# Patient Record
Sex: MALE | Race: WHITE | Employment: OTHER | URBAN - METROPOLITAN AREA
[De-identification: names, ages, dates, MRNs, and addresses within clinical notes are randomized per-mention and may not be internally consistent; named-entity substitution may affect disease eponyms.]

---

## 2017-01-30 ENCOUNTER — HOSPITAL ENCOUNTER (EMERGENCY)
Age: 62
Discharge: HOME | End: 2017-01-30
Attending: EMERGENCY MEDICINE
Payer: SUBSIDIZED

## 2017-01-30 VITALS
TEMPERATURE: 97.9 F | DIASTOLIC BLOOD PRESSURE: 70 MMHG | RESPIRATION RATE: 16 BRPM | SYSTOLIC BLOOD PRESSURE: 144 MMHG | HEART RATE: 64 BPM | OXYGEN SATURATION: 100 % | BODY MASS INDEX: 27.2 KG/M2 | HEIGHT: 70 IN | WEIGHT: 190 LBS

## 2017-01-30 DIAGNOSIS — N40.0 ENLARGED PROSTATE: Primary | ICD-10-CM

## 2017-01-30 PROCEDURE — 81003 URINALYSIS AUTO W/O SCOPE: CPT | Performed by: NURSE PRACTITIONER

## 2017-01-30 PROCEDURE — 99284 EMERGENCY DEPT VISIT MOD MDM: CPT | Performed by: NURSE PRACTITIONER

## 2017-01-30 RX ORDER — DOCUSATE SODIUM 100 MG/1
100 CAPSULE, LIQUID FILLED ORAL 2 TIMES DAILY
Qty: 60 CAP | Refills: 2 | Status: SHIPPED | OUTPATIENT
Start: 2017-01-30 | End: 2017-01-31 | Stop reason: ALTCHOICE

## 2017-01-30 RX ORDER — CIPROFLOXACIN 500 MG/1
500 TABLET ORAL 2 TIMES DAILY
Qty: 28 TAB | Refills: 0 | Status: SHIPPED | OUTPATIENT
Start: 2017-01-30 | End: 2017-02-13

## 2017-01-30 NOTE — PROGRESS NOTES
Visited with patient as no PCP listed in chart. Patient states that he is established with the [facility] and had blood drawn there back in December.   States he is having a hard time getting back in there to review the results and does not want to wait any longer as some of them were abnormal.

## 2017-01-30 NOTE — ED NOTES
I have reviewed discharge instructions with the patient. The patient verbalized understanding. Patient is ambulatory from department in no acute distress. Patient has discharge instructions and prescription x 2 in hand at time of departure.

## 2017-01-30 NOTE — ED PROVIDER NOTES
HPI Comments: 56 y/o m to ed with known elevated psa, dad w prostate cancer, with difficult and painful voids ongoing last 4-6 mos. nwo with painful bm, ribbon like bm at times. No fever or chills, no abd pain. No n/v/d. Admits urgency, frequency, hesitation of urine. Pain at beginning of void as well as with void. psa in dec 43    Patient is a 57 y.o. male presenting with frequency. The history is provided by the patient. No  was used. Urinary Frequency    This is a recurrent problem. Episode onset: 4-5 mos. The problem occurs every urination. The problem has been gradually worsening. Quality: pain at beginning of void, some pain with void. There has been no fever. Associated symptoms include frequency, hesitancy and urgency. Pertinent negatives include no chills, no sweats, no nausea, no vomiting, no discharge, no hematuria, no flank pain, no penile discharge, no abdominal pain and no back pain. History reviewed. No pertinent past medical history. History reviewed. No pertinent past surgical history. History reviewed. No pertinent family history. Social History     Social History    Marital status: SINGLE     Spouse name: N/A    Number of children: N/A    Years of education: N/A     Occupational History    Not on file. Social History Main Topics    Smoking status: Current Every Day Smoker     Packs/day: 0.50    Smokeless tobacco: Not on file      Comment: pt states he vapes    Alcohol use No    Drug use: No    Sexual activity: Not on file     Other Topics Concern    Not on file     Social History Narrative    No narrative on file         ALLERGIES: Review of patient's allergies indicates no known allergies. Review of Systems   Constitutional: Negative for chills and fever. HENT: Negative for ear pain and facial swelling. Eyes: Negative for discharge and redness. Respiratory: Negative for cough and shortness of breath.     Cardiovascular: Negative for chest pain and palpitations. Gastrointestinal: Positive for constipation. Negative for abdominal pain, nausea and vomiting. Endocrine: Negative for cold intolerance and heat intolerance. Genitourinary: Positive for decreased urine volume, difficulty urinating, dysuria, frequency, hesitancy and urgency. Negative for discharge, flank pain, hematuria, penile discharge, penile pain, penile swelling, scrotal swelling and testicular pain. Musculoskeletal: Negative for back pain and neck pain. Skin: Negative for pallor and rash. Neurological: Negative for dizziness and headaches. Psychiatric/Behavioral: Negative for confusion and decreased concentration. Vitals:    01/30/17 1128   BP: 160/90   Pulse: 70   Resp: 19   Temp: 97.9 °F (36.6 °C)   SpO2: 100%   Weight: 86.2 kg (190 lb)   Height: 5' 10\" (1.778 m)            Physical Exam   Constitutional: He is oriented to person, place, and time. He appears well-developed and well-nourished. No distress. HENT:   Head: Normocephalic and atraumatic. Right Ear: External ear normal.   Left Ear: External ear normal.   Nose: Nose normal.   Eyes: Conjunctivae and EOM are normal. Pupils are equal, round, and reactive to light. Neck: Normal range of motion. Neck supple. Cardiovascular: Normal rate, regular rhythm and normal heart sounds. Pulmonary/Chest: Effort normal and breath sounds normal. No respiratory distress. He has no wheezes. Abdominal: Soft. Bowel sounds are normal. He exhibits no distension. There is no tenderness. Genitourinary: Prostate is enlarged. Prostate is not tender. Musculoskeletal: Normal range of motion. He exhibits no edema or tenderness. Neurological: He is alert and oriented to person, place, and time. No cranial nerve deficit. Coordination normal.   Skin: Skin is warm and dry. No rash noted. Psychiatric: He has a normal mood and affect.  His behavior is normal. Judgment and thought content normal.   Nursing note and vitals reviewed. MDM  Number of Diagnoses or Management Options  Diagnosis management comments: 58 y/o m w urinary frequency, hesitancy, pain, as well as painful bm and ribbon like stools at times. psa in dec elevated. Dad with prostate cancer. Pt has tried to get in w free clinic but has had difficulty. His symptoms are problematic for him. Will eval prostate exam when chaperone available   D/w dr Cholo Duong. 12:56 PM  denny RN chaperone for prostate exam.  Prostate enlarged and hard, not tender. Appears symmetric to me. i was unable to palpate entire prostate however due to size. 1:11 PM I have spoken w Cj Ambriz at Dr Juliane Hernandez office. He is on his way there and will call me back as soon as he arrives. 1:54 PM  I have made multiple attempts to reach Dr Timothy Chance office but unable to get through. He has not called back yet. i will dc home with abx to follow w family md on call as well as dr alamo. Pt aware david santana       Amount and/or Complexity of Data Reviewed  Clinical lab tests: reviewed  Discuss the patient with other providers: yes (brown)    Risk of Complications, Morbidity, and/or Mortality  Presenting problems: minimal  Diagnostic procedures: minimal  Management options: minimal    Patient Progress  Patient progress: stable    ED Course       Procedures           I was personally available for consultation in the emergency department. I have reviewed the chart and agree with the documentation recorded by the [facility], including the assessment, treatment plan, and disposition.   Nasim Snider MD

## 2017-01-30 NOTE — ED TRIAGE NOTES
Pt in c/o urinary frequency and dysuria since last June. Pt states he is having difficulty being seen at [facility]. Pt states he believes it is his prostates. Denies n/v/d/fever. Pt states chronic constipation.

## 2017-01-31 PROBLEM — R10.2 PELVIC PAIN: Status: ACTIVE | Noted: 2017-01-31

## 2017-01-31 PROBLEM — N41.0 ACUTE PROSTATITIS: Status: ACTIVE | Noted: 2017-01-31

## 2017-01-31 PROBLEM — N40.2 PROSTATIC NODULE: Status: ACTIVE | Noted: 2017-01-31

## 2017-01-31 PROBLEM — R97.20 ELEVATED PSA: Status: ACTIVE | Noted: 2017-01-31

## 2017-01-31 PROBLEM — Z80.42 FAMILY HISTORY OF PROSTATE CANCER IN FATHER: Status: ACTIVE | Noted: 2017-01-31

## 2017-02-08 ENCOUNTER — HOSPITAL ENCOUNTER (OUTPATIENT)
Dept: LAB | Age: 62
Discharge: HOME | End: 2017-02-08

## 2017-02-08 PROCEDURE — 88305 TISSUE EXAM BY PATHOLOGIST: CPT | Performed by: UROLOGY

## 2017-02-27 ENCOUNTER — HOSPITAL ENCOUNTER (OUTPATIENT)
Dept: CT IMAGING | Age: 62
Discharge: HOME | End: 2017-02-27
Attending: UROLOGY
Payer: SUBSIDIZED

## 2017-02-27 VITALS — HEIGHT: 70 IN | BODY MASS INDEX: 26.48 KG/M2 | WEIGHT: 185 LBS

## 2017-02-27 DIAGNOSIS — C61 PROSTATIC ADENOCARCINOMA (HCC): ICD-10-CM

## 2017-02-27 PROCEDURE — 74011636320 HC RX REV CODE- 636/320: Performed by: UROLOGY

## 2017-02-27 PROCEDURE — 74011000258 HC RX REV CODE- 258: Performed by: UROLOGY

## 2017-02-27 PROCEDURE — 74177 CT ABD & PELVIS W/CONTRAST: CPT

## 2017-02-27 RX ORDER — SODIUM CHLORIDE 0.9 % (FLUSH) 0.9 %
10 SYRINGE (ML) INJECTION
Status: COMPLETED | OUTPATIENT
Start: 2017-02-27 | End: 2017-02-27

## 2017-02-27 RX ADMIN — DIATRIZOATE MEGLUMINE AND DIATRIZOATE SODIUM 15 ML: 660; 100 LIQUID ORAL; RECTAL at 13:04

## 2017-02-27 RX ADMIN — SODIUM CHLORIDE 100 ML: 900 INJECTION, SOLUTION INTRAVENOUS at 13:05

## 2017-02-27 RX ADMIN — IOVERSOL 100 ML: 741 INJECTION INTRA-ARTERIAL; INTRAVENOUS at 13:04

## 2017-02-27 RX ADMIN — Medication 10 ML: at 13:05

## 2017-03-01 ENCOUNTER — HOSPITAL ENCOUNTER (OUTPATIENT)
Dept: NUCLEAR MEDICINE | Age: 62
Discharge: HOME | End: 2017-03-01
Attending: UROLOGY
Payer: SUBSIDIZED

## 2017-03-01 DIAGNOSIS — C61 PROSTATIC ADENOCARCINOMA (HCC): ICD-10-CM

## 2017-03-01 PROCEDURE — 78306 BONE IMAGING WHOLE BODY: CPT

## 2017-03-02 ENCOUNTER — HOSPITAL ENCOUNTER (OUTPATIENT)
Dept: SURGERY | Age: 62
Discharge: HOME | End: 2017-03-02

## 2017-03-02 VITALS
OXYGEN SATURATION: 97 % | HEIGHT: 70 IN | RESPIRATION RATE: 16 BRPM | TEMPERATURE: 99 F | WEIGHT: 199.56 LBS | DIASTOLIC BLOOD PRESSURE: 87 MMHG | BODY MASS INDEX: 28.57 KG/M2 | SYSTOLIC BLOOD PRESSURE: 119 MMHG | HEART RATE: 66 BPM

## 2017-03-02 NOTE — PERIOP NOTES
Patient verified name, , and surgery as listed in [facility]. TYPE  CASE:1B  Orders per surgeon: yes Received  Labs per surgeon:\"no labs needed\"  Labs per anesthesia protocol: none  EKG  :  Not needed-pt denies      Patient provided with handouts including guide to surgery , transfusions, pain management and hand hygiene for the family and community. Pt verbalizes understanding of all pre-op instructions . Instructed that family must be present in building at all times. Hibiclens and instructions given per hospital policy. Instructed patient to continue  previous medications as prescribed prior to surgery and  to take the following medications the day of surgery according to anesthesia guidelines : none       Original medication prescription bottles NOT visualized during patient appointment. Continue all previous medications unless otherwise directed. Instructed patient to hold  the following medications prior to surgery: none       Patient verbalized understanding of all instructions and provided all medical/health information to the best of their ability.

## 2017-03-06 ENCOUNTER — ANESTHESIA EVENT (OUTPATIENT)
Dept: SURGERY | Age: 62
End: 2017-03-06
Payer: SUBSIDIZED

## 2017-03-07 ENCOUNTER — ANESTHESIA (OUTPATIENT)
Dept: SURGERY | Age: 62
End: 2017-03-07
Payer: SUBSIDIZED

## 2017-03-07 ENCOUNTER — HOSPITAL ENCOUNTER (OUTPATIENT)
Age: 62
Setting detail: OUTPATIENT SURGERY
Discharge: HOME | End: 2017-03-07
Attending: UROLOGY | Admitting: UROLOGY
Payer: SUBSIDIZED

## 2017-03-07 VITALS
DIASTOLIC BLOOD PRESSURE: 72 MMHG | TEMPERATURE: 98 F | SYSTOLIC BLOOD PRESSURE: 121 MMHG | RESPIRATION RATE: 26 BRPM | BODY MASS INDEX: 27.57 KG/M2 | WEIGHT: 192.56 LBS | HEIGHT: 70 IN | OXYGEN SATURATION: 92 % | HEART RATE: 52 BPM

## 2017-03-07 PROCEDURE — 76060000033 HC ANESTHESIA 1 TO 1.5 HR: Performed by: UROLOGY

## 2017-03-07 PROCEDURE — 76210000021 HC REC RM PH II 0.5 TO 1 HR: Performed by: UROLOGY

## 2017-03-07 PROCEDURE — 77030018836 HC SOL IRR NACL ICUM -A: Performed by: UROLOGY

## 2017-03-07 PROCEDURE — 74011250636 HC RX REV CODE- 250/636: Performed by: UROLOGY

## 2017-03-07 PROCEDURE — 77030020782 HC GWN BAIR PAWS FLX 3M -B: Performed by: ANESTHESIOLOGY

## 2017-03-07 PROCEDURE — 77030032490 HC SLV COMPR SCD KNE COVD -B: Performed by: UROLOGY

## 2017-03-07 PROCEDURE — 74011250637 HC RX REV CODE- 250/637: Performed by: ANESTHESIOLOGY

## 2017-03-07 PROCEDURE — 77030002912 HC SUT ETHBND J&J -A: Performed by: UROLOGY

## 2017-03-07 PROCEDURE — 88305 TISSUE EXAM BY PATHOLOGIST: CPT | Performed by: UROLOGY

## 2017-03-07 PROCEDURE — 74011250636 HC RX REV CODE- 250/636

## 2017-03-07 PROCEDURE — 76210000016 HC OR PH I REC 1 TO 1.5 HR: Performed by: UROLOGY

## 2017-03-07 PROCEDURE — 77030020143 HC AIRWY LARYN INTUB CGAS -A: Performed by: ANESTHESIOLOGY

## 2017-03-07 PROCEDURE — 74011250636 HC RX REV CODE- 250/636: Performed by: ANESTHESIOLOGY

## 2017-03-07 PROCEDURE — 77030011640 HC PAD GRND REM COVD -A: Performed by: UROLOGY

## 2017-03-07 PROCEDURE — 74011000250 HC RX REV CODE- 250: Performed by: UROLOGY

## 2017-03-07 PROCEDURE — 77030031139 HC SUT VCRL2 J&J -A: Performed by: UROLOGY

## 2017-03-07 PROCEDURE — 74011000250 HC RX REV CODE- 250

## 2017-03-07 PROCEDURE — 77030020407 HC IV BLD WRMR ST 3M -A: Performed by: ANESTHESIOLOGY

## 2017-03-07 PROCEDURE — 76010000149 HC OR TIME 1 TO 1.5 HR: Performed by: UROLOGY

## 2017-03-07 PROCEDURE — 77030012406 HC DRN WND PENRS BARD -A: Performed by: UROLOGY

## 2017-03-07 RX ORDER — SODIUM CHLORIDE 0.9 % (FLUSH) 0.9 %
5-10 SYRINGE (ML) INJECTION AS NEEDED
Status: DISCONTINUED | OUTPATIENT
Start: 2017-03-07 | End: 2017-03-07 | Stop reason: HOSPADM

## 2017-03-07 RX ORDER — MIDAZOLAM HYDROCHLORIDE 1 MG/ML
2 INJECTION, SOLUTION INTRAMUSCULAR; INTRAVENOUS ONCE
Status: COMPLETED | OUTPATIENT
Start: 2017-03-07 | End: 2017-03-07

## 2017-03-07 RX ORDER — FENTANYL CITRATE 50 UG/ML
100 INJECTION, SOLUTION INTRAMUSCULAR; INTRAVENOUS ONCE
Status: DISCONTINUED | OUTPATIENT
Start: 2017-03-07 | End: 2017-03-07 | Stop reason: HOSPADM

## 2017-03-07 RX ORDER — SODIUM CHLORIDE 0.9 % (FLUSH) 0.9 %
5-10 SYRINGE (ML) INJECTION EVERY 8 HOURS
Status: DISCONTINUED | OUTPATIENT
Start: 2017-03-07 | End: 2017-03-07 | Stop reason: HOSPADM

## 2017-03-07 RX ORDER — ONDANSETRON 2 MG/ML
INJECTION INTRAMUSCULAR; INTRAVENOUS AS NEEDED
Status: DISCONTINUED | OUTPATIENT
Start: 2017-03-07 | End: 2017-03-07 | Stop reason: HOSPADM

## 2017-03-07 RX ORDER — BUPIVACAINE HYDROCHLORIDE 5 MG/ML
INJECTION, SOLUTION EPIDURAL; INTRACAUDAL AS NEEDED
Status: DISCONTINUED | OUTPATIENT
Start: 2017-03-07 | End: 2017-03-07 | Stop reason: HOSPADM

## 2017-03-07 RX ORDER — SODIUM CHLORIDE, SODIUM LACTATE, POTASSIUM CHLORIDE, CALCIUM CHLORIDE 600; 310; 30; 20 MG/100ML; MG/100ML; MG/100ML; MG/100ML
100 INJECTION, SOLUTION INTRAVENOUS CONTINUOUS
Status: DISCONTINUED | OUTPATIENT
Start: 2017-03-07 | End: 2017-03-07 | Stop reason: HOSPADM

## 2017-03-07 RX ORDER — OXYCODONE HYDROCHLORIDE 5 MG/1
5 TABLET ORAL
Status: DISCONTINUED | OUTPATIENT
Start: 2017-03-07 | End: 2017-03-07 | Stop reason: HOSPADM

## 2017-03-07 RX ORDER — OXYCODONE HYDROCHLORIDE 5 MG/1
10 TABLET ORAL
Status: DISCONTINUED | OUTPATIENT
Start: 2017-03-07 | End: 2017-03-07 | Stop reason: HOSPADM

## 2017-03-07 RX ORDER — ACETAMINOPHEN 500 MG
1000 TABLET ORAL ONCE
Status: DISCONTINUED | OUTPATIENT
Start: 2017-03-07 | End: 2017-03-07 | Stop reason: HOSPADM

## 2017-03-07 RX ORDER — PROPOFOL 10 MG/ML
INJECTION, EMULSION INTRAVENOUS AS NEEDED
Status: DISCONTINUED | OUTPATIENT
Start: 2017-03-07 | End: 2017-03-07 | Stop reason: HOSPADM

## 2017-03-07 RX ORDER — KETOROLAC TROMETHAMINE 30 MG/ML
INJECTION, SOLUTION INTRAMUSCULAR; INTRAVENOUS AS NEEDED
Status: DISCONTINUED | OUTPATIENT
Start: 2017-03-07 | End: 2017-03-07 | Stop reason: HOSPADM

## 2017-03-07 RX ORDER — FLUMAZENIL 0.1 MG/ML
0.2 INJECTION INTRAVENOUS
Status: DISCONTINUED | OUTPATIENT
Start: 2017-03-07 | End: 2017-03-07 | Stop reason: HOSPADM

## 2017-03-07 RX ORDER — MIDAZOLAM HYDROCHLORIDE 1 MG/ML
2 INJECTION, SOLUTION INTRAMUSCULAR; INTRAVENOUS
Status: DISCONTINUED | OUTPATIENT
Start: 2017-03-07 | End: 2017-03-07 | Stop reason: HOSPADM

## 2017-03-07 RX ORDER — CEFAZOLIN SODIUM IN 0.9 % NACL 2 G/50 ML
2 INTRAVENOUS SOLUTION, PIGGYBACK (ML) INTRAVENOUS ONCE
Status: COMPLETED | OUTPATIENT
Start: 2017-03-07 | End: 2017-03-07

## 2017-03-07 RX ORDER — OXYCODONE AND ACETAMINOPHEN 7.5; 325 MG/1; MG/1
1 TABLET ORAL
Qty: 30 TAB | Refills: 0 | Status: SHIPPED | OUTPATIENT
Start: 2017-03-07 | End: 2017-05-23 | Stop reason: ALTCHOICE

## 2017-03-07 RX ORDER — DEXAMETHASONE SODIUM PHOSPHATE 4 MG/ML
INJECTION, SOLUTION INTRA-ARTICULAR; INTRALESIONAL; INTRAMUSCULAR; INTRAVENOUS; SOFT TISSUE AS NEEDED
Status: DISCONTINUED | OUTPATIENT
Start: 2017-03-07 | End: 2017-03-07 | Stop reason: HOSPADM

## 2017-03-07 RX ORDER — FENTANYL CITRATE 50 UG/ML
INJECTION, SOLUTION INTRAMUSCULAR; INTRAVENOUS AS NEEDED
Status: DISCONTINUED | OUTPATIENT
Start: 2017-03-07 | End: 2017-03-07 | Stop reason: HOSPADM

## 2017-03-07 RX ORDER — NALOXONE HYDROCHLORIDE 0.4 MG/ML
0.2 INJECTION, SOLUTION INTRAMUSCULAR; INTRAVENOUS; SUBCUTANEOUS AS NEEDED
Status: DISCONTINUED | OUTPATIENT
Start: 2017-03-07 | End: 2017-03-07 | Stop reason: HOSPADM

## 2017-03-07 RX ORDER — DIPHENHYDRAMINE HYDROCHLORIDE 50 MG/ML
12.5 INJECTION, SOLUTION INTRAMUSCULAR; INTRAVENOUS
Status: DISCONTINUED | OUTPATIENT
Start: 2017-03-07 | End: 2017-03-07 | Stop reason: HOSPADM

## 2017-03-07 RX ORDER — LIDOCAINE HYDROCHLORIDE 20 MG/ML
INJECTION, SOLUTION EPIDURAL; INFILTRATION; INTRACAUDAL; PERINEURAL AS NEEDED
Status: DISCONTINUED | OUTPATIENT
Start: 2017-03-07 | End: 2017-03-07 | Stop reason: HOSPADM

## 2017-03-07 RX ORDER — HYDROMORPHONE HYDROCHLORIDE 2 MG/ML
0.5 INJECTION, SOLUTION INTRAMUSCULAR; INTRAVENOUS; SUBCUTANEOUS
Status: DISCONTINUED | OUTPATIENT
Start: 2017-03-07 | End: 2017-03-07 | Stop reason: HOSPADM

## 2017-03-07 RX ORDER — LIDOCAINE HYDROCHLORIDE 10 MG/ML
0.1 INJECTION INFILTRATION; PERINEURAL AS NEEDED
Status: DISCONTINUED | OUTPATIENT
Start: 2017-03-07 | End: 2017-03-07 | Stop reason: HOSPADM

## 2017-03-07 RX ORDER — CEPHALEXIN 500 MG/1
500 CAPSULE ORAL 4 TIMES DAILY
Qty: 20 CAP | Refills: 0 | Status: SHIPPED | OUTPATIENT
Start: 2017-03-07 | End: 2017-03-12

## 2017-03-07 RX ADMIN — MIDAZOLAM HYDROCHLORIDE 2 MG: 1 INJECTION, SOLUTION INTRAMUSCULAR; INTRAVENOUS at 14:15

## 2017-03-07 RX ADMIN — FENTANYL CITRATE 25 MCG: 50 INJECTION, SOLUTION INTRAMUSCULAR; INTRAVENOUS at 15:01

## 2017-03-07 RX ADMIN — FENTANYL CITRATE 50 MCG: 50 INJECTION, SOLUTION INTRAMUSCULAR; INTRAVENOUS at 15:05

## 2017-03-07 RX ADMIN — FENTANYL CITRATE 50 MCG: 50 INJECTION, SOLUTION INTRAMUSCULAR; INTRAVENOUS at 14:35

## 2017-03-07 RX ADMIN — KETOROLAC TROMETHAMINE 30 MG: 30 INJECTION, SOLUTION INTRAMUSCULAR; INTRAVENOUS at 15:22

## 2017-03-07 RX ADMIN — FENTANYL CITRATE 25 MCG: 50 INJECTION, SOLUTION INTRAMUSCULAR; INTRAVENOUS at 14:53

## 2017-03-07 RX ADMIN — SODIUM CHLORIDE, SODIUM LACTATE, POTASSIUM CHLORIDE, AND CALCIUM CHLORIDE 100 ML/HR: 600; 310; 30; 20 INJECTION, SOLUTION INTRAVENOUS at 12:25

## 2017-03-07 RX ADMIN — CEFAZOLIN 2 G: 1 INJECTION, POWDER, FOR SOLUTION INTRAMUSCULAR; INTRAVENOUS; PARENTERAL at 14:27

## 2017-03-07 RX ADMIN — ONDANSETRON 4 MG: 2 INJECTION INTRAMUSCULAR; INTRAVENOUS at 15:22

## 2017-03-07 RX ADMIN — DEXAMETHASONE SODIUM PHOSPHATE 4 MG: 4 INJECTION, SOLUTION INTRA-ARTICULAR; INTRALESIONAL; INTRAMUSCULAR; INTRAVENOUS; SOFT TISSUE at 14:43

## 2017-03-07 RX ADMIN — PROPOFOL 200 MG: 10 INJECTION, EMULSION INTRAVENOUS at 14:37

## 2017-03-07 RX ADMIN — OXYCODONE HYDROCHLORIDE 10 MG: 5 TABLET ORAL at 16:38

## 2017-03-07 RX ADMIN — LIDOCAINE HYDROCHLORIDE 100 MG: 20 INJECTION, SOLUTION EPIDURAL; INFILTRATION; INTRACAUDAL; PERINEURAL at 14:37

## 2017-03-07 NOTE — ANESTHESIA POSTPROCEDURE EVALUATION
Post-Anesthesia Evaluation and Assessment    Patient: Lobo Camacho MRN: [id number]  SSN: [id number]    YOB: 1955  Age: 57 y.o. Sex: male       Cardiovascular Function/Vital Signs  Visit Vitals    /73    Pulse (!) 51    Temp 36.3 °C (97.4 °F)    Resp 20    Ht 5' 10\" (1.778 m)    Wt 87.3 kg (192 lb 9 oz)    SpO2 93%    BMI 27.63 kg/m2       Patient is status post general anesthesia for Procedure(s):  BILATERAL SCROTAL ORCHIECTOMY. Nausea/Vomiting: None    Postoperative hydration reviewed and adequate. Pain:  Pain Scale 1: Numeric (0 - 10) (03/07/17 1638)  Pain Intensity 1: 0 (03/07/17 1638)   Managed    Neurological Status:   Neuro (WDL): Within Defined Limits (03/07/17 1538)   At baseline    Mental Status and Level of Consciousness: Arousable    Pulmonary Status:   O2 Device: Nasal cannula (03/07/17 1538)   Adequate oxygenation and airway patent    Complications related to anesthesia: None    Post-anesthesia assessment completed.  No concerns    Signed By: Carloz Henriquez MD     March 7, 2017

## 2017-03-07 NOTE — IP AVS SNAPSHOT
Yee Saavedra 
 
 
 [address] [facility] [address] 
[phone] Patient: Farrah Ortiz MRN: [id number] :1955 You are allergic to the following No active allergies Recent Documentation Height Weight BMI Smoking Status 1.778 m 87.3 kg 27.63 kg/m2 Former Smoker Emergency Contacts Name Discharge Info Relation Home Work Mobile Lenard Johnson  Friend [5] [phone] About your hospitalization You were admitted on:  2017 [facility] You were discharged on:  2017 Unit phone number:  [phone] Why you were hospitalized Your primary diagnosis was:  Not on File Providers Seen During Your Hospitalizations Provider Role Specialty Primary office phone Madalyn Griffin MD Attending Provider Urology [phone] Your Primary Care Physician (PCP) Primary Care Physician Office Phone Office Fax NONE ** None ** ** None ** Follow-up Information Follow up With Details Comments Contact Info None   None ([phone]) Patient stated that they have no PCP Madalyn Griffin MD Schedule an appointment as soon as possible for a visit today For follow up in 2-4 weeks [address] [address] 
[phone] Current Discharge Medication List  
  
START taking these medications Dose & Instructions Dispensing Information Comments Morning Noon Evening Bedtime  
 cephALEXin 500 mg capsule Commonly known as:  Andre Fogo Your next dose is: Today, Tomorrow Other:  _________ Dose:  500 mg Take 1 Cap by mouth four (4) times daily for 5 days. Quantity:  20 Cap Refills:  0  
     
   
   
   
  
 oxyCODONE-acetaminophen 7.5-325 mg per tablet Commonly known as:  PERCOCET 7.5 Your next dose is: Today, Tomorrow Other:  _________ Dose:  1 Tab Take 1 Tab by mouth every four (4) hours as needed for Pain. Max Daily Amount: 6 Tabs. Indications: Pain Quantity:  30 Tab Refills:  0 CONTINUE these medications which have CHANGED Dose & Instructions Dispensing Information Comments Morning Noon Evening Bedtime  
 bicalutamide 50 mg tablet Commonly known as:  CASODEX What changed:  when to take this Your next dose is: Today, Tomorrow Other:  _________ Dose:  50 mg Take 1 Tab by mouth daily. Indications: advanced prostatic carcinoma Quantity:  30 Tab Refills:  2 CONTINUE these medications which have NOT CHANGED Dose & Instructions Dispensing Information Comments Morning Noon Evening Bedtime RAPAFLO PO Your next dose is: Today, Tomorrow Other:  _________ Dose:  1 Cap Take 1 Cap by mouth nightly. Unknown dosage  - will bring DOS Refills:  0 Where to Get Your Medications Information on where to get these meds will be given to you by the nurse or doctor. ! Ask your nurse or doctor about these medications  
  cephALEXin 500 mg capsule  
 oxyCODONE-acetaminophen 7.5-325 mg per tablet Discharge Instructions *Resume diet and limit activity for 5 to 7 days *Ice pack 12 hr and gauze dressing change daily.   
*May take shower tomorrow afternoon and place clean dressing and underwear.     
*F/U in 2 to 4  weeks ACTIVITY · As tolerated and as directed by your doctor. · Bathe or shower as directed by your doctor. DIET · Clear liquids until no nausea or vomiting; then light diet for the first day. · Advance to regular diet on second day, unless your doctor orders otherwise. · If nausea and vomiting continues, call your doctor. PAIN 
· Take pain medication as directed by your doctor. · Call your doctor if pain is NOT relieved by medication. · DO NOT take aspirin of blood thinners unless directed by your doctor. DRESSING CARE  
 
 
CALL YOUR DOCTOR IF  
· Excessive bleeding that does not stop after holding pressure over the area · Temperature of 101 degrees F or above · Excessive redness, swelling or bruising, and/ or green or yellow, smelly discharge from incision AFTER ANESTHESIA · For the first 24 hours: DO NOT Drive, Drink alcoholic beverages, or Make important decisions. · Be aware of dizziness following anesthesia and while taking pain medication. APPOINTMENT DATE/ TIME 
 
YOUR DOCTOR'S PHONE NUMBER  
 
 
DISCHARGE SUMMARY from Nurse PATIENT INSTRUCTIONS: 
 
After general anesthesia or intravenous sedation, for 24 hours or while taking prescription Narcotics: · Limit your activities · Do not drive and operate hazardous machinery · Do not make important personal or business decisions · Do  not drink alcoholic beverages · If you have not urinated within 8 hours after discharge, please contact your surgeon on call. *  Please give a list of your current medications to your Primary Care Provider. *  Please update this list whenever your medications are discontinued, doses are 
    changed, or new medications (including over-the-counter products) are added. *  Please carry medication information at all times in case of emergency situations. These are general instructions for a healthy lifestyle: No smoking/ No tobacco products/ Avoid exposure to second hand smoke Surgeon General's Warning:  Quitting smoking now greatly reduces serious risk to your health. Obesity, smoking, and sedentary lifestyle greatly increases your risk for illness A healthy diet, regular physical exercise & weight monitoring are important for maintaining a healthy lifestyle You may be retaining fluid if you have a history of heart failure or if you experience any of the following symptoms:  Weight gain of 3 pounds or more overnight or 5 pounds in a week, increased swelling in our hands or feet or shortness of breath while lying flat in bed. Please call your doctor as soon as you notice any of these symptoms; do not wait until your next office visit. Recognize signs and symptoms of STROKE: 
 
F-face looks uneven A-arms unable to move or move unevenly S-speech slurred or non-existent T-time-call 911 as soon as signs and symptoms begin-DO NOT go Back to bed or wait to see if you get better-TIME IS BRAIN. Discharge Orders None [facility]! Dear Domi Carbajal: 
Thank you for requesting a [employer]etch[employer] account. Our records indicate that you already have an active Fetch[employer] account. You can access your account anytime at [email or web address] ([email or web address] Did you know that you can access your hospital and ER discharge instructions at any time in [employer]etch[employer]? You can also review all of your test results from your hospital stay or ER visit. Additional Information If you have questions, please visit the Frequently Asked Questions section of the [employer]etch[employer] website at [email or web address] ([email or web address] Remember, Fetch[employer] is NOT to be used for urgent needs. For medical emergencies, dial 911. Now available from your iPhone and Android! General Information Please provide this summary of care documentation to your next provider. Patient Signature:  ____________________________________________________________ Date:  ____________________________________________________________  
  
Amauri Barone Provider Signature:  ____________________________________________________________ Date:  ____________________________________________________________

## 2017-03-07 NOTE — ANESTHESIA PREPROCEDURE EVALUATION
Anesthetic History   No history of anesthetic complications            Review of Systems / Medical History  Patient summary reviewed and pertinent labs reviewed    Pulmonary          Smoker         Neuro/Psych   Within defined limits           Cardiovascular                  Exercise tolerance: >4 METS     GI/Hepatic/Renal  Within defined limits              Endo/Other        Cancer (prostate)     Other Findings            Physical Exam    Airway  Mallampati: III  TM Distance: 4 - 6 cm  Neck ROM: normal range of motion   Mouth opening: Normal     Cardiovascular    Rhythm: regular  Rate: normal         Dental         Pulmonary  Breath sounds clear to auscultation               Abdominal         Other Findings            Anesthetic Plan    ASA: 2  Anesthesia type: general          Induction: Intravenous  Anesthetic plan and risks discussed with: Patient

## 2017-03-07 NOTE — BRIEF OP NOTE
BRIEF OPERATIVE NOTE    Date of Procedure: 3/7/2017   Preoperative Diagnosis: Prostate cancer ([facility] [address] [C61]  Postoperative Diagnosis: Prostate cancer ([facility] [address] Jailyn Dick    Procedure(s):  BILATERAL SCROTAL ORCHIECTOMY  Surgeon(s) and Role:     * Srikanth Ray MD - Primary            Surgical Staff:  Circ-1: Darryl Manjarrez RN  Scrub Tech-1: Shai Cuadra CNA  Scrub Tech-2: Ronda Harrison  Event Time In   Incision Start 1504   Incision Close 1522     Anesthesia: MAC   Estimated Blood Loss: min  Specimens:   ID Type Source Tests Collected by Time Destination   1 : RIGHT AND LEFT TESTICLES Preservative Testicle  Srikanth Ray MD 3/7/2017 1519 Pathology      Findings: note   Complications: none  Implants: * No implants in log *

## 2017-03-07 NOTE — DISCHARGE INSTRUCTIONS
*Resume diet and limit activity for 5 to 7 days  *Ice pack 12 hr and gauze dressing change daily.    *May take shower tomorrow afternoon and place clean dressing and underwear.      *F/U in 2 to 4  weeks    ACTIVITY  · As tolerated and as directed by your doctor. · Bathe or shower as directed by your doctor. DIET  · Clear liquids until no nausea or vomiting; then light diet for the first day. · Advance to regular diet on second day, unless your doctor orders otherwise. · If nausea and vomiting continues, call your doctor. PAIN  · Take pain medication as directed by your doctor. · Call your doctor if pain is NOT relieved by medication. · DO NOT take aspirin of blood thinners unless directed by your doctor. DRESSING CARE       CALL YOUR DOCTOR IF   · Excessive bleeding that does not stop after holding pressure over the area  · Temperature of 101 degrees F or above  · Excessive redness, swelling or bruising, and/ or green or yellow, smelly discharge from incision    AFTER ANESTHESIA   · For the first 24 hours: DO NOT Drive, Drink alcoholic beverages, or Make important decisions. · Be aware of dizziness following anesthesia and while taking pain medication. APPOINTMENT DATE/ TIME    YOUR DOCTOR'S PHONE NUMBER       DISCHARGE SUMMARY from Nurse    PATIENT INSTRUCTIONS:    After general anesthesia or intravenous sedation, for 24 hours or while taking prescription Narcotics:  · Limit your activities  · Do not drive and operate hazardous machinery  · Do not make important personal or business decisions  · Do  not drink alcoholic beverages  · If you have not urinated within 8 hours after discharge, please contact your surgeon on call. *  Please give a list of your current medications to your Primary Care Provider. *  Please update this list whenever your medications are discontinued, doses are      changed, or new medications (including over-the-counter products) are added.     *  Please carry medication information at all times in case of emergency situations. These are general instructions for a healthy lifestyle:    No smoking/ No tobacco products/ Avoid exposure to second hand smoke    Surgeon General's Warning:  Quitting smoking now greatly reduces serious risk to your health. Obesity, smoking, and sedentary lifestyle greatly increases your risk for illness    A healthy diet, regular physical exercise & weight monitoring are important for maintaining a healthy lifestyle    You may be retaining fluid if you have a history of heart failure or if you experience any of the following symptoms:  Weight gain of 3 pounds or more overnight or 5 pounds in a week, increased swelling in our hands or feet or shortness of breath while lying flat in bed. Please call your doctor as soon as you notice any of these symptoms; do not wait until your next office visit. Recognize signs and symptoms of STROKE:    F-face looks uneven    A-arms unable to move or move unevenly    S-speech slurred or non-existent    T-time-call 911 as soon as signs and symptoms begin-DO NOT go       Back to bed or wait to see if you get better-TIME IS BRAIN.

## 2017-03-08 NOTE — OP NOTES
[address]   OPERATIVE REPORT       Name:  Layla Farnsworth   MR#:  [id number]   :  1955   Account #:  [de-identified]   Date of Adm:  2017       DATE OF PROCEDURE: 2017    PREOPERATIVE DIAGNOSIS: Stage T4 prostatic adenocarcinoma, high-  grade. POSTOPERATIVE DIAGNOSIS: Stage T4 prostatic adenocarcinoma,   high-grade. PROCEDURE: Bilateral scrotal orchiectomy for hormonal ablation. SURGEON: CHANEL Deluna MD    ANESTHESIA: General.    BLOOD LOSS: Minimal.    OPERATIVE PROCEDURE: The patient was taken to the operating room   suite, underwent general anesthesia, shaved, prepped with   Betadine, and draped in appropriate manner. Transverse left   scrotal incision was made down through the skin and subcutaneous   tissue and tunica vaginalis using electrocautery. The patient   had the tunica vaginalis opened transversely and then the   testicle was brought out in the operative field. The spermatic   cord was dissected out. It was divided into 2 bundles using a   Nahed clamp. It was cross clamped. The testicle was removed   intact and a double ligation of each bundle of the spermatic   cord was performed with good hemostasis. The patient had a   similar procedure performed on the right side, removing the   testicle with good hemostasis. A total of 30 mL of 0.5% Marcaine   was used as a cord block and then injected into the incision   site into each side of the scrotal cavity. The patient had the   spermatic cords placed back in their anatomic position. The   patient had the incisions at each scrotal incision approximated   with interrupted 3-0 chromic in a vertical mattress incision. The patient tolerated the procedure well, was sent to the   recovery area in stable condition with a scrotal supporter,   fluff dressings, and would have an ice pack for 12 hours,   with followup in about 2-4 weeks.  He is to continue on his   Casodex and, when he comes in for followup, we will get a JOE.        MD ANASTASIA Saeed / TRINIDAD   D:  03/07/2017   15:32   T:  03/08/2017   09:51   Job #:  [id number]

## 2017-04-07 ENCOUNTER — TELEPHONE (OUTPATIENT)
Dept: ONCOLOGY | Age: 62
End: 2017-04-07

## 2017-04-07 NOTE — TELEPHONE ENCOUNTER
MATTHEW received voicemail from pt. SW returned pt's call and he stated he does not have insurance but has spoken with Lisa Friend [facility] and applied for hospital sponsorship. He stated he has also been contacted by [facility] for insurance screening and was attempting to complete application. MATTHEW encouraged pt to contact community MATHTEW Martino for immediate assistance with resources and application completion. MATTHEW stated that once pt has an appt at the [address] can also work with him. MATTHEW provided education about the [employer] [facility] and stated that she would reach out to pt's urology office to complete referral as they have confirmed pt's cancer diagnosis and pt has not been seen at [facility] yet. Pt verbalized understanding. No other needs identified at this time. MATTHEW encouraged pt to call back with questions or concerns. He verbalized understanding. MATTHEW contacted Dr. Ewing [place] office and faxed a blank referral form for [employer] for completion. MATTHEW intends to follow up PRN.

## 2017-04-07 NOTE — TELEPHONE ENCOUNTER
MATTHEW received referral for pt from [place] [address] as pt appeared at [facility] requesting to speak with a . Pt has not been seen at the [facility] nor does have an appointment scheduled. SW unable to meet with pt in person as she was scheduled with another pt. MATTHEW consulted with [facility] to assess if she had made contact with this patient. She stated she had been unable to reach him. MATTHEW called pt and left voicemail requesting a return call. MATTHEW intends to follow up PRN and refer patient to available resources as necessary.

## 2017-05-03 PROBLEM — C61 PROSTATE CANCER (HCC): Status: ACTIVE | Noted: 2017-05-03

## 2017-05-11 PROBLEM — C77.2 METASTASIS TO RETROPERITONEAL LYMPH NODE (HCC): Status: ACTIVE | Noted: 2017-05-11

## 2017-05-16 ENCOUNTER — DOCUMENTATION ONLY (OUTPATIENT)
Dept: HEMATOLOGY | Age: 62
End: 2017-05-16

## 2017-05-16 NOTE — PROGRESS NOTES
I spoke with Óscar Cano regarding him having no insurance coverage. Mr. Salome Marie is self-pay and I informed him the the medication that he will be taking is generic so   there is no option to obtain free drug from the . Mr. Salome Marie informed me that he has applied for financial assistance through the hospital.               Next, I spoke with Mr. Salome Marie regarding potential oral medication authorizations. I told him that if he ever had any problems getting his oral medications filled to give the dedicated             [address]  Hiren Sanchez a call. Most of the time, it is simply an authorization that needs to be done with the insurance company. Next, I spoke with Mr. Salome Marie regarding enrolling with [facility]. I went over some of the services that [facility] and [facility] offers and the enrollment process. Lastly, I gave Mr. Salome Marie a form with various resource organizations that could assist with specific needs (example:  transportation, lodging, preparing meals, home cleaning)                 Faxed Patient Referral form to the [facility] at [phone]. Phone [phone]. Form scanned into chart. Faxed Physician's Statement to the [id number] [place] [address] [facility] at [phone]. Phone [phone]. Form scanned into chart.

## 2017-05-19 ENCOUNTER — HOSPITAL ENCOUNTER (OUTPATIENT)
Dept: INFUSION THERAPY | Age: 62
Discharge: HOME | End: 2017-05-19
Payer: SUBSIDIZED

## 2017-05-19 ENCOUNTER — HOSPITAL ENCOUNTER (OUTPATIENT)
Dept: LAB | Age: 62
Discharge: HOME | End: 2017-05-19
Payer: SUBSIDIZED

## 2017-05-19 VITALS — DIASTOLIC BLOOD PRESSURE: 79 MMHG | HEART RATE: 68 BPM | OXYGEN SATURATION: 96 % | SYSTOLIC BLOOD PRESSURE: 150 MMHG

## 2017-05-19 DIAGNOSIS — C61 PROSTATE CANCER (HCC): ICD-10-CM

## 2017-05-19 DIAGNOSIS — C77.2 METASTASIS TO RETROPERITONEAL LYMPH NODE (HCC): ICD-10-CM

## 2017-05-19 LAB
ALBUMIN SERPL BCP-MCNC: 4.3 G/DL (ref 3.2–4.6)
ALBUMIN/GLOB SERPL: 1.4 {RATIO} (ref 1.2–3.5)
ALP SERPL-CCNC: 61 U/L (ref 50–136)
ALT SERPL-CCNC: 28 U/L (ref 12–65)
ANION GAP BLD CALC-SCNC: 8 MMOL/L (ref 7–16)
AST SERPL W P-5'-P-CCNC: 15 U/L (ref 15–37)
BASOPHILS # BLD AUTO: 0 K/UL (ref 0–0.2)
BASOPHILS # BLD: 0 % (ref 0–2)
BILIRUB SERPL-MCNC: 0.4 MG/DL (ref 0.2–1.1)
BUN SERPL-MCNC: 24 MG/DL (ref 8–23)
CALCIUM SERPL-MCNC: 9.4 MG/DL (ref 8.3–10.4)
CHLORIDE SERPL-SCNC: 108 MMOL/L (ref 98–107)
CO2 SERPL-SCNC: 25 MMOL/L (ref 21–32)
CREAT SERPL-MCNC: 1.03 MG/DL (ref 0.8–1.5)
DIFFERENTIAL METHOD BLD: ABNORMAL
EOSINOPHIL # BLD: 0 K/UL (ref 0–0.8)
EOSINOPHIL NFR BLD: 0 % (ref 0.5–7.8)
ERYTHROCYTE [DISTWIDTH] IN BLOOD BY AUTOMATED COUNT: 12.7 % (ref 11.9–14.6)
GLOBULIN SER CALC-MCNC: 3.1 G/DL (ref 2.3–3.5)
GLUCOSE SERPL-MCNC: 130 MG/DL (ref 65–100)
HCT VFR BLD AUTO: 35.8 % (ref 41.1–50.3)
HGB BLD-MCNC: 12.5 G/DL (ref 13.6–17.2)
LYMPHOCYTES # BLD AUTO: 11 % (ref 13–44)
LYMPHOCYTES # BLD: 1.5 K/UL (ref 0.5–4.6)
MCH RBC QN AUTO: 32.6 PG (ref 26.1–32.9)
MCHC RBC AUTO-ENTMCNC: 34.9 G/DL (ref 31.4–35)
MCV RBC AUTO: 93.5 FL (ref 79.6–97.8)
MONOCYTES # BLD: 0.7 K/UL (ref 0.1–1.3)
MONOCYTES NFR BLD AUTO: 5 % (ref 4–12)
NEUTS SEG # BLD: 11 K/UL (ref 1.7–8.2)
NEUTS SEG NFR BLD AUTO: 83 % (ref 43–78)
NRBC # BLD: 0 K/UL (ref 0–0.2)
PLATELET # BLD AUTO: 224 K/UL (ref 150–450)
PMV BLD AUTO: 9.7 FL (ref 10.8–14.1)
POTASSIUM SERPL-SCNC: 3.7 MMOL/L (ref 3.5–5.1)
PROT SERPL-MCNC: 7.4 G/DL (ref 6.3–8.2)
PSA SERPL-MCNC: 4.4 NG/ML
RBC # BLD AUTO: 3.83 M/UL (ref 4.23–5.67)
SODIUM SERPL-SCNC: 141 MMOL/L (ref 136–145)
WBC # BLD AUTO: 13.2 K/UL (ref 4.3–11.1)

## 2017-05-19 PROCEDURE — 84153 ASSAY OF PSA TOTAL: CPT | Performed by: NURSE PRACTITIONER

## 2017-05-19 PROCEDURE — 96375 TX/PRO/DX INJ NEW DRUG ADDON: CPT

## 2017-05-19 PROCEDURE — 80053 COMPREHEN METABOLIC PANEL: CPT | Performed by: NURSE PRACTITIONER

## 2017-05-19 PROCEDURE — 74011250636 HC RX REV CODE- 250/636

## 2017-05-19 PROCEDURE — 96413 CHEMO IV INFUSION 1 HR: CPT

## 2017-05-19 PROCEDURE — 74011250636 HC RX REV CODE- 250/636: Performed by: INTERNAL MEDICINE

## 2017-05-19 PROCEDURE — 85025 COMPLETE CBC W/AUTO DIFF WBC: CPT | Performed by: NURSE PRACTITIONER

## 2017-05-19 PROCEDURE — 36415 COLL VENOUS BLD VENIPUNCTURE: CPT | Performed by: NURSE PRACTITIONER

## 2017-05-19 PROCEDURE — 96361 HYDRATE IV INFUSION ADD-ON: CPT

## 2017-05-19 RX ORDER — DEXAMETHASONE SODIUM PHOSPHATE 100 MG/10ML
10 INJECTION INTRAMUSCULAR; INTRAVENOUS ONCE
Status: COMPLETED | OUTPATIENT
Start: 2017-05-19 | End: 2017-05-19

## 2017-05-19 RX ORDER — HEPARIN SODIUM 1000 [USP'U]/ML
2000 INJECTION, SOLUTION INTRAVENOUS; SUBCUTANEOUS AS NEEDED
Status: CANCELLED
Start: 2017-05-19

## 2017-05-19 RX ORDER — EPINEPHRINE 1 MG/ML
0.3 INJECTION, SOLUTION, CONCENTRATE INTRAVENOUS AS NEEDED
Status: CANCELLED | OUTPATIENT
Start: 2017-05-19

## 2017-05-19 RX ORDER — DIPHENHYDRAMINE HYDROCHLORIDE 50 MG/ML
50 INJECTION, SOLUTION INTRAMUSCULAR; INTRAVENOUS AS NEEDED
Status: CANCELLED
Start: 2017-05-19

## 2017-05-19 RX ORDER — SODIUM CHLORIDE 0.9 % (FLUSH) 0.9 %
10 SYRINGE (ML) INJECTION AS NEEDED
Status: ACTIVE | OUTPATIENT
Start: 2017-05-19 | End: 2017-05-20

## 2017-05-19 RX ORDER — ONDANSETRON 2 MG/ML
8 INJECTION INTRAMUSCULAR; INTRAVENOUS AS NEEDED
Status: CANCELLED | OUTPATIENT
Start: 2017-05-19

## 2017-05-19 RX ORDER — HYDROCORTISONE SODIUM SUCCINATE 100 MG/2ML
100 INJECTION, POWDER, FOR SOLUTION INTRAMUSCULAR; INTRAVENOUS AS NEEDED
Status: CANCELLED | OUTPATIENT
Start: 2017-05-19

## 2017-05-19 RX ORDER — HEPARIN 100 UNIT/ML
300-500 SYRINGE INTRAVENOUS AS NEEDED
Status: CANCELLED
Start: 2017-05-19

## 2017-05-19 RX ORDER — ACETAMINOPHEN 325 MG/1
650 TABLET ORAL AS NEEDED
Status: CANCELLED
Start: 2017-05-19

## 2017-05-19 RX ORDER — ALBUTEROL SULFATE 0.83 MG/ML
2.5 SOLUTION RESPIRATORY (INHALATION) AS NEEDED
Status: CANCELLED
Start: 2017-05-19

## 2017-05-19 RX ADMIN — DOCETAXEL 155 MG: 80 INJECTION, SOLUTION, CONCENTRATE INTRAVENOUS at 15:55

## 2017-05-19 RX ADMIN — SODIUM CHLORIDE 500 ML: 900 INJECTION, SOLUTION INTRAVENOUS at 15:10

## 2017-05-19 RX ADMIN — DEXAMETHASONE SODIUM PHOSPHATE 10 MG: 10 INJECTION INTRAMUSCULAR; INTRAVENOUS at 15:06

## 2017-05-19 NOTE — PROGRESS NOTES
Arrived ambulatory accompanied by son  D1C1 taxotere infused.   Patient is very anxious,reviewed possible reactions  Tolerated well  Reviewed when to call physician,verbalized understanding  Next appt 6/9

## 2017-05-22 ENCOUNTER — TELEPHONE (OUTPATIENT)
Dept: ONCOLOGY | Age: 62
End: 2017-05-22

## 2017-05-22 NOTE — TELEPHONE ENCOUNTER
SW received call from pt stating that he felt fatigued after his chemo and needed \"some kind of work out plan. \" Pt also stated he has tried to eat but \"I eat, but I don't feel good afterward. \" SW confirmed pt had RN Triage list and encouraged pt call that number to discuss side effects to ensure no complications from treatment. Pt verbalized understanding. Pt requested dietician and rehab referral. SW contacted pt's RN Ector Alonso via email to facilitate referrals to Oncology Rehab and MARINA Johnson. Pt did not identify any other needs at this time. SW encouraged pt to call SW should any needs arise. Pt verbalized understanding. SW intends to follow up PRN.

## 2017-05-23 ENCOUNTER — HOSPITAL ENCOUNTER (OUTPATIENT)
Dept: CT IMAGING | Age: 62
Discharge: HOME | End: 2017-05-23
Attending: UROLOGY
Payer: SUBSIDIZED

## 2017-05-23 DIAGNOSIS — R10.9 ABDOMINAL PAIN IN MALE: ICD-10-CM

## 2017-05-23 DIAGNOSIS — C61 PROSTATE CANCER (HCC): ICD-10-CM

## 2017-05-23 DIAGNOSIS — R97.20 ELEVATED PSA: ICD-10-CM

## 2017-05-23 PROCEDURE — 74011636320 HC RX REV CODE- 636/320: Performed by: UROLOGY

## 2017-05-23 PROCEDURE — 74011000258 HC RX REV CODE- 258: Performed by: UROLOGY

## 2017-05-23 PROCEDURE — 74177 CT ABD & PELVIS W/CONTRAST: CPT

## 2017-05-23 RX ORDER — SODIUM CHLORIDE 0.9 % (FLUSH) 0.9 %
10 SYRINGE (ML) INJECTION
Status: COMPLETED | OUTPATIENT
Start: 2017-05-23 | End: 2017-05-23

## 2017-05-23 RX ADMIN — Medication 10 ML: at 14:10

## 2017-05-23 RX ADMIN — DIATRIZOATE MEGLUMINE AND DIATRIZOATE SODIUM 15 ML: 660; 100 LIQUID ORAL; RECTAL at 14:09

## 2017-05-23 RX ADMIN — SODIUM CHLORIDE 100 ML: 900 INJECTION, SOLUTION INTRAVENOUS at 14:10

## 2017-05-23 RX ADMIN — IOPAMIDOL 100 ML: 755 INJECTION, SOLUTION INTRAVENOUS at 14:09

## 2017-05-24 ENCOUNTER — TELEPHONE (OUTPATIENT)
Dept: ONCOLOGY | Age: 62
End: 2017-05-24

## 2017-05-24 NOTE — TELEPHONE ENCOUNTER
SW called pt to follow up and relay that he has been referred to oncology rehab. Pt verbalized appreciation and stated he felt his physical symptoms from the chemo have gotten worse and have affected his mood. SW offered referral to LMFT and pt verbalized agreement and stated he would like to be scheduled. SW facilitated referral to  Caroline Sanches for [employer]. Pt verbalized appreciation. SW encouraged pt to call Triage RN should physical symptoms worsen. Pt verbalized understanding. SW encouraged pt to call SW should any other needs arise and pt verbalized understanding. No other needs identified at this time. SW intends to follow up PRN.

## 2017-05-26 ENCOUNTER — DOCUMENTATION ONLY (OUTPATIENT)
Dept: ONCOLOGY | Age: 62
End: 2017-05-26

## 2017-05-26 NOTE — PROGRESS NOTES
PSYCHOLOGY PROGRESS NOTE      Name:  Joe Pierre    Date of Service: 5/26/2017  Location of Service:   [facility]  Type of Service: Individual Psychotherapy  Duration:  60 minutes  Primary Diagnosis: Difficulty Coping with Disease    Summary of Service:  Therapist met with pt for an initial session and worked to form a therapeutic alliance. Therapist and pt explored pt's current status. Patient indicated that he was a 5 physically and a 2 mentally on a 1-10 scale with 10 being the best.  Pt stated that he was mentally foggy and depressed. Patient indicated on the PHQ-9 that for several days (Pt circled #1 on question #9) he had had \"thoughts that you would be better off dead or of hurting yourself in some way. \"  Patient said that he was thinking that he did not want to be a burden to anyone. Pt stated, \"I want to live and I want to feel better. \"  Pt denied any suicidal intent, plan, or means. 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 Pt stated that this failed venture occupies his thoughts and wakes him at night. Therapist and pt agreed to discuss and process this experience together. Will continue to meet with and be available to patient as scheduled and per patient's request.     Adriano Hair. LORA Morales-JUAN C  Marriage and Family Therapist    This note will not be viewable in [address]

## 2017-06-01 ENCOUNTER — DOCUMENTATION ONLY (OUTPATIENT)
Dept: ONCOLOGY | Age: 62
End: 2017-06-01

## 2017-06-01 NOTE — PROGRESS NOTES
PSYCHOLOGY PROGRESS NOTE      Name:  Pam Mae    Date of Service: 6/1/2017  Location of Service:   [facility]  Type of Service: Individual Psychotherapy  Duration:  60 minutes  Primary Diagnosis: Difficulty Coping with Disease    Summary of Service:  Therapist met with pt and continued to work to build a therapeutic alliance. Pt said that he was at a 5 physically and a 2 or 3 mentally on a 1-10 scale with 10 being the best.  Pt stated that he has continued to take his antidepressant and increased the dose today for the first time; however, he said that he doesn't think the medication is working yet. Pt indicated that he is eating and sleeping, but he said that he is too tired to do anything. Pt stated that he, \"should feel better and should be able to concentrate. \"  Therapist normalized the extreme fatigue associated with chemotherapy and encouraged pt to physically move by walking and working and to reach out socially rather than withdraw. 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 Will continue to meet with and be available to patient as scheduled and per patient's request.     Jacqueline Beavers. Marj Christianson, LORA-I  Marriage and Family Therapist    This note will not be viewable in [address]

## 2017-06-07 ENCOUNTER — HOSPITAL ENCOUNTER (OUTPATIENT)
Dept: ONCOLOGY | Age: 62
Discharge: HOME | End: 2017-06-07
Attending: INTERNAL MEDICINE
Payer: SUBSIDIZED

## 2017-06-07 DIAGNOSIS — C61 PROSTATE CANCER (HCC): ICD-10-CM

## 2017-06-07 PROCEDURE — 97161 PT EVAL LOW COMPLEX 20 MIN: CPT

## 2017-06-07 NOTE — PROGRESS NOTES
Therapy Center at [address], [address] [place] [address]  [phone]    [phone]    Oncology Rehab Plan of Care  NAME/AGE/GENDER: Rylee Horton is a 57 y.o. male who was born on 1955.   Date: 6/7/2017  Referring Provider: Stef Lemon MD  Medical Oncologist: Dr. Jaleesa Mantilla Oncologist: N/A  Primary Care Physician: None   Urologist:  Dr. Nelly Winston  Diagnosis: prostate cancer  History of Present Illness:  · Date of first cancer diagnosis/type/stage: January 2017; T4, high grade  · Surgery: bilateral scrotal orchiectomy for hormone ablation March 2017  · Radiation: N/A  · Chemotherapy:   · Current Day Forward Treatment Plan Days   · Treatment Plan: OP DOCETAXEL - 75MG/M2   ·           · Day 1, Cycle 2  (Planned for 6/9/2017)   ·  Chemotherapy: DOCEtaxel (TAXOTERE) 155 mg in 0.9% sodium chloride 250 mL   · chemo infusion   · Day 1, Cycle 3  (Planned for 6/30/2017)   ·  Chemotherapy: DOCEtaxel (TAXOTERE) 155 mg in 0.9% sodium chloride 250 mL   · chemo infusion   · Day 1, Cycle 4  (Planned for 7/21/2017)   ·  Chemotherapy: DOCEtaxel (TAXOTERE) 155 mg in 0.9% sodium chloride 250 mL   · chemo infusion   · Day 1, Cycle 5  (Planned for 8/11/2017)   ·  Chemotherapy: DOCEtaxel (TAXOTERE) 155 mg in 0.9% sodium chloride 250 mL   · chemo infusion   · Day 1, Cycle 6  (Planned for 9/1/2017)   ·  Chemotherapy: DOCEtaxel (TAXOTERE) 155 mg in 0.9% sodium chloride 250 mL   · chemo infusion   ·    · Other treatment: Casodex  · Clinical Trial: no  · Genetic counseling: No   · Date of second cancer and/or recurrence of primary cancer and subsequent treatment: N/A      SUBJECTIVE     Present Symptoms: patient reports fatigue   Symptom History:    · Pain Intensity:0 on a scale of 0-10  · Fatigue Intensity: 5 on a scale of 0-10    Home Situation (including environment, stairs, family support, if living alone, any DME used at home):  Patient rents a room in a 2 level home with 2 steps to enter. He has two housemates. 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  Personal/Social History:   Social History   Substance Use Topics    Smoking status: Former Smoker     Packs/day: 0.50     Years: 35.00     Quit date: 12/2/2016    Smokeless tobacco: Current User      Comment: pt states he vapes    Alcohol use No      Family History:   Family History   Problem Relation Age of Onset    Cancer Father     Prostate Cancer Father     Hypertension Father      Allergy: No Known Allergies   Current Med's:   Current Outpatient Prescriptions on File Prior to Encounter   Medication Sig Dispense Refill    bicalutamide (CASODEX) 50 mg tablet Take 1 Tab by mouth daily. Indications: advanced prostatic carcinoma 30 Tab 2    sertraline (ZOLOFT) 25 mg tablet Take 1 Tab by mouth daily. Can increase to 2 tabs daily after a couple of weeks. Indications: ANXIETY WITH DEPRESSION 60 Tab 0    ondansetron hcl (ZOFRAN) 8 mg tablet Take 1 Tab by mouth every eight (8) hours as needed for Nausea. 90 Tab 3    prochlorperazine (COMPAZINE) 10 mg tablet Take 1 Tab by mouth every six (6) hours as needed. 120 Tab 3    dexamethasone (DECADRON) 4 mg tablet Take 2 tablets the day before, the day of, and the day after chemotherapy 36 Tab 0    tamsulosin (FLOMAX) 0.4 mg capsule Take 0.4 mg by mouth daily. No current facility-administered medications on file prior to encounter. OBJECTIVE     Past Medical History:   Diagnosis Date    Cancer [facility])     prostate     Former light cigarette smoker (1-9 per day)     smoked for 35 years.   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 Tool Used: ECOG Performance Survey Score  Score:  Initial: 1 Most Recent:     Interpretation of Score:   0 Fully active, able to carry on all pre-disease performance without restriction   1 Restricted in physically strenuous activity but ambulatory and able to carry out work of a light or sedentary nature, e.g., light house work, office work   2 Ambulatory and capable of all selfcare but unable to carry out any work activities. Up and about more than 50% of waking hours   3 Capable of only limited selfcare, confined to bed or chair more than 50% of waking hours   4 Completely disabled. Cannot carry on any self care. Totally confined to bed or chair   5 Dead     Has your activity changed since diagnosis?    yes  Limitations/Prior level of functioning:  independent  Patient reports difficulty with the following:  endurance  []Walking  []Up/down chair  []Standing  []Stairs  []Lifting  []Laundry  []Yard work  []Driving  []Vacuuming  []Cooking  []Balance    []Reaching  []Writing  []Buttoning clothes  []Dressing  [x]Work activities []Hobbies    Owned Assistive Devices: none     Owned Exercise Equipment: none  Dominant Hand: right handed  Personal Goals: decreased fatigue    ASSESSMENT/PROBLEMS   [x]Fatigue 5 on a scale of 0-10  [x]Distress 5 on a scale of 0-10  [x]Deconditioned  [x]Shortness of Breath With Exertion  [x]Other(comment): depression, anxiety    PLAN   Short Term Goals:   4 Weeks  1. Attend Physical Therapy  2. Attend Nutrition consult  3. Continue counseling  4. Attend Yoga  5. Receive Massage therapy at [address] [facility] [address] Term Goals:   8-12 Weeks  1. Weight stable  2. Fatigue decreased to 3 on a scale of 0-10  3. Distress decreased to 3 on a scale of 0-10  4. Increased endurance (per Physical Therapy)      Referrals:   [x]Nutritionist  []Lymphedema Specialist []Support Group  []Home Health [x]Yoga   []Heal Thy Self []Look Good Feel Better  []Counseling  []Local Gym   [x]Massage  []Physical Therapy []Occupational Therapy  []Speech Therapy  []American Cancer Society [][facility] of [facility]    Recommendations: patient expressed worry regarding medical bills; his hospital sponsorship is pending - referred to financial counselors for follow-up. Also advised patient to continue his follow-up with counseling.   Potential for Stated Goals:  Abigail Miranda RN

## 2017-06-07 NOTE — PROGRESS NOTES
Rylee Horton  : 1955 Therapy Center at [address] [facility]  [address], [facility], [address] [facility]  [phone]   [phone]          OUTPATIENT PHYSICAL THERAPY:Initial Assessment 2017    ICD-10: Treatment Diagnosis: M 62.81 muscle weakness generalized  R 53.0 neoplastic related fatigue  Precautions/Allergies:   Review of patient's allergies indicates no known allergies. Fall Risk Score: 1 (? 5 = High Risk)  MD Orders: oncology rehab MEDICAL/REFERRING DIAGNOSIS:  Prostate cancer ([facility] [address] Lily M[place]    DATE OF ONSET: 2017  REFERRING PHYSICIAN: Linda Botello MD  RETURN PHYSICIAN APPOINTMENT: 17     INITIAL ASSESSMENT:  Mr. Anel Sauceda presents following diagnosis of stage IV prostate cancer. He visited the ER 2017 and was found to have a significantly elevated PSA. It was found that he also had positive retroperitoneal lymph nodes. He underwent an orchiectomy 17. He was then started on Taxotere. He reports he has increased fatigue mentally and physically. He reports his activity level has decreased since diagnosis. He will benefit from therapeutic exercises in order to increase overall strength and conditioning. PROBLEM LIST (Impacting functional limitations):  1. Decreased Strength  2. Decreased Activity Tolerance  3. Increased Fatigue  4. Decreased [place] with Home Exercise Program INTERVENTIONS PLANNED:  1. Therapeutic Exercise/Strengthening   TREATMENT PLAN:  Effective Dates: 17 TO 17. Frequency/Duration: 1 time a week for 1 week, 2 x / weeks for 10 weeks. GOALS: (Goals have been discussed and agreed upon with patient.)  Short-Term Functional Goals: Time Frame: 4 weeks  1. The patient will be independent with HEP for strength within 4 weeks. 2. The patient will report walking 4-5 days per week for 20-30 min on his good weeks following chemo.    3. The patient will report a fatigue level of 3 or less within 4 weeks.  Discharge Goals: Time Frame: 8 weeks  1. The patient will transition to Healthy self within 8 weeks. Rehabilitation Potential For Stated Goals: Good  Regarding Marine Armenleod therapy, I certify that the treatment plan above will be carried out by a therapist or under their direction. Thank you for this referral,  Jodie Angela PT     Referring Physician Signature: Kerri Ann MD              Date                    The information in this section was collected on 6/7/17 (except where otherwise noted). HISTORY:   History of Present Injury/Illness (Reason for Referral):  Stage IV prostate cancer  Past Medical History/Comorbidities:   Mr. Dipak Pelayo  has a past medical history of Cancer ([facility] [address] and Former light cigarette smoker (1-9 per day). Mr. Dipak Pelayo  has a past surgical history that includes colonoscopy; heent; and orchiectomy (02/2017). Past Medical History:   Diagnosis Date    Cancer [facility])     prostate     Former light cigarette smoker (1-9 per day)     smoked for 35 years. quit      Past Surgical History:   Procedure Laterality Date    HX COLONOSCOPY      HX HEENT      teeth removed     HX ORCHIECTOMY  02/2017       Social History/Living Environment:     rents a room, has steps, but no problems  Prior Level of Function/Work/Activity:  Not currently working, was working for [employer] at [address]:         RIGHT  Current Medications:       Current Outpatient Prescriptions:     bicalutamide (CASODEX) 50 mg tablet, Take 1 Tab by mouth daily. Indications: advanced prostatic carcinoma, Disp: 30 Tab, Rfl: 2    sertraline (ZOLOFT) 25 mg tablet, Take 1 Tab by mouth daily. Can increase to 2 tabs daily after a couple of weeks.   Indications: ANXIETY WITH DEPRESSION, Disp: 60 Tab, Rfl: 0    ondansetron hcl (ZOFRAN) 8 mg tablet, Take 1 Tab by mouth every eight (8) hours as needed for Nausea., Disp: 90 Tab, Rfl: 3    prochlorperazine (COMPAZINE) 10 mg tablet, Take 1 Tab by mouth every six (6) hours as needed. , Disp: 120 Tab, Rfl: 3    dexamethasone (DECADRON) 4 mg tablet, Take 2 tablets the day before, the day of, and the day after chemotherapy, Disp: 36 Tab, Rfl: 0    tamsulosin (FLOMAX) 0.4 mg capsule, Take 0.4 mg by mouth daily. , Disp: , Rfl:    Date Last Reviewed:  6/7/17   Number of Personal Factors/Comorbidities that affect the Plan of Care: 1-2: MODERATE COMPLEXITY   EXAMINATION:   ROM:          Within functional limits  Strength:          Grossly 5/5 x 4 extremities  Functional Mobility:         Gait/Ambulation:  independent        Transfers:  independent        Bed Mobility:  independent  Balance:          intact   Body Structures Involved:  1. Muscles Body Functions Affected:  1. Neuromusculoskeletal Activities and Participation Affected:  1. None   Number of elements (examined above) that affect the Plan of Care: 1-2: LOW COMPLEXITY   CLINICAL PRESENTATION:   Presentation: Stable and uncomplicated: LOW COMPLEXITY   CLINICAL DECISION MAKING:   Outcome Measure: Tool Used: 6-MINUTE WALK TEST  Score:  Initial: 1750 feet Most Recent: X feet (Date: -- )   Interpretation of Score: Normal range varies but is approximately 2433-4979 Feet      Distance walked: 1750 feet               Baseline End of Test   Heart Rate 69 93   Dyspnea (Agustín Scale)     Fatigue (Agustín Scale) 5/10 4/10   SpO2 98 98   /82 134/79     Score [id number] 2194-0387 [phone] 426-16 15-0   Modifier CH CI CJ CK CL CM CN         Tool Used: TINETTI  Score:  Initial:   Gait: 12/12  Balance: 16/16  TOTAL: 28/28 Most Recent:  Gait: /12  Balance: /16  TOTAL: /28   Interpretation of Score: The maximum score for the gait component is 12 points. The maximum score for the balance component is 16 points. The maximum total score is 28 points. In general, patients who score below 19 are at a high risk for falls.  Patients who score in the range of 19-24 indicate that the patient has a risk for falls.  Score 28 27-23 22-18 17-12 11-6 5-1 0   Modifier CH CI CJ CK CL CM CN       Tool Used: Timed Up and Go (TUG)  Score:  Initial: 6 seconds Most Recent: X seconds (Date: -- )   Interpretation of Score: The test measures, in seconds, the time taken by an individual to stand up from a standard arm chair (seat height 46 cm [18 in], arm height 65 cm [25.6 in]), walk a distance of 3 meters (118 in, approx 10 ft), turn, walk back to the chair and sit down. If the individual takes longer than 14 seconds to complete TUG, this indicates risk for falls. Score 7 7.5-10.5 11-14 14.5-17.5 18-21 21.5-24.5 25+   Modifier CH CI CJ CK CL CM CN       Tool Used: NCCN Distress Thermometer   Score:  Initial: 5 Most Recent: X    Interpretation of Score: If greater than or equal to 8, then PHQ-9 Depression Scale Score   and APOLINAR-7 Anxiety Scale Score  . Tool Used: ECOG Performance Survey Score  Score:  Initial: 1 Most Recent:      Interpretation of Score:   0 Fully active, able to carry on all pre-disease performance without restriction   1 Restricted in physically strenuous activity but ambulatory and able to carry out work of a light or sedentary nature, e.g., light house work, office work   2 Ambulatory and capable of all selfcare but unable to carry out any work activities. Up and about more than 50% of waking hours   3 Capable of only limited selfcare, confined to bed or chair more than 50% of waking hours   4 Completely disabled. Cannot carry on any selfcare. Totally confined to bed or chair   5 Dead        Medical Necessity:   · Patient is expected to demonstrate progress in strength and conditioning to reduce fatigue and allow to return to work activities. Reason for Services/Other Comments:  · Patient continues to demonstrate capacity to improve strength and conditioning which will increase independence.    Use of outcome tool(s) and clinical judgement create a POC that gives a: Clear prediction of patient's progress: LOW COMPLEXITY            TREATMENT:   (In addition to Assessment/Re-Assessment sessions the following treatments were rendered)  Pre-treatment Symptoms/Complaints:  Fatigue 5/10   Pain: Initial:     0/10 Post Session:  0/10   Nustep level 2 x 5 min O298  HR79 SPM78 METS 3.1  Sit to stand x 10 reps O2 98 HR 89  UBE level 1 x 4 min O2 98 HR98    Assessment only today, no treatment provided. As above    Treatment/Session Assessment:    · Response to Treatment:  Tolerated the assessment well. · Compliance with Program/Exercises: Will assess as treatment progresses. · Recommendations/Intent for next treatment session: \"Next visit will focus on advancements to more challenging activities\".   Total Treatment Duration:       Too Prince, PT

## 2017-06-07 NOTE — PROGRESS NOTES
Ambulatory/Rehab Services H2 Model Falls Risk Assessment    Risk Factor Pts. ·   Confusion/Disorientation/Impulsivity  []    4 ·   Symptomatic Depression  []   2 ·   Altered Elimination  []   1 ·   Dizziness/Vertigo  []   1 ·   Gender (Male)  []   1 ·   Any administered antiepileptics (anticonvulsants):  []   2 ·   Any administered benzodiazepines:  []   1 ·   Visual Impairment (specify):  []   1 ·   Portable Oxygen Use  []   1 ·   Orthostatic ? BP  []   1 ·   History of Recent Falls (within 3 mos.)  []   5     Ability to Rise from Chair (choose one) Pts. ·   Ability to rise in a single movement  []   0 ·   Pushes up, successful in one attempt  []   1 ·   Multiple attempts, but successful  []   3 ·   Unable to rise without assistance  []   4   Total: (5 or greater = High Risk) 1     Falls Prevention Plan:   []                Physical Limitations to Exercise (specify):   []                Mobility Assistance Device (type):   []                Exercise/Equipment Adaptation (specify):    ©2010 [facility] [address] Patent #[id number].  Federal Law prohibits the replication, distribution or use without written permission from [facility] [employer]

## 2017-06-08 ENCOUNTER — DOCUMENTATION ONLY (OUTPATIENT)
Dept: ONCOLOGY | Age: 62
End: 2017-06-08

## 2017-06-09 ENCOUNTER — HOSPITAL ENCOUNTER (OUTPATIENT)
Dept: LAB | Age: 62
Discharge: HOME | End: 2017-06-09
Payer: SUBSIDIZED

## 2017-06-09 ENCOUNTER — HOSPITAL ENCOUNTER (OUTPATIENT)
Dept: INFUSION THERAPY | Age: 62
Discharge: HOME | End: 2017-06-09
Payer: SUBSIDIZED

## 2017-06-09 DIAGNOSIS — C77.2 METASTASIS TO RETROPERITONEAL LYMPH NODE (HCC): ICD-10-CM

## 2017-06-09 DIAGNOSIS — C61 PROSTATE CANCER (HCC): ICD-10-CM

## 2017-06-09 LAB
ALBUMIN SERPL BCP-MCNC: 4.3 G/DL (ref 3.2–4.6)
ALBUMIN/GLOB SERPL: 1.2 {RATIO} (ref 1.2–3.5)
ALP SERPL-CCNC: 64 U/L (ref 50–136)
ALT SERPL-CCNC: 30 U/L (ref 12–65)
ANION GAP BLD CALC-SCNC: 8 MMOL/L (ref 7–16)
AST SERPL W P-5'-P-CCNC: 14 U/L (ref 15–37)
BASOPHILS # BLD AUTO: 0.1 K/UL (ref 0–0.2)
BASOPHILS # BLD: 0 % (ref 0–2)
BILIRUB SERPL-MCNC: 0.2 MG/DL (ref 0.2–1.1)
BUN SERPL-MCNC: 21 MG/DL (ref 8–23)
CALCIUM SERPL-MCNC: 9.1 MG/DL (ref 8.3–10.4)
CHLORIDE SERPL-SCNC: 103 MMOL/L (ref 98–107)
CO2 SERPL-SCNC: 27 MMOL/L (ref 21–32)
CREAT SERPL-MCNC: 0.84 MG/DL (ref 0.8–1.5)
DIFFERENTIAL METHOD BLD: ABNORMAL
EOSINOPHIL # BLD: 0 K/UL (ref 0–0.8)
EOSINOPHIL NFR BLD: 0 % (ref 0.5–7.8)
ERYTHROCYTE [DISTWIDTH] IN BLOOD BY AUTOMATED COUNT: 12.7 % (ref 11.9–14.6)
GLOBULIN SER CALC-MCNC: 3.6 G/DL (ref 2.3–3.5)
GLUCOSE SERPL-MCNC: 131 MG/DL (ref 65–100)
HCT VFR BLD AUTO: 39.3 % (ref 41.1–50.3)
HGB BLD-MCNC: 13.6 G/DL (ref 13.6–17.2)
LYMPHOCYTES # BLD AUTO: 14 % (ref 13–44)
LYMPHOCYTES # BLD: 2.7 K/UL (ref 0.5–4.6)
MCH RBC QN AUTO: 32.7 PG (ref 26.1–32.9)
MCHC RBC AUTO-ENTMCNC: 34.6 G/DL (ref 31.4–35)
MCV RBC AUTO: 94.5 FL (ref 79.6–97.8)
MONOCYTES # BLD: 0.8 K/UL (ref 0.1–1.3)
MONOCYTES NFR BLD AUTO: 4 % (ref 4–12)
NEUTS SEG # BLD: 15.6 K/UL (ref 1.7–8.2)
NEUTS SEG NFR BLD AUTO: 81 % (ref 43–78)
NRBC # BLD: 0 K/UL (ref 0–0.2)
PLATELET # BLD AUTO: 233 K/UL (ref 150–450)
PMV BLD AUTO: 9.2 FL (ref 10.8–14.1)
POTASSIUM SERPL-SCNC: 3.2 MMOL/L (ref 3.5–5.1)
PROT SERPL-MCNC: 7.9 G/DL (ref 6.3–8.2)
PSA SERPL-MCNC: 5.7 NG/ML
RBC # BLD AUTO: 4.16 M/UL (ref 4.23–5.67)
SODIUM SERPL-SCNC: 138 MMOL/L (ref 136–145)
WBC # BLD AUTO: 19.2 K/UL (ref 4.3–11.1)

## 2017-06-09 PROCEDURE — 74011250636 HC RX REV CODE- 250/636

## 2017-06-09 PROCEDURE — 84153 ASSAY OF PSA TOTAL: CPT | Performed by: INTERNAL MEDICINE

## 2017-06-09 PROCEDURE — 80053 COMPREHEN METABOLIC PANEL: CPT | Performed by: INTERNAL MEDICINE

## 2017-06-09 PROCEDURE — 96375 TX/PRO/DX INJ NEW DRUG ADDON: CPT

## 2017-06-09 PROCEDURE — 74011250636 HC RX REV CODE- 250/636: Performed by: INTERNAL MEDICINE

## 2017-06-09 PROCEDURE — 36415 COLL VENOUS BLD VENIPUNCTURE: CPT | Performed by: INTERNAL MEDICINE

## 2017-06-09 PROCEDURE — 85025 COMPLETE CBC W/AUTO DIFF WBC: CPT | Performed by: INTERNAL MEDICINE

## 2017-06-09 PROCEDURE — 96413 CHEMO IV INFUSION 1 HR: CPT

## 2017-06-09 RX ORDER — DEXAMETHASONE SODIUM PHOSPHATE 100 MG/10ML
10 INJECTION INTRAMUSCULAR; INTRAVENOUS ONCE
Status: COMPLETED | OUTPATIENT
Start: 2017-06-09 | End: 2017-06-09

## 2017-06-09 RX ORDER — HEPARIN 100 UNIT/ML
300-500 SYRINGE INTRAVENOUS AS NEEDED
Status: ACTIVE | OUTPATIENT
Start: 2017-06-09 | End: 2017-06-10

## 2017-06-09 RX ORDER — SODIUM CHLORIDE 0.9 % (FLUSH) 0.9 %
10 SYRINGE (ML) INJECTION AS NEEDED
Status: ACTIVE | OUTPATIENT
Start: 2017-06-09 | End: 2017-06-10

## 2017-06-09 RX ADMIN — DOCETAXEL 155 MG: 80 INJECTION, SOLUTION, CONCENTRATE INTRAVENOUS at 16:10

## 2017-06-09 RX ADMIN — Medication 10 ML: at 17:12

## 2017-06-09 RX ADMIN — DEXAMETHASONE SODIUM PHOSPHATE 10 MG: 10 INJECTION INTRAMUSCULAR; INTRAVENOUS at 15:45

## 2017-06-09 RX ADMIN — SODIUM CHLORIDE 500 ML: 900 INJECTION, SOLUTION INTRAVENOUS at 15:50

## 2017-06-09 RX ADMIN — Medication 10 ML: at 15:40

## 2017-06-09 NOTE — PROGRESS NOTES
Arrived to the [facility]. Taxotere completed. Patient tolerated well. Any issues or concerns during appointment: none. Patient aware of next infusion appointment on 6/30/17 at 1315. Discharged ambulatory.

## 2017-06-15 ENCOUNTER — DOCUMENTATION ONLY (OUTPATIENT)
Dept: ONCOLOGY | Age: 62
End: 2017-06-15

## 2017-06-15 NOTE — PROGRESS NOTES
PSYCHOLOGY PROGRESS NOTE      Name:  Cristóbal Lopez    Date of Service: 6/15/2017  Location of Service:   [facility]  Type of Service: Individual Psychotherapy  Duration:  60 minutes  Primary Diagnosis: Depression    Summary of Service:  Therapist and pt met and discussed his depression and the status of his antidepressant medication. Therapist encouraged pt to call Dr. Francia Moulton nurse to request a prescription for an increased dose of his medication or an alternative medication. In the presence of the therapist, the pt called the nurse and was put on hold for an extended period of time. Pt eventually hung up on the call, but planned to call again. Therapist and pt examined pt's thoughts regarding the reaction of his friends and family to his sharing about his mood and physical condition. Therapist reframed the behavior of 2 of the pt's male friends as unskilled rather than uncaring. Therapist normalized pt's experience of having depression, fatigue, mental fog, and pain attendant to chemotherapy and identified the pt's practice of mind reading as unhelpful. Therapist normalized pt's depressive lens. Will continue to meet with and be available to patient as scheduled and per patient's request.     Cr Fischer. LORA Bettencourt-I  Marriage and Family Therapist    This note will not be viewable in [address]

## 2017-06-21 ENCOUNTER — HOSPITAL ENCOUNTER (OUTPATIENT)
Dept: ONCOLOGY | Age: 62
Discharge: HOME | End: 2017-06-21
Attending: INTERNAL MEDICINE
Payer: SUBSIDIZED

## 2017-06-21 PROCEDURE — 97110 THERAPEUTIC EXERCISES: CPT

## 2017-06-21 NOTE — PROGRESS NOTES
Jenna Clements  : 1955 [facility] at [facility]  [address], Marylin Castelan, [address] [facility]  [phone]   [phone]          OUTPATIENT PHYSICAL THERAPY:Daily Note 2017    ICD-10: Treatment Diagnosis: M 62.81 muscle weakness generalized  R 53.0 neoplastic related fatigue  Precautions/Allergies:   Review of patient's allergies indicates no known allergies. Fall Risk Score: 1 (? 5 = High Risk)  MD Orders: oncology rehab MEDICAL/REFERRING DIAGNOSIS:  Prostate cancer ([facility] [address] Janie Jefferson    DATE OF ONSET: 2017  REFERRING PHYSICIAN: Dominic Barone MD  RETURN PHYSICIAN APPOINTMENT: 17     INITIAL ASSESSMENT:  Mr. Dionisio Suresh presents following diagnosis of stage IV prostate cancer. He visited the ER 2017 and was found to have a significantly elevated PSA. It was found that he also had positive retroperitoneal lymph nodes. He underwent an orchiectomy 17. He was then started on Taxotere. He reports he has increased fatigue mentally and physically. He reports his activity level has decreased since diagnosis. He will benefit from therapeutic exercises in order to increase overall strength and conditioning. PROBLEM LIST (Impacting functional limitations):  1. Decreased Strength  2. Decreased Activity Tolerance  3. Increased Fatigue  4. Decreased [place] with Home Exercise Program INTERVENTIONS PLANNED:  1. Therapeutic Exercise/Strengthening   TREATMENT PLAN:  Effective Dates: 17 TO 17. Frequency/Duration: 1 time a week for 1 week, 2 x / weeks for 10 weeks. GOALS: (Goals have been discussed and agreed upon with patient.)  Short-Term Functional Goals: Time Frame: 4 weeks  1. The patient will be independent with [facility] for strength within 4 weeks. 2. The patient will report walking 4-5 days per week for 20-30 min on his good weeks following chemo.    3. The patient will report a fatigue level of 3 or less within 4 weeks.  Discharge Goals: Time Frame: 8 weeks  1. The patient will transition to Healthy self within 8 weeks. Rehabilitation Potential For Stated Goals: Good  Regarding [place] therapy, I certify that the treatment plan above will be carried out by a therapist or under their direction. Thank you for this referral,  Bessie Abbott PT     Referring Physician Signature: Mary Ann Dubon MD              Date                    The information in this section was collected on 6/7/17 (except where otherwise noted). HISTORY:   History of Present Injury/Illness (Reason for Referral):  Stage IV prostate cancer  Past Medical History/Comorbidities:   Mr. Salome Marie  has a past medical history of Cancer ([facility] [address] and Former light cigarette smoker (1-9 per day). Mr. Salome Marie  has a past surgical history that includes colonoscopy; heent; and orchiectomy (02/2017). Past Medical History:   Diagnosis Date    Cancer [facility])     prostate     Former light cigarette smoker (1-9 per day)     smoked for 35 years. quit      Past Surgical History:   Procedure Laterality Date    HX COLONOSCOPY      HX HEENT      teeth removed     HX ORCHIECTOMY  02/2017       Social History/Living Environment:     rents a room, has steps, but no problems  Prior Level of Function/Work/Activity:  Not currently working, was working for [employer]:         RIGHT  Current Medications:       Current Outpatient Prescriptions:     sertraline (ZOLOFT) 100 mg tablet, Take 1 Tab by mouth daily. , Disp: 30 Tab, Rfl: 1    nystatin (MYCOSTATIN) powder, Apply  to affected area four (4) times daily. , Disp: 15 g, Rfl: 1    bicalutamide (CASODEX) 50 mg tablet, Take 1 Tab by mouth daily.  Indications: advanced prostatic carcinoma, Disp: 30 Tab, Rfl: 2    ondansetron hcl (ZOFRAN) 8 mg tablet, Take 1 Tab by mouth every eight (8) hours as needed for Nausea., Disp: 90 Tab, Rfl: 3    prochlorperazine (COMPAZINE) 10 mg tablet, Take 1 Tab by mouth every six (6) hours as needed. , Disp: 120 Tab, Rfl: 3    dexamethasone (DECADRON) 4 mg tablet, Take 2 tablets the day before, the day of, and the day after chemotherapy, Disp: 36 Tab, Rfl: 0    tamsulosin (FLOMAX) 0.4 mg capsule, Take 0.4 mg by mouth daily. , Disp: , Rfl:    Date Last Reviewed:  6/7/17   Number of Personal Factors/Comorbidities that affect the Plan of Care: 1-2: MODERATE COMPLEXITY   EXAMINATION:   ROM:          Within functional limits  Strength:          Grossly 5/5 x 4 extremities  Functional Mobility:         Gait/Ambulation:  independent        Transfers:  independent        Bed Mobility:  independent  Balance:          intact   Body Structures Involved:  1. Muscles Body Functions Affected:  1. Neuromusculoskeletal Activities and Participation Affected:  1. None   Number of elements (examined above) that affect the Plan of Care: 1-2: LOW COMPLEXITY   CLINICAL PRESENTATION:   Presentation: Stable and uncomplicated: LOW COMPLEXITY   CLINICAL DECISION MAKING:   Outcome Measure: Tool Used: 6-MINUTE WALK TEST  Score:  Initial: 1750 feet Most Recent: X feet (Date: -- )   Interpretation of Score: Normal range varies but is approximately 5895-9573 Feet      Distance walked: 1750 feet               Baseline End of Test   Heart Rate 69 93   Dyspnea (Agustín Scale)     Fatigue (Agustín Scale) 5/10 4/10   SpO2 98 98   /82 134/79     Score 2133 3458-3595 3859-4122 [phone] 426-16 15-0   Modifier CH CI CJ CK CL CM CN         Tool Used: TINETTI  Score:  Initial:   Gait: 12/12  Balance: 16/16  TOTAL: 28/28 Most Recent:  Gait: /12  Balance: /16  TOTAL: /28   Interpretation of Score: The maximum score for the gait component is 12 points. The maximum score for the balance component is 16 points. The maximum total score is 28 points. In general, patients who score below 19 are at a high risk for falls.  Patients who score in the range of 19-24 indicate that the patient has a risk for falls. Score 28 27-23 22-18 17-12 11-6 5-1 0   Modifier CH CI CJ CK CL CM CN       Tool Used: Timed Up and Go (TUG)  Score:  Initial: 6 seconds Most Recent: X seconds (Date: -- )   Interpretation of Score: The test measures, in seconds, the time taken by an individual to stand up from a standard arm chair (seat height 46 cm [18 in], arm height 65 cm [25.6 in]), walk a distance of 3 meters (118 in, approx 10 ft), turn, walk back to the chair and sit down. If the individual takes longer than 14 seconds to complete TUG, this indicates risk for falls. Score 7 7.5-10.5 11-14 14.5-17.5 18-21 21.5-24.5 25+   Modifier CH CI CJ CK CL CM CN       Tool Used: NCCN Distress Thermometer   Score:  Initial: 5 Most Recent: X    Interpretation of Score: If greater than or equal to 8, then PHQ-9 Depression Scale Score   and APOLINAR-7 Anxiety Scale Score  . Tool Used: ECOG Performance Survey Score  Score:  Initial: 1 Most Recent:      Interpretation of Score:   0 Fully active, able to carry on all pre-disease performance without restriction   1 Restricted in physically strenuous activity but ambulatory and able to carry out work of a light or sedentary nature, e.g., light house work, office work   2 Ambulatory and capable of all selfcare but unable to carry out any work activities. Up and about more than 50% of waking hours   3 Capable of only limited selfcare, confined to bed or chair more than 50% of waking hours   4 Completely disabled. Cannot carry on any selfcare. Totally confined to bed or chair   5 Dead        Medical Necessity:   · Patient is expected to demonstrate progress in strength and conditioning to reduce fatigue and allow to return to work activities. Reason for Services/Other Comments:  · Patient continues to demonstrate capacity to improve strength and conditioning which will increase independence.    Use of outcome tool(s) and clinical judgement create a POC that gives a: Clear prediction of patient's progress: LOW COMPLEXITY            TREATMENT:   (In addition to Assessment/Re-Assessment sessions the following treatments were rendered)  Pre-treatment Symptoms/Complaints:  Fatigue 7/10   Pain: Initial:     0/10 Post Session:  0/10   O2 98 HR 71  /77  48 min  Nustep level 2 x 7 min  SPM78 METS 3.0 ( increase to level 3 next visit)  Walk 3 min  UBE level 1 x 4 min  Sit to stand x 10 reps  Long arc quads x 10 reps with 5 count hold with 1 1/2#  Standing up on toes, hamstring curls, hip abduction, hip extension with 1 1/2# x 10 reps  Step ups x 10 reps from high step  Walk 3 min  Bilateral upper extremity with 3 # x 10 reps forward flexion, abduction, triceps extension, biceps curls with 5#  Counter top push ups x 10 reps    fatigue  as above      Treatment/Session Assessment:    · Response to Treatment:  Tolerated the treatment well. · Compliance with Program/Exercises: Will assess as treatment progresses. · Recommendations/Intent for next treatment session: \"Next visit will focus on advancements to more challenging activities\".   Total Treatment Duration:  PT Patient Time In/Time Out  Time In: 1027  Time Out: [address], PT

## 2017-06-22 ENCOUNTER — DOCUMENTATION ONLY (OUTPATIENT)
Dept: ONCOLOGY | Age: 62
End: 2017-06-22

## 2017-06-22 NOTE — PROGRESS NOTES
PSYCHOLOGY PROGRESS NOTE      Name:  Carolyn Varner    Date of Service: 6/22/2017  Location of Service:   [facility]  Type of Service: Individual Psychotherapy  Duration:  60 minutes  Primary Diagnosis: Depression    Summary of Service:  Therapist met pt and noted that the pt was smiling. Pt stated that he is feeling lighter and placed himself at a 7 physically and a 7 mentally on a 1 to 10 scale with 10 being the best.  Pt stated that he had increased the dose of his anti-depressant medication this week. Pt stated that he saw his son on Father's Day, had a second visit for physical therapy, and had a massage as well. Therapist and pt explored pt's words regarding his need for a \"purpose, plan, activity, or a vision\" for the future. Therapist and pt spoke about his past dealings in [facility] [employer]. 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 Therapist wondered whether pt's treatment timeline would interfere with the establishment of a SMART plan to get started. 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 Pt stated that he was going out to lunch with a good friend. Will continue to meet with and be available to patient as scheduled and per patient's request.     Estela Nguyen. LORA Summers-JUAN C  Marriage and Family Therapist    This note will not be viewable in [address]

## 2017-06-26 ENCOUNTER — HOSPITAL ENCOUNTER (OUTPATIENT)
Dept: ONCOLOGY | Age: 62
Discharge: HOME | End: 2017-06-26
Attending: INTERNAL MEDICINE
Payer: SUBSIDIZED

## 2017-06-30 ENCOUNTER — HOSPITAL ENCOUNTER (OUTPATIENT)
Dept: INFUSION THERAPY | Age: 62
Discharge: HOME | End: 2017-06-30
Payer: SUBSIDIZED

## 2017-06-30 ENCOUNTER — DOCUMENTATION ONLY (OUTPATIENT)
Dept: ONCOLOGY | Age: 62
End: 2017-06-30

## 2017-06-30 ENCOUNTER — HOSPITAL ENCOUNTER (OUTPATIENT)
Dept: LAB | Age: 62
Discharge: HOME | End: 2017-06-30
Payer: SUBSIDIZED

## 2017-06-30 DIAGNOSIS — C61 PROSTATE CANCER (HCC): ICD-10-CM

## 2017-06-30 DIAGNOSIS — C77.2 METASTASIS TO RETROPERITONEAL LYMPH NODE (HCC): ICD-10-CM

## 2017-06-30 LAB
ALBUMIN SERPL BCP-MCNC: 4 G/DL (ref 3.2–4.6)
ALBUMIN/GLOB SERPL: 1 {RATIO} (ref 1.2–3.5)
ALP SERPL-CCNC: 76 U/L (ref 50–136)
ALT SERPL-CCNC: 27 U/L (ref 12–65)
ANION GAP BLD CALC-SCNC: 8 MMOL/L (ref 7–16)
AST SERPL W P-5'-P-CCNC: 15 U/L (ref 15–37)
BASOPHILS # BLD AUTO: 0.1 K/UL (ref 0–0.2)
BASOPHILS # BLD: 1 % (ref 0–2)
BILIRUB SERPL-MCNC: 0.3 MG/DL (ref 0.2–1.1)
BUN SERPL-MCNC: 16 MG/DL (ref 8–23)
CALCIUM SERPL-MCNC: 9.6 MG/DL (ref 8.3–10.4)
CHLORIDE SERPL-SCNC: 105 MMOL/L (ref 98–107)
CO2 SERPL-SCNC: 25 MMOL/L (ref 21–32)
CREAT SERPL-MCNC: 0.87 MG/DL (ref 0.8–1.5)
DIFFERENTIAL METHOD BLD: ABNORMAL
EOSINOPHIL # BLD: 0.1 K/UL (ref 0–0.8)
EOSINOPHIL NFR BLD: 1 % (ref 0.5–7.8)
ERYTHROCYTE [DISTWIDTH] IN BLOOD BY AUTOMATED COUNT: 12.7 % (ref 11.9–14.6)
GLOBULIN SER CALC-MCNC: 3.9 G/DL (ref 2.3–3.5)
GLUCOSE SERPL-MCNC: 86 MG/DL (ref 65–100)
HCT VFR BLD AUTO: 36.9 % (ref 41.1–50.3)
HGB BLD-MCNC: 12.8 G/DL (ref 13.6–17.2)
LYMPHOCYTES # BLD AUTO: 14 % (ref 13–44)
LYMPHOCYTES # BLD: 1.7 K/UL (ref 0.5–4.6)
MCH RBC QN AUTO: 32.5 PG (ref 26.1–32.9)
MCHC RBC AUTO-ENTMCNC: 34.7 G/DL (ref 31.4–35)
MCV RBC AUTO: 93.7 FL (ref 79.6–97.8)
MONOCYTES # BLD: 1.3 K/UL (ref 0.1–1.3)
MONOCYTES NFR BLD AUTO: 11 % (ref 4–12)
NEUTS SEG # BLD: 8.9 K/UL (ref 1.7–8.2)
NEUTS SEG NFR BLD AUTO: 74 % (ref 43–78)
NRBC # BLD: 0 K/UL (ref 0–0.2)
PLATELET # BLD AUTO: 216 K/UL (ref 150–450)
PMV BLD AUTO: 9 FL (ref 10.8–14.1)
POTASSIUM SERPL-SCNC: 4 MMOL/L (ref 3.5–5.1)
PROT SERPL-MCNC: 7.9 G/DL (ref 6.3–8.2)
PSA FREE MFR SERPL: 15.4 %
PSA FREE SERPL-MCNC: 0.6 NG/ML
PSA SERPL-MCNC: 3.9 NG/ML
RBC # BLD AUTO: 3.94 M/UL (ref 4.23–5.67)
SODIUM SERPL-SCNC: 138 MMOL/L (ref 136–145)
WBC # BLD AUTO: 12.1 K/UL (ref 4.3–11.1)

## 2017-06-30 PROCEDURE — 74011250636 HC RX REV CODE- 250/636: Performed by: NURSE PRACTITIONER

## 2017-06-30 PROCEDURE — 96375 TX/PRO/DX INJ NEW DRUG ADDON: CPT

## 2017-06-30 PROCEDURE — 36415 COLL VENOUS BLD VENIPUNCTURE: CPT | Performed by: INTERNAL MEDICINE

## 2017-06-30 PROCEDURE — 74011250636 HC RX REV CODE- 250/636

## 2017-06-30 PROCEDURE — 80053 COMPREHEN METABOLIC PANEL: CPT | Performed by: INTERNAL MEDICINE

## 2017-06-30 PROCEDURE — 96413 CHEMO IV INFUSION 1 HR: CPT

## 2017-06-30 PROCEDURE — 85025 COMPLETE CBC W/AUTO DIFF WBC: CPT | Performed by: INTERNAL MEDICINE

## 2017-06-30 PROCEDURE — 84153 ASSAY OF PSA TOTAL: CPT | Performed by: INTERNAL MEDICINE

## 2017-06-30 RX ORDER — HEPARIN 100 UNIT/ML
300-500 SYRINGE INTRAVENOUS AS NEEDED
Status: DISPENSED | OUTPATIENT
Start: 2017-06-30 | End: 2017-07-01

## 2017-06-30 RX ORDER — DEXAMETHASONE SODIUM PHOSPHATE 100 MG/10ML
10 INJECTION INTRAMUSCULAR; INTRAVENOUS ONCE
Status: COMPLETED | OUTPATIENT
Start: 2017-06-30 | End: 2017-06-30

## 2017-06-30 RX ORDER — SODIUM CHLORIDE 0.9 % (FLUSH) 0.9 %
10 SYRINGE (ML) INJECTION AS NEEDED
Status: ACTIVE | OUTPATIENT
Start: 2017-06-30 | End: 2017-07-01

## 2017-06-30 RX ADMIN — DEXAMETHASONE SODIUM PHOSPHATE 10 MG: 10 INJECTION INTRAMUSCULAR; INTRAVENOUS at 14:14

## 2017-06-30 RX ADMIN — SODIUM CHLORIDE 500 ML: 900 INJECTION, SOLUTION INTRAVENOUS at 14:02

## 2017-06-30 RX ADMIN — DOCETAXEL 155 MG: 80 INJECTION, SOLUTION, CONCENTRATE INTRAVENOUS at 14:50

## 2017-06-30 RX ADMIN — Medication 10 ML: at 15:58

## 2017-06-30 RX ADMIN — Medication 10 ML: at 14:01

## 2017-06-30 NOTE — PROGRESS NOTES
PSYCHOLOGY PROGRESS NOTE      Name:  Luis Obregon    Date of Service: 6/29/2017  Location of Service:   [facility]  Type of Service: Individual Psychotherapy  Duration:  60 minutes  Primary Diagnosis: Adjustment    Summary of Service:  Therapist and pt discussed the social support from old friends that pt has been enjoying, and discussed the mutually supportive relationship that pt enjoys with his son. Therapist and pt explored pt's perception of the financial harm that he has brought upon his son. Therapist worked to reframe this perception by placing the event in the context of the gifts and blessings he has bestowed upon his son by virtue of their life together and their secure father-son attachment. Will continue to meet with and be available to patient as scheduled and per patient's request.     Jimi Ashley. LORA Slater-I  Marriage and Family Therapist    This note will not be viewable in [address]

## 2017-07-05 ENCOUNTER — HOSPITAL ENCOUNTER (OUTPATIENT)
Dept: ONCOLOGY | Age: 62
Discharge: HOME | End: 2017-07-05
Attending: INTERNAL MEDICINE
Payer: MEDICAID

## 2017-07-05 NOTE — PROGRESS NOTES
Francesca Amosida  : 1955 Therapy Center at [facility] Oncology/Bone Health  [address], [place], [address]  [phone]   [phone]          OUTPATIENT PHYSICAL THERAPY:Daily Note 2017    ICD-10: Treatment Diagnosis: M 62.81 muscle weakness generalized  R 53.0 neoplastic related fatigue  Precautions/Allergies:   Review of patient's allergies indicates no known allergies. Fall Risk Score: 1 (? 5 = High Risk)  MD Orders: oncology rehab MEDICAL/REFERRING DIAGNOSIS:  Prostate cancer ([facility] [address] [place]    DATE OF ONSET: 2017  REFERRING PHYSICIAN: Rene Brady MD  RETURN PHYSICIAN APPOINTMENT: 17     INITIAL ASSESSMENT:  Mr. Venice Styles presents following diagnosis of stage IV prostate cancer. He visited the ER 2017 and was found to have a significantly elevated PSA. It was found that he also had positive retroperitoneal lymph nodes. He underwent an orchiectomy 17. He was then started on Taxotere. He reports he has increased fatigue mentally and physically. He reports his activity level has decreased since diagnosis. He will benefit from therapeutic exercises in order to increase overall strength and conditioning. PROBLEM LIST (Impacting functional limitations):  1. Decreased Strength  2. Decreased Activity Tolerance  3. Increased Fatigue  4. Decreased [place] with Home Exercise Program INTERVENTIONS PLANNED:  1. Therapeutic Exercise/Strengthening   TREATMENT PLAN:  Effective Dates: 17 TO 17. Frequency/Duration: 1 time a week for 1 week, 2 x / weeks for 10 weeks. GOALS: (Goals have been discussed and agreed upon with patient.)  Short-Term Functional Goals: Time Frame: 4 weeks  1. The patient will be independent with [facility] for strength within 4 weeks. 2. The patient will report walking 4-5 days per week for 20-30 min on his good weeks following chemo.    3. The patient will report a fatigue level of 3 or less within 4 weeks.  Discharge Goals: Time Frame: 8 weeks  1. The patient will transition to Healthy self within 8 weeks. Rehabilitation Potential For Stated Goals: Good  Regarding [place] therapy, I certify that the treatment plan above will be carried out by a therapist or under their direction. Thank you for this referral,  Isaiah Evans PT     Referring Physician Signature: Javier Quiñones MD              Date                    The information in this section was collected on 6/7/17 (except where otherwise noted). HISTORY:   History of Present Injury/Illness (Reason for Referral):  Stage IV prostate cancer  Past Medical History/Comorbidities:   Mr. Ranjeet Stephens  has a past medical history of Cancer ([facility] [address] and Former light cigarette smoker (1-9 per day). Mr. Ranjeet Stephens  has a past surgical history that includes colonoscopy; heent; and orchiectomy (02/2017). Past Medical History:   Diagnosis Date    Cancer [facility])     prostate     Former light cigarette smoker (1-9 per day)     smoked for 35 years. quit      Past Surgical History:   Procedure Laterality Date    HX COLONOSCOPY      HX HEENT      teeth removed     HX ORCHIECTOMY  02/2017       Social History/Living Environment:     rents a room, has steps, but no problems  Prior Level of Function/Work/Activity:  Not currently working, was working for [employer] at [address]:         RIGHT  Current Medications:       Current Outpatient Prescriptions:     sertraline (ZOLOFT) 100 mg tablet, Take 1 Tab by mouth daily. , Disp: 30 Tab, Rfl: 1    nystatin (MYCOSTATIN) powder, Apply  to affected area four (4) times daily. , Disp: 15 g, Rfl: 1    bicalutamide (CASODEX) 50 mg tablet, Take 1 Tab by mouth daily.  Indications: advanced prostatic carcinoma, Disp: 30 Tab, Rfl: 2    ondansetron hcl (ZOFRAN) 8 mg tablet, Take 1 Tab by mouth every eight (8) hours as needed for Nausea., Disp: 90 Tab, Rfl: 3    prochlorperazine (COMPAZINE) 10 mg tablet, Take 1 Tab by mouth every six (6) hours as needed. , Disp: 120 Tab, Rfl: 3    dexamethasone (DECADRON) 4 mg tablet, Take 2 tablets the day before, the day of, and the day after chemotherapy, Disp: 36 Tab, Rfl: 0    tamsulosin (FLOMAX) 0.4 mg capsule, Take 0.4 mg by mouth daily. , Disp: , Rfl:    Date Last Reviewed:  6/7/17   Number of Personal Factors/Comorbidities that affect the Plan of Care: 1-2: MODERATE COMPLEXITY   EXAMINATION:   ROM:          Within functional limits  Strength:          Grossly 5/5 x 4 extremities  Functional Mobility:         Gait/Ambulation:  independent        Transfers:  independent        Bed Mobility:  independent  Balance:          intact   Body Structures Involved:  1. Muscles Body Functions Affected:  1. Neuromusculoskeletal Activities and Participation Affected:  1. None   Number of elements (examined above) that affect the Plan of Care: 1-2: LOW COMPLEXITY   CLINICAL PRESENTATION:   Presentation: Stable and uncomplicated: LOW COMPLEXITY   CLINICAL DECISION MAKING:   Outcome Measure: Tool Used: 6-MINUTE WALK TEST  Score:  Initial: 1750 feet Most Recent: X feet (Date: -- )   Interpretation of Score: Normal range varies but is approximately 4491-5854 Feet      Distance walked: 1750 feet               Baseline End of Test   Heart Rate 69 93   Dyspnea (Agustín Scale)     Fatigue (Agustín Scale) 5/10 4/10   SpO2 98 98   /82 134/79     Score 2133 4449-8053 [id number] [phone] 426-16 15-0   Modifier CH CI CJ CK CL CM CN         Tool Used: TINETTI  Score:  Initial:   Gait: 12/12  Balance: 16/16  TOTAL: 28/28 Most Recent:  Gait: /12  Balance: /16  TOTAL: /28   Interpretation of Score: The maximum score for the gait component is 12 points. The maximum score for the balance component is 16 points. The maximum total score is 28 points. In general, patients who score below 19 are at a high risk for falls.  Patients who score in the range of 19-24 indicate that the patient has a risk for falls. Score 28 27-23 22-18 17-12 11-6 5-1 0   Modifier CH CI CJ CK CL CM CN       Tool Used: Timed Up and Go (TUG)  Score:  Initial: 6 seconds Most Recent: X seconds (Date: -- )   Interpretation of Score: The test measures, in seconds, the time taken by an individual to stand up from a standard arm chair (seat height 46 cm [18 in], arm height 65 cm [25.6 in]), walk a distance of 3 meters (118 in, approx 10 ft), turn, walk back to the chair and sit down. If the individual takes longer than 14 seconds to complete TUG, this indicates risk for falls. Score 7 7.5-10.5 11-14 14.5-17.5 18-21 21.5-24.5 25+   Modifier CH CI CJ CK CL CM CN       Tool Used: NCCN Distress Thermometer   Score:  Initial: 5 Most Recent: X    Interpretation of Score: If greater than or equal to 8, then PHQ-9 Depression Scale Score   and APOLINAR-7 Anxiety Scale Score  . Tool Used: ECOG Performance Survey Score  Score:  Initial: 1 Most Recent:      Interpretation of Score:   0 Fully active, able to carry on all pre-disease performance without restriction   1 Restricted in physically strenuous activity but ambulatory and able to carry out work of a light or sedentary nature, e.g., light house work, office work   2 Ambulatory and capable of all selfcare but unable to carry out any work activities. Up and about more than 50% of waking hours   3 Capable of only limited selfcare, confined to bed or chair more than 50% of waking hours   4 Completely disabled. Cannot carry on any selfcare. Totally confined to bed or chair   5 Dead        Medical Necessity:   · Patient is expected to demonstrate progress in strength and conditioning to reduce fatigue and allow to return to work activities. Reason for Services/Other Comments:  · Patient continues to demonstrate capacity to improve strength and conditioning which will increase independence.    Use of outcome tool(s) and clinical judgement create a POC that gives a: Clear prediction of patient's progress: LOW COMPLEXITY            TREATMENT:   (In addition to Assessment/Re-Assessment sessions the following treatments were rendered)  Pre-treatment Symptoms/Complaints:  The patient reports he does not feel well and feels he needs to go back home. He reports he is dizzy. He was advised to go home and rest.  He is to increase his fluid intake. We will attempt again tomorrow. Pain: Initial:     0/10 Post Session:  /10   O2 98 HR 91  /77  10 min  No treatment due to patient feeling poorly. Advised to increase fluid and rest today. We will attempt to resume tomorrow. as above      Treatment/Session Assessment:    · Response to Treatment:  Unable to participate today. · Compliance with Program/Exercises: Will assess as treatment progresses. · Recommendations/Intent for next treatment session: \"Next visit will focus on advancements to more challenging activities\".   Total Treatment Duration:       Maylin Zamora, PT

## 2017-07-06 ENCOUNTER — HOSPITAL ENCOUNTER (OUTPATIENT)
Dept: ONCOLOGY | Age: 62
Discharge: HOME | End: 2017-07-06
Attending: INTERNAL MEDICINE
Payer: MEDICAID

## 2017-07-11 ENCOUNTER — HOSPITAL ENCOUNTER (OUTPATIENT)
Dept: ONCOLOGY | Age: 62
Discharge: HOME | End: 2017-07-11
Attending: INTERNAL MEDICINE
Payer: MEDICAID

## 2017-07-11 PROCEDURE — 97110 THERAPEUTIC EXERCISES: CPT

## 2017-07-11 NOTE — PROGRESS NOTES
Alec Valle  : 1955 [facility]  [address], Marylin Castelan, [address]  [phone]   [phone]          OUTPATIENT PHYSICAL THERAPY:Daily Note 2017    ICD-10: Treatment Diagnosis: M 62.81 muscle weakness generalized  R 53.0 neoplastic related fatigue  Precautions/Allergies:   Review of patient's allergies indicates no known allergies. Fall Risk Score: 1 (? 5 = High Risk)  MD Orders: oncology rehab MEDICAL/REFERRING DIAGNOSIS:  Prostate cancer ([facility] [address] [place]    DATE OF ONSET: 2017  REFERRING PHYSICIAN: Anjel Sullivan MD  RETURN PHYSICIAN APPOINTMENT: 17     INITIAL ASSESSMENT:  Mr. Manuelito Padilla presents following diagnosis of stage IV prostate cancer. He visited the ER 2017 and was found to have a significantly elevated PSA. It was found that he also had positive retroperitoneal lymph nodes. He underwent an orchiectomy 17. He was then started on Taxotere. He reports he has increased fatigue mentally and physically. He reports his activity level has decreased since diagnosis. He will benefit from therapeutic exercises in order to increase overall strength and conditioning. PROBLEM LIST (Impacting functional limitations):  1. Decreased Strength  2. Decreased Activity Tolerance  3. Increased Fatigue  4. Decreased [place] with Home Exercise Program INTERVENTIONS PLANNED:  1. Therapeutic Exercise/Strengthening   TREATMENT PLAN:  Effective Dates: 17 TO 17. Frequency/Duration: 1 time a week for 1 week, 2 x / weeks for 10 weeks. GOALS: (Goals have been discussed and agreed upon with patient.)  Short-Term Functional Goals: Time Frame: 4 weeks  1. The patient will be independent with HEP for strength within 4 weeks. 2. The patient will report walking 4-5 days per week for 20-30 min on his good weeks following chemo.    3. The patient will report a fatigue level of 3 or less within 4 weeks.  Discharge Goals: Time Frame: 8 weeks  1. The patient will transition to Healthy self within 8 weeks. Rehabilitation Potential For Stated Goals: Good  Regarding Neftali Winter therapy, I certify that the treatment plan above will be carried out by a therapist or under their direction. Thank you for this referral,  Adela Gold PT     Referring Physician Signature: Clarita Prado MD              Date                    The information in this section was collected on 6/7/17 (except where otherwise noted). HISTORY:   History of Present Injury/Illness (Reason for Referral):  Stage IV prostate cancer  Past Medical History/Comorbidities:   Mr. Zee Key  has a past medical history of Cancer ([facility] [address] and Former light cigarette smoker (1-9 per day). Mr. Zee Key  has a past surgical history that includes colonoscopy; heent; and orchiectomy (02/2017). Past Medical History:   Diagnosis Date    Cancer [facility])     prostate     Former light cigarette smoker (1-9 per day)     smoked for 35 years. quit      Past Surgical History:   Procedure Laterality Date    HX COLONOSCOPY      HX HEENT      teeth removed     HX ORCHIECTOMY  02/2017       Social History/Living Environment:     rents a room, has steps, but no problems  Prior Level of Function/Work/Activity:  Not currently working, was working for [employer] at [employer]:         RIGHT  Current Medications:       Current Outpatient Prescriptions:     sertraline (ZOLOFT) 100 mg tablet, Take 1 Tab by mouth daily. , Disp: 30 Tab, Rfl: 1    nystatin (MYCOSTATIN) powder, Apply  to affected area four (4) times daily. , Disp: 15 g, Rfl: 1    bicalutamide (CASODEX) 50 mg tablet, Take 1 Tab by mouth daily.  Indications: advanced prostatic carcinoma, Disp: 30 Tab, Rfl: 2    ondansetron hcl (ZOFRAN) 8 mg tablet, Take 1 Tab by mouth every eight (8) hours as needed for Nausea., Disp: 90 Tab, Rfl: 3    prochlorperazine (COMPAZINE) 10 mg tablet, Take 1 Tab by mouth every six (6) hours as needed. , Disp: 120 Tab, Rfl: 3    dexamethasone (DECADRON) 4 mg tablet, Take 2 tablets the day before, the day of, and the day after chemotherapy, Disp: 36 Tab, Rfl: 0    tamsulosin (FLOMAX) 0.4 mg capsule, Take 0.4 mg by mouth daily. , Disp: , Rfl:    Date Last Reviewed:  6/7/17   Number of Personal Factors/Comorbidities that affect the Plan of Care: 1-2: MODERATE COMPLEXITY   EXAMINATION:   ROM:          Within functional limits  Strength:          Grossly 5/5 x 4 extremities  Functional Mobility:         Gait/Ambulation:  independent        Transfers:  independent        Bed Mobility:  independent  Balance:          intact   Body Structures Involved:  1. Muscles Body Functions Affected:  1. Neuromusculoskeletal Activities and Participation Affected:  1. None   Number of elements (examined above) that affect the Plan of Care: 1-2: LOW COMPLEXITY   CLINICAL PRESENTATION:   Presentation: Stable and uncomplicated: LOW COMPLEXITY   CLINICAL DECISION MAKING:   Outcome Measure: Tool Used: 6-MINUTE WALK TEST  Score:  Initial: 1750 feet Most Recent: X feet (Date: -- )   Interpretation of Score: Normal range varies but is approximately 2047-6743 Feet      Distance walked: 1750 feet               Baseline End of Test   Heart Rate 69 93   Dyspnea (Agustín Scale)     Fatigue (Agustín Scale) 5/10 4/10   SpO2 98 98   /82 134/79     Score 2133 8847-7188 5755-9151 [phone] 426-16 15-0   Modifier CH CI CJ CK CL CM CN         Tool Used: TINETTI  Score:  Initial:   Gait: 12/12  Balance: 16/16  TOTAL: 28/28 Most Recent:  Gait: /12  Balance: /16  TOTAL: /28   Interpretation of Score: The maximum score for the gait component is 12 points. The maximum score for the balance component is 16 points. The maximum total score is 28 points. In general, patients who score below 19 are at a high risk for falls.  Patients who score in the range of 19-24 indicate that the patient has a risk for falls. Score 28 27-23 22-18 17-12 11-6 5-1 0   Modifier CH CI CJ CK CL CM CN       Tool Used: Timed Up and Go (TUG)  Score:  Initial: 6 seconds Most Recent: X seconds (Date: -- )   Interpretation of Score: The test measures, in seconds, the time taken by an individual to stand up from a standard arm chair (seat height 46 cm [18 in], arm height 65 cm [25.6 in]), walk a distance of 3 meters (118 in, approx 10 ft), turn, walk back to the chair and sit down. If the individual takes longer than 14 seconds to complete TUG, this indicates risk for falls. Score 7 7.5-10.5 11-14 14.5-17.5 18-21 21.5-24.5 25+   Modifier CH CI CJ CK CL CM CN       Tool Used: NCCN Distress Thermometer   Score:  Initial: 5 Most Recent: X    Interpretation of Score: If greater than or equal to 8, then PHQ-9 Depression Scale Score   and APOLINAR-7 Anxiety Scale Score  . Tool Used: ECOG Performance Survey Score  Score:  Initial: 1 Most Recent:      Interpretation of Score:   0 Fully active, able to carry on all pre-disease performance without restriction   1 Restricted in physically strenuous activity but ambulatory and able to carry out work of a light or sedentary nature, e.g., light house work, office work   2 Ambulatory and capable of all selfcare but unable to carry out any work activities. Up and about more than 50% of waking hours   3 Capable of only limited selfcare, confined to bed or chair more than 50% of waking hours   4 Completely disabled. Cannot carry on any selfcare. Totally confined to bed or chair   5 Dead        Medical Necessity:   · Patient is expected to demonstrate progress in strength and conditioning to reduce fatigue and allow to return to work activities. Reason for Services/Other Comments:  · Patient continues to demonstrate capacity to improve strength and conditioning which will increase independence.    Use of outcome tool(s) and clinical judgement create a POC that gives a: Clear prediction of patient's progress: LOW COMPLEXITY            TREATMENT:   (In addition to Assessment/Re-Assessment sessions the following treatments were rendered)  Pre-treatment Symptoms/Complaints:  Fatigue 4/10  Pain: Initial:     0/10 Post Session:  /10   43 min  O2 98 HR 76  /81  Nustep level 3 x 7 min SPM 80 METS 3.2  Walk 3 min  UBE level 1 x 4 min  Sit to stand x 10 reps  Long arc quads x 10 reps with 1 1/2#  Standing with 1 1/2# bilateral lower extremity up on toes, hip abduction, hip extension, hip flexion, hamstring curls x 10 reps  Bilateral upper extremity forward flexion, abduction, bicep curls( 5#), triceps extension, bent over rows( 5#) x 10 reps with 3#  Scapular retraction with silver theraband x 10 reps  Counter top pushups x 10 reps  3 min walk    Allowed patient to see Healthy Self. He will possibly start walking there and then possibly transition there for exercise soon. as above      Treatment/Session Assessment:    · Response to Treatment:  Unable to participate today. · Compliance with Program/Exercises: Will assess as treatment progresses. · Recommendations/Intent for next treatment session: \"Next visit will focus on advancements to more challenging activities\".   Total Treatment Duration:  PT Patient Time In/Time Out  Time In: 1017  Time Out: [address], PT

## 2017-07-13 ENCOUNTER — HOSPITAL ENCOUNTER (OUTPATIENT)
Dept: ONCOLOGY | Age: 62
Discharge: HOME | End: 2017-07-13
Attending: INTERNAL MEDICINE
Payer: MEDICAID

## 2017-07-13 ENCOUNTER — DOCUMENTATION ONLY (OUTPATIENT)
Dept: ONCOLOGY | Age: 62
End: 2017-07-13

## 2017-07-13 PROCEDURE — 97110 THERAPEUTIC EXERCISES: CPT

## 2017-07-13 NOTE — PROGRESS NOTES
Per Martin  : 1955 [facility]  [address], Mary[place] Castelan, [address]  [phone]   [phone]          OUTPATIENT PHYSICAL THERAPY:Daily Note 2017    ICD-10: Treatment Diagnosis: M 62.81 muscle weakness generalized  R 53.0 neoplastic related fatigue  Precautions/Allergies:   Review of patient's allergies indicates no known allergies. Fall Risk Score: 1 (? 5 = High Risk)  MD Orders: oncology rehab MEDICAL/REFERRING DIAGNOSIS:  Prostate cancer ([address] Raymon Monge    DATE OF ONSET: 2017  REFERRING PHYSICIAN: Isabel Munroe MD  RETURN PHYSICIAN APPOINTMENT: 17     INITIAL ASSESSMENT:  Mr. Dorina Solorzano presents following diagnosis of stage IV prostate cancer. He visited the ER 2017 and was found to have a significantly elevated PSA. It was found that he also had positive retroperitoneal lymph nodes. He underwent an orchiectomy 17. He was then started on Taxotere. He reports he has increased fatigue mentally and physically. He reports his activity level has decreased since diagnosis. He will benefit from therapeutic exercises in order to increase overall strength and conditioning. PROBLEM LIST (Impacting functional limitations):  1. Decreased Strength  2. Decreased Activity Tolerance  3. Increased Fatigue  4. Decreased [place] with Home Exercise Program INTERVENTIONS PLANNED:  1. Therapeutic Exercise/Strengthening   TREATMENT PLAN:  Effective Dates: 17 TO 17. Frequency/Duration: 1 time a week for 1 week, 2 x / weeks for 10 weeks. GOALS: (Goals have been discussed and agreed upon with patient.)  Short-Term Functional Goals: Time Frame: 4 weeks  1. The patient will be independent with HEP for strength within 4 weeks. 2. The patient will report walking 4-5 days per week for 20-30 min on his good weeks following chemo.    3. The patient will report a fatigue level of 3 or less within 4 weeks.  Discharge Goals: Time Frame: 8 weeks  1. The patient will transition to Healthy self within 8 weeks. Rehabilitation Potential For Stated Goals: Good  Regarding Alvarado Marquez therapy, I certify that the treatment plan above will be carried out by a therapist or under their direction. Thank you for this referral,  Majo Tamayo PT     Referring Physician Signature: Samantha Silverman MD              Date                    The information in this section was collected on 6/7/17 (except where otherwise noted). HISTORY:   History of Present Injury/Illness (Reason for Referral):  Stage IV prostate cancer  Past Medical History/Comorbidities:   Mr. Nadia Quinonez  has a past medical history of Cancer ([facility] [address] and Former light cigarette smoker (1-9 per day). Mr. Nadia Quinonez  has a past surgical history that includes colonoscopy; heent; and orchiectomy (02/2017). Past Medical History:   Diagnosis Date    Cancer [facility])     prostate     Former light cigarette smoker (1-9 per day)     smoked for 35 years. quit      Past Surgical History:   Procedure Laterality Date    HX COLONOSCOPY      HX HEENT      teeth removed     HX ORCHIECTOMY  02/2017       Social History/Living Environment:     rents a room, has steps, but no problems  Prior Level of Function/Work/Activity:  Not currently working, was working for [employer] at [employer]:         RIGHT  Current Medications:       Current Outpatient Prescriptions:     sertraline (ZOLOFT) 100 mg tablet, Take 1 Tab by mouth daily. , Disp: 30 Tab, Rfl: 1    nystatin (MYCOSTATIN) powder, Apply  to affected area four (4) times daily. , Disp: 15 g, Rfl: 1    bicalutamide (CASODEX) 50 mg tablet, Take 1 Tab by mouth daily.  Indications: advanced prostatic carcinoma, Disp: 30 Tab, Rfl: 2    ondansetron hcl (ZOFRAN) 8 mg tablet, Take 1 Tab by mouth every eight (8) hours as needed for Nausea., Disp: 90 Tab, Rfl: 3    prochlorperazine (COMPAZINE) 10 mg tablet, Take 1 Tab by mouth every six (6) hours as needed. , Disp: 120 Tab, Rfl: 3    dexamethasone (DECADRON) 4 mg tablet, Take 2 tablets the day before, the day of, and the day after chemotherapy, Disp: 36 Tab, Rfl: 0    tamsulosin (FLOMAX) 0.4 mg capsule, Take 0.4 mg by mouth daily. , Disp: , Rfl:    Date Last Reviewed:  6/7/17   Number of Personal Factors/Comorbidities that affect the Plan of Care: 1-2: MODERATE COMPLEXITY   EXAMINATION:   ROM:          Within functional limits  Strength:          Grossly 5/5 x 4 extremities  Functional Mobility:         Gait/Ambulation:  independent        Transfers:  independent        Bed Mobility:  independent  Balance:          intact   Body Structures Involved:  1. Muscles Body Functions Affected:  1. Neuromusculoskeletal Activities and Participation Affected:  1. None   Number of elements (examined above) that affect the Plan of Care: 1-2: LOW COMPLEXITY   CLINICAL PRESENTATION:   Presentation: Stable and uncomplicated: LOW COMPLEXITY   CLINICAL DECISION MAKING:   Outcome Measure: Tool Used: 6-MINUTE WALK TEST  Score:  Initial: 1750 feet Most Recent: X feet (Date: -- )   Interpretation of Score: Normal range varies but is approximately 2357-7312 Feet      Distance walked: 1750 feet               Baseline End of Test   Heart Rate 69 93   Dyspnea (Agustín Scale)     Fatigue (Agustín Scale) 5/10 4/10   SpO2 98 98   /82 134/79     Score 2133 3075-1407 5623-3825 [phone] 426-16 15-0   Modifier CH CI CJ CK CL CM CN         Tool Used: TINETTI  Score:  Initial:   Gait: 12/12  Balance: 16/16  TOTAL: 28/28 Most Recent:  Gait: /12  Balance: /16  TOTAL: /28   Interpretation of Score: The maximum score for the gait component is 12 points. The maximum score for the balance component is 16 points. The maximum total score is 28 points. In general, patients who score below 19 are at a high risk for falls.  Patients who score in the range of 19-24 indicate that the patient has a risk for falls. Score 28 27-23 22-18 17-12 11-6 5-1 0   Modifier CH CI CJ CK CL CM CN       Tool Used: Timed Up and Go (TUG)  Score:  Initial: 6 seconds Most Recent: X seconds (Date: -- )   Interpretation of Score: The test measures, in seconds, the time taken by an individual to stand up from a standard arm chair (seat height 46 cm [18 in], arm height 65 cm [25.6 in]), walk a distance of 3 meters (118 in, approx 10 ft), turn, walk back to the chair and sit down. If the individual takes longer than 14 seconds to complete TUG, this indicates risk for falls. Score 7 7.5-10.5 11-14 14.5-17.5 18-21 21.5-24.5 25+   Modifier CH CI CJ CK CL CM CN       Tool Used: NCCN Distress Thermometer   Score:  Initial: 5 Most Recent: X    Interpretation of Score: If greater than or equal to 8, then PHQ-9 Depression Scale Score   and APOLINAR-7 Anxiety Scale Score  . Tool Used: ECOG Performance Survey Score  Score:  Initial: 1 Most Recent:      Interpretation of Score:   0 Fully active, able to carry on all pre-disease performance without restriction   1 Restricted in physically strenuous activity but ambulatory and able to carry out work of a light or sedentary nature, e.g., light house work, office work   2 Ambulatory and capable of all selfcare but unable to carry out any work activities. Up and about more than 50% of waking hours   3 Capable of only limited selfcare, confined to bed or chair more than 50% of waking hours   4 Completely disabled. Cannot carry on any selfcare. Totally confined to bed or chair   5 Dead        Medical Necessity:   · Patient is expected to demonstrate progress in strength and conditioning to reduce fatigue and allow to return to work activities. Reason for Services/Other Comments:  · Patient continues to demonstrate capacity to improve strength and conditioning which will increase independence.    Use of outcome tool(s) and clinical judgement create a POC that gives a: Clear prediction of patient's progress: LOW COMPLEXITY            TREATMENT:   (In addition to Assessment/Re-Assessment sessions the following treatments were rendered)  Pre-treatment Symptoms/Complaints:  Fatigue 6/10  Patient reports he is fatigued today and is having light headedness at times. Pain: Initial:     0/10 Post Session: 0 /10   48 min  O2 96 HR 83  /70  Nustep level 3 x 7 min SPM 83 METS 3.3  Walk 3 min  UBE level 1 x 4 min  Sit to stand x 10 reps  Long arc quads x 10 reps with 1 1/2#  Standing with 1 1/2# bilateral lower extremity up on toes, hip abduction, hip extension, hip flexion, hamstring curls x 10 reps  Step ups x 10 reps high step O2 94   Hip abduction/adduction and hamstring curls with green theraband x 10 reps (may try silver next visit.)  3 min walk  Bilateral upper extremity forward flexion, abduction, bicep curls( 5#), triceps extension, bent over rows( 5#) x 10 reps with 3#  Scapular retraction with silver theraband x 10 reps  Counter top pushups x 10 reps  Fatigue 5/10  /66  Given water to drink during treatment. Advised patient to increase fluid intake at home. as above      Treatment/Session Assessment:    · Response to Treatment:  Unable to participate today. · Compliance with Program/Exercises: Will assess as treatment progresses. · Recommendations/Intent for next treatment session: \"Next visit will focus on advancements to more challenging activities\".   Total Treatment Duration:  PT Patient Time In/Time Out  Time In: 1340  Time Out: TANGELA [address], PT

## 2017-07-13 NOTE — PROGRESS NOTES
PSYCHOLOGY PROGRESS NOTE      Name:  Cande Egan    Date of Service: 7/13/2017  Location of Service:   [facility]  Type of Service: Individual Psychotherapy  Duration:  60 minutes  Primary Diagnosis: Adjustment    Summary of Service:  Patient indicated that he was a 7 mentally and a 6 physically on a 1-10 scale with 10 being the best.  Pt stated that he was a little fatigued today after his physical therapy session this morning. Pt indicated that he thought his anti-depressant medication was helping to lighten his mood. Pt indicated that he didn't have the energy or the focus to read all of his backlogged emails. Therapist normalized his reduced energy level and diminished ability to focus as the effects of his treatment. Pt expressed gratitude that he has not experienced any pain or nausea as a result of the chemotherapy. Therapist and pt created a list of activities that pt will do this week to feel active and productive prior to his next scheduled treatment. Will continue to meet with and be available to patient as scheduled and per patient's request.     Gildardo Nash. Susanna Kuo, LORIEFT-I  Marriage and Family Therapist    This note will not be viewable in [address]

## 2017-07-17 ENCOUNTER — HOSPITAL ENCOUNTER (OUTPATIENT)
Dept: ONCOLOGY | Age: 62
Discharge: HOME | End: 2017-07-17
Attending: INTERNAL MEDICINE
Payer: MEDICAID

## 2017-07-17 PROCEDURE — 97110 THERAPEUTIC EXERCISES: CPT

## 2017-07-17 NOTE — PROGRESS NOTES
Maya Nguyen  : 1955 [facility]  [address], Marylin Castelan, [address]  [phone]   [phone]          OUTPATIENT PHYSICAL THERAPY:Daily Note 2017    ICD-10: Treatment Diagnosis: M 62.81 muscle weakness generalized  R 53.0 neoplastic related fatigue  Precautions/Allergies:   Review of patient's allergies indicates no known allergies. Fall Risk Score: 1 (? 5 = High Risk)  MD Orders: oncology rehab MEDICAL/REFERRING DIAGNOSIS:  Prostate cancer ([address] Liseth Pereyra    DATE OF ONSET: 2017  REFERRING PHYSICIAN: Sarah Eli MD  RETURN PHYSICIAN APPOINTMENT: 17     INITIAL ASSESSMENT:  Mr. Jeronimo Humphrey presents following diagnosis of stage IV prostate cancer. He visited the ER 2017 and was found to have a significantly elevated PSA. It was found that he also had positive retroperitoneal lymph nodes. He underwent an orchiectomy 17. He was then started on Taxotere. He reports he has increased fatigue mentally and physically. He reports his activity level has decreased since diagnosis. He will benefit from therapeutic exercises in order to increase overall strength and conditioning. PROBLEM LIST (Impacting functional limitations):  1. Decreased Strength  2. Decreased Activity Tolerance  3. Increased Fatigue  4. Decreased Brooke with Home Exercise Program INTERVENTIONS PLANNED:  1. Therapeutic Exercise/Strengthening   TREATMENT PLAN:  Effective Dates: 17 TO 17. Frequency/Duration: 1 time a week for 1 week, 2 x / weeks for 10 weeks. GOALS: (Goals have been discussed and agreed upon with patient.)  Short-Term Functional Goals: Time Frame: 4 weeks  1. The patient will be independent with HEP for strength within 4 weeks. 2. The patient will report walking 4-5 days per week for 20-30 min on his good weeks following chemo.    3. The patient will report a fatigue level of 3 or less within 4 weeks.  Discharge Goals: Time Frame: 8 weeks  1. The patient will transition to Healthy self within 8 weeks. Rehabilitation Potential For Stated Goals: Good  Regarding Samantha Ou therapy, I certify that the treatment plan above will be carried out by a therapist or under their direction. Thank you for this referral,  Rupert Acharya PT     Referring Physician Signature: Maurice Dover MD              Date                    The information in this section was collected on 6/7/17 (except where otherwise noted). HISTORY:   History of Present Injury/Illness (Reason for Referral):  Stage IV prostate cancer  Past Medical History/Comorbidities:   Mr. Sherren Garre  has a past medical history of Cancer ([facility] [address] and Former light cigarette smoker (1-9 per day). Mr. Sherren Garre  has a past surgical history that includes colonoscopy; heent; and orchiectomy (02/2017). Past Medical History:   Diagnosis Date    Cancer [facility])     prostate     Former light cigarette smoker (1-9 per day)     smoked for 35 years. quit      Past Surgical History:   Procedure Laterality Date    HX COLONOSCOPY      HX HEENT      teeth removed     HX ORCHIECTOMY  02/2017       Social History/Living Environment:     rents a room, has steps, but no problems  Prior Level of Function/Work/Activity:  Not currently working, was working for [employer] at [address]:         RIGHT  Current Medications:       Current Outpatient Prescriptions:     sertraline (ZOLOFT) 100 mg tablet, Take 1 Tab by mouth daily. , Disp: 30 Tab, Rfl: 1    nystatin (MYCOSTATIN) powder, Apply  to affected area four (4) times daily. , Disp: 15 g, Rfl: 1    bicalutamide (CASODEX) 50 mg tablet, Take 1 Tab by mouth daily.  Indications: advanced prostatic carcinoma, Disp: 30 Tab, Rfl: 2    ondansetron hcl (ZOFRAN) 8 mg tablet, Take 1 Tab by mouth every eight (8) hours as needed for Nausea., Disp: 90 Tab, Rfl: 3    prochlorperazine (COMPAZINE) 10 mg tablet, Take 1 Tab by mouth every six (6) hours as needed. , Disp: 120 Tab, Rfl: 3    dexamethasone (DECADRON) 4 mg tablet, Take 2 tablets the day before, the day of, and the day after chemotherapy, Disp: 36 Tab, Rfl: 0    tamsulosin (FLOMAX) 0.4 mg capsule, Take 0.4 mg by mouth daily. , Disp: , Rfl:    Date Last Reviewed:  6/7/17   Number of Personal Factors/Comorbidities that affect the Plan of Care: 1-2: MODERATE COMPLEXITY   EXAMINATION:   ROM:          Within functional limits  Strength:          Grossly 5/5 x 4 extremities  Functional Mobility:         Gait/Ambulation:  independent        Transfers:  independent        Bed Mobility:  independent  Balance:          intact   Body Structures Involved:  1. Muscles Body Functions Affected:  1. Neuromusculoskeletal Activities and Participation Affected:  1. None   Number of elements (examined above) that affect the Plan of Care: 1-2: LOW COMPLEXITY   CLINICAL PRESENTATION:   Presentation: Stable and uncomplicated: LOW COMPLEXITY   CLINICAL DECISION MAKING:   Outcome Measure: Tool Used: 6-MINUTE WALK TEST  Score:  Initial: 1750 feet Most Recent: X feet (Date: -- )   Interpretation of Score: Normal range varies but is approximately 8254-5462 Feet      Distance walked: 1750 feet               Baseline End of Test   Heart Rate 69 93   Dyspnea (Agustín Scale)     Fatigue (Agustín Scale) 5/10 4/10   SpO2 98 98   /82 134/79     Score 2133 2734-1288 [id number] [phone] 426-16 15-0   Modifier CH CI CJ CK CL CM CN         Tool Used: TINETTI  Score:  Initial:   Gait: 12/12  Balance: 16/16  TOTAL: 28/28 Most Recent:  Gait: /12  Balance: /16  TOTAL: /28   Interpretation of Score: The maximum score for the gait component is 12 points. The maximum score for the balance component is 16 points. The maximum total score is 28 points. In general, patients who score below 19 are at a high risk for falls.  Patients who score in the range of 19-24 indicate that the patient has a risk for falls. Score 28 27-23 22-18 17-12 11-6 5-1 0   Modifier CH CI CJ CK CL CM CN       Tool Used: Timed Up and Go (TUG)  Score:  Initial: 6 seconds Most Recent: X seconds (Date: -- )   Interpretation of Score: The test measures, in seconds, the time taken by an individual to stand up from a standard arm chair (seat height 46 cm [18 in], arm height 65 cm [25.6 in]), walk a distance of 3 meters (118 in, approx 10 ft), turn, walk back to the chair and sit down. If the individual takes longer than 14 seconds to complete TUG, this indicates risk for falls. Score 7 7.5-10.5 11-14 14.5-17.5 18-21 21.5-24.5 25+   Modifier CH CI CJ CK CL CM CN       Tool Used: NCCN Distress Thermometer   Score:  Initial: 5 Most Recent: X    Interpretation of Score: If greater than or equal to 8, then PHQ-9 Depression Scale Score   and APOLINAR-7 Anxiety Scale Score  . Tool Used: ECOG Performance Survey Score  Score:  Initial: 1 Most Recent:      Interpretation of Score:   0 Fully active, able to carry on all pre-disease performance without restriction   1 Restricted in physically strenuous activity but ambulatory and able to carry out work of a light or sedentary nature, e.g., light house work, office work   2 Ambulatory and capable of all selfcare but unable to carry out any work activities. Up and about more than 50% of waking hours   3 Capable of only limited selfcare, confined to bed or chair more than 50% of waking hours   4 Completely disabled. Cannot carry on any selfcare. Totally confined to bed or chair   5 Dead        Medical Necessity:   · Patient is expected to demonstrate progress in strength and conditioning to reduce fatigue and allow to return to work activities. Reason for Services/Other Comments:  · Patient continues to demonstrate capacity to improve strength and conditioning which will increase independence.    Use of outcome tool(s) and clinical judgement create a POC that gives a: Clear prediction of patient's progress: LOW COMPLEXITY            TREATMENT:   (In addition to Assessment/Re-Assessment sessions the following treatments were rendered)  Pre-treatment Symptoms/Complaints:  Fatigue 2/10 before and 4/10 after. Pain: Initial:     0/10 Post Session: 0 /10   47 min  O2 98 HR 81  /68  Nustep level 3 x 9 min SPM 88 METS 3.5  Walk 3 min  UBE level 1 x 4 min   Sit to stand x 10 reps  Long arc quads x 10 reps with 1 1/2#  Standing with 1 1/2# bilateral lower extremity up on toes, hip abduction, hip extension, hip flexion, hamstring curls x 10 reps o2 98 HR 91  Step ups x 10 reps high step O2 95   Hip abduction/adduction and hamstring curls with green theraband x 10 reps (may try silver next visit.) not performed  3 min walk   Bilateral upper extremity forward flexion, abduction, bicep curl, triceps extension, bent over row) x 10 reps with 5#  Scapular retraction with silver theraband x 10 reps  Counter top pushups x 10 reps  Fatigue 4 /10      as above      Treatment/Session Assessment:    · Response to Treatment:  Unable to participate today. · Compliance with Program/Exercises: Will assess as treatment progresses. · Recommendations/Intent for next treatment session: \"Next visit will focus on advancements to more challenging activities\". The patient will attend yoga on Thursday. He will transition to therapy once a week in order to attend yoga weekly.    Total Treatment Duration:  PT Patient Time In/Time Out  Time In: 1103  Time Out: 1401 Manuel Cyr PT

## 2017-07-20 ENCOUNTER — APPOINTMENT (OUTPATIENT)
Dept: ONCOLOGY | Age: 62
End: 2017-07-20
Attending: INTERNAL MEDICINE
Payer: MEDICAID

## 2017-07-20 ENCOUNTER — DOCUMENTATION ONLY (OUTPATIENT)
Dept: ONCOLOGY | Age: 62
End: 2017-07-20

## 2017-07-21 ENCOUNTER — HOSPITAL ENCOUNTER (OUTPATIENT)
Dept: INFUSION THERAPY | Age: 62
Discharge: HOME | End: 2017-07-21
Payer: MEDICAID

## 2017-07-21 ENCOUNTER — HOSPITAL ENCOUNTER (OUTPATIENT)
Dept: LAB | Age: 62
Discharge: HOME | End: 2017-07-21
Payer: MEDICAID

## 2017-07-21 DIAGNOSIS — C61 PROSTATE CANCER (HCC): ICD-10-CM

## 2017-07-21 DIAGNOSIS — C77.2 METASTASIS TO RETROPERITONEAL LYMPH NODE (HCC): ICD-10-CM

## 2017-07-21 LAB
ALBUMIN SERPL BCP-MCNC: 3.7 G/DL (ref 3.2–4.6)
ALBUMIN/GLOB SERPL: 0.8 {RATIO} (ref 1.2–3.5)
ALP SERPL-CCNC: 74 U/L (ref 50–136)
ALT SERPL-CCNC: 38 U/L (ref 12–65)
ANION GAP BLD CALC-SCNC: 7 MMOL/L (ref 7–16)
AST SERPL W P-5'-P-CCNC: 22 U/L (ref 15–37)
BASOPHILS # BLD AUTO: 0 K/UL (ref 0–0.2)
BASOPHILS # BLD: 0 % (ref 0–2)
BILIRUB SERPL-MCNC: 0.2 MG/DL (ref 0.2–1.1)
BUN SERPL-MCNC: 19 MG/DL (ref 8–23)
CALCIUM SERPL-MCNC: 9.8 MG/DL (ref 8.3–10.4)
CHLORIDE SERPL-SCNC: 105 MMOL/L (ref 98–107)
CO2 SERPL-SCNC: 25 MMOL/L (ref 21–32)
CREAT SERPL-MCNC: 0.91 MG/DL (ref 0.8–1.5)
DIFFERENTIAL METHOD BLD: ABNORMAL
EOSINOPHIL # BLD: 0 K/UL (ref 0–0.8)
EOSINOPHIL NFR BLD: 0 % (ref 0.5–7.8)
ERYTHROCYTE [DISTWIDTH] IN BLOOD BY AUTOMATED COUNT: 13 % (ref 11.9–14.6)
GLOBULIN SER CALC-MCNC: 4.6 G/DL (ref 2.3–3.5)
GLUCOSE SERPL-MCNC: 140 MG/DL (ref 65–100)
HCT VFR BLD AUTO: 35 % (ref 41.1–50.3)
HGB BLD-MCNC: 11.9 G/DL (ref 13.6–17.2)
LYMPHOCYTES # BLD AUTO: 7 % (ref 13–44)
LYMPHOCYTES # BLD: 1.6 K/UL (ref 0.5–4.6)
MAGNESIUM SERPL-MCNC: 2.3 MG/DL (ref 1.8–2.4)
MCH RBC QN AUTO: 31.8 PG (ref 26.1–32.9)
MCHC RBC AUTO-ENTMCNC: 34 G/DL (ref 31.4–35)
MCV RBC AUTO: 93.6 FL (ref 79.6–97.8)
MONOCYTES # BLD: 0.5 K/UL (ref 0.1–1.3)
MONOCYTES NFR BLD AUTO: 2 % (ref 4–12)
NEUTS SEG # BLD: 20.2 K/UL (ref 1.7–8.2)
NEUTS SEG NFR BLD AUTO: 90 % (ref 43–78)
NRBC # BLD: 0 K/UL (ref 0–0.2)
PLATELET # BLD AUTO: 346 K/UL (ref 150–450)
PMV BLD AUTO: 9.3 FL (ref 10.8–14.1)
POTASSIUM SERPL-SCNC: 3.9 MMOL/L (ref 3.5–5.1)
PROT SERPL-MCNC: 8.3 G/DL (ref 6.3–8.2)
PSA SERPL-MCNC: 3.7 NG/ML
RBC # BLD AUTO: 3.74 M/UL (ref 4.23–5.67)
SODIUM SERPL-SCNC: 137 MMOL/L (ref 136–145)
WBC # BLD AUTO: 22.3 K/UL (ref 4.3–11.1)

## 2017-07-21 PROCEDURE — 36415 COLL VENOUS BLD VENIPUNCTURE: CPT | Performed by: NURSE PRACTITIONER

## 2017-07-21 PROCEDURE — 96361 HYDRATE IV INFUSION ADD-ON: CPT

## 2017-07-21 PROCEDURE — 80053 COMPREHEN METABOLIC PANEL: CPT | Performed by: NURSE PRACTITIONER

## 2017-07-21 PROCEDURE — 84153 ASSAY OF PSA TOTAL: CPT | Performed by: NURSE PRACTITIONER

## 2017-07-21 PROCEDURE — 83735 ASSAY OF MAGNESIUM: CPT | Performed by: NURSE PRACTITIONER

## 2017-07-21 PROCEDURE — 85025 COMPLETE CBC W/AUTO DIFF WBC: CPT | Performed by: NURSE PRACTITIONER

## 2017-07-21 PROCEDURE — 96375 TX/PRO/DX INJ NEW DRUG ADDON: CPT

## 2017-07-21 PROCEDURE — 74011250636 HC RX REV CODE- 250/636

## 2017-07-21 PROCEDURE — 74011250636 HC RX REV CODE- 250/636: Performed by: NURSE PRACTITIONER

## 2017-07-21 PROCEDURE — 96413 CHEMO IV INFUSION 1 HR: CPT

## 2017-07-21 RX ORDER — SODIUM CHLORIDE 0.9 % (FLUSH) 0.9 %
10 SYRINGE (ML) INJECTION AS NEEDED
Status: ACTIVE | OUTPATIENT
Start: 2017-07-21 | End: 2017-07-22

## 2017-07-21 RX ORDER — DEXAMETHASONE SODIUM PHOSPHATE 100 MG/10ML
10 INJECTION INTRAMUSCULAR; INTRAVENOUS ONCE
Status: COMPLETED | OUTPATIENT
Start: 2017-07-21 | End: 2017-07-21

## 2017-07-21 RX ADMIN — SODIUM CHLORIDE 500 ML: 900 INJECTION, SOLUTION INTRAVENOUS at 13:40

## 2017-07-21 RX ADMIN — Medication 10 ML: at 15:45

## 2017-07-21 RX ADMIN — Medication 10 ML: at 13:40

## 2017-07-21 RX ADMIN — DOCETAXEL 155 MG: 80 INJECTION, SOLUTION, CONCENTRATE INTRAVENOUS at 14:32

## 2017-07-21 RX ADMIN — DEXAMETHASONE SODIUM PHOSPHATE 10 MG: 10 INJECTION INTRAMUSCULAR; INTRAVENOUS at 13:53

## 2017-07-21 NOTE — PROGRESS NOTES
Arrived to the [facility]. Assessment completed. Patient was seen in [place] [address] today, before coming to infusion center. Labs reviewed. Patient tolerated chemotherapy well. Any issues or concerns during appointment: none. Patient aware of next infusion appointment on 8/11/17 (date) at [phone] (time) with IV infusion center. Discharged ambulatory, per self. Patient instructed to call Dr. Brigette Satrks office immediately for any problems or concerns. He verbalizes understanding.

## 2017-07-27 ENCOUNTER — DOCUMENTATION ONLY (OUTPATIENT)
Dept: ONCOLOGY | Age: 62
End: 2017-07-27

## 2017-07-27 NOTE — PROGRESS NOTES
PSYCHOLOGY PROGRESS NOTE      Name:  Loanne Gitelman    Date of Service: 7/27/2017  Location of Service:   [facility]  Type of Service: Individual Psychotherapy  Duration:  60 minutes  Primary Diagnosis: Adjustment    Summary of Service:  Patient stated that he was a 2 physically and a 5 mentally on a 1-10 scale with 10 being the best.  Pt stated that he is tired, unable to concentrate, foggy, and irritable after his latest round of treatment this past Friday. Pt said that he missed his yoga class this morning because he fell asleep, but he plans to attend the next class. Pt brought material to share regarding his vision and said he visited the site this week. Pt seeks to make good use of his time this week if he is able by watching golf, talking to his son, continuing to investigate his project, and listening to books on tape. Pt stated that his illness has deepened his relationship with his son with respect to honest sharing, expression of affection, and connection. Will continue to meet with and be available to patient as scheduled and per patient's request.     Kenny Liao. LORA Romo-I  Marriage and Family Therapist    This note will not be viewable in [address]

## 2017-08-03 ENCOUNTER — DOCUMENTATION ONLY (OUTPATIENT)
Dept: ONCOLOGY | Age: 62
End: 2017-08-03

## 2017-08-03 NOTE — PROGRESS NOTES
PSYCHOLOGY PROGRESS NOTE      Name:  Maya Nguyen    Date of Service: 8/3/2017  Location of Service:   [facility]  Type of Service: Individual Psychotherapy  Duration:  60 minutes  Primary Diagnosis: Adjustment    Summary of Service:  Pt stated that he missed his yoga class at oncology rehab today because he had another appointment. Pt stated that he was a 4 mentally and a 7 physically on a 1-10 scale with 10 being the best.  Pt said that his interrupted sleep and his latest chemo treatment may account for his foggy mental state. Pt stated that he obtained and read the biography of [employer]  over the weekend. Pt indicated that he was interested in it for business purposes. 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 Therapist and pt revisited pt's stated goals and identified progress on both. Will continue to meet with and be available to patient as scheduled and per patient's request.     Patricio Herzog. LORA Jose-I  Marriage and Family Therapist    This note will not be viewable in [address]

## 2017-08-10 ENCOUNTER — DOCUMENTATION ONLY (OUTPATIENT)
Dept: ONCOLOGY | Age: 62
End: 2017-08-10

## 2017-08-10 NOTE — PROGRESS NOTES
PSYCHOLOGY PROGRESS NOTE      Name:  Colton Vasquez    Date of Service: 8/10/2017  Location of Service:   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 Therapist and pt agreed that it would be reasonable for pt to set aside this business matter until after the conclusion of his treatment. Pt stated that he plans to enjoy the [place] this weekend and to begin another biography after his treatment tomorrow. Therapist and pt spoke about spiritual matters. Will continue to meet with and be available to patient as scheduled and per patient's request.     Layla Armstrong. LORA Kahn-I  Marriage and Family Therapist    This note will not be viewable in [address]

## 2017-08-11 ENCOUNTER — HOSPITAL ENCOUNTER (OUTPATIENT)
Dept: INFUSION THERAPY | Age: 62
Discharge: HOME | End: 2017-08-11
Payer: MEDICAID

## 2017-08-11 ENCOUNTER — HOSPITAL ENCOUNTER (OUTPATIENT)
Dept: LAB | Age: 62
Discharge: HOME | End: 2017-08-11
Payer: MEDICAID

## 2017-08-11 DIAGNOSIS — C77.2 METASTASIS TO RETROPERITONEAL LYMPH NODE (HCC): ICD-10-CM

## 2017-08-11 DIAGNOSIS — C61 PROSTATE CANCER (HCC): ICD-10-CM

## 2017-08-11 LAB
ALBUMIN SERPL BCP-MCNC: 4 G/DL (ref 3.2–4.6)
ALBUMIN/GLOB SERPL: 1 {RATIO} (ref 1.2–3.5)
ALP SERPL-CCNC: 70 U/L (ref 50–136)
ALT SERPL-CCNC: 56 U/L (ref 12–65)
ANION GAP BLD CALC-SCNC: 9 MMOL/L (ref 7–16)
AST SERPL W P-5'-P-CCNC: 20 U/L (ref 15–37)
BASOPHILS # BLD AUTO: 0 K/UL (ref 0–0.2)
BASOPHILS # BLD: 0 % (ref 0–2)
BILIRUB SERPL-MCNC: 0.3 MG/DL (ref 0.2–1.1)
BUN SERPL-MCNC: 20 MG/DL (ref 8–23)
CALCIUM SERPL-MCNC: 9.4 MG/DL (ref 8.3–10.4)
CHLORIDE SERPL-SCNC: 103 MMOL/L (ref 98–107)
CO2 SERPL-SCNC: 23 MMOL/L (ref 21–32)
CREAT SERPL-MCNC: 0.84 MG/DL (ref 0.8–1.5)
DIFFERENTIAL METHOD BLD: ABNORMAL
EOSINOPHIL # BLD: 0 K/UL (ref 0–0.8)
EOSINOPHIL NFR BLD: 0 % (ref 0.5–7.8)
ERYTHROCYTE [DISTWIDTH] IN BLOOD BY AUTOMATED COUNT: 13.8 % (ref 11.9–14.6)
GLOBULIN SER CALC-MCNC: 4.1 G/DL (ref 2.3–3.5)
GLUCOSE SERPL-MCNC: 200 MG/DL (ref 65–100)
HCT VFR BLD AUTO: 36.3 % (ref 41.1–50.3)
HGB BLD-MCNC: 12.3 G/DL (ref 13.6–17.2)
LYMPHOCYTES # BLD AUTO: 7 % (ref 13–44)
LYMPHOCYTES # BLD: 1.4 K/UL (ref 0.5–4.6)
MCH RBC QN AUTO: 31.5 PG (ref 26.1–32.9)
MCHC RBC AUTO-ENTMCNC: 33.9 G/DL (ref 31.4–35)
MCV RBC AUTO: 93.1 FL (ref 79.6–97.8)
MONOCYTES # BLD: 0.5 K/UL (ref 0.1–1.3)
MONOCYTES NFR BLD AUTO: 3 % (ref 4–12)
NEUTS SEG # BLD: 18.2 K/UL (ref 1.7–8.2)
NEUTS SEG NFR BLD AUTO: 90 % (ref 43–78)
NRBC # BLD: 0.01 K/UL (ref 0–0.2)
PLATELET # BLD AUTO: 240 K/UL (ref 150–450)
PMV BLD AUTO: 9.6 FL (ref 10.8–14.1)
POTASSIUM SERPL-SCNC: 3.8 MMOL/L (ref 3.5–5.1)
PROT SERPL-MCNC: 8.1 G/DL (ref 6.3–8.2)
PSA FREE MFR SERPL: 16.7 %
PSA FREE SERPL-MCNC: 0.5 NG/ML
PSA SERPL-MCNC: 3 NG/ML
RBC # BLD AUTO: 3.9 M/UL (ref 4.23–5.67)
SODIUM SERPL-SCNC: 135 MMOL/L (ref 136–145)
WBC # BLD AUTO: 20.2 K/UL (ref 4.3–11.1)

## 2017-08-11 PROCEDURE — 36415 COLL VENOUS BLD VENIPUNCTURE: CPT | Performed by: INTERNAL MEDICINE

## 2017-08-11 PROCEDURE — 84153 ASSAY OF PSA TOTAL: CPT | Performed by: INTERNAL MEDICINE

## 2017-08-11 PROCEDURE — 74011250636 HC RX REV CODE- 250/636

## 2017-08-11 PROCEDURE — 96375 TX/PRO/DX INJ NEW DRUG ADDON: CPT

## 2017-08-11 PROCEDURE — 96361 HYDRATE IV INFUSION ADD-ON: CPT

## 2017-08-11 PROCEDURE — 96413 CHEMO IV INFUSION 1 HR: CPT

## 2017-08-11 PROCEDURE — 85025 COMPLETE CBC W/AUTO DIFF WBC: CPT | Performed by: INTERNAL MEDICINE

## 2017-08-11 PROCEDURE — 80053 COMPREHEN METABOLIC PANEL: CPT | Performed by: INTERNAL MEDICINE

## 2017-08-11 PROCEDURE — 74011250636 HC RX REV CODE- 250/636: Performed by: NURSE PRACTITIONER

## 2017-08-11 RX ORDER — SODIUM CHLORIDE 0.9 % (FLUSH) 0.9 %
10 SYRINGE (ML) INJECTION AS NEEDED
Status: ACTIVE | OUTPATIENT
Start: 2017-08-11 | End: 2017-08-12

## 2017-08-11 RX ORDER — HEPARIN 100 UNIT/ML
300-500 SYRINGE INTRAVENOUS AS NEEDED
Status: ACTIVE | OUTPATIENT
Start: 2017-08-11 | End: 2017-08-12

## 2017-08-11 RX ORDER — DEXAMETHASONE SODIUM PHOSPHATE 100 MG/10ML
10 INJECTION INTRAMUSCULAR; INTRAVENOUS ONCE
Status: COMPLETED | OUTPATIENT
Start: 2017-08-11 | End: 2017-08-11

## 2017-08-11 RX ADMIN — Medication 10 ML: at 13:46

## 2017-08-11 RX ADMIN — SODIUM CHLORIDE 500 ML: 900 INJECTION, SOLUTION INTRAVENOUS at 13:49

## 2017-08-11 RX ADMIN — DOCETAXEL 155 MG: 80 INJECTION, SOLUTION, CONCENTRATE INTRAVENOUS at 14:20

## 2017-08-11 RX ADMIN — Medication 10 ML: at 15:18

## 2017-08-11 RX ADMIN — DEXAMETHASONE SODIUM PHOSPHATE 10 MG: 10 INJECTION INTRAMUSCULAR; INTRAVENOUS at 13:46

## 2017-08-11 NOTE — PROGRESS NOTES
Arrived to [facility] to receive Taxotere. Tolerated well. Issues or concerns during appointment: none. Aware of next appointment on 9/1 at 10:00 AM.  Discharged ambulatory.

## 2017-08-17 ENCOUNTER — DOCUMENTATION ONLY (OUTPATIENT)
Dept: ONCOLOGY | Age: 62
End: 2017-08-17

## 2017-08-17 NOTE — PROGRESS NOTES
PSYCHOLOGY PROGRESS NOTE      Name:  Moy Huggins    Date of Service: 8/17/2017  Location of Service:   [facility]  Type of Service: Individual Psychotherapy  Duration:  60 minutes  Primary Diagnosis: Difficulty Coping with Disease    Summary of Service:  Therapist and pt discussed pt's symptoms and symptom management after his 5th treatment one week ago. Pt stated that he experienced a more intense response to his latest round of chemotherapy including extreme hunger, fatigue, profound cognitive fogginess, and persistent insomnia. Pt stated that he was physically unable to attend yoga again this morning. Pt stated that he now simply accepts the symptoms as temporary and attempts to cope by watching television and golf. Therapist affirmed pt in his mindful attitude. Will continue to meet with and be available to patient as scheduled and per patient's request.     Konstantin Nelson. LORA Bacon-I  Marriage and Family Therapist    This note will not be viewable in [address]

## 2017-08-24 ENCOUNTER — DOCUMENTATION ONLY (OUTPATIENT)
Dept: ONCOLOGY | Age: 62
End: 2017-08-24

## 2017-08-24 NOTE — PROGRESS NOTES
PSYCHOLOGY PROGRESS NOTE      Name:  Radha Camacho    Date of Service: 8/24/2017  Location of Service:   [facility]  Type of Service: Individual Psychotherapy  Duration:  60 minutes  Primary Diagnosis: Difficulty Coping with Disease    Summary of Service:  Patient stated that he was feeling down today because his son is returning to school and is experiencing financial stress, the property [place], and he has gained 17 pounds since the start of his treatment. Pt stated that he chose to attend therapy today to express his unhappiness in order to improve his mood. Therapist processed pt's thoughts and feelings and affirmed the pt for his proactive behavior. Therapist and pt discussed insomnia and identified helpful relaxation techniques including breathing, focusing on bodily comfort, and praying. Will continue to meet with and be available to patient as scheduled and per patient's request.     Stuart Castillo. Glenroy Lunsford, LORIEFT-I  Marriage and Family Therapist    This note will not be viewable in [address]

## 2017-08-31 ENCOUNTER — DOCUMENTATION ONLY (OUTPATIENT)
Dept: ONCOLOGY | Age: 62
End: 2017-08-31

## 2017-09-01 ENCOUNTER — HOSPITAL ENCOUNTER (OUTPATIENT)
Dept: LAB | Age: 62
Discharge: HOME | End: 2017-09-01
Payer: MEDICAID

## 2017-09-01 ENCOUNTER — HOSPITAL ENCOUNTER (OUTPATIENT)
Dept: INFUSION THERAPY | Age: 62
Discharge: HOME | End: 2017-09-01
Payer: MEDICAID

## 2017-09-01 DIAGNOSIS — C61 PROSTATE CANCER (HCC): ICD-10-CM

## 2017-09-01 DIAGNOSIS — C77.2 METASTASIS TO RETROPERITONEAL LYMPH NODE (HCC): ICD-10-CM

## 2017-09-01 LAB
ALBUMIN SERPL-MCNC: 3.9 G/DL (ref 3.2–4.6)
ALBUMIN/GLOB SERPL: 1 {RATIO} (ref 1.2–3.5)
ALP SERPL-CCNC: 75 U/L (ref 50–136)
ALT SERPL-CCNC: 35 U/L (ref 12–65)
ANION GAP SERPL CALC-SCNC: 9 MMOL/L (ref 7–16)
AST SERPL-CCNC: 16 U/L (ref 15–37)
BASOPHILS # BLD: 0 K/UL (ref 0–0.2)
BASOPHILS NFR BLD: 0 % (ref 0–2)
BILIRUB SERPL-MCNC: 0.3 MG/DL (ref 0.2–1.1)
BUN SERPL-MCNC: 19 MG/DL (ref 8–23)
CALCIUM SERPL-MCNC: 9.1 MG/DL (ref 8.3–10.4)
CHLORIDE SERPL-SCNC: 104 MMOL/L (ref 98–107)
CO2 SERPL-SCNC: 23 MMOL/L (ref 21–32)
CREAT SERPL-MCNC: 0.92 MG/DL (ref 0.8–1.5)
DIFFERENTIAL METHOD BLD: ABNORMAL
EOSINOPHIL # BLD: 0 K/UL (ref 0–0.8)
EOSINOPHIL NFR BLD: 0 % (ref 0.5–7.8)
ERYTHROCYTE [DISTWIDTH] IN BLOOD BY AUTOMATED COUNT: 14.2 % (ref 11.9–14.6)
GLOBULIN SER CALC-MCNC: 3.9 G/DL (ref 2.3–3.5)
GLUCOSE SERPL-MCNC: 229 MG/DL (ref 65–100)
HCT VFR BLD AUTO: 35.9 % (ref 41.1–50.3)
HGB BLD-MCNC: 12.1 G/DL (ref 13.6–17.2)
LYMPHOCYTES # BLD: 1.7 K/UL (ref 0.5–4.6)
LYMPHOCYTES NFR BLD: 11 % (ref 13–44)
MAGNESIUM SERPL-MCNC: 2 MG/DL (ref 1.8–2.4)
MCH RBC QN AUTO: 31.5 PG (ref 26.1–32.9)
MCHC RBC AUTO-ENTMCNC: 33.7 G/DL (ref 31.4–35)
MCV RBC AUTO: 93.5 FL (ref 79.6–97.8)
MONOCYTES # BLD: 0.4 K/UL (ref 0.1–1.3)
MONOCYTES NFR BLD: 2 % (ref 4–12)
NEUTS SEG # BLD: 13.4 K/UL (ref 1.7–8.2)
NEUTS SEG NFR BLD: 87 % (ref 43–78)
NRBC # BLD: 0.01 K/UL (ref 0–0.2)
PLATELET # BLD AUTO: 268 K/UL (ref 150–450)
PMV BLD AUTO: 9.3 FL (ref 10.8–14.1)
POTASSIUM SERPL-SCNC: 3.8 MMOL/L (ref 3.5–5.1)
PROT SERPL-MCNC: 7.8 G/DL (ref 6.3–8.2)
PSA SERPL-MCNC: 2.4 NG/ML
RBC # BLD AUTO: 3.84 M/UL (ref 4.23–5.67)
SODIUM SERPL-SCNC: 136 MMOL/L (ref 136–145)
WBC # BLD AUTO: 15.4 K/UL (ref 4.3–11.1)

## 2017-09-01 PROCEDURE — 83735 ASSAY OF MAGNESIUM: CPT | Performed by: INTERNAL MEDICINE

## 2017-09-01 PROCEDURE — 80053 COMPREHEN METABOLIC PANEL: CPT | Performed by: INTERNAL MEDICINE

## 2017-09-01 PROCEDURE — 84153 ASSAY OF PSA TOTAL: CPT | Performed by: INTERNAL MEDICINE

## 2017-09-01 PROCEDURE — 74011250636 HC RX REV CODE- 250/636: Performed by: INTERNAL MEDICINE

## 2017-09-01 PROCEDURE — 36415 COLL VENOUS BLD VENIPUNCTURE: CPT | Performed by: INTERNAL MEDICINE

## 2017-09-01 PROCEDURE — 74011250636 HC RX REV CODE- 250/636

## 2017-09-01 PROCEDURE — 85025 COMPLETE CBC W/AUTO DIFF WBC: CPT | Performed by: INTERNAL MEDICINE

## 2017-09-01 PROCEDURE — 96375 TX/PRO/DX INJ NEW DRUG ADDON: CPT

## 2017-09-01 PROCEDURE — 96413 CHEMO IV INFUSION 1 HR: CPT

## 2017-09-01 RX ORDER — SODIUM CHLORIDE 0.9 % (FLUSH) 0.9 %
10 SYRINGE (ML) INJECTION AS NEEDED
Status: ACTIVE | OUTPATIENT
Start: 2017-09-01 | End: 2017-09-01

## 2017-09-01 RX ORDER — DEXAMETHASONE SODIUM PHOSPHATE 100 MG/10ML
10 INJECTION INTRAMUSCULAR; INTRAVENOUS ONCE
Status: COMPLETED | OUTPATIENT
Start: 2017-09-01 | End: 2017-09-01

## 2017-09-01 RX ORDER — HEPARIN 100 UNIT/ML
300-500 SYRINGE INTRAVENOUS AS NEEDED
Status: ACTIVE | OUTPATIENT
Start: 2017-09-01 | End: 2017-09-01

## 2017-09-01 RX ADMIN — SODIUM CHLORIDE 500 ML: 900 INJECTION, SOLUTION INTRAVENOUS at 12:10

## 2017-09-01 RX ADMIN — DOCETAXEL 155 MG: 80 INJECTION, SOLUTION, CONCENTRATE INTRAVENOUS at 12:45

## 2017-09-01 RX ADMIN — SODIUM CHLORIDE, PRESERVATIVE FREE 300 UNITS: 5 INJECTION INTRAVENOUS at 14:01

## 2017-09-01 RX ADMIN — DEXAMETHASONE SODIUM PHOSPHATE 10 MG: 10 INJECTION INTRAMUSCULAR; INTRAVENOUS at 12:08

## 2017-09-01 RX ADMIN — Medication 10 ML: at 12:05

## 2017-09-01 RX ADMIN — Medication 10 ML: at 14:00

## 2017-09-14 ENCOUNTER — DOCUMENTATION ONLY (OUTPATIENT)
Dept: ONCOLOGY | Age: 62
End: 2017-09-14

## 2017-09-14 NOTE — PROGRESS NOTES
PSYCHOLOGY PROGRESS NOTE      Name:  Cande Egan    Date of Service: 9/14/2017  Location of Service:   [facility]  Type of Service: Individual Psychotherapy  Duration:  60 minutes  Primary Diagnosis: Adjustment    Summary of Service:  Therapist and pt discussed the pt's last treatment and his PSA results. Patient expressed relief at the completion of his treatment and sought to understand how long the cognitive effects of his chemo treatments would last.  Therapist cautioned pt to be patient with himself and provided information on the slow process of the wearing away of post-chemotherapy mental fogginess. Therapist and pt spoke of activities that pt might pursue in the near future and spoke about his social relationships. Will continue to meet with and be available to patient as scheduled and per patient's request.     Gildardo Nash. LORA Gerard-I  Marriage and Family Therapist    This note will not be viewable in [address]

## 2017-09-21 ENCOUNTER — DOCUMENTATION ONLY (OUTPATIENT)
Dept: ONCOLOGY | Age: 62
End: 2017-09-21

## 2017-09-21 NOTE — PROGRESS NOTES
PSYCHOLOGY PROGRESS NOTE      Name:  Sakina Trujillo    Date of Service: 9/21/2017  Location of Service:   [facility]  Type of Service: Individual Psychotherapy  Duration:  60 minutes  Primary Diagnosis: Adjustment    Summary of Service:  Therapist and pt discussed pt's symptoms with the completion of his chemotherapy regime. Pt stated that his lack of mental clarity is unchanged. Therapist and pt revisited the long process of the wearing away of the effects of chemotherapy. Ptstated that he was not surprised by this information. 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 Will continue to meet with and be available to patient as scheduled and per patient's request.     LORA Chaudhari-I  Marriage and Family Therapist    This note will not be viewable in [address]

## 2017-09-29 ENCOUNTER — DOCUMENTATION ONLY (OUTPATIENT)
Dept: ONCOLOGY | Age: 62
End: 2017-09-29

## 2017-09-29 NOTE — PROGRESS NOTES
PSYCHOLOGY PROGRESS NOTE      Name:  Ellie Bradley    Date of Service: 9/28/2017  Location of Service:   [facility]  Type of Service: Individual Psychotherapy  Duration:  60 minutes  Primary Diagnosis: Adjustment    Summary of Service:  Therapist and pt explored pt's family history and patterns, financial matters regarding pt's son, and details relating to pt's project. Will continue to meet with and be available to patient as scheduled and per patient's request.     Park Felty. Robin Nails, LORIEFT-I  Marriage and Family Therapist    This note will not be viewable in [address]

## 2017-10-02 ENCOUNTER — HOSPITAL ENCOUNTER (OUTPATIENT)
Dept: LAB | Age: 62
Discharge: HOME | End: 2017-10-02
Payer: MEDICAID

## 2017-10-02 DIAGNOSIS — C61 PROSTATE CANCER (HCC): ICD-10-CM

## 2017-10-02 LAB
ALBUMIN SERPL-MCNC: 3.9 G/DL (ref 3.2–4.6)
ALBUMIN/GLOB SERPL: 1.1 {RATIO} (ref 1.2–3.5)
ALP SERPL-CCNC: 63 U/L (ref 50–136)
ALT SERPL-CCNC: 28 U/L (ref 12–65)
ANION GAP SERPL CALC-SCNC: 6 MMOL/L (ref 7–16)
AST SERPL-CCNC: 15 U/L (ref 15–37)
BASOPHILS # BLD: 0 K/UL (ref 0–0.2)
BASOPHILS NFR BLD: 0 % (ref 0–2)
BILIRUB SERPL-MCNC: 0.3 MG/DL (ref 0.2–1.1)
BUN SERPL-MCNC: 24 MG/DL (ref 8–23)
CALCIUM SERPL-MCNC: 9.6 MG/DL (ref 8.3–10.4)
CHLORIDE SERPL-SCNC: 107 MMOL/L (ref 98–107)
CO2 SERPL-SCNC: 24 MMOL/L (ref 21–32)
CREAT SERPL-MCNC: 0.82 MG/DL (ref 0.8–1.5)
DIFFERENTIAL METHOD BLD: ABNORMAL
EOSINOPHIL # BLD: 0.3 K/UL (ref 0–0.8)
EOSINOPHIL NFR BLD: 3 % (ref 0.5–7.8)
ERYTHROCYTE [DISTWIDTH] IN BLOOD BY AUTOMATED COUNT: 14.6 % (ref 11.9–14.6)
GLOBULIN SER CALC-MCNC: 3.5 G/DL (ref 2.3–3.5)
GLUCOSE SERPL-MCNC: 96 MG/DL (ref 65–100)
HCT VFR BLD AUTO: 36.8 % (ref 41.1–50.3)
HGB BLD-MCNC: 12.5 G/DL (ref 13.6–17.2)
LYMPHOCYTES # BLD: 1.9 K/UL (ref 0.5–4.6)
LYMPHOCYTES NFR BLD: 20 % (ref 13–44)
MCH RBC QN AUTO: 31.5 PG (ref 26.1–32.9)
MCHC RBC AUTO-ENTMCNC: 34 G/DL (ref 31.4–35)
MCV RBC AUTO: 92.7 FL (ref 79.6–97.8)
MONOCYTES # BLD: 1 K/UL (ref 0.1–1.3)
MONOCYTES NFR BLD: 10 % (ref 4–12)
NEUTS SEG # BLD: 6.6 K/UL (ref 1.7–8.2)
NEUTS SEG NFR BLD: 67 % (ref 43–78)
NRBC # BLD: 0 K/UL (ref 0–0.2)
PLATELET # BLD AUTO: 267 K/UL (ref 150–450)
PMV BLD AUTO: 9 FL (ref 10.8–14.1)
POTASSIUM SERPL-SCNC: 4.1 MMOL/L (ref 3.5–5.1)
PROT SERPL-MCNC: 7.4 G/DL (ref 6.3–8.2)
PSA SERPL-MCNC: 2 NG/ML
RBC # BLD AUTO: 3.97 M/UL (ref 4.23–5.67)
SODIUM SERPL-SCNC: 137 MMOL/L (ref 136–145)
WBC # BLD AUTO: 9.8 K/UL (ref 4.3–11.1)

## 2017-10-02 PROCEDURE — 80053 COMPREHEN METABOLIC PANEL: CPT | Performed by: INTERNAL MEDICINE

## 2017-10-02 PROCEDURE — 85025 COMPLETE CBC W/AUTO DIFF WBC: CPT | Performed by: INTERNAL MEDICINE

## 2017-10-02 PROCEDURE — 84153 ASSAY OF PSA TOTAL: CPT | Performed by: INTERNAL MEDICINE

## 2017-10-02 PROCEDURE — 36415 COLL VENOUS BLD VENIPUNCTURE: CPT | Performed by: INTERNAL MEDICINE

## 2017-10-05 ENCOUNTER — DOCUMENTATION ONLY (OUTPATIENT)
Dept: ONCOLOGY | Age: 62
End: 2017-10-05

## 2017-10-05 NOTE — PROGRESS NOTES
PSYCHOLOGY PROGRESS NOTE      Name:  Sangita Maier    Date of Service: 10/5/2017  Location of Service:   [facility]  Type of Service: Individual Psychotherapy  Duration:  60 minutes  Primary Diagnosis: Adjustment    Summary of Service:  Therapist and pt discussed the resolution of the financial matter concerning his son. Patient complained of physical fatigue and mental fog, but stated that he was generally feeling happy. Patient expressed gratitude for the blessings in his life including the good care provided by Nicki Odom. Therapist and pt created a SMART plan to begin exercising again. Therapist and pt explored details of pt's project. Will continue to meet with and be available to patient as scheduled and per patient's request.     Yaw Mcdermott. LORA Johnson-JUAN C  Marriage and Family Therapist    This note will not be viewable in [address]

## 2017-10-13 ENCOUNTER — DOCUMENTATION ONLY (OUTPATIENT)
Dept: ONCOLOGY | Age: 62
End: 2017-10-13

## 2017-10-13 NOTE — PROGRESS NOTES
PSYCHOLOGY PROGRESS NOTE      Name:  Jerome Barr    Date of Service: 10/12/2017  Location of Service:   [facility]  Type of Service: Individual Psychotherapy  Duration:  60 minutes  Primary Diagnosis: Adjustment    Summary of Service:  Therapist and pt explored matters relating to pt's exercise regime, weight gain, lack of mental clarity, and housing. 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 Therapist normalized pt's experience of diminished energy and reduced physical strength. Will continue to meet with and be available to patient as scheduled and per patient's request.     Ramonita West. Cheryle Magnus, LMFT-I  Marriage and Family Therapist    This note will not be viewable in [address]

## 2017-10-19 ENCOUNTER — DOCUMENTATION ONLY (OUTPATIENT)
Dept: ONCOLOGY | Age: 62
End: 2017-10-19

## 2017-10-19 NOTE — PROGRESS NOTES
PSYCHOLOGY PROGRESS NOTE      Name:  Sonal Atkinson    Date of Service: 10/19/2017  Location of Service:   [facility]  Type of Service: Individual Psychotherapy  Duration:  60 minutes  Primary Diagnosis: Adjustment    Summary of Service:  Patient reported that he walked three times in the past week. Therapist and pt explored exercising, eating, losing weight strategies, and socializing. [place] son and being reminded of the loss of that property by the time of year and the changing of the trees. Therapist and pt revisited pt's therapeutic goals. Will continue to meet with and be available to patient as scheduled and per patient's request.     Emerald Ortiz. LORA Flowers-I  Marriage and Family Therapist    This note will not be viewable in [address]

## 2017-10-23 NOTE — PROGRESS NOTES
Wade Day  : 1955 [facility]  [address], Marylin Castelan, [address]  [phone]   [phone]          OUTPATIENT PHYSICAL THERAPY:Discharge 10/23/2017    ICD-10: Treatment Diagnosis: M 62.81 muscle weakness generalized  R 53.0 neoplastic related fatigue  Precautions/Allergies:   Review of patient's allergies indicates no known allergies. Fall Risk Score: 1 (? 5 = High Risk)  MD Orders: oncology rehab MEDICAL/REFERRING DIAGNOSIS:  Prostate cancer ([facility] [address] Valeria Ramirez    DATE OF ONSET: 2017  REFERRING PHYSICIAN: Maurice Dover MD  RETURN PHYSICIAN APPOINTMENT: 17     INITIAL ASSESSMENT:  Mr. Sherren Garre presents following diagnosis of stage IV prostate cancer. He visited the ER 2017 and was found to have a significantly elevated PSA. It was found that he also had positive retroperitoneal lymph nodes. He underwent an orchiectomy 17. He was then started on Taxotere. He reports he has increased fatigue mentally and physically. He reports his activity level has decreased since diagnosis. He will benefit from therapeutic exercises in order to increase overall strength and conditioning. The patient did not meet his goals due to not returning. PROBLEM LIST (Impacting functional limitations):  1. Decreased Strength  2. Decreased Activity Tolerance  3. Increased Fatigue  4. Decreased [place] with Home Exercise Program INTERVENTIONS PLANNED:  1. Therapeutic Exercise/Strengthening   TREATMENT PLAN:  Effective Dates: 17 TO 17. Frequency/Duration: discharge from physical therapy. The patient did not continue. GOALS: (Goals have been discussed and agreed upon with patient.)  Short-Term Functional Goals: Time Frame: 4 weeks  1. The patient will be independent with [facility] for strength within 4 weeks. 2. The patient will report walking 4-5 days per week for 20-30 min on his good weeks following chemo. 3. The patient will report a fatigue level of 3 or less within 4 weeks. Discharge Goals: Time Frame: 8 weeks  1. The patient will transition to Healthy self within 8 weeks. Rehabilitation Potential For Stated Goals: Good  Regarding Sacha Alfaro therapy, I certify that the treatment plan above will be carried out by a therapist or under their direction. Thank you for this referral,  Cory Daniel PT     Referring Physician Signature: Sarah Eli MD              Date                    The information in this section was collected on 6/7/17 (except where otherwise noted). HISTORY:   History of Present Injury/Illness (Reason for Referral):  Stage IV prostate cancer  Past Medical History/Comorbidities:   Mr. Jeronimo Humphrey  has a past medical history of Cancer ([facility] [address] and Former light cigarette smoker (1-9 per day). Mr. Jeronimo Humphrey  has a past surgical history that includes colonoscopy; heent; and orchiectomy (02/2017). Past Medical History:   Diagnosis Date    Cancer [facility])     prostate     Former light cigarette smoker (1-9 per day)     smoked for 35 years. quit      Past Surgical History:   Procedure Laterality Date    HX COLONOSCOPY      HX HEENT      teeth removed     HX ORCHIECTOMY  02/2017       Social History/Living Environment:     rents a room, has steps, but no problems  Prior Level of Function/Work/Activity:  Not currently working, was working for [employer] at [employer] Side:         [employer]  Current Medications:       Current Outpatient Prescriptions:     sertraline (ZOLOFT) 100 mg tablet, Take 1 Tab by mouth daily. , Disp: 30 Tab, Rfl: 6    bicalutamide (CASODEX) 50 mg tablet, Take 1 Tab by mouth daily. Indications: advanced prostatic carcinoma, Disp: 30 Tab, Rfl: 2    nystatin (MYCOSTATIN) powder, Apply  to affected area four (4) times daily. , Disp: 15 g, Rfl: 1    ondansetron hcl (ZOFRAN) 8 mg tablet, Take 1 Tab by mouth every eight (8) hours as needed for Nausea., Disp: 90 Tab, Rfl: 3    prochlorperazine (COMPAZINE) 10 mg tablet, Take 1 Tab by mouth every six (6) hours as needed. , Disp: 120 Tab, Rfl: 3    dexamethasone (DECADRON) 4 mg tablet, Take 2 tablets the day before, the day of, and the day after chemotherapy, Disp: 36 Tab, Rfl: 0    tamsulosin (FLOMAX) 0.4 mg capsule, Take 0.4 mg by mouth daily. , Disp: , Rfl:    Date Last Reviewed:  6/7/17   Number of Personal Factors/Comorbidities that affect the Plan of Care: 1-2: MODERATE COMPLEXITY   EXAMINATION:   ROM:          Within functional limits  Strength:          Grossly 5/5 x 4 extremities  Functional Mobility:         Gait/Ambulation:  independent        Transfers:  independent        Bed Mobility:  independent  Balance:          intact   Body Structures Involved:  1. Muscles Body Functions Affected:  1. Neuromusculoskeletal Activities and Participation Affected:  1. None   Number of elements (examined above) that affect the Plan of Care: 1-2: LOW COMPLEXITY   CLINICAL PRESENTATION:   Presentation: Stable and uncomplicated: LOW COMPLEXITY   CLINICAL DECISION MAKING:   Outcome Measure: Tool Used: 6-MINUTE WALK TEST  Score:  Initial: 1750 feet Most Recent: X feet (Date: -- )   Interpretation of Score: Normal range varies but is approximately 6439-2687 Feet      Distance walked: 1750 feet               Baseline End of Test   Heart Rate 69 93   Dyspnea (Agustín Scale)     Fatigue (Agustín Scale) 5/10 4/10   SpO2 98 98   /82 134/79     Score 2133 1182-5050 9084-1475 [phone] 426-16 15-0   Modifier CH CI CJ CK CL CM CN         Tool Used: TINETTI  Score:  Initial:   Gait: 12/12  Balance: 16/16  TOTAL: 28/28 Most Recent:  Gait: /12  Balance: /16  TOTAL: /28   Interpretation of Score: The maximum score for the gait component is 12 points. The maximum score for the balance component is 16 points. The maximum total score is 28 points. In general, patients who score below 19 are at a high risk for falls. Patients who score in the range of 19-24 indicate that the patient has a risk for falls. Score 28 27-23 22-18 17-12 11-6 5-1 0   Modifier CH CI CJ CK CL CM CN       Tool Used: Timed Up and Go (TUG)  Score:  Initial: 6 seconds Most Recent: X seconds (Date: -- )   Interpretation of Score: The test measures, in seconds, the time taken by an individual to stand up from a standard arm chair (seat height 46 cm [18 in], arm height 65 cm [25.6 in]), walk a distance of 3 meters (118 in, approx 10 ft), turn, walk back to the chair and sit down. If the individual takes longer than 14 seconds to complete TUG, this indicates risk for falls. Score 7 7.5-10.5 11-14 14.5-17.5 18-21 21.5-24.5 25+   Modifier CH CI CJ CK CL CM CN       Tool Used: NCCN Distress Thermometer   Score:  Initial: 5 Most Recent: X    Interpretation of Score: If greater than or equal to 8, then PHQ-9 Depression Scale Score   and APOLINAR-7 Anxiety Scale Score  . Tool Used: ECOG Performance Survey Score  Score:  Initial: 1 Most Recent:      Interpretation of Score:   0 Fully active, able to carry on all pre-disease performance without restriction   1 Restricted in physically strenuous activity but ambulatory and able to carry out work of a light or sedentary nature, e.g., light house work, office work   2 Ambulatory and capable of all selfcare but unable to carry out any work activities. Up and about more than 50% of waking hours   3 Capable of only limited selfcare, confined to bed or chair more than 50% of waking hours   4 Completely disabled. Cannot carry on any selfcare. Totally confined to bed or chair   5 Dead        Medical Necessity:   · Patient is expected to demonstrate progress in strength and conditioning to reduce fatigue and allow to return to work activities. Reason for Services/Other Comments:  · Patient continues to demonstrate capacity to improve strength and conditioning which will increase independence.    Use of outcome tool(s) and clinical judgement create a POC that gives a: Clear prediction of patient's progress: LOW COMPLEXITY            TREATMENT:   (In addition to Assessment/Re-Assessment sessions the following treatments were rendered)  Pre-treatment Symptoms/Complaints:  Fatigue 2/10 before and 4/10 after. Pain: Initial:     0/10 Post Session: 0 /10   47 min  O2 98 HR 81  /68  Nustep level 3 x 9 min SPM 88 METS 3.5  Walk 3 min  UBE level 1 x 4 min   Sit to stand x 10 reps  Long arc quads x 10 reps with 1 1/2#  Standing with 1 1/2# bilateral lower extremity up on toes, hip abduction, hip extension, hip flexion, hamstring curls x 10 reps o2 98 HR 91  Step ups x 10 reps high step O2 95   Hip abduction/adduction and hamstring curls with green theraband x 10 reps (may try silver next visit.) not performed  3 min walk   Bilateral upper extremity forward flexion, abduction, bicep curl, triceps extension, bent over row) x 10 reps with 5#  Scapular retraction with silver theraband x 10 reps  Counter top pushups x 10 reps  Fatigue 4 /10      as above      Treatment/Session Assessment:    · Response to Treatment:    · Compliance with Program/Exercises: Will assess as treatment progresses. Recommendations/Intent for next treatment session:  The patient did not return to oncology rehab. Discharge at this time.   Total Treatment Duration:  PT Patient Time In/Time Out  Time In: 1103  Time Out: 1401 Manuel Cyr PT

## 2017-10-26 ENCOUNTER — DOCUMENTATION ONLY (OUTPATIENT)
Dept: ONCOLOGY | Age: 62
End: 2017-10-26

## 2017-10-26 NOTE — PROGRESS NOTES
PSYCHOLOGY PROGRESS NOTE      Name:  Laurence Darby    Date of Service: 10/26/2017  Location of Service:   [facility]  Type of Service: Individual Psychotherapy  Duration:  60 minutes  Primary Diagnosis: Adjustment    Summary of Service:  Therapist normalized pt's experience of being unable to think and remember clearly and highlighted pt's subsequent negative self-talk. Therapist and pt discussed accepting his experience as part of the process. Therapist and pt explored finding a  and proceeding forward despite pt's inability to perform optimally. Will continue to meet with and be available to patient as scheduled and per patient's request.     Sima Dia, LORIEFT-I  Marriage and Family Therapist    This note will not be viewable in [address]

## 2017-11-02 ENCOUNTER — DOCUMENTATION ONLY (OUTPATIENT)
Dept: ONCOLOGY | Age: 62
End: 2017-11-02

## 2017-11-02 NOTE — PROGRESS NOTES
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 Therapist and pt outlined an approach beginning with a disclaimer regarding pt's mental fog resulting from his chemotherapy. Pt clarified and synthesized his goals and vision in order to obtain an outside assessment. Will continue to meet with and be available to patient as scheduled and per patient's request.     Cara Thomas. LORA Villalobos-I  Marriage and Family Therapist    This note will not be viewable in [address]

## 2017-11-16 ENCOUNTER — HOSPITAL ENCOUNTER (OUTPATIENT)
Dept: CT IMAGING | Age: 62
Discharge: HOME | End: 2017-11-16
Attending: FAMILY MEDICINE

## 2017-11-16 DIAGNOSIS — Z00.00 HEALTH CARE MAINTENANCE: ICD-10-CM

## 2017-11-16 DIAGNOSIS — Z23 ENCOUNTER FOR IMMUNIZATION: ICD-10-CM

## 2017-11-16 DIAGNOSIS — C61 PROSTATE CANCER (HCC): ICD-10-CM

## 2017-11-30 ENCOUNTER — DOCUMENTATION ONLY (OUTPATIENT)
Dept: ONCOLOGY | Age: 62
End: 2017-11-30

## 2017-12-07 ENCOUNTER — DOCUMENTATION ONLY (OUTPATIENT)
Dept: ONCOLOGY | Age: 62
End: 2017-12-07

## 2017-12-07 NOTE — PROGRESS NOTES
PSYCHOLOGY PROGRESS NOTE      Name:  Gina Rodrigeuz    Date of Service: 12/7/2017  Location of Service:   [facility]  Type of Service: Individual Psychotherapy  Duration:  60 minutes  Primary Diagnosis: Depression    Summary of Service:  Therapist and pt explored possible reasons for pt's depression including a persistent ear infection, expectations surrounding the Song season, unreturned phone calls from a friend, or a changed response to his medication. Therapist and pt discussed issues relating to pt's medicaid benefits including choosing a plan and researching if he is able to earn any additional income. Will continue to meet with and be available to patient as scheduled and per patient's request.     Kofi Hook. LORA Shelley-I  Marriage and Family Therapist    This note will not be viewable in [address]

## 2017-12-14 ENCOUNTER — DOCUMENTATION ONLY (OUTPATIENT)
Dept: ONCOLOGY | Age: 62
End: 2017-12-14

## 2017-12-14 NOTE — PROGRESS NOTES
PSYCHOLOGY PROGRESS NOTE      Name:  Kelsy Unger    Date of Service: 12/14/2017  Location of Service:   [facility]  Type of Service: Individual Psychotherapy  Duration:  60 minutes  Primary Diagnosis: Depression    Summary of Service:  Patient stated that his mood is a 2 or 3 on 1-10 scale with 1 being very depressed and 10 being very happy. Patient stated that he \"would not harm himself\" and he \"could keep himself safe. \"  Patient indicated that he had already spoken to the office of his PCP about changing his anti-depressant medication or its dosage. Therapist recommended that the pt call his PCP's office again to follow up on this conversation. Patient stated that he and his roommate planned to go for a walk this Sunday. Will continue to meet with and be available to patient as scheduled and per patient's request.     Zay Mauricio. LORA Jane-I  Marriage and Family Therapist    This note will not be viewable in [address]

## 2018-01-02 ENCOUNTER — HOSPITAL ENCOUNTER (OUTPATIENT)
Dept: LAB | Age: 63
Discharge: HOME | End: 2018-01-02
Payer: MEDICAID

## 2018-01-02 DIAGNOSIS — C61 PROSTATE CANCER (HCC): ICD-10-CM

## 2018-01-02 LAB
ALBUMIN SERPL-MCNC: 3.7 G/DL (ref 3.2–4.6)
ALBUMIN/GLOB SERPL: 1 {RATIO} (ref 1.2–3.5)
ALP SERPL-CCNC: 68 U/L (ref 50–136)
ALT SERPL-CCNC: 25 U/L (ref 12–65)
ANION GAP SERPL CALC-SCNC: 8 MMOL/L (ref 7–16)
AST SERPL-CCNC: 15 U/L (ref 15–37)
BASOPHILS # BLD: 0 K/UL (ref 0–0.2)
BASOPHILS NFR BLD: 0 % (ref 0–2)
BILIRUB SERPL-MCNC: 0.3 MG/DL (ref 0.2–1.1)
BUN SERPL-MCNC: 16 MG/DL (ref 8–23)
CALCIUM SERPL-MCNC: 9.4 MG/DL (ref 8.3–10.4)
CHLORIDE SERPL-SCNC: 107 MMOL/L (ref 98–107)
CO2 SERPL-SCNC: 24 MMOL/L (ref 21–32)
CREAT SERPL-MCNC: 0.77 MG/DL (ref 0.8–1.5)
DIFFERENTIAL METHOD BLD: ABNORMAL
EOSINOPHIL # BLD: 0.2 K/UL (ref 0–0.8)
EOSINOPHIL NFR BLD: 2 % (ref 0.5–7.8)
ERYTHROCYTE [DISTWIDTH] IN BLOOD BY AUTOMATED COUNT: 13.8 % (ref 11.9–14.6)
GLOBULIN SER CALC-MCNC: 3.8 G/DL (ref 2.3–3.5)
GLUCOSE SERPL-MCNC: 102 MG/DL (ref 65–100)
HCT VFR BLD AUTO: 36.9 % (ref 41.1–50.3)
HGB BLD-MCNC: 12.7 G/DL (ref 13.6–17.2)
LYMPHOCYTES # BLD: 1.7 K/UL (ref 0.5–4.6)
LYMPHOCYTES NFR BLD: 18 % (ref 13–44)
MCH RBC QN AUTO: 31.5 PG (ref 26.1–32.9)
MCHC RBC AUTO-ENTMCNC: 34.4 G/DL (ref 31.4–35)
MCV RBC AUTO: 91.6 FL (ref 79.6–97.8)
MONOCYTES # BLD: 0.8 K/UL (ref 0.1–1.3)
MONOCYTES NFR BLD: 8 % (ref 4–12)
NEUTS SEG # BLD: 6.9 K/UL (ref 1.7–8.2)
NEUTS SEG NFR BLD: 72 % (ref 43–78)
NRBC # BLD: 0 K/UL (ref 0–0.2)
PLATELET # BLD AUTO: 225 K/UL (ref 150–450)
PMV BLD AUTO: 9.5 FL (ref 10.8–14.1)
POTASSIUM SERPL-SCNC: 4 MMOL/L (ref 3.5–5.1)
PROT SERPL-MCNC: 7.5 G/DL (ref 6.3–8.2)
PSA SERPL-MCNC: 1.5 NG/ML
RBC # BLD AUTO: 4.03 M/UL (ref 4.23–5.67)
SODIUM SERPL-SCNC: 139 MMOL/L (ref 136–145)
WBC # BLD AUTO: 9.7 K/UL (ref 4.3–11.1)

## 2018-01-02 PROCEDURE — 80053 COMPREHEN METABOLIC PANEL: CPT | Performed by: INTERNAL MEDICINE

## 2018-01-02 PROCEDURE — 84153 ASSAY OF PSA TOTAL: CPT | Performed by: INTERNAL MEDICINE

## 2018-01-02 PROCEDURE — 36415 COLL VENOUS BLD VENIPUNCTURE: CPT | Performed by: INTERNAL MEDICINE

## 2018-01-02 PROCEDURE — 85025 COMPLETE CBC W/AUTO DIFF WBC: CPT | Performed by: INTERNAL MEDICINE

## 2018-01-04 ENCOUNTER — DOCUMENTATION ONLY (OUTPATIENT)
Dept: ONCOLOGY | Age: 63
End: 2018-01-04

## 2018-01-04 NOTE — PROGRESS NOTES
PSYCHOLOGY PROGRESS NOTE      Name:  Gt Chapman    Date of Service: 1/4/2018  Location of Service:   [facility]  Type of Service: Individual Psychotherapy  Duration:  60 minutes  Primary Diagnosis: Depression    Summary of Service:  Therapist and pt spoke about pt's Marlow, his trigger thumb, his weight gain, his disappointment with himself and his situation, and his mood. Patient stated that he was feeling down, experiencing very low energy, and was not feeling motivated to do anything. Patient denied any suicidal thoughts, intent, means, or plan. Therapist recommended that pt speak to his doctor to ascertain whether a change, reduction or increase of his current medication would work to improve his low mood. Will continue to meet with and be available to patient as scheduled and per patient's request.     Christiana Segovia. LORA Armas-I  Marriage and Family Therapist    This note will not be viewable in [address]

## 2018-01-25 ENCOUNTER — DOCUMENTATION ONLY (OUTPATIENT)
Dept: ONCOLOGY | Age: 63
End: 2018-01-25

## 2018-03-08 ENCOUNTER — DOCUMENTATION ONLY (OUTPATIENT)
Dept: ONCOLOGY | Age: 63
End: 2018-03-08

## 2018-03-08 NOTE — PROGRESS NOTES
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 Pt stated that he was feeling much better than he had been when we last met 6 weeks ago. Patient stated that he increased his dose of anti-depressant medication. 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 Will continue to meet with and be available to patient as scheduled and per patient's request.     Noel Zamorano. Jere Peña, LORIEFT-I  Marriage and Family Therapist    This note will not be viewable in [address]

## 2018-03-14 ENCOUNTER — DOCUMENTATION ONLY (OUTPATIENT)
Dept: ONCOLOGY | Age: 63
End: 2018-03-14

## 2018-03-14 NOTE — PROGRESS NOTES
PSYCHOLOGY PROGRESS NOTE      Name:  Alok Klein    Date of Service: 3/14/2018  Location of Service:   [facility]  Type of Service: Individual Psychotherapy  Duration:  60 minutes  Primary Diagnosis: Adjustmentt    Summary of Service:  Therapist and pt discussed pt's familial and social relationships with his son, his former neighbor, his former assistant, and his friend who recently . 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 Patient stated that he felt more forgiving toward himself regarding his past failures. Patient stated that he was feeling very good and felt lighter with respect to mood. Therapist and pt explored the reasons pt is feeling better including being more socially active, engaging in more physical activities, and perhaps experiencing a diminishment of his post-chemo brain fog. Will continue to meet with and be available to patient as scheduled and per patient's request.     No Cortes. Ivana Courser, LMFT-I  Marriage and Family Therapist    This note will not be viewable in [address]

## 2018-03-22 ENCOUNTER — DOCUMENTATION ONLY (OUTPATIENT)
Dept: ONCOLOGY | Age: 63
End: 2018-03-22

## 2018-03-22 NOTE — PROGRESS NOTES
PSYCHOLOGY PROGRESS NOTE      Name:  Prasanth Staton    Date of Service: 3/22/2018  Location of Service:   [facility]  Type of Service: Individual Psychotherapy  Duration:  60 minutes  Primary Diagnosis: Adjustment    Summary of Service:  Patient stated that he continues to feel lighter with respect to mood and mentally clearer than he was previously. Patient stated that he feels very grateful for everything and stated that he has put his personal and business losses behind him. Therapist and pt discussed pt's social interactions as well as the business meetings that he attended this past week. Will continue to meet with and be available to patient as scheduled and per patient's request.     Raymon Goldberg. LORA Ely-I  Marriage and Family Therapist    This note will not be viewable in [address]

## 2018-04-03 ENCOUNTER — HOSPITAL ENCOUNTER (OUTPATIENT)
Dept: LAB | Age: 63
Discharge: HOME | End: 2018-04-03
Payer: COMMERCIAL

## 2018-04-03 DIAGNOSIS — C61 PROSTATE CANCER (HCC): ICD-10-CM

## 2018-04-03 DIAGNOSIS — C77.2 METASTASIS TO RETROPERITONEAL LYMPH NODE (HCC): ICD-10-CM

## 2018-04-03 LAB
ALBUMIN SERPL-MCNC: 3.9 G/DL (ref 3.2–4.6)
ALBUMIN/GLOB SERPL: 1.1 {RATIO} (ref 1.2–3.5)
ALP SERPL-CCNC: 81 U/L (ref 50–136)
ALT SERPL-CCNC: 29 U/L (ref 12–65)
ANION GAP SERPL CALC-SCNC: 7 MMOL/L (ref 7–16)
AST SERPL-CCNC: 19 U/L (ref 15–37)
BASOPHILS # BLD: 0 K/UL (ref 0–0.2)
BASOPHILS NFR BLD: 0 % (ref 0–2)
BILIRUB SERPL-MCNC: 0.4 MG/DL (ref 0.2–1.1)
BUN SERPL-MCNC: 16 MG/DL (ref 8–23)
CALCIUM SERPL-MCNC: 9 MG/DL (ref 8.3–10.4)
CHLORIDE SERPL-SCNC: 111 MMOL/L (ref 98–107)
CO2 SERPL-SCNC: 23 MMOL/L (ref 21–32)
CREAT SERPL-MCNC: 0.91 MG/DL (ref 0.8–1.5)
DIFFERENTIAL METHOD BLD: ABNORMAL
EOSINOPHIL # BLD: 0.1 K/UL (ref 0–0.8)
EOSINOPHIL NFR BLD: 2 % (ref 0.5–7.8)
ERYTHROCYTE [DISTWIDTH] IN BLOOD BY AUTOMATED COUNT: 13.9 % (ref 11.9–14.6)
GLOBULIN SER CALC-MCNC: 3.4 G/DL (ref 2.3–3.5)
GLUCOSE SERPL-MCNC: 110 MG/DL (ref 65–100)
HCT VFR BLD AUTO: 36.8 % (ref 41.1–50.3)
HGB BLD-MCNC: 12.4 G/DL (ref 13.6–17.2)
LYMPHOCYTES # BLD: 2 K/UL (ref 0.5–4.6)
LYMPHOCYTES NFR BLD: 22 % (ref 13–44)
MCH RBC QN AUTO: 32 PG (ref 26.1–32.9)
MCHC RBC AUTO-ENTMCNC: 33.7 G/DL (ref 31.4–35)
MCV RBC AUTO: 95.1 FL (ref 79.6–97.8)
MONOCYTES # BLD: 0.7 K/UL (ref 0.1–1.3)
MONOCYTES NFR BLD: 8 % (ref 4–12)
NEUTS SEG # BLD: 6.2 K/UL (ref 1.7–8.2)
NEUTS SEG NFR BLD: 68 % (ref 43–78)
NRBC # BLD: 0 K/UL (ref 0–0.2)
PLATELET # BLD AUTO: 241 K/UL (ref 150–450)
PMV BLD AUTO: 9.5 FL (ref 10.8–14.1)
POTASSIUM SERPL-SCNC: 3.9 MMOL/L (ref 3.5–5.1)
PROT SERPL-MCNC: 7.3 G/DL (ref 6.3–8.2)
PSA SERPL-MCNC: 1.6 NG/ML
RBC # BLD AUTO: 3.87 M/UL (ref 4.23–5.67)
SODIUM SERPL-SCNC: 141 MMOL/L (ref 136–145)
WBC # BLD AUTO: 9.1 K/UL (ref 4.3–11.1)

## 2018-04-03 PROCEDURE — 84153 ASSAY OF PSA TOTAL: CPT | Performed by: INTERNAL MEDICINE

## 2018-04-03 PROCEDURE — 36415 COLL VENOUS BLD VENIPUNCTURE: CPT | Performed by: INTERNAL MEDICINE

## 2018-04-03 PROCEDURE — 85025 COMPLETE CBC W/AUTO DIFF WBC: CPT | Performed by: INTERNAL MEDICINE

## 2018-04-03 PROCEDURE — 80053 COMPREHEN METABOLIC PANEL: CPT | Performed by: INTERNAL MEDICINE

## 2018-04-05 ENCOUNTER — DOCUMENTATION ONLY (OUTPATIENT)
Dept: ONCOLOGY | Age: 63
End: 2018-04-05

## 2018-04-05 NOTE — PROGRESS NOTES
PSYCHOLOGY PROGRESS NOTE      Name:  Francisco Hull    Date of Service: 4/5/2018  Location of Service:   [facility]  Type of Service: Individual Psychotherapy  Duration:  60 minutes  Primary Diagnosis: Adjustment    Summary of Service:  Pt stated that his checkup earlier this week went well. 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 Pt described further business meetings, contacts, and possibilities. Pt stated that the hope that he possessed earlier in his life has blossomed into a deep dayana in God and God's providence. Pt stated that he now trusts God and seeks to align his life in conformity to God's will. Pt said that he is deeply grateful for everything. Will continue to meet with and be available to patient as scheduled and per patient's request.     Deshawn Brooks. [place] LORIE SingletonFT-I  Marriage and Family Therapist    This note will not be viewable in [address]

## 2018-04-12 ENCOUNTER — DOCUMENTATION ONLY (OUTPATIENT)
Dept: ONCOLOGY | Age: 63
End: 2018-04-12

## 2018-04-12 NOTE — PROGRESS NOTES
PSYCHOLOGY PROGRESS NOTE      Name:  Elda Prieto    Date of Service: 4/12/2018  Location of Service:   [facility]  Type of Service: Individual Psychotherapy  Duration:  60 minutes  Primary Diagnosis: Adjustment    Summary of Service:  Therapist and pt reviewed their time together and examined what was helpful. Pt stated that he was deeply grateful for the care that [address] [place] [address] provided to him including the therapeutic support. Patient stated that prayer brought him relief, allowed him to be more patient with himself, and multiplied his gratitude for all the blessings in his life. Therapist and pt said goodbye. Will continue to meet with and be available to patient as scheduled and per patient's request.     Feliz Reno. LORA Saldaña-I  Marriage and Family Therapist    This note will not be viewable in [address]

## 2018-07-09 ENCOUNTER — HOSPITAL ENCOUNTER (OUTPATIENT)
Dept: LAB | Age: 63
Discharge: HOME | End: 2018-07-09
Payer: COMMERCIAL

## 2018-07-09 DIAGNOSIS — C77.2 METASTASIS TO RETROPERITONEAL LYMPH NODE (HCC): ICD-10-CM

## 2018-07-09 DIAGNOSIS — C61 PROSTATE CANCER (HCC): ICD-10-CM

## 2018-07-09 LAB
ALBUMIN SERPL-MCNC: 3.9 G/DL (ref 3.2–4.6)
ALBUMIN/GLOB SERPL: 1.3 {RATIO} (ref 1.2–3.5)
ALP SERPL-CCNC: 71 U/L (ref 50–136)
ALT SERPL-CCNC: 26 U/L (ref 12–65)
ANION GAP SERPL CALC-SCNC: 10 MMOL/L (ref 7–16)
AST SERPL-CCNC: 18 U/L (ref 15–37)
BASOPHILS # BLD: 0.1 K/UL (ref 0–0.2)
BASOPHILS NFR BLD: 1 % (ref 0–2)
BILIRUB SERPL-MCNC: 0.3 MG/DL (ref 0.2–1.1)
BUN SERPL-MCNC: 19 MG/DL (ref 8–23)
CALCIUM SERPL-MCNC: 9.1 MG/DL (ref 8.3–10.4)
CHLORIDE SERPL-SCNC: 105 MMOL/L (ref 98–107)
CO2 SERPL-SCNC: 22 MMOL/L (ref 21–32)
CREAT SERPL-MCNC: 0.7 MG/DL (ref 0.8–1.5)
DIFFERENTIAL METHOD BLD: ABNORMAL
EOSINOPHIL # BLD: 0.3 K/UL (ref 0–0.8)
EOSINOPHIL NFR BLD: 3 % (ref 0.5–7.8)
ERYTHROCYTE [DISTWIDTH] IN BLOOD BY AUTOMATED COUNT: 13.6 % (ref 11.9–14.6)
GLOBULIN SER CALC-MCNC: 3 G/DL (ref 2.3–3.5)
GLUCOSE SERPL-MCNC: 114 MG/DL (ref 65–100)
HCT VFR BLD AUTO: 40.8 % (ref 41.1–50.3)
HGB BLD-MCNC: 14.1 G/DL (ref 13.6–17.2)
LYMPHOCYTES # BLD: 1.7 K/UL (ref 0.5–4.6)
LYMPHOCYTES NFR BLD: 18 % (ref 13–44)
MCH RBC QN AUTO: 32.9 PG (ref 26.1–32.9)
MCHC RBC AUTO-ENTMCNC: 34.6 G/DL (ref 31.4–35)
MCV RBC AUTO: 95.3 FL (ref 79.6–97.8)
MONOCYTES # BLD: 0.7 K/UL (ref 0.1–1.3)
MONOCYTES NFR BLD: 7 % (ref 4–12)
NEUTS SEG # BLD: 6.8 K/UL (ref 1.7–8.2)
NEUTS SEG NFR BLD: 72 % (ref 43–78)
NRBC # BLD: 0 K/UL (ref 0–0.2)
PLATELET # BLD AUTO: 223 K/UL (ref 150–450)
PMV BLD AUTO: 9.4 FL (ref 10.8–14.1)
POTASSIUM SERPL-SCNC: 4 MMOL/L (ref 3.5–5.1)
PROT SERPL-MCNC: 6.9 G/DL (ref 6.3–8.2)
PSA SERPL-MCNC: 1.5 NG/ML
RBC # BLD AUTO: 4.28 M/UL (ref 4.23–5.67)
SODIUM SERPL-SCNC: 137 MMOL/L (ref 136–145)
WBC # BLD AUTO: 9.5 K/UL (ref 4.3–11.1)

## 2018-07-09 PROCEDURE — 85025 COMPLETE CBC W/AUTO DIFF WBC: CPT | Performed by: INTERNAL MEDICINE

## 2018-07-09 PROCEDURE — 80053 COMPREHEN METABOLIC PANEL: CPT | Performed by: INTERNAL MEDICINE

## 2018-07-09 PROCEDURE — 84153 ASSAY OF PSA TOTAL: CPT | Performed by: INTERNAL MEDICINE

## 2018-07-09 PROCEDURE — 36415 COLL VENOUS BLD VENIPUNCTURE: CPT | Performed by: INTERNAL MEDICINE

## 2018-10-08 ENCOUNTER — HOSPITAL ENCOUNTER (OUTPATIENT)
Dept: LAB | Age: 63
Discharge: HOME | End: 2018-10-08
Payer: COMMERCIAL

## 2018-10-08 DIAGNOSIS — C61 PROSTATE CANCER (HCC): ICD-10-CM

## 2018-10-08 LAB
ALBUMIN SERPL-MCNC: 4.1 G/DL (ref 3.2–4.6)
ALBUMIN/GLOB SERPL: 1.2 {RATIO} (ref 1.2–3.5)
ALP SERPL-CCNC: 77 U/L (ref 50–136)
ALT SERPL-CCNC: 35 U/L (ref 12–65)
ANION GAP SERPL CALC-SCNC: 6 MMOL/L (ref 7–16)
AST SERPL-CCNC: 21 U/L (ref 15–37)
BASOPHILS # BLD: 0.1 K/UL (ref 0–0.2)
BASOPHILS NFR BLD: 1 % (ref 0–2)
BILIRUB SERPL-MCNC: 0.4 MG/DL (ref 0.2–1.1)
BUN SERPL-MCNC: 21 MG/DL (ref 8–23)
CALCIUM SERPL-MCNC: 9.5 MG/DL (ref 8.3–10.4)
CHLORIDE SERPL-SCNC: 104 MMOL/L (ref 98–107)
CO2 SERPL-SCNC: 26 MMOL/L (ref 21–32)
CREAT SERPL-MCNC: 0.89 MG/DL (ref 0.8–1.5)
DIFFERENTIAL METHOD BLD: ABNORMAL
EOSINOPHIL # BLD: 0.3 K/UL (ref 0–0.8)
EOSINOPHIL NFR BLD: 4 % (ref 0.5–7.8)
ERYTHROCYTE [DISTWIDTH] IN BLOOD BY AUTOMATED COUNT: 12.8 % (ref 11.9–14.6)
GLOBULIN SER CALC-MCNC: 3.4 G/DL (ref 2.3–3.5)
GLUCOSE SERPL-MCNC: 105 MG/DL (ref 65–100)
HCT VFR BLD AUTO: 38.8 % (ref 41.1–50.3)
HGB BLD-MCNC: 13.2 G/DL (ref 13.6–17.2)
IMM GRANULOCYTES # BLD: 0.1 K/UL (ref 0–0.5)
IMM GRANULOCYTES NFR BLD AUTO: 1 % (ref 0–5)
LYMPHOCYTES # BLD: 1.8 K/UL (ref 0.5–4.6)
LYMPHOCYTES NFR BLD: 20 % (ref 13–44)
MCH RBC QN AUTO: 32.5 PG (ref 26.1–32.9)
MCHC RBC AUTO-ENTMCNC: 34 G/DL (ref 31.4–35)
MCV RBC AUTO: 95.6 FL (ref 79.6–97.8)
MONOCYTES # BLD: 0.8 K/UL (ref 0.1–1.3)
MONOCYTES NFR BLD: 9 % (ref 4–12)
NEUTS SEG # BLD: 6.1 K/UL (ref 1.7–8.2)
NEUTS SEG NFR BLD: 67 % (ref 43–78)
NRBC # BLD: 0 K/UL (ref 0–0.2)
PLATELET # BLD AUTO: 246 K/UL (ref 150–450)
PMV BLD AUTO: 9.1 FL (ref 9.4–12.3)
POTASSIUM SERPL-SCNC: 4.2 MMOL/L (ref 3.5–5.1)
PROT SERPL-MCNC: 7.5 G/DL (ref 6.3–8.2)
PSA SERPL-MCNC: 1.7 NG/ML
RBC # BLD AUTO: 4.06 M/UL (ref 4.23–5.67)
SODIUM SERPL-SCNC: 136 MMOL/L (ref 136–145)
WBC # BLD AUTO: 9.2 K/UL (ref 4.3–11.1)

## 2018-10-08 PROCEDURE — 84153 ASSAY OF PSA TOTAL: CPT

## 2018-10-08 PROCEDURE — 36415 COLL VENOUS BLD VENIPUNCTURE: CPT

## 2018-10-08 PROCEDURE — 80053 COMPREHEN METABOLIC PANEL: CPT

## 2018-10-08 PROCEDURE — 85025 COMPLETE CBC W/AUTO DIFF WBC: CPT

## 2018-10-23 ENCOUNTER — HOSPITAL ENCOUNTER (OUTPATIENT)
Dept: MAMMOGRAPHY | Age: 63
Discharge: HOME | End: 2018-10-23
Attending: NURSE PRACTITIONER
Payer: COMMERCIAL

## 2018-10-23 DIAGNOSIS — N63.20 LEFT BREAST MASS: ICD-10-CM

## 2018-10-23 DIAGNOSIS — C61 PROSTATE CANCER (HCC): ICD-10-CM

## 2018-10-23 PROCEDURE — 77066 DX MAMMO INCL CAD BI: CPT

## 2018-10-23 PROCEDURE — 76642 ULTRASOUND BREAST LIMITED: CPT

## 2019-01-22 ENCOUNTER — HOSPITAL ENCOUNTER (OUTPATIENT)
Dept: LAB | Age: 64
Discharge: HOME | End: 2019-01-22
Payer: COMMERCIAL

## 2019-01-22 DIAGNOSIS — C61 PROSTATE CANCER (HCC): ICD-10-CM

## 2019-01-22 LAB
ALBUMIN SERPL-MCNC: 4 G/DL (ref 3.2–4.6)
ALBUMIN/GLOB SERPL: 1.1 {RATIO} (ref 1.2–3.5)
ALP SERPL-CCNC: 62 U/L (ref 50–136)
ALT SERPL-CCNC: 29 U/L (ref 12–65)
ANION GAP SERPL CALC-SCNC: 6 MMOL/L (ref 7–16)
AST SERPL-CCNC: 13 U/L (ref 15–37)
BASOPHILS # BLD: 0 K/UL (ref 0–0.2)
BASOPHILS NFR BLD: 0 % (ref 0–2)
BILIRUB SERPL-MCNC: 0.3 MG/DL (ref 0.2–1.1)
BUN SERPL-MCNC: 18 MG/DL (ref 8–23)
CALCIUM SERPL-MCNC: 9.1 MG/DL (ref 8.3–10.4)
CHLORIDE SERPL-SCNC: 105 MMOL/L (ref 98–107)
CO2 SERPL-SCNC: 25 MMOL/L (ref 21–32)
CREAT SERPL-MCNC: 0.82 MG/DL (ref 0.8–1.5)
DIFFERENTIAL METHOD BLD: ABNORMAL
EOSINOPHIL # BLD: 0.2 K/UL (ref 0–0.8)
EOSINOPHIL NFR BLD: 2 % (ref 0.5–7.8)
ERYTHROCYTE [DISTWIDTH] IN BLOOD BY AUTOMATED COUNT: 12.3 % (ref 11.9–14.6)
GLOBULIN SER CALC-MCNC: 3.7 G/DL (ref 2.3–3.5)
GLUCOSE SERPL-MCNC: 96 MG/DL (ref 65–100)
HCT VFR BLD AUTO: 38.7 % (ref 41.1–50.3)
HGB BLD-MCNC: 13 G/DL (ref 13.6–17.2)
IMM GRANULOCYTES # BLD AUTO: 0 K/UL (ref 0–0.5)
IMM GRANULOCYTES NFR BLD AUTO: 0 % (ref 0–5)
LYMPHOCYTES # BLD: 2.2 K/UL (ref 0.5–4.6)
LYMPHOCYTES NFR BLD: 23 % (ref 13–44)
MCH RBC QN AUTO: 32.3 PG (ref 26.1–32.9)
MCHC RBC AUTO-ENTMCNC: 33.6 G/DL (ref 31.4–35)
MCV RBC AUTO: 96.3 FL (ref 79.6–97.8)
MONOCYTES # BLD: 0.9 K/UL (ref 0.1–1.3)
MONOCYTES NFR BLD: 9 % (ref 4–12)
NEUTS SEG # BLD: 6.1 K/UL (ref 1.7–8.2)
NEUTS SEG NFR BLD: 65 % (ref 43–78)
NRBC # BLD: 0 K/UL (ref 0–0.2)
PLATELET # BLD AUTO: 251 K/UL (ref 150–450)
PMV BLD AUTO: 9.4 FL (ref 9.4–12.3)
POTASSIUM SERPL-SCNC: 4 MMOL/L (ref 3.5–5.1)
PROT SERPL-MCNC: 7.7 G/DL (ref 6.3–8.2)
PSA SERPL-MCNC: 1.6 NG/ML
RBC # BLD AUTO: 4.02 M/UL (ref 4.23–5.67)
SODIUM SERPL-SCNC: 136 MMOL/L (ref 136–145)
WBC # BLD AUTO: 9.5 K/UL (ref 4.3–11.1)

## 2019-01-22 PROCEDURE — 84153 ASSAY OF PSA TOTAL: CPT

## 2019-01-22 PROCEDURE — 85025 COMPLETE CBC W/AUTO DIFF WBC: CPT

## 2019-01-22 PROCEDURE — 80053 COMPREHEN METABOLIC PANEL: CPT

## 2019-01-22 PROCEDURE — 36415 COLL VENOUS BLD VENIPUNCTURE: CPT

## 2019-03-07 PROBLEM — N62 GYNECOMASTIA, MALE: Status: ACTIVE | Noted: 2019-03-07

## 2019-04-23 ENCOUNTER — HOSPITAL ENCOUNTER (OUTPATIENT)
Dept: LAB | Age: 64
Discharge: HOME | End: 2019-04-23
Payer: COMMERCIAL

## 2019-04-23 DIAGNOSIS — C61 PROSTATE CANCER (HCC): ICD-10-CM

## 2019-04-23 LAB
ALBUMIN SERPL-MCNC: 4.2 G/DL (ref 3.2–4.6)
ALBUMIN/GLOB SERPL: 1.1 {RATIO} (ref 1.2–3.5)
ALP SERPL-CCNC: 77 U/L (ref 50–136)
ALT SERPL-CCNC: 26 U/L (ref 12–65)
ANION GAP SERPL CALC-SCNC: 9 MMOL/L (ref 7–16)
AST SERPL-CCNC: 16 U/L (ref 15–37)
BASOPHILS # BLD: 0.1 K/UL (ref 0–0.2)
BASOPHILS NFR BLD: 1 % (ref 0–2)
BILIRUB SERPL-MCNC: 0.3 MG/DL (ref 0.2–1.1)
BUN SERPL-MCNC: 18 MG/DL (ref 8–23)
CALCIUM SERPL-MCNC: 9.7 MG/DL (ref 8.3–10.4)
CHLORIDE SERPL-SCNC: 106 MMOL/L (ref 98–107)
CO2 SERPL-SCNC: 23 MMOL/L (ref 21–32)
CREAT SERPL-MCNC: 0.95 MG/DL (ref 0.8–1.5)
DIFFERENTIAL METHOD BLD: ABNORMAL
EOSINOPHIL # BLD: 0.2 K/UL (ref 0–0.8)
EOSINOPHIL NFR BLD: 1 % (ref 0.5–7.8)
ERYTHROCYTE [DISTWIDTH] IN BLOOD BY AUTOMATED COUNT: 12.6 % (ref 11.9–14.6)
GLOBULIN SER CALC-MCNC: 3.7 G/DL (ref 2.3–3.5)
GLUCOSE SERPL-MCNC: 91 MG/DL (ref 65–100)
HCT VFR BLD AUTO: 38 % (ref 41.1–50.3)
HGB BLD-MCNC: 13 G/DL (ref 13.6–17.2)
IMM GRANULOCYTES # BLD AUTO: 0.1 K/UL (ref 0–0.5)
IMM GRANULOCYTES NFR BLD AUTO: 1 % (ref 0–5)
LYMPHOCYTES # BLD: 2.8 K/UL (ref 0.5–4.6)
LYMPHOCYTES NFR BLD: 22 % (ref 13–44)
MCH RBC QN AUTO: 32.2 PG (ref 26.1–32.9)
MCHC RBC AUTO-ENTMCNC: 34.2 G/DL (ref 31.4–35)
MCV RBC AUTO: 94.1 FL (ref 79.6–97.8)
MONOCYTES # BLD: 1.3 K/UL (ref 0.1–1.3)
MONOCYTES NFR BLD: 10 % (ref 4–12)
NEUTS SEG # BLD: 8.3 K/UL (ref 1.7–8.2)
NEUTS SEG NFR BLD: 65 % (ref 43–78)
NRBC # BLD: 0 K/UL (ref 0–0.2)
PLATELET # BLD AUTO: 270 K/UL (ref 150–450)
PMV BLD AUTO: 9.3 FL (ref 9.4–12.3)
POTASSIUM SERPL-SCNC: 3.9 MMOL/L (ref 3.5–5.1)
PROT SERPL-MCNC: 7.9 G/DL (ref 6.3–8.2)
PSA SERPL-MCNC: 2.6 NG/ML
RBC # BLD AUTO: 4.04 M/UL (ref 4.23–5.67)
SODIUM SERPL-SCNC: 138 MMOL/L (ref 136–145)
WBC # BLD AUTO: 12.7 K/UL (ref 4.3–11.1)

## 2019-04-23 PROCEDURE — 85025 COMPLETE CBC W/AUTO DIFF WBC: CPT

## 2019-04-23 PROCEDURE — 80053 COMPREHEN METABOLIC PANEL: CPT

## 2019-04-23 PROCEDURE — 36415 COLL VENOUS BLD VENIPUNCTURE: CPT

## 2019-04-23 PROCEDURE — 84153 ASSAY OF PSA TOTAL: CPT

## 2019-06-11 ENCOUNTER — HOSPITAL ENCOUNTER (OUTPATIENT)
Dept: LAB | Age: 64
Discharge: HOME | End: 2019-06-11
Payer: COMMERCIAL

## 2019-06-11 DIAGNOSIS — C61 PROSTATE CANCER (HCC): ICD-10-CM

## 2019-06-11 LAB
ALBUMIN SERPL-MCNC: 3.8 G/DL (ref 3.2–4.6)
ALBUMIN/GLOB SERPL: 1.1 {RATIO} (ref 1.2–3.5)
ALP SERPL-CCNC: 76 U/L (ref 50–136)
ALT SERPL-CCNC: 30 U/L (ref 12–65)
ANION GAP SERPL CALC-SCNC: 8 MMOL/L (ref 7–16)
AST SERPL-CCNC: 14 U/L (ref 15–37)
BASOPHILS # BLD: 0.1 K/UL (ref 0–0.2)
BASOPHILS NFR BLD: 1 % (ref 0–2)
BILIRUB SERPL-MCNC: 0.3 MG/DL (ref 0.2–1.1)
BUN SERPL-MCNC: 20 MG/DL (ref 8–23)
CALCIUM SERPL-MCNC: 9.3 MG/DL (ref 8.3–10.4)
CHLORIDE SERPL-SCNC: 107 MMOL/L (ref 98–107)
CO2 SERPL-SCNC: 24 MMOL/L (ref 21–32)
CREAT SERPL-MCNC: 0.83 MG/DL (ref 0.8–1.5)
DIFFERENTIAL METHOD BLD: NORMAL
EOSINOPHIL # BLD: 0.2 K/UL (ref 0–0.8)
EOSINOPHIL NFR BLD: 2 % (ref 0.5–7.8)
ERYTHROCYTE [DISTWIDTH] IN BLOOD BY AUTOMATED COUNT: 13.2 % (ref 11.9–14.6)
GLOBULIN SER CALC-MCNC: 3.5 G/DL (ref 2.3–3.5)
GLUCOSE SERPL-MCNC: 119 MG/DL (ref 65–100)
HCT VFR BLD AUTO: 41.6 % (ref 41.1–50.3)
HGB BLD-MCNC: 13.9 G/DL (ref 13.6–17.2)
IMM GRANULOCYTES # BLD AUTO: 0.1 K/UL (ref 0–0.5)
IMM GRANULOCYTES NFR BLD AUTO: 1 % (ref 0–5)
LYMPHOCYTES # BLD: 2 K/UL (ref 0.5–4.6)
LYMPHOCYTES NFR BLD: 22 % (ref 13–44)
MCH RBC QN AUTO: 32.6 PG (ref 26.1–32.9)
MCHC RBC AUTO-ENTMCNC: 33.4 G/DL (ref 31.4–35)
MCV RBC AUTO: 97.4 FL (ref 79.6–97.8)
MONOCYTES # BLD: 0.8 K/UL (ref 0.1–1.3)
MONOCYTES NFR BLD: 9 % (ref 4–12)
NEUTS SEG # BLD: 5.7 K/UL (ref 1.7–8.2)
NEUTS SEG NFR BLD: 64 % (ref 43–78)
NRBC # BLD: 0 K/UL (ref 0–0.2)
PLATELET # BLD AUTO: 241 K/UL (ref 150–450)
PMV BLD AUTO: 9.8 FL (ref 9.4–12.3)
POTASSIUM SERPL-SCNC: 4 MMOL/L (ref 3.5–5.1)
PROT SERPL-MCNC: 7.3 G/DL (ref 6.3–8.2)
PSA SERPL-MCNC: 1.9 NG/ML
RBC # BLD AUTO: 4.27 M/UL (ref 4.23–5.67)
SODIUM SERPL-SCNC: 139 MMOL/L (ref 136–145)
WBC # BLD AUTO: 8.9 K/UL (ref 4.3–11.1)

## 2019-06-11 PROCEDURE — 85025 COMPLETE CBC W/AUTO DIFF WBC: CPT

## 2019-06-11 PROCEDURE — 80053 COMPREHEN METABOLIC PANEL: CPT

## 2019-06-11 PROCEDURE — 84153 ASSAY OF PSA TOTAL: CPT

## 2019-06-11 PROCEDURE — 36415 COLL VENOUS BLD VENIPUNCTURE: CPT

## 2019-07-12 ENCOUNTER — HOSPITAL ENCOUNTER (OUTPATIENT)
Dept: LAB | Age: 64
Discharge: HOME | End: 2019-07-12
Payer: COMMERCIAL

## 2019-07-12 DIAGNOSIS — C61 PROSTATE CANCER (HCC): ICD-10-CM

## 2019-07-12 LAB
ALBUMIN SERPL-MCNC: 4.1 G/DL (ref 3.2–4.6)
ALBUMIN/GLOB SERPL: 1.2 {RATIO} (ref 1.2–3.5)
ALP SERPL-CCNC: 77 U/L (ref 50–136)
ALT SERPL-CCNC: 29 U/L (ref 12–65)
ANION GAP SERPL CALC-SCNC: 7 MMOL/L (ref 7–16)
AST SERPL-CCNC: 11 U/L (ref 15–37)
BASOPHILS # BLD: 0 K/UL (ref 0–0.2)
BASOPHILS NFR BLD: 0 % (ref 0–2)
BILIRUB SERPL-MCNC: 0.3 MG/DL (ref 0.2–1.1)
BUN SERPL-MCNC: 20 MG/DL (ref 8–23)
CALCIUM SERPL-MCNC: 9.6 MG/DL (ref 8.3–10.4)
CHLORIDE SERPL-SCNC: 107 MMOL/L (ref 98–107)
CO2 SERPL-SCNC: 26 MMOL/L (ref 21–32)
CREAT SERPL-MCNC: 0.92 MG/DL (ref 0.8–1.5)
DIFFERENTIAL METHOD BLD: ABNORMAL
EOSINOPHIL # BLD: 0.3 K/UL (ref 0–0.8)
EOSINOPHIL NFR BLD: 3 % (ref 0.5–7.8)
ERYTHROCYTE [DISTWIDTH] IN BLOOD BY AUTOMATED COUNT: 12.6 % (ref 11.9–14.6)
GLOBULIN SER CALC-MCNC: 3.5 G/DL (ref 2.3–3.5)
GLUCOSE SERPL-MCNC: 103 MG/DL (ref 65–100)
HCT VFR BLD AUTO: 41.5 % (ref 41.1–50.3)
HGB BLD-MCNC: 14.4 G/DL (ref 13.6–17.2)
IMM GRANULOCYTES # BLD AUTO: 0.1 K/UL (ref 0–0.5)
IMM GRANULOCYTES NFR BLD AUTO: 1 % (ref 0–5)
LYMPHOCYTES # BLD: 2.6 K/UL (ref 0.5–4.6)
LYMPHOCYTES NFR BLD: 24 % (ref 13–44)
MCH RBC QN AUTO: 33 PG (ref 26.1–32.9)
MCHC RBC AUTO-ENTMCNC: 34.7 G/DL (ref 31.4–35)
MCV RBC AUTO: 95 FL (ref 79.6–97.8)
MONOCYTES # BLD: 0.9 K/UL (ref 0.1–1.3)
MONOCYTES NFR BLD: 9 % (ref 4–12)
NEUTS SEG # BLD: 7 K/UL (ref 1.7–8.2)
NEUTS SEG NFR BLD: 64 % (ref 43–78)
NRBC # BLD: 0 K/UL (ref 0–0.2)
PLATELET # BLD AUTO: 257 K/UL (ref 150–450)
PMV BLD AUTO: 9.3 FL (ref 9.4–12.3)
POTASSIUM SERPL-SCNC: 4 MMOL/L (ref 3.5–5.1)
PROT SERPL-MCNC: 7.6 G/DL (ref 6.3–8.2)
PSA SERPL-MCNC: 1.5 NG/ML
RBC # BLD AUTO: 4.37 M/UL (ref 4.23–5.67)
SODIUM SERPL-SCNC: 140 MMOL/L (ref 136–145)
WBC # BLD AUTO: 10.8 K/UL (ref 4.3–11.1)

## 2019-07-12 PROCEDURE — 84153 ASSAY OF PSA TOTAL: CPT

## 2019-07-12 PROCEDURE — 85025 COMPLETE CBC W/AUTO DIFF WBC: CPT

## 2019-07-12 PROCEDURE — 36415 COLL VENOUS BLD VENIPUNCTURE: CPT

## 2019-07-12 PROCEDURE — 80053 COMPREHEN METABOLIC PANEL: CPT

## 2019-09-06 ENCOUNTER — HOSPITAL ENCOUNTER (OUTPATIENT)
Dept: LAB | Age: 64
Discharge: HOME | End: 2019-09-06
Payer: COMMERCIAL

## 2019-09-06 DIAGNOSIS — C61 PROSTATE CANCER (HCC): ICD-10-CM

## 2019-09-06 LAB
ALBUMIN SERPL-MCNC: 4.1 G/DL (ref 3.2–4.6)
ALBUMIN/GLOB SERPL: 1.1 {RATIO} (ref 1.2–3.5)
ALP SERPL-CCNC: 85 U/L (ref 50–136)
ALT SERPL-CCNC: 32 U/L (ref 12–65)
ANION GAP SERPL CALC-SCNC: 11 MMOL/L (ref 7–16)
AST SERPL-CCNC: 13 U/L (ref 15–37)
BASOPHILS # BLD: 0 K/UL (ref 0–0.2)
BASOPHILS NFR BLD: 0 % (ref 0–2)
BILIRUB SERPL-MCNC: 0.4 MG/DL (ref 0.2–1.1)
BUN SERPL-MCNC: 22 MG/DL (ref 8–23)
CALCIUM SERPL-MCNC: 9.3 MG/DL (ref 8.3–10.4)
CHLORIDE SERPL-SCNC: 106 MMOL/L (ref 98–107)
CO2 SERPL-SCNC: 21 MMOL/L (ref 21–32)
CREAT SERPL-MCNC: 1.13 MG/DL (ref 0.8–1.5)
DIFFERENTIAL METHOD BLD: ABNORMAL
EOSINOPHIL # BLD: 0.2 K/UL (ref 0–0.8)
EOSINOPHIL NFR BLD: 2 % (ref 0.5–7.8)
ERYTHROCYTE [DISTWIDTH] IN BLOOD BY AUTOMATED COUNT: 12.7 % (ref 11.9–14.6)
GLOBULIN SER CALC-MCNC: 3.9 G/DL (ref 2.3–3.5)
GLUCOSE SERPL-MCNC: 122 MG/DL (ref 65–100)
HCT VFR BLD AUTO: 40 % (ref 41.1–50.3)
HGB BLD-MCNC: 13.7 G/DL (ref 13.6–17.2)
IMM GRANULOCYTES # BLD AUTO: 0 K/UL (ref 0–0.5)
IMM GRANULOCYTES NFR BLD AUTO: 0 % (ref 0–5)
LYMPHOCYTES # BLD: 2.1 K/UL (ref 0.5–4.6)
LYMPHOCYTES NFR BLD: 19 % (ref 13–44)
MCH RBC QN AUTO: 32.7 PG (ref 26.1–32.9)
MCHC RBC AUTO-ENTMCNC: 34.3 G/DL (ref 31.4–35)
MCV RBC AUTO: 95.5 FL (ref 79.6–97.8)
MONOCYTES # BLD: 0.9 K/UL (ref 0.1–1.3)
MONOCYTES NFR BLD: 9 % (ref 4–12)
NEUTS SEG # BLD: 7.4 K/UL (ref 1.7–8.2)
NEUTS SEG NFR BLD: 70 % (ref 43–78)
NRBC # BLD: 0 K/UL (ref 0–0.2)
PLATELET # BLD AUTO: 290 K/UL (ref 150–450)
PMV BLD AUTO: 9.7 FL (ref 9.4–12.3)
POTASSIUM SERPL-SCNC: 3.8 MMOL/L (ref 3.5–5.1)
PROT SERPL-MCNC: 8 G/DL (ref 6.3–8.2)
PSA SERPL-MCNC: 1.1 NG/ML
RBC # BLD AUTO: 4.19 M/UL (ref 4.23–5.67)
SODIUM SERPL-SCNC: 138 MMOL/L (ref 136–145)
WBC # BLD AUTO: 10.6 K/UL (ref 4.3–11.1)

## 2019-09-06 PROCEDURE — 80053 COMPREHEN METABOLIC PANEL: CPT

## 2019-09-06 PROCEDURE — 36415 COLL VENOUS BLD VENIPUNCTURE: CPT

## 2019-09-06 PROCEDURE — 85025 COMPLETE CBC W/AUTO DIFF WBC: CPT

## 2019-09-06 PROCEDURE — 84153 ASSAY OF PSA TOTAL: CPT

## 2019-09-17 NOTE — PROGRESS NOTES
DATE OF SERVICE:  09/17/2019    UROLOGY CONSULTATION    REASON FOR CONSULTATION:  Urology service was consulted by Dr. Mclain for   advice and opinion regarding this gentleman's clot urinary retention.    HISTORY OF PRESENT ILLNESS:  The patient is 72 years old who is visiting from   [place] who has had progressive difficulty with urination.  Three days ago, he   noticed the onset of blood in the urine, became worse yesterday such that   this morning, he was unable to void.    He does have a history apparently of lower urinary tract symptoms related to   enlarged prostate.  He is followed by urologist there.  Of note, the record   states he has had a cystoscopy in the last year that was reportedly normal.    He has had some nausea that is believed related to recent pain medication.  No   dysuria, no fevers, no chills.    For detailed account of the patient's past medical, surgical, social history   and review of systems, please see Dr. Mclain's history and physical dated   today in the patient's chart.    PHYSICAL EXAMINATION:  GENERAL:  Well-nourished, well-developed pleasant gentleman, lying in [facility], in no acute distress.  VITAL SIGNS:  Afebrile, vital signs are stable.  HEENT:  Oropharynx is clear.  NECK:  No cervical lymphadenopathy.  CHEST:  Rises symmetric.  HEART:  Regular, 2+ radial pulses bilaterally.  SKIN:  Warm and dry.  NEUROLOGIC:  Grossly intact.  EXTREMITIES:  No lower extremity edema.  ABDOMEN:  Soft, nontender, nondistended.  No suprapubic fullness or   tenderness.  There is a Hatfield catheter in place, it is a 3-way catheter.    There is blood in the Hatfield bag mixed with some urine.    DIAGNOSTIC DATA:  CT scan demonstrates normal appearing kidneys, with no   hydroureteronephrosis.  There is a large left parapelvic cyst.  The Hatfield   catheter appears to have balloon position within the prostatic fossa and its   tip in the bladder.  Within the bladder, there was a layering of what  Problem: Knowledge Deficit  Goal: *Participate in the learning process  Outcome: Progressing Towards Goal  Reviewed POC. appears   to be clot, it is difficult to discern between that and mass.    LABORATORY DATA:  White blood cell count 6, hematocrit 44.  Creatinine 0.8.    Urinalysis notable only for red cells.    IMPRESSION AND PLAN:  A 72-year-old gentleman with clot urinary retention with   difficult Hatfield catheter placement and malpositioning of the catheter and   questionable bladder clot.  Options were reviewed and I have advocated for   evacuation of clot in the operating room with fulguration, possible resection,   biopsy of any worrisome mass.  I explained the rationale for this.  The   patient expressed adequate understanding and wished to proceed.  Additionally,   we have reviewed the risks, which include but not limited to ongoing   bleeding, infectious complications as well as need for further followup.  The   patient expressed adequate understanding and wished to proceed.       ____________________________________     Caesar Nair MD MCM / NTS    DD:  09/17/2019 13:11:11  DT:  09/17/2019 15:32:25    D#:  [id number]  Job#:  [id number]    cc: JEWELL FRANK MD

## 2019-11-01 ENCOUNTER — HOSPITAL ENCOUNTER (OUTPATIENT)
Dept: LAB | Age: 64
Discharge: HOME | End: 2019-11-01
Payer: COMMERCIAL

## 2019-11-01 DIAGNOSIS — C61 PROSTATE CANCER (HCC): ICD-10-CM

## 2019-11-01 LAB
ALBUMIN SERPL-MCNC: 4.2 G/DL (ref 3.2–4.6)
ALBUMIN/GLOB SERPL: 1.2 {RATIO} (ref 1.2–3.5)
ALP SERPL-CCNC: 84 U/L (ref 50–136)
ALT SERPL-CCNC: 31 U/L (ref 12–65)
ANION GAP SERPL CALC-SCNC: 7 MMOL/L (ref 7–16)
AST SERPL-CCNC: 15 U/L (ref 15–37)
BASOPHILS # BLD: 0 K/UL (ref 0–0.2)
BASOPHILS NFR BLD: 0 % (ref 0–2)
BILIRUB SERPL-MCNC: 0.3 MG/DL (ref 0.2–1.1)
BUN SERPL-MCNC: 14 MG/DL (ref 8–23)
CALCIUM SERPL-MCNC: 9.1 MG/DL (ref 8.3–10.4)
CHLORIDE SERPL-SCNC: 105 MMOL/L (ref 98–107)
CO2 SERPL-SCNC: 25 MMOL/L (ref 21–32)
CREAT SERPL-MCNC: 0.84 MG/DL (ref 0.8–1.5)
DIFFERENTIAL METHOD BLD: ABNORMAL
EOSINOPHIL # BLD: 0.2 K/UL (ref 0–0.8)
EOSINOPHIL NFR BLD: 2 % (ref 0.5–7.8)
ERYTHROCYTE [DISTWIDTH] IN BLOOD BY AUTOMATED COUNT: 12.2 % (ref 11.9–14.6)
GLOBULIN SER CALC-MCNC: 3.5 G/DL (ref 2.3–3.5)
GLUCOSE SERPL-MCNC: 99 MG/DL (ref 65–100)
HCT VFR BLD AUTO: 40.6 % (ref 41.1–50.3)
HGB BLD-MCNC: 13.9 G/DL (ref 13.6–17.2)
IMM GRANULOCYTES # BLD AUTO: 0 K/UL (ref 0–0.5)
IMM GRANULOCYTES NFR BLD AUTO: 0 % (ref 0–5)
LYMPHOCYTES # BLD: 2.3 K/UL (ref 0.5–4.6)
LYMPHOCYTES NFR BLD: 22 % (ref 13–44)
MCH RBC QN AUTO: 33.1 PG (ref 26.1–32.9)
MCHC RBC AUTO-ENTMCNC: 34.2 G/DL (ref 31.4–35)
MCV RBC AUTO: 96.7 FL (ref 79.6–97.8)
MONOCYTES # BLD: 1 K/UL (ref 0.1–1.3)
MONOCYTES NFR BLD: 9 % (ref 4–12)
NEUTS SEG # BLD: 7.1 K/UL (ref 1.7–8.2)
NEUTS SEG NFR BLD: 66 % (ref 43–78)
NRBC # BLD: 0 K/UL (ref 0–0.2)
PLATELET # BLD AUTO: 294 K/UL (ref 150–450)
PMV BLD AUTO: 9.2 FL (ref 9.4–12.3)
POTASSIUM SERPL-SCNC: 3.7 MMOL/L (ref 3.5–5.1)
PROT SERPL-MCNC: 7.7 G/DL (ref 6.3–8.2)
PSA SERPL-MCNC: 1.2 NG/ML
RBC # BLD AUTO: 4.2 M/UL (ref 4.23–5.67)
SODIUM SERPL-SCNC: 137 MMOL/L (ref 136–145)
WBC # BLD AUTO: 10.7 K/UL (ref 4.3–11.1)

## 2019-11-01 PROCEDURE — 85025 COMPLETE CBC W/AUTO DIFF WBC: CPT

## 2019-11-01 PROCEDURE — 84153 ASSAY OF PSA TOTAL: CPT

## 2019-11-01 PROCEDURE — 36415 COLL VENOUS BLD VENIPUNCTURE: CPT

## 2019-11-01 PROCEDURE — 80053 COMPREHEN METABOLIC PANEL: CPT

## 2020-01-03 ENCOUNTER — HOSPITAL ENCOUNTER (OUTPATIENT)
Dept: LAB | Age: 65
Discharge: HOME | End: 2020-01-03
Payer: MEDICARE

## 2020-01-03 DIAGNOSIS — C61 PROSTATE CANCER (HCC): ICD-10-CM

## 2020-01-03 LAB
ALBUMIN SERPL-MCNC: 4 G/DL (ref 3.2–4.6)
ALBUMIN/GLOB SERPL: 1.1 {RATIO} (ref 1.2–3.5)
ALP SERPL-CCNC: 82 U/L (ref 50–136)
ALT SERPL-CCNC: 29 U/L (ref 12–65)
ANION GAP SERPL CALC-SCNC: 5 MMOL/L (ref 7–16)
AST SERPL-CCNC: 17 U/L (ref 15–37)
BASOPHILS # BLD: 0 K/UL (ref 0–0.2)
BASOPHILS NFR BLD: 0 % (ref 0–2)
BILIRUB SERPL-MCNC: 0.2 MG/DL (ref 0.2–1.1)
BUN SERPL-MCNC: 21 MG/DL (ref 8–23)
CALCIUM SERPL-MCNC: 9.3 MG/DL (ref 8.3–10.4)
CHLORIDE SERPL-SCNC: 106 MMOL/L (ref 98–107)
CO2 SERPL-SCNC: 26 MMOL/L (ref 21–32)
CREAT SERPL-MCNC: 0.81 MG/DL (ref 0.8–1.5)
DIFFERENTIAL METHOD BLD: ABNORMAL
EOSINOPHIL # BLD: 0.2 K/UL (ref 0–0.8)
EOSINOPHIL NFR BLD: 3 % (ref 0.5–7.8)
ERYTHROCYTE [DISTWIDTH] IN BLOOD BY AUTOMATED COUNT: 12.4 % (ref 11.9–14.6)
GLOBULIN SER CALC-MCNC: 3.5 G/DL (ref 2.3–3.5)
GLUCOSE SERPL-MCNC: 86 MG/DL (ref 65–100)
HCT VFR BLD AUTO: 41.3 % (ref 41.1–50.3)
HGB BLD-MCNC: 13.7 G/DL (ref 13.6–17.2)
IMM GRANULOCYTES # BLD AUTO: 0 K/UL (ref 0–0.5)
IMM GRANULOCYTES NFR BLD AUTO: 0 % (ref 0–5)
LYMPHOCYTES # BLD: 2.2 K/UL (ref 0.5–4.6)
LYMPHOCYTES NFR BLD: 24 % (ref 13–44)
MCH RBC QN AUTO: 32 PG (ref 26.1–32.9)
MCHC RBC AUTO-ENTMCNC: 33.2 G/DL (ref 31.4–35)
MCV RBC AUTO: 96.5 FL (ref 79.6–97.8)
MONOCYTES # BLD: 0.9 K/UL (ref 0.1–1.3)
MONOCYTES NFR BLD: 10 % (ref 4–12)
NEUTS SEG # BLD: 5.7 K/UL (ref 1.7–8.2)
NEUTS SEG NFR BLD: 63 % (ref 43–78)
NRBC # BLD: 0 K/UL (ref 0–0.2)
PLATELET # BLD AUTO: 275 K/UL (ref 150–450)
PMV BLD AUTO: 9.1 FL (ref 9.4–12.3)
POTASSIUM SERPL-SCNC: 4.1 MMOL/L (ref 3.5–5.1)
PROT SERPL-MCNC: 7.5 G/DL (ref 6.3–8.2)
PSA SERPL-MCNC: 0.9 NG/ML
RBC # BLD AUTO: 4.28 M/UL (ref 4.23–5.67)
SODIUM SERPL-SCNC: 137 MMOL/L (ref 136–145)
WBC # BLD AUTO: 9.2 K/UL (ref 4.3–11.1)

## 2020-01-03 PROCEDURE — 80053 COMPREHEN METABOLIC PANEL: CPT

## 2020-01-03 PROCEDURE — 84153 ASSAY OF PSA TOTAL: CPT

## 2020-01-03 PROCEDURE — 36415 COLL VENOUS BLD VENIPUNCTURE: CPT

## 2020-01-03 PROCEDURE — 85025 COMPLETE CBC W/AUTO DIFF WBC: CPT

## 2020-02-28 ENCOUNTER — HOSPITAL ENCOUNTER (OUTPATIENT)
Dept: LAB | Age: 65
Discharge: HOME | End: 2020-02-28
Payer: MEDICARE

## 2020-02-28 DIAGNOSIS — C61 PROSTATE CANCER (HCC): ICD-10-CM

## 2020-02-28 LAB
ALBUMIN SERPL-MCNC: 3.5 G/DL (ref 3.2–4.6)
ALBUMIN/GLOB SERPL: 0.9 {RATIO} (ref 1.2–3.5)
ALP SERPL-CCNC: 84 U/L (ref 50–136)
ALT SERPL-CCNC: 28 U/L (ref 12–65)
ANION GAP SERPL CALC-SCNC: 8 MMOL/L (ref 7–16)
AST SERPL-CCNC: 11 U/L (ref 15–37)
BASOPHILS # BLD: 0 K/UL (ref 0–0.2)
BASOPHILS NFR BLD: 0 % (ref 0–2)
BILIRUB SERPL-MCNC: 0.3 MG/DL (ref 0.2–1.1)
BUN SERPL-MCNC: 15 MG/DL (ref 8–23)
CALCIUM SERPL-MCNC: 9.3 MG/DL (ref 8.3–10.4)
CHLORIDE SERPL-SCNC: 105 MMOL/L (ref 98–107)
CO2 SERPL-SCNC: 25 MMOL/L (ref 21–32)
CREAT SERPL-MCNC: 0.86 MG/DL (ref 0.8–1.5)
DIFFERENTIAL METHOD BLD: ABNORMAL
EOSINOPHIL # BLD: 0.2 K/UL (ref 0–0.8)
EOSINOPHIL NFR BLD: 2 % (ref 0.5–7.8)
ERYTHROCYTE [DISTWIDTH] IN BLOOD BY AUTOMATED COUNT: 12.2 % (ref 11.9–14.6)
GLOBULIN SER CALC-MCNC: 4.1 G/DL (ref 2.3–3.5)
GLUCOSE SERPL-MCNC: 109 MG/DL (ref 65–100)
HCT VFR BLD AUTO: 36.6 % (ref 41.1–50.3)
HGB BLD-MCNC: 12.2 G/DL (ref 13.6–17.2)
IMM GRANULOCYTES # BLD AUTO: 0 K/UL (ref 0–0.5)
IMM GRANULOCYTES NFR BLD AUTO: 0 % (ref 0–5)
LYMPHOCYTES # BLD: 2.2 K/UL (ref 0.5–4.6)
LYMPHOCYTES NFR BLD: 20 % (ref 13–44)
MCH RBC QN AUTO: 31.4 PG (ref 26.1–32.9)
MCHC RBC AUTO-ENTMCNC: 33.3 G/DL (ref 31.4–35)
MCV RBC AUTO: 94.1 FL (ref 79.6–97.8)
MONOCYTES # BLD: 1 K/UL (ref 0.1–1.3)
MONOCYTES NFR BLD: 9 % (ref 4–12)
NEUTS SEG # BLD: 7.8 K/UL (ref 1.7–8.2)
NEUTS SEG NFR BLD: 69 % (ref 43–78)
NRBC # BLD: 0 K/UL (ref 0–0.2)
PLATELET # BLD AUTO: 305 K/UL (ref 150–450)
PMV BLD AUTO: 8.9 FL (ref 9.4–12.3)
POTASSIUM SERPL-SCNC: 4 MMOL/L (ref 3.5–5.1)
PROT SERPL-MCNC: 7.6 G/DL (ref 6.3–8.2)
PSA SERPL-MCNC: 1.2 NG/ML
RBC # BLD AUTO: 3.89 M/UL (ref 4.23–5.67)
SODIUM SERPL-SCNC: 138 MMOL/L (ref 136–145)
WBC # BLD AUTO: 11.2 K/UL (ref 4.3–11.1)

## 2020-02-28 PROCEDURE — 84153 ASSAY OF PSA TOTAL: CPT

## 2020-02-28 PROCEDURE — 36415 COLL VENOUS BLD VENIPUNCTURE: CPT

## 2020-02-28 PROCEDURE — 80053 COMPREHEN METABOLIC PANEL: CPT

## 2020-02-28 PROCEDURE — 85025 COMPLETE CBC W/AUTO DIFF WBC: CPT

## 2020-04-21 ENCOUNTER — HOSPITAL ENCOUNTER (OUTPATIENT)
Dept: LAB | Age: 65
Discharge: HOME | End: 2020-04-21
Payer: MEDICARE

## 2020-04-21 DIAGNOSIS — C61 PROSTATE CANCER (HCC): ICD-10-CM

## 2020-04-21 LAB
ALBUMIN SERPL-MCNC: 3.9 G/DL (ref 3.2–4.6)
ALBUMIN/GLOB SERPL: 1.1 {RATIO} (ref 1.2–3.5)
ALP SERPL-CCNC: 82 U/L (ref 50–136)
ALT SERPL-CCNC: 25 U/L (ref 12–65)
ANION GAP SERPL CALC-SCNC: 6 MMOL/L (ref 7–16)
AST SERPL-CCNC: 11 U/L (ref 15–37)
BASOPHILS # BLD: 0 K/UL (ref 0–0.2)
BASOPHILS NFR BLD: 0 % (ref 0–2)
BILIRUB SERPL-MCNC: 0.3 MG/DL (ref 0.2–1.1)
BUN SERPL-MCNC: 11 MG/DL (ref 8–23)
CALCIUM SERPL-MCNC: 9.2 MG/DL (ref 8.3–10.4)
CHLORIDE SERPL-SCNC: 106 MMOL/L (ref 98–107)
CO2 SERPL-SCNC: 24 MMOL/L (ref 21–32)
CREAT SERPL-MCNC: 0.76 MG/DL (ref 0.8–1.5)
DIFFERENTIAL METHOD BLD: ABNORMAL
EOSINOPHIL # BLD: 0.2 K/UL (ref 0–0.8)
EOSINOPHIL NFR BLD: 2 % (ref 0.5–7.8)
ERYTHROCYTE [DISTWIDTH] IN BLOOD BY AUTOMATED COUNT: 12.8 % (ref 11.9–14.6)
GLOBULIN SER CALC-MCNC: 3.7 G/DL (ref 2.3–3.5)
GLUCOSE SERPL-MCNC: 80 MG/DL (ref 65–100)
HCT VFR BLD AUTO: 38.4 % (ref 41.1–50.3)
HGB BLD-MCNC: 12.9 G/DL (ref 13.6–17.2)
IMM GRANULOCYTES # BLD AUTO: 0.1 K/UL (ref 0–0.5)
IMM GRANULOCYTES NFR BLD AUTO: 1 % (ref 0–5)
LYMPHOCYTES # BLD: 2.8 K/UL (ref 0.5–4.6)
LYMPHOCYTES NFR BLD: 28 % (ref 13–44)
MCH RBC QN AUTO: 31.6 PG (ref 26.1–32.9)
MCHC RBC AUTO-ENTMCNC: 33.6 G/DL (ref 31.4–35)
MCV RBC AUTO: 94.1 FL (ref 79.6–97.8)
MONOCYTES # BLD: 1 K/UL (ref 0.1–1.3)
MONOCYTES NFR BLD: 10 % (ref 4–12)
NEUTS SEG # BLD: 5.9 K/UL (ref 1.7–8.2)
NEUTS SEG NFR BLD: 60 % (ref 43–78)
NRBC # BLD: 0 K/UL (ref 0–0.2)
PLATELET # BLD AUTO: 263 K/UL (ref 150–450)
PMV BLD AUTO: 9.2 FL (ref 9.4–12.3)
POTASSIUM SERPL-SCNC: 4 MMOL/L (ref 3.5–5.1)
PROT SERPL-MCNC: 7.6 G/DL (ref 6.3–8.2)
PSA SERPL-MCNC: 1.1 NG/ML
RBC # BLD AUTO: 4.08 M/UL (ref 4.23–5.67)
SODIUM SERPL-SCNC: 136 MMOL/L (ref 136–145)
WBC # BLD AUTO: 9.9 K/UL (ref 4.3–11.1)

## 2020-04-21 PROCEDURE — 85025 COMPLETE CBC W/AUTO DIFF WBC: CPT

## 2020-04-21 PROCEDURE — 36415 COLL VENOUS BLD VENIPUNCTURE: CPT

## 2020-04-21 PROCEDURE — 84153 ASSAY OF PSA TOTAL: CPT

## 2020-04-21 PROCEDURE — 80053 COMPREHEN METABOLIC PANEL: CPT

## 2020-07-20 ENCOUNTER — HOSPITAL ENCOUNTER (OUTPATIENT)
Dept: GENERAL RADIOLOGY | Age: 65
Discharge: HOME | End: 2020-07-20

## 2020-07-20 DIAGNOSIS — M25.551 RIGHT HIP PAIN: ICD-10-CM

## 2020-08-05 ENCOUNTER — HOSPITAL ENCOUNTER (OUTPATIENT)
Dept: LAB | Age: 65
Discharge: HOME | End: 2020-08-05

## 2020-08-05 PROCEDURE — 88305 TISSUE EXAM BY PATHOLOGIST: CPT

## 2020-08-07 ENCOUNTER — HOSPITAL ENCOUNTER (OUTPATIENT)
Dept: PHYSICAL THERAPY | Age: 65
Discharge: HOME | End: 2020-08-07
Payer: MEDICARE

## 2020-08-07 PROCEDURE — 97162 PT EVAL MOD COMPLEX 30 MIN: CPT

## 2020-08-07 NOTE — THERAPY EVALUATION
Minesh Shetty  : 1955    Payor[place] / Plan[place] / Product Type: Managed Care Medicare /    [address] [place] [address] at [address] [place] [address]. [place] [address], [address] [place] [address]  [phone]   [phone]        OUTPATIENT PHYSICAL THERAPY:Initial Assessment 2020    ICD-10: Treatment Diagnosis:   Pain in right hip (M25.551)  Stiffness of right hip, not elsewhere classified (M25.651)  Pain in right thigh (X80.084)          Precautions/Allergies:   Patient has no known allergies. Fall Risk Score: 0 (? 5 = High Risk)  MD Orders: Eval and Treat  MEDICAL/REFERRING DIAGNOSIS:  hip pain   DATE OF ONSET: 2 weeks ago  REFERRING PHYSICIAN: Tiki Mendez MD  RETURN PHYSICIAN APPOINTMENT: TBD by patient      INITIAL ASSESSMENT:  Minesh Shetty presents to physical therapy with decreased postural and hip/core strength, ROM, joint mobility, flexibility, functional mobility, and increased pain. No pelvic malalignment upon initial evaluation but decreased posture. These S/S are consistent with right hip pain. Minesh Shetty will benefit from skilled physical therapy (medically necessary) to address above deficits affecting participation in basic ADLs and functional mobility/tolerance. Patient will benefit from manual therapeutic techniques (stretching, joint mobilizations, soft tissue mobilization/myofascial release), therapeutic exercises and activities, postural strengthening/education, and comprehensive home exercises program to address current impairments and functional limitations.        PROBLEM LIST (Impacting functional limitations):  · Decreased Strength  · Decreased ADL/Functional Activities  · Decreased Transfer Abilities  · Decreased Ambulation Ability/Technique  · Decreased Balance  · Increased Pain  · Decreased Activity Tolerance  · Increased Fatigue  · Increased Shortness of Breath  · Decreased Flexibility/Joint Mobility  · Decreased [place] with Home Exercise Program INTERVENTIONS PLANNED:  1. Balance Exercise  2. Bed Mobility  3. Cold  4. Cryotherapy  5. Electrical Stimulation  6. Family Education  7. Gait Training  8. Heat  9. Home Exercise Program (HEP)  10. Manual Therapy  11. Neuromuscular Re-education/Strengthening  12. Range of Motion (ROM)  13. Therapeutic Activites  14. Therapeutic Exercise/Strengthening  15. Transfer Training  16. Ultrasound (US)   TREATMENT PLAN:  Effective Dates: 8/7/2020 TO 10/6/2020 (60 days). Frequency/Duration: 2 times a week for 60 Days  GOALS: (Goals have been discussed and agreed upon with patient.)   Short-Term Goals~4 weeks  Goal Met   1. John Rimes will be independent with HEP for strength and ROM 1.  [] Date:   2. John Rimes will participate in LE strengthening exercises for hip, knee, ankle with weight as appropriate for 3 sets of 10 2.  [] Date:   3. John Rimes will report <=2/10 pain with stair climbing and demonstrate minimal to no difficulty 3. [] Date:   4. John Rimes will demonstrate improvement of MMT from initial eval to 4/5 B LE in order to return to participate in ADLs with minimal to no difficulty. 4.  [] Date:   5. John Rimes will participate in static and dynamic balance activities for 5 minutes to help improve proprioception and decrease risk of falls  5. [] Date:   6. John Rimes to increase lower extremity functional scale by 10 points to show improvement in areas of difficulty 6. [] Date:         Long Term Goals~8 weeks Goal Met   1. John Rimes will be independent in HEP of stretching and strengthening 1. [] Date:   2. John Rimes will be able to perform sit to stand transfers independently with decreased compensation  2. [] Date:   3. John Rimes will ascend/descend 20 steps with reciprocal gait pattern and rail 3. [] Date:   4.  John Rimes to increase lower extremity functional scale by 2078 points to show improvement in areas of difficulty 4. [] Date:            CLINICAL DECISION MAKING:   Outcome Measure: Tool Used: Lower Extremity Functional Scale (LEFS)  Score:  Initial: 30/80 Most Recent: 0/80 (Date: -- )   Interpretation of Score: 20 questions each scored on a 5 point scale with 0 representing \"extreme difficulty or unable to perform\" and 4 representing \"no difficulty\". The lower the score, the greater the functional disability. 80/80 represents no disability. Minimal detectable change is 9 points. Medical Necessity:   · Skilled intervention continues to be required due to deficits and impairments seen upon initial evaluation affecting patient's participation in ADLs and functional tasks. Reason for Services/Other Comments:  · Patient continues to require skilled intervention due to deficits and impairments seen upon initial evaluation affecting patient's participation in ADLs and functional tasks. Total Treatment Duration:       Rehabilitation Potential For Stated Goals: excellent  Regarding Courtney Moraneto Reyes's therapy, I certify that the treatment plan above will be carried out by a therapist or under their direction. Thank you for this referral,  Kary Pierre PT     Referring Physician Signature: Russell Chapman MD              Date                    HISTORY:   History of Present Injury/Illness (Reason for Referral):  Pt reports that 2 weeks ago he started a walking program and was doing really well. Pt had yard work on of his days which he thinks cause his hip to start hurting. Patients pain started the next morning in his hip that radiated down his leg. Pt went to the doctor and was referred to his orthopedist where he was sent to Physical Therapy.       >Present Symptoms (on day of evaluation)  · Pain; Currently= 2/10  · Best= 0/10  · Worst= 6/10     · Aggravating factors:  Walking, Lifting, Lifting >50 lbs and stair climbing  · Relieving factors: Walking and Rest  · Irritability: Low (Onset of pain is is longer than the time it takes for Pain to go away)      Past Medical History/Comorbidities:   Mr. Scottie Fleming  has a past medical history of Cancer [facility]), Ear problems, Former light cigarette smoker (1-9 per day), and History of multiple allergies. Mr. Scottie Fleming  has a past surgical history that includes hx heent; hx orchiectomy (02/2017); and hx colonoscopy.     Active Ambulatory Problems     Diagnosis Date Noted    Family history of prostate cancer in father 01/31/2017    Elevated PSA 01/31/2017    Pelvic pain 01/31/2017    Prostatic nodule 01/31/2017    Acute prostatitis 01/31/2017    Prostate cancer ([facility] 05/03/2017    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/11/2017    Gynecomastia, male 03/07/2019     Resolved Ambulatory Problems     Diagnosis Date Noted    No Resolved Ambulatory Problems     Past Medical History:   Diagnosis Date    Cancer [facility])     Ear problems     Former light cigarette smoker (1-9 per day)     History of multiple allergies      Social History/Living Environment:          Social History     Socioeconomic History    Marital status: SINGLE     Spouse name: Not on file    Number of children: Not on file    Years of education: Not on file    Highest education level: Not on file   Occupational History    Not on file   Social Needs    Financial resource strain: Not on file    Food insecurity     Worry: Not on file     Inability: Not on file    Transportation needs     Medical: Not on file     Non-medical: Not on file   Tobacco Use    Smoking status: Former Smoker     Packs/day: 0.50     Years: 35.00     Pack years: 17.50     Last attempt to quit: 12/2/2016     Years since quitting: 3.6    Smokeless tobacco: Former User    Tobacco comment: pt states he vapes   Substance and Sexual Activity    Alcohol use: No    Drug use: No    Sexual activity: Never   Lifestyle    Physical activity     Days per week: Not on file Minutes per session: Not on file    Stress: Not on file   Relationships    Social connections     Talks on phone: Not on file     Gets together: Not on file     Attends Rastafari service: Not on file     Active member of club or organization: Not on file     Attends meetings of clubs or organizations: Not on file     Relationship status: Not on file    Intimate partner violence     Fear of current or ex partner: Not on file     Emotionally abused: Not on file     Physically abused: Not on file     Forced sexual activity: Not on file   Other Topics Concern    Not on file   Social History Narrative    Not on file     Prior Level of Function/Work/Activity:  Normal  Dominant Side:  right  Previous Treatment Approach:  none  Other Clinical Tests:  none  none  Current Medications:    Current Outpatient Medications:     diclofenac (VOLTAREN) 1 % gel, Apply 2 g to affected area every six (6) hours. , Disp: 100 g, Rfl: 0    enzalutamide (Xtandi) 40 mg capsule, Take 4 Caps by mouth daily for 30 days. , Disp: 120 Cap, Rfl: 5        Ambulatory/Rehab Services H2 Model Falls Risk Assessment    Risk Factors:       No Risk Factors Identified Ability to Rise from Chair:       (0)  Ability to rise in a single movement    Falls Prevention Plan:       No modifications necessary   Total: (5 or greater = High Risk): 0    ©2010 [facility] [employer]. All Rights Reserved. [facility] States Patent #6,365,048.  Federal Law prohibits the replication, distribution or use without written permission from [employer]       Date Last Reviewed:  8/7/2020   Number of Personal Factors/Comorbidities that affect the Plan of Care: 1-2: MODERATE COMPLEXITY   EXAMINATION:   Observation/Orthostatic Postural Assessment:    · Gait= mild lack in hip flexion during gait  Palpation:  Assessed @ Initial Visit: Tenderness to glut med  ROM: Assessed @ Initial Visit:  AROM/PROM         Joint: Eval Date: 8/7/2020  Re-Assess Date:  Re-Assess Date: Active ROM RIGHT LEFT RIGHT LEFT RIGHT LEFT   Knee Extension             Knee Flexion             Hip Flexion             Hip Abduction             Prone Hip Extension w/ Knee bent    [place]        Hip IR/ER   [place]          Strength:     Eval Date: 8/7/2020  Re-Assess Date:  Re-Assess Date:      RIGHT LEFT RIGHT LEFT RIGHT LEFT   Knee Flexion 4+/5 4+/5           Knee Extension 4+/5 4+/5           Hip Flexion 4/5 4+/5           Hip Abduction 4/5 4/5           Hip Extension 4+/5 4+/5                            Special Tests: Assessed @ Initial Visit   · Scouring:Negative  · Long axis Traction:Negative  · none     Neurological Screen: Patient demonstrates/reports no loss in sensation. Functional Mobility:  Affecting participation in ambulation and standing. Balance:    Single Leg Stance: R= <15 seconds/ L= <30 seconds     Body Structures Involved:  1. Nerves  2. Bones  3. Joints  4. Muscles  5. Ligaments Body Functions Affected:  1. Sensory/Pain  2. Reproductive  3. Neuromusculoskeletal  4. Movement Related Activities and Participation Affected:  1. Mobility  2. Self Care   Number of elements that affect the Plan of Care: 3: MODERATE COMPLEXITY   CLINICAL PRESENTATION:   Presentation: Evolving clinical presentation with changing clinical characteristics: MODERATE COMPLEXITY         Use of outcome tool(s) and clinical judgement create a POC that gives a: Questionable prediction of patient's progress: MODERATE COMPLEXITY   See treatment note for associated treatment provided today.     Future Appointments   Date Time Provider Berta Gisele   8/24/2020  1:40 PM [address] [place] [address]   8/24/2020  2:15 PM Kristina Powers MD [facility]   3/11/2021 11:00 AM Cecy Anglin MD [facility]   8/9/2021  9:00 AM Ann Sharma MD [facility] HTF HTF         Ricky Niño, PT

## 2020-08-07 NOTE — PROGRESS NOTES
Mayte Profit  : 1955  Payor: [facility] [place] / Plan: Yoko Chance [place] / Product Type: Managed Care Medicare /  [address] [place] [address] at [address] [place] [address]. [address] [place] [address], [address] [place] [address]  [phone]   [phone]                                                          Sam Thornton MD      OUTPATIENT PHYSICAL THERAPY: Daily Treatment Note 2020 Visit Count:  1    Tx Diagnosis  Pain in right hip (M25.551)  Stiffness of right hip, not elsewhere classified (M25.651)  Pain in right thigh (M79.651)        Pre-treatment Symptoms/Complaints:  See Initial Eval Dated 20 for more details. Pain: Initial:4/10 Post Session: 1/10   Medications Last Reviewed:  2020     Updated Objective Findings: See Initial Eval for more details. TREATMENT:   THERAPEUTIC EXERCISE: (0 minutes):  Exercises per grid below to improve mobility, strength and balance. Required minimal visual, verbal and manual cues to promote proper body alignment and promote proper body posture. Progressed resistance and complexity of movement as indicated. Date:  2020 Date:   Date:     Activity/Exercise Parameters Parameters Parameters   Education HEP, POC, PT goals, anatomy/pathology                                               THERAPEUTIC ACTIVITY: ( 0 minutes): Activities per gid below to improve functional movement related mobility, strength and balance to improve neuro-muscular carryover to daily functional activities for improving patient's quality of life. Required visual, verbal and manual cues to promote proper body alignment and promote proper body posture/mechanics. Progressed resistance and complexity of movement as indicated.      Date:  2020 Date:   Date:     Activity/Exercise Parameters Parameters Parameters                                                                               MANUAL THERAPY: (0 minutes): Joint mobilization, Soft tissue mobilization was utilized and necessary because of the patient's restricted joint motion and restricted motion of soft tissue mobility. Date  8/7/2020    Technique Used Grade  Level # Time(s) Effect while being performed                                                                 HEP Log Date 1.    8/7/2020   2.  8/7/2020   3. 8/7/2020   4.    5.           "[employer] Portal  Treatment/Session Summary:    Response to Treatment: Pt demonstrated understanding of POC and initial HEP. No increase in pain or adverse reactions. Communication/Consultation:  POC, HEP, PT goals, Faxed initial evaluation to MD.   Equipment provided today: HEP Handout     Recommendations/Intent for next treatment session:   Next visit will focus on Manual Therapy Core Stability Quad strengthening Hip strengthening soft tissue mobilization. Treatment Plan of Care Effective Dates: 8/7/2020 TO 10/6/2020 (60 days).   Frequency/Duration: 2 times a week for 60 Days             Total Treatment Billable Duration:   0  Rx plus Eval   PT Patient Time In/Time Out  Time In: 1604  Time Out: 451 [facility],     Future Appointments   Date Time Provider Berta Jaquez   8/24/2020  1:40 PM [facility] OUTREACH INSURANCE [facility]   8/24/2020  2:15 PM Darin Salas MD [facility]   3/11/2021 11:00 AM Jacob Mccurdy MD [facility]   8/9/2021  9:00 AM Rhonda Nair MD [facility] HTF HT

## 2020-08-13 ENCOUNTER — HOSPITAL ENCOUNTER (OUTPATIENT)
Dept: PHYSICAL THERAPY | Age: 65
Discharge: HOME | End: 2020-08-13
Payer: MEDICARE

## 2020-08-13 PROCEDURE — 97110 THERAPEUTIC EXERCISES: CPT

## 2020-08-13 PROCEDURE — 97140 MANUAL THERAPY 1/> REGIONS: CPT

## 2020-08-13 NOTE — PROGRESS NOTES
Mayte Profit  : 1955  Payor[place]ison Finn [place] / Plan[place]oko [place] / Product Type: Managed Care Medicare /  [address] [place] [address] at [address] [place] [address]. [facility], [address], [address]  [phone]   [phone]                                                          Sam Thornton MD      OUTPATIENT PHYSICAL THERAPY: Daily Treatment Note 2020 Visit Count:  2    Tx Diagnosis  Pain in right hip (M25.551)  Stiffness of right hip, not elsewhere classified (M25.651)  Pain in right thigh (M79.651)        Pre-treatment Symptoms/Complaints:  Pt reports that he felt better after last visit but since his pain has returned and continues to  limit his ability to walk  Pain: Initial:4/10 Post Session: 1/10   Medications Last Reviewed:  2020     Updated Objective Findings: See Initial Eval for more details. TREATMENT:   THERAPEUTIC EXERCISE: (40 minutes):  Exercises per grid below to improve mobility, strength and balance. Required minimal visual, verbal and manual cues to promote proper body alignment and promote proper body posture. Progressed resistance and complexity of movement as indicated. Date:  2020 Date:  2020 Date:     Activity/Exercise Parameters Parameters Parameters   Education HEP, POC, PT goals, anatomy/pathology Education on back pain and how it effects and responds to sensitivity. leon pose  30 seconds 4xs    L stretch  30 seconds 3xs    Weight shifting  4 min    Hip Abduction  30xs    [place]  4 min                THERAPEUTIC ACTIVITY: ( 0 minutes): Activities per gid below to improve functional movement related mobility, strength and balance to improve neuro-muscular carryover to daily functional activities for improving patient's quality of life. Required visual, verbal and manual cues to promote proper body alignment and promote proper body posture/mechanics.  Progressed resistance and complexity of movement as indicated. Date:  8/7/2020 Date:   Date:     Activity/Exercise Parameters Parameters Parameters                                                                               MANUAL THERAPY: (13 minutes): Joint mobilization, Soft tissue mobilization was utilized and necessary because of the patient's restricted joint motion and restricted motion of soft tissue mobility. Date  8/13/2020    Technique Used Grade Level # Time(s) Effect while being performed   Ischemic compression   13 min glute med min max                                                          HEP Log Date 1.    8/13/2020   2.  8/13/2020   3. 8/13/2020   4.    5.           [employer] Portal  Treatment/Session Summary:    Response to Treatment: Pt progressed with back stretches and manual therapy to improve hip pain . Pt had improvement in walking but still demosntrated tightness in lumbar spine. Communication/Consultation:  POC, HEP, PT goals, Faxed initial evaluation to MD.   Equipment provided today: HEP Handout     Recommendations/Intent for next treatment session:   Next visit will focus on Manual Therapy Core Stability Quad strengthening Hip strengthening soft tissue mobilization. Treatment Plan of Care Effective Dates: 8/7/2020 TO 10/6/2020 (60 days).   Frequency/Duration: 2 times a week for 60 Days             Total Treatment Billable Duration:   53  Rx   PT Patient Time In/Time Out  Time In: 0908  Time Out: [address], PT    Future Appointments   Date Time Provider Berta Jaquez   8/17/2020  9:00 AM Xuan Polo, PT [facility]   8/20/2020  9:00 AM Xuan Polo, PT [facility]   8/24/2020  1:40 PM [facility] OUTREACH INSURANCE [facility]   8/24/2020  2:15 PM Davon Muñoz MD [facility]   8/25/2020  1:00 PM Xuan Polsoheila, PT [facility]   8/28/2020 11:00 AM Xuan Polo, PT [facility]   9/1/2020  1:00 PM Abby Muniz, PT [facility]   9/3/2020  1:45 PM Abby Muniz, PT [facility]   3/11/2021 11:00 AM [facility]   8/9/2021  9:00 AM Glen Belle MD [facility] HTF

## 2020-08-17 ENCOUNTER — HOSPITAL ENCOUNTER (OUTPATIENT)
Dept: PHYSICAL THERAPY | Age: 65
Discharge: HOME | End: 2020-08-17
Payer: MEDICARE

## 2020-08-17 PROCEDURE — 97110 THERAPEUTIC EXERCISES: CPT

## 2020-08-17 NOTE — PROGRESS NOTES
Minesh Shetty  : 1955  [place] / Plan[place] / Product Type: Managed Care Medicare /  [address] [place] [address] at [address] [place] [address]. [address] [place] [address], [facility], [address] [place] [address]  [phone]   [phone]                                                          Tiki Mendez MD      OUTPATIENT PHYSICAL THERAPY: Daily Treatment Note 2020 Visit Count:  3    Tx Diagnosis  Pain in right hip (M25.551)  Stiffness of right hip, not elsewhere classified (M25.651)  Pain in right thigh (M79.651)        Pre-treatment Symptoms/Complaints:  Pt reports that he felt ok after last visit but over the course of the weekend he was in alot of pain. Pain: Initial:4/10 Post Session: 1/10   Medications Last Reviewed:  2020     Updated Objective Findings: See Initial Eval for more details. TREATMENT:   THERAPEUTIC EXERCISE: (55 minutes):  Exercises per grid below to improve mobility, strength and balance. Required minimal visual, verbal and manual cues to promote proper body alignment and promote proper body posture. Progressed resistance and complexity of movement as indicated. Date:  2020 Date:  2020 Date:  2020   Activity/Exercise Parameters Parameters Parameters   Education HEP, POC, PT goals, anatomy/pathology Education on back pain and how it effects and responds to sensitivity. Educated on stress and its impact with pain. Educated on the importance to deep breathing exercises and its maintenance on pain. leon pose  30 seconds 4xs    L stretch  30 seconds 3xs 30xs x3   Weight shifting  4 min    Hip Abduction  30xs    [place]  4 min 3 min   Ball rolling   5 min   Deep breathing    15 min   nustep   8 min   Windshield wipers   4 min   Knees to chest progression   3 min               Transfer edcuation   4 min         THERAPEUTIC ACTIVITY: ( 0 minutes):  Activities per gid below to improve functional movement related mobility, strength and balance to improve neuro-muscular carryover to daily functional activities for improving patient's quality of life. Required visual, verbal and manual cues to promote proper body alignment and promote proper body posture/mechanics. Progressed resistance and complexity of movement as indicated. Date:  8/7/2020 Date:   Date:     Activity/Exercise Parameters Parameters Parameters                                                                               MANUAL THERAPY: (0 minutes): Joint mobilization, Soft tissue mobilization was utilized and necessary because of the patient's restricted joint motion and restricted motion of soft tissue mobility. Date  8/17/2020    Technique Used Grade Level # Time(s) Effect while being performed                                                                 HEP Log Date 1.    8/17/2020   2.  8/17/2020   3. 8/17/2020   4.    5.           [employer] Portal  Treatment/Session Summary:    Response to Treatment: Pt had improvement in pain in supine opsition but still had pain with transfers of supine to sitting. Communication/Consultation:  POC, HEP, PT goals, Faxed initial evaluation to MD.   Equipment provided today: HEP Handout     Recommendations/Intent for next treatment session:   Next visit will focus on Manual Therapy Core Stability Quad strengthening Hip strengthening soft tissue mobilization. Treatment Plan of Care Effective Dates: 8/7/2020 TO 10/6/2020 (60 days).   Frequency/Duration: 2 times a week for 60 Days             Total Treatment Billable Duration:   55  Rx   PT Patient Time In/Time Out  Time In: 0901  Time Out: 451 [facility], PT    Future Appointments   Date Time Provider Berta Jaquez   8/19/2020  9:00 AM Manjit Deitz PT [facility]   8/24/2020  1:40 PM [facility] OUTREACH INSURANCE [facility]   8/24/2020  2:15 PM Marina Padilla MD [facility] 8/25/2020  1:00 PM Padmini Salcedo, PT [facility]   8/28/2020 11:00 AM Padmini Salcedo, PT [facility]   9/1/2020  1:00 PM Padmini Salcedo, PT [facility] [place] [facility]   9/3/2020  1:45 PM [facility]i [facility]cedo[facility]   3/11/2021 11:00 AM Layla Way MD [facility]   8/9/2021  9:00 AM Marquez Mars MD [facility] HTF HTF

## 2020-08-19 ENCOUNTER — HOSPITAL ENCOUNTER (OUTPATIENT)
Dept: PHYSICAL THERAPY | Age: 65
Discharge: HOME | End: 2020-08-19
Payer: MEDICARE

## 2020-08-19 PROCEDURE — 97110 THERAPEUTIC EXERCISES: CPT

## 2020-08-19 PROCEDURE — 97140 MANUAL THERAPY 1/> REGIONS: CPT

## 2020-08-19 NOTE — PROGRESS NOTES
Nita Wang  : 1955  Payor: Mahnaz [place]egino [place] / Plan[place] Jennifer [place] / Product Type: Managed Care Medicare /  Escobar Niño at [facility]. [address] [place] [address], [facility], [address] [place] [address] Road  [phone]   [phone]                                                          Linda Mims MD      OUTPATIENT PHYSICAL THERAPY: Daily Treatment Note 2020 Visit Count:  4    Tx Diagnosis  Pain in right hip (M25.551)  Stiffness of right hip, not elsewhere classified (M25.651)  Pain in right thigh (M79.651)        Pre-treatment Symptoms/Complaints:  Pt reports he is walking around ok but any time he bends his back he is in alot of pain. Pain: Initial:4/10 Post Session: 1/10   Medications Last Reviewed:  2020     Updated Objective Findings: See Initial Eval for more details. TREATMENT:   THERAPEUTIC EXERCISE: (25 minutes):  Exercises per grid below to improve mobility, strength and balance. Required minimal visual, verbal and manual cues to promote proper body alignment and promote proper body posture. Progressed resistance and complexity of movement as indicated. Date:  2020 Date:  2020 Date:  2020 Date  2020   Activity/Exercise Parameters Parameters Parameters    Education HEP, POC, PT goals, anatomy/pathology Education on back pain and how it effects and responds to sensitivity. Educated on stress and its impact with pain. Educated on the importance to deep breathing exercises and its maintenance on pain. Education on back pain lack of sleep, Plan of care, progressions and regressions. Pain and fear avoidance.  (13 min)   leon pose  30 seconds 4xs     L stretch  30 seconds 3xs 30xs x3    Weight shifting  4 min     Hip Abduction  30xs     [place]  4 min 3 min    Ball rolling   5 min 4 min   Deep breathing    15 min    nustep   8 min 8 min   Windshield wipers   4 min    Knees to chest progression   3 min Transfer edcuation   4 min          THERAPEUTIC ACTIVITY: ( 0 minutes): Activities per gid below to improve functional movement related mobility, strength and balance to improve neuro-muscular carryover to daily functional activities for improving patient's quality of life. Required visual, verbal and manual cues to promote proper body alignment and promote proper body posture/mechanics. Progressed resistance and complexity of movement as indicated. Date:  8/7/2020 Date:   Date:     Activity/Exercise Parameters Parameters Parameters                                                                               MANUAL THERAPY: (28 minutes): Joint mobilization, Soft tissue mobilization was utilized and necessary because of the patient's restricted joint motion and restricted motion of soft tissue mobility. Date  8/19/2020    Technique Used Grade Level # Time(s) Effect while being performed   Soft tissue release  Lumbar 28 Isolating multifidi, skin rolling, active release. HEP Log Date 1.    8/19/2020   2.  8/19/2020   3. 8/19/2020   4.    5.           [facility]  Treatment/Session Summary:    Response to Treatment: Pt had a marked improvement in pain and mobility post treatment. Patient had improvement in subjective complaints after soft tissue release that targeted  lumbar multifidus and Quadratus lumborum bilaterally. Communication/Consultation:  POC, HEP, PT goals, Faxed initial evaluation to MD.   Equipment provided today: HEP Handout     Recommendations/Intent for next treatment session:   Next visit will focus on Manual Therapy Core Stability Quad strengthening Hip strengthening soft tissue mobilization. Treatment Plan of Care Effective Dates: 8/7/2020 TO 10/6/2020 (60 days).   Frequency/Duration: 2 times a week for 60 Days             Total Treatment Billable Duration:   54  Rx   PT Patient Time In/Time Out  Time In: 0901  Time Out: 2200 Ariane Martin, PT    Future Appointments   Date Time Provider Berta Jaquez   8/24/2020  1:40 PM [facility] OUTREACH INSURANCE [facility]   8/24/2020  2:15 PM Basil Antonio MD [facility]   8/25/2020  1:00 PM Jacob Powell, PT [facility]   8/28/2020 11:00 AM [facility]   9/1/2020  1:00 PM [facility]   9/3/2020  1:45 PM [facility]   3/11/2021 11:00 AM Jeannie Del Valle MD [facility]   8/9/2021  9:00 AM Angie Bahena MD [facility] HTF HTF

## 2020-08-24 ENCOUNTER — HOSPITAL ENCOUNTER (OUTPATIENT)
Dept: LAB | Age: 65
Discharge: HOME | End: 2020-08-24
Payer: MEDICARE

## 2020-08-24 DIAGNOSIS — C61 MALIGNANT NEOPLASM OF PROSTATE (HCC): ICD-10-CM

## 2020-08-24 LAB
ALBUMIN SERPL-MCNC: 3.9 G/DL (ref 3.2–4.6)
ALBUMIN/GLOB SERPL: 1.1 {RATIO} (ref 1.2–3.5)
ALP SERPL-CCNC: 94 U/L (ref 50–136)
ALT SERPL-CCNC: 24 U/L (ref 12–65)
ANION GAP SERPL CALC-SCNC: 6 MMOL/L (ref 7–16)
AST SERPL-CCNC: 17 U/L (ref 15–37)
BASOPHILS # BLD: 0 K/UL (ref 0–0.2)
BASOPHILS NFR BLD: 0 % (ref 0–2)
BILIRUB SERPL-MCNC: 0.3 MG/DL (ref 0.2–1.1)
BUN SERPL-MCNC: 22 MG/DL (ref 8–23)
CALCIUM SERPL-MCNC: 9.3 MG/DL (ref 8.3–10.4)
CHLORIDE SERPL-SCNC: 105 MMOL/L (ref 98–107)
CO2 SERPL-SCNC: 26 MMOL/L (ref 21–32)
CREAT SERPL-MCNC: 1 MG/DL (ref 0.8–1.5)
DIFFERENTIAL METHOD BLD: ABNORMAL
EOSINOPHIL # BLD: 0.2 K/UL (ref 0–0.8)
EOSINOPHIL NFR BLD: 3 % (ref 0.5–7.8)
ERYTHROCYTE [DISTWIDTH] IN BLOOD BY AUTOMATED COUNT: 12.8 % (ref 11.9–14.6)
GLOBULIN SER CALC-MCNC: 3.5 G/DL (ref 2.3–3.5)
GLUCOSE SERPL-MCNC: 144 MG/DL (ref 65–100)
HCT VFR BLD AUTO: 37.8 % (ref 41.1–50.3)
HGB BLD-MCNC: 12.6 G/DL (ref 13.6–17.2)
IMM GRANULOCYTES # BLD AUTO: 0 K/UL (ref 0–0.5)
IMM GRANULOCYTES NFR BLD AUTO: 0 % (ref 0–5)
LYMPHOCYTES # BLD: 1.6 K/UL (ref 0.5–4.6)
LYMPHOCYTES NFR BLD: 22 % (ref 13–44)
MCH RBC QN AUTO: 31.7 PG (ref 26.1–32.9)
MCHC RBC AUTO-ENTMCNC: 33.3 G/DL (ref 31.4–35)
MCV RBC AUTO: 95.2 FL (ref 79.6–97.8)
MONOCYTES # BLD: 0.5 K/UL (ref 0.1–1.3)
MONOCYTES NFR BLD: 6 % (ref 4–12)
NEUTS SEG # BLD: 5.2 K/UL (ref 1.7–8.2)
NEUTS SEG NFR BLD: 69 % (ref 43–78)
NRBC # BLD: 0 K/UL (ref 0–0.2)
PLATELET # BLD AUTO: 242 K/UL (ref 150–450)
PMV BLD AUTO: 9.1 FL (ref 9.4–12.3)
POTASSIUM SERPL-SCNC: 3.7 MMOL/L (ref 3.5–5.1)
PROT SERPL-MCNC: 7.4 G/DL (ref 6.3–8.2)
PSA SERPL-MCNC: 1.2 NG/ML
RBC # BLD AUTO: 3.97 M/UL (ref 4.23–5.67)
SODIUM SERPL-SCNC: 137 MMOL/L (ref 136–145)
WBC # BLD AUTO: 7.6 K/UL (ref 4.3–11.1)

## 2020-08-24 PROCEDURE — 85025 COMPLETE CBC W/AUTO DIFF WBC: CPT

## 2020-08-24 PROCEDURE — 84153 ASSAY OF PSA TOTAL: CPT

## 2020-08-24 PROCEDURE — 36415 COLL VENOUS BLD VENIPUNCTURE: CPT

## 2020-08-24 PROCEDURE — 80053 COMPREHEN METABOLIC PANEL: CPT

## 2020-08-25 ENCOUNTER — HOSPITAL ENCOUNTER (OUTPATIENT)
Dept: PHYSICAL THERAPY | Age: 65
Discharge: HOME | End: 2020-08-25
Payer: MEDICARE

## 2020-08-25 PROCEDURE — 97140 MANUAL THERAPY 1/> REGIONS: CPT

## 2020-08-25 PROCEDURE — 97110 THERAPEUTIC EXERCISES: CPT

## 2020-08-28 ENCOUNTER — HOSPITAL ENCOUNTER (OUTPATIENT)
Dept: PHYSICAL THERAPY | Age: 65
Discharge: HOME | End: 2020-08-28
Payer: MEDICARE

## 2020-08-28 PROCEDURE — 97140 MANUAL THERAPY 1/> REGIONS: CPT

## 2020-08-28 PROCEDURE — 97110 THERAPEUTIC EXERCISES: CPT

## 2020-08-28 NOTE — PROGRESS NOTES
Janet Homans  : 1955  Payor: Mitesh Mcgee [place] / Plan[place] Pool [place] / Product Type: Managed Care Medicare /  [address] [place] [address] at [address] [place] [address]. [facility]., [address], [id number] [place] [address] Road  [phone]   [phone]                                                          Emilia Montes MD      OUTPATIENT PHYSICAL THERAPY: Daily Treatment Note 2020 Visit Count:  6    Tx Diagnosis  Pain in right hip (M25.551)  Stiffness of right hip, not elsewhere classified (M25.651)  Pain in right thigh (M79.651)        Pre-treatment Symptoms/Complaints:  Pt reports improvement  and only has pain getting out of bed  Pain: Initial:4/10 Post Session: 1/10   Medications Last Reviewed:  2020     Updated Objective Findings: 40 degrees of flexion        TREATMENT:   THERAPEUTIC EXERCISE: (18 minutes):  Exercises per grid below to improve mobility, strength and balance. Required minimal visual, verbal and manual cues to promote proper body alignment and promote proper body posture. Progressed resistance and complexity of movement as indicated. Date:  2020 Date:  2020 Date  2020 Date  2020 Date  2020   Activity/Exercise Parameters Parameters      Education Education on back pain and how it effects and responds to sensitivity. Educated on stress and its impact with pain. Educated on the importance to deep breathing exercises and its maintenance on pain. Education on back pain lack of sleep, Plan of care, progressions and regressions. Pain and fear avoidance. (13 min)  Educated on muscle spasm in the back. Reviewed progress in one week.    leon pose 30 seconds 4xs       L stretch 30 seconds 3xs 30xs x3  L-stretch 3 min     Weight shifting 4 min       Hip Abduction 30xs       [place] 4 min 3 min      Ball rolling  5 min 4 min     Deep breathing   15 min  4 min    nustep  8 min 8 min 8 min 8 min   Windshield wipers  4 min Knees to chest progression  3 min      Extension oriented    14 min    Seated pulsating flexion coupled with massage to QL        Transfer edcuation  4 min      Bike     5 min   Sit to stand     2x10   Hip Abduction     3x10         THERAPEUTIC ACTIVITY: ( 0 minutes): Activities per gid below to improve functional movement related mobility, strength and balance to improve neuro-muscular carryover to daily functional activities for improving patient's quality of life. Required visual, verbal and manual cues to promote proper body alignment and promote proper body posture/mechanics. Progressed resistance and complexity of movement as indicated. Date:  8/7/2020 Date:   Date:     Activity/Exercise Parameters Parameters Parameters                                                                               MANUAL THERAPY: (23 minutes): Joint mobilization, Soft tissue mobilization was utilized and necessary because of the patient's restricted joint motion and restricted motion of soft tissue mobility. Date  8/28/2020    Technique Used Grade Level # Time(s) Effect while being performed   Soft tissue release while seated leaning on table  Lumbar 18 Isolating multifidi, skin rolling, active release. QL release in flexion   5 min                                                    HEP Log Date 1.    8/28/2020   2.  8/28/2020   3. 8/28/2020   4.    5.           [employer] [facility]  Treatment/Session Summary:    Response to Treatment: Pt progressed with closed chain exercises to improve mobility. Communication/Consultation:  POC, HEP, PT goals, Faxed initial evaluation to MD.   Equipment provided today: HEP Handout     Recommendations/Intent for next treatment session:   Next visit will focus on Manual Therapy Core Stability Quad strengthening Hip strengthening soft tissue mobilization. Treatment Plan of Care Effective Dates: 8/7/2020 TO 10/6/2020 (60 days). Frequency/Duration: 2 times a week for 60 Days             Total Treatment Billable Duration:   41  Rx   PT Patient Time In/Time Out  Time In: 1106  Time Out: [address], PT    Future Appointments   Date Time Provider Berta Jaquez   9/1/2020  1:00 PM Anastasia Palumbo PT [facility] AND [facility]   9/3/2020  3:15 PM Moisés De Jesus MD [facility] PGU   10/19/2020  1:30 PM [facility]   10/19/2020  2:00 PM Annamarie Schroeder NP [facility]   12/14/2020  2:00 PM [facility]   12/14/2020  2:30 PM Marlen Bedolla MD [facility]   3/11/2021 11:00 AM Moisés De Jesus MD [facility] PGU   8/9/2021  9:00 AM Larissa Rudd MD [facility] HTF HTF

## 2020-09-01 ENCOUNTER — HOSPITAL ENCOUNTER (OUTPATIENT)
Dept: GENERAL RADIOLOGY | Age: 65
Discharge: HOME | End: 2020-09-01
Attending: NURSE PRACTITIONER
Payer: MEDICARE

## 2020-09-01 ENCOUNTER — HOSPITAL ENCOUNTER (OUTPATIENT)
Dept: PHYSICAL THERAPY | Age: 65
Discharge: HOME | End: 2020-09-01

## 2020-09-01 DIAGNOSIS — M54.50 ACUTE MIDLINE LOW BACK PAIN WITHOUT SCIATICA: ICD-10-CM

## 2020-09-01 DIAGNOSIS — K59.00 CONSTIPATION, UNSPECIFIED CONSTIPATION TYPE: ICD-10-CM

## 2020-09-01 PROCEDURE — 72100 X-RAY EXAM L-S SPINE 2/3 VWS: CPT

## 2020-09-01 PROCEDURE — 74022 RADEX COMPL AQT ABD SERIES: CPT

## 2020-09-01 NOTE — PROGRESS NOTES
Baldo Mccall  : 1955  [place] Krystle Conde [place] / Plan[place] Celia Walls [place] / Product Type: Managed Care Medicare /  [address] [facility] at [address] [place] [address]. [address] [place] [address] [address] [place] [address], [facility], [address] [place] [address]  [phone]   [phone]        OUTPATIENT DAILY NOTE    NAME: Baldo Mccall    DATE: 2020    Patient Cancelled physical therapy appointment today due to Sickness. Will follow up next appointment.     Gay Aguilar, PT    Future Appointments   Date Time Provider Berta Andradeisti   2020  7:00 PM Lamont Calderon, SONAM [facility] AND [facility]   9/3/2020  3:15 PM Grant Bland MD [address] PGU   10/19/2020  1:30 PM [facility] OUTREACH INSURANCE [facility]   10/19/2020  2:00 PM Gumaro Lay NP [facility]   2020  2:00 PM [facility] OUTREACH INSURANCE [facility]   2020  2:30 PM Rad Oro MD [facility]   3/11/2021 11:00 AM Grant Bland MD [facility] PGU   2021  9:00 AM Frankey Poplin, MD [facility] HTF HTF Home

## 2020-09-01 NOTE — PROGRESS NOTES
I called pt with results of XRAY--DDD lumbar spine  -worse at L4-L5. Advised that [facility] recommends Mobic 15 mg daily- which I send to pt's pharmacy. Pt states that he is using Mag Citrate and it is currently working well. Advised him to follow up in 2 wks if back pain is not improving. Pt is currently receiving PT, and I sent in a new referral to extend PT for lumbar back pain. Pt states that he is expecting a call from Tiffany today. I advised him that I will send a message to [facility] to advise her of this.    KATIE/db

## 2020-09-03 ENCOUNTER — HOSPITAL ENCOUNTER (OUTPATIENT)
Dept: GENERAL RADIOLOGY | Age: 65
Discharge: HOME | End: 2020-09-03

## 2020-09-03 ENCOUNTER — APPOINTMENT (OUTPATIENT)
Dept: PHYSICAL THERAPY | Age: 65
End: 2020-09-03

## 2020-09-03 DIAGNOSIS — M54.2 NECK PAIN: ICD-10-CM

## 2020-09-18 ENCOUNTER — HOSPITAL ENCOUNTER (OUTPATIENT)
Dept: CT IMAGING | Age: 65
Discharge: HOME | End: 2020-09-18
Attending: NURSE PRACTITIONER

## 2020-09-18 DIAGNOSIS — R93.7 ABNORMAL X-RAY OF CERVICAL SPINE: ICD-10-CM

## 2020-09-18 DIAGNOSIS — Z85.46 HISTORY OF PROSTATE CANCER: ICD-10-CM

## 2020-09-18 RX ORDER — SODIUM CHLORIDE 0.9 % (FLUSH) 0.9 %
10 SYRINGE (ML) INJECTION
Status: COMPLETED | OUTPATIENT
Start: 2020-09-18 | End: 2020-09-18

## 2020-09-18 RX ADMIN — Medication 10 ML: at 11:01

## 2020-09-22 NOTE — PROGRESS NOTES
I called patient and gave him his results. States he is \"OK. \"  Aware I forwarded results to Dr. Diane Garcia. States not sleeping and continues with low back pain and urinary frequency. Mybetriq gives him a bad taste in his mouth. Taking Tramadol and Meloxicam with minimal relief.   aw

## 2020-09-25 ENCOUNTER — HOSPITAL ENCOUNTER (OUTPATIENT)
Dept: LAB | Age: 65
Discharge: HOME | End: 2020-09-25
Payer: MEDICARE

## 2020-09-25 DIAGNOSIS — C61 MALIGNANT NEOPLASM OF PROSTATE (HCC): ICD-10-CM

## 2020-09-25 LAB
ALBUMIN SERPL-MCNC: 4 G/DL (ref 3.2–4.6)
ALBUMIN/GLOB SERPL: 1.1 {RATIO} (ref 1.2–3.5)
ALP SERPL-CCNC: 112 U/L (ref 50–136)
ALT SERPL-CCNC: 21 U/L (ref 12–65)
ANION GAP SERPL CALC-SCNC: 8 MMOL/L (ref 7–16)
AST SERPL-CCNC: 19 U/L (ref 15–37)
BASOPHILS # BLD: 0 K/UL (ref 0–0.2)
BASOPHILS NFR BLD: 0 % (ref 0–2)
BILIRUB SERPL-MCNC: 0.5 MG/DL (ref 0.2–1.1)
BUN SERPL-MCNC: 25 MG/DL (ref 8–23)
CALCIUM SERPL-MCNC: 9.7 MG/DL (ref 8.3–10.4)
CHLORIDE SERPL-SCNC: 102 MMOL/L (ref 98–107)
CO2 SERPL-SCNC: 25 MMOL/L (ref 21–32)
CREAT SERPL-MCNC: 1.1 MG/DL (ref 0.8–1.5)
DIFFERENTIAL METHOD BLD: ABNORMAL
EOSINOPHIL # BLD: 0.2 K/UL (ref 0–0.8)
EOSINOPHIL NFR BLD: 2 % (ref 0.5–7.8)
ERYTHROCYTE [DISTWIDTH] IN BLOOD BY AUTOMATED COUNT: 12.9 % (ref 11.9–14.6)
GLOBULIN SER CALC-MCNC: 3.7 G/DL (ref 2.3–3.5)
GLUCOSE SERPL-MCNC: 123 MG/DL (ref 65–100)
HCT VFR BLD AUTO: 33.8 % (ref 41.1–50.3)
HGB BLD-MCNC: 11.3 G/DL (ref 13.6–17.2)
IMM GRANULOCYTES # BLD AUTO: 0 K/UL (ref 0–0.5)
IMM GRANULOCYTES NFR BLD AUTO: 0 % (ref 0–5)
LYMPHOCYTES # BLD: 1.8 K/UL (ref 0.5–4.6)
LYMPHOCYTES NFR BLD: 20 % (ref 13–44)
MCH RBC QN AUTO: 31.2 PG (ref 26.1–32.9)
MCHC RBC AUTO-ENTMCNC: 33.4 G/DL (ref 31.4–35)
MCV RBC AUTO: 93.4 FL (ref 79.6–97.8)
MONOCYTES # BLD: 0.9 K/UL (ref 0.1–1.3)
MONOCYTES NFR BLD: 10 % (ref 4–12)
NEUTS SEG # BLD: 6 K/UL (ref 1.7–8.2)
NEUTS SEG NFR BLD: 68 % (ref 43–78)
NRBC # BLD: 0 K/UL (ref 0–0.2)
PLATELET # BLD AUTO: 245 K/UL (ref 150–450)
PMV BLD AUTO: 9.4 FL (ref 9.4–12.3)
POTASSIUM SERPL-SCNC: 4 MMOL/L (ref 3.5–5.1)
PROT SERPL-MCNC: 7.7 G/DL (ref 6.3–8.2)
PSA SERPL-MCNC: 1.5 NG/ML
RBC # BLD AUTO: 3.62 M/UL (ref 4.23–5.67)
SODIUM SERPL-SCNC: 135 MMOL/L (ref 136–145)
WBC # BLD AUTO: 8.9 K/UL (ref 4.3–11.1)

## 2020-09-25 PROCEDURE — 36415 COLL VENOUS BLD VENIPUNCTURE: CPT

## 2020-09-25 PROCEDURE — 84153 ASSAY OF PSA TOTAL: CPT

## 2020-09-25 PROCEDURE — 80053 COMPREHEN METABOLIC PANEL: CPT

## 2020-09-25 PROCEDURE — 85025 COMPLETE CBC W/AUTO DIFF WBC: CPT

## 2020-10-01 ENCOUNTER — HOSPITAL ENCOUNTER (OUTPATIENT)
Dept: NUCLEAR MEDICINE | Age: 65
Discharge: HOME | End: 2020-10-01
Attending: INTERNAL MEDICINE
Payer: MEDICARE

## 2020-10-01 DIAGNOSIS — C79.51 BONE METASTASIS (HCC): ICD-10-CM

## 2020-10-01 DIAGNOSIS — C61 MALIGNANT NEOPLASM OF PROSTATE (HCC): ICD-10-CM

## 2020-10-01 PROCEDURE — 78306 BONE IMAGING WHOLE BODY: CPT

## 2020-10-02 ENCOUNTER — TRANSCRIBE ORDER (OUTPATIENT)
Dept: SCHEDULING | Age: 65
End: 2020-10-02

## 2020-10-02 DIAGNOSIS — C61 PROSTATE CANCER (HCC): Primary | ICD-10-CM

## 2020-10-05 ENCOUNTER — HOSPITAL ENCOUNTER (OUTPATIENT)
Dept: MRI IMAGING | Age: 65
Discharge: HOME | End: 2020-10-05
Attending: RADIOLOGY
Payer: MEDICARE

## 2020-10-05 DIAGNOSIS — C61 PROSTATE CANCER (HCC): ICD-10-CM

## 2020-10-05 PROCEDURE — A9575 INJ GADOTERATE MEGLUMI 0.1ML: HCPCS | Performed by: RADIOLOGY

## 2020-10-05 PROCEDURE — 72157 MRI CHEST SPINE W/O & W/DYE: CPT

## 2020-10-05 PROCEDURE — 72158 MRI LUMBAR SPINE W/O & W/DYE: CPT

## 2020-10-05 PROCEDURE — 74011250636 HC RX REV CODE- 250/636: Performed by: RADIOLOGY

## 2020-10-05 RX ORDER — GADOTERATE MEGLUMINE 376.9 MG/ML
20 INJECTION INTRAVENOUS
Status: COMPLETED | OUTPATIENT
Start: 2020-10-05 | End: 2020-10-05

## 2020-10-05 RX ORDER — SODIUM CHLORIDE 0.9 % (FLUSH) 0.9 %
10 SYRINGE (ML) INJECTION
Status: COMPLETED | OUTPATIENT
Start: 2020-10-05 | End: 2020-10-05

## 2020-10-05 RX ADMIN — GADOTERATE MEGLUMINE 20 ML: 376.9 INJECTION INTRAVENOUS at 19:43

## 2020-10-05 RX ADMIN — Medication 10 ML: at 19:43

## 2020-10-13 ENCOUNTER — ANESTHESIA EVENT (OUTPATIENT)
Dept: SURGERY | Age: 65
End: 2020-10-13
Payer: MEDICARE

## 2020-10-13 ENCOUNTER — HOSPITAL ENCOUNTER (OUTPATIENT)
Age: 65
Setting detail: OBSERVATION
Discharge: HOME | End: 2020-10-14
Attending: EMERGENCY MEDICINE | Admitting: INTERNAL MEDICINE
Payer: MEDICARE

## 2020-10-13 DIAGNOSIS — C61 PROSTATE CANCER METASTATIC TO BONE (HCC): ICD-10-CM

## 2020-10-13 DIAGNOSIS — C79.51 PROSTATE CANCER METASTATIC TO BONE (HCC): ICD-10-CM

## 2020-10-13 DIAGNOSIS — R52 INTRACTABLE PAIN: ICD-10-CM

## 2020-10-13 DIAGNOSIS — M54.2 NECK PAIN, ACUTE: Primary | ICD-10-CM

## 2020-10-13 LAB
ALBUMIN SERPL-MCNC: 4.4 G/DL (ref 3.2–4.6)
ALBUMIN/GLOB SERPL: 1 {RATIO} (ref 1.2–3.5)
ALP SERPL-CCNC: 126 U/L (ref 50–136)
ALT SERPL-CCNC: 32 U/L (ref 12–65)
ANION GAP SERPL CALC-SCNC: 5 MMOL/L (ref 7–16)
AST SERPL-CCNC: 24 U/L (ref 15–37)
BILIRUB SERPL-MCNC: 0.5 MG/DL (ref 0.2–1.1)
BUN SERPL-MCNC: 30 MG/DL (ref 8–23)
CALCIUM SERPL-MCNC: 10.1 MG/DL (ref 8.3–10.4)
CHLORIDE SERPL-SCNC: 102 MMOL/L (ref 98–107)
CO2 SERPL-SCNC: 28 MMOL/L (ref 21–32)
CREAT SERPL-MCNC: 1.19 MG/DL (ref 0.8–1.5)
ERYTHROCYTE [DISTWIDTH] IN BLOOD BY AUTOMATED COUNT: 13.1 % (ref 11.9–14.6)
GLOBULIN SER CALC-MCNC: 4.2 G/DL (ref 2.3–3.5)
GLUCOSE SERPL-MCNC: 102 MG/DL (ref 65–100)
HCT VFR BLD AUTO: 36.3 % (ref 41.1–50.3)
HGB BLD-MCNC: 12.1 G/DL (ref 13.6–17.2)
INR PPP: 1
MCH RBC QN AUTO: 31.5 PG (ref 26.1–32.9)
MCHC RBC AUTO-ENTMCNC: 33.3 G/DL (ref 31.4–35)
MCV RBC AUTO: 94.5 FL (ref 79.6–97.8)
NRBC # BLD: 0 K/UL (ref 0–0.2)
PLATELET # BLD AUTO: 325 K/UL (ref 150–450)
PMV BLD AUTO: 9.1 FL (ref 9.4–12.3)
POTASSIUM SERPL-SCNC: 4.2 MMOL/L (ref 3.5–5.1)
PROT SERPL-MCNC: 8.6 G/DL (ref 6.3–8.2)
PROTHROMBIN TIME: 13.6 SEC (ref 12–14.7)
RBC # BLD AUTO: 3.84 M/UL (ref 4.23–5.6)
SODIUM SERPL-SCNC: 135 MMOL/L (ref 136–145)
WBC # BLD AUTO: 11.8 K/UL (ref 4.3–11.1)

## 2020-10-13 PROCEDURE — 99218 HC RM OBSERVATION: CPT

## 2020-10-13 PROCEDURE — 85610 PROTHROMBIN TIME: CPT

## 2020-10-13 PROCEDURE — 96375 TX/PRO/DX INJ NEW DRUG ADDON: CPT

## 2020-10-13 PROCEDURE — 85027 COMPLETE CBC AUTOMATED: CPT

## 2020-10-13 PROCEDURE — 80053 COMPREHEN METABOLIC PANEL: CPT

## 2020-10-13 PROCEDURE — 74011250636 HC RX REV CODE- 250/636: Performed by: EMERGENCY MEDICINE

## 2020-10-13 PROCEDURE — 99215 OFFICE O/P EST HI 40 MIN: CPT | Performed by: INTERNAL MEDICINE

## 2020-10-13 PROCEDURE — 96374 THER/PROPH/DIAG INJ IV PUSH: CPT

## 2020-10-13 PROCEDURE — 2709999900 HC NON-CHARGEABLE SUPPLY

## 2020-10-13 PROCEDURE — 96372 THER/PROPH/DIAG INJ SC/IM: CPT

## 2020-10-13 PROCEDURE — 74011250637 HC RX REV CODE- 250/637: Performed by: INTERNAL MEDICINE

## 2020-10-13 PROCEDURE — 76450000000

## 2020-10-13 PROCEDURE — 99283 EMERGENCY DEPT VISIT LOW MDM: CPT

## 2020-10-13 PROCEDURE — 74011250636 HC RX REV CODE- 250/636: Performed by: INTERNAL MEDICINE

## 2020-10-13 RX ORDER — HYDROMORPHONE HYDROCHLORIDE 1 MG/ML
1 INJECTION, SOLUTION INTRAMUSCULAR; INTRAVENOUS; SUBCUTANEOUS
Status: COMPLETED | OUTPATIENT
Start: 2020-10-13 | End: 2020-10-13

## 2020-10-13 RX ORDER — MELOXICAM 7.5 MG/1
15 TABLET ORAL DAILY
Status: DISCONTINUED | OUTPATIENT
Start: 2020-10-14 | End: 2020-10-14 | Stop reason: HOSPADM

## 2020-10-13 RX ORDER — FAMOTIDINE 20 MG/1
20 TABLET, FILM COATED ORAL
Status: DISCONTINUED | OUTPATIENT
Start: 2020-10-13 | End: 2020-10-14 | Stop reason: HOSPADM

## 2020-10-13 RX ORDER — GUAIFENESIN 600 MG/1
1200 TABLET, EXTENDED RELEASE ORAL
Status: DISCONTINUED | OUTPATIENT
Start: 2020-10-13 | End: 2020-10-14 | Stop reason: HOSPADM

## 2020-10-13 RX ORDER — GUAIFENESIN/DEXTROMETHORPHAN 100-10MG/5
10 SYRUP ORAL
Status: DISCONTINUED | OUTPATIENT
Start: 2020-10-13 | End: 2020-10-14 | Stop reason: HOSPADM

## 2020-10-13 RX ORDER — POTASSIUM CHLORIDE 14.9 MG/ML
10 INJECTION INTRAVENOUS AS NEEDED
Status: DISCONTINUED | OUTPATIENT
Start: 2020-10-13 | End: 2020-10-14 | Stop reason: HOSPADM

## 2020-10-13 RX ORDER — SODIUM CHLORIDE, SODIUM LACTATE, POTASSIUM CHLORIDE, CALCIUM CHLORIDE 600; 310; 30; 20 MG/100ML; MG/100ML; MG/100ML; MG/100ML
75 INJECTION, SOLUTION INTRAVENOUS CONTINUOUS
Status: CANCELLED | OUTPATIENT
Start: 2020-10-13 | End: 2020-10-14

## 2020-10-13 RX ORDER — MIDAZOLAM HYDROCHLORIDE 1 MG/ML
2 INJECTION, SOLUTION INTRAMUSCULAR; INTRAVENOUS ONCE
Status: CANCELLED | OUTPATIENT
Start: 2020-10-13 | End: 2020-10-13

## 2020-10-13 RX ORDER — ACETAMINOPHEN 500 MG
1000 TABLET ORAL 3 TIMES DAILY
Status: DISCONTINUED | OUTPATIENT
Start: 2020-10-13 | End: 2020-10-14 | Stop reason: HOSPADM

## 2020-10-13 RX ORDER — AMOXICILLIN 250 MG
2 CAPSULE ORAL
Status: DISCONTINUED | OUTPATIENT
Start: 2020-10-13 | End: 2020-10-14 | Stop reason: HOSPADM

## 2020-10-13 RX ORDER — LIDOCAINE HYDROCHLORIDE 10 MG/ML
0.1 INJECTION INFILTRATION; PERINEURAL AS NEEDED
Status: CANCELLED | OUTPATIENT
Start: 2020-10-13

## 2020-10-13 RX ORDER — MAG HYDROX/ALUMINUM HYD/SIMETH 200-200-20
30 SUSPENSION, ORAL (FINAL DOSE FORM) ORAL
Status: DISCONTINUED | OUTPATIENT
Start: 2020-10-13 | End: 2020-10-14 | Stop reason: HOSPADM

## 2020-10-13 RX ORDER — ZOLPIDEM TARTRATE 5 MG/1
5 TABLET ORAL
Status: DISCONTINUED | OUTPATIENT
Start: 2020-10-13 | End: 2020-10-14 | Stop reason: HOSPADM

## 2020-10-13 RX ORDER — ONDANSETRON 2 MG/ML
4 INJECTION INTRAMUSCULAR; INTRAVENOUS
Status: DISCONTINUED | OUTPATIENT
Start: 2020-10-13 | End: 2020-10-14 | Stop reason: HOSPADM

## 2020-10-13 RX ORDER — HYDROMORPHONE HYDROCHLORIDE 1 MG/ML
1 INJECTION, SOLUTION INTRAMUSCULAR; INTRAVENOUS; SUBCUTANEOUS
Status: DISCONTINUED | OUTPATIENT
Start: 2020-10-13 | End: 2020-10-14 | Stop reason: HOSPADM

## 2020-10-13 RX ORDER — TAMSULOSIN HYDROCHLORIDE 0.4 MG/1
0.4 CAPSULE ORAL DAILY
Status: DISCONTINUED | OUTPATIENT
Start: 2020-10-14 | End: 2020-10-14 | Stop reason: HOSPADM

## 2020-10-13 RX ORDER — SODIUM CHLORIDE 0.9 % (FLUSH) 0.9 %
5-40 SYRINGE (ML) INJECTION AS NEEDED
Status: DISCONTINUED | OUTPATIENT
Start: 2020-10-13 | End: 2020-10-14 | Stop reason: HOSPADM

## 2020-10-13 RX ORDER — SODIUM CHLORIDE 0.9 % (FLUSH) 0.9 %
5-40 SYRINGE (ML) INJECTION EVERY 8 HOURS
Status: DISCONTINUED | OUTPATIENT
Start: 2020-10-13 | End: 2020-10-14 | Stop reason: HOSPADM

## 2020-10-13 RX ORDER — MAGNESIUM SULFATE HEPTAHYDRATE 40 MG/ML
2 INJECTION, SOLUTION INTRAVENOUS AS NEEDED
Status: DISCONTINUED | OUTPATIENT
Start: 2020-10-13 | End: 2020-10-14 | Stop reason: HOSPADM

## 2020-10-13 RX ORDER — FAMOTIDINE 20 MG/1
20 TABLET, FILM COATED ORAL ONCE
Status: CANCELLED | OUTPATIENT
Start: 2020-10-13 | End: 2020-10-13

## 2020-10-13 RX ORDER — ACETAMINOPHEN 650 MG/1
650 SUPPOSITORY RECTAL
Status: DISCONTINUED | OUTPATIENT
Start: 2020-10-13 | End: 2020-10-14 | Stop reason: HOSPADM

## 2020-10-13 RX ORDER — ENOXAPARIN SODIUM 100 MG/ML
40 INJECTION SUBCUTANEOUS DAILY
Status: DISCONTINUED | OUTPATIENT
Start: 2020-10-14 | End: 2020-10-14 | Stop reason: HOSPADM

## 2020-10-13 RX ORDER — MIRTAZAPINE 15 MG/1
15 TABLET, FILM COATED ORAL
Status: DISCONTINUED | OUTPATIENT
Start: 2020-10-13 | End: 2020-10-14 | Stop reason: HOSPADM

## 2020-10-13 RX ORDER — OXYCODONE HYDROCHLORIDE 5 MG/1
10 TABLET ORAL
Status: DISCONTINUED | OUTPATIENT
Start: 2020-10-13 | End: 2020-10-14 | Stop reason: HOSPADM

## 2020-10-13 RX ORDER — ACETAMINOPHEN 325 MG/1
650 TABLET ORAL
Status: DISCONTINUED | OUTPATIENT
Start: 2020-10-13 | End: 2020-10-14 | Stop reason: HOSPADM

## 2020-10-13 RX ORDER — FACIAL-BODY WIPES
10 EACH TOPICAL DAILY PRN
Status: DISCONTINUED | OUTPATIENT
Start: 2020-10-13 | End: 2020-10-14 | Stop reason: HOSPADM

## 2020-10-13 RX ORDER — ONDANSETRON 4 MG/1
4 TABLET, ORALLY DISINTEGRATING ORAL
Status: DISCONTINUED | OUTPATIENT
Start: 2020-10-13 | End: 2020-10-14 | Stop reason: HOSPADM

## 2020-10-13 RX ORDER — POLYETHYLENE GLYCOL 3350 17 G/17G
17 POWDER, FOR SOLUTION ORAL DAILY PRN
Status: DISCONTINUED | OUTPATIENT
Start: 2020-10-13 | End: 2020-10-14 | Stop reason: HOSPADM

## 2020-10-13 RX ORDER — LORAZEPAM 0.5 MG/1
0.25 TABLET ORAL
Status: DISCONTINUED | OUTPATIENT
Start: 2020-10-13 | End: 2020-10-14 | Stop reason: HOSPADM

## 2020-10-13 RX ORDER — FENTANYL CITRATE 50 UG/ML
100 INJECTION, SOLUTION INTRAMUSCULAR; INTRAVENOUS ONCE
Status: CANCELLED | OUTPATIENT
Start: 2020-10-13 | End: 2020-10-13

## 2020-10-13 RX ADMIN — OXYCODONE HYDROCHLORIDE 20 MG: 15 TABLET ORAL at 16:56

## 2020-10-13 RX ADMIN — ONDANSETRON 4 MG: 2 INJECTION INTRAMUSCULAR; INTRAVENOUS at 12:57

## 2020-10-13 RX ADMIN — ACETAMINOPHEN 1000 MG: 500 TABLET, FILM COATED ORAL at 21:07

## 2020-10-13 RX ADMIN — Medication 10 ML: at 16:36

## 2020-10-13 RX ADMIN — LORAZEPAM 0.25 MG: 0.5 TABLET ORAL at 22:13

## 2020-10-13 RX ADMIN — HYDROMORPHONE HYDROCHLORIDE 1 MG: 1 INJECTION, SOLUTION INTRAMUSCULAR; INTRAVENOUS; SUBCUTANEOUS at 12:08

## 2020-10-13 RX ADMIN — OXYCODONE HYDROCHLORIDE 20 MG: 15 TABLET ORAL at 12:57

## 2020-10-13 RX ADMIN — Medication 10 ML: at 21:07

## 2020-10-13 RX ADMIN — ACETAMINOPHEN 1000 MG: 500 TABLET, FILM COATED ORAL at 16:56

## 2020-10-13 RX ADMIN — HYDROMORPHONE HYDROCHLORIDE 1 MG: 1 INJECTION, SOLUTION INTRAMUSCULAR; INTRAVENOUS; SUBCUTANEOUS at 08:57

## 2020-10-13 RX ADMIN — FAMOTIDINE 20 MG: 20 TABLET ORAL at 21:14

## 2020-10-13 NOTE — PROGRESS NOTES
Care Management Interventions  PCP Verified by CM: Yes(Dr. Pelletier @ [facility].)  Last Visit to PCP: 09/28/20  Mode of Transport at Discharge: (friends)  Transition of Care Consult (CM Consult): Other  Current Support Network: Own Home  Confirm Follow Up Transport: Family  The Patient and/or Patient Representative was Provided with a Choice of Provider and Agrees with the Discharge Plan?: Yes  Freedom of Choice List was Provided with Basic Dialogue that Supports the Patient's Individualized Plan of Care/Goals, Treatment Preferences and Shares the Quality Data Associated with the Providers?: Yes  Discharge Location  Discharge Placement: Home  MATTHEW met with pt after talking with Dr. Noemy Barillas MD plans to dc pt in the AM prior to pt going DT for already scheduled for kyphoplasty. SW spoke with pt , he had changed his mind and preferred to transfer back to [place] then dc. Pt states he spoke with Austin Fischer from [facility] who told pt today because he was admitted now insurance will pay for whole procedure tomorrow rather than half, this was per pt and SW told him that did not make sense and explained why. SW did try to call [facility] and left voicemail but it was just before 5 pm.    Pt was also given MOON form which he signed and SW explained OBS status. SW called DR. Noemy Barillas to update him and he reports he will dc pt in the AM.    SW made pt aware he will before discharged PRIOR to surgery. Pt was going to call [facility] transport and try to change p/u place from his home to hospital (pt already had this arranged). MATTHEW also spoke with pt's son who states he can provide transport in the AM if needed. [facility] and will [facility]. Son was traveling from [facility] and may arrive later than 6 pm , supervisor Cheo Ga approved son to visit after hours.   Pt's nurse is also here tomorrow and aware of the situation and that pt has to be DT at 8:30am.  Neymar Palacio from case management also aware of situation.    Cisco Newman

## 2020-10-13 NOTE — ED NOTES
Pt's son, Edda Aleman, wishes to be updated on his father's condition and room.  His phone [phone]

## 2020-10-13 NOTE — ED TRIAGE NOTES
Pt presents to ED with c/o neck pain. Pt says he has prostate cancer that has now spread to his spine. Pt is suppose to have a procedure tomorrow for spine. Pt was tearful upon triage and anxious. Mask on pt prior to triage.

## 2020-10-13 NOTE — CONSULTS
[employer] [facility]        Inpatient Hematology / Oncology Consult Note    Reason for Consult:  Uncontrolled pain [R52]  Referring Physician:  Leonardo Almaraz MD    History of Present Illness: Mr. Daria Clark is a 71 y.o. 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 He was started on Casodex and underwent staging studies which showed no bone metastasis, but showed multiple lymph nodes throughout the pelvis which were enlarged and consistent with metastatic disease. He underwent bilateral orchiectomy for surgical castration, and PSA on 5/4/17 was down to 4 consistent with castrate sensitivity. He completed 6 cycles of Taxotere in September 2017 and tolerated therapy very well, PSA dropped to 1.5 and he was continued on Casodex alone. In April 2019 his PSA was noted to increase from 1.6 to 2.6, so Casodex was stopped and change to [place]. In August, he was reporting back pain. End of August he was seen in clinic. An outside CT ordered by PCP showed suggestion of multiple lytic lesions of the spine which was worrisome for cancer progression. Because of lack of PSA progression, it was felt best to obtain biopsy to confirm that this is prostate adenocarcinoma, a small cell or other transformation that would require a different treatment approach. IR was consulted for biopsy and kyphoplasty which was scheduled for tomorrow. However, today he presented to ED with intractable pain unable to manage with home regimen. We are consulted for recommendations.          Review of Systems:  Constitutional Denies fever, chills, weight loss, appetite changes   HEENT Denies trauma, blurry vision, hearing loss, ear pain, nosebleeds, sore throat, neck pain   Skin Denies lesions or rashes. Lungs Denies dyspnea, cough, sputum production or hemoptysis. Cardiovascular Denies chest pain, palpitations, or lower extremity edema.    Gastrointestinal Denies nausea, vomiting,  abdominal pain.   Neuro Denies headaches, visual changes or ataxia. Denies dizziness, tingling, tremors, sensory change, speech change, focal weakness or headaches. MSK +back and neck pain     Psychiatric/Behavioral  The patient is nervous/anxious. No Known Allergies  Past Medical History:   Diagnosis Date    Cancer [facility])     prostate     Ear problems     Elevated PSA     Former light cigarette smoker (1-9 per day)     smoked for 35 years.   quit     History of multiple allergies     Personal history of prostate cancer      Past Surgical History:   Procedure Laterality Date    BIOPSY PROSTATE      HX COLONOSCOPY  08/2020    HX HEENT      teeth removed     HX ORCHIECTOMY  02/2017     Family History   Problem Relation Age of Onset    Cancer Father     Prostate Cancer Father     Hypertension Father      Social History     Socioeconomic History    Marital status: SINGLE     Spouse name: Not on file    Number of children: Not on file    Years of education: Not on file    Highest education level: Not on file   Occupational History    Not on file   Social Needs    Financial resource strain: Not on file    Food insecurity     Worry: Not on file     Inability: Not on file    Transportation needs     Medical: Not on file     Non-medical: Not on file   Tobacco Use    Smoking status: Former Smoker     Packs/day: 0.50     Years: 35.00     Pack years: 17.50     Last attempt to quit: 12/2/2016     Years since quitting: 3.8    Smokeless tobacco: Former User    Tobacco comment: pt states he vapes   Substance and Sexual Activity    Alcohol use: No    Drug use: No    Sexual activity: Never   Lifestyle    Physical activity     Days per week: Not on file     Minutes per session: Not on file    Stress: Not on file   Relationships    Social connections     Talks on phone: Not on file     Gets together: Not on file     Attends Latter-day service: Not on file     Active member of club or organization: Not on file     Attends meetings of clubs or organizations: Not on file     Relationship status: Not on file    Intimate partner violence     Fear of current or ex partner: Not on file     Emotionally abused: Not on file     Physically abused: Not on file     Forced sexual activity: Not on file   Other Topics Concern    Not on file   Social History Narrative    Not on file     Current Facility-Administered Medications   Medication Dose Route Frequency Provider Last Rate Last Dose    . PHARMACY TO SUBSTITUTE PER PROTOCOL (Reordered from: enzalutamide Dheeraj Green) 40 mg capsule)    Per Protocol Yesica Low MD        LORazepam (ATIVAN) tablet 0.25 mg  0.25 mg Oral QHS PRN Yesica Low MD        [START ON 10/14/2020] meloxicam [facility]) tablet 15 mg  15 mg Oral DAILY Yesica Low MD        [START ON 10/14/2020] tamsulosin (FLOMAX) capsule 0.4 mg  0.4 mg Oral DAILY Yesica Low MD        acetaminophen (TYLENOL) tablet 1,000 mg  1,000 mg Oral TID MD Otilio BellaaiFENesin ER [facility]) tablet 1,200 mg  1,200 mg Oral BID PRN Yesica Low MD        alum-mag [facility]) oral suspension 30 mL  30 mL Oral Q4H PRN Yesica Low MD        oxyCODONE IR (ROXICODONE) tablet 10 mg  10 mg Oral Q4H PRN Yesica Low MD        oxyCODONE IR (ROXICODONE) tablet 20 mg  20 mg Oral Q4H PRN Yesica Low MD   20 mg at 10/13/20 1257    zolpidem (AMBIEN) tablet 5 mg  5 mg Oral QHS PRN MD Otilio BellaFENesin-dextromethorphan [facility] DM) 100-10 mg/5 mL syrup 10 mL  10 mL Oral Q4H PRN Yesica Low MD        senna-docusate (PERICOLACE) 8.6-50 mg per tablet 2 Tab  2 Tab Oral DAILY PRN Yesica Low MD        mirtazapine (REMERON) tablet 15 mg  15 mg Oral QHS PRN Yesica Low MD        sodium chloride (NS) flush 5-40 mL  5-40 mL IntraVENous Joe Shaker, Charm [place], MD        sodium chloride (NS) flush 5-40 mL  5-40 mL IntraVENous PRN Margarito Callahan MD        potassium chloride 10 mEq in 100 ml IVPB  10 mEq IntraVENous PRN Margarito Callahan MD        magnesium sulfate 2 g/50 ml IVPB (premix or compounded)  2 g IntraVENous PRN Margarito Callahan MD        acetaminophen (TYLENOL) tablet 650 mg  650 mg Oral Q6H PRN Margarito Callahan MD        Or    acetaminophen (TYLENOL) suppository 650 mg  650 mg Rectal Q6H PRN Margarito Callahan MD        polyethylene glycol [facility]) packet 17 g  17 g Oral DAILY PRN Margarito Callahan MD        bisacodyL (DULCOLAX) suppository 10 mg  10 mg Rectal DAILY PRN Margarito Callahan MD        ondansetron (ZOFRAN ODT) tablet 4 mg  4 mg Oral Q8H PRN Margarito Callahan MD        Or    ondansetron [facility]) injection 4 mg  4 mg IntraVENous Q6H PRN Margarito Callahan MD   4 mg at 10/13/20 1257    famotidine (PEPCID) tablet 20 mg  20 mg Oral BID PRN Margarito Callahan MD        [START ON 10/14/2020] enoxaparin (LOVENOX) injection 40 mg  40 mg SubCUTAneous DAILY Margarito Callahan MD         Current Outpatient Medications   Medication Sig Dispense Refill    HYDROcodone-acetaminophen (Norco) 7.5-325 mg per tablet Take 1 Tab by mouth every four (4) hours as needed for Pain for up to 30 days. Max Daily Amount: 6 Tabs. Indications: pain 60 Tab 0    meloxicam (MOBIC) 15 mg tablet Take 1 Tab by mouth daily. 30 Tab 0    enzalutamide (Xtandi) 40 mg capsule Take 4 Caps by mouth daily for 30 days. 120 Cap 2    tamsulosin (Flomax) 0.4 mg capsule Take 1 Cap by mouth daily for 30 days. Indications: urinary frequency 30 Cap 5    LORazepam (ATIVAN) 0.5 mg tablet Take 0.5 Tabs by mouth nightly as needed for Anxiety.  Max Daily Amount: 0.25 mg. 5 Tab 0    OTHER Inability to walk 100 feet without causing pain 1 Each 0       OBJECTIVE:  Patient Vitals for the past 8 hrs:   BP Temp Pulse Resp SpO2 Height Weight   10/13/20 0729 128/82 98.5 °F (36.9 °C) 73 18 99 % 5' 9\" (1.753 m) 225 lb (102.1 kg)     Temp (24hrs), Av.5 °F (36.9 °C), Min:98.5 °F (36.9 °C), Max:98.5 °F (36.9 °C)    No intake/output data recorded. Physical Exam:  Constitutional: Well developed, well nourished male in acute distress from pain, sitting in ER chair    HEENT: Normocephalic and atraumatic. Oropharynx is clear, mucous membranes are moist.  Pupils are equal, round, and reactive to light. Skin Warm and dry. No bruising and no rash noted. No erythema. No pallor. Respiratory Lungs are clear to auscultation bilaterally without wheezes, rales or rhonchi, normal air exchange without accessory muscle use. CVS Normal rate, regular rhythm and normal S1 and S2. No murmurs, gallops, or rubs. Neuro Grossly nonfocal with no obvious sensory or motor deficits. MSK Neck and back pain   Psych Anxious       Labs:    Recent Results (from the past 24 hour(s))   METABOLIC PANEL, COMPREHENSIVE    Collection Time: 10/13/20 11:45 AM   Result Value Ref Range    Sodium 135 (L) 136 - 145 mmol/L    Potassium 4.2 3.5 - 5.1 mmol/L    Chloride 102 98 - 107 mmol/L    CO2 28 21 - 32 mmol/L    Anion gap 5 (L) 7 - 16 mmol/L    Glucose 102 (H) 65 - 100 mg/dL    BUN 30 (H) 8 - 23 MG/DL    Creatinine 1.19 0.8 - 1.5 MG/DL    GFR est AA >60 >60 ml/min/1.73m2    GFR est non-AA >60 >60 ml/min/1.73m2    Calcium 10.1 8.3 - 10.4 MG/DL    Bilirubin, total 0.5 0.2 - 1.1 MG/DL    ALT (SGPT) 32 12 - 65 U/L    AST (SGOT) 24 15 - 37 U/L    Alk.  phosphatase 126 50 - 136 U/L    Protein, total 8.6 (H) 6.3 - 8.2 g/dL    Albumin 4.4 3.2 - 4.6 g/dL    Globulin 4.2 (H) 2.3 - 3.5 g/dL    A-G Ratio 1.0 (L) 1.2 - 3.5     CBC W/O DIFF    Collection Time: 10/13/20 11:45 AM   Result Value Ref Range    WBC 11.8 (H) 4.3 - 11.1 K/uL    RBC 3.84 (L) 4.23 - 5.6 M/uL    HGB 12.1 (L) 13.6 - 17.2 g/dL    HCT 36.3 (L) 41.1 - 50.3 %    MCV 94.5 79.6 - 97.8 FL    MCH 31.5 26.1 - 32.9 PG    MCHC 33.3 31.4 - 35.0 g/dL    RDW 13.1 11.9 - 14.6 %    PLATELET 540 503 - 896 K/uL    MPV 9.1 (L) 9.4 - 12.3 FL    ABSOLUTE NRBC 0.00 0.0 - 0.2 K/uL   PROTHROMBIN TIME + INR    Collection Time: 10/13/20 11:45 AM   Result Value Ref Range    Prothrombin time 13.6 12.0 - 14.7 sec    INR 1.0         Imaging:  [unfilled]    ASSESSMENT:  Problem List  Date Reviewed: 9/25/2020          Codes Class Noted    Uncontrolled pain ICD-10-CM: R52  ICD-9-CM: 780.96  10/13/2020        Gynecomastia, male ICD-10-CM: N62  ICD-9-CM: 611.1  3/7/2019        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 ([facility] [address] ICD-10-CM: C77.2  ICD-9-CM: 196.2  5/11/2017        Prostate cancer ([facility] [address] ICD-10-CM: C61  ICD-9-CM: 185  5/3/2017        Family history of prostate cancer in father ICD-10-CM: Z80.42  ICD-9-CM: V16.42  1/31/2017        Elevated PSA ICD-10-CM: R97.20  ICD-9-CM: 790.93  1/31/2017        Pelvic pain ICD-10-CM: R10.2  ICD-9-CM: Brendia Staple  1/31/2017        Prostatic nodule ICD-10-CM: N40.2  ICD-9-CM: 600.10  1/31/2017        Acute prostatitis ICD-10-CM: N41.0  ICD-9-CM: 601.0  1/31/2017                RECOMMENDATIONS:  Intractable pain  -Scheduled tomorrow for biopsy and kyphoplasty. PC consulted. PMH prostrate cancer on Xtandi  -CT ordered by PCP showed suggestion of multiple lytic lesions of the spine  -MRI -Metastatic lesions throughout the imaged portions of the lumbosacral spine, including destructive lesions at T12, L1, and L4. Right iliac metastatic adenopathy. Metastatic lesions throughout the thoracic spine. Mild compression deformity at T9. No tumor extension into the epidural space. -NM bone scan-Thoracic and lumbar foci of increased activity, similar to the CT scan, suspicious for metastases. Possible lesion superior pubic ramus on the right. Original plan was to admit patient to [facility] where it was felt that he would be better served however, hospitalist here made decision to admit to [facility] which is also acceptable.  Lab studies and imaging studies  were personally reviewed. Pertinent old records were reviewed. Thank you for allowing us to participate in the care of Mr. Joshua Lim. We will follow along. Kody Savage NP   [facility] Oncology  [address] [address]  Office : [phone]  Fax : [phone]   I personally saw, exammed and counselled the patient, and discussed with NP, agree with above history/assessment/plan. 71 y. o.male prostate cancer developing bone lytic lesions causing uncontrolled pain, admitted for pain control, had scheduled for kyphoplasty and biopsy tomorrow, discussed with IR and ED regarding the best way to coordinate his procedure, consult palliative care. Kwabena Bruno M.D.   [address]  Office : [phone]  Fax : [phone]

## 2020-10-13 NOTE — ED NOTES
TRANSFER - OUT REPORT:    Verbal report given to Maggi Alvarez RN on Alexia Hernadez  being transferred to [facility] for routine progression of care       Report consisted of patients Situation, Background, Assessment and   Recommendations(SBAR). Information from the following report(s) SBAR was reviewed with the receiving nurse. Lines:   Peripheral IV 10/13/20 Right Antecubital (Active)   Site Assessment Clean, dry, & intact 10/13/20 1210   Phlebitis Assessment 0 10/13/20 1210   Infiltration Assessment 0 10/13/20 1210   Dressing Status Clean, dry, & intact 10/13/20 1210   Dressing Type Tape;Transparent 10/13/20 1210   Hub Color/Line Status Flushed;Patent 10/13/20 1210   Action Taken Blood drawn 10/13/20 1210        Opportunity for questions and clarification was provided.       Patient transported with:   [employer]

## 2020-10-13 NOTE — PROGRESS NOTES
TRANSFER - IN REPORT:    Verbal report received from DYLON Edmondson(name) on Sanam Carroll  being received from ED(unit) for routine progression of care      Report consisted of patients Situation, Background, Assessment and   Recommendations(SBAR). Information from the following report(s) SBAR and ED Summary was reviewed with the receiving nurse. Opportunity for questions and clarification was provided. Assessment completed upon patients arrival to unit and care assumed.

## 2020-10-13 NOTE — PROGRESS NOTES
10/13/20 1642   Dual Skin Pressure Injury Assessment   Dual Skin Pressure Injury Assessment WDL   Second Care Provider (Based on [address] [place] [address]) DYLON Sandoval   Skin Integumentary   Skin Integumentary (WDL) WDL    Pressure  Injury Documentation No Pressure Injury Noted-Pressure Ulcer Prevention Initiated   Wound Prevention and Protection Methods   Orientation of Wound Prevention Posterior   Location of Wound Prevention Sacrum/Coccyx   Dressing Present  No   Wound Offloading (Prevention Methods) Bed, pressure reduction mattress

## 2020-10-13 NOTE — H&P
Hospitalist Note     Admit Date:  10/13/2020  7:24 AM   Name:  Donna Morrissey   Age:  71 y.o.  :  1955   MRN:  [id number]   PCP:  William Alvarado MD  Treatment Team: Attending Provider: Hodan Barajas MD; Primary Nurse: Nenita Lennon RN; Care Manager: Victorino Dover RN    HPI/Subjective:   Pt is a 72 y/o M with prostate cancer metastatic to bones and lymph nodes, who presented with uncontrolled neck pain. Sharp, severe \"felt like daggers\". Worse when turning head side to side. Scheduled for IR procedures biopsy and kyphoplasties 10/14 but said he had to come to hospital today due to the pain. Unfortunately, he says the IV dilaudid \"didn't do a damn thing\". He does not appear overtly uncomfortable during my interview however and is in good spirits. No CP, SOB, fevers    10 systems reviewed and negative except as noted in HPI. Past Medical History:   Diagnosis Date    Cancer [facility])     prostate     Ear problems     Elevated PSA     Former light cigarette smoker (1-9 per day)     smoked for 35 years. quit     History of multiple allergies     Personal history of prostate cancer       Past Surgical History:   Procedure Laterality Date    BIOPSY PROSTATE      HX COLONOSCOPY  2020    HX HEENT      teeth removed     HX ORCHIECTOMY  2017      No Known Allergies   Social History     Tobacco Use    Smoking status: Former Smoker     Packs/day: 0.50     Years: 35.00     Pack years: 17.50     Last attempt to quit: 2016     Years since quitting: 3.8    Smokeless tobacco: Former User    Tobacco comment: pt states he vapes   Substance Use Topics    Alcohol use: No      Family History   Problem Relation Age of Onset    Cancer Father     Prostate Cancer Father     Hypertension Father       Family history reviewed and noncontributory.   Immunization History   Administered Date(s) Administered    Pneumococcal Conjugate (PCV-13) 2020    Tdap 2017     PTA Medications:  Prior to Admission Medications   Prescriptions Last Dose Informant Patient Reported? Taking? HYDROcodone-acetaminophen (Norco) 7.5-325 mg per tablet   No No   Sig: Take 1 Tab by mouth every four (4) hours as needed for Pain for up to 30 days. Max Daily Amount: 6 Tabs. Indications: pain   LORazepam (ATIVAN) 0.5 mg tablet   No No   Sig: Take 0.5 Tabs by mouth nightly as needed for Anxiety. Max Daily Amount: 0.25 mg.   OTHER   No No   Sig: Inability to walk 100 feet without causing pain   enzalutamide (Xtandi) 40 mg capsule   No No   Sig: Take 4 Caps by mouth daily for 30 days. meloxicam (MOBIC) 15 mg tablet   No No   Sig: Take 1 Tab by mouth daily. tamsulosin (Flomax) 0.4 mg capsule   No No   Sig: Take 1 Cap by mouth daily for 30 days. Indications: urinary frequency      Facility-Administered Medications: None       Objective:     Patient Vitals for the past 24 hrs:   Temp Pulse Resp BP SpO2   10/13/20 0729 98.5 °F (36.9 °C) 73 18 128/82 99 %     Oxygen Therapy  O2 Sat (%): 99 % (10/13/20 0729)  O2 Device: Room air (10/13/20 0729)    Estimated body mass index is 33.23 kg/m² as calculated from the following:    Height as of this encounter: 5' 9\" (1.753 m). Weight as of this encounter: 102.1 kg (225 lb). No intake or output data in the 24 hours ending 10/13/20 1227    *Note that automatically entered I/Os may not be accurate; dependent on patient compliance with collection and accurate  by assistants. Physical Exam:  General:    Well nourished. Alert. Eyes:   Normal sclerae. Extraocular movements intact. HENT:  Normocephalic, atraumatic. Moist mucous membranes  CV:   RRR. No m/r/g. Lungs:  CTAB. No wheezing, rhonchi, or rales. Abdomen: Soft, nontender, nondistended. Extremities: Warm and dry. No cyanosis or edema. Neurologic: CN II-XII grossly intact. Sensation intact. Skin:     No rashes or jaundice. Normal coloration  Psych:  Normal mood and affect.     I reviewed the labs, imaging, EKGs, telemetry, and other studies done this admission. Data Reviewed:   Recent Results (from the past 24 hour(s))   METABOLIC PANEL, COMPREHENSIVE    Collection Time: 10/13/20 11:45 AM   Result Value Ref Range    Sodium 135 (L) 136 - 145 mmol/L    Potassium 4.2 3.5 - 5.1 mmol/L    Chloride 102 98 - 107 mmol/L    CO2 28 21 - 32 mmol/L    Anion gap 5 (L) 7 - 16 mmol/L    Glucose 102 (H) 65 - 100 mg/dL    BUN 30 (H) 8 - 23 MG/DL    Creatinine 1.19 0.8 - 1.5 MG/DL    GFR est AA >60 >60 ml/min/1.73m2    GFR est non-AA >60 >60 ml/min/1.73m2    Calcium 10.1 8.3 - 10.4 MG/DL    Bilirubin, total 0.5 0.2 - 1.1 MG/DL    ALT (SGPT) 32 12 - 65 U/L    AST (SGOT) 24 15 - 37 U/L    Alk. phosphatase 126 50 - 136 U/L    Protein, total 8.6 (H) 6.3 - 8.2 g/dL    Albumin 4.4 3.2 - 4.6 g/dL    Globulin 4.2 (H) 2.3 - 3.5 g/dL    A-G Ratio 1.0 (L) 1.2 - 3.5     CBC W/O DIFF    Collection Time: 10/13/20 11:45 AM   Result Value Ref Range    WBC 11.8 (H) 4.3 - 11.1 K/uL    RBC 3.84 (L) 4.23 - 5.6 M/uL    HGB 12.1 (L) 13.6 - 17.2 g/dL    HCT 36.3 (L) 41.1 - 50.3 %    MCV 94.5 79.6 - 97.8 FL    MCH 31.5 26.1 - 32.9 PG    MCHC 33.3 31.4 - 35.0 g/dL    RDW 13.1 11.9 - 14.6 %    PLATELET 277 939 - 138 K/uL    MPV 9.1 (L) 9.4 - 12.3 FL    ABSOLUTE NRBC 0.00 0.0 - 0.2 K/uL   PROTHROMBIN TIME + INR    Collection Time: 10/13/20 11:45 AM   Result Value Ref Range    Prothrombin time 13.6 12.0 - 14.7 sec    INR 1.0         All Micro Results     None          No current facility-administered medications for this encounter. Current Outpatient Medications   Medication Sig    HYDROcodone-acetaminophen (Norco) 7.5-325 mg per tablet Take 1 Tab by mouth every four (4) hours as needed for Pain for up to 30 days. Max Daily Amount: 6 Tabs. Indications: pain    meloxicam (MOBIC) 15 mg tablet Take 1 Tab by mouth daily.  enzalutamide (Xtandi) 40 mg capsule Take 4 Caps by mouth daily for 30 days.     tamsulosin (Flomax) 0.4 mg capsule Take 1 Cap by mouth daily for 30 days. Indications: urinary frequency    LORazepam (ATIVAN) 0.5 mg tablet Take 0.5 Tabs by mouth nightly as needed for Anxiety. Max Daily Amount: 0.25 mg.    OTHER Inability to walk 100 feet without causing pain       Other Studies:  No results found for this visit on 10/13/20. No results found.     SARS-CoV-2 Lab Results  \"Novel Coronavirus\" Test: No results found for: COV2NT   \"Emergent Disease\" Test: No results found for: EDPR  \"SARS-COV-2\" Test: No results found for: XGCOVT  Rapid Test: No results found for: COVR           Assessment and Plan:     Hospital Problems as of 10/13/2020 Date Reviewed: 9/25/2020          Codes Class Noted - Resolved POA    Uncontrolled pain ICD-10-CM: R52  ICD-9-CM: 780.96  10/13/2020 - Present Unknown              Plan:  · Observation  · IV pain control today  · Discharge tomorrow morning so pt can make it to his outpatient IR appointment on time  · Needs outpatient follow up with oncology or PC for better pain control    Signed:  Patricia Moulton MD

## 2020-10-13 NOTE — ED PROVIDER NOTES
76-year-old male presents with complaints of recurrent and progressive neck pain  History of metastatic prostate cancer  Patient is due for kyphoplasty tomorrow for multiple levels  Patient's pain was much better over the last several days until early this morning when he awoke with severe pain  Attempted hydrocodone with minimal improvement  No neurologic symptoms    The history is provided by the patient. Neck Pain    This is a recurrent problem. The current episode started 6 to 12 hours ago. The problem occurs constantly. The problem has not changed since onset. Associated with: metaStatic prostate cancer. There has been no fever. The pain is present in the generalized neck. The quality of the pain is described as shooting and stabbing. The pain is severe. The symptoms are aggravated by twisting and certain positions. The pain is worse with movement. Pertinent negatives include no visual change, no chest pain, no syncope, no numbness, no headaches, no tingling and no weakness. Treatments tried: Hydrocodone 7.5. The treatment provided mild relief. Past Medical History:   Diagnosis Date    Cancer [facility])     prostate     Ear problems     Elevated PSA     Former light cigarette smoker (1-9 per day)     smoked for 35 years.   quit     History of multiple allergies     Personal history of prostate cancer        Past Surgical History:   Procedure Laterality Date    BIOPSY PROSTATE      HX COLONOSCOPY  08/2020    HX HEENT      teeth removed     HX ORCHIECTOMY  02/2017         Family History:   Problem Relation Age of Onset    Cancer Father     Prostate Cancer Father     Hypertension Father        Social History     Socioeconomic History    Marital status: SINGLE     Spouse name: Not on file    Number of children: Not on file    Years of education: Not on file    Highest education level: Not on file   Occupational History    Not on file   Social Needs    Financial resource strain: Not on file    Food insecurity     Worry: Not on file     Inability: Not on file    Transportation needs     Medical: Not on file     Non-medical: Not on file   Tobacco Use    Smoking status: Former Smoker     Packs/day: 0.50     Years: 35.00     Pack years: 17.50     Last attempt to quit: 12/2/2016     Years since quitting: 3.8    Smokeless tobacco: Former User    Tobacco comment: pt states he vapes   Substance and Sexual Activity    Alcohol use: No    Drug use: No    Sexual activity: Never   Lifestyle    Physical activity     Days per week: Not on file     Minutes per session: Not on file    Stress: Not on file   Relationships    Social connections     Talks on phone: Not on file     Gets together: Not on file     Attends Confucianism service: Not on file     Active member of club or organization: Not on file     Attends meetings of clubs or organizations: Not on file     Relationship status: Not on file    Intimate partner violence     Fear of current or ex partner: Not on file     Emotionally abused: Not on file     Physically abused: Not on file     Forced sexual activity: Not on file   Other Topics Concern    Not on file   Social History Narrative    Not on file         ALLERGIES: Patient has no known allergies. Review of Systems   Constitutional: Negative for activity change, chills, diaphoresis and fever. HENT: Negative for dental problem, hearing loss, nosebleeds, rhinorrhea and sore throat. Eyes: Negative for pain, discharge, redness and visual disturbance. Respiratory: Negative for cough, chest tightness and shortness of breath. Cardiovascular: Negative for chest pain, palpitations, leg swelling and syncope. Gastrointestinal: Negative for abdominal pain, constipation, diarrhea, nausea and vomiting. Endocrine: Negative for cold intolerance, heat intolerance, polydipsia and polyuria. Genitourinary: Negative for dysuria and flank pain.    Musculoskeletal: Positive for arthralgias and neck pain. Negative for back pain, joint swelling and myalgias. Skin: Negative for pallor and rash. Allergic/Immunologic: Negative for environmental allergies and food allergies. Neurological: Negative for dizziness, tingling, tremors, weakness, light-headedness, numbness and headaches. Hematological: Negative for adenopathy. Does not bruise/bleed easily. Psychiatric/Behavioral: Negative for confusion and dysphoric mood. The patient is nervous/anxious. The patient is not hyperactive. All other systems reviewed and are negative. Vitals:    10/13/20 0729   BP: 128/82   Pulse: 73   Resp: 18   Temp: 98.5 °F (36.9 °C)   SpO2: 99%   Weight: 102.1 kg (225 lb)   Height: 5' 9\" (1.753 m)            Physical Exam  Vitals signs and nursing note reviewed. Constitutional:       General: He is in acute distress. Appearance: Normal appearance. He is well-developed. He is obese. HENT:      Head: Normocephalic and atraumatic. Right Ear: External ear normal.      Left Ear: External ear normal.      Mouth/Throat:      Pharynx: No oropharyngeal exudate. Eyes:      General: No scleral icterus. Extraocular Movements: Extraocular movements intact. Conjunctiva/sclera: Conjunctivae normal.      Pupils: Pupils are equal, round, and reactive to light. Neck:      Musculoskeletal: Normal range of motion and neck supple. Muscular tenderness present. Thyroid: No thyromegaly. Vascular: No JVD. Cardiovascular:      Rate and Rhythm: Normal rate and regular rhythm. Pulses: Normal pulses. Heart sounds: Normal heart sounds. No murmur. No friction rub. No gallop. Pulmonary:      Effort: Pulmonary effort is normal. No respiratory distress. Breath sounds: Normal breath sounds. No wheezing. Abdominal:      General: Bowel sounds are normal. There is no distension. Palpations: Abdomen is soft. Tenderness: There is no abdominal tenderness.    Musculoskeletal: Normal range of motion. General: No tenderness or deformity. Skin:     General: Skin is warm and dry. Capillary Refill: Capillary refill takes less than 2 seconds. Findings: No rash. Neurological:      General: No focal deficit present. Mental Status: He is alert and oriented to person, place, and time. GCS: GCS eye subscore is 4. GCS verbal subscore is 5. GCS motor subscore is 6. Cranial Nerves: Cranial nerves are intact. No cranial nerve deficit. Sensory: No sensory deficit. Motor: Motor function is intact. No abnormal muscle tone. Coordination: Coordination is intact. Coordination normal.   Psychiatric:         Mood and Affect: Mood is anxious. Affect is tearful. Speech: Speech normal.         Behavior: Behavior normal. Behavior is cooperative. Thought Content: Thought content normal.         Cognition and Memory: Cognition and memory normal.         Judgment: Judgment normal.          MDM  Number of Diagnoses or Management Options  Intractable pain: established and worsening  Neck pain, acute: established and worsening  Prostate cancer metastatic to bone [facility]): established and worsening  Diagnosis management comments: 65-year-old male with metastatic cancer to his vertebral column  Due for kyphoplasty at multiple levels tomorrow    Presented today due to progressing pain not controlled by his hydrocodone    On my exam the patient is sitting upright is able to move his head back and forth without exacerbating the pain significantly. He does report that there are certain movements and positions which triggers severe pain. He is logically avoiding these maneuvers. I will go ahead and give the patient a milligram of Dilaudid IM.   I have paged oncology to discuss disposition plan    10:11 AM  Reviewed findings with Dr. Bradley Wilson  Due to lack of response to Dilaudid patient will be admitted    Plan likely will be to have the patient admitted to the [facility] facility for interventional radiology procedure that was scheduled for tomorrow. Interventional radiology team is aware of the extent of the patient right now. They will also contact the oncology team to coordinate care       Amount and/or Complexity of Data Reviewed  Tests in the medicine section of CPT®: reviewed and ordered  Review and summarize past medical records: yes    Risk of Complications, Morbidity, and/or Mortality  Presenting problems: moderate  Diagnostic procedures: low  Management options: moderate  General comments: Elements of this note have been dictated via voice recognition software. Text and phrases may be limited by the accuracy of the software. The chart has been reviewed, but errors may still be present.       Patient Progress  Patient progress: stable         Procedures

## 2020-10-13 NOTE — PROGRESS NOTES
Care Management Interventions  PCP Verified by CM: Yes(Dr. Pelletier @ [facility].)  Last Visit to PCP: 09/28/20  Mode of Transport at Discharge: (friends)  Transition of Care Consult (CM Consult): Other  Current Support Network: Own Home(has roommates)  Confirm Follow Up Transport: Friends  The Patient and/or Patient Representative was Provided with a Choice of Provider and Agrees with the Discharge Plan?: Yes  Freedom of Choice List was Provided with Basic Dialogue that Supports the Patient's Individualized Plan of Care/Goals, Treatment Preferences and Shares the Quality Data Associated with the Providers?: Yes  Discharge Location  Discharge Placement: Home  Visited with pt regarding plans for discharge, pt states that he lives at  Home with a \"few roommates\", he is very inde at home, drives,cooks, and sees Dr. Sue Camacho @ [facility], and last visit was approx 2-3 weeks ago. Pt gets Rx filled @ [facility] on [address]. No further needs at this time, CM will continue to follow.

## 2020-10-14 ENCOUNTER — HOSPITAL ENCOUNTER (OUTPATIENT)
Age: 65
Setting detail: OUTPATIENT SURGERY
Discharge: HOME | End: 2020-10-14
Attending: RADIOLOGY | Admitting: RADIOLOGY
Payer: MEDICARE

## 2020-10-14 ENCOUNTER — HOSPITAL ENCOUNTER (OUTPATIENT)
Dept: INTERVENTIONAL RADIOLOGY/VASCULAR | Age: 65
Discharge: HOME | End: 2020-10-14
Attending: RADIOLOGY
Payer: MEDICARE

## 2020-10-14 ENCOUNTER — ANESTHESIA (OUTPATIENT)
Dept: SURGERY | Age: 65
End: 2020-10-14
Payer: MEDICARE

## 2020-10-14 VITALS
OXYGEN SATURATION: 95 % | TEMPERATURE: 98.3 F | HEART RATE: 68 BPM | DIASTOLIC BLOOD PRESSURE: 56 MMHG | RESPIRATION RATE: 16 BRPM | SYSTOLIC BLOOD PRESSURE: 130 MMHG

## 2020-10-14 VITALS
OXYGEN SATURATION: 99 % | TEMPERATURE: 98.1 F | HEIGHT: 69 IN | BODY MASS INDEX: 33.33 KG/M2 | HEART RATE: 68 BPM | WEIGHT: 225 LBS | DIASTOLIC BLOOD PRESSURE: 62 MMHG | RESPIRATION RATE: 18 BRPM | SYSTOLIC BLOOD PRESSURE: 111 MMHG

## 2020-10-14 VITALS
HEART RATE: 70 BPM | WEIGHT: 225 LBS | RESPIRATION RATE: 16 BRPM | SYSTOLIC BLOOD PRESSURE: 156 MMHG | DIASTOLIC BLOOD PRESSURE: 75 MMHG | TEMPERATURE: 99 F | HEIGHT: 69 IN | BODY MASS INDEX: 33.33 KG/M2 | OXYGEN SATURATION: 94 %

## 2020-10-14 DIAGNOSIS — C79.51 BONE METASTASIS (HCC): ICD-10-CM

## 2020-10-14 LAB
ABO + RH BLD: NORMAL
BLOOD GROUP ANTIBODIES SERPL: NORMAL
SPECIMEN EXP DATE BLD: NORMAL

## 2020-10-14 PROCEDURE — 77030040922 HC BLNKT HYPOTHRM STRY -A: Performed by: ANESTHESIOLOGY

## 2020-10-14 PROCEDURE — 74011000250 HC RX REV CODE- 250: Performed by: RADIOLOGY

## 2020-10-14 PROCEDURE — 77030037492 HC KT BN ACCS OSTEOCOOL MEDT -E

## 2020-10-14 PROCEDURE — 74011250636 HC RX REV CODE- 250/636: Performed by: RADIOLOGY

## 2020-10-14 PROCEDURE — 22515 PERQ VERTEBRAL AUGMENTATION: CPT

## 2020-10-14 PROCEDURE — 74011250636 HC RX REV CODE- 250/636: Performed by: NURSE ANESTHETIST, CERTIFIED REGISTERED

## 2020-10-14 PROCEDURE — 77030003666 HC NDL SPINAL BD -A

## 2020-10-14 PROCEDURE — 77030018846 HC SOL IRR STRL H20 ICUM -A

## 2020-10-14 PROCEDURE — 74011000250 HC RX REV CODE- 250: Performed by: NURSE ANESTHETIST, CERTIFIED REGISTERED

## 2020-10-14 PROCEDURE — 74011250636 HC RX REV CODE- 250/636: Performed by: ANESTHESIOLOGY

## 2020-10-14 PROCEDURE — 77030019908 HC STETH ESOPH SIMS -A: Performed by: ANESTHESIOLOGY

## 2020-10-14 PROCEDURE — C1886 CATHETER, ABLATION: HCPCS

## 2020-10-14 PROCEDURE — 99218 HC RM OBSERVATION: CPT

## 2020-10-14 PROCEDURE — 88305 TISSUE EXAM BY PATHOLOGIST: CPT

## 2020-10-14 PROCEDURE — 76060000035 HC ANESTHESIA 2 TO 2.5 HR: Performed by: RADIOLOGY

## 2020-10-14 PROCEDURE — 76210000006 HC OR PH I REC 0.5 TO 1 HR: Performed by: RADIOLOGY

## 2020-10-14 PROCEDURE — 88342 IMHCHEM/IMCYTCHM 1ST ANTB: CPT

## 2020-10-14 PROCEDURE — 77030021783 HC SYS CEM DEL MEDT -D

## 2020-10-14 PROCEDURE — 77030010507 HC ADH SKN DERMBND J&J -B

## 2020-10-14 PROCEDURE — C1713 ANCHOR/SCREW BN/BN,TIS/BN: HCPCS

## 2020-10-14 PROCEDURE — 74011250637 HC RX REV CODE- 250/637: Performed by: RADIOLOGY

## 2020-10-14 PROCEDURE — 74011250637 HC RX REV CODE- 250/637: Performed by: ANESTHESIOLOGY

## 2020-10-14 PROCEDURE — 88341 IMHCHEM/IMCYTCHM EA ADD ANTB: CPT

## 2020-10-14 PROCEDURE — 77030026361 HC SYR INFL XII KYPH -C

## 2020-10-14 PROCEDURE — 20982 ABLATE BONE TUMOR(S) PERQ: CPT

## 2020-10-14 PROCEDURE — 77030037088 HC TUBE ENDOTRACH ORAL NSL COVD-A: Performed by: ANESTHESIOLOGY

## 2020-10-14 PROCEDURE — 77030034843 HC TAMP SPN BN INFL EXP II KYPH -H

## 2020-10-14 PROCEDURE — 77030011218 HC DEV BIOP BN KYPH -C

## 2020-10-14 PROCEDURE — 77030021678 HC GLIDESCP STAT DISP VERT -B: Performed by: ANESTHESIOLOGY

## 2020-10-14 PROCEDURE — 77030021782 HC SYS CEM CART DEL KYPH -C

## 2020-10-14 PROCEDURE — 86900 BLOOD TYPING SEROLOGIC ABO: CPT

## 2020-10-14 PROCEDURE — 77030034842 HC TAMP SPN BN INFL EXP II KYPH -I

## 2020-10-14 RX ORDER — LIDOCAINE HYDROCHLORIDE 10 MG/ML
0.1 INJECTION, SOLUTION EPIDURAL; INFILTRATION; INTRACAUDAL; PERINEURAL AS NEEDED
Status: DISCONTINUED | OUTPATIENT
Start: 2020-10-14 | End: 2020-10-18 | Stop reason: HOSPADM

## 2020-10-14 RX ORDER — PROPOFOL 10 MG/ML
INJECTION, EMULSION INTRAVENOUS AS NEEDED
Status: DISCONTINUED | OUTPATIENT
Start: 2020-10-14 | End: 2020-10-14 | Stop reason: HOSPADM

## 2020-10-14 RX ORDER — OXYCODONE HYDROCHLORIDE 5 MG/1
10 TABLET ORAL
Status: COMPLETED | OUTPATIENT
Start: 2020-10-14 | End: 2020-10-14

## 2020-10-14 RX ORDER — ACETAMINOPHEN 325 MG/1
650 TABLET ORAL
Status: COMPLETED | OUTPATIENT
Start: 2020-10-14 | End: 2020-10-14

## 2020-10-14 RX ORDER — SODIUM CHLORIDE 0.9 % (FLUSH) 0.9 %
5-40 SYRINGE (ML) INJECTION EVERY 8 HOURS
Status: DISCONTINUED | OUTPATIENT
Start: 2020-10-14 | End: 2020-10-18 | Stop reason: HOSPADM

## 2020-10-14 RX ORDER — ONDANSETRON 2 MG/ML
INJECTION INTRAMUSCULAR; INTRAVENOUS AS NEEDED
Status: DISCONTINUED | OUTPATIENT
Start: 2020-10-14 | End: 2020-10-14 | Stop reason: HOSPADM

## 2020-10-14 RX ORDER — HYDROCODONE BITARTRATE AND ACETAMINOPHEN 5; 325 MG/1; MG/1
1 TABLET ORAL
Status: DISCONTINUED | OUTPATIENT
Start: 2020-10-14 | End: 2020-10-18 | Stop reason: HOSPADM

## 2020-10-14 RX ORDER — DEXAMETHASONE SODIUM PHOSPHATE 4 MG/ML
INJECTION, SOLUTION INTRA-ARTICULAR; INTRALESIONAL; INTRAMUSCULAR; INTRAVENOUS; SOFT TISSUE AS NEEDED
Status: DISCONTINUED | OUTPATIENT
Start: 2020-10-14 | End: 2020-10-14 | Stop reason: HOSPADM

## 2020-10-14 RX ORDER — HYDROMORPHONE HYDROCHLORIDE 2 MG/ML
0.5 INJECTION, SOLUTION INTRAMUSCULAR; INTRAVENOUS; SUBCUTANEOUS
Status: DISCONTINUED | OUTPATIENT
Start: 2020-10-14 | End: 2020-10-16 | Stop reason: HOSPADM

## 2020-10-14 RX ORDER — OXYCODONE HYDROCHLORIDE 5 MG/1
5 TABLET ORAL
Status: DISCONTINUED | OUTPATIENT
Start: 2020-10-14 | End: 2020-10-15 | Stop reason: HOSPADM

## 2020-10-14 RX ORDER — GLYCOPYRROLATE 0.2 MG/ML
INJECTION INTRAMUSCULAR; INTRAVENOUS AS NEEDED
Status: DISCONTINUED | OUTPATIENT
Start: 2020-10-14 | End: 2020-10-14 | Stop reason: HOSPADM

## 2020-10-14 RX ORDER — EPHEDRINE SULFATE/0.9% NACL/PF 50 MG/5 ML
SYRINGE (ML) INTRAVENOUS AS NEEDED
Status: DISCONTINUED | OUTPATIENT
Start: 2020-10-14 | End: 2020-10-14 | Stop reason: HOSPADM

## 2020-10-14 RX ORDER — SODIUM CHLORIDE 0.9 % (FLUSH) 0.9 %
5-40 SYRINGE (ML) INJECTION AS NEEDED
Status: DISCONTINUED | OUTPATIENT
Start: 2020-10-14 | End: 2020-10-18 | Stop reason: HOSPADM

## 2020-10-14 RX ORDER — SODIUM CHLORIDE, SODIUM LACTATE, POTASSIUM CHLORIDE, CALCIUM CHLORIDE 600; 310; 30; 20 MG/100ML; MG/100ML; MG/100ML; MG/100ML
75 INJECTION, SOLUTION INTRAVENOUS CONTINUOUS
Status: DISCONTINUED | OUTPATIENT
Start: 2020-10-14 | End: 2020-10-16 | Stop reason: HOSPADM

## 2020-10-14 RX ORDER — FENTANYL CITRATE 50 UG/ML
100 INJECTION, SOLUTION INTRAMUSCULAR; INTRAVENOUS ONCE
Status: ACTIVE | OUTPATIENT
Start: 2020-10-14 | End: 2020-10-14

## 2020-10-14 RX ORDER — BUPIVACAINE HYDROCHLORIDE 5 MG/ML
100 INJECTION, SOLUTION EPIDURAL; INTRACAUDAL
Status: DISCONTINUED | OUTPATIENT
Start: 2020-10-14 | End: 2020-10-18 | Stop reason: HOSPADM

## 2020-10-14 RX ORDER — ROCURONIUM BROMIDE 10 MG/ML
INJECTION, SOLUTION INTRAVENOUS AS NEEDED
Status: DISCONTINUED | OUTPATIENT
Start: 2020-10-14 | End: 2020-10-14 | Stop reason: HOSPADM

## 2020-10-14 RX ORDER — LIDOCAINE HYDROCHLORIDE 20 MG/ML
INJECTION, SOLUTION EPIDURAL; INFILTRATION; INTRACAUDAL; PERINEURAL AS NEEDED
Status: DISCONTINUED | OUTPATIENT
Start: 2020-10-14 | End: 2020-10-14 | Stop reason: HOSPADM

## 2020-10-14 RX ORDER — FENTANYL CITRATE 50 UG/ML
INJECTION, SOLUTION INTRAMUSCULAR; INTRAVENOUS AS NEEDED
Status: DISCONTINUED | OUTPATIENT
Start: 2020-10-14 | End: 2020-10-14 | Stop reason: HOSPADM

## 2020-10-14 RX ORDER — MIDAZOLAM HYDROCHLORIDE 1 MG/ML
2 INJECTION, SOLUTION INTRAMUSCULAR; INTRAVENOUS ONCE
Status: ACTIVE | OUTPATIENT
Start: 2020-10-14 | End: 2020-10-14

## 2020-10-14 RX ORDER — NEOSTIGMINE METHYLSULFATE 1 MG/ML
INJECTION, SOLUTION INTRAVENOUS AS NEEDED
Status: DISCONTINUED | OUTPATIENT
Start: 2020-10-14 | End: 2020-10-14 | Stop reason: HOSPADM

## 2020-10-14 RX ORDER — SODIUM CHLORIDE, SODIUM LACTATE, POTASSIUM CHLORIDE, CALCIUM CHLORIDE 600; 310; 30; 20 MG/100ML; MG/100ML; MG/100ML; MG/100ML
75 INJECTION, SOLUTION INTRAVENOUS CONTINUOUS
Status: DISCONTINUED | OUTPATIENT
Start: 2020-10-14 | End: 2020-10-15 | Stop reason: HOSPADM

## 2020-10-14 RX ORDER — FAMOTIDINE 20 MG/1
20 TABLET, FILM COATED ORAL ONCE
Status: COMPLETED | OUTPATIENT
Start: 2020-10-14 | End: 2020-10-14

## 2020-10-14 RX ORDER — CEFAZOLIN SODIUM/WATER 2 G/20 ML
2 SYRINGE (ML) INTRAVENOUS ONCE
Status: COMPLETED | OUTPATIENT
Start: 2020-10-14 | End: 2020-10-14

## 2020-10-14 RX ADMIN — GLYCOPYRROLATE 0.4 MG: 0.2 INJECTION, SOLUTION INTRAMUSCULAR; INTRAVENOUS at 12:39

## 2020-10-14 RX ADMIN — OXYCODONE 10 MG: 5 TABLET ORAL at 13:11

## 2020-10-14 RX ADMIN — PHENYLEPHRINE HYDROCHLORIDE 120 MCG: 10 INJECTION INTRAVENOUS at 11:53

## 2020-10-14 RX ADMIN — LIDOCAINE HYDROCHLORIDE 100 MG: 20 INJECTION, SOLUTION EPIDURAL; INFILTRATION; INTRACAUDAL; PERINEURAL at 10:41

## 2020-10-14 RX ADMIN — BUPIVACAINE HYDROCHLORIDE 50 MG: 5 INJECTION, SOLUTION EPIDURAL; INTRACAUDAL at 11:37

## 2020-10-14 RX ADMIN — FAMOTIDINE 20 MG: 20 TABLET ORAL at 09:35

## 2020-10-14 RX ADMIN — FENTANYL CITRATE 50 MCG: 50 INJECTION INTRAMUSCULAR; INTRAVENOUS at 10:41

## 2020-10-14 RX ADMIN — SODIUM CHLORIDE, SODIUM LACTATE, POTASSIUM CHLORIDE, AND CALCIUM CHLORIDE: 600; 310; 30; 20 INJECTION, SOLUTION INTRAVENOUS at 10:35

## 2020-10-14 RX ADMIN — PHENYLEPHRINE HYDROCHLORIDE 120 MCG: 10 INJECTION INTRAVENOUS at 11:20

## 2020-10-14 RX ADMIN — ROCURONIUM BROMIDE 40 MG: 10 INJECTION, SOLUTION INTRAVENOUS at 10:42

## 2020-10-14 RX ADMIN — Medication 3 MG: at 12:39

## 2020-10-14 RX ADMIN — PHENYLEPHRINE HYDROCHLORIDE 120 MCG: 10 INJECTION INTRAVENOUS at 11:03

## 2020-10-14 RX ADMIN — ONDANSETRON 4 MG: 2 INJECTION INTRAMUSCULAR; INTRAVENOUS at 12:35

## 2020-10-14 RX ADMIN — PHENYLEPHRINE HYDROCHLORIDE 120 MCG: 10 INJECTION INTRAVENOUS at 11:43

## 2020-10-14 RX ADMIN — DEXAMETHASONE SODIUM PHOSPHATE 4 MG: 4 INJECTION, SOLUTION INTRAMUSCULAR; INTRAVENOUS at 11:00

## 2020-10-14 RX ADMIN — PHENYLEPHRINE HYDROCHLORIDE 60 MCG: 10 INJECTION INTRAVENOUS at 10:51

## 2020-10-14 RX ADMIN — BUPIVACAINE HYDROCHLORIDE 100 MG: 5 INJECTION, SOLUTION EPIDURAL; INTRACAUDAL at 11:11

## 2020-10-14 RX ADMIN — Medication 2 G: at 10:45

## 2020-10-14 RX ADMIN — ACETAMINOPHEN 650 MG: 325 TABLET, FILM COATED ORAL at 14:42

## 2020-10-14 RX ADMIN — FENTANYL CITRATE 50 MCG: 50 INJECTION INTRAMUSCULAR; INTRAVENOUS at 12:12

## 2020-10-14 RX ADMIN — Medication 10 ML: at 05:11

## 2020-10-14 RX ADMIN — Medication 10 MG: at 11:29

## 2020-10-14 RX ADMIN — Medication 10 MG: at 11:09

## 2020-10-14 RX ADMIN — PROPOFOL 150 MG: 10 INJECTION, EMULSION INTRAVENOUS at 10:41

## 2020-10-14 NOTE — PROGRESS NOTES
Patient requesting Tylenol for back pain. Dr Jerrica Harris at bedside. Received verbal orders for 650 mg Tylenol po.

## 2020-10-14 NOTE — DISCHARGE INSTRUCTIONS
[address]  Department of Interventional Radiology  [address] [place] [address] [address] [facility]  [phone] Office  [phone] Fax    KYPHOPLASTY/VERTEBROPLASTY DISCHARGE INSTRUCTIONS    General Information: This procedure is done to help with the back pain that is associated with compression fractures in the spine. The kyphoplasty involves placing a balloon into the space of the vertebrae that is fractured, blowing up the balloon, therefore realigning the broken pieces of bone, and then injecting cement into the space to strengthen the vertebrae. The vertebroplasty is only slightly different in that there is no balloon used. The Interventional Radiologist will speak with you to decide which procedure is best for you. The pain experienced from compression fractures is caused by the vertebrae not being stabilized. The cement stabilizes the bone, therefore reducing the pain. Home Care Instructions: You can resume your regular diet and medication regimen. Do not drink alcohol, drive, or make any important legal decisions in the next 24 hours. Do not lift anything heavier than a gallon of milk, or do anything strenuous for the next 24 hours. You will notice a dressing on your lower back after your procedure. This dressing can be removed in 24 hours. Showering is acceptable in 24 hours, but you should refrain from tub baths or swimming for 5 days. You will be asked to come back in for a recheck about 30 days after your procedure. At that time, our physician will ask you questions about your pain and if it has improved. Call If:     You should call your Physician and/or the Radiology Nurse if you have any bleeding other than a small spot on your bandage. Call if you have any signs of infection, fever, or increased pain at the site. Call if you should have new or worsening pain in your back, or if you lose control of your bladder or bowel.  Any tingling or loss of feeling or movement in your legs should also be reported. Follow-Up Instructions: Please see your ordering doctor as he/she has requested. To Reach Us: If you have any questions about your procedure, please call the Interventional Radiology department at [phone]. After business hours (5pm) and weekends, call the answering service at [phone] and ask for the Radiologist on call to be paged. Si tiene Preguntas acerca del procedimiento, por favor llame al departamento de Radiología Intervencional al [phone]. Después de horas de oficina (5 pm) y los fines de [place], llamar al Imelda Torrez al [phone] y pregunte por el Radiologo de [facility]. Interventional Radiology General Nurse Discharge    After general anesthesia or intravenous sedation, for 24 hours or while taking prescription Narcotics:  · Limit your activities  · Do not drive and operate hazardous machinery  · Do not make important personal or business decisions  · Do  not drink alcoholic beverages  · If you have not urinated within 8 hours after discharge, please contact your surgeon on call. * Please give a list of your current medications to your Primary Care Provider. * Please update this list whenever your medications are discontinued, doses are     changed, or new medications (including over-the-counter products) are added. * Please carry medication information at all times in case of emergency situations. These are general instructions for a healthy lifestyle:    No smoking/ No tobacco products/ Avoid exposure to second hand smoke  Surgeon General's Warning:  Quitting smoking now greatly reduces serious risk to your health.     Obesity, smoking, and sedentary lifestyle greatly increases your risk for illness  A healthy diet, regular physical exercise & weight monitoring are important for maintaining a healthy lifestyle    You may be retaining fluid if you have a history of heart failure or if you experience any of the following symptoms:  Weight gain of 3 pounds or more overnight or 5 pounds in a week, increased swelling in our hands or feet or shortness of breath while lying flat in bed. Please call your doctor as soon as you notice any of these symptoms; do not wait until your next office visit. Recognize signs and symptoms of STROKE:  F-face looks uneven    A-arms unable to move or move unevenly    S-speech slurred or non-existent    T-time-call 911 as soon as signs and symptoms begin-DO NOT go       Back to bed or wait to see if you get better-TIME IS BRAIN.       Patient Signature:  Date: 10/14/2020  Discharging Nurse: Melinda Leiva RN

## 2020-10-14 NOTE — ANESTHESIA PREPROCEDURE EVALUATION
Anesthetic History   No history of anesthetic complications            Review of Systems / Medical History  Patient summary reviewed and pertinent labs reviewed    Pulmonary          Smoker         Neuro/Psych   Within defined limits        Pertinent negatives: No CVA   Cardiovascular                Pertinent negatives: No past MI and CABG  Exercise tolerance: >4 METS     GI/Hepatic/Renal           Liver disease (presumed)     Endo/Other        Cancer (prostate, with multiple spine mets)     Other Findings   Comments: Admitted 10/13/20 to [facility] with extreme neck pain especially with extension. Previous heavy drinker for 30-40 years 5-6 drinks nightly. Moderate user of NSAIDS. Lytic lesions C4 and c5. Probable metastatic         Physical Exam    Airway  Mallampati: III  TM Distance: 4 - 6 cm  Neck ROM: normal range of motion   Mouth opening: Normal     Cardiovascular    Rhythm: regular  Rate: normal         Dental         Pulmonary  Breath sounds clear to auscultation               Abdominal         Other Findings            Anesthetic Plan    ASA: 3  Anesthesia type: general          Induction: Intravenous  Anesthetic plan and risks discussed with: Patient      Discussed GETA, likely with glidescope and careful stability of neck given symptoms.

## 2020-10-14 NOTE — PROGRESS NOTES
Pt to IR Procedure room #1/[facility] room for Kyphoplasty/Biopsy/Osteocool per Dr. Karen Garcia. Anesthesia to assume care-see anesthesia flowsheet.

## 2020-10-14 NOTE — DISCHARGE SUMMARY
Hospitalist Discharge Summary     Admit Date:  10/13/2020  7:24 AM   DC note date: 10/14/2020  Name:  Yair Randolph   Age:  71 y.o.  :  1955   MRN:  [id number]   PCP:  Jaspreet Richard MD  Treatment Team: Attending Provider: Cristóbal Man MD; Care Manager: Perla Covarrubias RN; Consulting Provider: Vicki Chacon MD; Care Manager: Chace Anaya; Staff Nurse: Susan Wilkins; Utilization Review: Kim Guerin RN    Problem List for this Hospitalization:  Hospital Problems as of 10/14/2020 Date Reviewed: 10/13/2020          Codes Class Noted - Resolved POA    * (Principal) Uncontrolled pain ICD-10-CM: R52  ICD-9-CM: 780.96  10/13/2020 - Present Yes        Primary prostate cancer with metastasis from prostate to other site [facility]) (Chronic) ICD-10-CM: C61  ICD-9-CM: 199.1, 185  5/3/2017 - Present Yes                Admission HPI from 10/13/2020:    \"Pt is a 70 y/o M with prostate cancer metastatic to bones and lymph nodes, who presented with uncontrolled neck pain. Sharp, severe \"felt like daggers\". Worse when turning head side to side. Scheduled for IR procedures biopsy and kyphoplasties 10/14 but said he had to come to hospital today due to the pain. Unfortunately, he says the IV dilaudid \"didn't do a damn thing\". He does not appear overtly uncomfortable during my interview however and is in good spirits. No CP, SOB, fevers  \"    Hospital Course:  Pt placed in observation for pain control. I really dont think he was in intractable pain, or he was hiding it very well. He was walking around without difficulty. Per CM note, it seems he may have been trying to get inpatient status because according to him they would cover more of the IR procedure costs. CM explained this is not the case and in any event he never met criteria for intpatient status. He is now OK with discharge. He will be discharged this morning so that he can keep his outpatient IR appointments as scheduled. Needs to follow up with Onc/PC for pain control    Disposition: Home or Self Care  Activity: Activity as tolerated  Diet: DIET NPO Except Meds, With Ice Chips  Code Status: Full Code    Follow Up Orders:  No orders of the defined types were placed in this encounter. Follow-up Information     Follow up With Specialties Details Why Jose Humphries MD Family Medicine   [id number] [phone]            Discharge meds at bottom of this note. Plan was discussed with pt. All questions answered. Patient was stable at time of discharge. Given instructions to call a physician or return if any concerns. Discharge summary and encounter summary was sent to PCP electronically via \"Comm Mgt\" link in [facility], if possible. Diagnostic Imaging/Tests:   Nm Bone Scan Wh Body    Result Date: 10/1/2020  NUCLEAR MEDICINE  WHOLE BODY BONE SCAN. INDICATION: Prostate cancer, restaging. COMPARISON: Chest CT from 2 weeks ago. RADIOPHARMACEUTICAL:  27.5 mCi Tc-99m MDP IV. Routine whole body anterior and posterior images are obtained. FINDINGS: Skull: No focal abnormality. Sternum and ribs: 1 focus anterior inferior rib end. Also linear density posterior inferior right rib. Pelvis and hips: Small focus superior pubic ramus on the right activity. [place] Spine: Focal areas of increased activity in the lower thoracic and upper lumbar spine. Corresponding to the abnormalities CT scan. [place] Upper extremities: No focal abnormality. Lower extremities: Degenerative changes. CONCLUSION: Thoracic and lumbar foci of increased activity, similar to the CT scan, suspicious for metastases. Possible lesion superior pubic ramus on the right.      Mri Thorac Spine W Wo Cont    Result Date: 10/6/2020  MRI thoracic spine without and with IV contrast INDICATION: Metastatic prostate cancer COMPARISON: Bone scan dated 10/1/2020, CT cervical spine and CT chest 9/18/2020 TECHNIQUE: Multisequence multiplanar MRI was performed of the thoracic spine before and after administration of IV gadolinium contrast. 20 mL Dotarem was administered. FINDINGS: Old spine images obtained for spine count purposes are not of diagnostic quality but demonstrate incompletely evaluated lesions within the cervical spine of low signal on T1-weighted images. Multiple enhancing lesions throughout the thoracic spine, especially involving the T7 T9 and T12 levels. The lesion is also partially imaged at L1. There is mild height loss at T9. Normal alignment is maintained. No significant retropulsion of bony fragments. No clear tumor extension into the epidural space. The thoracic cord demonstrates normal signal. The intervertebral discs are dehydrated and demonstrate height loss at multiple levels. No significant spinal canal or neuroforaminal narrowing. IMPRESSION: 1. Metastatic lesions throughout the thoracic spine. 2.  Mild compression deformity at T9. No tumor extension into the epidural space. 3.  Incompletely evaluated lesions within the cervical spine. Mri Lumb Spine W Wo Cont    Result Date: 10/6/2020  EXAM: MRI OF THE LUMBAR SPINE WITHOUT AND WITH CONTRAST INDICATION: Metastatic prostate cancer COMPARISON: Radiographs dated 9/1/2020, bone scan dated 10/1/2020 TECHNIQUE: MRI sequences of the lumbar spine were obtained before and after administration of IV gadolinium contrast. 20 mL Dotarem was administered. FINDINGS: Enhancing metastatic lesions at T12, L1, and L4 causing clear bony destruction in mild disc height loss. No significant retropulsion of fracture fragments. Enhancing lesions also present in remaining levels of the lumbar spine, throughout the sacrum, and within the bilateral iliac bones. Lesions within the posterior elements at multiple levels. No clear tumor extension into the epidural space. Enlarged right iliac node measuring at least 1.4 cm short axis. The intervertebral discs are mildly dehydrated at multiple levels.  Normal alignment is maintained. L1-2: No significant spinal canal stenosis or neuroforaminal narrowing. L2-3:     No significant spinal canal stenosis or neuroforaminal narrowing. L3-4:     Mild broad-based disc bulge and facet arthrosis without significant spinal canal or neuroforaminal narrowing. L4-5:     No significant spinal canal stenosis or neuroforaminal narrowing. L5-S1:  Broad-based disc bulge and facet arthrosis resulting in mild bilateral neuroforaminal narrowing without significant spinal canal stenosis. IMPRESSION: 1. Metastatic lesions throughout the imaged portions of the lumbosacral spine, including destructive lesions at T12, L1, and L4 resulting in mild vertebral body height loss. No significant retropulsion of fracture fragments. No tumor involvement of the epidural space. 2.  Right iliac metastatic adenopathy. 3.  Mild spondylosis. Ct Chest W Cont    Result Date: 9/18/2020  EXAM: CT CHEST WITH CONTRAST INDICATION: History of prostate cancer, abnormal findings in the lung on. COMPARISON: CT abdomen and pelvis dated 5/23/2017 TECHNIQUE:  CT imaging was performed of the chest after intravenous injection of 100 mL Isovue 370. Intravenous contrast was used for better evaluation of solid organs and vascular structures. Coronal reformatted imaging provided. Radiation dose reduction techniques were used for this study. Our CT scanners use one or all of the following: Automated exposure control, adjustment of the mA and/or kV according to patient size, iterative reconstruction. FINDINGS: Mediastinum and visualized thyroid: Normal. Heart: Normal. Large Vessels: Normal. Pleura: No significant pleural thickening. No calcification. Lungs: No lung mass or discrete nodule. Airways: Normal. Lymph nodes: Normal. Bones/Soft tissues: Multiple lytic lesions are present in the bones, including the distal right clavicle as well as the thoracic spine at multiple levels.  There is a subacute appearing pathologic compression fracture at L1. Severe bilateral gynecomastia. Visualized abdomen: Normal.     IMPRESSION: 1.  Multiple lytic lesions present within the bones concerning for multiple myeloma or metastasis from an unknown primary neoplasm. 2.  No significant pleural or parenchymal abnormality. [address] [facility] Cont    Result Date: 9/18/2020  CT CERVICAL SPINE INDICATION: Abnormal cervical spine x-ray COMPARISON: Radiographs dated 9/3/2020 TECHNIQUE: Contiguous axial images from the skull base through the superior mediastinum were obtained with coronal and sagittal reformations. This examination was interpreted using multiplanar reconstructions. This rendering was performed because of the indication(s) for the examinations. If multiplanar reconstructions had not been performed, the likelihood of detecting an abnormality relevant to the patient's condition would have been substantially decreased. Radiation dose reduction techniques were used for this study. Our CT scanners use one or all of the following: Automated exposure control, adjustment of the mA and/or kV according to patient size, iterative reconstruction. FINDINGS: Lytic lesions involving the C4 and C5 vertebral bodies. There is also a destructive lesion of the C4 spinous process. Partially imaged lytic lesion of the thoracic spine. Suspected age-indeterminate compression fracture at C4 with only minimal vertebral body height loss. Normal alignment is maintained. Fluid opacification of the right mastoid air cell complex. No significant bony narrowing of the spinal canal or neural foramina. IMPRESSION: 1. Lytic cervical spine lesions concerning for multiple myeloma or metastasis from an unknown primary neoplasm. The lesions are not sclerotic as typically seen in metastatic prostate cancer. 2.  Suspected age-indeterminate compression fracture at C4. Echocardiogram results:  No results found for this visit on 10/13/20.     Procedures done this admission:  * No surgery found *    All Micro Results     None          SARS-CoV-2 Lab Results  \"Novel Coronavirus\" Test: No results found for: COV2NT   \"Emergent Disease\" Test: No results found for: EDPR  \"SARS-COV-2\" Test: No results found for: XGCOVT  Rapid Test: No results found for: COVR         Labs: Results:       BMP, Mg, Phos Recent Labs     10/13/20  1145   *   K 4.2      CO2 28   AGAP 5*   BUN 30*   CREA 1.19   CA 10.1   *      CBC Recent Labs     10/13/20  1145   WBC 11.8*   RBC 3.84*   HGB 12.1*   HCT 36.3*         LFT Recent Labs     10/13/20  1145   ALT 32      TP 8.6*   ALB 4.4   GLOB 4.2*   AGRAT 1.0*      Cardiac Testing No results found for: BNPP, BNP, CPK, RCK1, RCK2, RCK3, RCK4, CKMB, CKNDX, CKND1, TROPT, TROIQ   Coagulation Tests Lab Results   Component Value Date/Time    Prothrombin time 13.6 10/13/2020 11:45 AM    INR 1.0 10/13/2020 11:45 AM      A1c No results found for: HBA1C, HGBE8, GGL6FKBQ   Lipid Panel Lab Results   Component Value Date/Time    Cholesterol, total 286 (H) 07/27/2020 10:54 AM    HDL Cholesterol 55 07/27/2020 10:54 AM    LDL, calculated 181 (H) 07/27/2020 10:54 AM    VLDL, calculated 50 (H) 07/27/2020 10:54 AM    Triglyceride 252 (H) 07/27/2020 10:54 AM      Thyroid Panel Lab Results   Component Value Date/Time    TSH 1.700 11/09/2017 12:32 PM        Most Recent UA No results found for: COLOR, APPRN, REFSG, AKUA, PROTU, GLUCU, KETU, BILU, BLDU, UROU, KENDAL, LEUKU, WBCU, RBCU, UEPI, BACTU, CASTS, UCRY, MUCUS, UCOM     No Known Allergies  Immunization History   Administered Date(s) Administered    Pneumococcal Conjugate (PCV-13) 07/27/2020    Tdap 11/09/2017       All Labs from Last 24 Hrs:  Recent Results (from the past 24 hour(s))   METABOLIC PANEL, COMPREHENSIVE    Collection Time: 10/13/20 11:45 AM   Result Value Ref Range    Sodium 135 (L) 136 - 145 mmol/L    Potassium 4.2 3.5 - 5.1 mmol/L    Chloride 102 98 - 107 mmol/L    CO2 28 21 - 32 mmol/L    Anion gap 5 (L) 7 - 16 mmol/L    Glucose 102 (H) 65 - 100 mg/dL    BUN 30 (H) 8 - 23 MG/DL    Creatinine 1.19 0.8 - 1.5 MG/DL    GFR est AA >60 >60 ml/min/1.73m2    GFR est non-AA >60 >60 ml/min/1.73m2    Calcium 10.1 8.3 - 10.4 MG/DL    Bilirubin, total 0.5 0.2 - 1.1 MG/DL    ALT (SGPT) 32 12 - 65 U/L    AST (SGOT) 24 15 - 37 U/L    Alk. phosphatase 126 50 - 136 U/L    Protein, total 8.6 (H) 6.3 - 8.2 g/dL    Albumin 4.4 3.2 - 4.6 g/dL    Globulin 4.2 (H) 2.3 - 3.5 g/dL    A-G Ratio 1.0 (L) 1.2 - 3.5     CBC W/O DIFF    Collection Time: 10/13/20 11:45 AM   Result Value Ref Range    WBC 11.8 (H) 4.3 - 11.1 K/uL    RBC 3.84 (L) 4.23 - 5.6 M/uL    HGB 12.1 (L) 13.6 - 17.2 g/dL    HCT 36.3 (L) 41.1 - 50.3 %    MCV 94.5 79.6 - 97.8 FL    MCH 31.5 26.1 - 32.9 PG    MCHC 33.3 31.4 - 35.0 g/dL    RDW 13.1 11.9 - 14.6 %    PLATELET 988 376 - 328 K/uL    MPV 9.1 (L) 9.4 - 12.3 FL    ABSOLUTE NRBC 0.00 0.0 - 0.2 K/uL   PROTHROMBIN TIME + INR    Collection Time: 10/13/20 11:45 AM   Result Value Ref Range    Prothrombin time 13.6 12.0 - 14.7 sec    INR 1.0         Discharge Exam:  Patient Vitals for the past 24 hrs:   Temp Pulse Resp BP SpO2   10/14/20 0344 98.1 °F (36.7 °C) 68 18 111/62 99 %   10/13/20 2249 98.3 °F (36.8 °C) 70 17 110/65 98 %   10/13/20 1921 98.1 °F (36.7 °C) 68 18 117/66 96 %   10/13/20 1600 97.4 °F (36.3 °C) 69 20 124/72 99 %     Oxygen Therapy  O2 Sat (%): 99 % (10/14/20 0344)  O2 Device: Room air (10/14/20 0725)    Estimated body mass index is 33.23 kg/m² as calculated from the following:    Height as of this encounter: 5' 9\" (1.753 m). Weight as of this encounter: 102.1 kg (225 lb). No intake or output data in the 24 hours ending 10/14/20 0736    *Note that automatically entered I/Os may not be accurate; dependent on patient compliance with collection and accurate  by assistants. General:    Well nourished. Alert. Eyes:   Normal sclerae.   Extraocular movements intact. ENT:  Normocephalic, atraumatic. Moist mucous membranes  CV:   Regular rate and rhythm. No murmur, rub, or gallop. Lungs:  Clear to auscultation bilaterally. No wheezing, rhonchi, or rales. Abdomen: Soft, nontender, nondistended. Extremities: Warm and dry. No cyanosis or edema. Neurologic: CN II-XII grossly intact. No gross focal deficits   Skin:     No rashes or jaundice. Psych:  Normal mood and affect.     Current Med List in Hospital:   Current Facility-Administered Medications   Medication Dose Route Frequency    enzalutamide (XTANDI) chemo capsule 160 mg   (Patient Supplied)  160 mg Oral DAILY    LORazepam (ATIVAN) tablet 0.25 mg  0.25 mg Oral QHS PRN    meloxicam (MOBIC) tablet 15 mg  15 mg Oral DAILY    tamsulosin (FLOMAX) capsule 0.4 mg  0.4 mg Oral DAILY    acetaminophen (TYLENOL) tablet 1,000 mg  1,000 mg Oral TID    guaiFENesin ER (MUCINEX) tablet 1,200 mg  1,200 mg Oral BID PRN    alum-mag hydroxide-simeth (MYLANTA) oral suspension 30 mL  30 mL Oral Q4H PRN    oxyCODONE IR (ROXICODONE) tablet 10 mg  10 mg Oral Q4H PRN    oxyCODONE IR (ROXICODONE) tablet 20 mg  20 mg Oral Q4H PRN    zolpidem (AMBIEN) tablet 5 mg  5 mg Oral QHS PRN    guaiFENesin-dextromethorphan (ROBITUSSIN DM) 100-10 mg/5 mL syrup 10 mL  10 mL Oral Q4H PRN    senna-docusate (PERICOLACE) 8.6-50 mg per tablet 2 Tab  2 Tab Oral DAILY PRN    mirtazapine (REMERON) tablet 15 mg  15 mg Oral QHS PRN    sodium chloride (NS) flush 5-40 mL  5-40 mL IntraVENous Q8H    sodium chloride (NS) flush 5-40 mL  5-40 mL IntraVENous PRN    potassium chloride 10 mEq in 50 ml IVPB  10 mEq IntraVENous PRN    magnesium sulfate 2 g/50 ml IVPB (premix or compounded)  2 g IntraVENous PRN    acetaminophen (TYLENOL) tablet 650 mg  650 mg Oral Q6H PRN    Or    acetaminophen (TYLENOL) suppository 650 mg  650 mg Rectal Q6H PRN    polyethylene glycol (MIRALAX) packet 17 g  17 g Oral DAILY PRN    bisacodyL (DULCOLAX) suppository 10 mg  10 mg Rectal DAILY PRN    ondansetron (ZOFRAN ODT) tablet 4 mg  4 mg Oral Q8H PRN    Or    ondansetron (ZOFRAN) injection 4 mg  4 mg IntraVENous Q6H PRN    famotidine (PEPCID) tablet 20 mg  20 mg Oral BID PRN    [Held by provider] enoxaparin (LOVENOX) injection 40 mg  40 mg SubCUTAneous DAILY    HYDROmorphone (PF) (DILAUDID) injection 1 mg  1 mg IntraVENous Q4H PRN    influenza vaccine 2020-21 (6 mos+)(PF) (FLUARIX/FLULAVAL/FLUZONE QUAD) injection 0.5 mL  0.5 mL IntraMUSCular PRIOR TO DISCHARGE       Discharge Info:   Current Discharge Medication List      CONTINUE these medications which have NOT CHANGED    Details   HYDROcodone-acetaminophen (Norco) 7.5-325 mg per tablet Take 1 Tab by mouth every four (4) hours as needed for Pain for up to 30 days. Max Daily Amount: 6 Tabs. Indications: pain  Qty: 60 Tab, Refills: 0    Associated Diagnoses: Cancer related pain      meloxicam (MOBIC) 15 mg tablet Take 1 Tab by mouth daily. Qty: 30 Tab, Refills: 0    Associated Diagnoses: Lumbar back pain; DDD (degenerative disc disease), lumbar      enzalutamide (Xtandi) 40 mg capsule Take 4 Caps by mouth daily for 30 days. Qty: 120 Cap, Refills: 2    Associated Diagnoses: Prostate cancer ([facility] [address]      tamsulosin (Flomax) 0.4 mg capsule Take 1 Cap by mouth daily for 30 days. Indications: urinary frequency  Qty: 30 Cap, Refills: 5      LORazepam (ATIVAN) 0.5 mg tablet Take 0.5 Tabs by mouth nightly as needed for Anxiety. Max Daily Amount: 0.25 mg.  Qty: 5 Tab, Refills: 0    Associated Diagnoses: Insomnia, unspecified type      OTHER Inability to walk 100 feet without causing pain  Qty: 1 Each, Refills: 0    Associated Diagnoses: Right hip pain               Time spent in patient discharge planning and coordination 35 minutes.     Signed:  Adan Conde MD

## 2020-10-14 NOTE — PROGRESS NOTES
Pt to PACU  TRANSFER - OUT REPORT:    Verbal report given to DYLON Mota on Tara Quinones  being transferred to PACU for routine post - op       Report consisted of patients Situation, Background, Assessment and   Recommendations(SBAR). Information from the following report(s) SBAR, Kardex, Procedure Summary and MAR was reviewed with the receiving nurse. Lines:   Peripheral IV 10/14/20 Left Antecubital (Active)   Site Assessment Clean, dry, & intact 10/14/20 0843   Phlebitis Assessment 0 10/14/20 0843   Infiltration Assessment 0 10/14/20 0843   Dressing Status Clean, dry, & intact 10/14/20 0843   Hub Color/Line Status Pink 10/14/20 0843       Peripheral IV 10/14/20 Anterior;Distal;Right Forearm (Active)        Opportunity for questions and clarification was provided.       Patient transported with:   Registered Nurse and CRNA

## 2020-10-14 NOTE — PERIOP NOTES
TRANSFER - OUT REPORT:    Verbal report given to Bryan Mai RN on America Tobar  being transferred to  recovery 6 for routine post - op       Report consisted of patients Situation, Background, Assessment and   Recommendations(SBAR). Information from the following report(s) OR Summary, Procedure Summary, Intake/Output and MAR was reviewed with the receiving nurse. Lines:   Peripheral IV 10/14/20 Left Antecubital (Active)   Site Assessment Clean, dry, & intact 10/14/20 1256   Phlebitis Assessment 0 10/14/20 1256   Infiltration Assessment 0 10/14/20 1256   Dressing Status Clean, dry, & intact 10/14/20 1256   Dressing Type Tape;Transparent 10/14/20 1256   Hub Color/Line Status Patent 10/14/20 1256       Peripheral IV 10/14/20 Anterior;Distal;Right Forearm (Active)   Site Assessment Clean, dry, & intact 10/14/20 1256   Phlebitis Assessment 0 10/14/20 1256   Infiltration Assessment 0 10/14/20 1256   Dressing Status Clean, dry, & intact 10/14/20 1256   Dressing Type Tape;Transparent 10/14/20 1256   Hub Color/Line Status Patent 10/14/20 1256        Opportunity for questions and clarification was provided. Patient transported with:   Registered Nurse    VTE prophylaxis orders have been written for America Tobar. Patient and family given floor number and nurses name. Family updated re: pt status after security code verified.

## 2020-10-14 NOTE — PROGRESS NOTES
Patient states that he has a ride service to take him home today and that there will be someone at home to take care of him on arrival. Patient states \"I have a plan B in case that doesn't work. \"

## 2020-10-14 NOTE — ANESTHESIA POSTPROCEDURE EVALUATION
Procedure(s):  IR ANESTHESIA- T9, 12, L, 4 KYPHO WITH OSTEOCOOL AND BIOPSY. general    Anesthesia Post Evaluation      Multimodal analgesia: multimodal analgesia not used between 6 hours prior to anesthesia start to PACU discharge  Patient location during evaluation: PACU  Patient participation: complete - patient participated  Level of consciousness: awake and alert  Pain management: adequate  Airway patency: patent  Anesthetic complications: no  Cardiovascular status: hemodynamically stable  Respiratory status: acceptable  Hydration status: acceptable        INITIAL Post-op Vital signs:   Vitals Value Taken Time   /63 10/14/2020  1:03 PM   Temp 36.7 °C (98 °F) 10/14/2020 12:56 PM   Pulse 73 10/14/2020  1:04 PM   Resp 16 10/14/2020 12:56 PM   SpO2 93 % 10/14/2020  1:04 PM   Vitals shown include unvalidated device data.

## 2020-10-14 NOTE — PROCEDURES
Department of Interventional Radiology  [phone]        Interventional Radiology Brief Procedure Note    Patient: Harrison Mendoza MRN: [id number]  SSN: [id number]    YOB: 1955  Age: 71 y.o. Sex: male      Date of Procedure: 10/14/2020    Pre-Procedure Diagnosis: Presumed metastatic prostate cancer. Painful, pathologic fractures of L1 and L4. Post-Procedure Diagnosis: SAME    Procedure(s): Biopsy, OsteoCool RFA, Kyphoplasty    Brief Description of Procedure: as above    Performed By: Courtney Spears MD     Assistants: None    Anesthesia:General    Estimated Blood Loss: Less than 10ml    Specimens:  Pathology    Implants:  None    Findings:  Height restoration at both levels. Complications: None    Recommendations: 1 hour bedrest.       Follow Up: T9 and T12 Kyphoplasty/OsteoCool next week.       Signed By: Courtney Spears MD     October 14, 2020

## 2020-10-14 NOTE — PROGRESS NOTES
Spiritual Care visit. Initial Visit, Pre Surgery Consult. Visit and prayer before patient goes to surgery.     Visit by Garima Bernal M.Ed., Th.B. ,Staff

## 2020-10-14 NOTE — H&P
Department of Interventional Radiology  [phone]    History and Physical    Patient:  Marie Peres MRN:  [id number]  SSN:  [id number]    YOB: 1955  Age:  71 y.o. Sex:  male      Primary Care Provider:  Autumn Moreno MD  Referring Physician:  Clarice Black MD    Subjective:     Chief Complaint: back pain    History of the Present Illness: The patient is a 71 y.o. male with metastatic prostate cancer, bone mets who presents for L1, L4 kyphoplasty OsteoCool. Severe back pain,mostly upper thoracic, MRI reveal pathologic VCFs throughout the spine including T9, T12, L1, L4. Dilaudid has not provided pain relief. He took Oxycodone and Tylenol this morning and is feeling better. NPO. Past Medical History:   Diagnosis Date    Claustrophobia     Ear problems     Elevated PSA     Former light cigarette smoker (1-9 per day)     smoked for 35 years. quit     History of multiple allergies     Nicotine vapor product user     Personal history of prostate cancer     Prostate cancer ([facility] [address]     10/2020 Bone metastasis     Past Surgical History:   Procedure Laterality Date    BIOPSY PROSTATE      HX COLONOSCOPY  08/2020    HX HEENT      teeth removed     HX ORCHIECTOMY Bilateral 02/2017        Review of Systems:    Pertinent items are noted in the History of Present Illness. Prior to Admission medications    Medication Sig Start Date End Date Taking? Authorizing Provider   HYDROcodone-acetaminophen (Norco) 7.5-325 mg per tablet Take 1 Tab by mouth every four (4) hours as needed for Pain for up to 30 days. Max Daily Amount: 6 Tabs. Indications: pain 10/9/20 11/8/20  Catherine Valdovinos MD   meloxicam (MOBIC) 15 mg tablet Take 1 Tab by mouth daily. 10/7/20   Creta Prima, NP   enzalutamide Amarjit Bones) 40 mg capsule Take 4 Caps by mouth daily for 30 days. 10/2/20 11/1/20  Catherine Valdovinos MD   tamsulosin (Flomax) 0.4 mg capsule Take 1 Cap by mouth daily for 30 days. Indications: urinary frequency 9/25/20 10/25/20  Mir Winter MD   LORazepam (ATIVAN) 0.5 mg tablet Take 0.5 Tabs by mouth nightly as needed for Anxiety.  Max Daily Amount: 0.25 mg. 9/22/20   Nilsa Danielson NP   OTHER Inability to walk 100 feet without causing pain 8/11/20   Guido Hood MD        No Known Allergies    Family History   Problem Relation Age of Onset    Prostate Cancer Father     Hypertension Father     Cancer Father         prostate cancer     Social History     Tobacco Use    Smoking status: Former Smoker     Packs/day: 0.50     Years: 35.00     Pack years: 17.50     Last attempt to quit: 12/2/2016     Years since quitting: 3.8    Smokeless tobacco: Never Used   Substance Use Topics    Alcohol use: No        Objective:       Physical Examination:    Vitals:    10/14/20 0829   BP: 116/64   Pulse: 70   Resp: 18   Temp: 99 °F (37.2 °C)   SpO2: 99%   Weight: 102.1 kg (225 lb)   Height: 5' 9\" (1.753 m)       Pain Assessment  Pain Intensity 1: 2 (10/14/20 0826)  Pain Location 1: Back   Interv: procedure, meds  Goal 0            HEART: regular rate and rhythm  LUNG: clear to auscultation bilaterally  ABDOMEN: normal findings: soft, non-tender  EXTREMITIES: warm, trace LE edema   Tender upper thoracic spine    Laboratory:     Lab Results   Component Value Date/Time    Sodium 135 (L) 10/13/2020 11:45 AM    Sodium 135 (L) 09/25/2020 12:17 PM    Potassium 4.2 10/13/2020 11:45 AM    Potassium 4.0 09/25/2020 12:17 PM    Chloride 102 10/13/2020 11:45 AM    Chloride 102 09/25/2020 12:17 PM    CO2 28 10/13/2020 11:45 AM    CO2 25 09/25/2020 12:17 PM    Anion gap 5 (L) 10/13/2020 11:45 AM    Anion gap 8 09/25/2020 12:17 PM    Glucose 102 (H) 10/13/2020 11:45 AM    Glucose 123 (H) 09/25/2020 12:17 PM    BUN 30 (H) 10/13/2020 11:45 AM    BUN 25 (H) 09/25/2020 12:17 PM    Creatinine 1.19 10/13/2020 11:45 AM    Creatinine 1.10 09/25/2020 12:17 PM    GFR est AA >60 10/13/2020 11:45 AM    GFR est AA >60 09/25/2020 12:17 PM    GFR est non-AA >60 10/13/2020 11:45 AM    GFR est non-AA >60 09/25/2020 12:17 PM    Calcium 10.1 10/13/2020 11:45 AM    Calcium 9.7 09/25/2020 12:17 PM    Magnesium 2.0 09/01/2017 10:20 AM    Magnesium 2.3 07/21/2017 11:23 AM    Albumin 4.4 10/13/2020 11:45 AM    Albumin 4.0 09/25/2020 12:17 PM    Protein, total 8.6 (H) 10/13/2020 11:45 AM    Protein, total 7.7 09/25/2020 12:17 PM    Globulin 4.2 (H) 10/13/2020 11:45 AM    Globulin 3.7 (H) 09/25/2020 12:17 PM    A-G Ratio 1.0 (L) 10/13/2020 11:45 AM    A-G Ratio 1.1 (L) 09/25/2020 12:17 PM    ALT (SGPT) 32 10/13/2020 11:45 AM    ALT (SGPT) 21 09/25/2020 12:17 PM     Lab Results   Component Value Date/Time    WBC 11.8 (H) 10/13/2020 11:45 AM    WBC 8.9 09/25/2020 12:17 PM    HGB 12.1 (L) 10/13/2020 11:45 AM    HGB 11.3 (L) 09/25/2020 12:17 PM    HCT 36.3 (L) 10/13/2020 11:45 AM    HCT 33.8 (L) 09/25/2020 12:17 PM    PLATELET 079 80/25/6119 11:45 AM    PLATELET 420 97/22/0260 12:17 PM     Lab Results   Component Value Date/Time    Prothrombin time 13.6 10/13/2020 11:45 AM    INR 1.0 10/13/2020 11:45 AM       Assessment:     Prostate cancer, bone mets, pathologic VCF throughout spine    Hospital Problems  Date Reviewed: 10/13/2020    None          Plan:     Planned Procedure:  L1 L4 biopsy, OsteoCool, kyphoplasty. T9, T12 in near future    Risks, benefits, and alternatives reviewed with patient and he agrees to proceed with the procedure.       Signed By: Ann Dia PA-C     October 14, 2020

## 2020-10-14 NOTE — PROGRESS NOTES
Patient states he is not ready to go home. MD aware and is okay for patient to stay until he finds his ride home.

## 2020-10-15 ENCOUNTER — PATIENT OUTREACH (OUTPATIENT)
Dept: CASE MANAGEMENT | Age: 65
End: 2020-10-15

## 2020-10-15 NOTE — PROGRESS NOTES
Opened chart for Transitions Of Care: Concern for Covid-19 (Coronavirus) (Education provided due to at risk conditions) call. Patient is noted to be current inpatient. No further Concern for Covid DARIA calls needed at this time. Patient to be reassigned pending discharge disposition.

## 2020-10-19 ENCOUNTER — HOSPITAL ENCOUNTER (OUTPATIENT)
Dept: LAB | Age: 65
Discharge: HOME | End: 2020-10-19
Payer: MEDICARE

## 2020-10-19 DIAGNOSIS — C79.51 BONE METASTASIS (HCC): ICD-10-CM

## 2020-10-19 DIAGNOSIS — C61 MALIGNANT NEOPLASM OF PROSTATE (HCC): ICD-10-CM

## 2020-10-19 LAB
ALBUMIN SERPL-MCNC: 3.8 G/DL (ref 3.2–4.6)
ALBUMIN/GLOB SERPL: 0.9 {RATIO} (ref 1.2–3.5)
ALP SERPL-CCNC: 119 U/L (ref 50–136)
ALT SERPL-CCNC: 24 U/L (ref 12–65)
ANION GAP SERPL CALC-SCNC: 9 MMOL/L (ref 7–16)
AST SERPL-CCNC: 20 U/L (ref 15–37)
BASOPHILS # BLD: 0 K/UL (ref 0–0.2)
BASOPHILS NFR BLD: 0 % (ref 0–2)
BILIRUB SERPL-MCNC: 0.4 MG/DL (ref 0.2–1.1)
BUN SERPL-MCNC: 18 MG/DL (ref 8–23)
CALCIUM SERPL-MCNC: 9.7 MG/DL (ref 8.3–10.4)
CANCER AG19-9 SERPL-ACNC: 333.5 U/ML (ref 2–37)
CEA SERPL-MCNC: 553.5 NG/ML (ref 0–3)
CHLORIDE SERPL-SCNC: 103 MMOL/L (ref 98–107)
CO2 SERPL-SCNC: 24 MMOL/L (ref 21–32)
CREAT SERPL-MCNC: 0.9 MG/DL (ref 0.8–1.5)
DIFFERENTIAL METHOD BLD: ABNORMAL
EOSINOPHIL # BLD: 0.1 K/UL (ref 0–0.8)
EOSINOPHIL NFR BLD: 1 % (ref 0.5–7.8)
ERYTHROCYTE [DISTWIDTH] IN BLOOD BY AUTOMATED COUNT: 12.9 % (ref 11.9–14.6)
GLOBULIN SER CALC-MCNC: 4.2 G/DL (ref 2.3–3.5)
GLUCOSE SERPL-MCNC: 109 MG/DL (ref 65–100)
HCT VFR BLD AUTO: 32.4 % (ref 41.1–50.3)
HGB BLD-MCNC: 10.7 G/DL (ref 13.6–17.2)
IMM GRANULOCYTES # BLD AUTO: 0.1 K/UL (ref 0–0.5)
IMM GRANULOCYTES NFR BLD AUTO: 1 % (ref 0–5)
LYMPHOCYTES # BLD: 1.7 K/UL (ref 0.5–4.6)
LYMPHOCYTES NFR BLD: 17 % (ref 13–44)
MCH RBC QN AUTO: 30.9 PG (ref 26.1–32.9)
MCHC RBC AUTO-ENTMCNC: 33 G/DL (ref 31.4–35)
MCV RBC AUTO: 93.6 FL (ref 79.6–97.8)
MONOCYTES # BLD: 0.8 K/UL (ref 0.1–1.3)
MONOCYTES NFR BLD: 8 % (ref 4–12)
NEUTS SEG # BLD: 7.3 K/UL (ref 1.7–8.2)
NEUTS SEG NFR BLD: 73 % (ref 43–78)
NRBC # BLD: 0 K/UL (ref 0–0.2)
PLATELET # BLD AUTO: 298 K/UL (ref 150–450)
PMV BLD AUTO: 8.6 FL (ref 9.4–12.3)
POTASSIUM SERPL-SCNC: 3.8 MMOL/L (ref 3.5–5.1)
PROT SERPL-MCNC: 8 G/DL (ref 6.3–8.2)
PSA SERPL-MCNC: 1.7 NG/ML
RBC # BLD AUTO: 3.46 M/UL (ref 4.23–5.67)
SODIUM SERPL-SCNC: 136 MMOL/L (ref 136–145)
WBC # BLD AUTO: 10 K/UL (ref 4.3–11.1)

## 2020-10-19 PROCEDURE — 86334 IMMUNOFIX E-PHORESIS SERUM: CPT

## 2020-10-19 PROCEDURE — 84165 PROTEIN E-PHORESIS SERUM: CPT

## 2020-10-19 PROCEDURE — 86301 IMMUNOASSAY TUMOR CA 19-9: CPT

## 2020-10-19 PROCEDURE — 36415 COLL VENOUS BLD VENIPUNCTURE: CPT

## 2020-10-19 PROCEDURE — 80053 COMPREHEN METABOLIC PANEL: CPT

## 2020-10-19 PROCEDURE — 84153 ASSAY OF PSA TOTAL: CPT

## 2020-10-19 PROCEDURE — 85025 COMPLETE CBC W/AUTO DIFF WBC: CPT

## 2020-10-19 PROCEDURE — 82378 CARCINOEMBRYONIC ANTIGEN: CPT

## 2020-10-22 LAB
ALBUMIN SERPL ELPH-MCNC: 3.95 G/DL (ref 3.2–5.6)
ALBUMIN/GLOB SERPL: 1.2 {RATIO}
ALPHA1 GLOB SERPL ELPH-MCNC: 0.37 G/DL (ref 0.1–0.4)
ALPHA2 GLOB SERPL ELPH-MCNC: 1.14 G/DL (ref 0.4–1.2)
B-GLOBULIN SERPL QL ELPH: 1.22 G/DL (ref 0.6–1.3)
GAMMA GLOB MFR SERPL ELPH: 0.71 G/DL (ref 0.5–1.6)
IGA SERPL-MCNC: 205 MG/DL (ref 85–499)
IGG SERPL-MCNC: 785 MG/DL (ref 610–1616)
IGM SERPL-MCNC: 46 MG/DL (ref 35–242)
M PROTEIN SERPL ELPH-MCNC: NORMAL G/DL
PROT PATTERN SERPL ELPH-IMP: NORMAL
PROT PATTERN SPEC IFE-IMP: NORMAL
PROT SERPL-MCNC: 7.4 G/DL (ref 6.3–8.2)

## 2020-10-27 ENCOUNTER — HOSPITAL ENCOUNTER (OUTPATIENT)
Dept: SURGERY | Age: 65
Discharge: HOME | End: 2020-10-27

## 2020-10-28 ENCOUNTER — ANESTHESIA EVENT (OUTPATIENT)
Dept: SURGERY | Age: 65
End: 2020-10-28
Payer: MEDICARE

## 2020-10-28 ENCOUNTER — HOSPITAL ENCOUNTER (OUTPATIENT)
Dept: PET IMAGING | Age: 65
Discharge: HOME | End: 2020-10-28
Payer: MEDICARE

## 2020-10-28 DIAGNOSIS — C7A.1 NEUROENDOCRINE CARCINOMA, HIGH GRADE (HCC): ICD-10-CM

## 2020-10-28 DIAGNOSIS — C61 PROSTATE CANCER (HCC): ICD-10-CM

## 2020-10-28 PROCEDURE — A9552 F18 FDG: HCPCS

## 2020-10-28 PROCEDURE — 74011000636 HC RX REV CODE- 636: Performed by: INTERNAL MEDICINE

## 2020-10-28 RX ORDER — SODIUM CHLORIDE 0.9 % (FLUSH) 0.9 %
10 SYRINGE (ML) INJECTION
Status: COMPLETED | OUTPATIENT
Start: 2020-10-28 | End: 2020-10-28

## 2020-10-28 RX ORDER — SODIUM CHLORIDE, SODIUM LACTATE, POTASSIUM CHLORIDE, CALCIUM CHLORIDE 600; 310; 30; 20 MG/100ML; MG/100ML; MG/100ML; MG/100ML
100 INJECTION, SOLUTION INTRAVENOUS CONTINUOUS
Status: CANCELLED | OUTPATIENT
Start: 2020-10-28

## 2020-10-28 RX ADMIN — Medication 10 ML: at 12:32

## 2020-10-28 RX ADMIN — DIATRIZOATE MEGLUMINE AND DIATRIZOATE SODIUM 10 ML: 660; 100 LIQUID ORAL; RECTAL at 12:32

## 2020-10-29 ENCOUNTER — HOSPITAL ENCOUNTER (OUTPATIENT)
Dept: INTERVENTIONAL RADIOLOGY/VASCULAR | Age: 65
Discharge: HOME | End: 2020-10-29
Attending: RADIOLOGY
Payer: MEDICARE

## 2020-10-29 ENCOUNTER — ANESTHESIA (OUTPATIENT)
Dept: SURGERY | Age: 65
End: 2020-10-29
Payer: MEDICARE

## 2020-10-29 ENCOUNTER — HOSPITAL ENCOUNTER (OUTPATIENT)
Age: 65
Setting detail: OUTPATIENT SURGERY
Discharge: HOME | End: 2020-10-29
Attending: RADIOLOGY | Admitting: RADIOLOGY
Payer: MEDICARE

## 2020-10-29 VITALS
TEMPERATURE: 97.5 F | OXYGEN SATURATION: 94 % | DIASTOLIC BLOOD PRESSURE: 59 MMHG | HEART RATE: 67 BPM | SYSTOLIC BLOOD PRESSURE: 123 MMHG | RESPIRATION RATE: 14 BRPM

## 2020-10-29 VITALS
DIASTOLIC BLOOD PRESSURE: 78 MMHG | OXYGEN SATURATION: 90 % | HEIGHT: 69 IN | TEMPERATURE: 98.2 F | RESPIRATION RATE: 16 BRPM | HEART RATE: 62 BPM | BODY MASS INDEX: 31.84 KG/M2 | SYSTOLIC BLOOD PRESSURE: 127 MMHG | WEIGHT: 215 LBS

## 2020-10-29 DIAGNOSIS — C79.51 PROSTATE CANCER METASTATIC TO BONE (HCC): ICD-10-CM

## 2020-10-29 DIAGNOSIS — C61 PROSTATE CANCER METASTATIC TO BONE (HCC): ICD-10-CM

## 2020-10-29 DIAGNOSIS — C61 PROSTATE CANCER (HCC): ICD-10-CM

## 2020-10-29 PROCEDURE — 77030002996 HC SUT SLK J&J -A

## 2020-10-29 PROCEDURE — 77030037088 HC TUBE ENDOTRACH ORAL NSL COVD-A: Performed by: ANESTHESIOLOGY

## 2020-10-29 PROCEDURE — 74011250636 HC RX REV CODE- 250/636: Performed by: NURSE ANESTHETIST, CERTIFIED REGISTERED

## 2020-10-29 PROCEDURE — 20982 ABLATE BONE TUMOR(S) PERQ: CPT

## 2020-10-29 PROCEDURE — 74011250636 HC RX REV CODE- 250/636: Performed by: RADIOLOGY

## 2020-10-29 PROCEDURE — 74011000250 HC RX REV CODE- 250: Performed by: RADIOLOGY

## 2020-10-29 PROCEDURE — 77030002983 HC SUT POLYSRB COVD -A

## 2020-10-29 PROCEDURE — 77030037492 HC KT BN ACCS OSTEOCOOL MEDT -E

## 2020-10-29 PROCEDURE — C1713 ANCHOR/SCREW BN/BN,TIS/BN: HCPCS

## 2020-10-29 PROCEDURE — 77030026361 HC SYR INFL XII KYPH -C

## 2020-10-29 PROCEDURE — 77030021678 HC GLIDESCP STAT DISP VERT -B: Performed by: ANESTHESIOLOGY

## 2020-10-29 PROCEDURE — 74011250636 HC RX REV CODE- 250/636: Performed by: ANESTHESIOLOGY

## 2020-10-29 PROCEDURE — 74011000250 HC RX REV CODE- 250: Performed by: NURSE ANESTHETIST, CERTIFIED REGISTERED

## 2020-10-29 PROCEDURE — 77030021782 HC SYS CEM CART DEL KYPH -C

## 2020-10-29 PROCEDURE — 77030018846 HC SOL IRR STRL H20 ICUM -A

## 2020-10-29 PROCEDURE — C1894 INTRO/SHEATH, NON-LASER: HCPCS

## 2020-10-29 PROCEDURE — 77030019908 HC STETH ESOPH SIMS -A: Performed by: ANESTHESIOLOGY

## 2020-10-29 PROCEDURE — 77030034842 HC TAMP SPN BN INFL EXP II KYPH -I

## 2020-10-29 PROCEDURE — 88342 IMHCHEM/IMCYTCHM 1ST ANTB: CPT

## 2020-10-29 PROCEDURE — 77030011218 HC DEV BIOP BN KYPH -C

## 2020-10-29 PROCEDURE — 77030010507 HC ADH SKN DERMBND J&J -B

## 2020-10-29 PROCEDURE — 88305 TISSUE EXAM BY PATHOLOGIST: CPT

## 2020-10-29 PROCEDURE — 76060000037 HC ANESTHESIA 3 TO 3.5 HR: Performed by: RADIOLOGY

## 2020-10-29 PROCEDURE — 88341 IMHCHEM/IMCYTCHM EA ADD ANTB: CPT

## 2020-10-29 PROCEDURE — 77030040922 HC BLNKT HYPOTHRM STRY -A: Performed by: ANESTHESIOLOGY

## 2020-10-29 PROCEDURE — C1788 PORT, INDWELLING, IMP: HCPCS

## 2020-10-29 PROCEDURE — 77030003666 HC NDL SPINAL BD -A

## 2020-10-29 PROCEDURE — 77030021783 HC SYS CEM DEL MEDT -D

## 2020-10-29 PROCEDURE — 76210000006 HC OR PH I REC 0.5 TO 1 HR: Performed by: RADIOLOGY

## 2020-10-29 PROCEDURE — 74011250637 HC RX REV CODE- 250/637: Performed by: ANESTHESIOLOGY

## 2020-10-29 PROCEDURE — 77030034843 HC TAMP SPN BN INFL EXP II KYPH -H

## 2020-10-29 PROCEDURE — C1886 CATHETER, ABLATION: HCPCS

## 2020-10-29 PROCEDURE — 22513 PERQ VERTEBRAL AUGMENTATION: CPT

## 2020-10-29 PROCEDURE — 88311 DECALCIFY TISSUE: CPT

## 2020-10-29 PROCEDURE — 77030031131 HC SUT MXN P COVD -B

## 2020-10-29 RX ORDER — HEPARIN SODIUM (PORCINE) LOCK FLUSH IV SOLN 100 UNIT/ML 100 UNIT/ML
500 SOLUTION INTRAVENOUS AS NEEDED
Status: DISCONTINUED | OUTPATIENT
Start: 2020-10-29 | End: 2020-11-02 | Stop reason: HOSPADM

## 2020-10-29 RX ORDER — SODIUM CHLORIDE 0.9 % (FLUSH) 0.9 %
5-40 SYRINGE (ML) INJECTION AS NEEDED
Status: DISCONTINUED | OUTPATIENT
Start: 2020-10-29 | End: 2020-11-02 | Stop reason: HOSPADM

## 2020-10-29 RX ORDER — FLUMAZENIL 0.1 MG/ML
0.2 INJECTION INTRAVENOUS
Status: DISCONTINUED | OUTPATIENT
Start: 2020-10-29 | End: 2020-11-02 | Stop reason: HOSPADM

## 2020-10-29 RX ORDER — HYDROMORPHONE HYDROCHLORIDE 2 MG/ML
0.5 INJECTION, SOLUTION INTRAMUSCULAR; INTRAVENOUS; SUBCUTANEOUS
Status: DISCONTINUED | OUTPATIENT
Start: 2020-10-29 | End: 2020-11-02 | Stop reason: HOSPADM

## 2020-10-29 RX ORDER — DIPHENHYDRAMINE HYDROCHLORIDE 50 MG/ML
12.5 INJECTION, SOLUTION INTRAMUSCULAR; INTRAVENOUS
Status: DISCONTINUED | OUTPATIENT
Start: 2020-10-29 | End: 2020-11-02 | Stop reason: HOSPADM

## 2020-10-29 RX ORDER — SODIUM CHLORIDE, SODIUM LACTATE, POTASSIUM CHLORIDE, CALCIUM CHLORIDE 600; 310; 30; 20 MG/100ML; MG/100ML; MG/100ML; MG/100ML
100 INJECTION, SOLUTION INTRAVENOUS CONTINUOUS
Status: DISCONTINUED | OUTPATIENT
Start: 2020-10-29 | End: 2020-11-02 | Stop reason: HOSPADM

## 2020-10-29 RX ORDER — ACETAMINOPHEN 500 MG
1000 TABLET ORAL ONCE
Status: DISCONTINUED | OUTPATIENT
Start: 2020-10-29 | End: 2020-10-30 | Stop reason: HOSPADM

## 2020-10-29 RX ORDER — NEOSTIGMINE METHYLSULFATE 1 MG/ML
INJECTION, SOLUTION INTRAVENOUS AS NEEDED
Status: DISCONTINUED | OUTPATIENT
Start: 2020-10-29 | End: 2020-10-29 | Stop reason: HOSPADM

## 2020-10-29 RX ORDER — GLYCOPYRROLATE 0.2 MG/ML
INJECTION INTRAMUSCULAR; INTRAVENOUS AS NEEDED
Status: DISCONTINUED | OUTPATIENT
Start: 2020-10-29 | End: 2020-10-29 | Stop reason: HOSPADM

## 2020-10-29 RX ORDER — PROPOFOL 10 MG/ML
INJECTION, EMULSION INTRAVENOUS AS NEEDED
Status: DISCONTINUED | OUTPATIENT
Start: 2020-10-29 | End: 2020-10-29 | Stop reason: HOSPADM

## 2020-10-29 RX ORDER — SODIUM CHLORIDE 0.9 % (FLUSH) 0.9 %
5-40 SYRINGE (ML) INJECTION EVERY 8 HOURS
Status: DISCONTINUED | OUTPATIENT
Start: 2020-10-29 | End: 2020-11-02 | Stop reason: HOSPADM

## 2020-10-29 RX ORDER — DEXAMETHASONE SODIUM PHOSPHATE 4 MG/ML
INJECTION, SOLUTION INTRA-ARTICULAR; INTRALESIONAL; INTRAMUSCULAR; INTRAVENOUS; SOFT TISSUE AS NEEDED
Status: DISCONTINUED | OUTPATIENT
Start: 2020-10-29 | End: 2020-10-29 | Stop reason: HOSPADM

## 2020-10-29 RX ORDER — OXYCODONE HYDROCHLORIDE 5 MG/1
10 TABLET ORAL
Status: DISCONTINUED | OUTPATIENT
Start: 2020-10-29 | End: 2020-10-30 | Stop reason: HOSPADM

## 2020-10-29 RX ORDER — LIDOCAINE HYDROCHLORIDE 20 MG/ML
0.2 INJECTION, SOLUTION INFILTRATION; PERINEURAL ONCE
Status: COMPLETED | OUTPATIENT
Start: 2020-10-29 | End: 2020-10-29

## 2020-10-29 RX ORDER — ONDANSETRON 2 MG/ML
INJECTION INTRAMUSCULAR; INTRAVENOUS AS NEEDED
Status: DISCONTINUED | OUTPATIENT
Start: 2020-10-29 | End: 2020-10-29 | Stop reason: HOSPADM

## 2020-10-29 RX ORDER — LIDOCAINE HYDROCHLORIDE 20 MG/ML
INJECTION, SOLUTION EPIDURAL; INFILTRATION; INTRACAUDAL; PERINEURAL AS NEEDED
Status: DISCONTINUED | OUTPATIENT
Start: 2020-10-29 | End: 2020-10-29 | Stop reason: HOSPADM

## 2020-10-29 RX ORDER — ROCURONIUM BROMIDE 10 MG/ML
INJECTION, SOLUTION INTRAVENOUS AS NEEDED
Status: DISCONTINUED | OUTPATIENT
Start: 2020-10-29 | End: 2020-10-29 | Stop reason: HOSPADM

## 2020-10-29 RX ORDER — OXYCODONE HYDROCHLORIDE 5 MG/1
5 TABLET ORAL
Status: COMPLETED | OUTPATIENT
Start: 2020-10-29 | End: 2020-10-29

## 2020-10-29 RX ORDER — HYDROCODONE BITARTRATE AND ACETAMINOPHEN 5; 325 MG/1; MG/1
1 TABLET ORAL
Status: DISCONTINUED | OUTPATIENT
Start: 2020-10-29 | End: 2020-11-02 | Stop reason: HOSPADM

## 2020-10-29 RX ORDER — CEFAZOLIN SODIUM/WATER 2 G/20 ML
2 SYRINGE (ML) INTRAVENOUS
Status: COMPLETED | OUTPATIENT
Start: 2020-10-29 | End: 2020-10-29

## 2020-10-29 RX ORDER — FENTANYL CITRATE 50 UG/ML
INJECTION, SOLUTION INTRAMUSCULAR; INTRAVENOUS AS NEEDED
Status: DISCONTINUED | OUTPATIENT
Start: 2020-10-29 | End: 2020-10-29 | Stop reason: HOSPADM

## 2020-10-29 RX ORDER — NALOXONE HYDROCHLORIDE 0.4 MG/ML
0.2 INJECTION, SOLUTION INTRAMUSCULAR; INTRAVENOUS; SUBCUTANEOUS AS NEEDED
Status: DISCONTINUED | OUTPATIENT
Start: 2020-10-29 | End: 2020-11-02 | Stop reason: HOSPADM

## 2020-10-29 RX ORDER — BUPIVACAINE HYDROCHLORIDE 5 MG/ML
5 INJECTION, SOLUTION EPIDURAL; INTRACAUDAL
Status: DISCONTINUED | OUTPATIENT
Start: 2020-10-29 | End: 2020-11-02 | Stop reason: HOSPADM

## 2020-10-29 RX ADMIN — FENTANYL CITRATE 50 MCG: 50 INJECTION INTRAMUSCULAR; INTRAVENOUS at 10:02

## 2020-10-29 RX ADMIN — PHENYLEPHRINE HYDROCHLORIDE 100 MCG: 10 INJECTION INTRAVENOUS at 10:31

## 2020-10-29 RX ADMIN — ONDANSETRON 4 MG: 2 INJECTION INTRAMUSCULAR; INTRAVENOUS at 12:32

## 2020-10-29 RX ADMIN — PHENYLEPHRINE HYDROCHLORIDE 100 MCG: 10 INJECTION INTRAVENOUS at 10:47

## 2020-10-29 RX ADMIN — BUPIVACAINE HYDROCHLORIDE 5 MG: 5 INJECTION, SOLUTION EPIDURAL; INTRACAUDAL at 10:31

## 2020-10-29 RX ADMIN — ROCURONIUM BROMIDE 40 MG: 10 INJECTION, SOLUTION INTRAVENOUS at 10:04

## 2020-10-29 RX ADMIN — LIDOCAINE HYDROCHLORIDE 4 MG: 20 INJECTION, SOLUTION INFILTRATION; PERINEURAL at 12:24

## 2020-10-29 RX ADMIN — SODIUM CHLORIDE, SODIUM LACTATE, POTASSIUM CHLORIDE, AND CALCIUM CHLORIDE: 600; 310; 30; 20 INJECTION, SOLUTION INTRAVENOUS at 09:56

## 2020-10-29 RX ADMIN — PHENYLEPHRINE HYDROCHLORIDE 40 MCG/MIN: 10 INJECTION INTRAVENOUS at 10:52

## 2020-10-29 RX ADMIN — PHENYLEPHRINE HYDROCHLORIDE 100 MCG: 10 INJECTION INTRAVENOUS at 10:32

## 2020-10-29 RX ADMIN — Medication 2 G: at 10:24

## 2020-10-29 RX ADMIN — HEPARIN SODIUM (PORCINE) LOCK FLUSH IV SOLN 100 UNIT/ML 500 UNITS: 100 SOLUTION at 12:40

## 2020-10-29 RX ADMIN — LIDOCAINE HYDROCHLORIDE 100 MG: 20 INJECTION, SOLUTION EPIDURAL; INFILTRATION; INTRACAUDAL; PERINEURAL at 10:02

## 2020-10-29 RX ADMIN — OXYCODONE 5 MG: 5 TABLET ORAL at 13:15

## 2020-10-29 RX ADMIN — BUPIVACAINE HYDROCHLORIDE 5 MG: 5 INJECTION, SOLUTION EPIDURAL; INTRACAUDAL at 10:59

## 2020-10-29 RX ADMIN — DEXAMETHASONE SODIUM PHOSPHATE 4 MG: 4 INJECTION, SOLUTION INTRAMUSCULAR; INTRAVENOUS at 10:30

## 2020-10-29 RX ADMIN — ROCURONIUM BROMIDE 10 MG: 10 INJECTION, SOLUTION INTRAVENOUS at 11:20

## 2020-10-29 RX ADMIN — LIDOCAINE HYDROCHLORIDE 100 MG: 10; .005 INJECTION, SOLUTION EPIDURAL; INFILTRATION; INTRACAUDAL; PERINEURAL at 12:28

## 2020-10-29 RX ADMIN — PHENYLEPHRINE HYDROCHLORIDE 50 MCG: 10 INJECTION INTRAVENOUS at 12:29

## 2020-10-29 RX ADMIN — HYDROMORPHONE HYDROCHLORIDE 0.5 MG: 2 INJECTION INTRAMUSCULAR; INTRAVENOUS; SUBCUTANEOUS at 13:15

## 2020-10-29 RX ADMIN — PHENYLEPHRINE HYDROCHLORIDE 100 MCG: 10 INJECTION INTRAVENOUS at 10:39

## 2020-10-29 RX ADMIN — FENTANYL CITRATE 50 MCG: 50 INJECTION INTRAMUSCULAR; INTRAVENOUS at 11:38

## 2020-10-29 RX ADMIN — GLYCOPYRROLATE 0.4 MG: 0.2 INJECTION, SOLUTION INTRAMUSCULAR; INTRAVENOUS at 12:37

## 2020-10-29 RX ADMIN — PROPOFOL 200 MG: 10 INJECTION, EMULSION INTRAVENOUS at 10:03

## 2020-10-29 RX ADMIN — Medication 3 MG: at 12:37

## 2020-10-29 NOTE — ANESTHESIA POSTPROCEDURE EVALUATION
Procedure(s):  IR ANESTHESIA- T9 ADN T12 KYPHO. general    Anesthesia Post Evaluation      Multimodal analgesia: multimodal analgesia used between 6 hours prior to anesthesia start to PACU discharge  Patient location during evaluation: PACU  Patient participation: complete - patient participated  Level of consciousness: awake and awake and alert  Pain management: adequate  Airway patency: patent  Anesthetic complications: no  Cardiovascular status: acceptable  Respiratory status: acceptable  Hydration status: acceptable  Post anesthesia nausea and vomiting:  controlled      INITIAL Post-op Vital signs:   Vitals Value Taken Time   /58 10/29/2020  1:21 PM   Temp 36.2 °C (97.2 °F) 10/29/2020  1:06 PM   Pulse 68 10/29/2020  1:23 PM   Resp 14 10/29/2020  1:06 PM   SpO2 70 % 10/29/2020  1:23 PM   Vitals shown include unvalidated device data.

## 2020-10-29 NOTE — PROGRESS NOTES
Pt to IR procedure room #1/[facility] Room for Kyphoplasty followed by [facility] placement per Dr. Mamadou Villa. DYLON Leon shadowing today. Kyphoplasty rep also present. Anesthesia to assume care-see anesthesia flowsheet.
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
## 2020-10-29 NOTE — H&P
Department of Interventional Radiology  [phone]    History and Physical    Patient:  Annabelle Lynn MRN:  [id number]  SSN:  [id number]    YOB: 1955  Age:  71 y.o. Sex:  male      Primary Care Provider:  Lorraine Dickey MD  Referring Physician:  Lizette Littlejohn MD    Subjective:     Chief Complaint: back pain    History of the Present Illness: The patient is a very pleasant 71 y.o. male with pathologic T9 and T12 Vertebral Compression Fractures and severe thoraco-lumbar back pain who presents for biopsy, RFAblation, and kyphoplasty of these two levels. Underwent L1, L4 biopsy, RFA and kyphoplasty earlier this month which revealed metastatic high grade neuroendocrine tumor. The low back pain is significantly improved. Interestinly, his neck pain also improved. His pain is now mid/lower thoracic. Also hx prostate cancer. NPO x oxycodone. He is scheduled to return next week for Chest Port placement, but would like that done today if at all possible. He had a PET/CT yesterday that I am unable to view as the [employer] system is down, and has been down since about 07:15. Past Medical History:   Diagnosis Date    Claustrophobia     Ear problems     Elevated PSA     Former light cigarette smoker (1-9 per day)     smoked for 35 years. quit     History of multiple allergies     Nicotine vapor product user     Personal history of prostate cancer     Prostate cancer ([facility] [address]     10/2020 Bone metastasis     Past Surgical History:   Procedure Laterality Date    BIOPSY PROSTATE      HX COLONOSCOPY  08/2020    HX HEENT      teeth removed     HX ORCHIECTOMY Bilateral 02/2017    IR KYPHOPLASTY LUMBAR  10/14/2020     Review of Systems:    Pertinent items are noted in the History of Present Illness. Prior to Admission medications    Medication Sig Start Date End Date Taking?  Authorizing Provider   tamsulosin (Flomax) 0.4 mg capsule Take 0.4 mg by mouth nightly. Yes Provider, Historical   oxazepam (SERAX) 10 mg capsule Take  by mouth nightly as needed for Sleep or Anxiety. Yes Provider, Historical   ibuprofen (MOTRIN) 200 mg tablet Take  by mouth. Provider, Historical   oxyCODONE IR (ROXICODONE) 10 mg tab immediate release tablet Take 1 Tab by mouth every four (4) hours as needed for Pain for up to 5 days. Max Daily Amount: 60 mg. 10/23/20 10/28/20  Mir Winter MD   HYDROcodone-acetaminophen (Norco) 7.5-325 mg per tablet Take 1 Tab by mouth every four (4) hours as needed for Pain for up to 30 days. Max Daily Amount: 6 Tabs. Indications: pain 10/9/20 11/8/20  Mir Winter MD   meloxicam (MOBIC) 15 mg tablet Take 1 Tab by mouth daily.  10/7/20   Lowell Garcia NP   OTHER Inability to walk 100 feet without causing pain 8/11/20   Guido Hood MD        No Known Allergies    Family History   Problem Relation Age of Onset    Prostate Cancer Father     Hypertension Father     Cancer Father         prostate cancer     Social History     Tobacco Use    Smoking status: Former Smoker     Packs/day: 0.50     Years: 35.00     Pack years: 17.50     Last attempt to quit: 12/2/2016     Years since quitting: 3.9    Smokeless tobacco: Never Used   Substance Use Topics    Alcohol use: No        Objective:       Physical Examination:    Vitals:    10/29/20 0801   BP: 114/67   Pulse: 67   Resp: 18   Temp: 98.2 °F (36.8 °C)   SpO2: 96%   Weight: 97.5 kg (215 lb)   Height: 5' 9\" (1.753 m)       Pain Assessment  Pain Intensity 1: 2 (10/29/20 0758)  Pain Location 1: Back     Patient Stated Pain Goal: 0      HEART: regular rate and rhythm  LUNG: clear to auscultation bilaterally  ABDOMEN: normal findings: soft, non-tender  EXTREMITIES: warm, no edema    Laboratory:     Lab Results   Component Value Date/Time    Sodium 136 10/19/2020 02:40 PM    Sodium 135 (L) 10/13/2020 11:45 AM    Potassium 3.8 10/19/2020 02:40 PM    Potassium 4.2 10/13/2020 11:45 AM    Chloride 103 10/19/2020 02:40 PM    Chloride 102 10/13/2020 11:45 AM    CO2 24 10/19/2020 02:40 PM    CO2 28 10/13/2020 11:45 AM    Anion gap 9 10/19/2020 02:40 PM    Anion gap 5 (L) 10/13/2020 11:45 AM    Glucose 109 (H) 10/19/2020 02:40 PM    Glucose 102 (H) 10/13/2020 11:45 AM    BUN 18 10/19/2020 02:40 PM    BUN 30 (H) 10/13/2020 11:45 AM    Creatinine 0.90 10/19/2020 02:40 PM    Creatinine 1.19 10/13/2020 11:45 AM    GFR est AA >60 10/19/2020 02:40 PM    GFR est AA >60 10/13/2020 11:45 AM    GFR est non-AA >60 10/19/2020 02:40 PM    GFR est non-AA >60 10/13/2020 11:45 AM    Calcium 9.7 10/19/2020 02:40 PM    Calcium 10.1 10/13/2020 11:45 AM    Magnesium 2.0 09/01/2017 10:20 AM    Magnesium 2.3 07/21/2017 11:23 AM    Albumin 3.8 10/19/2020 02:40 PM    Albumin 4.4 10/13/2020 11:45 AM    Protein, total 8.0 10/19/2020 02:40 PM    Protein, total 7.4 10/19/2020 02:40 PM    Globulin 4.2 (H) 10/19/2020 02:40 PM    Globulin 4.2 (H) 10/13/2020 11:45 AM    A-G Ratio 0.9 (L) 10/19/2020 02:40 PM    A-G Ratio 1.2 10/19/2020 02:40 PM    ALT (SGPT) 24 10/19/2020 02:40 PM    ALT (SGPT) 32 10/13/2020 11:45 AM     Lab Results   Component Value Date/Time    WBC 10.0 10/19/2020 02:40 PM    WBC 11.8 (H) 10/13/2020 11:45 AM    HGB 10.7 (L) 10/19/2020 02:40 PM    HGB 12.1 (L) 10/13/2020 11:45 AM    HCT 32.4 (L) 10/19/2020 02:40 PM    HCT 36.3 (L) 10/13/2020 11:45 AM    PLATELET 427 86/86/1044 02:40 PM    PLATELET 203 73/07/6013 11:45 AM     Lab Results   Component Value Date/Time    Prothrombin time 13.6 10/13/2020 11:45 AM    INR 1.0 10/13/2020 11:45 AM       Assessment:     Pathologic VCF T9, T12  Metastatic high grade NEC. Inadequate venous access. Hospital Problems  Date Reviewed: 10/23/2020    None          Plan:     Planned Procedure:  Biopsy, OsteoCool RFA, and Kyphoplasty at T9, T12. Chest Port placement. GETA. Risks, benefits, and alternatives reviewed with patient and he agrees to proceed with the procedure.       Signed By: Yuniel Hassan PA-C     October 29, 2020

## 2020-10-29 NOTE — DISCHARGE INSTRUCTIONS
[address]  Department of Interventional Radiology  [facility]  [phone] Office  [phone] Fax    KYPHOPLASTY/VERTEBROPLASTY DISCHARGE INSTRUCTIONS    General Information: This procedure is done to help with the back pain that is associated with compression fractures in the spine. The kyphoplasty involves placing a balloon into the space of the vertebrae that is fractured, blowing up the balloon, therefore realigning the broken pieces of bone, and then injecting cement into the space to strengthen the vertebrae. The vertebroplasty is only slightly different in that there is no balloon used. The Interventional Radiologist will speak with you to decide which procedure is best for you. The pain experienced from compression fractures is caused by the vertebrae not being stabilized. The cement stabilizes the bone, therefore reducing the pain. Home Care Instructions: You can resume your regular diet and medication regimen. Do not drink alcohol, drive, or make any important legal decisions in the next 24 hours. Do not lift anything heavier than a gallon of milk, or do anything strenuous for the next 24 hours. You will notice a dressing on your lower back after your procedure. This dressing can be removed in 24 hours. Showering is acceptable in 24 hours, but you should refrain from tub baths or swimming for 5 days. You will be asked to come back in for a recheck about 30 days after your procedure. At that time, our physician will ask you questions about your pain and if it has improved. Call If:     You should call your Physician and/or the Radiology Nurse if you have any bleeding other than a small spot on your bandage. Call if you have any signs of infection, fever, or increased pain at the site. Call if you should have new or worsening pain in your back, or if you lose control of your bladder or bowel.  Any tingling or loss of feeling or movement in your legs should also be reported. Follow-Up Instructions: Please see your ordering doctor as he/she has requested. To Reach Us: If you have any questions about your procedure, please call the Interventional Radiology department at [phone]. After business hours (5pm) and weekends, call the answering service at [phone] and ask for the Radiologist on call to be paged. Si tiene Preguntas acerca del procedimiento, por favor llame al departamento de Radiología Intervencional al [phone]. Después de horas de oficina (5 pm) y los fines de [place], llamar al Dagoberto Torrez al [phone] y pregunte por el Radiologo de [facility] [place]. Interventional Radiology General Nurse Discharge    After general anesthesia or intravenous sedation, for 24 hours or while taking prescription Narcotics:  · Limit your activities  · Do not drive and operate hazardous machinery  · Do not make important personal or business decisions  · Do  not drink alcoholic beverages  · If you have not urinated within 8 hours after discharge, please contact your surgeon on call. * Please give a list of your current medications to your Primary Care Provider. * Please update this list whenever your medications are discontinued, doses are     changed, or new medications (including over-the-counter products) are added. * Please carry medication information at all times in case of emergency situations. These are general instructions for a healthy lifestyle:    No smoking/ No tobacco products/ Avoid exposure to second hand smoke  Surgeon General's Warning:  Quitting smoking now greatly reduces serious risk to your health.     Obesity, smoking, and sedentary lifestyle greatly increases your risk for illness  A healthy diet, regular physical exercise & weight monitoring are important for maintaining a healthy lifestyle    You may be retaining fluid if you have a history of heart failure or if you experience any of the following symptoms:  Weight gain of 3 pounds or more overnight or 5 pounds in a week, increased swelling in our hands or feet or shortness of breath while lying flat in bed. Please call your doctor as soon as you notice any of these symptoms; do not wait until your next office visit. Recognize signs and symptoms of STROKE:  F-face looks uneven    A-arms unable to move or move unevenly    S-speech slurred or non-existent    T-time-call 911 as soon as signs and symptoms begin-DO NOT go       Back to bed or wait to see if you get better-TIME IS BRAIN.     Patient Signature:  Date: 10/29/2020  Discharging Nurse: Jose Moreno RN

## 2020-10-29 NOTE — PROCEDURES
Department of Interventional Radiology  [phone]        Interventional Radiology Brief Procedure Note    Patient: Jolie Harris MRN: [id number]  SSN: [id number]    YOB: 1955  Age: 71 y.o. Sex: male      Date of Procedure: 10/29/2020    Pre-Procedure Diagnosis: Metastatic Neuroendocrine Tumor. Post-Procedure Diagnosis: SAME    Procedure(s):  T9 and T12 Biopsy, RFA, Kyphoplasty. Venous Chest Port Placement. Brief Description of Procedure: As above. Performed By: Primo Oleary MD     Assistants: None    Anesthesia:General    Estimated Blood Loss: Less than 20 ml    Specimens:  Pathology    Implants:  Chest Port Placement    Findings: Port tip in RA. Good cement distribution in T9 and T12. Complications: None    Recommendations: 1 hour bedrest.  Chest port is ready to use. Follow Up: Office/Virtual Visit in 2-4 weeks.       Signed By: Primo Oleary MD     October 29, 2020

## 2020-10-29 NOTE — PERIOP NOTES
TRANSFER - OUT REPORT:    Verbal report given to Antonio Martin RN on Ben Urena  being transferred to IR for routine post - op       Report consisted of patients Situation, Background, Assessment and   Recommendations(SBAR). Information from the following report(s) SBAR, Kardex, OR Summary and Procedure Summary was reviewed with the receiving nurse. Lines:   Venous Access Device 8Fr PowerPort 10/29/20 Upper chest (subclavicular area, right (Active)       Peripheral IV 10/29/20 Left;Posterior Hand (Active)   Site Assessment Clean, dry, & intact 10/29/20 1306   Phlebitis Assessment 0 10/29/20 1306   Infiltration Assessment 0 10/29/20 1306   Dressing Status Clean, dry, & intact 10/29/20 1306   Dressing Type Transparent;Tape 10/29/20 1306   Hub Color/Line Status Patent 10/29/20 1306        Opportunity for questions and clarification was provided. Patient transported with:   Tech    VTE prophylaxis orders have been written for Ben Urena. Patient and family given floor number and nurses name. Family updated re: pt status after security code verified.

## 2020-10-29 NOTE — PROGRESS NOTES
TRANSFER - OUT REPORT:    Verbal report given to TessaRN/Jorge RN on Erika Forrestr  being transferred to PACU for routine post - op       Report consisted of patients Situation, Background, Assessment and   Recommendations(SBAR). Information from the following report(s) SBAR, Kardex, Procedure Summary and MAR was reviewed with the receiving nurse. Lines:   Venous Access Device 8Fr PowerPort 10/29/20 Upper chest (subclavicular area, right (Active)       Peripheral IV 10/29/20 Left;Posterior Hand (Active)        Opportunity for questions and clarification was provided. Patient transported with:   Registered Nurse and CRNA          Pt to PACU; bedside report given to Tessa/Jorge.  After recovery, pt to return to IR room #6 for discharge

## 2020-10-29 NOTE — ANESTHESIA PREPROCEDURE EVALUATION
Anesthetic History   No history of anesthetic complications            Review of Systems / Medical History  Patient summary reviewed and pertinent labs reviewed    Pulmonary          Smoker         Neuro/Psych       CVA       Cardiovascular                  Exercise tolerance: >4 METS     GI/Hepatic/Renal           Liver disease (presumed)     Endo/Other        Cancer (prostate, with multiple spine mets)     Other Findings   Comments: Admitted 10/13/20 to [facility] with extreme neck pain especially with extension. Previous heavy drinker for 30-40 years 5-6 drinks nightly. Moderate user of NSAIDS. Lytic lesions C4 and c5. Probable metastatic         Physical Exam    Airway  Mallampati: III  TM Distance: 4 - 6 cm  Neck ROM: normal range of motion   Mouth opening: Normal     Cardiovascular    Rhythm: regular  Rate: normal         Dental         Pulmonary  Breath sounds clear to auscultation               Abdominal         Other Findings            Anesthetic Plan    ASA: 3  Anesthesia type: general          Induction: Intravenous  Anesthetic plan and risks discussed with: Patient      Plan for GETA.   Elective glidescope for cervical lytic lesions

## 2020-10-31 NOTE — PROGRESS NOTES
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

## 2020-11-03 NOTE — PROGRESS NOTES
Mr. Jerrol Burkitt is an established patient with a long history with [facility]. We reviewed Mr. Renata Be Medicare and Medicaid. He is well covered for all of his insurance approved charges. Mr. Jerrol Burkitt is enrolled into the [id number] [place] [address] and is aware of other resources that are available to him. Patient expressed understanding of the above information and all questions were answered to his satisfaction.

## 2020-11-04 ENCOUNTER — HOSPITAL ENCOUNTER (OUTPATIENT)
Dept: LAB | Age: 65
Discharge: HOME | End: 2020-11-04
Payer: MEDICARE

## 2020-11-04 DIAGNOSIS — C79.51 PROSTATE CANCER METASTATIC TO BONE (HCC): ICD-10-CM

## 2020-11-04 DIAGNOSIS — C61 PROSTATE CANCER METASTATIC TO BONE (HCC): ICD-10-CM

## 2020-11-04 LAB
ALBUMIN SERPL-MCNC: 3.7 G/DL (ref 3.2–4.6)
ALBUMIN/GLOB SERPL: 0.9 {RATIO} (ref 1.2–3.5)
ALP SERPL-CCNC: 120 U/L (ref 50–136)
ALT SERPL-CCNC: 23 U/L (ref 12–65)
ANION GAP SERPL CALC-SCNC: 9 MMOL/L (ref 7–16)
AST SERPL-CCNC: 21 U/L (ref 15–37)
BASOPHILS # BLD: 0 K/UL (ref 0–0.2)
BASOPHILS NFR BLD: 0 % (ref 0–2)
BILIRUB SERPL-MCNC: 0.4 MG/DL (ref 0.2–1.1)
BUN SERPL-MCNC: 19 MG/DL (ref 8–23)
CALCIUM SERPL-MCNC: 9.5 MG/DL (ref 8.3–10.4)
CHLORIDE SERPL-SCNC: 104 MMOL/L (ref 98–107)
CO2 SERPL-SCNC: 23 MMOL/L (ref 21–32)
CREAT SERPL-MCNC: 1 MG/DL (ref 0.8–1.5)
DIFFERENTIAL METHOD BLD: ABNORMAL
EOSINOPHIL # BLD: 0.2 K/UL (ref 0–0.8)
EOSINOPHIL NFR BLD: 2 % (ref 0.5–7.8)
ERYTHROCYTE [DISTWIDTH] IN BLOOD BY AUTOMATED COUNT: 13.2 % (ref 11.9–14.6)
GLOBULIN SER CALC-MCNC: 4.3 G/DL (ref 2.3–3.5)
GLUCOSE SERPL-MCNC: 99 MG/DL (ref 65–100)
HCT VFR BLD AUTO: 31.9 % (ref 41.1–50.3)
HGB BLD-MCNC: 10.8 G/DL (ref 13.6–17.2)
IMM GRANULOCYTES # BLD AUTO: 0 K/UL (ref 0–0.5)
IMM GRANULOCYTES NFR BLD AUTO: 1 % (ref 0–5)
LYMPHOCYTES # BLD: 1.5 K/UL (ref 0.5–4.6)
LYMPHOCYTES NFR BLD: 19 % (ref 13–44)
MCH RBC QN AUTO: 31.8 PG (ref 26.1–32.9)
MCHC RBC AUTO-ENTMCNC: 33.9 G/DL (ref 31.4–35)
MCV RBC AUTO: 93.8 FL (ref 79.6–97.8)
MONOCYTES # BLD: 0.8 K/UL (ref 0.1–1.3)
MONOCYTES NFR BLD: 10 % (ref 4–12)
NEUTS SEG # BLD: 5.5 K/UL (ref 1.7–8.2)
NEUTS SEG NFR BLD: 69 % (ref 43–78)
NRBC # BLD: 0 K/UL (ref 0–0.2)
PLATELET # BLD AUTO: 322 K/UL (ref 150–450)
PMV BLD AUTO: 9.2 FL (ref 9.4–12.3)
POTASSIUM SERPL-SCNC: 3.8 MMOL/L (ref 3.5–5.1)
PROT SERPL-MCNC: 8 G/DL (ref 6.3–8.2)
PSA SERPL-MCNC: 1.6 NG/ML
RBC # BLD AUTO: 3.4 M/UL (ref 4.23–5.67)
SODIUM SERPL-SCNC: 136 MMOL/L (ref 136–145)
WBC # BLD AUTO: 8.1 K/UL (ref 4.3–11.1)

## 2020-11-04 PROCEDURE — 36415 COLL VENOUS BLD VENIPUNCTURE: CPT

## 2020-11-04 PROCEDURE — 85025 COMPLETE CBC W/AUTO DIFF WBC: CPT

## 2020-11-04 PROCEDURE — 80053 COMPREHEN METABOLIC PANEL: CPT

## 2020-11-04 PROCEDURE — 84153 ASSAY OF PSA TOTAL: CPT

## 2020-11-05 ENCOUNTER — APPOINTMENT (OUTPATIENT)
Dept: INFUSION THERAPY | Age: 65
End: 2020-11-05
Payer: MEDICARE

## 2020-11-05 ENCOUNTER — HOSPITAL ENCOUNTER (OUTPATIENT)
Dept: INFUSION THERAPY | Age: 65
Discharge: HOME | End: 2020-11-05
Payer: MEDICARE

## 2020-11-05 VITALS
HEART RATE: 87 BPM | WEIGHT: 216 LBS | OXYGEN SATURATION: 99 % | SYSTOLIC BLOOD PRESSURE: 141 MMHG | BODY MASS INDEX: 31.99 KG/M2 | TEMPERATURE: 97 F | RESPIRATION RATE: 16 BRPM | HEIGHT: 69 IN | DIASTOLIC BLOOD PRESSURE: 82 MMHG

## 2020-11-05 DIAGNOSIS — C77.2 METASTASIS TO RETROPERITONEAL LYMPH NODE (HCC): Primary | ICD-10-CM

## 2020-11-05 DIAGNOSIS — C61 PRIMARY PROSTATE CANCER WITH METASTASIS FROM PROSTATE TO OTHER SITE (HCC): ICD-10-CM

## 2020-11-05 PROCEDURE — 74011000258 HC RX REV CODE- 258: Performed by: INTERNAL MEDICINE

## 2020-11-05 PROCEDURE — 74011000250 HC RX REV CODE- 250: Performed by: INTERNAL MEDICINE

## 2020-11-05 PROCEDURE — 74011250636 HC RX REV CODE- 250/636: Performed by: INTERNAL MEDICINE

## 2020-11-05 PROCEDURE — 96413 CHEMO IV INFUSION 1 HR: CPT

## 2020-11-05 PROCEDURE — 96415 CHEMO IV INFUSION ADDL HR: CPT

## 2020-11-05 PROCEDURE — 96417 CHEMO IV INFUS EACH ADDL SEQ: CPT

## 2020-11-05 PROCEDURE — 96375 TX/PRO/DX INJ NEW DRUG ADDON: CPT

## 2020-11-05 PROCEDURE — 96367 TX/PROPH/DG ADDL SEQ IV INF: CPT

## 2020-11-05 RX ORDER — SODIUM CHLORIDE 0.9 % (FLUSH) 0.9 %
10 SYRINGE (ML) INJECTION AS NEEDED
Status: ACTIVE | OUTPATIENT
Start: 2020-11-05 | End: 2020-11-05

## 2020-11-05 RX ORDER — ALBUTEROL SULFATE 0.83 MG/ML
2.5 SOLUTION RESPIRATORY (INHALATION) AS NEEDED
Status: ACTIVE | OUTPATIENT
Start: 2020-11-05 | End: 2020-11-05

## 2020-11-05 RX ORDER — DIPHENHYDRAMINE HYDROCHLORIDE 50 MG/ML
50 INJECTION, SOLUTION INTRAMUSCULAR; INTRAVENOUS AS NEEDED
Status: DISPENSED | OUTPATIENT
Start: 2020-11-05 | End: 2020-11-05

## 2020-11-05 RX ORDER — SODIUM CHLORIDE 9 MG/ML
10 INJECTION INTRAMUSCULAR; INTRAVENOUS; SUBCUTANEOUS AS NEEDED
Status: ACTIVE | OUTPATIENT
Start: 2020-11-05 | End: 2020-11-05

## 2020-11-05 RX ORDER — EPINEPHRINE 1 MG/ML
0.3 INJECTION, SOLUTION, CONCENTRATE INTRAVENOUS AS NEEDED
Status: ACTIVE | OUTPATIENT
Start: 2020-11-05 | End: 2020-11-05

## 2020-11-05 RX ORDER — ONDANSETRON 2 MG/ML
8 INJECTION INTRAMUSCULAR; INTRAVENOUS AS NEEDED
Status: ACTIVE | OUTPATIENT
Start: 2020-11-05 | End: 2020-11-05

## 2020-11-05 RX ORDER — HYDROCORTISONE SODIUM SUCCINATE 100 MG/2ML
100 INJECTION, POWDER, FOR SOLUTION INTRAMUSCULAR; INTRAVENOUS AS NEEDED
Status: DISPENSED | OUTPATIENT
Start: 2020-11-05 | End: 2020-11-05

## 2020-11-05 RX ORDER — SODIUM CHLORIDE 9 MG/ML
25 INJECTION, SOLUTION INTRAVENOUS CONTINUOUS
Status: ACTIVE | OUTPATIENT
Start: 2020-11-05 | End: 2020-11-05

## 2020-11-05 RX ORDER — ONDANSETRON 2 MG/ML
8 INJECTION INTRAMUSCULAR; INTRAVENOUS ONCE
Status: COMPLETED | OUTPATIENT
Start: 2020-11-05 | End: 2020-11-05

## 2020-11-05 RX ORDER — ACETAMINOPHEN 325 MG/1
650 TABLET ORAL AS NEEDED
Status: ACTIVE | OUTPATIENT
Start: 2020-11-05 | End: 2020-11-05

## 2020-11-05 RX ORDER — DIPHENHYDRAMINE HYDROCHLORIDE 50 MG/ML
25 INJECTION, SOLUTION INTRAMUSCULAR; INTRAVENOUS AS NEEDED
Status: ACTIVE | OUTPATIENT
Start: 2020-11-05 | End: 2020-11-05

## 2020-11-05 RX ORDER — HEPARIN 100 UNIT/ML
300-500 SYRINGE INTRAVENOUS AS NEEDED
Status: ACTIVE | OUTPATIENT
Start: 2020-11-05 | End: 2020-11-05

## 2020-11-05 RX ADMIN — Medication 10 ML: at 08:59

## 2020-11-05 RX ADMIN — ETOPOSIDE 218 MG: 20 INJECTION INTRAVENOUS at 10:41

## 2020-11-05 RX ADMIN — SODIUM CHLORIDE 25 ML/HR: 900 INJECTION, SOLUTION INTRAVENOUS at 09:00

## 2020-11-05 RX ADMIN — ONDANSETRON 8 MG: 2 INJECTION INTRAMUSCULAR; INTRAVENOUS at 09:31

## 2020-11-05 RX ADMIN — SODIUM CHLORIDE 150 MG: 900 INJECTION, SOLUTION INTRAVENOUS at 09:02

## 2020-11-05 RX ADMIN — FAMOTIDINE 20 MG: 10 INJECTION INTRAVENOUS at 11:16

## 2020-11-05 RX ADMIN — CARBOPLATIN 636 MG: 10 INJECTION, SOLUTION INTRAVENOUS at 09:55

## 2020-11-05 RX ADMIN — DEXAMETHASONE SODIUM PHOSPHATE 12 MG: 4 INJECTION, SOLUTION INTRAMUSCULAR; INTRAVENOUS at 09:33

## 2020-11-05 NOTE — PROGRESS NOTES
Arrived to the [facility]. First Cycle of Carboplatin & VP16 completed. Patient tolerated well. Any issues or concerns during appointment: Pt had indigestion feeling during VP16 and was given IV Pepcid and symptom resolved. Patient aware of next infusion appointment on 11/6/20  Discharged home with needle in place in stable condition.

## 2020-11-06 ENCOUNTER — HOSPITAL ENCOUNTER (OUTPATIENT)
Dept: INFUSION THERAPY | Age: 65
Discharge: HOME | End: 2020-11-06
Payer: MEDICARE

## 2020-11-06 VITALS
SYSTOLIC BLOOD PRESSURE: 199 MMHG | BODY MASS INDEX: 32.34 KG/M2 | TEMPERATURE: 98.5 F | DIASTOLIC BLOOD PRESSURE: 81 MMHG | RESPIRATION RATE: 16 BRPM | OXYGEN SATURATION: 100 % | HEART RATE: 75 BPM | WEIGHT: 219 LBS

## 2020-11-06 DIAGNOSIS — C61 PRIMARY PROSTATE CANCER WITH METASTASIS FROM PROSTATE TO OTHER SITE (HCC): ICD-10-CM

## 2020-11-06 DIAGNOSIS — C77.2 METASTASIS TO RETROPERITONEAL LYMPH NODE (HCC): Primary | ICD-10-CM

## 2020-11-06 PROCEDURE — 96413 CHEMO IV INFUSION 1 HR: CPT

## 2020-11-06 PROCEDURE — 74011250636 HC RX REV CODE- 250/636: Performed by: INTERNAL MEDICINE

## 2020-11-06 PROCEDURE — 96375 TX/PRO/DX INJ NEW DRUG ADDON: CPT

## 2020-11-06 RX ORDER — SODIUM CHLORIDE 0.9 % (FLUSH) 0.9 %
10 SYRINGE (ML) INJECTION AS NEEDED
Status: ACTIVE | OUTPATIENT
Start: 2020-11-06 | End: 2020-11-06

## 2020-11-06 RX ORDER — SODIUM CHLORIDE 9 MG/ML
25 INJECTION, SOLUTION INTRAVENOUS CONTINUOUS
Status: ACTIVE | OUTPATIENT
Start: 2020-11-06 | End: 2020-11-06

## 2020-11-06 RX ORDER — DEXAMETHASONE SODIUM PHOSPHATE 4 MG/ML
8 INJECTION, SOLUTION INTRA-ARTICULAR; INTRALESIONAL; INTRAMUSCULAR; INTRAVENOUS; SOFT TISSUE ONCE
Status: COMPLETED | OUTPATIENT
Start: 2020-11-06 | End: 2020-11-06

## 2020-11-06 RX ADMIN — DEXAMETHASONE SODIUM PHOSPHATE 8 MG: 4 INJECTION, SOLUTION INTRAMUSCULAR; INTRAVENOUS at 08:26

## 2020-11-06 RX ADMIN — Medication 10 ML: at 10:06

## 2020-11-06 RX ADMIN — SODIUM CHLORIDE 25 ML/HR: 900 INJECTION, SOLUTION INTRAVENOUS at 08:26

## 2020-11-06 RX ADMIN — ETOPOSIDE 220 MG: 20 INJECTION INTRAVENOUS at 08:59

## 2020-11-06 NOTE — PROGRESS NOTES
Arrived to the [facility]. Etoposide completed. Patient tolerated without problems. Any issues or concerns during appointment: no.  Patient aware of next infusion appointment on 11/7/20 (date) at 0800 (time). Discharged ambulatory.

## 2020-11-07 ENCOUNTER — HOSPITAL ENCOUNTER (OUTPATIENT)
Dept: INFUSION THERAPY | Age: 65
Discharge: HOME | End: 2020-11-07
Payer: MEDICARE

## 2020-11-07 VITALS
WEIGHT: 216.8 LBS | HEART RATE: 85 BPM | RESPIRATION RATE: 16 BRPM | TEMPERATURE: 97.3 F | DIASTOLIC BLOOD PRESSURE: 82 MMHG | BODY MASS INDEX: 32.02 KG/M2 | SYSTOLIC BLOOD PRESSURE: 159 MMHG | OXYGEN SATURATION: 99 %

## 2020-11-07 DIAGNOSIS — C77.2 METASTASIS TO RETROPERITONEAL LYMPH NODE (HCC): Primary | ICD-10-CM

## 2020-11-07 DIAGNOSIS — C61 PRIMARY PROSTATE CANCER WITH METASTASIS FROM PROSTATE TO OTHER SITE (HCC): ICD-10-CM

## 2020-11-07 PROCEDURE — 96375 TX/PRO/DX INJ NEW DRUG ADDON: CPT

## 2020-11-07 PROCEDURE — 96413 CHEMO IV INFUSION 1 HR: CPT

## 2020-11-07 PROCEDURE — 74011250636 HC RX REV CODE- 250/636: Performed by: INTERNAL MEDICINE

## 2020-11-07 RX ORDER — LORAZEPAM 2 MG/ML
0.5 INJECTION INTRAMUSCULAR
Status: COMPLETED | OUTPATIENT
Start: 2020-11-07 | End: 2020-11-07

## 2020-11-07 RX ORDER — SODIUM CHLORIDE 9 MG/ML
25 INJECTION, SOLUTION INTRAVENOUS CONTINUOUS
Status: ACTIVE | OUTPATIENT
Start: 2020-11-07 | End: 2020-11-07

## 2020-11-07 RX ORDER — SODIUM CHLORIDE 0.9 % (FLUSH) 0.9 %
10 SYRINGE (ML) INJECTION AS NEEDED
Status: ACTIVE | OUTPATIENT
Start: 2020-11-07 | End: 2020-11-07

## 2020-11-07 RX ORDER — DEXAMETHASONE SODIUM PHOSPHATE 4 MG/ML
8 INJECTION, SOLUTION INTRA-ARTICULAR; INTRALESIONAL; INTRAMUSCULAR; INTRAVENOUS; SOFT TISSUE ONCE
Status: COMPLETED | OUTPATIENT
Start: 2020-11-07 | End: 2020-11-07

## 2020-11-07 RX ADMIN — Medication 10 ML: at 11:05

## 2020-11-07 RX ADMIN — LORAZEPAM 0.5 MG: 2 INJECTION INTRAMUSCULAR; INTRAVENOUS at 08:45

## 2020-11-07 RX ADMIN — DEXAMETHASONE SODIUM PHOSPHATE 8 MG: 4 INJECTION, SOLUTION INTRAMUSCULAR; INTRAVENOUS at 08:33

## 2020-11-07 RX ADMIN — SODIUM CHLORIDE 25 ML/HR: 900 INJECTION, SOLUTION INTRAVENOUS at 09:15

## 2020-11-07 RX ADMIN — ETOPOSIDE 219 MG: 20 INJECTION INTRAVENOUS at 09:20

## 2020-11-07 RX ADMIN — Medication 10 ML: at 08:15

## 2020-11-07 NOTE — PROGRESS NOTES
Arrived to the [facility]. Assessment completed and labs reviewed. Pre meds and Etoposide infusion completed. Patient tolerated well. Any issues or concerns during appointment: none. Patient aware of next infusion appointment on 11/25/2020 at 1330. Discharged ambulatory.

## 2020-11-24 ENCOUNTER — HOSPITAL ENCOUNTER (OUTPATIENT)
Dept: RADIATION ONCOLOGY | Age: 65
Discharge: HOME | End: 2020-11-24
Payer: MEDICARE

## 2020-11-24 VITALS
DIASTOLIC BLOOD PRESSURE: 66 MMHG | WEIGHT: 215 LBS | BODY MASS INDEX: 31.75 KG/M2 | SYSTOLIC BLOOD PRESSURE: 132 MMHG | TEMPERATURE: 91.2 F | OXYGEN SATURATION: 99 % | HEART RATE: 82 BPM

## 2020-11-24 PROCEDURE — 99211 OFF/OP EST MAY X REQ PHY/QHP: CPT

## 2020-11-24 NOTE — PROGRESS NOTES
Pt is here today for the initial RT consult with Dr. Tommie Painting for right shoulder pain related to prostate cancer multiple bone mets. Pt has a long history since 2017 of a Lita score 9 prostate cancer. Pt is s/p a bilateral orchiectomy,Taxotere, kyphoplasty x 2 to his spine, and Xtandi. He is currently receiving chemo cycles of Carbo/Etopiside. Pt has right clavicular bone mets and has been having right shoulder pain, which is why he was referred to [address] [place] [address] by Dr. Adbirahman Enriquez. Pt is not a candidate for RT to the prostate. Pt stated that he took Aleve last night and currently does not have right shoulder pain. An overview of RT was given. Pt stated that he is not aware if he has cancer in his right shoulder--Dr. Tommie Painting was notified. Pt is aware of the CT/Sim appt on 12/3/20. RT consents signed.

## 2020-11-24 NOTE — H&P
Patient: Kelly Mota MRN: [id number]  SSN: [id number]    YOB: 1955  Age: 71 y.o. Sex: male      Other Providers:  Dr. Faraz Alicea, Dr. Pool Ser: My shoulder hurts     DIAGNOSIS: Metastatic prostate cancer, high grade neuroendocrine type    PREVIOUS TREATMENT:  1) Orchiectomy 3/7/2017         2) thoracic kyphoplasty 10/29/2020         3) lumbar kyphoplasty 10/14/2020          4) ADT          5) 6 cycles of Taxotere completed September 2017 with Casodex           6) chemotherapy initiated with Carboplatin and etoposide November 2020    HISTORY OF PRESENT ILLNESS:  Kelly Mota is a 71 y.o. male who I am seeing at the request of Dr. Elysia Jordan. Patient states that he began having urinary problems around June 2016 and went to the emergency department in January 2017 with urinary symptoms and a PSA was found to be elevated at 36. He was subsequently referred to urology and biopsies of the prostate were positive for prostatic cancer Lita 9 in half of the cores. Patient was started on Casodex and underwent staging studies which did not show any bone metastasis but did show multiple lymph nodes throughout the pelvis. Patient then underwent a bilateral orchiectomy with PSA response. He went on  to receive 6 cycles of Taxotere which was completed in September 2017 and he tolerated this treatment well again with his PSA decreasing and he was continued on Casodex alone. In April 2019 his PSA was noted to increase from 1.6 to 2.6 and patient was changed to [id number]. .  Over this past  summer patient began experiencing back pain. But his PSA was stable and at that time he did not have any history of osseous metastasis. A CT of the chest was done which showed multiple bony lytic lesions with no  Liver or lung  parenchymal masses. Bone scan positive for multiple metastatic osseous lesions 9/25/2020.   Patient had further bone imaging which revealed lesions in the thoracic and lumbar area.  Kyphoplasty was done in the lumbar as well as thoracic region  and patient had a very good palliative result from this. Biopsies of the bone lesions were taken as well and these were consistent with a high-grade neuroendocrine tumor. Dr. Tolu Hernandez believes given the lack of lung parenchymal lesion and the fact that this patient had such a high-grade prostate cancer that this is most likely growth of a neuroendocrine clone from his prostate cancer. Of note patient CEA and CA 19-9 have increased. Patient is starting now on a  course of chemotherapy with carboplatin and etoposide. We have been asked to evaluate the patient for consideration of palliative treatment. Mr. Soumya Palacios complains of pain in the right groin area as well as the right shoulder. He had a PET/CT on 10/28/2020 which showed diffuse osseous disease including right groin and right distal clavicle. Also noted was marked increased uptake in the prostate as well as metastatic right pelvic and lower retroperitoneal lymphadenopathy. On talking to the patient today he has had longstanding issues with his shoulder but states that the pain  does get better and sometimes worse and currently today is really not that bad but it  often limits his ability to sleep. He also complains of discomfort in the right groin area which he has had for quite some time as well. PAST MEDICAL HISTORY:    Past Medical History:   Diagnosis Date    Claustrophobia     Ear problems     Elevated PSA     Former light cigarette smoker (1-9 per day)     smoked for 35 years. quit     History of multiple allergies     Nicotine vapor product user     Personal history of prostate cancer     Prostate cancer ([facility] [address]     10/2020 Bone metastasis       The patient denies history of collagen vascular diseases, pacemaker insertion, prior radiation or prior chemotherapy.      PAST SURGICAL HISTORY:   Past Surgical History:   Procedure Laterality Date    BIOPSY PROSTATE  HX COLONOSCOPY  08/2020    HX HEENT      teeth removed     HX ORCHIECTOMY Bilateral 02/2017    IR KYPHOPLASTY LUMBAR  10/14/2020    IR KYPHOPLASTY THORACIC  10/29/2020       MEDICATIONS:     Current Outpatient Medications:     tamsulosin (Flomax) 0.4 mg capsule, Take 1 Cap by mouth nightly for 30 days. , Disp: 30 Cap, Rfl: 5    cyclobenzaprine (FLEXERIL) 10 mg tablet, Take 1 Tab by mouth three (3) times daily as needed for Muscle Spasm(s). Indications: muscle spasm, Disp: 30 Tab, Rfl: 0    ondansetron hcl (ZOFRAN) 8 mg tablet, Take 1 Tab by mouth every eight (8) hours as needed for Nausea., Disp: 90 Tab, Rfl: 2    prochlorperazine (Compazine) 10 mg tablet, Take 1 Tab by mouth every six (6) hours as needed for Nausea or Vomiting., Disp: 90 Tab, Rfl: 2    ibuprofen (MOTRIN) 200 mg tablet, Take  by mouth., Disp: , Rfl:     oxazepam (SERAX) 10 mg capsule, Take  by mouth nightly as needed for Sleep or Anxiety. , Disp: , Rfl:     meloxicam (MOBIC) 15 mg tablet, Take 1 Tab by mouth daily. , Disp: 30 Tab, Rfl: 0    OTHER, Inability to walk 100 feet without causing pain, Disp: 1 Each, Rfl: 0    ALLERGIES:   Allergies   Allergen Reactions    Turmeric Nausea and Vomiting       SOCIAL HISTORY:   Social History     Socioeconomic History    Marital status: SINGLE     Spouse name: Not on file    Number of children: Not on file    Years of education: Not on file    Highest education level: Not on file   Occupational History    Not on file   Social Needs    Financial resource strain: Not on file    Food insecurity     Worry: Not on file     Inability: Not on file    Transportation needs     Medical: Not on file     Non-medical: Not on file   Tobacco Use    Smoking status: Former Smoker     Packs/day: 0.50     Years: 35.00     Pack years: 17.50     Last attempt to quit: 12/2/2016     Years since quitting: 3.9    Smokeless tobacco: Never Used   Substance and Sexual Activity    Alcohol use: No    Drug use:  No  Sexual activity: Never   Lifestyle    Physical activity     Days per week: Not on file     Minutes per session: Not on file    Stress: Not on file   Relationships    Social connections     Talks on phone: Not on file     Gets together: Not on file     Attends [employer] service: Not on file     Active member of club or organization: Not on file     Attends meetings of clubs or organizations: Not on file     Relationship status: Not on file    Intimate partner violence     Fear of current or ex partner: Not on file     Emotionally abused: Not on file     Physically abused: Not on file     Forced sexual activity: Not on file   Other Topics Concern    Not on file   Social History Narrative    Not on file       FAMILY HISTORY:   Family History   Problem Relation Age of Onset    Prostate Cancer Father     Hypertension Father     Cancer Father         prostate cancer       REVIEW OF SYSTEMS: Please see the completed review of systems sheet in the chart that I have reviewed today. PHYSICAL EXAMINATION:   ECOG Performance status  1  VITAL SIGNS:   Visit Vitals  /66   Pulse 82   Temp (!) 91.2 °F (32.9 °C)   Wt 97.5 kg (215 lb)   SpO2 99%   BMI 31.75 kg/m²        GENERAL: The patient is well-developed, ambulatory, alert and in no acute distress. HEENT: Head is normocephalic, atraumatic. Pupils are equal, round and reactive to light and accommodation. Extraocular movement intact. Hearing is intact bilaterally to finger rub. Oral cavity reveals no lesions. Mucous membranes are moist. NECK: Neck is supple with no masses. CARDIOVASCULAR: Heart is regular rate and rhythm. There are no murmurs rubs or gallups. RESPIRATORY: Lungs are clear to auscultation and percussion. There is normal respiratory effort. GASTROINTESTINAL: The abdomen is soft, non-tender, nondistended with no hepatospelnomagaly.  Digital rectal examination: deferred LYMPHATIC: There is no cervical, supraclavicular or axillary lymphadenopathy bilaterally. MUSCULOSKELETAL: Extremities reveal no cyanosis, clubbing or edema.  is 5+/5. Right shoulder movement constrained compared to left. NEURO:  Cranial nerves II-XII grossly intact. Muscular strength and sensation are intact throughout all four extremities. PATHOLOGY:    As noted above     LABORATORY:   Lab Results   Component Value Date/Time    Sodium 136 11/04/2020 01:25 PM    Potassium 3.8 11/04/2020 01:25 PM    Chloride 104 11/04/2020 01:25 PM    CO2 23 11/04/2020 01:25 PM    Anion gap 9 11/04/2020 01:25 PM    Glucose 99 11/04/2020 01:25 PM    BUN 19 11/04/2020 01:25 PM    Creatinine 1.00 11/04/2020 01:25 PM    GFR est AA >60 11/04/2020 01:25 PM    GFR est non-AA >60 11/04/2020 01:25 PM    Calcium 9.5 11/04/2020 01:25 PM    Magnesium 2.0 09/01/2017 10:20 AM    Albumin 3.7 11/04/2020 01:25 PM    Protein, total 8.0 11/04/2020 01:25 PM    Globulin 4.3 (H) 11/04/2020 01:25 PM    A-G Ratio 0.9 (L) 11/04/2020 01:25 PM    ALT (SGPT) 23 11/04/2020 01:25 PM     Lab Results   Component Value Date/Time    WBC 8.1 11/04/2020 01:25 PM    HGB 10.8 (L) 11/04/2020 01:25 PM    HCT 31.9 (L) 11/04/2020 01:25 PM    PLATELET 826 23/48/2342 01:25 PM       RADIOLOGY:    Nm Bone Scan Wh Body    Result Date: 10/1/2020  NUCLEAR MEDICINE  WHOLE BODY BONE SCAN. INDICATION: Prostate cancer, restaging. COMPARISON: Chest CT from 2 weeks ago. RADIOPHARMACEUTICAL:  27.5 mCi Tc-99m MDP IV. Routine whole body anterior and posterior images are obtained. FINDINGS: Skull: No focal abnormality. Sternum and ribs: 1 focus anterior inferior rib end. Also linear density posterior inferior right rib. Pelvis and hips: Small focus superior pubic ramus on the right activity. Tonye Cogan Spine: Focal areas of increased activity in the lower thoracic and upper lumbar spine. Corresponding to the abnormalities CT scan. Tonye Cogan Upper extremities: No focal abnormality. Lower extremities: Degenerative changes.  CONCLUSION: Thoracic and lumbar foci of increased activity, similar to the CT scan, suspicious for metastases. Possible lesion superior pubic ramus on the right. Xr Spine Cerv Pa Lat [place] 3 V Max    Result Date: 9/4/2020  Cervical spine radiograph series, 9/3/2020 History: Neck pain for a few days. Technique:  AP, lateral, submental-vertex and open-mouth odontoid views. Findings: Alignment of the cervical spine is maintained. Straightening and mild reversal of the normal lordotic curvature is seen in the mid cervical spine. An abnormality is seen in the spinous process of C4 which is incompletely characterized. This demonstrates potential acute appearing margins for which an acute fracture or aggressive process otherwise cannot be excluded. No acute-appearing vertebral body height abnormality is seen. Prevertebral soft tissues and pre-dens space is normal.  The dens is intact on the submental-vertex view. Alignment of the lateral masses of C1 and C2 is normal on the open mouth odontoid view. Mild to moderate discogenic degenerative changes are seen in the mid to lower cervical spine most evident at C5-6. Limited imaging of the lung apices abnormal pleural-based changes in the right lung with pleural thickening. IMPRESSION:  1. There is an abnormal appearance of the spinous process of C4. This demonstrates acute margins which could either represent an acute fracture or potential lytic lesion. Initial assessment with a noncontrast CT scan of the cervical spine is recommended. 2. Abnormal pleural changes in the partially visualized right lung which are incompletely characterized and no recent studies of the chest are available to determine whether this is stable or new. Given the inability to exclude a lytic lesion involving the C4 spinous process, it is felt of the chest should also be assessed either with plain film or a concurrent CT when the patient returns. This report was made using voice transcription.  Despite my best efforts to avoid any, transcription errors may persist. If there is any question about the accuracy of the report or need for clarification, then please call [phone], or text me through perfectserv for clarification or correction. This study has been referred to the imaging Navigator to communicate these findings and recommendations to the ordering physician     Xr Spine Lumb 2 Or 3 V    Result Date: 9/1/2020  History: Back pain with constipation EXAM: Abdominal series and lumbar spine series FINDINGS: Abdominal series: The lungs are clear. The bowel gas pattern is nonspecific without evidence of free air or obstruction. Lumbar spine: There is facet arthropathy at multiple levels. There is loss of disc space at L4-5. There is anterior osteophyte formation multiple levels. Vascular calcifications are present. The SI joints are patent. IMPRESSION: 1. Multilevel lumbar spondylosis. 2. No evidence of free air or obstruction. Result Date: 10/6/2020  MRI thoracic spine without and with IV contrast INDICATION: Metastatic prostate cancer COMPARISON: Bone scan dated 10/1/2020, CT cervical spine and CT chest 9/18/2020 TECHNIQUE: Multisequence multiplanar MRI was performed of the thoracic spine before and after administration of IV gadolinium contrast. 20 mL Dotarem was administered. FINDINGS: Old spine images obtained for spine count purposes are not of diagnostic quality but demonstrate incompletely evaluated lesions within the cervical spine of low signal on T1-weighted images. Multiple enhancing lesions throughout the thoracic spine, especially involving the T7 T9 and T12 levels. The lesion is also partially imaged at L1. There is mild height loss at T9. Normal alignment is maintained. No significant retropulsion of bony fragments. No clear tumor extension into the epidural space. The thoracic cord demonstrates normal signal. The intervertebral discs are dehydrated and demonstrate height loss at multiple levels.  No significant spinal canal or neuroforaminal narrowing. IMPRESSION: 1. Metastatic lesions throughout the thoracic spine. 2.  Mild compression deformity at T9. No tumor extension into the epidural space. 3.  Incompletely evaluated lesions within the cervical spine. Mri Lumb Spine W Wo Cont    Result Date: 10/6/2020  EXAM: MRI OF THE LUMBAR SPINE WITHOUT AND WITH CONTRAST INDICATION: Metastatic prostate cancer COMPARISON: Radiographs dated 9/1/2020, bone scan dated 10/1/2020 TECHNIQUE: MRI sequences of the lumbar spine were obtained before and after administration of IV gadolinium contrast. 20 mL Dotarem was administered. FINDINGS: Enhancing metastatic lesions at T12, L1, and L4 causing clear bony destruction in mild disc height loss. No significant retropulsion of fracture fragments. Enhancing lesions also present in remaining levels of the lumbar spine, throughout the sacrum, and within the bilateral iliac bones. Lesions within the posterior elements at multiple levels. No clear tumor extension into the epidural space. Enlarged right iliac node measuring at least 1.4 cm short axis. The intervertebral discs are mildly dehydrated at multiple levels. Normal alignment is maintained. L1-2: No significant spinal canal stenosis or neuroforaminal narrowing. L2-3:     No significant spinal canal stenosis or neuroforaminal narrowing. L3-4:     Mild broad-based disc bulge and facet arthrosis without significant spinal canal or neuroforaminal narrowing. L4-5:     No significant spinal canal stenosis or neuroforaminal narrowing. L5-S1:  Broad-based disc bulge and facet arthrosis resulting in mild bilateral neuroforaminal narrowing without significant spinal canal stenosis. IMPRESSION: 1. Metastatic lesions throughout the imaged portions of the lumbosacral spine, including destructive lesions at T12, L1, and L4 resulting in mild vertebral body height loss. No significant retropulsion of fracture fragments.  No tumor involvement of the epidural space. 2.  Right iliac metastatic adenopathy. 3.  Mild spondylosis. Ct Chest W Cont    Result Date: 9/18/2020  EXAM: CT CHEST WITH CONTRAST INDICATION: History of prostate cancer, abnormal findings in the lung on. COMPARISON: CT abdomen and pelvis dated 5/23/2017 TECHNIQUE:  CT imaging was performed of the chest after intravenous injection of 100 mL Isovue 370. Intravenous contrast was used for better evaluation of solid organs and vascular structures. Coronal reformatted imaging provided. Radiation dose reduction techniques were used for this study. Our CT scanners use one or all of the following: Automated exposure control, adjustment of the mA and/or kV according to patient size, iterative reconstruction. FINDINGS: Mediastinum and visualized thyroid: Normal. Heart: Normal. Large Vessels: Normal. Pleura: No significant pleural thickening. No calcification. Lungs: No lung mass or discrete nodule. Airways: Normal. Lymph nodes: Normal. Bones/Soft tissues: Multiple lytic lesions are present in the bones, including the distal right clavicle as well as the thoracic spine at multiple levels. There is a subacute appearing pathologic compression fracture at L1. Severe bilateral gynecomastia. Visualized abdomen: Normal.     IMPRESSION: 1.  Multiple lytic lesions present within the bones concerning for multiple myeloma or metastasis from an unknown primary neoplasm. 2.  No significant pleural or parenchymal abnormality. [facility] Cont    Result Date: 9/18/2020  CT CERVICAL SPINE INDICATION: Abnormal cervical spine x-ray COMPARISON: Radiographs dated 9/3/2020 TECHNIQUE: Contiguous axial images from the skull base through the superior mediastinum were obtained with coronal and sagittal reformations. This examination was interpreted using multiplanar reconstructions. This rendering was performed because of the indication(s) for the examinations.   If multiplanar reconstructions had not been performed, the likelihood of detecting an abnormality relevant to the patient's condition would have been substantially decreased. Radiation dose reduction techniques were used for this study. Our CT scanners use one or all of the following: Automated exposure control, adjustment of the mA and/or kV according to patient size, iterative reconstruction. FINDINGS: Lytic lesions involving the C4 and C5 vertebral bodies. There is also a destructive lesion of the C4 spinous process. Partially imaged lytic lesion of the thoracic spine. Suspected age-indeterminate compression fracture at C4 with only minimal vertebral body height loss. Normal alignment is maintained. Fluid opacification of the right mastoid air cell complex. No significant bony narrowing of the spinal canal or neural foramina. IMPRESSION: 1. Lytic cervical spine lesions concerning for multiple myeloma or metastasis from an unknown primary neoplasm. The lesions are not sclerotic as typically seen in metastatic prostate cancer. 2.  Suspected age-indeterminate compression fracture at C4. Pet/ct Tumor Image Skull Thigh (sub)    Result Date: 10/28/2020  PET/CT  Indication: Restaging prostate carcinoma; neuroendocrine carcinoma, high-grade Radiopharmaceutical: 13.77 mCi F18-FDG, intravenously. Technique: Imaging was performed from the skull through the proximal thighs using routine PET/CT acquisition protocol. Imaging was performed approximately 60 minutes post injection. Oral contrast was administered. Radiation dose reduction techniques were used for this study:  Our CT scanners use one or all of the following: Automated exposure control, adjustment of the mA and/or kVp according to patient's size, iterative reconstruction. Comparison studies: Chest CT 9/18/2020, CT abdomen pelvis 5/23/2017, MRI of lumbar and thoracic spine 10/5/2020, calf of class the imaging 10/14/2020 Findings: Head and Neck: No lymphadenopathy. Diffuse lytic osseous foci.  Chest: Diffuse lytic osseous foci. Gynecomastia. No discrete focal pulmonary lesion on nonbreath-hold technique. No lymphadenopathy. Abdomen/Pelvis: Diffuse osseous disease. No bowel obstruction. Extensive right pelvic lymph adenopathy measuring up to 2.1 cm short axis with elevated FDG activity, max SUV 6.8. Additional retroperitoneal FDG avid lymph nodes are identified to the level of takeoff DIANA. Extensive abnormal appearance of the prostate with abnormal prostate tissue on the left extending to the pelvic sidewall with associated intense FDG activity. Involvement of the bladder base cannot be excluded. Max SUV of the prostate 7.2. IMPRESSION: 1. Marked abnormal appearance of the prostate as above consistent with FDG avid disease. 2. FDG avid metastatic right pelvic and lower retroperitoneal adenopathy. 3. Diffuse osseous metastatic foci with elevated FDG activity. IMPRESSION:  Bernardino Troy is a 71 y.o. male with metastatic high grade neuroendocrine carcinoma most likely from prostate primary. He has multiple osseous metastases but at the present only pain in the right shoulder area and the right groin. PLAN:  Recommend palliative radiation to the right distal clavicle and right pubic symphysis , 800 cGy in one fraction. The rationale for treatment and the possible side effects have been discussed with the patient who appears to understand and agrees to the treatment plan. Patient will return next week for CT simulation.       Karen Matthew MD   November 24, 2020

## 2020-11-25 ENCOUNTER — HOSPITAL ENCOUNTER (OUTPATIENT)
Dept: INFUSION THERAPY | Age: 65
Discharge: HOME | End: 2020-11-25
Payer: MEDICARE

## 2020-11-25 ENCOUNTER — HOSPITAL ENCOUNTER (OUTPATIENT)
Dept: LAB | Age: 65
Discharge: HOME | End: 2020-11-25
Payer: MEDICARE

## 2020-11-25 DIAGNOSIS — E87.6 HYPOKALEMIA: Primary | ICD-10-CM

## 2020-11-25 DIAGNOSIS — C77.2 METASTASIS TO RETROPERITONEAL LYMPH NODE (HCC): ICD-10-CM

## 2020-11-25 DIAGNOSIS — C61 PRIMARY PROSTATE CANCER WITH METASTASIS FROM PROSTATE TO OTHER SITE (HCC): ICD-10-CM

## 2020-11-25 DIAGNOSIS — C61 MALIGNANT NEOPLASM OF PROSTATE (HCC): ICD-10-CM

## 2020-11-25 LAB
ALBUMIN SERPL-MCNC: 3.7 G/DL (ref 3.2–4.6)
ALBUMIN/GLOB SERPL: 1.1 {RATIO} (ref 1.2–3.5)
ALP SERPL-CCNC: 159 U/L (ref 50–136)
ALT SERPL-CCNC: 32 U/L (ref 12–65)
ANION GAP SERPL CALC-SCNC: 5 MMOL/L (ref 7–16)
AST SERPL-CCNC: 22 U/L (ref 15–37)
BASOPHILS # BLD: 0 K/UL (ref 0–0.2)
BASOPHILS NFR BLD: 0 % (ref 0–2)
BILIRUB SERPL-MCNC: 0.2 MG/DL (ref 0.2–1.1)
BUN SERPL-MCNC: 11 MG/DL (ref 8–23)
CALCIUM SERPL-MCNC: 9 MG/DL (ref 8.3–10.4)
CHLORIDE SERPL-SCNC: 108 MMOL/L (ref 98–107)
CO2 SERPL-SCNC: 27 MMOL/L (ref 21–32)
CREAT SERPL-MCNC: 0.8 MG/DL (ref 0.8–1.5)
DIFFERENTIAL METHOD BLD: ABNORMAL
EOSINOPHIL # BLD: 0 K/UL (ref 0–0.8)
EOSINOPHIL NFR BLD: 1 % (ref 0.5–7.8)
ERYTHROCYTE [DISTWIDTH] IN BLOOD BY AUTOMATED COUNT: 15.5 % (ref 11.9–14.6)
GLOBULIN SER CALC-MCNC: 3.3 G/DL (ref 2.3–3.5)
GLUCOSE SERPL-MCNC: 92 MG/DL (ref 65–100)
HCT VFR BLD AUTO: 27.9 % (ref 41.1–50.3)
HGB BLD-MCNC: 9.2 G/DL (ref 13.6–17.2)
IMM GRANULOCYTES # BLD AUTO: 0 K/UL (ref 0–0.5)
IMM GRANULOCYTES NFR BLD AUTO: 1 % (ref 0–5)
LYMPHOCYTES # BLD: 1.6 K/UL (ref 0.5–4.6)
LYMPHOCYTES NFR BLD: 39 % (ref 13–44)
MCH RBC QN AUTO: 31.9 PG (ref 26.1–32.9)
MCHC RBC AUTO-ENTMCNC: 33 G/DL (ref 31.4–35)
MCV RBC AUTO: 96.9 FL (ref 79.6–97.8)
MONOCYTES # BLD: 0.7 K/UL (ref 0.1–1.3)
MONOCYTES NFR BLD: 18 % (ref 4–12)
NEUTS SEG # BLD: 1.7 K/UL (ref 1.7–8.2)
NEUTS SEG NFR BLD: 42 % (ref 43–78)
NRBC # BLD: 0.02 K/UL (ref 0–0.2)
PLATELET # BLD AUTO: 267 K/UL (ref 150–450)
PMV BLD AUTO: 9 FL (ref 9.4–12.3)
POTASSIUM SERPL-SCNC: 3.3 MMOL/L (ref 3.5–5.1)
PROT SERPL-MCNC: 7 G/DL (ref 6.3–8.2)
PSA SERPL-MCNC: 1.7 NG/ML
RBC # BLD AUTO: 2.88 M/UL (ref 4.23–5.67)
SODIUM SERPL-SCNC: 140 MMOL/L (ref 136–145)
WBC # BLD AUTO: 4.1 K/UL (ref 4.3–11.1)

## 2020-11-25 PROCEDURE — 96375 TX/PRO/DX INJ NEW DRUG ADDON: CPT

## 2020-11-25 PROCEDURE — 84153 ASSAY OF PSA TOTAL: CPT

## 2020-11-25 PROCEDURE — 74011000250 HC RX REV CODE- 250: Performed by: NURSE PRACTITIONER

## 2020-11-25 PROCEDURE — 96367 TX/PROPH/DG ADDL SEQ IV INF: CPT

## 2020-11-25 PROCEDURE — 80053 COMPREHEN METABOLIC PANEL: CPT

## 2020-11-25 PROCEDURE — 96413 CHEMO IV INFUSION 1 HR: CPT

## 2020-11-25 PROCEDURE — 74011250637 HC RX REV CODE- 250/637: Performed by: NURSE PRACTITIONER

## 2020-11-25 PROCEDURE — 96417 CHEMO IV INFUS EACH ADDL SEQ: CPT

## 2020-11-25 PROCEDURE — 36415 COLL VENOUS BLD VENIPUNCTURE: CPT

## 2020-11-25 PROCEDURE — 74011250636 HC RX REV CODE- 250/636: Performed by: NURSE PRACTITIONER

## 2020-11-25 PROCEDURE — 74011000258 HC RX REV CODE- 258: Performed by: NURSE PRACTITIONER

## 2020-11-25 PROCEDURE — 85025 COMPLETE CBC W/AUTO DIFF WBC: CPT

## 2020-11-25 RX ORDER — ONDANSETRON 2 MG/ML
8 INJECTION INTRAMUSCULAR; INTRAVENOUS ONCE
Status: COMPLETED | OUTPATIENT
Start: 2020-11-25 | End: 2020-11-25

## 2020-11-25 RX ORDER — POTASSIUM CHLORIDE 750 MG/1
20 TABLET, EXTENDED RELEASE ORAL
Status: COMPLETED | OUTPATIENT
Start: 2020-11-25 | End: 2020-11-25

## 2020-11-25 RX ORDER — SODIUM CHLORIDE 9 MG/ML
25 INJECTION, SOLUTION INTRAVENOUS CONTINUOUS
Status: DISCONTINUED | OUTPATIENT
Start: 2020-11-25 | End: 2020-11-26 | Stop reason: HOSPADM

## 2020-11-25 RX ORDER — SODIUM CHLORIDE 0.9 % (FLUSH) 0.9 %
10 SYRINGE (ML) INJECTION AS NEEDED
Status: DISCONTINUED | OUTPATIENT
Start: 2020-11-25 | End: 2020-11-26 | Stop reason: HOSPADM

## 2020-11-25 RX ADMIN — POTASSIUM CHLORIDE 20 MEQ: 750 TABLET, EXTENDED RELEASE ORAL at 12:51

## 2020-11-25 RX ADMIN — SODIUM CHLORIDE 150 MG: 900 INJECTION, SOLUTION INTRAVENOUS at 13:08

## 2020-11-25 RX ADMIN — ONDANSETRON 8 MG: 2 INJECTION INTRAMUSCULAR; INTRAVENOUS at 12:52

## 2020-11-25 RX ADMIN — DEXAMETHASONE SODIUM PHOSPHATE 12 MG: 4 INJECTION, SOLUTION INTRAMUSCULAR; INTRAVENOUS at 12:53

## 2020-11-25 RX ADMIN — Medication 10 ML: at 15:38

## 2020-11-25 RX ADMIN — Medication 10 ML: at 12:30

## 2020-11-25 RX ADMIN — FAMOTIDINE 20 MG: 10 INJECTION, SOLUTION INTRAVENOUS at 15:32

## 2020-11-25 RX ADMIN — CARBOPLATIN 750 MG: 10 INJECTION, SOLUTION INTRAVENOUS at 13:42

## 2020-11-25 RX ADMIN — ETOPOSIDE 218 MG: 20 INJECTION INTRAVENOUS at 14:20

## 2020-11-25 RX ADMIN — SODIUM CHLORIDE 25 ML/HR: 900 INJECTION, SOLUTION INTRAVENOUS at 13:28

## 2020-11-25 NOTE — ADDENDUM NOTE
Encounter addended by: ASIA Braden [facility] [place] on: 11/25/2020 1:30 PM   Actions taken: [employer]ent created or edited

## 2020-11-25 NOTE — PROGRESS NOTES
Arrived to the [facility]. Assessment complete, labs reviewed. Carboplatin and Etoposide completed. Patient tolerated without problems. Any issues or concerns during appointment: Pt developed indigestion at completion of treatment. Pepcid administered IV. Post Pepcid, pt states his indigestions was tolerable. Pt's port flushed and left accessed, per pt request, for infusion appointment tomorrow. Patient aware of next infusion appointment on 11/26/2020 (date) at 0930 (time). Discharged ambulatory.

## 2020-11-26 ENCOUNTER — HOSPITAL ENCOUNTER (OUTPATIENT)
Dept: INFUSION THERAPY | Age: 65
Discharge: HOME | End: 2020-11-26
Payer: MEDICARE

## 2020-11-26 VITALS
SYSTOLIC BLOOD PRESSURE: 170 MMHG | RESPIRATION RATE: 18 BRPM | WEIGHT: 217.6 LBS | HEART RATE: 70 BPM | DIASTOLIC BLOOD PRESSURE: 93 MMHG | BODY MASS INDEX: 32.13 KG/M2 | TEMPERATURE: 98.1 F | OXYGEN SATURATION: 100 %

## 2020-11-26 DIAGNOSIS — C61 PRIMARY PROSTATE CANCER WITH METASTASIS FROM PROSTATE TO OTHER SITE (HCC): ICD-10-CM

## 2020-11-26 DIAGNOSIS — E87.6 HYPOKALEMIA: Primary | ICD-10-CM

## 2020-11-26 DIAGNOSIS — C77.2 METASTASIS TO RETROPERITONEAL LYMPH NODE (HCC): ICD-10-CM

## 2020-11-26 PROCEDURE — 74011250636 HC RX REV CODE- 250/636: Performed by: NURSE PRACTITIONER

## 2020-11-26 PROCEDURE — 96375 TX/PRO/DX INJ NEW DRUG ADDON: CPT

## 2020-11-26 PROCEDURE — 96413 CHEMO IV INFUSION 1 HR: CPT

## 2020-11-26 RX ORDER — SODIUM CHLORIDE 9 MG/ML
25 INJECTION, SOLUTION INTRAVENOUS CONTINUOUS
Status: ACTIVE | OUTPATIENT
Start: 2020-11-26 | End: 2020-11-26

## 2020-11-26 RX ORDER — SODIUM CHLORIDE 9 MG/ML
10 INJECTION INTRAMUSCULAR; INTRAVENOUS; SUBCUTANEOUS AS NEEDED
Status: ACTIVE | OUTPATIENT
Start: 2020-11-26 | End: 2020-11-26

## 2020-11-26 RX ORDER — DEXAMETHASONE SODIUM PHOSPHATE 4 MG/ML
8 INJECTION, SOLUTION INTRA-ARTICULAR; INTRALESIONAL; INTRAMUSCULAR; INTRAVENOUS; SOFT TISSUE ONCE
Status: COMPLETED | OUTPATIENT
Start: 2020-11-26 | End: 2020-11-26

## 2020-11-26 RX ADMIN — DEXAMETHASONE SODIUM PHOSPHATE 8 MG: 4 INJECTION, SOLUTION INTRAMUSCULAR; INTRAVENOUS at 09:36

## 2020-11-26 RX ADMIN — SODIUM CHLORIDE 10 ML: 9 INJECTION INTRAMUSCULAR; INTRAVENOUS; SUBCUTANEOUS at 11:22

## 2020-11-26 RX ADMIN — SODIUM CHLORIDE 25 ML/HR: 900 INJECTION, SOLUTION INTRAVENOUS at 09:22

## 2020-11-26 RX ADMIN — ETOPOSIDE 218 MG: 20 INJECTION INTRAVENOUS at 10:15

## 2020-11-26 NOTE — PROGRESS NOTES
Arrived to the [facility]. Assessment completed, labs reviewed. Etoposide completed. Patient tolerated without problems. Any issues or concerns during appointment: None  Instructed to call Dr Karma Jackman with any side effects or concerns  Patient aware of next infusion appointment on 11/27/20 (date) at 8 AM (time).   Discharged ambulatory

## 2020-11-27 ENCOUNTER — HOSPITAL ENCOUNTER (OUTPATIENT)
Dept: INFUSION THERAPY | Age: 65
Discharge: HOME | End: 2020-11-27
Payer: MEDICARE

## 2020-11-27 VITALS
HEART RATE: 59 BPM | TEMPERATURE: 97.3 F | DIASTOLIC BLOOD PRESSURE: 79 MMHG | OXYGEN SATURATION: 99 % | BODY MASS INDEX: 31.9 KG/M2 | RESPIRATION RATE: 18 BRPM | WEIGHT: 216 LBS | SYSTOLIC BLOOD PRESSURE: 138 MMHG

## 2020-11-27 DIAGNOSIS — C61 PRIMARY PROSTATE CANCER WITH METASTASIS FROM PROSTATE TO OTHER SITE (HCC): ICD-10-CM

## 2020-11-27 DIAGNOSIS — E87.6 HYPOKALEMIA: Primary | ICD-10-CM

## 2020-11-27 DIAGNOSIS — C77.2 METASTASIS TO RETROPERITONEAL LYMPH NODE (HCC): ICD-10-CM

## 2020-11-27 PROCEDURE — 74011250636 HC RX REV CODE- 250/636: Performed by: NURSE PRACTITIONER

## 2020-11-27 PROCEDURE — 96413 CHEMO IV INFUSION 1 HR: CPT

## 2020-11-27 PROCEDURE — 96375 TX/PRO/DX INJ NEW DRUG ADDON: CPT

## 2020-11-27 RX ORDER — SODIUM CHLORIDE 9 MG/ML
25 INJECTION, SOLUTION INTRAVENOUS CONTINUOUS
Status: ACTIVE | OUTPATIENT
Start: 2020-11-27 | End: 2020-11-27

## 2020-11-27 RX ORDER — DEXAMETHASONE SODIUM PHOSPHATE 4 MG/ML
8 INJECTION, SOLUTION INTRA-ARTICULAR; INTRALESIONAL; INTRAMUSCULAR; INTRAVENOUS; SOFT TISSUE ONCE
Status: COMPLETED | OUTPATIENT
Start: 2020-11-27 | End: 2020-11-27

## 2020-11-27 RX ORDER — SODIUM CHLORIDE 0.9 % (FLUSH) 0.9 %
10 SYRINGE (ML) INJECTION AS NEEDED
Status: ACTIVE | OUTPATIENT
Start: 2020-11-27 | End: 2020-11-27

## 2020-11-27 RX ADMIN — Medication 10 ML: at 13:27

## 2020-11-27 RX ADMIN — ETOPOSIDE 218 MG: 20 INJECTION INTRAVENOUS at 12:15

## 2020-11-27 RX ADMIN — SODIUM CHLORIDE 25 ML/HR: 9 INJECTION, SOLUTION INTRAVENOUS at 11:55

## 2020-11-27 RX ADMIN — DEXAMETHASONE SODIUM PHOSPHATE 8 MG: 4 INJECTION, SOLUTION INTRAMUSCULAR; INTRAVENOUS at 12:00

## 2020-12-03 ENCOUNTER — HOSPITAL ENCOUNTER (OUTPATIENT)
Dept: RADIATION ONCOLOGY | Age: 65
Discharge: HOME | End: 2020-12-03
Payer: MEDICARE

## 2020-12-03 PROCEDURE — 77285 THER RAD SIMULAJ FIELD INTRM: CPT

## 2020-12-09 ENCOUNTER — HOSPITAL ENCOUNTER (OUTPATIENT)
Dept: RADIATION ONCOLOGY | Age: 65
Discharge: HOME | End: 2020-12-09
Payer: MEDICARE

## 2020-12-10 ENCOUNTER — HOSPITAL ENCOUNTER (OUTPATIENT)
Dept: RADIATION ONCOLOGY | Age: 65
Discharge: HOME | End: 2020-12-10
Payer: MEDICARE

## 2020-12-10 PROCEDURE — 77295 3-D RADIOTHERAPY PLAN: CPT

## 2020-12-10 PROCEDURE — 77300 RADIATION THERAPY DOSE PLAN: CPT

## 2020-12-10 PROCEDURE — 77334 RADIATION TREATMENT AID(S): CPT

## 2020-12-11 ENCOUNTER — HOSPITAL ENCOUNTER (OUTPATIENT)
Dept: RADIATION ONCOLOGY | Age: 65
Discharge: HOME | End: 2020-12-11
Payer: MEDICARE

## 2020-12-11 PROCEDURE — 77280 THER RAD SIMULAJ FIELD SMPL: CPT

## 2020-12-11 PROCEDURE — 77336 RADIATION PHYSICS CONSULT: CPT

## 2020-12-11 PROCEDURE — 77412 RADIATION TX DELIVERY LVL 3: CPT

## 2020-12-14 NOTE — PROGRESS NOTES
Patient: Alexia Hernadez MRN: [id number]  SSN: [id number]    YOB: 1955  Age: 71 y.o. Sex: male      DIAGNOSIS:  Metastatic prostate cancer    TREATMENT SITE:  R clavicle and R pubic symphsysis    DOSE and FRACTIONATION:  800 cGy x 1 to both sites    INTERVAL HISTORY:  Alexia Hernadez is a 71 y.o. male being treated for bone mets    No complaints     OBJECTIVE:  NAD  There were no vitals taken for this visit. Lab Results   Component Value Date/Time    Sodium 140 11/25/2020 11:17 AM    Potassium 3.3 (L) 11/25/2020 11:17 AM    Chloride 108 (H) 11/25/2020 11:17 AM    CO2 27 11/25/2020 11:17 AM    Anion gap 5 (L) 11/25/2020 11:17 AM    Glucose 92 11/25/2020 11:17 AM    BUN 11 11/25/2020 11:17 AM    Creatinine 0.80 11/25/2020 11:17 AM    GFR est AA >60 11/25/2020 11:17 AM    GFR est non-AA >60 11/25/2020 11:17 AM    Calcium 9.0 11/25/2020 11:17 AM    Magnesium 2.0 09/01/2017 10:20 AM    Albumin 3.7 11/25/2020 11:17 AM    Protein, total 7.0 11/25/2020 11:17 AM    Globulin 3.3 11/25/2020 11:17 AM    A-G Ratio 1.1 (L) 11/25/2020 11:17 AM    ALT (SGPT) 32 11/25/2020 11:17 AM     Lab Results   Component Value Date/Time    WBC 4.1 (L) 11/25/2020 11:17 AM    HGB 9.2 (L) 11/25/2020 11:17 AM    HCT 27.9 (L) 11/25/2020 11:17 AM    PLATELET 883 69/33/9778 11:17 AM       ASSESSMENT and PLAN:  Alexia Hernadez is tolerating radiation as anticipated for the current dose and fraction. F/U prn.     Le Francisco MD   December 14, 2020

## 2020-12-16 ENCOUNTER — HOSPITAL ENCOUNTER (OUTPATIENT)
Dept: INFUSION THERAPY | Age: 65
Discharge: HOME | End: 2020-12-16
Payer: MEDICARE

## 2020-12-16 DIAGNOSIS — C77.2 METASTASIS TO RETROPERITONEAL LYMPH NODE (HCC): Primary | ICD-10-CM

## 2020-12-16 DIAGNOSIS — E87.6 HYPOKALEMIA: ICD-10-CM

## 2020-12-16 DIAGNOSIS — C61 MALIGNANT NEOPLASM OF PROSTATE (HCC): ICD-10-CM

## 2020-12-16 DIAGNOSIS — D64.9 ANEMIA, UNSPECIFIED TYPE: ICD-10-CM

## 2020-12-16 DIAGNOSIS — C61 PRIMARY PROSTATE CANCER WITH METASTASIS FROM PROSTATE TO OTHER SITE (HCC): ICD-10-CM

## 2020-12-16 LAB
ALBUMIN SERPL-MCNC: 3.5 G/DL (ref 3.2–4.6)
ALBUMIN/GLOB SERPL: 0.9 {RATIO} (ref 1.2–3.5)
ALP SERPL-CCNC: 111 U/L (ref 50–136)
ALT SERPL-CCNC: 25 U/L (ref 12–65)
ANION GAP SERPL CALC-SCNC: 9 MMOL/L (ref 7–16)
AST SERPL-CCNC: 18 U/L (ref 15–37)
BASOPHILS # BLD: 0 K/UL (ref 0–0.2)
BASOPHILS NFR BLD: 0 % (ref 0–2)
BILIRUB SERPL-MCNC: 0.2 MG/DL (ref 0.2–1.1)
BUN SERPL-MCNC: 13 MG/DL (ref 8–23)
CALCIUM SERPL-MCNC: 9.1 MG/DL (ref 8.3–10.4)
CEA SERPL-MCNC: 112.7 NG/ML (ref 0–3)
CHLORIDE SERPL-SCNC: 105 MMOL/L (ref 98–107)
CO2 SERPL-SCNC: 24 MMOL/L (ref 21–32)
CREAT SERPL-MCNC: 0.9 MG/DL (ref 0.8–1.5)
DIFFERENTIAL METHOD BLD: ABNORMAL
EOSINOPHIL # BLD: 0 K/UL (ref 0–0.8)
EOSINOPHIL NFR BLD: 0 % (ref 0.5–7.8)
ERYTHROCYTE [DISTWIDTH] IN BLOOD BY AUTOMATED COUNT: 18.2 % (ref 11.9–14.6)
FERRITIN SERPL-MCNC: 589 NG/ML (ref 8–388)
GLOBULIN SER CALC-MCNC: 4 G/DL (ref 2.3–3.5)
GLUCOSE SERPL-MCNC: 106 MG/DL (ref 65–100)
HCT VFR BLD AUTO: 26.4 % (ref 41.1–50.3)
HGB BLD-MCNC: 8.4 G/DL (ref 13.6–17.2)
HGB RETIC QN AUTO: 34 PG (ref 29–35)
IMM GRANULOCYTES # BLD AUTO: 0.1 K/UL (ref 0–0.5)
IMM GRANULOCYTES NFR BLD AUTO: 3 % (ref 0–5)
IMM RETICS NFR: 31.1 % (ref 2.3–13.4)
IRON SATN MFR SERPL: 28 %
IRON SERPL-MCNC: 67 UG/DL (ref 35–150)
LYMPHOCYTES # BLD: 1.4 K/UL (ref 0.5–4.6)
LYMPHOCYTES NFR BLD: 30 % (ref 13–44)
MCH RBC QN AUTO: 31.9 PG (ref 26.1–32.9)
MCHC RBC AUTO-ENTMCNC: 31.8 G/DL (ref 31.4–35)
MCV RBC AUTO: 100.4 FL (ref 79.6–97.8)
MONOCYTES # BLD: 0.7 K/UL (ref 0.1–1.3)
MONOCYTES NFR BLD: 16 % (ref 4–12)
NEUTS SEG # BLD: 2.4 K/UL (ref 1.7–8.2)
NEUTS SEG NFR BLD: 51 % (ref 43–78)
NRBC # BLD: 0 K/UL (ref 0–0.2)
PLATELET # BLD AUTO: 304 K/UL (ref 150–450)
PMV BLD AUTO: 8.7 FL (ref 9.4–12.3)
POTASSIUM SERPL-SCNC: 3.8 MMOL/L (ref 3.5–5.1)
PROT SERPL-MCNC: 7.5 G/DL (ref 6.3–8.2)
PSA SERPL-MCNC: 1.7 NG/ML
RBC # BLD AUTO: 2.63 M/UL (ref 4.23–5.67)
RETICS # AUTO: 0.15 M/UL (ref 0.03–0.1)
RETICS/RBC NFR AUTO: 5.5 % (ref 0.3–2)
SODIUM SERPL-SCNC: 138 MMOL/L (ref 136–145)
TIBC SERPL-MCNC: 241 UG/DL (ref 250–450)
WBC # BLD AUTO: 4.7 K/UL (ref 4.3–11.1)

## 2020-12-16 PROCEDURE — 82668 ASSAY OF ERYTHROPOIETIN: CPT

## 2020-12-16 PROCEDURE — 96417 CHEMO IV INFUS EACH ADDL SEQ: CPT

## 2020-12-16 PROCEDURE — 36415 COLL VENOUS BLD VENIPUNCTURE: CPT

## 2020-12-16 PROCEDURE — 84153 ASSAY OF PSA TOTAL: CPT

## 2020-12-16 PROCEDURE — 82378 CARCINOEMBRYONIC ANTIGEN: CPT

## 2020-12-16 PROCEDURE — 83540 ASSAY OF IRON: CPT

## 2020-12-16 PROCEDURE — 82728 ASSAY OF FERRITIN: CPT

## 2020-12-16 PROCEDURE — 96367 TX/PROPH/DG ADDL SEQ IV INF: CPT

## 2020-12-16 PROCEDURE — 74011000258 HC RX REV CODE- 258: Performed by: INTERNAL MEDICINE

## 2020-12-16 PROCEDURE — 96413 CHEMO IV INFUSION 1 HR: CPT

## 2020-12-16 PROCEDURE — 96375 TX/PRO/DX INJ NEW DRUG ADDON: CPT

## 2020-12-16 PROCEDURE — 85046 RETICYTE/HGB CONCENTRATE: CPT

## 2020-12-16 PROCEDURE — 74011250636 HC RX REV CODE- 250/636: Performed by: INTERNAL MEDICINE

## 2020-12-16 PROCEDURE — 80053 COMPREHEN METABOLIC PANEL: CPT

## 2020-12-16 PROCEDURE — 85025 COMPLETE CBC W/AUTO DIFF WBC: CPT

## 2020-12-16 RX ORDER — SODIUM CHLORIDE 0.9 % (FLUSH) 0.9 %
10 SYRINGE (ML) INJECTION AS NEEDED
Status: ACTIVE | OUTPATIENT
Start: 2020-12-16 | End: 2020-12-16

## 2020-12-16 RX ORDER — SODIUM CHLORIDE 9 MG/ML
25 INJECTION, SOLUTION INTRAVENOUS CONTINUOUS
Status: ACTIVE | OUTPATIENT
Start: 2020-12-16 | End: 2020-12-16

## 2020-12-16 RX ORDER — ONDANSETRON 2 MG/ML
8 INJECTION INTRAMUSCULAR; INTRAVENOUS ONCE
Status: COMPLETED | OUTPATIENT
Start: 2020-12-16 | End: 2020-12-16

## 2020-12-16 RX ADMIN — SODIUM CHLORIDE 25 ML/HR: 9 INJECTION, SOLUTION INTRAVENOUS at 10:50

## 2020-12-16 RX ADMIN — CARBOPLATIN 678 MG: 10 INJECTION, SOLUTION INTRAVENOUS at 11:35

## 2020-12-16 RX ADMIN — SODIUM CHLORIDE 150 MG: 900 INJECTION, SOLUTION INTRAVENOUS at 11:07

## 2020-12-16 RX ADMIN — ETOPOSIDE 218 MG: 20 INJECTION INTRAVENOUS at 12:05

## 2020-12-16 RX ADMIN — DEXAMETHASONE SODIUM PHOSPHATE 12 MG: 4 INJECTION, SOLUTION INTRAMUSCULAR; INTRAVENOUS at 10:52

## 2020-12-16 RX ADMIN — Medication 10 ML: at 13:06

## 2020-12-16 RX ADMIN — ONDANSETRON 8 MG: 2 INJECTION INTRAMUSCULAR; INTRAVENOUS at 10:51

## 2020-12-16 NOTE — PROGRESS NOTES
Arrived to the [facility]. Carbo/VP16 completed. Patient tolerated well. Port remains accessed per patient request.  Any issues or concerns during appointment: none. Patient aware of next infusion appointment on 12/17 at 1100. Discharged ambulatory.       Scott Matthew RN

## 2020-12-17 ENCOUNTER — HOSPITAL ENCOUNTER (OUTPATIENT)
Dept: INFUSION THERAPY | Age: 65
Discharge: HOME | End: 2020-12-17
Payer: MEDICARE

## 2020-12-17 VITALS
DIASTOLIC BLOOD PRESSURE: 73 MMHG | HEART RATE: 74 BPM | OXYGEN SATURATION: 98 % | SYSTOLIC BLOOD PRESSURE: 144 MMHG | RESPIRATION RATE: 18 BRPM | WEIGHT: 217.6 LBS | TEMPERATURE: 97.4 F | BODY MASS INDEX: 32.13 KG/M2

## 2020-12-17 DIAGNOSIS — C61 PRIMARY PROSTATE CANCER WITH METASTASIS FROM PROSTATE TO OTHER SITE (HCC): ICD-10-CM

## 2020-12-17 DIAGNOSIS — C77.2 METASTASIS TO RETROPERITONEAL LYMPH NODE (HCC): ICD-10-CM

## 2020-12-17 DIAGNOSIS — E87.6 HYPOKALEMIA: Primary | ICD-10-CM

## 2020-12-17 LAB — EPO SERPL-ACNC: 104.1 MIU/ML (ref 2.6–18.5)

## 2020-12-17 PROCEDURE — 74011250636 HC RX REV CODE- 250/636: Performed by: INTERNAL MEDICINE

## 2020-12-17 PROCEDURE — 96375 TX/PRO/DX INJ NEW DRUG ADDON: CPT

## 2020-12-17 PROCEDURE — 96413 CHEMO IV INFUSION 1 HR: CPT

## 2020-12-17 RX ORDER — SODIUM CHLORIDE 0.9 % (FLUSH) 0.9 %
10 SYRINGE (ML) INJECTION AS NEEDED
Status: ACTIVE | OUTPATIENT
Start: 2020-12-17 | End: 2020-12-17

## 2020-12-17 RX ORDER — DEXAMETHASONE SODIUM PHOSPHATE 4 MG/ML
8 INJECTION, SOLUTION INTRA-ARTICULAR; INTRALESIONAL; INTRAMUSCULAR; INTRAVENOUS; SOFT TISSUE ONCE
Status: COMPLETED | OUTPATIENT
Start: 2020-12-17 | End: 2020-12-17

## 2020-12-17 RX ORDER — SODIUM CHLORIDE 9 MG/ML
25 INJECTION, SOLUTION INTRAVENOUS CONTINUOUS
Status: ACTIVE | OUTPATIENT
Start: 2020-12-17 | End: 2020-12-17

## 2020-12-17 RX ADMIN — DEXAMETHASONE SODIUM PHOSPHATE 8 MG: 4 INJECTION, SOLUTION INTRAMUSCULAR; INTRAVENOUS at 11:41

## 2020-12-17 RX ADMIN — SODIUM CHLORIDE 25 ML/HR: 900 INJECTION, SOLUTION INTRAVENOUS at 11:36

## 2020-12-17 RX ADMIN — Medication 10 ML: at 11:36

## 2020-12-17 RX ADMIN — ETOPOSIDE 218 MG: 20 INJECTION INTRAVENOUS at 12:06

## 2020-12-17 NOTE — PROGRESS NOTES
Arrived to the [facility]. VP16 completed. Patient tolerated without adverse reaction. Any issues or concerns during appointment: none. Patient aware of next infusion appointment on 12/18 (date) at 80 (time). Discharged to home.

## 2020-12-18 ENCOUNTER — HOSPITAL ENCOUNTER (OUTPATIENT)
Dept: INFUSION THERAPY | Age: 65
Discharge: HOME | End: 2020-12-18
Payer: MEDICARE

## 2020-12-18 VITALS
BODY MASS INDEX: 31.9 KG/M2 | RESPIRATION RATE: 18 BRPM | TEMPERATURE: 98.4 F | HEART RATE: 86 BPM | WEIGHT: 216 LBS | SYSTOLIC BLOOD PRESSURE: 160 MMHG | DIASTOLIC BLOOD PRESSURE: 93 MMHG | OXYGEN SATURATION: 99 %

## 2020-12-18 DIAGNOSIS — C77.2 METASTASIS TO RETROPERITONEAL LYMPH NODE (HCC): ICD-10-CM

## 2020-12-18 DIAGNOSIS — C61 PRIMARY PROSTATE CANCER WITH METASTASIS FROM PROSTATE TO OTHER SITE (HCC): ICD-10-CM

## 2020-12-18 DIAGNOSIS — E87.6 HYPOKALEMIA: Primary | ICD-10-CM

## 2020-12-18 PROCEDURE — 96375 TX/PRO/DX INJ NEW DRUG ADDON: CPT

## 2020-12-18 PROCEDURE — 96413 CHEMO IV INFUSION 1 HR: CPT

## 2020-12-18 PROCEDURE — 74011250636 HC RX REV CODE- 250/636: Performed by: INTERNAL MEDICINE

## 2020-12-18 RX ORDER — SODIUM CHLORIDE 0.9 % (FLUSH) 0.9 %
10 SYRINGE (ML) INJECTION AS NEEDED
Status: ACTIVE | OUTPATIENT
Start: 2020-12-18 | End: 2020-12-18

## 2020-12-18 RX ORDER — SODIUM CHLORIDE 9 MG/ML
25 INJECTION, SOLUTION INTRAVENOUS CONTINUOUS
Status: ACTIVE | OUTPATIENT
Start: 2020-12-18 | End: 2020-12-18

## 2020-12-18 RX ORDER — DEXAMETHASONE SODIUM PHOSPHATE 4 MG/ML
8 INJECTION, SOLUTION INTRA-ARTICULAR; INTRALESIONAL; INTRAMUSCULAR; INTRAVENOUS; SOFT TISSUE ONCE
Status: COMPLETED | OUTPATIENT
Start: 2020-12-18 | End: 2020-12-18

## 2020-12-18 RX ADMIN — DEXAMETHASONE SODIUM PHOSPHATE 8 MG: 4 INJECTION, SOLUTION INTRAMUSCULAR; INTRAVENOUS at 11:20

## 2020-12-18 RX ADMIN — Medication 10 ML: at 11:07

## 2020-12-18 RX ADMIN — ETOPOSIDE 218 MG: 20 INJECTION INTRAVENOUS at 11:46

## 2020-12-18 RX ADMIN — SODIUM CHLORIDE 25 ML/HR: 900 INJECTION, SOLUTION INTRAVENOUS at 11:07

## 2020-12-18 NOTE — PROGRESS NOTES
Arrived to the [facility]. VP16 completed. Patient tolerated without adverse reaction. Any issues or concerns during appointment: none.   Discharged to home.

## 2021-01-05 ENCOUNTER — HOSPITAL ENCOUNTER (OUTPATIENT)
Dept: LAB | Age: 66
Discharge: HOME | End: 2021-01-05
Payer: MEDICARE

## 2021-01-05 DIAGNOSIS — C61 MALIGNANT NEOPLASM OF PROSTATE (HCC): ICD-10-CM

## 2021-01-05 PROBLEM — D64.81 ANEMIA DUE TO CHEMOTHERAPY: Status: ACTIVE | Noted: 2021-01-05

## 2021-01-05 PROBLEM — T45.1X5A ANEMIA DUE TO CHEMOTHERAPY: Status: ACTIVE | Noted: 2021-01-05

## 2021-01-05 LAB
ALBUMIN SERPL-MCNC: 3.7 G/DL (ref 3.2–4.6)
ALBUMIN/GLOB SERPL: 1 {RATIO} (ref 1.2–3.5)
ALP SERPL-CCNC: 100 U/L (ref 50–136)
ALT SERPL-CCNC: 24 U/L (ref 12–65)
ANION GAP SERPL CALC-SCNC: 7 MMOL/L (ref 7–16)
AST SERPL-CCNC: 16 U/L (ref 15–37)
BASOPHILS # BLD: 0 K/UL (ref 0–0.2)
BASOPHILS NFR BLD: 0 % (ref 0–2)
BILIRUB SERPL-MCNC: 0.3 MG/DL (ref 0.2–1.1)
BUN SERPL-MCNC: 14 MG/DL (ref 8–23)
CALCIUM SERPL-MCNC: 9.4 MG/DL (ref 8.3–10.4)
CEA SERPL-MCNC: 50.6 NG/ML (ref 0–3)
CHLORIDE SERPL-SCNC: 107 MMOL/L (ref 98–107)
CO2 SERPL-SCNC: 27 MMOL/L (ref 21–32)
CREAT SERPL-MCNC: 1 MG/DL (ref 0.8–1.5)
DIFFERENTIAL METHOD BLD: ABNORMAL
EOSINOPHIL # BLD: 0 K/UL (ref 0–0.8)
EOSINOPHIL NFR BLD: 1 % (ref 0.5–7.8)
ERYTHROCYTE [DISTWIDTH] IN BLOOD BY AUTOMATED COUNT: 19.9 % (ref 11.9–14.6)
GLOBULIN SER CALC-MCNC: 3.6 G/DL (ref 2.3–3.5)
GLUCOSE SERPL-MCNC: 100 MG/DL (ref 65–100)
HCT VFR BLD AUTO: 26.4 % (ref 41.1–50.3)
HGB BLD-MCNC: 8.6 G/DL (ref 13.6–17.2)
IMM GRANULOCYTES # BLD AUTO: 0 K/UL (ref 0–0.5)
IMM GRANULOCYTES NFR BLD AUTO: 1 % (ref 0–5)
LYMPHOCYTES # BLD: 1.3 K/UL (ref 0.5–4.6)
LYMPHOCYTES NFR BLD: 38 % (ref 13–44)
MCH RBC QN AUTO: 33 PG (ref 26.1–32.9)
MCHC RBC AUTO-ENTMCNC: 32.6 G/DL (ref 31.4–35)
MCV RBC AUTO: 101.1 FL (ref 79.6–97.8)
MONOCYTES # BLD: 0.7 K/UL (ref 0.1–1.3)
MONOCYTES NFR BLD: 19 % (ref 4–12)
NEUTS SEG # BLD: 1.5 K/UL (ref 1.7–8.2)
NEUTS SEG NFR BLD: 41 % (ref 43–78)
NRBC # BLD: 0.02 K/UL (ref 0–0.2)
PLATELET # BLD AUTO: 180 K/UL (ref 150–450)
PMV BLD AUTO: 8.8 FL (ref 9.4–12.3)
POTASSIUM SERPL-SCNC: 3.7 MMOL/L (ref 3.5–5.1)
PROT SERPL-MCNC: 7.3 G/DL (ref 6.3–8.2)
PSA SERPL-MCNC: 1.9 NG/ML
RBC # BLD AUTO: 2.61 M/UL (ref 4.23–5.67)
SODIUM SERPL-SCNC: 141 MMOL/L (ref 136–145)
WBC # BLD AUTO: 3.6 K/UL (ref 4.3–11.1)

## 2021-01-05 PROCEDURE — 80053 COMPREHEN METABOLIC PANEL: CPT

## 2021-01-05 PROCEDURE — 85025 COMPLETE CBC W/AUTO DIFF WBC: CPT

## 2021-01-05 PROCEDURE — 36415 COLL VENOUS BLD VENIPUNCTURE: CPT

## 2021-01-05 PROCEDURE — 84153 ASSAY OF PSA TOTAL: CPT

## 2021-01-05 PROCEDURE — 82378 CARCINOEMBRYONIC ANTIGEN: CPT

## 2021-01-06 ENCOUNTER — HOSPITAL ENCOUNTER (OUTPATIENT)
Dept: INFUSION THERAPY | Age: 66
Discharge: HOME | End: 2021-01-06
Payer: MEDICARE

## 2021-01-06 VITALS
OXYGEN SATURATION: 96 % | BODY MASS INDEX: 32.02 KG/M2 | SYSTOLIC BLOOD PRESSURE: 128 MMHG | WEIGHT: 216.8 LBS | TEMPERATURE: 97 F | RESPIRATION RATE: 18 BRPM | DIASTOLIC BLOOD PRESSURE: 64 MMHG | HEART RATE: 74 BPM

## 2021-01-06 DIAGNOSIS — C77.2 METASTASIS TO RETROPERITONEAL LYMPH NODE (HCC): ICD-10-CM

## 2021-01-06 DIAGNOSIS — D64.81 ANEMIA DUE TO CHEMOTHERAPY: ICD-10-CM

## 2021-01-06 DIAGNOSIS — T45.1X5A ANEMIA DUE TO CHEMOTHERAPY: ICD-10-CM

## 2021-01-06 DIAGNOSIS — E87.6 HYPOKALEMIA: Primary | ICD-10-CM

## 2021-01-06 DIAGNOSIS — C61 PRIMARY PROSTATE CANCER WITH METASTASIS FROM PROSTATE TO OTHER SITE (HCC): ICD-10-CM

## 2021-01-06 PROCEDURE — 96375 TX/PRO/DX INJ NEW DRUG ADDON: CPT

## 2021-01-06 PROCEDURE — 96417 CHEMO IV INFUS EACH ADDL SEQ: CPT

## 2021-01-06 PROCEDURE — 96413 CHEMO IV INFUSION 1 HR: CPT

## 2021-01-06 PROCEDURE — 74011250636 HC RX REV CODE- 250/636: Performed by: INTERNAL MEDICINE

## 2021-01-06 PROCEDURE — 96367 TX/PROPH/DG ADDL SEQ IV INF: CPT

## 2021-01-06 PROCEDURE — 74011000250 HC RX REV CODE- 250: Performed by: INTERNAL MEDICINE

## 2021-01-06 PROCEDURE — 36593 DECLOT VASCULAR DEVICE: CPT

## 2021-01-06 PROCEDURE — 74011000258 HC RX REV CODE- 258: Performed by: INTERNAL MEDICINE

## 2021-01-06 RX ORDER — SODIUM CHLORIDE 9 MG/ML
25 INJECTION, SOLUTION INTRAVENOUS CONTINUOUS
Status: ACTIVE | OUTPATIENT
Start: 2021-01-06 | End: 2021-01-06

## 2021-01-06 RX ORDER — ONDANSETRON 2 MG/ML
8 INJECTION INTRAMUSCULAR; INTRAVENOUS ONCE
Status: COMPLETED | OUTPATIENT
Start: 2021-01-06 | End: 2021-01-06

## 2021-01-06 RX ORDER — SODIUM CHLORIDE 0.9 % (FLUSH) 0.9 %
10 SYRINGE (ML) INJECTION AS NEEDED
Status: ACTIVE | OUTPATIENT
Start: 2021-01-06 | End: 2021-01-06

## 2021-01-06 RX ADMIN — DEXAMETHASONE SODIUM PHOSPHATE 12 MG: 4 INJECTION, SOLUTION INTRAMUSCULAR; INTRAVENOUS at 08:35

## 2021-01-06 RX ADMIN — ALTEPLASE 2 MG: 2.2 INJECTION, POWDER, LYOPHILIZED, FOR SOLUTION INTRAVENOUS at 08:02

## 2021-01-06 RX ADMIN — SODIUM CHLORIDE 150 MG: 900 INJECTION, SOLUTION INTRAVENOUS at 08:52

## 2021-01-06 RX ADMIN — CARBOPLATIN 631 MG: 10 INJECTION, SOLUTION INTRAVENOUS at 09:30

## 2021-01-06 RX ADMIN — ETOPOSIDE 218 MG: 20 INJECTION INTRAVENOUS at 10:05

## 2021-01-06 RX ADMIN — ONDANSETRON 8 MG: 2 INJECTION INTRAMUSCULAR; INTRAVENOUS at 08:31

## 2021-01-06 RX ADMIN — SODIUM CHLORIDE 25 ML/HR: 9 INJECTION, SOLUTION INTRAVENOUS at 08:30

## 2021-01-06 RX ADMIN — Medication 10 ML: at 11:11

## 2021-01-06 NOTE — PROGRESS NOTES
Arrived to the [facility] ambulatory. Sloan, -16  completed. Patient tolerated well. Any issues or concerns during appointment: Pt C/O pain 4-6 upper right back and neck, pt instructed to use hot and cold pack and to take his pain meds per Yvette Fu NP. Patient aware of next infusion appointment on  1/7 at 0830. Discharged to home ambulatory.

## 2021-01-07 ENCOUNTER — HOSPITAL ENCOUNTER (OUTPATIENT)
Dept: PET IMAGING | Age: 66
Discharge: HOME | End: 2021-01-05
Payer: MEDICARE

## 2021-01-07 ENCOUNTER — HOSPITAL ENCOUNTER (OUTPATIENT)
Dept: PET IMAGING | Age: 66
Discharge: HOME | End: 2021-01-07
Payer: MEDICARE

## 2021-01-07 ENCOUNTER — HOSPITAL ENCOUNTER (OUTPATIENT)
Dept: INFUSION THERAPY | Age: 66
Discharge: HOME | End: 2021-01-07
Payer: MEDICARE

## 2021-01-07 VITALS
HEART RATE: 74 BPM | WEIGHT: 218.4 LBS | DIASTOLIC BLOOD PRESSURE: 91 MMHG | SYSTOLIC BLOOD PRESSURE: 152 MMHG | BODY MASS INDEX: 32.25 KG/M2 | TEMPERATURE: 96.9 F | OXYGEN SATURATION: 99 % | RESPIRATION RATE: 18 BRPM

## 2021-01-07 DIAGNOSIS — C77.2 METASTASIS TO RETROPERITONEAL LYMPH NODE (HCC): ICD-10-CM

## 2021-01-07 DIAGNOSIS — E87.6 HYPOKALEMIA: Primary | ICD-10-CM

## 2021-01-07 DIAGNOSIS — C61 PRIMARY PROSTATE CANCER WITH METASTASIS FROM PROSTATE TO OTHER SITE (HCC): ICD-10-CM

## 2021-01-07 PROCEDURE — 74011250636 HC RX REV CODE- 250/636: Performed by: INTERNAL MEDICINE

## 2021-01-07 PROCEDURE — 96375 TX/PRO/DX INJ NEW DRUG ADDON: CPT

## 2021-01-07 PROCEDURE — 96413 CHEMO IV INFUSION 1 HR: CPT

## 2021-01-07 PROCEDURE — 78815 PET IMAGE W/CT SKULL-THIGH: CPT

## 2021-01-07 PROCEDURE — 74011000636 HC RX REV CODE- 636: Performed by: INTERNAL MEDICINE

## 2021-01-07 RX ORDER — SODIUM CHLORIDE 0.9 % (FLUSH) 0.9 %
10 SYRINGE (ML) INJECTION AS NEEDED
Status: ACTIVE | OUTPATIENT
Start: 2021-01-07 | End: 2021-01-07

## 2021-01-07 RX ORDER — SODIUM CHLORIDE 9 MG/ML
25 INJECTION, SOLUTION INTRAVENOUS CONTINUOUS
Status: ACTIVE | OUTPATIENT
Start: 2021-01-07 | End: 2021-01-07

## 2021-01-07 RX ORDER — SODIUM CHLORIDE 0.9 % (FLUSH) 0.9 %
10 SYRINGE (ML) INJECTION
Status: COMPLETED | OUTPATIENT
Start: 2021-01-07 | End: 2021-01-07

## 2021-01-07 RX ORDER — DEXAMETHASONE SODIUM PHOSPHATE 4 MG/ML
8 INJECTION, SOLUTION INTRA-ARTICULAR; INTRALESIONAL; INTRAMUSCULAR; INTRAVENOUS; SOFT TISSUE ONCE
Status: COMPLETED | OUTPATIENT
Start: 2021-01-07 | End: 2021-01-07

## 2021-01-07 RX ADMIN — SODIUM CHLORIDE 25 ML/HR: 900 INJECTION, SOLUTION INTRAVENOUS at 09:04

## 2021-01-07 RX ADMIN — DIATRIZOATE MEGLUMINE AND DIATRIZOATE SODIUM 10 ML: 660; 100 LIQUID ORAL; RECTAL at 11:56

## 2021-01-07 RX ADMIN — DEXAMETHASONE SODIUM PHOSPHATE 8 MG: 4 INJECTION, SOLUTION INTRAMUSCULAR; INTRAVENOUS at 09:03

## 2021-01-07 RX ADMIN — ETOPOSIDE 218 MG: 20 INJECTION INTRAVENOUS at 09:28

## 2021-01-07 RX ADMIN — Medication 10 ML: at 10:30

## 2021-01-07 RX ADMIN — Medication 10 ML: at 11:56

## 2021-01-07 RX ADMIN — Medication 10 ML: at 08:50

## 2021-01-07 NOTE — PROGRESS NOTES
Arrived to the [facility]. Assessment complete. Etoposide completed. Patient tolerated without problems. Any issues or concerns during appointment: None. Port flushed and left accessed, per pt request, for infusion appointment tomorrow. Patient aware of next infusion appointment on 1/8/2021 (date) at 36 (time). Discharged ambulatory.

## 2021-01-08 ENCOUNTER — HOSPITAL ENCOUNTER (OUTPATIENT)
Dept: INFUSION THERAPY | Age: 66
Discharge: HOME | End: 2021-01-08
Payer: MEDICARE

## 2021-01-08 VITALS
OXYGEN SATURATION: 100 % | HEART RATE: 56 BPM | SYSTOLIC BLOOD PRESSURE: 143 MMHG | TEMPERATURE: 98.2 F | DIASTOLIC BLOOD PRESSURE: 86 MMHG

## 2021-01-08 DIAGNOSIS — C77.2 METASTASIS TO RETROPERITONEAL LYMPH NODE (HCC): ICD-10-CM

## 2021-01-08 DIAGNOSIS — D64.81 ANEMIA DUE TO CHEMOTHERAPY: ICD-10-CM

## 2021-01-08 DIAGNOSIS — C61 PRIMARY PROSTATE CANCER WITH METASTASIS FROM PROSTATE TO OTHER SITE (HCC): ICD-10-CM

## 2021-01-08 DIAGNOSIS — E87.6 HYPOKALEMIA: Primary | ICD-10-CM

## 2021-01-08 DIAGNOSIS — T45.1X5A ANEMIA DUE TO CHEMOTHERAPY: ICD-10-CM

## 2021-01-08 PROCEDURE — 96375 TX/PRO/DX INJ NEW DRUG ADDON: CPT

## 2021-01-08 PROCEDURE — 96413 CHEMO IV INFUSION 1 HR: CPT

## 2021-01-08 PROCEDURE — 74011250636 HC RX REV CODE- 250/636: Performed by: INTERNAL MEDICINE

## 2021-01-08 PROCEDURE — 96372 THER/PROPH/DIAG INJ SC/IM: CPT

## 2021-01-08 RX ORDER — SODIUM CHLORIDE 0.9 % (FLUSH) 0.9 %
10 SYRINGE (ML) INJECTION AS NEEDED
Status: ACTIVE | OUTPATIENT
Start: 2021-01-08 | End: 2021-01-08

## 2021-01-08 RX ORDER — DEXAMETHASONE SODIUM PHOSPHATE 4 MG/ML
8 INJECTION, SOLUTION INTRA-ARTICULAR; INTRALESIONAL; INTRAMUSCULAR; INTRAVENOUS; SOFT TISSUE ONCE
Status: COMPLETED | OUTPATIENT
Start: 2021-01-08 | End: 2021-01-08

## 2021-01-08 RX ORDER — SODIUM CHLORIDE 9 MG/ML
25 INJECTION, SOLUTION INTRAVENOUS CONTINUOUS
Status: ACTIVE | OUTPATIENT
Start: 2021-01-08 | End: 2021-01-08

## 2021-01-08 RX ADMIN — DEXAMETHASONE SODIUM PHOSPHATE 8 MG: 4 INJECTION, SOLUTION INTRAMUSCULAR; INTRAVENOUS at 12:33

## 2021-01-08 RX ADMIN — EPOETIN ALFA-EPBX 120000 UNITS: 40000 INJECTION, SOLUTION INTRAVENOUS; SUBCUTANEOUS at 13:01

## 2021-01-08 RX ADMIN — ETOPOSIDE 218 MG: 20 INJECTION INTRAVENOUS at 13:14

## 2021-01-08 RX ADMIN — Medication 10 ML: at 14:35

## 2021-01-08 RX ADMIN — SODIUM CHLORIDE 25 ML/HR: 900 INJECTION, SOLUTION INTRAVENOUS at 12:00

## 2021-01-08 NOTE — PROGRESS NOTES
[facility]--Etoposide. Tolerating the chemo. Without side effects. No new issues or concerns voiced during the visit. . hgb 8.6. Retacrit 120.000 units given. Aware  Of next appointment on 1/27 at 1030. Discharged to home in satisfactory condition.

## 2021-01-26 ENCOUNTER — HOSPITAL ENCOUNTER (OUTPATIENT)
Dept: LAB | Age: 66
Discharge: HOME | End: 2021-01-26
Payer: MEDICARE

## 2021-01-26 DIAGNOSIS — C61 PRIMARY PROSTATE CANCER WITH METASTASIS FROM PROSTATE TO OTHER SITE (HCC): Chronic | ICD-10-CM

## 2021-01-26 LAB
ALBUMIN SERPL-MCNC: 4.2 G/DL (ref 3.2–4.6)
ALBUMIN/GLOB SERPL: 1.2 {RATIO} (ref 1.2–3.5)
ALP SERPL-CCNC: 91 U/L (ref 50–136)
ALT SERPL-CCNC: 30 U/L (ref 12–65)
ANION GAP SERPL CALC-SCNC: 5 MMOL/L (ref 7–16)
AST SERPL-CCNC: 18 U/L (ref 15–37)
BASOPHILS # BLD: 0 K/UL (ref 0–0.2)
BASOPHILS NFR BLD: 0 % (ref 0–2)
BILIRUB SERPL-MCNC: 0.3 MG/DL (ref 0.2–1.1)
BUN SERPL-MCNC: 11 MG/DL (ref 8–23)
CALCIUM SERPL-MCNC: 9.2 MG/DL (ref 8.3–10.4)
CEA SERPL-MCNC: 24.8 NG/ML (ref 0–3)
CHLORIDE SERPL-SCNC: 105 MMOL/L (ref 98–107)
CO2 SERPL-SCNC: 26 MMOL/L (ref 21–32)
CREAT SERPL-MCNC: 0.8 MG/DL (ref 0.8–1.5)
DIFFERENTIAL METHOD BLD: ABNORMAL
EOSINOPHIL # BLD: 0 K/UL (ref 0–0.8)
EOSINOPHIL NFR BLD: 1 % (ref 0.5–7.8)
ERYTHROCYTE [DISTWIDTH] IN BLOOD BY AUTOMATED COUNT: 19.9 % (ref 11.9–14.6)
GLOBULIN SER CALC-MCNC: 3.5 G/DL (ref 2.3–3.5)
GLUCOSE SERPL-MCNC: 98 MG/DL (ref 65–100)
HCT VFR BLD AUTO: 28.5 % (ref 41.1–50.3)
HGB BLD-MCNC: 9 G/DL (ref 13.6–17.2)
IMM GRANULOCYTES # BLD AUTO: 0 K/UL (ref 0–0.5)
IMM GRANULOCYTES NFR BLD AUTO: 1 % (ref 0–5)
LYMPHOCYTES # BLD: 1.5 K/UL (ref 0.5–4.6)
LYMPHOCYTES NFR BLD: 40 % (ref 13–44)
MCH RBC QN AUTO: 33.6 PG (ref 26.1–32.9)
MCHC RBC AUTO-ENTMCNC: 31.6 G/DL (ref 31.4–35)
MCV RBC AUTO: 106.3 FL (ref 79.6–97.8)
MONOCYTES # BLD: 0.7 K/UL (ref 0.1–1.3)
MONOCYTES NFR BLD: 20 % (ref 4–12)
NEUTS SEG # BLD: 1.4 K/UL (ref 1.7–8.2)
NEUTS SEG NFR BLD: 38 % (ref 43–78)
NRBC # BLD: 0 K/UL (ref 0–0.2)
PLATELET # BLD AUTO: 195 K/UL (ref 150–450)
PMV BLD AUTO: 9.4 FL (ref 9.4–12.3)
POTASSIUM SERPL-SCNC: 3.4 MMOL/L (ref 3.5–5.1)
PROT SERPL-MCNC: 7.7 G/DL (ref 6.3–8.2)
PSA SERPL-MCNC: 1.8 NG/ML
RBC # BLD AUTO: 2.68 M/UL (ref 4.23–5.67)
SODIUM SERPL-SCNC: 136 MMOL/L (ref 136–145)
WBC # BLD AUTO: 3.6 K/UL (ref 4.3–11.1)

## 2021-01-26 PROCEDURE — 80053 COMPREHEN METABOLIC PANEL: CPT

## 2021-01-26 PROCEDURE — 84153 ASSAY OF PSA TOTAL: CPT

## 2021-01-26 PROCEDURE — 36415 COLL VENOUS BLD VENIPUNCTURE: CPT

## 2021-01-26 PROCEDURE — 82378 CARCINOEMBRYONIC ANTIGEN: CPT

## 2021-01-26 PROCEDURE — 85025 COMPLETE CBC W/AUTO DIFF WBC: CPT

## 2021-01-27 ENCOUNTER — HOSPITAL ENCOUNTER (OUTPATIENT)
Dept: INFUSION THERAPY | Age: 66
Discharge: HOME | End: 2021-01-27
Payer: MEDICARE

## 2021-01-27 VITALS
SYSTOLIC BLOOD PRESSURE: 134 MMHG | BODY MASS INDEX: 32.19 KG/M2 | HEART RATE: 79 BPM | WEIGHT: 218 LBS | OXYGEN SATURATION: 99 % | RESPIRATION RATE: 18 BRPM | TEMPERATURE: 97.1 F | DIASTOLIC BLOOD PRESSURE: 76 MMHG

## 2021-01-27 DIAGNOSIS — C77.2 METASTASIS TO RETROPERITONEAL LYMPH NODE (HCC): Primary | ICD-10-CM

## 2021-01-27 DIAGNOSIS — C61 PRIMARY PROSTATE CANCER WITH METASTASIS FROM PROSTATE TO OTHER SITE (HCC): ICD-10-CM

## 2021-01-27 PROCEDURE — 74011000258 HC RX REV CODE- 258: Performed by: NURSE PRACTITIONER

## 2021-01-27 PROCEDURE — 96413 CHEMO IV INFUSION 1 HR: CPT

## 2021-01-27 PROCEDURE — 96375 TX/PRO/DX INJ NEW DRUG ADDON: CPT

## 2021-01-27 PROCEDURE — 74011250636 HC RX REV CODE- 250/636: Performed by: NURSE PRACTITIONER

## 2021-01-27 PROCEDURE — 96417 CHEMO IV INFUS EACH ADDL SEQ: CPT

## 2021-01-27 PROCEDURE — 96367 TX/PROPH/DG ADDL SEQ IV INF: CPT

## 2021-01-27 RX ORDER — SODIUM CHLORIDE 0.9 % (FLUSH) 0.9 %
10 SYRINGE (ML) INJECTION AS NEEDED
Status: ACTIVE | OUTPATIENT
Start: 2021-01-27 | End: 2021-01-27

## 2021-01-27 RX ORDER — ONDANSETRON 2 MG/ML
8 INJECTION INTRAMUSCULAR; INTRAVENOUS ONCE
Status: COMPLETED | OUTPATIENT
Start: 2021-01-27 | End: 2021-01-27

## 2021-01-27 RX ORDER — SODIUM CHLORIDE 9 MG/ML
25 INJECTION, SOLUTION INTRAVENOUS CONTINUOUS
Status: ACTIVE | OUTPATIENT
Start: 2021-01-27 | End: 2021-01-27

## 2021-01-27 RX ADMIN — DEXAMETHASONE SODIUM PHOSPHATE 12 MG: 4 INJECTION, SOLUTION INTRAMUSCULAR; INTRAVENOUS at 11:25

## 2021-01-27 RX ADMIN — SODIUM CHLORIDE 25 ML/HR: 900 INJECTION, SOLUTION INTRAVENOUS at 12:11

## 2021-01-27 RX ADMIN — ETOPOSIDE 218 MG: 20 INJECTION INTRAVENOUS at 12:53

## 2021-01-27 RX ADMIN — ONDANSETRON 8 MG: 2 INJECTION INTRAMUSCULAR; INTRAVENOUS at 11:22

## 2021-01-27 RX ADMIN — SODIUM CHLORIDE 150 MG: 900 INJECTION, SOLUTION INTRAVENOUS at 11:40

## 2021-01-27 RX ADMIN — CARBOPLATIN 750 MG: 10 INJECTION, SOLUTION INTRAVENOUS at 12:14

## 2021-01-27 RX ADMIN — Medication 10 ML: at 14:02

## 2021-01-27 NOTE — PROGRESS NOTES
Pt arrived ambulatory, etoposide/Carbo completed, pt tolerated well, discharged home ambulatory, aware of appointment tomorrow @

## 2021-01-28 ENCOUNTER — HOSPITAL ENCOUNTER (OUTPATIENT)
Dept: INFUSION THERAPY | Age: 66
Discharge: HOME | End: 2021-01-28
Payer: MEDICARE

## 2021-01-28 VITALS
DIASTOLIC BLOOD PRESSURE: 86 MMHG | TEMPERATURE: 96.9 F | HEART RATE: 90 BPM | SYSTOLIC BLOOD PRESSURE: 154 MMHG | WEIGHT: 221 LBS | BODY MASS INDEX: 32.64 KG/M2 | OXYGEN SATURATION: 96 % | RESPIRATION RATE: 18 BRPM

## 2021-01-28 DIAGNOSIS — C77.2 METASTASIS TO RETROPERITONEAL LYMPH NODE (HCC): Primary | ICD-10-CM

## 2021-01-28 DIAGNOSIS — C61 PRIMARY PROSTATE CANCER WITH METASTASIS FROM PROSTATE TO OTHER SITE (HCC): ICD-10-CM

## 2021-01-28 PROCEDURE — 96413 CHEMO IV INFUSION 1 HR: CPT

## 2021-01-28 PROCEDURE — 74011250636 HC RX REV CODE- 250/636: Performed by: NURSE PRACTITIONER

## 2021-01-28 PROCEDURE — 96375 TX/PRO/DX INJ NEW DRUG ADDON: CPT

## 2021-01-28 RX ORDER — DEXAMETHASONE SODIUM PHOSPHATE 4 MG/ML
8 INJECTION, SOLUTION INTRA-ARTICULAR; INTRALESIONAL; INTRAMUSCULAR; INTRAVENOUS; SOFT TISSUE ONCE
Status: COMPLETED | OUTPATIENT
Start: 2021-01-28 | End: 2021-01-28

## 2021-01-28 RX ORDER — SODIUM CHLORIDE 9 MG/ML
25 INJECTION, SOLUTION INTRAVENOUS CONTINUOUS
Status: ACTIVE | OUTPATIENT
Start: 2021-01-28 | End: 2021-01-28

## 2021-01-28 RX ORDER — SODIUM CHLORIDE 0.9 % (FLUSH) 0.9 %
10 SYRINGE (ML) INJECTION AS NEEDED
Status: ACTIVE | OUTPATIENT
Start: 2021-01-28 | End: 2021-01-28

## 2021-01-28 RX ADMIN — ETOPOSIDE 218 MG: 20 INJECTION INTRAVENOUS at 10:20

## 2021-01-28 RX ADMIN — Medication 10 ML: at 09:45

## 2021-01-28 RX ADMIN — DEXAMETHASONE SODIUM PHOSPHATE 8 MG: 4 INJECTION, SOLUTION INTRAMUSCULAR; INTRAVENOUS at 09:52

## 2021-01-28 RX ADMIN — Medication 10 ML: at 11:34

## 2021-01-28 RX ADMIN — SODIUM CHLORIDE 25 ML/HR: 900 INJECTION, SOLUTION INTRAVENOUS at 09:53

## 2021-01-28 NOTE — PROGRESS NOTES
Arrived to the [facility]. Assessment complete. Etoposide completed. Patient tolerated without problems. Any issues or concerns during appointment: None. Port flushed and left accessed, per pt request, for infusion appointment tomorrow. Patient aware of next infusion appointment on 1/29/2021 (date) at 0930 (time). Discharged ambulatory.

## 2021-01-29 ENCOUNTER — HOSPITAL ENCOUNTER (OUTPATIENT)
Dept: INFUSION THERAPY | Age: 66
Discharge: HOME | End: 2021-01-29
Payer: MEDICARE

## 2021-01-29 VITALS
TEMPERATURE: 96.8 F | HEART RATE: 71 BPM | DIASTOLIC BLOOD PRESSURE: 86 MMHG | BODY MASS INDEX: 32.13 KG/M2 | RESPIRATION RATE: 18 BRPM | SYSTOLIC BLOOD PRESSURE: 130 MMHG | OXYGEN SATURATION: 100 % | WEIGHT: 217.6 LBS

## 2021-01-29 DIAGNOSIS — C77.2 METASTASIS TO RETROPERITONEAL LYMPH NODE (HCC): Primary | ICD-10-CM

## 2021-01-29 DIAGNOSIS — T45.1X5A ANEMIA DUE TO CHEMOTHERAPY: ICD-10-CM

## 2021-01-29 DIAGNOSIS — C61 PRIMARY PROSTATE CANCER WITH METASTASIS FROM PROSTATE TO OTHER SITE (HCC): ICD-10-CM

## 2021-01-29 DIAGNOSIS — D64.81 ANEMIA DUE TO CHEMOTHERAPY: ICD-10-CM

## 2021-01-29 PROCEDURE — 74011250636 HC RX REV CODE- 250/636: Performed by: NURSE PRACTITIONER

## 2021-01-29 PROCEDURE — 96375 TX/PRO/DX INJ NEW DRUG ADDON: CPT

## 2021-01-29 PROCEDURE — 96413 CHEMO IV INFUSION 1 HR: CPT

## 2021-01-29 PROCEDURE — 96372 THER/PROPH/DIAG INJ SC/IM: CPT

## 2021-01-29 RX ORDER — SODIUM CHLORIDE 9 MG/ML
25 INJECTION, SOLUTION INTRAVENOUS CONTINUOUS
Status: ACTIVE | OUTPATIENT
Start: 2021-01-29 | End: 2021-01-29

## 2021-01-29 RX ORDER — DEXAMETHASONE SODIUM PHOSPHATE 4 MG/ML
8 INJECTION, SOLUTION INTRA-ARTICULAR; INTRALESIONAL; INTRAMUSCULAR; INTRAVENOUS; SOFT TISSUE ONCE
Status: COMPLETED | OUTPATIENT
Start: 2021-01-29 | End: 2021-01-29

## 2021-01-29 RX ORDER — SODIUM CHLORIDE 0.9 % (FLUSH) 0.9 %
10 SYRINGE (ML) INJECTION AS NEEDED
Status: ACTIVE | OUTPATIENT
Start: 2021-01-29 | End: 2021-01-29

## 2021-01-29 RX ADMIN — SODIUM CHLORIDE 25 ML/HR: 900 INJECTION, SOLUTION INTRAVENOUS at 10:20

## 2021-01-29 RX ADMIN — EPOETIN ALFA-EPBX 120000 UNITS: 40000 INJECTION, SOLUTION INTRAVENOUS; SUBCUTANEOUS at 10:31

## 2021-01-29 RX ADMIN — DEXAMETHASONE SODIUM PHOSPHATE 8 MG: 4 INJECTION, SOLUTION INTRAMUSCULAR; INTRAVENOUS at 10:24

## 2021-01-29 RX ADMIN — Medication 10 ML: at 11:55

## 2021-01-29 RX ADMIN — Medication 10 ML: at 10:20

## 2021-01-29 RX ADMIN — ETOPOSIDE 218 MG: 20 INJECTION INTRAVENOUS at 10:37

## 2021-01-29 NOTE — PROGRESS NOTES
Arrived to the [facility]. Etoposide and Retacrit completed. Patient tolerated well. Any issues or concerns during appointment: none. Patient aware of next infusion appointment on 2/17 (date) at 11:00 AM (time). Discharged ambulatory.

## 2021-02-16 ENCOUNTER — HOSPITAL ENCOUNTER (OUTPATIENT)
Dept: LAB | Age: 66
Discharge: HOME | End: 2021-02-16
Payer: MEDICARE

## 2021-02-16 DIAGNOSIS — C61 PRIMARY PROSTATE CANCER WITH METASTASIS FROM PROSTATE TO OTHER SITE (HCC): ICD-10-CM

## 2021-02-16 LAB
ALBUMIN SERPL-MCNC: 4.2 G/DL (ref 3.2–4.6)
ALBUMIN/GLOB SERPL: 1.2 {RATIO} (ref 1.2–3.5)
ALP SERPL-CCNC: 89 U/L (ref 50–136)
ALT SERPL-CCNC: 25 U/L (ref 12–65)
ANION GAP SERPL CALC-SCNC: 9 MMOL/L (ref 7–16)
AST SERPL-CCNC: 6 U/L (ref 15–37)
BASOPHILS # BLD: 0 K/UL (ref 0–0.2)
BASOPHILS NFR BLD: 0 % (ref 0–2)
BILIRUB SERPL-MCNC: 0.3 MG/DL (ref 0.2–1.1)
BUN SERPL-MCNC: 14 MG/DL (ref 8–23)
CALCIUM SERPL-MCNC: 9.3 MG/DL (ref 8.3–10.4)
CEA SERPL-MCNC: 15 NG/ML (ref 0–3)
CHLORIDE SERPL-SCNC: 103 MMOL/L (ref 98–107)
CO2 SERPL-SCNC: 25 MMOL/L (ref 21–32)
CREAT SERPL-MCNC: 0.8 MG/DL (ref 0.8–1.5)
DIFFERENTIAL METHOD BLD: ABNORMAL
EOSINOPHIL # BLD: 0 K/UL (ref 0–0.8)
EOSINOPHIL NFR BLD: 0 % (ref 0.5–7.8)
ERYTHROCYTE [DISTWIDTH] IN BLOOD BY AUTOMATED COUNT: 18.1 % (ref 11.9–14.6)
GLOBULIN SER CALC-MCNC: 3.5 G/DL (ref 2.3–3.5)
GLUCOSE SERPL-MCNC: 97 MG/DL (ref 65–100)
HCT VFR BLD AUTO: 27.4 % (ref 41.1–50.3)
HGB BLD-MCNC: 9 G/DL (ref 13.6–17.2)
IMM GRANULOCYTES # BLD AUTO: 0.1 K/UL (ref 0–0.5)
IMM GRANULOCYTES NFR BLD AUTO: 1 % (ref 0–5)
LYMPHOCYTES # BLD: 1.2 K/UL (ref 0.5–4.6)
LYMPHOCYTES NFR BLD: 33 % (ref 13–44)
MCH RBC QN AUTO: 35 PG (ref 26.1–32.9)
MCHC RBC AUTO-ENTMCNC: 32.8 G/DL (ref 31.4–35)
MCV RBC AUTO: 106.6 FL (ref 79.6–97.8)
MONOCYTES # BLD: 0.9 K/UL (ref 0.1–1.3)
MONOCYTES NFR BLD: 24 % (ref 4–12)
NEUTS SEG # BLD: 1.5 K/UL (ref 1.7–8.2)
NEUTS SEG NFR BLD: 42 % (ref 43–78)
NRBC # BLD: 0.02 K/UL (ref 0–0.2)
PLATELET # BLD AUTO: 164 K/UL (ref 150–450)
PMV BLD AUTO: 9.3 FL (ref 9.4–12.3)
POTASSIUM SERPL-SCNC: 4 MMOL/L (ref 3.5–5.1)
PROT SERPL-MCNC: 7.7 G/DL (ref 6.3–8.2)
PSA SERPL-MCNC: 1.7 NG/ML
RBC # BLD AUTO: 2.57 M/UL (ref 4.23–5.67)
SODIUM SERPL-SCNC: 137 MMOL/L (ref 136–145)
WBC # BLD AUTO: 3.7 K/UL (ref 4.3–11.1)

## 2021-02-16 PROCEDURE — 85025 COMPLETE CBC W/AUTO DIFF WBC: CPT

## 2021-02-16 PROCEDURE — 82378 CARCINOEMBRYONIC ANTIGEN: CPT

## 2021-02-16 PROCEDURE — 80053 COMPREHEN METABOLIC PANEL: CPT

## 2021-02-16 PROCEDURE — 84153 ASSAY OF PSA TOTAL: CPT

## 2021-02-16 PROCEDURE — 36415 COLL VENOUS BLD VENIPUNCTURE: CPT

## 2021-02-17 ENCOUNTER — HOSPITAL ENCOUNTER (OUTPATIENT)
Dept: INFUSION THERAPY | Age: 66
Discharge: HOME | End: 2021-02-17
Payer: MEDICARE

## 2021-02-17 VITALS
SYSTOLIC BLOOD PRESSURE: 149 MMHG | HEART RATE: 78 BPM | WEIGHT: 217 LBS | RESPIRATION RATE: 18 BRPM | HEIGHT: 69 IN | TEMPERATURE: 96.9 F | OXYGEN SATURATION: 100 % | BODY MASS INDEX: 32.14 KG/M2 | DIASTOLIC BLOOD PRESSURE: 81 MMHG

## 2021-02-17 DIAGNOSIS — C61 PRIMARY PROSTATE CANCER WITH METASTASIS FROM PROSTATE TO OTHER SITE (HCC): ICD-10-CM

## 2021-02-17 DIAGNOSIS — C77.2 METASTASIS TO RETROPERITONEAL LYMPH NODE (HCC): ICD-10-CM

## 2021-02-17 DIAGNOSIS — E87.6 HYPOKALEMIA: Primary | ICD-10-CM

## 2021-02-17 PROCEDURE — 74011250636 HC RX REV CODE- 250/636: Performed by: INTERNAL MEDICINE

## 2021-02-17 PROCEDURE — 74011000258 HC RX REV CODE- 258: Performed by: INTERNAL MEDICINE

## 2021-02-17 PROCEDURE — 96417 CHEMO IV INFUS EACH ADDL SEQ: CPT

## 2021-02-17 PROCEDURE — 96375 TX/PRO/DX INJ NEW DRUG ADDON: CPT

## 2021-02-17 PROCEDURE — 96413 CHEMO IV INFUSION 1 HR: CPT

## 2021-02-17 PROCEDURE — 96367 TX/PROPH/DG ADDL SEQ IV INF: CPT

## 2021-02-17 RX ORDER — SODIUM CHLORIDE 0.9 % (FLUSH) 0.9 %
10 SYRINGE (ML) INJECTION AS NEEDED
Status: ACTIVE | OUTPATIENT
Start: 2021-02-17 | End: 2021-02-17

## 2021-02-17 RX ORDER — ONDANSETRON 2 MG/ML
8 INJECTION INTRAMUSCULAR; INTRAVENOUS ONCE
Status: COMPLETED | OUTPATIENT
Start: 2021-02-17 | End: 2021-02-17

## 2021-02-17 RX ORDER — SODIUM CHLORIDE 9 MG/ML
25 INJECTION, SOLUTION INTRAVENOUS CONTINUOUS
Status: ACTIVE | OUTPATIENT
Start: 2021-02-17 | End: 2021-02-17

## 2021-02-17 RX ADMIN — SODIUM CHLORIDE 150 MG: 900 INJECTION, SOLUTION INTRAVENOUS at 12:25

## 2021-02-17 RX ADMIN — Medication 10 ML: at 14:20

## 2021-02-17 RX ADMIN — DEXAMETHASONE SODIUM PHOSPHATE 12 MG: 4 INJECTION, SOLUTION INTRAMUSCULAR; INTRAVENOUS at 12:05

## 2021-02-17 RX ADMIN — ETOPOSIDE 218 MG: 20 INJECTION INTRAVENOUS at 13:20

## 2021-02-17 RX ADMIN — CARBOPLATIN 750 MG: 10 INJECTION, SOLUTION INTRAVENOUS at 12:46

## 2021-02-17 RX ADMIN — ONDANSETRON 8 MG: 2 INJECTION INTRAMUSCULAR; INTRAVENOUS at 11:45

## 2021-02-17 RX ADMIN — SODIUM CHLORIDE 25 ML/HR: 9 INJECTION, SOLUTION INTRAVENOUS at 11:45

## 2021-02-17 NOTE — PROGRESS NOTES
Arrived to the [facility]. Carbo + Etoposide completed. Patient tolerated well. Any issues or concerns during appointment: none. Patient aware of next infusion appointment on 2/18 @ 1130. Discharged ambulatory.     Akin Garrett RN

## 2021-02-18 ENCOUNTER — HOSPITAL ENCOUNTER (OUTPATIENT)
Dept: INFUSION THERAPY | Age: 66
Discharge: HOME | End: 2021-02-18
Payer: MEDICARE

## 2021-02-18 VITALS
OXYGEN SATURATION: 99 % | HEART RATE: 79 BPM | TEMPERATURE: 96.8 F | BODY MASS INDEX: 32.61 KG/M2 | DIASTOLIC BLOOD PRESSURE: 96 MMHG | RESPIRATION RATE: 18 BRPM | SYSTOLIC BLOOD PRESSURE: 158 MMHG | WEIGHT: 220.8 LBS

## 2021-02-18 DIAGNOSIS — C61 PRIMARY PROSTATE CANCER WITH METASTASIS FROM PROSTATE TO OTHER SITE (HCC): ICD-10-CM

## 2021-02-18 DIAGNOSIS — C77.2 METASTASIS TO RETROPERITONEAL LYMPH NODE (HCC): ICD-10-CM

## 2021-02-18 DIAGNOSIS — E87.6 HYPOKALEMIA: Primary | ICD-10-CM

## 2021-02-18 PROCEDURE — 96413 CHEMO IV INFUSION 1 HR: CPT

## 2021-02-18 PROCEDURE — 74011250636 HC RX REV CODE- 250/636: Performed by: INTERNAL MEDICINE

## 2021-02-18 PROCEDURE — 96375 TX/PRO/DX INJ NEW DRUG ADDON: CPT

## 2021-02-18 RX ORDER — DEXAMETHASONE SODIUM PHOSPHATE 4 MG/ML
8 INJECTION, SOLUTION INTRA-ARTICULAR; INTRALESIONAL; INTRAMUSCULAR; INTRAVENOUS; SOFT TISSUE ONCE
Status: COMPLETED | OUTPATIENT
Start: 2021-02-18 | End: 2021-02-18

## 2021-02-18 RX ORDER — SODIUM CHLORIDE 9 MG/ML
25 INJECTION, SOLUTION INTRAVENOUS CONTINUOUS
Status: ACTIVE | OUTPATIENT
Start: 2021-02-18 | End: 2021-02-18

## 2021-02-18 RX ORDER — SODIUM CHLORIDE 0.9 % (FLUSH) 0.9 %
10 SYRINGE (ML) INJECTION AS NEEDED
Status: ACTIVE | OUTPATIENT
Start: 2021-02-18 | End: 2021-02-18

## 2021-02-18 RX ADMIN — Medication 10 ML: at 11:42

## 2021-02-18 RX ADMIN — SODIUM CHLORIDE 25 ML/HR: 900 INJECTION, SOLUTION INTRAVENOUS at 11:42

## 2021-02-18 RX ADMIN — ETOPOSIDE 218 MG: 20 INJECTION INTRAVENOUS at 12:11

## 2021-02-18 RX ADMIN — DEXAMETHASONE SODIUM PHOSPHATE 8 MG: 4 INJECTION, SOLUTION INTRAMUSCULAR; INTRAVENOUS at 11:47

## 2021-02-18 NOTE — PROGRESS NOTES
Arrived to the [facility]. VP16 completed. Patient tolerated without adverse reaction. Any issues or concerns during appointment: none. Patient aware of next infusion appointment on 2/19 (date) at 36 (time). Discharged to home.

## 2021-02-19 ENCOUNTER — HOSPITAL ENCOUNTER (OUTPATIENT)
Dept: INFUSION THERAPY | Age: 66
Discharge: HOME | End: 2021-02-19
Payer: MEDICARE

## 2021-02-19 VITALS
RESPIRATION RATE: 18 BRPM | OXYGEN SATURATION: 100 % | WEIGHT: 219.6 LBS | HEART RATE: 76 BPM | TEMPERATURE: 97.1 F | BODY MASS INDEX: 32.43 KG/M2 | DIASTOLIC BLOOD PRESSURE: 78 MMHG | SYSTOLIC BLOOD PRESSURE: 138 MMHG

## 2021-02-19 DIAGNOSIS — C77.2 METASTASIS TO RETROPERITONEAL LYMPH NODE (HCC): ICD-10-CM

## 2021-02-19 DIAGNOSIS — C61 PRIMARY PROSTATE CANCER WITH METASTASIS FROM PROSTATE TO OTHER SITE (HCC): ICD-10-CM

## 2021-02-19 DIAGNOSIS — E87.6 HYPOKALEMIA: ICD-10-CM

## 2021-02-19 DIAGNOSIS — D64.81 ANEMIA DUE TO CHEMOTHERAPY: Primary | ICD-10-CM

## 2021-02-19 DIAGNOSIS — T45.1X5A ANEMIA DUE TO CHEMOTHERAPY: Primary | ICD-10-CM

## 2021-02-19 PROCEDURE — 96375 TX/PRO/DX INJ NEW DRUG ADDON: CPT

## 2021-02-19 PROCEDURE — 96413 CHEMO IV INFUSION 1 HR: CPT

## 2021-02-19 PROCEDURE — 74011250636 HC RX REV CODE- 250/636: Performed by: INTERNAL MEDICINE

## 2021-02-19 PROCEDURE — 96372 THER/PROPH/DIAG INJ SC/IM: CPT

## 2021-02-19 RX ORDER — DEXAMETHASONE SODIUM PHOSPHATE 4 MG/ML
8 INJECTION, SOLUTION INTRA-ARTICULAR; INTRALESIONAL; INTRAMUSCULAR; INTRAVENOUS; SOFT TISSUE ONCE
Status: COMPLETED | OUTPATIENT
Start: 2021-02-19 | End: 2021-02-19

## 2021-02-19 RX ORDER — SODIUM CHLORIDE 9 MG/ML
25 INJECTION, SOLUTION INTRAVENOUS CONTINUOUS
Status: ACTIVE | OUTPATIENT
Start: 2021-02-19 | End: 2021-02-19

## 2021-02-19 RX ORDER — SODIUM CHLORIDE 0.9 % (FLUSH) 0.9 %
10 SYRINGE (ML) INJECTION AS NEEDED
Status: ACTIVE | OUTPATIENT
Start: 2021-02-19 | End: 2021-02-19

## 2021-02-19 RX ADMIN — SODIUM CHLORIDE 25 ML/HR: 9 INJECTION, SOLUTION INTRAVENOUS at 11:39

## 2021-02-19 RX ADMIN — ETOPOSIDE 218 MG: 20 INJECTION INTRAVENOUS at 12:13

## 2021-02-19 RX ADMIN — DEXAMETHASONE SODIUM PHOSPHATE 8 MG: 4 INJECTION, SOLUTION INTRAMUSCULAR; INTRAVENOUS at 11:47

## 2021-02-19 RX ADMIN — EPOETIN ALFA-EPBX 120000 UNITS: 40000 INJECTION, SOLUTION INTRAVENOUS; SUBCUTANEOUS at 11:52

## 2021-02-19 NOTE — PROGRESS NOTES
Arrived to the [facility]. Etoposide & Retacrit completed. Patient tolerated well. Any issues or concerns during appointment: none. Patient aware of next infusion appointment on 3-9-21 (date) at [phone] (time). Discharged via ambulatory.

## 2021-03-09 ENCOUNTER — HOSPITAL ENCOUNTER (OUTPATIENT)
Dept: LAB | Age: 66
Discharge: HOME | End: 2021-03-09
Payer: MEDICARE

## 2021-03-09 ENCOUNTER — HOSPITAL ENCOUNTER (OUTPATIENT)
Dept: INFUSION THERAPY | Age: 66
Discharge: HOME | End: 2021-03-09
Payer: MEDICARE

## 2021-03-09 VITALS
DIASTOLIC BLOOD PRESSURE: 77 MMHG | RESPIRATION RATE: 18 BRPM | HEART RATE: 90 BPM | TEMPERATURE: 99.1 F | OXYGEN SATURATION: 97 % | SYSTOLIC BLOOD PRESSURE: 144 MMHG

## 2021-03-09 DIAGNOSIS — D64.81 ANEMIA DUE TO CHEMOTHERAPY: Primary | ICD-10-CM

## 2021-03-09 DIAGNOSIS — T45.1X5A ANEMIA DUE TO CHEMOTHERAPY: Primary | ICD-10-CM

## 2021-03-09 DIAGNOSIS — C61 PROSTATE CANCER (HCC): ICD-10-CM

## 2021-03-09 LAB
ALBUMIN SERPL-MCNC: 3.8 G/DL (ref 3.2–4.6)
ALBUMIN/GLOB SERPL: 1.1 {RATIO} (ref 1.2–3.5)
ALP SERPL-CCNC: 87 U/L (ref 50–136)
ALT SERPL-CCNC: 25 U/L (ref 12–65)
ANION GAP SERPL CALC-SCNC: 8 MMOL/L (ref 7–16)
AST SERPL-CCNC: 16 U/L (ref 15–37)
BASOPHILS # BLD: 0 K/UL (ref 0–0.2)
BASOPHILS NFR BLD: 0 % (ref 0–2)
BILIRUB SERPL-MCNC: 0.3 MG/DL (ref 0.2–1.1)
BUN SERPL-MCNC: 14 MG/DL (ref 8–23)
CALCIUM SERPL-MCNC: 9.1 MG/DL (ref 8.3–10.4)
CEA SERPL-MCNC: 9.6 NG/ML (ref 0–3)
CHLORIDE SERPL-SCNC: 106 MMOL/L (ref 98–107)
CO2 SERPL-SCNC: 26 MMOL/L (ref 21–32)
CREAT SERPL-MCNC: 0.9 MG/DL (ref 0.8–1.5)
DIFFERENTIAL METHOD BLD: ABNORMAL
EOSINOPHIL # BLD: 0 K/UL (ref 0–0.8)
EOSINOPHIL NFR BLD: 0 % (ref 0.5–7.8)
ERYTHROCYTE [DISTWIDTH] IN BLOOD BY AUTOMATED COUNT: 17.2 % (ref 11.9–14.6)
GLOBULIN SER CALC-MCNC: 3.4 G/DL (ref 2.3–3.5)
GLUCOSE SERPL-MCNC: 105 MG/DL (ref 65–100)
HCT VFR BLD AUTO: 25.7 % (ref 41.1–50.3)
HGB BLD-MCNC: 8.2 G/DL (ref 13.6–17.2)
IMM GRANULOCYTES # BLD AUTO: 0 K/UL (ref 0–0.5)
IMM GRANULOCYTES NFR BLD AUTO: 1 % (ref 0–5)
LYMPHOCYTES # BLD: 1.5 K/UL (ref 0.5–4.6)
LYMPHOCYTES NFR BLD: 43 % (ref 13–44)
MCH RBC QN AUTO: 34.7 PG (ref 26.1–32.9)
MCHC RBC AUTO-ENTMCNC: 31.9 G/DL (ref 31.4–35)
MCV RBC AUTO: 108.9 FL (ref 79.6–97.8)
MONOCYTES # BLD: 0.7 K/UL (ref 0.1–1.3)
MONOCYTES NFR BLD: 20 % (ref 4–12)
NEUTS SEG # BLD: 1.3 K/UL (ref 1.7–8.2)
NEUTS SEG NFR BLD: 36 % (ref 43–78)
NRBC # BLD: 0.03 K/UL (ref 0–0.2)
PLATELET # BLD AUTO: 160 K/UL (ref 150–450)
PMV BLD AUTO: 9.3 FL (ref 9.4–12.3)
POTASSIUM SERPL-SCNC: 3.6 MMOL/L (ref 3.5–5.1)
PROT SERPL-MCNC: 7.2 G/DL (ref 6.3–8.2)
PSA SERPL-MCNC: 2.2 NG/ML
RBC # BLD AUTO: 2.36 M/UL (ref 4.23–5.67)
SODIUM SERPL-SCNC: 140 MMOL/L (ref 136–145)
WBC # BLD AUTO: 3.5 K/UL (ref 4.3–11.1)

## 2021-03-09 PROCEDURE — 84153 ASSAY OF PSA TOTAL: CPT

## 2021-03-09 PROCEDURE — 74011250636 HC RX REV CODE- 250/636: Performed by: INTERNAL MEDICINE

## 2021-03-09 PROCEDURE — 80053 COMPREHEN METABOLIC PANEL: CPT

## 2021-03-09 PROCEDURE — 36415 COLL VENOUS BLD VENIPUNCTURE: CPT

## 2021-03-09 PROCEDURE — 85025 COMPLETE CBC W/AUTO DIFF WBC: CPT

## 2021-03-09 PROCEDURE — 82378 CARCINOEMBRYONIC ANTIGEN: CPT

## 2021-03-09 PROCEDURE — 96372 THER/PROPH/DIAG INJ SC/IM: CPT

## 2021-03-09 RX ADMIN — EPOETIN ALFA-EPBX 120000 UNITS: 40000 INJECTION, SOLUTION INTRAVENOUS; SUBCUTANEOUS at 15:20

## 2021-03-09 NOTE — PROGRESS NOTES
Arrived to the [facility]. RETACRIT completed and tolerated well. Any issues or concerns during appointment: denies  Patient given education on injection. Instructed patient to notify provider for any issues or worrisome symptoms. They verbalized understanding. Patient aware of next infusion appointment scheduled for 3/30 at 1500. Discharged ambulatory with self.

## 2021-03-30 ENCOUNTER — HOSPITAL ENCOUNTER (OUTPATIENT)
Dept: LAB | Age: 66
Discharge: HOME | End: 2021-03-30
Payer: MEDICARE

## 2021-03-30 ENCOUNTER — HOSPITAL ENCOUNTER (OUTPATIENT)
Dept: INFUSION THERAPY | Age: 66
Discharge: HOME | End: 2021-03-30

## 2021-03-30 DIAGNOSIS — C61 PROSTATE CANCER (HCC): ICD-10-CM

## 2021-03-30 LAB
ALBUMIN SERPL-MCNC: 4 G/DL (ref 3.2–4.6)
ALBUMIN/GLOB SERPL: 1.1 {RATIO} (ref 1.2–3.5)
ALP SERPL-CCNC: 84 U/L (ref 50–136)
ALT SERPL-CCNC: 30 U/L (ref 12–65)
ANION GAP SERPL CALC-SCNC: 7 MMOL/L (ref 7–16)
AST SERPL-CCNC: 16 U/L (ref 15–37)
BASOPHILS # BLD: 0 K/UL (ref 0–0.2)
BASOPHILS NFR BLD: 1 % (ref 0–2)
BILIRUB SERPL-MCNC: 0.3 MG/DL (ref 0.2–1.1)
BUN SERPL-MCNC: 13 MG/DL (ref 8–23)
CALCIUM SERPL-MCNC: 9.2 MG/DL (ref 8.3–10.4)
CEA SERPL-MCNC: 14.6 NG/ML (ref 0–3)
CHLORIDE SERPL-SCNC: 105 MMOL/L (ref 98–107)
CO2 SERPL-SCNC: 24 MMOL/L (ref 21–32)
CREAT SERPL-MCNC: 0.9 MG/DL (ref 0.8–1.5)
DIFFERENTIAL METHOD BLD: ABNORMAL
EOSINOPHIL # BLD: 0.1 K/UL (ref 0–0.8)
EOSINOPHIL NFR BLD: 1 % (ref 0.5–7.8)
ERYTHROCYTE [DISTWIDTH] IN BLOOD BY AUTOMATED COUNT: 14.8 % (ref 11.9–14.6)
GLOBULIN SER CALC-MCNC: 3.5 G/DL (ref 2.3–3.5)
GLUCOSE SERPL-MCNC: 132 MG/DL (ref 65–100)
HCT VFR BLD AUTO: 35 %
HGB BLD-MCNC: 11.1 G/DL (ref 13.6–17.2)
IMM GRANULOCYTES # BLD AUTO: 0 K/UL (ref 0–0.5)
IMM GRANULOCYTES NFR BLD AUTO: 1 % (ref 0–5)
LYMPHOCYTES # BLD: 1.5 K/UL (ref 0.5–4.6)
LYMPHOCYTES NFR BLD: 17 % (ref 13–44)
MCH RBC QN AUTO: 33.9 PG (ref 26.1–32.9)
MCHC RBC AUTO-ENTMCNC: 31.7 G/DL (ref 31.4–35)
MCV RBC AUTO: 107 FL (ref 79.6–97.8)
MONOCYTES # BLD: 0.8 K/UL (ref 0.1–1.3)
MONOCYTES NFR BLD: 9 % (ref 4–12)
NEUTS SEG # BLD: 6.2 K/UL (ref 1.7–8.2)
NEUTS SEG NFR BLD: 72 % (ref 43–78)
NRBC # BLD: 0 K/UL (ref 0–0.2)
PLATELET # BLD AUTO: 209 K/UL (ref 150–450)
PMV BLD AUTO: 8.8 FL (ref 9.4–12.3)
POTASSIUM SERPL-SCNC: 3.7 MMOL/L (ref 3.5–5.1)
PROT SERPL-MCNC: 7.5 G/DL (ref 6.3–8.2)
PSA SERPL-MCNC: 2.3 NG/ML
RBC # BLD AUTO: 3.27 M/UL (ref 4.23–5.6)
SODIUM SERPL-SCNC: 136 MMOL/L (ref 136–145)
WBC # BLD AUTO: 8.6 K/UL (ref 4.3–11.1)

## 2021-03-30 PROCEDURE — 84153 ASSAY OF PSA TOTAL: CPT

## 2021-03-30 PROCEDURE — 36415 COLL VENOUS BLD VENIPUNCTURE: CPT

## 2021-03-30 PROCEDURE — 85025 COMPLETE CBC W/AUTO DIFF WBC: CPT

## 2021-03-30 PROCEDURE — 82378 CARCINOEMBRYONIC ANTIGEN: CPT

## 2021-03-30 PROCEDURE — 80053 COMPREHEN METABOLIC PANEL: CPT

## 2021-05-11 ENCOUNTER — HOSPITAL ENCOUNTER (OUTPATIENT)
Dept: PET IMAGING | Age: 66
Discharge: HOME | End: 2021-05-11
Payer: MEDICARE

## 2021-05-11 DIAGNOSIS — C61 PRIMARY PROSTATE CANCER WITH METASTASIS FROM PROSTATE TO OTHER SITE (HCC): ICD-10-CM

## 2021-05-11 DIAGNOSIS — C7A.1 NEUROENDOCRINE CARCINOMA, HIGH GRADE (HCC): ICD-10-CM

## 2021-05-11 DIAGNOSIS — C77.2 METASTASIS TO RETROPERITONEAL LYMPH NODE (HCC): ICD-10-CM

## 2021-05-11 DIAGNOSIS — C79.51 BONE METASTASIS (HCC): ICD-10-CM

## 2021-05-11 LAB
GLUCOSE BLD STRIP.AUTO-MCNC: 103 MG/DL (ref 65–100)
SERVICE CMNT-IMP: ABNORMAL

## 2021-05-11 PROCEDURE — 74011000636 HC RX REV CODE- 636: Performed by: INTERNAL MEDICINE

## 2021-05-11 PROCEDURE — 82962 GLUCOSE BLOOD TEST: CPT

## 2021-05-11 PROCEDURE — A9552 F18 FDG: HCPCS

## 2021-05-11 RX ORDER — SODIUM CHLORIDE 0.9 % (FLUSH) 0.9 %
10 SYRINGE (ML) INJECTION
Status: COMPLETED | OUTPATIENT
Start: 2021-05-11 | End: 2021-05-11

## 2021-05-11 RX ADMIN — DIATRIZOATE MEGLUMINE AND DIATRIZOATE SODIUM 10 ML: 660; 100 LIQUID ORAL; RECTAL at 11:11

## 2021-05-11 RX ADMIN — Medication 10 ML: at 11:11

## 2021-05-12 ENCOUNTER — HOSPITAL ENCOUNTER (OUTPATIENT)
Dept: LAB | Age: 66
Discharge: HOME | End: 2021-05-12
Payer: MEDICARE

## 2021-05-12 DIAGNOSIS — C61 PROSTATE CANCER (HCC): ICD-10-CM

## 2021-05-12 LAB
ALBUMIN SERPL-MCNC: 3.8 G/DL (ref 3.2–4.6)
ALBUMIN/GLOB SERPL: 1.1 {RATIO} (ref 1.2–3.5)
ALP SERPL-CCNC: 69 U/L (ref 50–136)
ALT SERPL-CCNC: 41 U/L (ref 12–65)
ANION GAP SERPL CALC-SCNC: 5 MMOL/L (ref 7–16)
AST SERPL-CCNC: 22 U/L (ref 15–37)
BASOPHILS # BLD: 0 K/UL (ref 0–0.2)
BASOPHILS NFR BLD: 0 % (ref 0–2)
BILIRUB SERPL-MCNC: 0.3 MG/DL (ref 0.2–1.1)
BUN SERPL-MCNC: 17 MG/DL (ref 8–23)
CALCIUM SERPL-MCNC: 8.9 MG/DL (ref 8.3–10.4)
CEA SERPL-MCNC: 79.7 NG/ML (ref 0–3)
CHLORIDE SERPL-SCNC: 106 MMOL/L (ref 98–107)
CO2 SERPL-SCNC: 27 MMOL/L (ref 21–32)
CREAT SERPL-MCNC: 0.8 MG/DL (ref 0.8–1.5)
DIFFERENTIAL METHOD BLD: ABNORMAL
EOSINOPHIL # BLD: 0.1 K/UL (ref 0–0.8)
EOSINOPHIL NFR BLD: 1 % (ref 0.5–7.8)
ERYTHROCYTE [DISTWIDTH] IN BLOOD BY AUTOMATED COUNT: 13.6 % (ref 11.9–14.6)
GLOBULIN SER CALC-MCNC: 3.4 G/DL (ref 2.3–3.5)
GLUCOSE SERPL-MCNC: 102 MG/DL (ref 65–100)
HCT VFR BLD AUTO: 35 %
HGB BLD-MCNC: 11.6 G/DL (ref 13.6–17.2)
IMM GRANULOCYTES # BLD AUTO: 0 K/UL (ref 0–0.5)
IMM GRANULOCYTES NFR BLD AUTO: 0 % (ref 0–5)
LYMPHOCYTES # BLD: 1.5 K/UL (ref 0.5–4.6)
LYMPHOCYTES NFR BLD: 30 % (ref 13–44)
MCH RBC QN AUTO: 34.6 PG (ref 26.1–32.9)
MCHC RBC AUTO-ENTMCNC: 33.1 G/DL (ref 31.4–35)
MCV RBC AUTO: 104.5 FL (ref 79.6–97.8)
MONOCYTES # BLD: 0.6 K/UL (ref 0.1–1.3)
MONOCYTES NFR BLD: 12 % (ref 4–12)
NEUTS SEG # BLD: 2.9 K/UL (ref 1.7–8.2)
NEUTS SEG NFR BLD: 57 % (ref 43–78)
NRBC # BLD: 0 K/UL (ref 0–0.2)
PLATELET # BLD AUTO: 161 K/UL (ref 150–450)
PMV BLD AUTO: 8.7 FL (ref 9.4–12.3)
POTASSIUM SERPL-SCNC: 3.7 MMOL/L (ref 3.5–5.1)
PROT SERPL-MCNC: 7.2 G/DL (ref 6.3–8.2)
PSA SERPL-MCNC: 2.6 NG/ML
RBC # BLD AUTO: 3.35 M/UL (ref 4.23–5.6)
SODIUM SERPL-SCNC: 138 MMOL/L (ref 136–145)
WBC # BLD AUTO: 5.2 K/UL (ref 4.3–11.1)

## 2021-05-12 PROCEDURE — 80053 COMPREHEN METABOLIC PANEL: CPT

## 2021-05-12 PROCEDURE — 36415 COLL VENOUS BLD VENIPUNCTURE: CPT

## 2021-05-12 PROCEDURE — 85025 COMPLETE CBC W/AUTO DIFF WBC: CPT

## 2021-05-12 PROCEDURE — 82378 CARCINOEMBRYONIC ANTIGEN: CPT

## 2021-05-12 PROCEDURE — 84153 ASSAY OF PSA TOTAL: CPT

## 2021-05-24 ENCOUNTER — HOSPITAL ENCOUNTER (OUTPATIENT)
Dept: INFUSION THERAPY | Age: 66
Discharge: HOME | End: 2021-05-24
Payer: MEDICARE

## 2021-05-24 ENCOUNTER — HOSPITAL ENCOUNTER (OUTPATIENT)
Dept: INFUSION THERAPY | Age: 66
Discharge: HOME | End: 2021-05-24

## 2021-05-24 DIAGNOSIS — C61 PRIMARY PROSTATE CANCER WITH METASTASIS FROM PROSTATE TO OTHER SITE (HCC): ICD-10-CM

## 2021-05-24 LAB
ALBUMIN SERPL-MCNC: 3.9 G/DL (ref 3.2–4.6)
ALBUMIN/GLOB SERPL: 1.2 {RATIO} (ref 1.2–3.5)
ALP SERPL-CCNC: 73 U/L (ref 50–136)
ALT SERPL-CCNC: 45 U/L (ref 12–65)
ANION GAP SERPL CALC-SCNC: 8 MMOL/L (ref 7–16)
AST SERPL-CCNC: 20 U/L (ref 15–37)
BASOPHILS # BLD: 0 K/UL (ref 0–0.2)
BASOPHILS NFR BLD: 0 % (ref 0–2)
BILIRUB SERPL-MCNC: 0.3 MG/DL (ref 0.2–1.1)
BUN SERPL-MCNC: 19 MG/DL (ref 8–23)
CALCIUM SERPL-MCNC: 9 MG/DL (ref 8.3–10.4)
CEA SERPL-MCNC: 112.7 NG/ML (ref 0–3)
CHLORIDE SERPL-SCNC: 105 MMOL/L (ref 98–107)
CO2 SERPL-SCNC: 24 MMOL/L (ref 21–32)
CREAT SERPL-MCNC: 0.9 MG/DL (ref 0.8–1.5)
DIFFERENTIAL METHOD BLD: ABNORMAL
EOSINOPHIL # BLD: 0.1 K/UL (ref 0–0.8)
EOSINOPHIL NFR BLD: 1 % (ref 0.5–7.8)
ERYTHROCYTE [DISTWIDTH] IN BLOOD BY AUTOMATED COUNT: 13.5 % (ref 11.9–14.6)
GLOBULIN SER CALC-MCNC: 3.3 G/DL (ref 2.3–3.5)
GLUCOSE SERPL-MCNC: 106 MG/DL (ref 65–100)
HCT VFR BLD AUTO: 34.2 %
HGB BLD-MCNC: 11.5 G/DL (ref 13.6–17.2)
IMM GRANULOCYTES # BLD AUTO: 0 K/UL (ref 0–0.5)
IMM GRANULOCYTES NFR BLD AUTO: 0 % (ref 0–5)
LYMPHOCYTES # BLD: 1.6 K/UL (ref 0.5–4.6)
LYMPHOCYTES NFR BLD: 31 % (ref 13–44)
MCH RBC QN AUTO: 34.3 PG (ref 26.1–32.9)
MCHC RBC AUTO-ENTMCNC: 33.6 G/DL (ref 31.4–35)
MCV RBC AUTO: 102.1 FL (ref 79.6–97.8)
MONOCYTES # BLD: 0.6 K/UL (ref 0.1–1.3)
MONOCYTES NFR BLD: 12 % (ref 4–12)
NEUTS SEG # BLD: 2.8 K/UL (ref 1.7–8.2)
NEUTS SEG NFR BLD: 55 % (ref 43–78)
NRBC # BLD: 0 K/UL (ref 0–0.2)
PLATELET # BLD AUTO: 180 K/UL (ref 150–450)
PMV BLD AUTO: 8.8 FL (ref 9.4–12.3)
POTASSIUM SERPL-SCNC: 3.9 MMOL/L (ref 3.5–5.1)
PROT SERPL-MCNC: 7.2 G/DL (ref 6.3–8.2)
PSA SERPL-MCNC: 2.3 NG/ML
RBC # BLD AUTO: 3.35 M/UL (ref 4.23–5.6)
SODIUM SERPL-SCNC: 137 MMOL/L (ref 136–145)
WBC # BLD AUTO: 5.1 K/UL (ref 4.3–11.1)

## 2021-05-24 PROCEDURE — 84153 ASSAY OF PSA TOTAL: CPT

## 2021-05-24 PROCEDURE — 80053 COMPREHEN METABOLIC PANEL: CPT

## 2021-05-24 PROCEDURE — 85025 COMPLETE CBC W/AUTO DIFF WBC: CPT

## 2021-05-24 PROCEDURE — 36591 DRAW BLOOD OFF VENOUS DEVICE: CPT

## 2021-05-24 PROCEDURE — 82378 CARCINOEMBRYONIC ANTIGEN: CPT

## 2021-05-24 RX ORDER — SODIUM CHLORIDE 0.9 % (FLUSH) 0.9 %
10 SYRINGE (ML) INJECTION ONCE
Status: COMPLETED | OUTPATIENT
Start: 2021-05-24 | End: 2021-05-24

## 2021-05-24 RX ADMIN — Medication 10 ML: at 09:50

## 2021-05-24 NOTE — PROGRESS NOTES
Patient arrived to port lab for port access and lab draw   [facility] accessed and labs drawn per protocol   Port remains accessed; infusion bracelet placed on right wrist.   Patient discharged from port lab to OV and Infusion.

## 2021-05-25 ENCOUNTER — HOSPITAL ENCOUNTER (OUTPATIENT)
Dept: INFUSION THERAPY | Age: 66
End: 2021-05-25
Payer: MEDICARE

## 2021-05-26 ENCOUNTER — HOSPITAL ENCOUNTER (OUTPATIENT)
Dept: INFUSION THERAPY | Age: 66
End: 2021-05-26
Payer: MEDICARE

## 2021-05-27 ENCOUNTER — HOSPITAL ENCOUNTER (OUTPATIENT)
Dept: INFUSION THERAPY | Age: 66
End: 2021-05-27

## 2021-06-01 ENCOUNTER — HOSPITAL ENCOUNTER (OUTPATIENT)
Dept: INFUSION THERAPY | Age: 66
Discharge: HOME | End: 2021-06-01
Payer: MEDICARE

## 2021-06-01 DIAGNOSIS — C61 PRIMARY PROSTATE CANCER WITH METASTASIS FROM PROSTATE TO OTHER SITE (HCC): ICD-10-CM

## 2021-06-01 DIAGNOSIS — E87.6 HYPOKALEMIA: Primary | ICD-10-CM

## 2021-06-01 DIAGNOSIS — C77.2 METASTASIS TO RETROPERITONEAL LYMPH NODE (HCC): ICD-10-CM

## 2021-06-01 LAB
ALBUMIN SERPL-MCNC: 4 G/DL (ref 3.2–4.6)
ALBUMIN/GLOB SERPL: 1.1 {RATIO} (ref 1.2–3.5)
ALP SERPL-CCNC: 88 U/L (ref 50–136)
ALT SERPL-CCNC: 40 U/L (ref 12–65)
ANION GAP SERPL CALC-SCNC: 5 MMOL/L (ref 7–16)
AST SERPL-CCNC: 21 U/L (ref 15–37)
BASOPHILS # BLD: 0 K/UL (ref 0–0.2)
BASOPHILS NFR BLD: 0 % (ref 0–2)
BILIRUB SERPL-MCNC: 0.4 MG/DL (ref 0.2–1.1)
BUN SERPL-MCNC: 19 MG/DL (ref 8–23)
CALCIUM SERPL-MCNC: 9.3 MG/DL (ref 8.3–10.4)
CEA SERPL-MCNC: 166 NG/ML (ref 0–3)
CHLORIDE SERPL-SCNC: 104 MMOL/L (ref 98–107)
CO2 SERPL-SCNC: 27 MMOL/L (ref 21–32)
CREAT SERPL-MCNC: 1 MG/DL (ref 0.8–1.5)
DIFFERENTIAL METHOD BLD: ABNORMAL
EOSINOPHIL # BLD: 0.1 K/UL (ref 0–0.8)
EOSINOPHIL NFR BLD: 1 % (ref 0.5–7.8)
ERYTHROCYTE [DISTWIDTH] IN BLOOD BY AUTOMATED COUNT: 13.2 % (ref 11.9–14.6)
GLOBULIN SER CALC-MCNC: 3.6 G/DL (ref 2.3–3.5)
GLUCOSE SERPL-MCNC: 118 MG/DL (ref 65–100)
HCT VFR BLD AUTO: 35.5 %
HGB BLD-MCNC: 12.2 G/DL (ref 13.6–17.2)
IMM GRANULOCYTES # BLD AUTO: 0 K/UL (ref 0–0.5)
IMM GRANULOCYTES NFR BLD AUTO: 0 % (ref 0–5)
LYMPHOCYTES # BLD: 1.7 K/UL (ref 0.5–4.6)
LYMPHOCYTES NFR BLD: 23 % (ref 13–44)
MCH RBC QN AUTO: 34.5 PG (ref 26.1–32.9)
MCHC RBC AUTO-ENTMCNC: 34.4 G/DL (ref 31.4–35)
MCV RBC AUTO: 100.3 FL (ref 79.6–97.8)
MONOCYTES # BLD: 0.7 K/UL (ref 0.1–1.3)
MONOCYTES NFR BLD: 10 % (ref 4–12)
NEUTS SEG # BLD: 5 K/UL (ref 1.7–8.2)
NEUTS SEG NFR BLD: 66 % (ref 43–78)
NRBC # BLD: 0 K/UL (ref 0–0.2)
PLATELET # BLD AUTO: 196 K/UL (ref 150–450)
PMV BLD AUTO: 9.2 FL (ref 9.4–12.3)
POTASSIUM SERPL-SCNC: 3.9 MMOL/L (ref 3.5–5.1)
PROT SERPL-MCNC: 7.6 G/DL (ref 6.3–8.2)
PSA SERPL-MCNC: 2.6 NG/ML
RBC # BLD AUTO: 3.54 M/UL (ref 4.23–5.6)
SODIUM SERPL-SCNC: 136 MMOL/L (ref 136–145)
WBC # BLD AUTO: 7.6 K/UL (ref 4.3–11.1)

## 2021-06-01 PROCEDURE — 85025 COMPLETE CBC W/AUTO DIFF WBC: CPT

## 2021-06-01 PROCEDURE — 74011250636 HC RX REV CODE- 250/636: Performed by: INTERNAL MEDICINE

## 2021-06-01 PROCEDURE — 36591 DRAW BLOOD OFF VENOUS DEVICE: CPT

## 2021-06-01 PROCEDURE — 82378 CARCINOEMBRYONIC ANTIGEN: CPT

## 2021-06-01 PROCEDURE — 96417 CHEMO IV INFUS EACH ADDL SEQ: CPT

## 2021-06-01 PROCEDURE — 96367 TX/PROPH/DG ADDL SEQ IV INF: CPT

## 2021-06-01 PROCEDURE — 80053 COMPREHEN METABOLIC PANEL: CPT

## 2021-06-01 PROCEDURE — 84153 ASSAY OF PSA TOTAL: CPT

## 2021-06-01 PROCEDURE — 96413 CHEMO IV INFUSION 1 HR: CPT

## 2021-06-01 PROCEDURE — 96375 TX/PRO/DX INJ NEW DRUG ADDON: CPT

## 2021-06-01 PROCEDURE — 74011000258 HC RX REV CODE- 258: Performed by: INTERNAL MEDICINE

## 2021-06-01 RX ORDER — SODIUM CHLORIDE 9 MG/ML
25 INJECTION, SOLUTION INTRAVENOUS CONTINUOUS
Status: DISCONTINUED | OUTPATIENT
Start: 2021-06-01 | End: 2021-06-02 | Stop reason: HOSPADM

## 2021-06-01 RX ORDER — ONDANSETRON 2 MG/ML
8 INJECTION INTRAMUSCULAR; INTRAVENOUS ONCE
Status: COMPLETED | OUTPATIENT
Start: 2021-06-01 | End: 2021-06-01

## 2021-06-01 RX ORDER — SODIUM CHLORIDE 0.9 % (FLUSH) 0.9 %
10 SYRINGE (ML) INJECTION AS NEEDED
Status: DISCONTINUED | OUTPATIENT
Start: 2021-06-01 | End: 2021-06-02 | Stop reason: HOSPADM

## 2021-06-01 RX ORDER — SODIUM CHLORIDE 0.9 % (FLUSH) 0.9 %
10 SYRINGE (ML) INJECTION AS NEEDED
Status: DISCONTINUED | OUTPATIENT
Start: 2021-06-01 | End: 2021-06-03 | Stop reason: HOSPADM

## 2021-06-01 RX ADMIN — SODIUM CHLORIDE 25 ML/HR: 9 INJECTION, SOLUTION INTRAVENOUS at 14:00

## 2021-06-01 RX ADMIN — ETOPOSIDE 218 MG: 20 INJECTION INTRAVENOUS at 15:38

## 2021-06-01 RX ADMIN — CARBOPLATIN 626 MG: 10 INJECTION, SOLUTION INTRAVENOUS at 15:02

## 2021-06-01 RX ADMIN — DEXAMETHASONE SODIUM PHOSPHATE 12 MG: 4 INJECTION, SOLUTION INTRAMUSCULAR; INTRAVENOUS at 14:30

## 2021-06-01 RX ADMIN — FOSAPREPITANT 150 MG: 150 INJECTION, POWDER, LYOPHILIZED, FOR SOLUTION INTRAVENOUS at 14:08

## 2021-06-01 RX ADMIN — ONDANSETRON 8 MG: 2 INJECTION INTRAMUSCULAR; INTRAVENOUS at 14:05

## 2021-06-01 RX ADMIN — Medication 10 ML: at 11:55

## 2021-06-01 RX ADMIN — Medication 10 ML: at 16:50

## 2021-06-01 NOTE — PROGRESS NOTES
Patient arrived to port lab for port access and lab draw. Port was still accessed from last visit on 5/24/21. Discussed with pt that port needle must be changed q7days. Pt was upset at this, but eventually verbalized understanding. Port de-accessed. Port re-accessed, labs drawn and port flushed. Left accessed for infusion appointment today. Patient discharged from port lab ambulatory.

## 2021-06-01 NOTE — PROGRESS NOTES
Arrived to the [facility] ambulatory. Carbo and etoposide completed. Patient tolerated well. Any issues or concerns during appointment: no.  Patient aware of next infusion appointment on  6/2 at 1650. Discharged to home ambulatory.

## 2021-06-01 NOTE — ADDENDUM NOTE
Encounter addended by: Kena Martines RPH on: 6/1/2021 1:39 PM   Actions taken: i-Vent created or edited

## 2021-06-02 ENCOUNTER — HOSPITAL ENCOUNTER (OUTPATIENT)
Dept: INFUSION THERAPY | Age: 66
Discharge: HOME | End: 2021-06-02
Payer: MEDICARE

## 2021-06-02 VITALS
WEIGHT: 216.2 LBS | RESPIRATION RATE: 18 BRPM | OXYGEN SATURATION: 96 % | HEART RATE: 102 BPM | SYSTOLIC BLOOD PRESSURE: 149 MMHG | TEMPERATURE: 98.3 F | BODY MASS INDEX: 31.93 KG/M2 | DIASTOLIC BLOOD PRESSURE: 88 MMHG

## 2021-06-02 DIAGNOSIS — C61 PRIMARY PROSTATE CANCER WITH METASTASIS FROM PROSTATE TO OTHER SITE (HCC): ICD-10-CM

## 2021-06-02 DIAGNOSIS — E87.6 HYPOKALEMIA: Primary | ICD-10-CM

## 2021-06-02 DIAGNOSIS — C77.2 METASTASIS TO RETROPERITONEAL LYMPH NODE (HCC): ICD-10-CM

## 2021-06-02 PROCEDURE — 96375 TX/PRO/DX INJ NEW DRUG ADDON: CPT

## 2021-06-02 PROCEDURE — 96413 CHEMO IV INFUSION 1 HR: CPT

## 2021-06-02 PROCEDURE — 74011250636 HC RX REV CODE- 250/636: Performed by: INTERNAL MEDICINE

## 2021-06-02 RX ORDER — SODIUM CHLORIDE 0.9 % (FLUSH) 0.9 %
10 SYRINGE (ML) INJECTION AS NEEDED
Status: ACTIVE | OUTPATIENT
Start: 2021-06-02 | End: 2021-06-02

## 2021-06-02 RX ORDER — SODIUM CHLORIDE 9 MG/ML
25 INJECTION, SOLUTION INTRAVENOUS CONTINUOUS
Status: ACTIVE | OUTPATIENT
Start: 2021-06-02 | End: 2021-06-02

## 2021-06-02 RX ORDER — DEXAMETHASONE SODIUM PHOSPHATE 4 MG/ML
8 INJECTION, SOLUTION INTRA-ARTICULAR; INTRALESIONAL; INTRAMUSCULAR; INTRAVENOUS; SOFT TISSUE ONCE
Status: COMPLETED | OUTPATIENT
Start: 2021-06-02 | End: 2021-06-02

## 2021-06-02 RX ADMIN — Medication 10 ML: at 12:50

## 2021-06-02 RX ADMIN — DEXAMETHASONE SODIUM PHOSPHATE 8 MG: 4 INJECTION, SOLUTION INTRAMUSCULAR; INTRAVENOUS at 11:28

## 2021-06-02 RX ADMIN — ETOPOSIDE 218 MG: 20 INJECTION INTRAVENOUS at 11:40

## 2021-06-02 RX ADMIN — SODIUM CHLORIDE 25 ML/HR: 900 INJECTION, SOLUTION INTRAVENOUS at 11:14

## 2021-06-02 NOTE — PROGRESS NOTES
Pt arrived ambulatory. Decadron and VP16 infused. Pt tolerated well. Pt aware of tomorrow's appt at 1530. Discharged ambulatory, no distress noted.

## 2021-06-03 ENCOUNTER — HOSPITAL ENCOUNTER (OUTPATIENT)
Dept: INFUSION THERAPY | Age: 66
Discharge: HOME | End: 2021-06-03
Payer: MEDICARE

## 2021-06-03 VITALS
RESPIRATION RATE: 16 BRPM | SYSTOLIC BLOOD PRESSURE: 143 MMHG | BODY MASS INDEX: 32.13 KG/M2 | WEIGHT: 217.6 LBS | DIASTOLIC BLOOD PRESSURE: 88 MMHG | OXYGEN SATURATION: 98 % | HEART RATE: 82 BPM | TEMPERATURE: 98.1 F

## 2021-06-03 DIAGNOSIS — E87.6 HYPOKALEMIA: Primary | ICD-10-CM

## 2021-06-03 DIAGNOSIS — C77.2 METASTASIS TO RETROPERITONEAL LYMPH NODE (HCC): ICD-10-CM

## 2021-06-03 DIAGNOSIS — C61 PRIMARY PROSTATE CANCER WITH METASTASIS FROM PROSTATE TO OTHER SITE (HCC): ICD-10-CM

## 2021-06-03 PROCEDURE — 96375 TX/PRO/DX INJ NEW DRUG ADDON: CPT

## 2021-06-03 PROCEDURE — 96413 CHEMO IV INFUSION 1 HR: CPT

## 2021-06-03 PROCEDURE — 74011250636 HC RX REV CODE- 250/636: Performed by: INTERNAL MEDICINE

## 2021-06-03 RX ORDER — DEXAMETHASONE SODIUM PHOSPHATE 4 MG/ML
8 INJECTION, SOLUTION INTRA-ARTICULAR; INTRALESIONAL; INTRAMUSCULAR; INTRAVENOUS; SOFT TISSUE ONCE
Status: COMPLETED | OUTPATIENT
Start: 2021-06-03 | End: 2021-06-03

## 2021-06-03 RX ORDER — SODIUM CHLORIDE 9 MG/ML
25 INJECTION, SOLUTION INTRAVENOUS CONTINUOUS
Status: DISCONTINUED | OUTPATIENT
Start: 2021-06-03 | End: 2021-06-04 | Stop reason: HOSPADM

## 2021-06-03 RX ORDER — SODIUM CHLORIDE 0.9 % (FLUSH) 0.9 %
10 SYRINGE (ML) INJECTION AS NEEDED
Status: DISCONTINUED | OUTPATIENT
Start: 2021-06-03 | End: 2021-06-04 | Stop reason: HOSPADM

## 2021-06-03 RX ADMIN — ETOPOSIDE 218 MG: 20 INJECTION INTRAVENOUS at 16:03

## 2021-06-03 RX ADMIN — SODIUM CHLORIDE 25 ML/HR: 9 INJECTION, SOLUTION INTRAVENOUS at 15:45

## 2021-06-03 RX ADMIN — Medication 10 ML: at 17:10

## 2021-06-03 RX ADMIN — Medication 10 ML: at 15:45

## 2021-06-03 RX ADMIN — DEXAMETHASONE SODIUM PHOSPHATE 8 MG: 4 INJECTION, SOLUTION INTRAMUSCULAR; INTRAVENOUS at 15:51

## 2021-06-03 NOTE — ROUTINE PROCESS
Pt arrived ambulatory to [facility]. Port previously accessed with good blood return. NS infusing. Decadron IV. Etoposide infusing. Pt aware of next appt on 6/21/21 at 1130. Port flushed and de accessed. Port flushed and de accessed. Pt discharged ambulatory.

## 2021-06-21 ENCOUNTER — HOSPITAL ENCOUNTER (OUTPATIENT)
Dept: INFUSION THERAPY | Age: 66
Discharge: HOME | End: 2021-06-21
Payer: MEDICARE

## 2021-06-21 DIAGNOSIS — C61 PRIMARY PROSTATE CANCER WITH METASTASIS FROM PROSTATE TO OTHER SITE (HCC): ICD-10-CM

## 2021-06-21 DIAGNOSIS — E87.6 HYPOKALEMIA: Primary | ICD-10-CM

## 2021-06-21 DIAGNOSIS — C77.2 METASTASIS TO RETROPERITONEAL LYMPH NODE (HCC): ICD-10-CM

## 2021-06-21 LAB
ALBUMIN SERPL-MCNC: 3.9 G/DL (ref 3.2–4.6)
ALBUMIN/GLOB SERPL: 1.1 {RATIO} (ref 1.2–3.5)
ALP SERPL-CCNC: 78 U/L (ref 50–136)
ALT SERPL-CCNC: 31 U/L (ref 12–65)
ANION GAP SERPL CALC-SCNC: 5 MMOL/L (ref 7–16)
AST SERPL-CCNC: 17 U/L (ref 15–37)
BASOPHILS # BLD: 0 K/UL (ref 0–0.2)
BASOPHILS NFR BLD: 0 % (ref 0–2)
BILIRUB SERPL-MCNC: 0.3 MG/DL (ref 0.2–1.1)
BUN SERPL-MCNC: 17 MG/DL (ref 8–23)
CALCIUM SERPL-MCNC: 8.9 MG/DL (ref 8.3–10.4)
CEA SERPL-MCNC: 89.5 NG/ML (ref 0–3)
CHLORIDE SERPL-SCNC: 105 MMOL/L (ref 98–107)
CO2 SERPL-SCNC: 26 MMOL/L (ref 21–32)
CREAT SERPL-MCNC: 0.8 MG/DL (ref 0.8–1.5)
DIFFERENTIAL METHOD BLD: ABNORMAL
EOSINOPHIL # BLD: 0 K/UL (ref 0–0.8)
EOSINOPHIL NFR BLD: 0 % (ref 0.5–7.8)
ERYTHROCYTE [DISTWIDTH] IN BLOOD BY AUTOMATED COUNT: 13.5 % (ref 11.9–14.6)
GLOBULIN SER CALC-MCNC: 3.4 G/DL (ref 2.3–3.5)
GLUCOSE SERPL-MCNC: 108 MG/DL (ref 65–100)
HCT VFR BLD AUTO: 31.1 %
HGB BLD-MCNC: 10.6 G/DL (ref 13.6–17.2)
IMM GRANULOCYTES # BLD AUTO: 0 K/UL (ref 0–0.5)
IMM GRANULOCYTES NFR BLD AUTO: 1 % (ref 0–5)
LYMPHOCYTES # BLD: 1.7 K/UL (ref 0.5–4.6)
LYMPHOCYTES NFR BLD: 49 % (ref 13–44)
MCH RBC QN AUTO: 34.1 PG (ref 26.1–32.9)
MCHC RBC AUTO-ENTMCNC: 34.1 G/DL (ref 31.4–35)
MCV RBC AUTO: 100 FL (ref 79.6–97.8)
MONOCYTES # BLD: 0.8 K/UL (ref 0.1–1.3)
MONOCYTES NFR BLD: 22 % (ref 4–12)
NEUTS SEG # BLD: 1 K/UL (ref 1.7–8.2)
NEUTS SEG NFR BLD: 28 % (ref 43–78)
NRBC # BLD: 0 K/UL (ref 0–0.2)
PLATELET # BLD AUTO: 152 K/UL (ref 150–450)
PMV BLD AUTO: 8.9 FL (ref 9.4–12.3)
POTASSIUM SERPL-SCNC: 3.7 MMOL/L (ref 3.5–5.1)
PROT SERPL-MCNC: 7.3 G/DL (ref 6.3–8.2)
PSA SERPL-MCNC: 2.8 NG/ML
RBC # BLD AUTO: 3.11 M/UL (ref 4.23–5.6)
SODIUM SERPL-SCNC: 136 MMOL/L (ref 136–145)
WBC # BLD AUTO: 3.4 K/UL (ref 4.3–11.1)

## 2021-06-21 PROCEDURE — 74011250636 HC RX REV CODE- 250/636: Performed by: INTERNAL MEDICINE

## 2021-06-21 PROCEDURE — 36591 DRAW BLOOD OFF VENOUS DEVICE: CPT

## 2021-06-21 PROCEDURE — 74011000258 HC RX REV CODE- 258: Performed by: INTERNAL MEDICINE

## 2021-06-21 PROCEDURE — 96413 CHEMO IV INFUSION 1 HR: CPT

## 2021-06-21 PROCEDURE — 85025 COMPLETE CBC W/AUTO DIFF WBC: CPT

## 2021-06-21 PROCEDURE — 96375 TX/PRO/DX INJ NEW DRUG ADDON: CPT

## 2021-06-21 PROCEDURE — 80053 COMPREHEN METABOLIC PANEL: CPT

## 2021-06-21 PROCEDURE — 82378 CARCINOEMBRYONIC ANTIGEN: CPT

## 2021-06-21 PROCEDURE — 96367 TX/PROPH/DG ADDL SEQ IV INF: CPT

## 2021-06-21 PROCEDURE — 96417 CHEMO IV INFUS EACH ADDL SEQ: CPT

## 2021-06-21 PROCEDURE — 84153 ASSAY OF PSA TOTAL: CPT

## 2021-06-21 RX ORDER — SODIUM CHLORIDE 0.9 % (FLUSH) 0.9 %
10 SYRINGE (ML) INJECTION EVERY 8 HOURS
Status: DISCONTINUED | OUTPATIENT
Start: 2021-06-21 | End: 2021-06-24 | Stop reason: HOSPADM

## 2021-06-21 RX ORDER — SODIUM CHLORIDE 0.9 % (FLUSH) 0.9 %
10 SYRINGE (ML) INJECTION AS NEEDED
Status: ACTIVE | OUTPATIENT
Start: 2021-06-21 | End: 2021-06-21

## 2021-06-21 RX ORDER — ONDANSETRON 2 MG/ML
8 INJECTION INTRAMUSCULAR; INTRAVENOUS ONCE
Status: COMPLETED | OUTPATIENT
Start: 2021-06-21 | End: 2021-06-21

## 2021-06-21 RX ORDER — SODIUM CHLORIDE 9 MG/ML
25 INJECTION, SOLUTION INTRAVENOUS CONTINUOUS
Status: ACTIVE | OUTPATIENT
Start: 2021-06-21 | End: 2021-06-21

## 2021-06-21 RX ADMIN — Medication 10 ML: at 14:44

## 2021-06-21 RX ADMIN — SODIUM CHLORIDE 25 ML/HR: 9 INJECTION, SOLUTION INTRAVENOUS at 12:02

## 2021-06-21 RX ADMIN — Medication 10 ML: at 12:02

## 2021-06-21 RX ADMIN — ONDANSETRON 8 MG: 2 INJECTION INTRAMUSCULAR; INTRAVENOUS at 12:07

## 2021-06-21 RX ADMIN — ETOPOSIDE 218 MG: 20 INJECTION INTRAVENOUS at 13:42

## 2021-06-21 RX ADMIN — CARBOPLATIN 750 MG: 10 INJECTION, SOLUTION INTRAVENOUS at 13:01

## 2021-06-21 RX ADMIN — FOSAPREPITANT 150 MG: 150 INJECTION, POWDER, LYOPHILIZED, FOR SOLUTION INTRAVENOUS at 12:34

## 2021-06-21 RX ADMIN — DEXAMETHASONE SODIUM PHOSPHATE 12 MG: 4 INJECTION, SOLUTION INTRAMUSCULAR; INTRAVENOUS at 12:16

## 2021-06-21 RX ADMIN — Medication 10 ML: at 10:35

## 2021-06-21 NOTE — PROGRESS NOTES
Patient arrived to port lab for port access and lab draw. Port accessed and labs drawn per protocol. Port flushed and left accessed for infusion. Patient discharged from port lab ambulatory.

## 2021-06-21 NOTE — PROGRESS NOTES
Pt arrived ambulatory to [facility] with port previously accessed with good blood return. NS infusing. Pre meds given as ordered. Carboplatin infusing. Etoposide infusing. Pt aware of next appt on 6/22/21 at 1400. Port flushed and remains accessed. Pt discharged ambulatory.

## 2021-06-22 ENCOUNTER — HOSPITAL ENCOUNTER (OUTPATIENT)
Dept: INFUSION THERAPY | Age: 66
Discharge: HOME | End: 2021-06-22
Payer: MEDICARE

## 2021-06-22 VITALS
RESPIRATION RATE: 16 BRPM | SYSTOLIC BLOOD PRESSURE: 160 MMHG | TEMPERATURE: 98.1 F | OXYGEN SATURATION: 98 % | DIASTOLIC BLOOD PRESSURE: 88 MMHG | HEART RATE: 87 BPM | WEIGHT: 217 LBS | BODY MASS INDEX: 32.05 KG/M2

## 2021-06-22 DIAGNOSIS — C61 PRIMARY PROSTATE CANCER WITH METASTASIS FROM PROSTATE TO OTHER SITE (HCC): ICD-10-CM

## 2021-06-22 DIAGNOSIS — E87.6 HYPOKALEMIA: Primary | ICD-10-CM

## 2021-06-22 DIAGNOSIS — C77.2 METASTASIS TO RETROPERITONEAL LYMPH NODE (HCC): ICD-10-CM

## 2021-06-22 PROCEDURE — 74011250636 HC RX REV CODE- 250/636: Performed by: INTERNAL MEDICINE

## 2021-06-22 PROCEDURE — 96375 TX/PRO/DX INJ NEW DRUG ADDON: CPT

## 2021-06-22 PROCEDURE — 96413 CHEMO IV INFUSION 1 HR: CPT

## 2021-06-22 RX ORDER — SODIUM CHLORIDE 9 MG/ML
25 INJECTION, SOLUTION INTRAVENOUS CONTINUOUS
Status: DISCONTINUED | OUTPATIENT
Start: 2021-06-22 | End: 2021-06-23 | Stop reason: HOSPADM

## 2021-06-22 RX ORDER — SODIUM CHLORIDE 0.9 % (FLUSH) 0.9 %
10 SYRINGE (ML) INJECTION AS NEEDED
Status: DISCONTINUED | OUTPATIENT
Start: 2021-06-22 | End: 2021-06-23 | Stop reason: HOSPADM

## 2021-06-22 RX ORDER — DEXAMETHASONE SODIUM PHOSPHATE 4 MG/ML
8 INJECTION, SOLUTION INTRA-ARTICULAR; INTRALESIONAL; INTRAMUSCULAR; INTRAVENOUS; SOFT TISSUE ONCE
Status: COMPLETED | OUTPATIENT
Start: 2021-06-22 | End: 2021-06-22

## 2021-06-22 RX ADMIN — DEXAMETHASONE SODIUM PHOSPHATE 8 MG: 4 INJECTION, SOLUTION INTRAMUSCULAR; INTRAVENOUS at 14:09

## 2021-06-22 RX ADMIN — Medication 10 ML: at 15:42

## 2021-06-22 RX ADMIN — SODIUM CHLORIDE 25 ML/HR: 900 INJECTION, SOLUTION INTRAVENOUS at 14:06

## 2021-06-22 RX ADMIN — ETOPOSIDE 218 MG: 20 INJECTION INTRAVENOUS at 14:35

## 2021-06-22 NOTE — PROGRESS NOTES
Arrived to the [facility]. VP16 completed and tolerated well. Any issues or concerns during appointment: none during infusion. States had indigestion overnight -plans to take OTC prilosec  Patient given education on infusion process  Instructed patient to notify provider for any issues or worrisome symptoms. They verbalized understanding. Patient aware of next infusion appointment scheduled for 6/23/21 at 2pm  Discharged ambulatory with self.

## 2021-06-23 ENCOUNTER — HOSPITAL ENCOUNTER (OUTPATIENT)
Dept: INFUSION THERAPY | Age: 66
Discharge: HOME | End: 2021-06-23
Payer: MEDICARE

## 2021-06-23 VITALS
BODY MASS INDEX: 32.4 KG/M2 | OXYGEN SATURATION: 98 % | TEMPERATURE: 97.8 F | SYSTOLIC BLOOD PRESSURE: 182 MMHG | HEART RATE: 61 BPM | RESPIRATION RATE: 16 BRPM | DIASTOLIC BLOOD PRESSURE: 98 MMHG | WEIGHT: 219.4 LBS

## 2021-06-23 DIAGNOSIS — E87.6 HYPOKALEMIA: Primary | ICD-10-CM

## 2021-06-23 DIAGNOSIS — C61 PRIMARY PROSTATE CANCER WITH METASTASIS FROM PROSTATE TO OTHER SITE (HCC): ICD-10-CM

## 2021-06-23 DIAGNOSIS — C77.2 METASTASIS TO RETROPERITONEAL LYMPH NODE (HCC): ICD-10-CM

## 2021-06-23 PROCEDURE — 74011000250 HC RX REV CODE- 250: Performed by: INTERNAL MEDICINE

## 2021-06-23 PROCEDURE — 74011250636 HC RX REV CODE- 250/636: Performed by: INTERNAL MEDICINE

## 2021-06-23 PROCEDURE — 96413 CHEMO IV INFUSION 1 HR: CPT

## 2021-06-23 PROCEDURE — 96375 TX/PRO/DX INJ NEW DRUG ADDON: CPT

## 2021-06-23 PROCEDURE — 74011250636 HC RX REV CODE- 250/636: Performed by: NURSE PRACTITIONER

## 2021-06-23 RX ORDER — SODIUM CHLORIDE 0.9 % (FLUSH) 0.9 %
10 SYRINGE (ML) INJECTION AS NEEDED
Status: DISCONTINUED | OUTPATIENT
Start: 2021-06-23 | End: 2021-06-24 | Stop reason: HOSPADM

## 2021-06-23 RX ORDER — DEXAMETHASONE SODIUM PHOSPHATE 4 MG/ML
8 INJECTION, SOLUTION INTRA-ARTICULAR; INTRALESIONAL; INTRAMUSCULAR; INTRAVENOUS; SOFT TISSUE ONCE
Status: COMPLETED | OUTPATIENT
Start: 2021-06-23 | End: 2021-06-23

## 2021-06-23 RX ORDER — SODIUM CHLORIDE 9 MG/ML
25 INJECTION, SOLUTION INTRAVENOUS CONTINUOUS
Status: DISCONTINUED | OUTPATIENT
Start: 2021-06-23 | End: 2021-06-24 | Stop reason: HOSPADM

## 2021-06-23 RX ADMIN — ETOPOSIDE 218 MG: 20 INJECTION INTRAVENOUS at 15:36

## 2021-06-23 RX ADMIN — SODIUM CHLORIDE 500 ML: 900 INJECTION, SOLUTION INTRAVENOUS at 17:05

## 2021-06-23 RX ADMIN — SODIUM CHLORIDE 25 ML/HR: 900 INJECTION, SOLUTION INTRAVENOUS at 14:25

## 2021-06-23 RX ADMIN — Medication 10 ML: at 14:25

## 2021-06-23 RX ADMIN — FAMOTIDINE 20 MG: 10 INJECTION, SOLUTION INTRAVENOUS at 17:14

## 2021-06-23 RX ADMIN — Medication 10 ML: at 17:30

## 2021-06-23 RX ADMIN — DEXAMETHASONE SODIUM PHOSPHATE 8 MG: 4 INJECTION, SOLUTION INTRAMUSCULAR; INTRAVENOUS at 15:06

## 2021-06-23 NOTE — PROGRESS NOTES
Arrived to the [facility]. Etoposide completed. Patient tolerated well. Any issues or concerns during appointment:     Upon arrival to the infusion center, patient is tearful and states he doesn't feel like he can get his infusion today. Patient states he is feeling weak and has some ongoing periodic dizzy-ness, but otherwise no new issues. BP elevated, but otherwise VSS. Melinda Townsend, RN spoke with Dr. Lamar Thornton and MD encouraged patient to proceed with treatment today. Patient aware that he has the right to decline treatment today. Patient agreeable with proceeding. Upon completion of Etoposide infusion, patient complaining on dizzy-ness that is worse than normal after going to the bathroom. Patient also complaining of indigestion after eating crackers, but reports that he hasn't eaten anything today besides milk and coffee. BP elevated at 176/100. Junior Murcia NP notified. Order given for a 500ml NS bolus. Pepcid given. Upon completion of the NS bolus, patient is still feeling dizzy and feels like he is in a \"fog\". /98. Junior Murcia NP notified. Per NP, patient was advised to go to the ER to be evaluated. Patient has no one to drive him, but he was told an ambulance would be called for him. Patient refused to go to the ER. Patient educated on the risks of refusing to be further evaluated. NP notified. Patient aware of next infusion appointment on 6/24/2021 (date) at 5:30 pm (time). Discharged ambulatory with self.

## 2021-06-24 ENCOUNTER — HOSPITAL ENCOUNTER (OUTPATIENT)
Dept: INFUSION THERAPY | Age: 66
Discharge: HOME | End: 2021-06-24
Payer: MEDICARE

## 2021-06-24 VITALS
HEART RATE: 79 BPM | RESPIRATION RATE: 16 BRPM | DIASTOLIC BLOOD PRESSURE: 75 MMHG | SYSTOLIC BLOOD PRESSURE: 122 MMHG | OXYGEN SATURATION: 98 % | TEMPERATURE: 98.2 F

## 2021-06-24 DIAGNOSIS — E87.6 HYPOKALEMIA: Primary | ICD-10-CM

## 2021-06-24 DIAGNOSIS — C61 PRIMARY PROSTATE CANCER WITH METASTASIS FROM PROSTATE TO OTHER SITE (HCC): ICD-10-CM

## 2021-06-24 DIAGNOSIS — C77.2 METASTASIS TO RETROPERITONEAL LYMPH NODE (HCC): ICD-10-CM

## 2021-06-24 PROCEDURE — 74011250636 HC RX REV CODE- 250/636: Performed by: INTERNAL MEDICINE

## 2021-06-24 PROCEDURE — 96372 THER/PROPH/DIAG INJ SC/IM: CPT

## 2021-06-24 RX ADMIN — PEGFILGRASTIM-CBQV 6 MG: 6 INJECTION, SOLUTION SUBCUTANEOUS at 16:37

## 2021-06-24 NOTE — PROGRESS NOTES
Arrived to the [facility]. Nelson completed. Provided education on use of Claritin    Patient instructed to report any side affects to ordering provider. Patient tolerated without problems. Any issues or concerns during appointment: no.  Patient aware of next infusion appointment on 7/12/21 (date) at 0930 (time). Discharged ambulatory.

## 2021-07-12 ENCOUNTER — HOSPITAL ENCOUNTER (OUTPATIENT)
Dept: INFUSION THERAPY | Age: 66
Discharge: HOME | End: 2021-07-12
Payer: MEDICARE

## 2021-07-12 VITALS — WEIGHT: 218 LBS | BODY MASS INDEX: 32.19 KG/M2

## 2021-07-12 DIAGNOSIS — C61 PRIMARY PROSTATE CANCER WITH METASTASIS FROM PROSTATE TO OTHER SITE (HCC): ICD-10-CM

## 2021-07-12 DIAGNOSIS — E87.6 HYPOKALEMIA: ICD-10-CM

## 2021-07-12 DIAGNOSIS — T45.1X5A ANEMIA DUE TO CHEMOTHERAPY: Primary | ICD-10-CM

## 2021-07-12 DIAGNOSIS — C77.2 METASTASIS TO RETROPERITONEAL LYMPH NODE (HCC): ICD-10-CM

## 2021-07-12 DIAGNOSIS — D64.81 ANEMIA DUE TO CHEMOTHERAPY: Primary | ICD-10-CM

## 2021-07-12 LAB
ALBUMIN SERPL-MCNC: 3.7 G/DL (ref 3.2–4.6)
ALBUMIN/GLOB SERPL: 1.1 {RATIO} (ref 1.2–3.5)
ALP SERPL-CCNC: 80 U/L (ref 50–136)
ALT SERPL-CCNC: 36 U/L (ref 12–65)
ANION GAP SERPL CALC-SCNC: 8 MMOL/L (ref 7–16)
AST SERPL-CCNC: 17 U/L (ref 15–37)
BASOPHILS # BLD: 0 K/UL (ref 0–0.2)
BASOPHILS NFR BLD: 0 % (ref 0–2)
BILIRUB SERPL-MCNC: 0.3 MG/DL (ref 0.2–1.1)
BUN SERPL-MCNC: 31 MG/DL (ref 8–23)
CALCIUM SERPL-MCNC: 8.8 MG/DL (ref 8.3–10.4)
CEA SERPL-MCNC: 49.2 NG/ML (ref 0–3)
CHLORIDE SERPL-SCNC: 105 MMOL/L (ref 98–107)
CO2 SERPL-SCNC: 24 MMOL/L (ref 21–32)
CREAT SERPL-MCNC: 0.9 MG/DL (ref 0.8–1.5)
DIFFERENTIAL METHOD BLD: ABNORMAL
EOSINOPHIL # BLD: 0 K/UL (ref 0–0.8)
EOSINOPHIL NFR BLD: 0 % (ref 0.5–7.8)
ERYTHROCYTE [DISTWIDTH] IN BLOOD BY AUTOMATED COUNT: 14.9 % (ref 11.9–14.6)
GLOBULIN SER CALC-MCNC: 3.4 G/DL (ref 2.3–3.5)
GLUCOSE SERPL-MCNC: 143 MG/DL (ref 65–100)
HCT VFR BLD AUTO: 27.5 %
HGB BLD-MCNC: 9.1 G/DL (ref 13.6–17.2)
IMM GRANULOCYTES # BLD AUTO: 0.1 K/UL (ref 0–0.5)
IMM GRANULOCYTES NFR BLD AUTO: 2 % (ref 0–5)
LYMPHOCYTES # BLD: 1.7 K/UL (ref 0.5–4.6)
LYMPHOCYTES NFR BLD: 23 % (ref 13–44)
MCH RBC QN AUTO: 33.6 PG (ref 26.1–32.9)
MCHC RBC AUTO-ENTMCNC: 33.1 G/DL (ref 31.4–35)
MCV RBC AUTO: 101.5 FL (ref 79.6–97.8)
MONOCYTES # BLD: 0.9 K/UL (ref 0.1–1.3)
MONOCYTES NFR BLD: 12 % (ref 4–12)
NEUTS SEG # BLD: 4.8 K/UL (ref 1.7–8.2)
NEUTS SEG NFR BLD: 63 % (ref 43–78)
NRBC # BLD: 0 K/UL (ref 0–0.2)
PLATELET # BLD AUTO: 215 K/UL (ref 150–450)
PMV BLD AUTO: 9.3 FL (ref 9.4–12.3)
POTASSIUM SERPL-SCNC: 3.6 MMOL/L (ref 3.5–5.1)
PROT SERPL-MCNC: 7.1 G/DL (ref 6.3–8.2)
PSA SERPL-MCNC: 2.5 NG/ML
RBC # BLD AUTO: 2.71 M/UL (ref 4.23–5.6)
SODIUM SERPL-SCNC: 137 MMOL/L (ref 136–145)
WBC # BLD AUTO: 7.5 K/UL (ref 4.3–11.1)

## 2021-07-12 PROCEDURE — 96417 CHEMO IV INFUS EACH ADDL SEQ: CPT

## 2021-07-12 PROCEDURE — 96375 TX/PRO/DX INJ NEW DRUG ADDON: CPT

## 2021-07-12 PROCEDURE — 96413 CHEMO IV INFUSION 1 HR: CPT

## 2021-07-12 PROCEDURE — 84153 ASSAY OF PSA TOTAL: CPT

## 2021-07-12 PROCEDURE — 74011250636 HC RX REV CODE- 250/636: Performed by: INTERNAL MEDICINE

## 2021-07-12 PROCEDURE — 74011000258 HC RX REV CODE- 258: Performed by: INTERNAL MEDICINE

## 2021-07-12 PROCEDURE — 80053 COMPREHEN METABOLIC PANEL: CPT

## 2021-07-12 PROCEDURE — 96367 TX/PROPH/DG ADDL SEQ IV INF: CPT

## 2021-07-12 PROCEDURE — 36591 DRAW BLOOD OFF VENOUS DEVICE: CPT

## 2021-07-12 PROCEDURE — 82378 CARCINOEMBRYONIC ANTIGEN: CPT

## 2021-07-12 PROCEDURE — 85025 COMPLETE CBC W/AUTO DIFF WBC: CPT

## 2021-07-12 RX ORDER — ONDANSETRON 2 MG/ML
8 INJECTION INTRAMUSCULAR; INTRAVENOUS ONCE
Status: COMPLETED | OUTPATIENT
Start: 2021-07-12 | End: 2021-07-12

## 2021-07-12 RX ORDER — SODIUM CHLORIDE 0.9 % (FLUSH) 0.9 %
10 SYRINGE (ML) INJECTION AS NEEDED
Status: DISCONTINUED | OUTPATIENT
Start: 2021-07-12 | End: 2021-07-14 | Stop reason: HOSPADM

## 2021-07-12 RX ORDER — SODIUM CHLORIDE 0.9 % (FLUSH) 0.9 %
10 SYRINGE (ML) INJECTION AS NEEDED
Status: ACTIVE | OUTPATIENT
Start: 2021-07-12 | End: 2021-07-12

## 2021-07-12 RX ORDER — SODIUM CHLORIDE 9 MG/ML
25 INJECTION, SOLUTION INTRAVENOUS CONTINUOUS
Status: ACTIVE | OUTPATIENT
Start: 2021-07-12 | End: 2021-07-12

## 2021-07-12 RX ADMIN — SODIUM CHLORIDE 100 ML/HR: 900 INJECTION, SOLUTION INTRAVENOUS at 09:30

## 2021-07-12 RX ADMIN — FOSAPREPITANT 150 MG: 150 INJECTION, POWDER, LYOPHILIZED, FOR SOLUTION INTRAVENOUS at 10:25

## 2021-07-12 RX ADMIN — Medication 10 ML: at 12:05

## 2021-07-12 RX ADMIN — Medication 10 ML: at 08:19

## 2021-07-12 RX ADMIN — DEXAMETHASONE SODIUM PHOSPHATE 12 MG: 4 INJECTION, SOLUTION INTRAMUSCULAR; INTRAVENOUS at 10:04

## 2021-07-12 RX ADMIN — CARBOPLATIN 690 MG: 10 INJECTION, SOLUTION INTRAVENOUS at 11:22

## 2021-07-12 RX ADMIN — Medication 10 ML: at 09:30

## 2021-07-12 RX ADMIN — Medication 10 ML: at 13:25

## 2021-07-12 RX ADMIN — ONDANSETRON 8 MG: 2 INJECTION INTRAMUSCULAR; INTRAVENOUS at 10:02

## 2021-07-12 RX ADMIN — ETOPOSIDE 218 MG: 20 INJECTION INTRAVENOUS at 12:10

## 2021-07-12 NOTE — PROGRESS NOTES
Patient arrived to port lab for port access and lab draw   [address] accessed and labs drawn per protocol   *Port remains accessed for infusion  Patient discharged from port lab ambulatory*

## 2021-07-12 NOTE — PROGRESS NOTES
Pt arrived ambulatory today at 0910, to receive IV carboplatin & etoposide. Pt tolerated without difficulty. Patient discharged via ambulatory accompanied by self. Instructed to notify physician of any problems, questions or concerns. Allowed opportunity for patient/family to ask questions. Verbalized understanding. Next appointment is July 13 at 1500 with Paddy Patel.

## 2021-07-13 ENCOUNTER — HOSPITAL ENCOUNTER (OUTPATIENT)
Dept: INFUSION THERAPY | Age: 66
Discharge: HOME | End: 2021-07-13
Payer: MEDICARE

## 2021-07-13 VITALS
HEART RATE: 78 BPM | SYSTOLIC BLOOD PRESSURE: 140 MMHG | BODY MASS INDEX: 32.31 KG/M2 | OXYGEN SATURATION: 97 % | DIASTOLIC BLOOD PRESSURE: 76 MMHG | WEIGHT: 218.8 LBS | TEMPERATURE: 98.2 F | RESPIRATION RATE: 16 BRPM

## 2021-07-13 DIAGNOSIS — C61 PRIMARY PROSTATE CANCER WITH METASTASIS FROM PROSTATE TO OTHER SITE (HCC): ICD-10-CM

## 2021-07-13 DIAGNOSIS — E87.6 HYPOKALEMIA: Primary | ICD-10-CM

## 2021-07-13 DIAGNOSIS — C77.2 METASTASIS TO RETROPERITONEAL LYMPH NODE (HCC): ICD-10-CM

## 2021-07-13 PROCEDURE — 96375 TX/PRO/DX INJ NEW DRUG ADDON: CPT

## 2021-07-13 PROCEDURE — 96413 CHEMO IV INFUSION 1 HR: CPT

## 2021-07-13 PROCEDURE — 74011250636 HC RX REV CODE- 250/636: Performed by: INTERNAL MEDICINE

## 2021-07-13 RX ORDER — DEXAMETHASONE SODIUM PHOSPHATE 4 MG/ML
8 INJECTION, SOLUTION INTRA-ARTICULAR; INTRALESIONAL; INTRAMUSCULAR; INTRAVENOUS; SOFT TISSUE ONCE
Status: COMPLETED | OUTPATIENT
Start: 2021-07-13 | End: 2021-07-13

## 2021-07-13 RX ORDER — SODIUM CHLORIDE 0.9 % (FLUSH) 0.9 %
10 SYRINGE (ML) INJECTION AS NEEDED
Status: DISCONTINUED | OUTPATIENT
Start: 2021-07-13 | End: 2021-07-14 | Stop reason: HOSPADM

## 2021-07-13 RX ORDER — SODIUM CHLORIDE 9 MG/ML
25 INJECTION, SOLUTION INTRAVENOUS CONTINUOUS
Status: DISCONTINUED | OUTPATIENT
Start: 2021-07-13 | End: 2021-07-14 | Stop reason: HOSPADM

## 2021-07-13 RX ADMIN — Medication 10 ML: at 17:04

## 2021-07-13 RX ADMIN — Medication 10 ML: at 15:31

## 2021-07-13 RX ADMIN — SODIUM CHLORIDE 25 ML/HR: 900 INJECTION, SOLUTION INTRAVENOUS at 15:31

## 2021-07-13 RX ADMIN — ETOPOSIDE 218 MG: 20 INJECTION INTRAVENOUS at 16:00

## 2021-07-13 RX ADMIN — DEXAMETHASONE SODIUM PHOSPHATE 8 MG: 4 INJECTION, SOLUTION INTRAMUSCULAR; INTRAVENOUS at 15:35

## 2021-07-13 NOTE — PROGRESS NOTES
Patient ambulatory to infusion area. Here for C9D2 of Etoposide. Port flushed with positive blood return. Patient tolerated treatment well. Port remains accessed for infusion tomorrow per patient preference. Discharged home ambulatory.

## 2021-07-14 ENCOUNTER — HOSPITAL ENCOUNTER (OUTPATIENT)
Dept: INFUSION THERAPY | Age: 66
Discharge: HOME | End: 2021-07-14
Payer: MEDICARE

## 2021-07-14 VITALS
HEART RATE: 68 BPM | BODY MASS INDEX: 32.31 KG/M2 | TEMPERATURE: 97.8 F | SYSTOLIC BLOOD PRESSURE: 150 MMHG | WEIGHT: 218.8 LBS | OXYGEN SATURATION: 99 % | DIASTOLIC BLOOD PRESSURE: 80 MMHG | RESPIRATION RATE: 16 BRPM

## 2021-07-14 DIAGNOSIS — D64.81 ANEMIA DUE TO CHEMOTHERAPY: ICD-10-CM

## 2021-07-14 DIAGNOSIS — E87.6 HYPOKALEMIA: Primary | ICD-10-CM

## 2021-07-14 DIAGNOSIS — C77.2 METASTASIS TO RETROPERITONEAL LYMPH NODE (HCC): ICD-10-CM

## 2021-07-14 DIAGNOSIS — T45.1X5A ANEMIA DUE TO CHEMOTHERAPY: ICD-10-CM

## 2021-07-14 DIAGNOSIS — C61 PRIMARY PROSTATE CANCER WITH METASTASIS FROM PROSTATE TO OTHER SITE (HCC): ICD-10-CM

## 2021-07-14 PROCEDURE — 74011250636 HC RX REV CODE- 250/636: Performed by: INTERNAL MEDICINE

## 2021-07-14 PROCEDURE — 96375 TX/PRO/DX INJ NEW DRUG ADDON: CPT

## 2021-07-14 PROCEDURE — 96413 CHEMO IV INFUSION 1 HR: CPT

## 2021-07-14 RX ORDER — DEXAMETHASONE SODIUM PHOSPHATE 4 MG/ML
8 INJECTION, SOLUTION INTRA-ARTICULAR; INTRALESIONAL; INTRAMUSCULAR; INTRAVENOUS; SOFT TISSUE ONCE
Status: COMPLETED | OUTPATIENT
Start: 2021-07-14 | End: 2021-07-14

## 2021-07-14 RX ORDER — SODIUM CHLORIDE 0.9 % (FLUSH) 0.9 %
10 SYRINGE (ML) INJECTION AS NEEDED
Status: DISCONTINUED | OUTPATIENT
Start: 2021-07-14 | End: 2021-07-15 | Stop reason: HOSPADM

## 2021-07-14 RX ORDER — SODIUM CHLORIDE 9 MG/ML
25 INJECTION, SOLUTION INTRAVENOUS CONTINUOUS
Status: DISCONTINUED | OUTPATIENT
Start: 2021-07-14 | End: 2021-07-15 | Stop reason: HOSPADM

## 2021-07-14 RX ADMIN — DEXAMETHASONE SODIUM PHOSPHATE 8 MG: 4 INJECTION, SOLUTION INTRAMUSCULAR; INTRAVENOUS at 15:22

## 2021-07-14 RX ADMIN — SODIUM CHLORIDE 25 ML/HR: 9 INJECTION, SOLUTION INTRAVENOUS at 15:22

## 2021-07-14 RX ADMIN — ETOPOSIDE 218 MG: 20 INJECTION INTRAVENOUS at 15:46

## 2021-07-14 RX ADMIN — Medication 10 ML: at 16:50

## 2021-07-14 NOTE — PROGRESS NOTES
Arrived to the [facility]. Etoposide completed. Patient tolerated well. Any issues or concerns during appointment: none. Patient aware of infusion appointment tomorrow 7/15 at [phone] for [facility]. Discharged ambulatory.     Cheli Miguel RN

## 2021-07-15 ENCOUNTER — HOSPITAL ENCOUNTER (OUTPATIENT)
Dept: INFUSION THERAPY | Age: 66
Discharge: HOME | End: 2021-07-15
Payer: MEDICARE

## 2021-07-15 VITALS
RESPIRATION RATE: 20 BRPM | HEART RATE: 73 BPM | OXYGEN SATURATION: 99 % | SYSTOLIC BLOOD PRESSURE: 140 MMHG | DIASTOLIC BLOOD PRESSURE: 72 MMHG | TEMPERATURE: 98.3 F

## 2021-07-15 DIAGNOSIS — D64.81 ANEMIA DUE TO CHEMOTHERAPY: ICD-10-CM

## 2021-07-15 DIAGNOSIS — C61 PRIMARY PROSTATE CANCER WITH METASTASIS FROM PROSTATE TO OTHER SITE (HCC): ICD-10-CM

## 2021-07-15 DIAGNOSIS — C77.2 METASTASIS TO RETROPERITONEAL LYMPH NODE (HCC): ICD-10-CM

## 2021-07-15 DIAGNOSIS — E87.6 HYPOKALEMIA: Primary | ICD-10-CM

## 2021-07-15 DIAGNOSIS — T45.1X5A ANEMIA DUE TO CHEMOTHERAPY: ICD-10-CM

## 2021-07-15 PROCEDURE — 96372 THER/PROPH/DIAG INJ SC/IM: CPT

## 2021-07-15 PROCEDURE — 74011250636 HC RX REV CODE- 250/636: Performed by: INTERNAL MEDICINE

## 2021-07-15 RX ADMIN — EPOETIN ALFA-EPBX 120000 UNITS: 40000 INJECTION, SOLUTION INTRAVENOUS; SUBCUTANEOUS at 17:17

## 2021-07-15 RX ADMIN — PEGFILGRASTIM-CBQV 6 MG: 6 INJECTION, SOLUTION SUBCUTANEOUS at 17:12

## 2021-08-02 ENCOUNTER — HOSPITAL ENCOUNTER (OUTPATIENT)
Dept: INFUSION THERAPY | Age: 66
Discharge: HOME | End: 2021-08-02
Payer: MEDICARE

## 2021-08-02 DIAGNOSIS — C77.2 METASTASIS TO RETROPERITONEAL LYMPH NODE (HCC): ICD-10-CM

## 2021-08-02 DIAGNOSIS — C61 PRIMARY PROSTATE CANCER WITH METASTASIS FROM PROSTATE TO OTHER SITE (HCC): ICD-10-CM

## 2021-08-02 DIAGNOSIS — C61 MALIGNANT NEOPLASM OF PROSTATE (HCC): ICD-10-CM

## 2021-08-02 DIAGNOSIS — E87.6 HYPOKALEMIA: Primary | ICD-10-CM

## 2021-08-02 PROBLEM — C7A.1 NEUROENDOCRINE CARCINOMA, HIGH GRADE (HCC): Status: ACTIVE | Noted: 2021-08-02

## 2021-08-02 LAB
ALBUMIN SERPL-MCNC: 3.6 G/DL (ref 3.2–4.6)
ALBUMIN/GLOB SERPL: 1 {RATIO} (ref 1.2–3.5)
ALP SERPL-CCNC: 85 U/L (ref 50–136)
ALT SERPL-CCNC: 32 U/L (ref 12–65)
ANION GAP SERPL CALC-SCNC: 7 MMOL/L (ref 7–16)
AST SERPL-CCNC: 17 U/L (ref 15–37)
BASOPHILS # BLD: 0 K/UL (ref 0–0.2)
BASOPHILS NFR BLD: 0 % (ref 0–2)
BILIRUB SERPL-MCNC: 0.3 MG/DL (ref 0.2–1.1)
BUN SERPL-MCNC: 20 MG/DL (ref 8–23)
CALCIUM SERPL-MCNC: 9.4 MG/DL (ref 8.3–10.4)
CEA SERPL-MCNC: 37 NG/ML (ref 0–3)
CHLORIDE SERPL-SCNC: 108 MMOL/L (ref 98–107)
CO2 SERPL-SCNC: 23 MMOL/L (ref 21–32)
CREAT SERPL-MCNC: 0.9 MG/DL (ref 0.8–1.5)
DIFFERENTIAL METHOD BLD: ABNORMAL
EOSINOPHIL # BLD: 0.1 K/UL (ref 0–0.8)
EOSINOPHIL NFR BLD: 1 % (ref 0.5–7.8)
ERYTHROCYTE [DISTWIDTH] IN BLOOD BY AUTOMATED COUNT: 16.9 % (ref 11.9–14.6)
GLOBULIN SER CALC-MCNC: 3.6 G/DL (ref 2.3–3.5)
GLUCOSE SERPL-MCNC: 141 MG/DL (ref 65–100)
HCT VFR BLD AUTO: 28.2 %
HGB BLD-MCNC: 9.1 G/DL (ref 13.6–17.2)
IMM GRANULOCYTES # BLD AUTO: 0.1 K/UL (ref 0–0.5)
IMM GRANULOCYTES NFR BLD AUTO: 1 % (ref 0–5)
LYMPHOCYTES # BLD: 1.5 K/UL (ref 0.5–4.6)
LYMPHOCYTES NFR BLD: 18 % (ref 13–44)
MCH RBC QN AUTO: 33.6 PG (ref 26.1–32.9)
MCHC RBC AUTO-ENTMCNC: 32.3 G/DL (ref 31.4–35)
MCV RBC AUTO: 104.1 FL (ref 79.6–97.8)
MONOCYTES # BLD: 0.9 K/UL (ref 0.1–1.3)
MONOCYTES NFR BLD: 11 % (ref 4–12)
NEUTS SEG # BLD: 5.6 K/UL (ref 1.7–8.2)
NEUTS SEG NFR BLD: 69 % (ref 43–78)
NRBC # BLD: 0 K/UL (ref 0–0.2)
PLATELET # BLD AUTO: 173 K/UL (ref 150–450)
PMV BLD AUTO: 8.9 FL (ref 9.4–12.3)
POTASSIUM SERPL-SCNC: 3.5 MMOL/L (ref 3.5–5.1)
PROT SERPL-MCNC: 7.2 G/DL (ref 6.3–8.2)
PSA SERPL-MCNC: 2.9 NG/ML
RBC # BLD AUTO: 2.71 M/UL (ref 4.23–5.6)
SODIUM SERPL-SCNC: 138 MMOL/L (ref 136–145)
WBC # BLD AUTO: 8.2 K/UL (ref 4.3–11.1)

## 2021-08-02 PROCEDURE — 96375 TX/PRO/DX INJ NEW DRUG ADDON: CPT

## 2021-08-02 PROCEDURE — 84153 ASSAY OF PSA TOTAL: CPT

## 2021-08-02 PROCEDURE — 96367 TX/PROPH/DG ADDL SEQ IV INF: CPT

## 2021-08-02 PROCEDURE — 82378 CARCINOEMBRYONIC ANTIGEN: CPT

## 2021-08-02 PROCEDURE — 85025 COMPLETE CBC W/AUTO DIFF WBC: CPT

## 2021-08-02 PROCEDURE — 96413 CHEMO IV INFUSION 1 HR: CPT

## 2021-08-02 PROCEDURE — 96417 CHEMO IV INFUS EACH ADDL SEQ: CPT

## 2021-08-02 PROCEDURE — 74011000258 HC RX REV CODE- 258: Performed by: INTERNAL MEDICINE

## 2021-08-02 PROCEDURE — 80053 COMPREHEN METABOLIC PANEL: CPT

## 2021-08-02 PROCEDURE — 74011250636 HC RX REV CODE- 250/636: Performed by: INTERNAL MEDICINE

## 2021-08-02 PROCEDURE — 36591 DRAW BLOOD OFF VENOUS DEVICE: CPT

## 2021-08-02 RX ORDER — SODIUM CHLORIDE 0.9 % (FLUSH) 0.9 %
10 SYRINGE (ML) INJECTION AS NEEDED
Status: ACTIVE | OUTPATIENT
Start: 2021-08-02 | End: 2021-08-02

## 2021-08-02 RX ORDER — SODIUM CHLORIDE 0.9 % (FLUSH) 0.9 %
10 SYRINGE (ML) INJECTION ONCE
Status: COMPLETED | OUTPATIENT
Start: 2021-08-02 | End: 2021-08-02

## 2021-08-02 RX ORDER — HEPARIN 100 UNIT/ML
300-500 SYRINGE INTRAVENOUS AS NEEDED
Status: ACTIVE | OUTPATIENT
Start: 2021-08-02 | End: 2021-08-02

## 2021-08-02 RX ORDER — ONDANSETRON 2 MG/ML
8 INJECTION INTRAMUSCULAR; INTRAVENOUS ONCE
Status: COMPLETED | OUTPATIENT
Start: 2021-08-02 | End: 2021-08-02

## 2021-08-02 RX ORDER — SODIUM CHLORIDE 9 MG/ML
25 INJECTION, SOLUTION INTRAVENOUS CONTINUOUS
Status: ACTIVE | OUTPATIENT
Start: 2021-08-02 | End: 2021-08-02

## 2021-08-02 RX ADMIN — CARBOPLATIN 690 MG: 10 INJECTION, SOLUTION INTRAVENOUS at 10:52

## 2021-08-02 RX ADMIN — FOSAPREPITANT 150 MG: 150 INJECTION, POWDER, LYOPHILIZED, FOR SOLUTION INTRAVENOUS at 10:20

## 2021-08-02 RX ADMIN — SODIUM CHLORIDE 218 MG: 900 INJECTION, SOLUTION INTRAVENOUS at 11:26

## 2021-08-02 RX ADMIN — Medication 10 ML: at 12:28

## 2021-08-02 RX ADMIN — DEXAMETHASONE SODIUM PHOSPHATE 12 MG: 4 INJECTION, SOLUTION INTRAMUSCULAR; INTRAVENOUS at 10:00

## 2021-08-02 RX ADMIN — ONDANSETRON 8 MG: 2 INJECTION INTRAMUSCULAR; INTRAVENOUS at 09:36

## 2021-08-02 RX ADMIN — Medication 10 ML: at 08:32

## 2021-08-02 RX ADMIN — SODIUM CHLORIDE 25 ML/HR: 900 INJECTION, SOLUTION INTRAVENOUS at 09:34

## 2021-08-02 RX ADMIN — Medication 10 ML: at 09:33

## 2021-08-02 NOTE — PROGRESS NOTES
Patient arrived to port lab for port access and lab draw   [facility] accessed and labs drawn per protocol   *Port remains accessed for infusion  Patient discharged from port lab ambulatory*

## 2021-08-02 NOTE — PROGRESS NOTES
Patient ambulatory to infusion area. Here for C10D1 of Carbo/Etoposide. Port flushed with good blood return. Patient tolerated treatment without difficulty. Aware of next appt tomorrow for Etoposide infusion. Port left accessed for tomorrows infusion. Discharged home ambulatory.

## 2021-08-03 ENCOUNTER — HOSPITAL ENCOUNTER (OUTPATIENT)
Dept: INFUSION THERAPY | Age: 66
Discharge: HOME | End: 2021-08-03
Payer: MEDICARE

## 2021-08-03 VITALS
TEMPERATURE: 98.3 F | HEART RATE: 85 BPM | SYSTOLIC BLOOD PRESSURE: 142 MMHG | OXYGEN SATURATION: 97 % | DIASTOLIC BLOOD PRESSURE: 80 MMHG | BODY MASS INDEX: 32.58 KG/M2 | WEIGHT: 220.6 LBS | RESPIRATION RATE: 18 BRPM

## 2021-08-03 DIAGNOSIS — E87.6 HYPOKALEMIA: Primary | ICD-10-CM

## 2021-08-03 DIAGNOSIS — C77.2 METASTASIS TO RETROPERITONEAL LYMPH NODE (HCC): ICD-10-CM

## 2021-08-03 DIAGNOSIS — C61 PRIMARY PROSTATE CANCER WITH METASTASIS FROM PROSTATE TO OTHER SITE (HCC): ICD-10-CM

## 2021-08-03 PROCEDURE — 96413 CHEMO IV INFUSION 1 HR: CPT

## 2021-08-03 PROCEDURE — 96375 TX/PRO/DX INJ NEW DRUG ADDON: CPT

## 2021-08-03 PROCEDURE — 74011250636 HC RX REV CODE- 250/636: Performed by: INTERNAL MEDICINE

## 2021-08-03 RX ORDER — DEXAMETHASONE SODIUM PHOSPHATE 4 MG/ML
8 INJECTION, SOLUTION INTRA-ARTICULAR; INTRALESIONAL; INTRAMUSCULAR; INTRAVENOUS; SOFT TISSUE ONCE
Status: COMPLETED | OUTPATIENT
Start: 2021-08-03 | End: 2021-08-03

## 2021-08-03 RX ORDER — SODIUM CHLORIDE 0.9 % (FLUSH) 0.9 %
10 SYRINGE (ML) INJECTION AS NEEDED
Status: DISCONTINUED | OUTPATIENT
Start: 2021-08-03 | End: 2021-08-04 | Stop reason: HOSPADM

## 2021-08-03 RX ORDER — SODIUM CHLORIDE 9 MG/ML
25 INJECTION, SOLUTION INTRAVENOUS CONTINUOUS
Status: DISCONTINUED | OUTPATIENT
Start: 2021-08-03 | End: 2021-08-04 | Stop reason: HOSPADM

## 2021-08-03 RX ADMIN — ETOPOSIDE 218 MG: 20 INJECTION INTRAVENOUS at 15:55

## 2021-08-03 RX ADMIN — Medication 10 ML: at 16:58

## 2021-08-03 RX ADMIN — SODIUM CHLORIDE 25 ML/HR: 900 INJECTION, SOLUTION INTRAVENOUS at 15:30

## 2021-08-03 RX ADMIN — Medication 10 ML: at 15:15

## 2021-08-03 RX ADMIN — DEXAMETHASONE SODIUM PHOSPHATE 8 MG: 4 INJECTION, SOLUTION INTRAMUSCULAR; INTRAVENOUS at 15:27

## 2021-08-03 NOTE — PROGRESS NOTES
Pt. Discharged ambulatory. Tolerated chemotherapy well. No distress noted. To call physician with any problems or concerns. Understanding voiced. To return to Infusions on 8/4/21.

## 2021-08-04 ENCOUNTER — HOSPITAL ENCOUNTER (OUTPATIENT)
Dept: INFUSION THERAPY | Age: 66
Discharge: HOME | End: 2021-08-04
Payer: MEDICARE

## 2021-08-04 VITALS
SYSTOLIC BLOOD PRESSURE: 136 MMHG | RESPIRATION RATE: 18 BRPM | HEART RATE: 79 BPM | OXYGEN SATURATION: 100 % | DIASTOLIC BLOOD PRESSURE: 75 MMHG | TEMPERATURE: 98 F | WEIGHT: 222 LBS | BODY MASS INDEX: 32.78 KG/M2

## 2021-08-04 DIAGNOSIS — C77.2 METASTASIS TO RETROPERITONEAL LYMPH NODE (HCC): ICD-10-CM

## 2021-08-04 DIAGNOSIS — E87.6 HYPOKALEMIA: Primary | ICD-10-CM

## 2021-08-04 DIAGNOSIS — C61 PRIMARY PROSTATE CANCER WITH METASTASIS FROM PROSTATE TO OTHER SITE (HCC): ICD-10-CM

## 2021-08-04 PROCEDURE — 96413 CHEMO IV INFUSION 1 HR: CPT

## 2021-08-04 PROCEDURE — 74011250636 HC RX REV CODE- 250/636: Performed by: INTERNAL MEDICINE

## 2021-08-04 PROCEDURE — 96375 TX/PRO/DX INJ NEW DRUG ADDON: CPT

## 2021-08-04 RX ORDER — SODIUM CHLORIDE 0.9 % (FLUSH) 0.9 %
10 SYRINGE (ML) INJECTION AS NEEDED
Status: DISCONTINUED | OUTPATIENT
Start: 2021-08-04 | End: 2021-08-05 | Stop reason: HOSPADM

## 2021-08-04 RX ORDER — SODIUM CHLORIDE 9 MG/ML
25 INJECTION, SOLUTION INTRAVENOUS CONTINUOUS
Status: DISCONTINUED | OUTPATIENT
Start: 2021-08-04 | End: 2021-08-05 | Stop reason: HOSPADM

## 2021-08-04 RX ORDER — DEXAMETHASONE SODIUM PHOSPHATE 4 MG/ML
8 INJECTION, SOLUTION INTRA-ARTICULAR; INTRALESIONAL; INTRAMUSCULAR; INTRAVENOUS; SOFT TISSUE ONCE
Status: COMPLETED | OUTPATIENT
Start: 2021-08-04 | End: 2021-08-04

## 2021-08-04 RX ADMIN — DEXAMETHASONE SODIUM PHOSPHATE 8 MG: 4 INJECTION, SOLUTION INTRAMUSCULAR; INTRAVENOUS at 15:28

## 2021-08-04 RX ADMIN — Medication 10 ML: at 16:59

## 2021-08-04 RX ADMIN — ETOPOSIDE 218 MG: 20 INJECTION INTRAVENOUS at 15:57

## 2021-08-04 RX ADMIN — SODIUM CHLORIDE 25 ML/HR: 900 INJECTION, SOLUTION INTRAVENOUS at 15:25

## 2021-08-04 RX ADMIN — Medication 10 ML: at 15:25

## 2021-08-05 ENCOUNTER — HOSPITAL ENCOUNTER (OUTPATIENT)
Dept: INFUSION THERAPY | Age: 66
Discharge: HOME | End: 2021-08-05
Payer: MEDICARE

## 2021-08-05 VITALS
DIASTOLIC BLOOD PRESSURE: 87 MMHG | RESPIRATION RATE: 18 BRPM | WEIGHT: 219.6 LBS | TEMPERATURE: 97.7 F | HEART RATE: 69 BPM | BODY MASS INDEX: 32.43 KG/M2 | OXYGEN SATURATION: 100 % | SYSTOLIC BLOOD PRESSURE: 143 MMHG

## 2021-08-05 DIAGNOSIS — D64.81 ANEMIA DUE TO CHEMOTHERAPY: ICD-10-CM

## 2021-08-05 DIAGNOSIS — C7A.1 NEUROENDOCRINE CARCINOMA, HIGH GRADE (HCC): Primary | ICD-10-CM

## 2021-08-05 DIAGNOSIS — C77.2 METASTASIS TO RETROPERITONEAL LYMPH NODE (HCC): ICD-10-CM

## 2021-08-05 DIAGNOSIS — C61 PRIMARY PROSTATE CANCER WITH METASTASIS FROM PROSTATE TO OTHER SITE (HCC): ICD-10-CM

## 2021-08-05 DIAGNOSIS — E87.6 HYPOKALEMIA: ICD-10-CM

## 2021-08-05 DIAGNOSIS — T45.1X5A ANEMIA DUE TO CHEMOTHERAPY: ICD-10-CM

## 2021-08-05 PROCEDURE — 74011250636 HC RX REV CODE- 250/636: Performed by: INTERNAL MEDICINE

## 2021-08-05 PROCEDURE — 96372 THER/PROPH/DIAG INJ SC/IM: CPT

## 2021-08-05 RX ADMIN — EPOETIN ALFA-EPBX 120000 UNITS: 40000 INJECTION, SOLUTION INTRAVENOUS; SUBCUTANEOUS at 17:32

## 2021-08-05 RX ADMIN — PEGFILGRASTIM-CBQV 6 MG: 6 INJECTION, SOLUTION SUBCUTANEOUS at 17:39

## 2021-08-05 NOTE — PROGRESS NOTES
Arrived to the [facility]. Retacrit & Udenyca completed. Patient tolerated well. Any issues or concerns during appointment: None. Patient aware no future infusion appointments. Patient to discuss with physician at next OV if patient needs future retacrit injections. No future orders. Patient aware of next lab and [facility] office visit on 8/17 (date) at [phone] (time). Discharged ambulatory in stable condition.

## 2021-08-16 ENCOUNTER — HOSPITAL ENCOUNTER (OUTPATIENT)
Dept: PET IMAGING | Age: 66
Discharge: HOME | End: 2021-08-16
Payer: MEDICARE

## 2021-08-16 DIAGNOSIS — C61 MALIGNANT NEOPLASM OF PROSTATE (HCC): ICD-10-CM

## 2021-08-16 DIAGNOSIS — C77.2 METASTASIS TO RETROPERITONEAL LYMPH NODE (HCC): ICD-10-CM

## 2021-08-16 LAB
GLUCOSE BLD STRIP.AUTO-MCNC: 137 MG/DL (ref 65–100)
SERVICE CMNT-IMP: ABNORMAL

## 2021-08-16 PROCEDURE — A9552 F18 FDG: HCPCS

## 2021-08-16 PROCEDURE — 74011000636 HC RX REV CODE- 636: Performed by: INTERNAL MEDICINE

## 2021-08-16 PROCEDURE — 82962 GLUCOSE BLOOD TEST: CPT

## 2021-08-16 RX ORDER — SODIUM CHLORIDE 0.9 % (FLUSH) 0.9 %
10 SYRINGE (ML) INJECTION
Status: COMPLETED | OUTPATIENT
Start: 2021-08-16 | End: 2021-08-16

## 2021-08-16 RX ORDER — FLUDEOXYGLUCOSE F-18 200 MCI/ML
10 INJECTION INTRAVENOUS ONCE
Status: COMPLETED | OUTPATIENT
Start: 2021-08-16 | End: 2021-08-16

## 2021-08-16 RX ADMIN — FLUDEOXYGLUCOSE F-18 13 MILLICURIE: 200 INJECTION INTRAVENOUS at 07:43

## 2021-08-16 RX ADMIN — DIATRIZOATE MEGLUMINE AND DIATRIZOATE SODIUM 10 ML: 660; 100 LIQUID ORAL; RECTAL at 07:43

## 2021-08-16 RX ADMIN — Medication 10 ML: at 07:43

## 2021-08-17 ENCOUNTER — HOSPITAL ENCOUNTER (OUTPATIENT)
Dept: LAB | Age: 66
Discharge: HOME | End: 2021-08-17
Payer: MEDICARE

## 2021-08-17 DIAGNOSIS — D61.810 ANTINEOPLASTIC CHEMOTHERAPY INDUCED PANCYTOPENIA (HCC): ICD-10-CM

## 2021-08-17 DIAGNOSIS — C61 MALIGNANT NEOPLASM OF PROSTATE (HCC): ICD-10-CM

## 2021-08-17 DIAGNOSIS — C77.2 METASTASIS TO RETROPERITONEAL LYMPH NODE (HCC): ICD-10-CM

## 2021-08-17 DIAGNOSIS — T45.1X5A ANTINEOPLASTIC CHEMOTHERAPY INDUCED PANCYTOPENIA (HCC): ICD-10-CM

## 2021-08-17 LAB
ALBUMIN SERPL-MCNC: 3.5 G/DL (ref 3.2–4.6)
ALBUMIN/GLOB SERPL: 0.9 {RATIO} (ref 1.2–3.5)
ALP SERPL-CCNC: 93 U/L (ref 50–136)
ALT SERPL-CCNC: 29 U/L (ref 12–65)
ANION GAP SERPL CALC-SCNC: 7 MMOL/L (ref 7–16)
AST SERPL-CCNC: 14 U/L (ref 15–37)
BASOPHILS # BLD: 0 K/UL (ref 0–0.2)
BASOPHILS NFR BLD: 0 % (ref 0–2)
BILIRUB SERPL-MCNC: 0.3 MG/DL (ref 0.2–1.1)
BUN SERPL-MCNC: 16 MG/DL (ref 8–23)
CALCIUM SERPL-MCNC: 8.5 MG/DL (ref 8.3–10.4)
CEA SERPL-MCNC: 32 NG/ML (ref 0–3)
CHLORIDE SERPL-SCNC: 106 MMOL/L (ref 98–107)
CO2 SERPL-SCNC: 24 MMOL/L (ref 21–32)
CREAT SERPL-MCNC: 1.3 MG/DL (ref 0.8–1.5)
DIFFERENTIAL METHOD BLD: ABNORMAL
EOSINOPHIL # BLD: 0 K/UL (ref 0–0.8)
EOSINOPHIL NFR BLD: 0 % (ref 0.5–7.8)
ERYTHROCYTE [DISTWIDTH] IN BLOOD BY AUTOMATED COUNT: 17 % (ref 11.9–14.6)
GLOBULIN SER CALC-MCNC: 3.9 G/DL (ref 2.3–3.5)
GLUCOSE SERPL-MCNC: 100 MG/DL (ref 65–100)
HCT VFR BLD AUTO: 22.7 %
HGB BLD-MCNC: 7.3 G/DL (ref 13.6–17.2)
HISTORY CHECKED?,CKHIST: NORMAL
IMM GRANULOCYTES # BLD AUTO: 0 K/UL (ref 0–0.5)
IMM GRANULOCYTES NFR BLD AUTO: 0 % (ref 0–5)
LYMPHOCYTES # BLD: 1.6 K/UL (ref 0.5–4.6)
LYMPHOCYTES NFR BLD: 28 % (ref 13–44)
MCH RBC QN AUTO: 34.3 PG (ref 26.1–32.9)
MCHC RBC AUTO-ENTMCNC: 32.2 G/DL (ref 31.4–35)
MCV RBC AUTO: 106.6 FL (ref 79.6–97.8)
MONOCYTES # BLD: 0.8 K/UL (ref 0.1–1.3)
MONOCYTES NFR BLD: 13 % (ref 4–12)
NEUTS SEG # BLD: 3.4 K/UL (ref 1.7–8.2)
NEUTS SEG NFR BLD: 58 % (ref 43–78)
NRBC # BLD: 0.02 K/UL (ref 0–0.2)
PLATELET # BLD AUTO: 53 K/UL (ref 150–450)
PMV BLD AUTO: 10.2 FL (ref 9.4–12.3)
POTASSIUM SERPL-SCNC: 3.7 MMOL/L (ref 3.5–5.1)
PROT SERPL-MCNC: 7.4 G/DL (ref 6.3–8.2)
PSA SERPL-MCNC: 2.8 NG/ML
RBC # BLD AUTO: 2.13 M/UL (ref 4.23–5.6)
SODIUM SERPL-SCNC: 137 MMOL/L (ref 136–145)
WBC # BLD AUTO: 5.9 K/UL (ref 4.3–11.1)

## 2021-08-17 PROCEDURE — 84153 ASSAY OF PSA TOTAL: CPT

## 2021-08-17 PROCEDURE — 36415 COLL VENOUS BLD VENIPUNCTURE: CPT

## 2021-08-17 PROCEDURE — 82378 CARCINOEMBRYONIC ANTIGEN: CPT

## 2021-08-17 PROCEDURE — 86920 COMPATIBILITY TEST SPIN: CPT

## 2021-08-17 PROCEDURE — 86901 BLOOD TYPING SEROLOGIC RH(D): CPT

## 2021-08-17 PROCEDURE — 80053 COMPREHEN METABOLIC PANEL: CPT

## 2021-08-17 PROCEDURE — 85025 COMPLETE CBC W/AUTO DIFF WBC: CPT

## 2021-08-17 RX ORDER — SODIUM CHLORIDE 9 MG/ML
250 INJECTION, SOLUTION INTRAVENOUS AS NEEDED
Status: DISCONTINUED | OUTPATIENT
Start: 2021-08-17 | End: 2021-08-20 | Stop reason: HOSPADM

## 2021-08-18 ENCOUNTER — HOSPITAL ENCOUNTER (OUTPATIENT)
Dept: INFUSION THERAPY | Age: 66
Discharge: HOME | End: 2021-08-18
Payer: MEDICARE

## 2021-08-18 VITALS
OXYGEN SATURATION: 99 % | DIASTOLIC BLOOD PRESSURE: 76 MMHG | SYSTOLIC BLOOD PRESSURE: 164 MMHG | TEMPERATURE: 97.6 F | RESPIRATION RATE: 18 BRPM | HEART RATE: 84 BPM

## 2021-08-18 DIAGNOSIS — T45.1X5A ANEMIA DUE TO CHEMOTHERAPY: ICD-10-CM

## 2021-08-18 DIAGNOSIS — D64.81 ANEMIA DUE TO CHEMOTHERAPY: ICD-10-CM

## 2021-08-18 DIAGNOSIS — C61 PRIMARY PROSTATE CANCER WITH METASTASIS FROM PROSTATE TO OTHER SITE (HCC): ICD-10-CM

## 2021-08-18 DIAGNOSIS — C7A.1 NEUROENDOCRINE CARCINOMA, HIGH GRADE (HCC): ICD-10-CM

## 2021-08-18 DIAGNOSIS — C77.2 METASTASIS TO RETROPERITONEAL LYMPH NODE (HCC): ICD-10-CM

## 2021-08-18 DIAGNOSIS — E87.6 HYPOKALEMIA: ICD-10-CM

## 2021-08-18 PROCEDURE — 99211 OFF/OP EST MAY X REQ PHY/QHP: CPT

## 2021-08-18 RX ORDER — ACETAMINOPHEN 325 MG/1
650 TABLET ORAL AS NEEDED
Status: CANCELLED | OUTPATIENT
Start: 2021-08-18 | End: 2021-08-18

## 2021-08-18 RX ORDER — HYDROCORTISONE SODIUM SUCCINATE 100 MG/2ML
100 INJECTION, POWDER, FOR SOLUTION INTRAMUSCULAR; INTRAVENOUS AS NEEDED
Status: CANCELLED | OUTPATIENT
Start: 2021-08-18 | End: 2021-08-18

## 2021-08-18 RX ORDER — ALBUTEROL SULFATE 0.83 MG/ML
2.5 SOLUTION RESPIRATORY (INHALATION) AS NEEDED
Status: CANCELLED | OUTPATIENT
Start: 2021-08-18 | End: 2021-08-18

## 2021-08-18 RX ORDER — EPINEPHRINE 1 MG/ML
0.3 INJECTION, SOLUTION, CONCENTRATE INTRAVENOUS AS NEEDED
Status: CANCELLED | OUTPATIENT
Start: 2021-08-18 | End: 2021-08-18

## 2021-08-18 RX ORDER — SODIUM CHLORIDE 9 MG/ML
250 INJECTION, SOLUTION INTRAVENOUS AS NEEDED
Status: CANCELLED | OUTPATIENT
Start: 2021-08-18

## 2021-08-18 RX ORDER — ONDANSETRON 2 MG/ML
8 INJECTION INTRAMUSCULAR; INTRAVENOUS AS NEEDED
Status: CANCELLED | OUTPATIENT
Start: 2021-08-18 | End: 2021-08-18

## 2021-08-18 RX ORDER — DIPHENHYDRAMINE HYDROCHLORIDE 50 MG/ML
50 INJECTION, SOLUTION INTRAMUSCULAR; INTRAVENOUS AS NEEDED
Status: CANCELLED | OUTPATIENT
Start: 2021-08-18 | End: 2021-08-18

## 2021-08-18 NOTE — PROGRESS NOTES
Patient arrived to unit for a blood transfusion. Patient reports that he has never had a blood transfusion and reports that he is nervous about it. States \"I had a bad feeling about it all night long. \"  Patient education reviewed and patient informed that blood is screened but that there is not 100% accuracy in the screening process. Patient states \"I don't want it. I would rather be tired than to get it. \"  Patient given opportunity to review blood consent and teaching handout for home use. Consent looked over by patient and he confirms that he does not desire to take the blood transfusion. Sierra James NP notified of patient's decision. Patient discharged to home ambulatory by self. Gerda w/c. Next appointment 09/14/2021 at 1430 with lab followed by 1500  OV and Infusion at 1530.

## 2021-08-18 NOTE — PROGRESS NOTES
Reviewed education for blood transfusion with patient. Consent form reviewed and education handout reviewed. Patient does not want to proceed with transfusion at this time.

## 2021-08-20 LAB
ABO + RH BLD: NORMAL
BLD PROD TYP BPU: NORMAL
BLOOD GROUP ANTIBODIES SERPL: NORMAL
BPU ID: NORMAL
CROSSMATCH RESULT,%XM: NORMAL
SPECIMEN EXP DATE BLD: NORMAL
STATUS OF UNIT,%ST: NORMAL
UNIT DIVISION, %UDIV: 0

## 2021-09-14 ENCOUNTER — APPOINTMENT (OUTPATIENT)
Dept: LAB | Age: 66
End: 2021-09-14
Payer: MEDICARE

## 2021-09-14 ENCOUNTER — HOSPITAL ENCOUNTER (OUTPATIENT)
Dept: INFUSION THERAPY | Age: 66
Discharge: HOME | End: 2021-09-14
Payer: MEDICARE

## 2021-09-14 DIAGNOSIS — E03.2 HYPOTHYROIDISM DUE TO DRUGS: ICD-10-CM

## 2021-09-14 DIAGNOSIS — C61 PRIMARY PROSTATE CANCER WITH METASTASIS FROM PROSTATE TO OTHER SITE (HCC): ICD-10-CM

## 2021-09-14 DIAGNOSIS — C7A.1 NEUROENDOCRINE CARCINOMA, HIGH GRADE (HCC): Primary | ICD-10-CM

## 2021-09-14 DIAGNOSIS — C61 MALIGNANT NEOPLASM OF PROSTATE (HCC): ICD-10-CM

## 2021-09-14 DIAGNOSIS — C77.2 METASTASIS TO RETROPERITONEAL LYMPH NODE (HCC): ICD-10-CM

## 2021-09-14 LAB
ALBUMIN SERPL-MCNC: 3.8 G/DL (ref 3.2–4.6)
ALBUMIN/GLOB SERPL: 1 {RATIO} (ref 1.2–3.5)
ALP SERPL-CCNC: 72 U/L (ref 50–136)
ALT SERPL-CCNC: 44 U/L (ref 12–65)
ANION GAP SERPL CALC-SCNC: 6 MMOL/L (ref 7–16)
AST SERPL-CCNC: 25 U/L (ref 15–37)
BASOPHILS # BLD: 0 K/UL (ref 0–0.2)
BASOPHILS NFR BLD: 1 % (ref 0–2)
BILIRUB SERPL-MCNC: 0.3 MG/DL (ref 0.2–1.1)
BUN SERPL-MCNC: 20 MG/DL (ref 8–23)
CALCIUM SERPL-MCNC: 9.3 MG/DL (ref 8.3–10.4)
CEA SERPL-MCNC: 97.3 NG/ML (ref 0–3)
CHLORIDE SERPL-SCNC: 108 MMOL/L (ref 98–107)
CO2 SERPL-SCNC: 25 MMOL/L (ref 21–32)
CREAT SERPL-MCNC: 1.1 MG/DL (ref 0.8–1.5)
DIFFERENTIAL METHOD BLD: ABNORMAL
EOSINOPHIL # BLD: 0.1 K/UL (ref 0–0.8)
EOSINOPHIL NFR BLD: 2 % (ref 0.5–7.8)
ERYTHROCYTE [DISTWIDTH] IN BLOOD BY AUTOMATED COUNT: 14.7 % (ref 11.9–14.6)
GLOBULIN SER CALC-MCNC: 4 G/DL (ref 2.3–3.5)
GLUCOSE SERPL-MCNC: 95 MG/DL (ref 65–100)
HCT VFR BLD AUTO: 30.9 %
HGB BLD-MCNC: 10.2 G/DL (ref 13.6–17.2)
IMM GRANULOCYTES # BLD AUTO: 0 K/UL (ref 0–0.5)
IMM GRANULOCYTES NFR BLD AUTO: 0 % (ref 0–5)
LYMPHOCYTES # BLD: 1.7 K/UL (ref 0.5–4.6)
LYMPHOCYTES NFR BLD: 23 % (ref 13–44)
MCH RBC QN AUTO: 35.1 PG (ref 26.1–32.9)
MCHC RBC AUTO-ENTMCNC: 33 G/DL (ref 31.4–35)
MCV RBC AUTO: 106.2 FL (ref 79.6–97.8)
MONOCYTES # BLD: 0.8 K/UL (ref 0.1–1.3)
MONOCYTES NFR BLD: 10 % (ref 4–12)
NEUTS SEG # BLD: 4.9 K/UL (ref 1.7–8.2)
NEUTS SEG NFR BLD: 64 % (ref 43–78)
NRBC # BLD: 0 K/UL (ref 0–0.2)
PLATELET # BLD AUTO: 234 K/UL (ref 150–450)
PMV BLD AUTO: 9.2 FL (ref 9.4–12.3)
POTASSIUM SERPL-SCNC: 3.7 MMOL/L (ref 3.5–5.1)
PROT SERPL-MCNC: 7.8 G/DL (ref 6.3–8.2)
PSA SERPL-MCNC: 3 NG/ML
RBC # BLD AUTO: 2.91 M/UL (ref 4.23–5.6)
SODIUM SERPL-SCNC: 139 MMOL/L (ref 136–145)
TSH SERPL DL<=0.005 MIU/L-ACNC: 1 UIU/ML (ref 0.36–3.74)
WBC # BLD AUTO: 7.6 K/UL (ref 4.3–11.1)

## 2021-09-14 PROCEDURE — 80053 COMPREHEN METABOLIC PANEL: CPT

## 2021-09-14 PROCEDURE — 84153 ASSAY OF PSA TOTAL: CPT

## 2021-09-14 PROCEDURE — 82378 CARCINOEMBRYONIC ANTIGEN: CPT

## 2021-09-14 PROCEDURE — 74011250636 HC RX REV CODE- 250/636: Performed by: INTERNAL MEDICINE

## 2021-09-14 PROCEDURE — 85025 COMPLETE CBC W/AUTO DIFF WBC: CPT

## 2021-09-14 PROCEDURE — 84443 ASSAY THYROID STIM HORMONE: CPT

## 2021-09-14 PROCEDURE — 96413 CHEMO IV INFUSION 1 HR: CPT

## 2021-09-14 PROCEDURE — 36591 DRAW BLOOD OFF VENOUS DEVICE: CPT

## 2021-09-14 RX ORDER — SODIUM CHLORIDE 0.9 % (FLUSH) 0.9 %
10 SYRINGE (ML) INJECTION AS NEEDED
Status: DISCONTINUED | OUTPATIENT
Start: 2021-09-14 | End: 2021-09-15 | Stop reason: HOSPADM

## 2021-09-14 RX ORDER — SODIUM CHLORIDE 9 MG/ML
25 INJECTION, SOLUTION INTRAVENOUS CONTINUOUS
Status: DISCONTINUED | OUTPATIENT
Start: 2021-09-14 | End: 2021-09-15 | Stop reason: HOSPADM

## 2021-09-14 RX ORDER — SODIUM CHLORIDE 0.9 % (FLUSH) 0.9 %
10 SYRINGE (ML) INJECTION AS NEEDED
Status: DISCONTINUED | OUTPATIENT
Start: 2021-09-14 | End: 2021-09-16 | Stop reason: HOSPADM

## 2021-09-14 RX ADMIN — Medication 10 ML: at 17:59

## 2021-09-14 RX ADMIN — ATEZOLIZUMAB 1200 MG: 1200 INJECTION, SOLUTION INTRAVENOUS at 16:37

## 2021-09-14 RX ADMIN — Medication 10 ML: at 16:25

## 2021-09-14 RX ADMIN — Medication 10 ML: at 14:44

## 2021-09-14 RX ADMIN — SODIUM CHLORIDE 25 ML/HR: 900 INJECTION, SOLUTION INTRAVENOUS at 16:25

## 2021-09-14 NOTE — PROGRESS NOTES
Patient arrived to port lab for port access and lab draw   [facility] accessed and labs drawn per protocol   *Port remains accessed for infusion  Patient discharged from port lab ambulatory*

## 2021-09-14 NOTE — PROGRESS NOTES
Arrived to the [facility]. Tecentriq infusion completed. Patient tolerated well. Any issues or concerns during appointment: NO.  Patient aware of next infusion appointment on 10/05/21 (date) at 0 (time). Patient aware of next lab and [facility] office visit on 10/5/21 (date) at 1250 (time). Discharged ambulatory.

## 2021-10-05 ENCOUNTER — HOSPITAL ENCOUNTER (OUTPATIENT)
Dept: INFUSION THERAPY | Age: 66
Discharge: HOME | End: 2021-10-05
Payer: MEDICARE

## 2021-10-05 DIAGNOSIS — T45.1X5A ANEMIA DUE TO CHEMOTHERAPY: ICD-10-CM

## 2021-10-05 DIAGNOSIS — C7A.1 NEUROENDOCRINE CARCINOMA, HIGH GRADE (HCC): Primary | ICD-10-CM

## 2021-10-05 DIAGNOSIS — C61 PRIMARY PROSTATE CANCER WITH METASTASIS FROM PROSTATE TO OTHER SITE (HCC): ICD-10-CM

## 2021-10-05 DIAGNOSIS — C77.2 METASTASIS TO RETROPERITONEAL LYMPH NODE (HCC): ICD-10-CM

## 2021-10-05 DIAGNOSIS — E03.2 HYPOTHYROIDISM DUE TO DRUGS: ICD-10-CM

## 2021-10-05 DIAGNOSIS — D64.81 ANEMIA DUE TO CHEMOTHERAPY: ICD-10-CM

## 2021-10-05 DIAGNOSIS — C61 MALIGNANT NEOPLASM OF PROSTATE (HCC): ICD-10-CM

## 2021-10-05 LAB
ALBUMIN SERPL-MCNC: 3.8 G/DL (ref 3.2–4.6)
ALBUMIN/GLOB SERPL: 1 {RATIO} (ref 1.2–3.5)
ALP SERPL-CCNC: 79 U/L (ref 50–136)
ALT SERPL-CCNC: 57 U/L (ref 12–65)
ANION GAP SERPL CALC-SCNC: 7 MMOL/L (ref 7–16)
AST SERPL-CCNC: 35 U/L (ref 15–37)
BASOPHILS # BLD: 0 K/UL (ref 0–0.2)
BASOPHILS NFR BLD: 0 % (ref 0–2)
BILIRUB SERPL-MCNC: 0.3 MG/DL (ref 0.2–1.1)
BUN SERPL-MCNC: 18 MG/DL (ref 8–23)
CALCIUM SERPL-MCNC: 9.2 MG/DL (ref 8.3–10.4)
CEA SERPL-MCNC: 202.4 NG/ML (ref 0–3)
CHLORIDE SERPL-SCNC: 106 MMOL/L (ref 98–107)
CO2 SERPL-SCNC: 27 MMOL/L (ref 21–32)
CREAT SERPL-MCNC: 0.9 MG/DL (ref 0.8–1.5)
DIFFERENTIAL METHOD BLD: ABNORMAL
EOSINOPHIL # BLD: 0.2 K/UL (ref 0–0.8)
EOSINOPHIL NFR BLD: 3 % (ref 0.5–7.8)
ERYTHROCYTE [DISTWIDTH] IN BLOOD BY AUTOMATED COUNT: 13.2 % (ref 11.9–14.6)
GLOBULIN SER CALC-MCNC: 3.8 G/DL (ref 2.3–3.5)
GLUCOSE SERPL-MCNC: 112 MG/DL (ref 65–100)
HCT VFR BLD AUTO: 31.5 %
HGB BLD-MCNC: 10.3 G/DL (ref 13.6–17.2)
IMM GRANULOCYTES # BLD AUTO: 0 K/UL (ref 0–0.5)
IMM GRANULOCYTES NFR BLD AUTO: 0 % (ref 0–5)
LYMPHOCYTES # BLD: 1.4 K/UL (ref 0.5–4.6)
LYMPHOCYTES NFR BLD: 23 % (ref 13–44)
MCH RBC QN AUTO: 33 PG (ref 26.1–32.9)
MCHC RBC AUTO-ENTMCNC: 32.7 G/DL (ref 31.4–35)
MCV RBC AUTO: 101 FL (ref 79.6–97.8)
MONOCYTES # BLD: 0.6 K/UL (ref 0.1–1.3)
MONOCYTES NFR BLD: 11 % (ref 4–12)
NEUTS SEG # BLD: 3.8 K/UL (ref 1.7–8.2)
NEUTS SEG NFR BLD: 63 % (ref 43–78)
NRBC # BLD: 0 K/UL (ref 0–0.2)
PLATELET # BLD AUTO: 141 K/UL (ref 150–450)
PMV BLD AUTO: 9 FL (ref 9.4–12.3)
POTASSIUM SERPL-SCNC: 3.8 MMOL/L (ref 3.5–5.1)
PROT SERPL-MCNC: 7.6 G/DL (ref 6.3–8.2)
PSA SERPL-MCNC: 2.8 NG/ML
RBC # BLD AUTO: 3.12 M/UL (ref 4.23–5.6)
SODIUM SERPL-SCNC: 140 MMOL/L (ref 136–145)
TSH SERPL DL<=0.005 MIU/L-ACNC: 1.13 UIU/ML (ref 0.36–3.74)
WBC # BLD AUTO: 6 K/UL (ref 4.3–11.1)

## 2021-10-05 PROCEDURE — 84153 ASSAY OF PSA TOTAL: CPT

## 2021-10-05 PROCEDURE — 84443 ASSAY THYROID STIM HORMONE: CPT

## 2021-10-05 PROCEDURE — 96413 CHEMO IV INFUSION 1 HR: CPT

## 2021-10-05 PROCEDURE — 36591 DRAW BLOOD OFF VENOUS DEVICE: CPT

## 2021-10-05 PROCEDURE — 85025 COMPLETE CBC W/AUTO DIFF WBC: CPT

## 2021-10-05 PROCEDURE — 82378 CARCINOEMBRYONIC ANTIGEN: CPT

## 2021-10-05 PROCEDURE — 80053 COMPREHEN METABOLIC PANEL: CPT

## 2021-10-05 PROCEDURE — 74011250636 HC RX REV CODE- 250/636: Performed by: INTERNAL MEDICINE

## 2021-10-05 RX ORDER — SODIUM CHLORIDE 9 MG/ML
10 INJECTION INTRAMUSCULAR; INTRAVENOUS; SUBCUTANEOUS AS NEEDED
Status: DISCONTINUED | OUTPATIENT
Start: 2021-10-05 | End: 2021-10-06 | Stop reason: HOSPADM

## 2021-10-05 RX ORDER — SODIUM CHLORIDE 0.9 % (FLUSH) 0.9 %
10 SYRINGE (ML) INJECTION AS NEEDED
Status: DISCONTINUED | OUTPATIENT
Start: 2021-10-05 | End: 2021-10-07 | Stop reason: HOSPADM

## 2021-10-05 RX ORDER — SODIUM CHLORIDE 9 MG/ML
25 INJECTION, SOLUTION INTRAVENOUS CONTINUOUS
Status: DISCONTINUED | OUTPATIENT
Start: 2021-10-05 | End: 2021-10-06 | Stop reason: HOSPADM

## 2021-10-05 RX ADMIN — Medication 10 ML: at 13:18

## 2021-10-05 RX ADMIN — SODIUM CHLORIDE 10 ML: 9 INJECTION INTRAMUSCULAR; INTRAVENOUS; SUBCUTANEOUS at 15:49

## 2021-10-05 RX ADMIN — SODIUM CHLORIDE 25 ML/HR: 900 INJECTION, SOLUTION INTRAVENOUS at 14:40

## 2021-10-05 RX ADMIN — ATEZOLIZUMAB 1200 MG: 1200 INJECTION, SOLUTION INTRAVENOUS at 15:09

## 2021-10-05 NOTE — PROGRESS NOTES
Arrived to the [facility]. Assessment completed, labs reviewed. Tencentriq completed. Patient tolerated without problems. Any issues or concerns during appointment: None  Instructed to call Dr Carito Tijerina with any side effects or concerns  Patient aware of next infusion appointment on 10/26/21(date) at 3 PM (time).   Discharged ambulatory

## 2021-10-05 NOTE — PROGRESS NOTES
Patient arrived to port lab for port access and lab draw   Port accessed and labs drawn per protocol   *Port remains accessed for infusion  Patient discharged from port lab ambulatory*

## 2021-10-26 ENCOUNTER — HOSPITAL ENCOUNTER (OUTPATIENT)
Dept: INFUSION THERAPY | Age: 66
Discharge: HOME | End: 2021-10-26
Payer: MEDICARE

## 2021-10-26 DIAGNOSIS — C61 PROSTATE CANCER METASTATIC TO BONE (HCC): ICD-10-CM

## 2021-10-26 DIAGNOSIS — C7A.1 NEUROENDOCRINE CARCINOMA, HIGH GRADE (HCC): Primary | ICD-10-CM

## 2021-10-26 DIAGNOSIS — C61 PRIMARY PROSTATE CANCER WITH METASTASIS FROM PROSTATE TO OTHER SITE (HCC): ICD-10-CM

## 2021-10-26 DIAGNOSIS — C79.51 PROSTATE CANCER METASTATIC TO BONE (HCC): ICD-10-CM

## 2021-10-26 DIAGNOSIS — E03.2 HYPOTHYROIDISM DUE TO DRUGS: ICD-10-CM

## 2021-10-26 DIAGNOSIS — C77.2 METASTASIS TO RETROPERITONEAL LYMPH NODE (HCC): ICD-10-CM

## 2021-10-26 DIAGNOSIS — C7A.1 NEUROENDOCRINE CARCINOMA, HIGH GRADE (HCC): ICD-10-CM

## 2021-10-26 LAB
ALBUMIN SERPL-MCNC: 3.6 G/DL (ref 3.2–4.6)
ALBUMIN/GLOB SERPL: 1 {RATIO} (ref 1.2–3.5)
ALP SERPL-CCNC: 71 U/L (ref 50–136)
ALT SERPL-CCNC: 35 U/L (ref 12–65)
ANION GAP SERPL CALC-SCNC: 9 MMOL/L (ref 7–16)
AST SERPL-CCNC: 20 U/L (ref 15–37)
BASOPHILS # BLD: 0 K/UL (ref 0–0.2)
BASOPHILS NFR BLD: 0 % (ref 0–2)
BILIRUB SERPL-MCNC: 0.2 MG/DL (ref 0.2–1.1)
BUN SERPL-MCNC: 18 MG/DL (ref 8–23)
CALCIUM SERPL-MCNC: 9.1 MG/DL (ref 8.3–10.4)
CEA SERPL-MCNC: 427 NG/ML (ref 0–3)
CHLORIDE SERPL-SCNC: 106 MMOL/L (ref 98–107)
CO2 SERPL-SCNC: 23 MMOL/L (ref 21–32)
CREAT SERPL-MCNC: 0.9 MG/DL (ref 0.8–1.5)
DIFFERENTIAL METHOD BLD: ABNORMAL
EOSINOPHIL # BLD: 0.1 K/UL (ref 0–0.8)
EOSINOPHIL NFR BLD: 2 % (ref 0.5–7.8)
ERYTHROCYTE [DISTWIDTH] IN BLOOD BY AUTOMATED COUNT: 12.7 % (ref 11.9–14.6)
GLOBULIN SER CALC-MCNC: 3.7 G/DL (ref 2.3–3.5)
GLUCOSE SERPL-MCNC: 93 MG/DL (ref 65–100)
HCT VFR BLD AUTO: 31.9 %
HGB BLD-MCNC: 10.3 G/DL (ref 13.6–17.2)
IMM GRANULOCYTES # BLD AUTO: 0 K/UL (ref 0–0.5)
IMM GRANULOCYTES NFR BLD AUTO: 1 % (ref 0–5)
LYMPHOCYTES # BLD: 1.5 K/UL (ref 0.5–4.6)
LYMPHOCYTES NFR BLD: 24 % (ref 13–44)
MCH RBC QN AUTO: 32 PG (ref 26.1–32.9)
MCHC RBC AUTO-ENTMCNC: 32.3 G/DL (ref 31.4–35)
MCV RBC AUTO: 99.1 FL (ref 79.6–97.8)
MONOCYTES # BLD: 0.6 K/UL (ref 0.1–1.3)
MONOCYTES NFR BLD: 10 % (ref 4–12)
NEUTS SEG # BLD: 4 K/UL (ref 1.7–8.2)
NEUTS SEG NFR BLD: 63 % (ref 43–78)
NRBC # BLD: 0 K/UL (ref 0–0.2)
PLATELET # BLD AUTO: 178 K/UL (ref 150–450)
PMV BLD AUTO: 9 FL (ref 9.4–12.3)
POTASSIUM SERPL-SCNC: 3.9 MMOL/L (ref 3.5–5.1)
PROT SERPL-MCNC: 7.3 G/DL (ref 6.3–8.2)
PSA SERPL-MCNC: 3.7 NG/ML
RBC # BLD AUTO: 3.22 M/UL (ref 4.23–5.6)
SODIUM SERPL-SCNC: 138 MMOL/L (ref 136–145)
TSH SERPL DL<=0.005 MIU/L-ACNC: 0.95 UIU/ML (ref 0.36–3)
WBC # BLD AUTO: 6.3 K/UL (ref 4.3–11.1)

## 2021-10-26 PROCEDURE — 84443 ASSAY THYROID STIM HORMONE: CPT

## 2021-10-26 PROCEDURE — 85025 COMPLETE CBC W/AUTO DIFF WBC: CPT

## 2021-10-26 PROCEDURE — 36591 DRAW BLOOD OFF VENOUS DEVICE: CPT

## 2021-10-26 PROCEDURE — 96413 CHEMO IV INFUSION 1 HR: CPT

## 2021-10-26 PROCEDURE — 84153 ASSAY OF PSA TOTAL: CPT

## 2021-10-26 PROCEDURE — 74011250636 HC RX REV CODE- 250/636: Performed by: NURSE PRACTITIONER

## 2021-10-26 PROCEDURE — 80053 COMPREHEN METABOLIC PANEL: CPT

## 2021-10-26 PROCEDURE — 82378 CARCINOEMBRYONIC ANTIGEN: CPT

## 2021-10-26 RX ORDER — SODIUM CHLORIDE 0.9 % (FLUSH) 0.9 %
10 SYRINGE (ML) INJECTION AS NEEDED
Status: DISCONTINUED | OUTPATIENT
Start: 2021-10-26 | End: 2021-10-28 | Stop reason: HOSPADM

## 2021-10-26 RX ORDER — SODIUM CHLORIDE 9 MG/ML
25 INJECTION, SOLUTION INTRAVENOUS CONTINUOUS
Status: DISCONTINUED | OUTPATIENT
Start: 2021-10-26 | End: 2021-10-27 | Stop reason: HOSPADM

## 2021-10-26 RX ORDER — SODIUM CHLORIDE 0.9 % (FLUSH) 0.9 %
10 SYRINGE (ML) INJECTION AS NEEDED
Status: DISCONTINUED | OUTPATIENT
Start: 2021-10-26 | End: 2021-10-27 | Stop reason: HOSPADM

## 2021-10-26 RX ADMIN — Medication 10 ML: at 16:10

## 2021-10-26 RX ADMIN — ATEZOLIZUMAB 1200 MG: 1200 INJECTION, SOLUTION INTRAVENOUS at 15:36

## 2021-10-26 RX ADMIN — Medication 10 ML: at 15:17

## 2021-10-26 RX ADMIN — SODIUM CHLORIDE 25 ML/HR: 900 INJECTION, SOLUTION INTRAVENOUS at 15:18

## 2021-10-26 RX ADMIN — Medication 10 ML: at 13:35

## 2021-10-26 NOTE — PROGRESS NOTES
Patient arrived ambulatory to infusion center. C3D1 Tecentriq completed. Patient tolerated well. Port de-accessed and patient discharged ambulatory. Patient next infusion appt scheduled on 11/16.

## 2021-10-27 ENCOUNTER — HOSPITAL ENCOUNTER (OUTPATIENT)
Dept: CT IMAGING | Age: 66
Discharge: HOME | End: 2021-10-27
Attending: NURSE PRACTITIONER

## 2021-10-27 DIAGNOSIS — C61 PROSTATE CANCER (HCC): ICD-10-CM

## 2021-10-27 DIAGNOSIS — K59.00 CONSTIPATION, UNSPECIFIED CONSTIPATION TYPE: ICD-10-CM

## 2021-10-27 RX ORDER — SODIUM CHLORIDE 0.9 % (FLUSH) 0.9 %
10 SYRINGE (ML) INJECTION
Status: COMPLETED | OUTPATIENT
Start: 2021-10-27 | End: 2021-10-27

## 2021-10-27 RX ADMIN — Medication 10 ML: at 10:17

## 2021-11-23 ENCOUNTER — HOSPITAL ENCOUNTER (OUTPATIENT)
Dept: INFUSION THERAPY | Age: 66
Discharge: HOME | End: 2021-11-23
Payer: MEDICARE

## 2021-11-23 DIAGNOSIS — C7A.1 NEUROENDOCRINE CARCINOMA, HIGH GRADE (HCC): ICD-10-CM

## 2021-11-23 DIAGNOSIS — D64.81 ANEMIA DUE TO CHEMOTHERAPY: ICD-10-CM

## 2021-11-23 DIAGNOSIS — T45.1X5A ANEMIA DUE TO CHEMOTHERAPY: ICD-10-CM

## 2021-11-23 DIAGNOSIS — E87.6 HYPOKALEMIA: ICD-10-CM

## 2021-11-23 DIAGNOSIS — C77.2 METASTASIS TO RETROPERITONEAL LYMPH NODE (HCC): ICD-10-CM

## 2021-11-23 DIAGNOSIS — C7A.1 NEUROENDOCRINE CARCINOMA, HIGH GRADE (HCC): Primary | ICD-10-CM

## 2021-11-23 DIAGNOSIS — C61 PRIMARY PROSTATE CANCER WITH METASTASIS FROM PROSTATE TO OTHER SITE (HCC): ICD-10-CM

## 2021-11-23 DIAGNOSIS — C79.51 BONE METASTASIS (HCC): ICD-10-CM

## 2021-11-23 LAB
ALBUMIN SERPL-MCNC: 3.6 G/DL (ref 3.2–4.6)
ALBUMIN/GLOB SERPL: 0.9 {RATIO} (ref 1.2–3.5)
ALP SERPL-CCNC: 93 U/L (ref 50–136)
ALT SERPL-CCNC: 26 U/L (ref 12–65)
ANION GAP SERPL CALC-SCNC: 9 MMOL/L (ref 7–16)
AST SERPL-CCNC: 14 U/L (ref 15–37)
BASOPHILS # BLD: 0 K/UL (ref 0–0.2)
BASOPHILS NFR BLD: 0 % (ref 0–2)
BILIRUB SERPL-MCNC: 0.4 MG/DL (ref 0.2–1.1)
BUN SERPL-MCNC: 16 MG/DL (ref 8–23)
CALCIUM SERPL-MCNC: 9.4 MG/DL (ref 8.3–10.4)
CEA SERPL-MCNC: 677.8 NG/ML (ref 0–3)
CHLORIDE SERPL-SCNC: 104 MMOL/L (ref 98–107)
CO2 SERPL-SCNC: 24 MMOL/L (ref 21–32)
CREAT SERPL-MCNC: 1 MG/DL (ref 0.8–1.5)
DIFFERENTIAL METHOD BLD: ABNORMAL
EOSINOPHIL # BLD: 0.1 K/UL (ref 0–0.8)
EOSINOPHIL NFR BLD: 2 % (ref 0.5–7.8)
ERYTHROCYTE [DISTWIDTH] IN BLOOD BY AUTOMATED COUNT: 13.2 % (ref 11.9–14.6)
GLOBULIN SER CALC-MCNC: 4 G/DL (ref 2.3–3.5)
GLUCOSE SERPL-MCNC: 113 MG/DL (ref 65–100)
HBV SURFACE AB SERPL IA-ACNC: 4.77 MIU/ML
HBV SURFACE AG SER QL: NONREACTIVE
HCT VFR BLD AUTO: 33.2 %
HGB BLD-MCNC: 10.9 G/DL (ref 13.6–17.2)
IMM GRANULOCYTES # BLD AUTO: 0 K/UL (ref 0–0.5)
IMM GRANULOCYTES NFR BLD AUTO: 0 % (ref 0–5)
LYMPHOCYTES # BLD: 1.4 K/UL (ref 0.5–4.6)
LYMPHOCYTES NFR BLD: 20 % (ref 13–44)
MCH RBC QN AUTO: 31.6 PG (ref 26.1–32.9)
MCHC RBC AUTO-ENTMCNC: 32.8 G/DL (ref 31.4–35)
MCV RBC AUTO: 96.2 FL (ref 79.6–97.8)
MONOCYTES # BLD: 0.7 K/UL (ref 0.1–1.3)
MONOCYTES NFR BLD: 9 % (ref 4–12)
NEUTS SEG # BLD: 4.9 K/UL (ref 1.7–8.2)
NEUTS SEG NFR BLD: 69 % (ref 43–78)
NRBC # BLD: 0 K/UL (ref 0–0.2)
PLATELET # BLD AUTO: 183 K/UL (ref 150–450)
PMV BLD AUTO: 8.6 FL (ref 9.4–12.3)
POTASSIUM SERPL-SCNC: 4 MMOL/L (ref 3.5–5.1)
PROT SERPL-MCNC: 7.6 G/DL (ref 6.3–8.2)
PSA SERPL-MCNC: 4.2 NG/ML
RBC # BLD AUTO: 3.45 M/UL (ref 4.23–5.6)
SODIUM SERPL-SCNC: 137 MMOL/L (ref 136–145)
WBC # BLD AUTO: 7 K/UL (ref 4.3–11.1)

## 2021-11-23 PROCEDURE — 96375 TX/PRO/DX INJ NEW DRUG ADDON: CPT

## 2021-11-23 PROCEDURE — 86705 HEP B CORE ANTIBODY IGM: CPT

## 2021-11-23 PROCEDURE — 87340 HEPATITIS B SURFACE AG IA: CPT

## 2021-11-23 PROCEDURE — 80053 COMPREHEN METABOLIC PANEL: CPT

## 2021-11-23 PROCEDURE — 36591 DRAW BLOOD OFF VENOUS DEVICE: CPT

## 2021-11-23 PROCEDURE — 86706 HEP B SURFACE ANTIBODY: CPT

## 2021-11-23 PROCEDURE — 85025 COMPLETE CBC W/AUTO DIFF WBC: CPT

## 2021-11-23 PROCEDURE — 96413 CHEMO IV INFUSION 1 HR: CPT

## 2021-11-23 PROCEDURE — 84153 ASSAY OF PSA TOTAL: CPT

## 2021-11-23 PROCEDURE — 82378 CARCINOEMBRYONIC ANTIGEN: CPT

## 2021-11-23 PROCEDURE — 74011250636 HC RX REV CODE- 250/636: Performed by: INTERNAL MEDICINE

## 2021-11-23 RX ORDER — DEXAMETHASONE SODIUM PHOSPHATE 4 MG/ML
8 INJECTION, SOLUTION INTRA-ARTICULAR; INTRALESIONAL; INTRAMUSCULAR; INTRAVENOUS; SOFT TISSUE ONCE
Status: COMPLETED | OUTPATIENT
Start: 2021-11-23 | End: 2021-11-23

## 2021-11-23 RX ORDER — SODIUM CHLORIDE 0.9 % (FLUSH) 0.9 %
10 SYRINGE (ML) INJECTION AS NEEDED
Status: DISCONTINUED | OUTPATIENT
Start: 2021-11-23 | End: 2021-11-25 | Stop reason: HOSPADM

## 2021-11-23 RX ORDER — ONDANSETRON 2 MG/ML
8 INJECTION INTRAMUSCULAR; INTRAVENOUS ONCE
Status: COMPLETED | OUTPATIENT
Start: 2021-11-23 | End: 2021-11-23

## 2021-11-23 RX ORDER — SODIUM CHLORIDE 0.9 % (FLUSH) 0.9 %
10 SYRINGE (ML) INJECTION AS NEEDED
Status: ACTIVE | OUTPATIENT
Start: 2021-11-23 | End: 2021-11-23

## 2021-11-23 RX ORDER — SODIUM CHLORIDE 9 MG/ML
25 INJECTION, SOLUTION INTRAVENOUS CONTINUOUS
Status: ACTIVE | OUTPATIENT
Start: 2021-11-23 | End: 2021-11-23

## 2021-11-23 RX ADMIN — SODIUM CHLORIDE 25 ML/HR: 900 INJECTION, SOLUTION INTRAVENOUS at 11:10

## 2021-11-23 RX ADMIN — LURBINECTEDIN 6.9 MG: 0.5 INJECTION, POWDER, LYOPHILIZED, FOR SOLUTION INTRAVENOUS at 11:51

## 2021-11-23 RX ADMIN — DEXAMETHASONE SODIUM PHOSPHATE 8 MG: 4 INJECTION, SOLUTION INTRAMUSCULAR; INTRAVENOUS at 11:23

## 2021-11-23 RX ADMIN — Medication 10 ML: at 09:51

## 2021-11-23 RX ADMIN — Medication 10 ML: at 12:58

## 2021-11-23 RX ADMIN — ONDANSETRON 8 MG: 2 INJECTION INTRAMUSCULAR; INTRAVENOUS at 11:22

## 2021-11-23 NOTE — PROGRESS NOTES
Patient arrived to port lab for port access and lab draw   [address] accessed and labs drawn per protocol   Port remains accessed. Patient discharged from port lab ambulatory.

## 2021-11-23 NOTE — PROGRESS NOTES
Pt arrived ambulatory. Premeds and Lurbinectedin infused without complications. Pt aware of next appt 12/15 at 1130. Discharged ambulatory, no distress noted.

## 2021-11-24 LAB — HBV CORE IGM SERPL QL IA: NEGATIVE

## 2021-12-15 ENCOUNTER — HOSPITAL ENCOUNTER (OUTPATIENT)
Dept: INFUSION THERAPY | Age: 66
Discharge: HOME | End: 2021-12-15
Payer: MEDICARE

## 2021-12-15 DIAGNOSIS — C77.2 METASTASIS TO RETROPERITONEAL LYMPH NODE (HCC): ICD-10-CM

## 2021-12-15 DIAGNOSIS — D64.81 ANEMIA DUE TO CHEMOTHERAPY: ICD-10-CM

## 2021-12-15 DIAGNOSIS — E87.6 HYPOKALEMIA: ICD-10-CM

## 2021-12-15 DIAGNOSIS — T45.1X5A ANEMIA DUE TO CHEMOTHERAPY: ICD-10-CM

## 2021-12-15 DIAGNOSIS — C61 PRIMARY PROSTATE CANCER WITH METASTASIS FROM PROSTATE TO OTHER SITE (HCC): ICD-10-CM

## 2021-12-15 DIAGNOSIS — C7A.1 NEUROENDOCRINE CARCINOMA, HIGH GRADE (HCC): ICD-10-CM

## 2021-12-15 DIAGNOSIS — C7A.1 NEUROENDOCRINE CARCINOMA, HIGH GRADE (HCC): Primary | ICD-10-CM

## 2021-12-15 LAB
ALBUMIN SERPL-MCNC: 3.7 G/DL (ref 3.2–4.6)
ALBUMIN/GLOB SERPL: 1 {RATIO} (ref 1.2–3.5)
ALP SERPL-CCNC: 148 U/L (ref 50–136)
ALT SERPL-CCNC: 33 U/L (ref 12–65)
ANION GAP SERPL CALC-SCNC: 6 MMOL/L (ref 7–16)
AST SERPL-CCNC: 18 U/L (ref 15–37)
BASOPHILS # BLD: 0 K/UL (ref 0–0.2)
BASOPHILS NFR BLD: 0 % (ref 0–2)
BILIRUB SERPL-MCNC: 0.4 MG/DL (ref 0.2–1.1)
BUN SERPL-MCNC: 15 MG/DL (ref 8–23)
CALCIUM SERPL-MCNC: 8.9 MG/DL (ref 8.3–10.4)
CEA SERPL-MCNC: 323.2 NG/ML (ref 0–3)
CHLORIDE SERPL-SCNC: 107 MMOL/L (ref 98–107)
CO2 SERPL-SCNC: 25 MMOL/L (ref 21–32)
CREAT SERPL-MCNC: 1 MG/DL (ref 0.8–1.5)
DIFFERENTIAL METHOD BLD: ABNORMAL
EOSINOPHIL # BLD: 0 K/UL (ref 0–0.8)
EOSINOPHIL NFR BLD: 0 % (ref 0.5–7.8)
ERYTHROCYTE [DISTWIDTH] IN BLOOD BY AUTOMATED COUNT: 15.4 % (ref 11.9–14.6)
GLOBULIN SER CALC-MCNC: 3.6 G/DL (ref 2.3–3.5)
GLUCOSE SERPL-MCNC: 102 MG/DL (ref 65–100)
HCT VFR BLD AUTO: 31.7 %
HGB BLD-MCNC: 10.4 G/DL (ref 13.6–17.2)
IMM GRANULOCYTES # BLD AUTO: 0 K/UL (ref 0–0.5)
IMM GRANULOCYTES NFR BLD AUTO: 1 % (ref 0–5)
LYMPHOCYTES # BLD: 1.6 K/UL (ref 0.5–4.6)
LYMPHOCYTES NFR BLD: 29 % (ref 13–44)
MCH RBC QN AUTO: 31.9 PG (ref 26.1–32.9)
MCHC RBC AUTO-ENTMCNC: 32.8 G/DL (ref 31.4–35)
MCV RBC AUTO: 97.2 FL (ref 79.6–97.8)
MONOCYTES # BLD: 0.8 K/UL (ref 0.1–1.3)
MONOCYTES NFR BLD: 14 % (ref 4–12)
NEUTS SEG # BLD: 3.3 K/UL (ref 1.7–8.2)
NEUTS SEG NFR BLD: 57 % (ref 43–78)
NRBC # BLD: 0 K/UL (ref 0–0.2)
PLATELET # BLD AUTO: 220 K/UL (ref 150–450)
PMV BLD AUTO: 8.6 FL (ref 9.4–12.3)
POTASSIUM SERPL-SCNC: 3.7 MMOL/L (ref 3.5–5.1)
PROT SERPL-MCNC: 7.3 G/DL (ref 6.3–8.2)
PSA SERPL-MCNC: 5.5 NG/ML
RBC # BLD AUTO: 3.26 M/UL (ref 4.23–5.6)
SODIUM SERPL-SCNC: 138 MMOL/L (ref 136–145)
WBC # BLD AUTO: 5.8 K/UL (ref 4.3–11.1)

## 2021-12-15 PROCEDURE — 36591 DRAW BLOOD OFF VENOUS DEVICE: CPT

## 2021-12-15 PROCEDURE — 96375 TX/PRO/DX INJ NEW DRUG ADDON: CPT

## 2021-12-15 PROCEDURE — 85025 COMPLETE CBC W/AUTO DIFF WBC: CPT

## 2021-12-15 PROCEDURE — 74011250636 HC RX REV CODE- 250/636: Performed by: NURSE PRACTITIONER

## 2021-12-15 PROCEDURE — 84153 ASSAY OF PSA TOTAL: CPT

## 2021-12-15 PROCEDURE — 82378 CARCINOEMBRYONIC ANTIGEN: CPT

## 2021-12-15 PROCEDURE — 96413 CHEMO IV INFUSION 1 HR: CPT

## 2021-12-15 PROCEDURE — 80053 COMPREHEN METABOLIC PANEL: CPT

## 2021-12-15 RX ORDER — SODIUM CHLORIDE 9 MG/ML
25 INJECTION, SOLUTION INTRAVENOUS CONTINUOUS
Status: DISCONTINUED | OUTPATIENT
Start: 2021-12-15 | End: 2021-12-16 | Stop reason: HOSPADM

## 2021-12-15 RX ORDER — DEXAMETHASONE SODIUM PHOSPHATE 4 MG/ML
8 INJECTION, SOLUTION INTRA-ARTICULAR; INTRALESIONAL; INTRAMUSCULAR; INTRAVENOUS; SOFT TISSUE ONCE
Status: COMPLETED | OUTPATIENT
Start: 2021-12-15 | End: 2021-12-15

## 2021-12-15 RX ORDER — SODIUM CHLORIDE 0.9 % (FLUSH) 0.9 %
10 SYRINGE (ML) INJECTION AS NEEDED
Status: DISCONTINUED | OUTPATIENT
Start: 2021-12-15 | End: 2021-12-17 | Stop reason: HOSPADM

## 2021-12-15 RX ORDER — ONDANSETRON 2 MG/ML
8 INJECTION INTRAMUSCULAR; INTRAVENOUS ONCE
Status: COMPLETED | OUTPATIENT
Start: 2021-12-15 | End: 2021-12-15

## 2021-12-15 RX ORDER — SODIUM CHLORIDE 0.9 % (FLUSH) 0.9 %
10 SYRINGE (ML) INJECTION AS NEEDED
Status: DISCONTINUED | OUTPATIENT
Start: 2021-12-15 | End: 2021-12-16 | Stop reason: HOSPADM

## 2021-12-15 RX ADMIN — SODIUM CHLORIDE 25 ML/HR: 9 INJECTION, SOLUTION INTRAVENOUS at 12:30

## 2021-12-15 RX ADMIN — LURBINECTEDIN 6.9 MG: 0.5 INJECTION, POWDER, LYOPHILIZED, FOR SOLUTION INTRAVENOUS at 13:11

## 2021-12-15 RX ADMIN — DEXAMETHASONE SODIUM PHOSPHATE 8 MG: 4 INJECTION, SOLUTION INTRAMUSCULAR; INTRAVENOUS at 12:28

## 2021-12-15 RX ADMIN — Medication 10 ML: at 11:05

## 2021-12-15 RX ADMIN — Medication 10 ML: at 12:12

## 2021-12-15 RX ADMIN — ONDANSETRON 8 MG: 2 INJECTION INTRAMUSCULAR; INTRAVENOUS at 12:23

## 2021-12-15 NOTE — ADDENDUM NOTE
Encounter addended by: ASIA Morales [facility] on: 12/15/2021 12:20 PM   Actions taken: i-Vent created or edited

## 2021-12-15 NOTE — PROGRESS NOTES
Problem: Knowledge Deficit  Goal: *Participate in the learning process  Outcome: Progressing Towards Goal  Goal: *Verbalize description of procedure  Outcome: Progressing Towards Goal  Goal: *Verbalizes understanding and describes medication purposes and frequencies  Outcome: Progressing Towards Goal  Goal: *Knowledge of discharge instructions  Outcome: Progressing Towards Goal     Problem: Knowledge Deficit  Goal: *Participate in the learning process  Outcome: Progressing Towards Goal  Goal: *Verbalize description of procedure  Outcome: Progressing Towards Goal  Goal: *Verbalizes understanding and describes medication purposes and frequencies  Outcome: Progressing Towards Goal  Goal: *Knowledge of discharge instructions  Outcome: Progressing Towards Goal

## 2021-12-15 NOTE — PROGRESS NOTES
Arrived to the [facility]. Lurbinectedin infusion completed. Patient tolerated well. Any issues or concerns during appointment: none. Patient aware of next infusion appointment on 01/05/2022 (date) at 80 (time). Discharged ambulatory.

## 2021-12-15 NOTE — PROGRESS NOTES
Patient arrived to port lab for port access and lab draw   [address] accessed and labs drawn per protocol   *Port remains accessed   Patient discharged from port lab ambulatory*

## 2022-01-01 ENCOUNTER — APPOINTMENT (OUTPATIENT)
Dept: ULTRASOUND IMAGING | Age: 67
DRG: 542 | End: 2022-01-01
Attending: INTERNAL MEDICINE
Payer: MEDICARE

## 2022-01-01 ENCOUNTER — HOSPITAL ENCOUNTER (OUTPATIENT)
Dept: INFUSION THERAPY | Age: 67
Discharge: HOME | End: 2022-06-08
Payer: MEDICARE

## 2022-01-01 ENCOUNTER — HOSPITAL ENCOUNTER (OUTPATIENT)
Age: 67
Setting detail: OBSERVATION
End: 2022-08-20
Attending: EMERGENCY MEDICINE
Payer: MEDICARE

## 2022-01-01 ENCOUNTER — APPOINTMENT (OUTPATIENT)
Dept: RADIATION ONCOLOGY | Age: 67
End: 2022-01-01
Payer: MEDICARE

## 2022-01-01 ENCOUNTER — HOSPITAL ENCOUNTER (OUTPATIENT)
Dept: RADIATION ONCOLOGY | Age: 67
Setting detail: RECURRING SERIES
Discharge: HOME | End: 2022-07-10
Payer: MEDICARE

## 2022-01-01 ENCOUNTER — HOSPITAL ENCOUNTER (OUTPATIENT)
Dept: INFUSION THERAPY | Age: 67
Discharge: HOME | End: 2022-06-29
Payer: MEDICARE

## 2022-01-01 ENCOUNTER — HOSPITAL ENCOUNTER (OUTPATIENT)
Dept: MRI IMAGING | Age: 67
Discharge: HOME | End: 2022-06-25
Payer: MEDICARE

## 2022-01-01 ENCOUNTER — APPOINTMENT (OUTPATIENT)
Dept: GENERAL RADIOLOGY | Age: 67
DRG: 542 | End: 2022-01-01
Payer: MEDICARE

## 2022-01-01 ENCOUNTER — HOSPITAL ENCOUNTER (OUTPATIENT)
Dept: INFUSION THERAPY | Age: 67
Discharge: HOME | End: 2022-06-09
Payer: MEDICARE

## 2022-01-01 ENCOUNTER — HOSPITAL ENCOUNTER (OUTPATIENT)
Dept: INFUSION THERAPY | Age: 67
End: 2022-01-01

## 2022-01-01 ENCOUNTER — APPOINTMENT (OUTPATIENT)
Dept: GENERAL RADIOLOGY | Age: 67
DRG: 542 | End: 2022-01-01
Attending: INTERNAL MEDICINE
Payer: MEDICARE

## 2022-01-01 ENCOUNTER — CARE COORDINATION (OUTPATIENT)
Dept: CARE COORDINATION | Facility: CLINIC | Age: 67
End: 2022-01-01

## 2022-01-01 ENCOUNTER — TELEPHONE (OUTPATIENT)
Dept: ONCOLOGY | Age: 67
End: 2022-01-01

## 2022-01-01 ENCOUNTER — CLINICAL DOCUMENTATION (OUTPATIENT)
Dept: PALLATIVE CARE | Age: 67
End: 2022-01-01

## 2022-01-01 ENCOUNTER — OFFICE VISIT (OUTPATIENT)
Dept: ONCOLOGY | Age: 67
End: 2022-01-01
Payer: MEDICARE

## 2022-01-01 ENCOUNTER — HOME CARE VISIT (OUTPATIENT)
Dept: HOSPICE | Facility: HOSPICE | Age: 67
End: 2022-01-01

## 2022-01-01 ENCOUNTER — HOSPITAL ENCOUNTER (INPATIENT)
Age: 67
LOS: 38 days | Discharge: HOSPICE | DRG: 542 | End: 2022-08-15
Attending: INTERNAL MEDICINE
Payer: MEDICARE

## 2022-01-01 ENCOUNTER — TELEPHONE (OUTPATIENT)
Dept: RADIATION ONCOLOGY | Age: 67
End: 2022-01-01

## 2022-01-01 ENCOUNTER — HOSPITAL ENCOUNTER (OUTPATIENT)
Dept: RADIATION ONCOLOGY | Age: 67
Setting detail: RECURRING SERIES
Discharge: HOME | End: 2022-07-04
Payer: MEDICARE

## 2022-01-01 ENCOUNTER — OFFICE VISIT (OUTPATIENT)
Dept: ORTHOPEDIC SURGERY | Age: 67
End: 2022-01-01
Payer: MEDICARE

## 2022-01-01 ENCOUNTER — APPOINTMENT (OUTPATIENT)
Dept: CT IMAGING | Age: 67
DRG: 542 | End: 2022-01-01
Attending: INTERNAL MEDICINE
Payer: MEDICARE

## 2022-01-01 ENCOUNTER — CLINICAL DOCUMENTATION (OUTPATIENT)
Dept: ONCOLOGY | Age: 67
End: 2022-01-01

## 2022-01-01 ENCOUNTER — HOSPITAL ENCOUNTER (EMERGENCY)
Age: 67
Discharge: ANOTHER HOSPITAL | DRG: 542 | End: 2022-07-08
Attending: STUDENT IN AN ORGANIZED HEALTH CARE EDUCATION/TRAINING PROGRAM
Payer: MEDICARE

## 2022-01-01 ENCOUNTER — HOSPITAL ENCOUNTER (OUTPATIENT)
Dept: RADIATION ONCOLOGY | Age: 67
Setting detail: RECURRING SERIES
Discharge: HOME | End: 2022-07-09
Payer: MEDICARE

## 2022-01-01 ENCOUNTER — HOSPITAL ENCOUNTER (OUTPATIENT)
Dept: RADIATION ONCOLOGY | Age: 67
Setting detail: RECURRING SERIES
Discharge: HOME | End: 2022-07-08
Payer: MEDICARE

## 2022-01-01 ENCOUNTER — HOSPITAL ENCOUNTER (OUTPATIENT)
Dept: INFUSION THERAPY | Age: 67
Discharge: HOME | End: 2022-06-29

## 2022-01-01 ENCOUNTER — HOSPITAL ENCOUNTER (OUTPATIENT)
Dept: RADIATION ONCOLOGY | Age: 67
Setting detail: RECURRING SERIES
End: 2022-01-01
Payer: MEDICARE

## 2022-01-01 VITALS
BODY MASS INDEX: 27.77 KG/M2 | HEIGHT: 72 IN | SYSTOLIC BLOOD PRESSURE: 130 MMHG | DIASTOLIC BLOOD PRESSURE: 88 MMHG | OXYGEN SATURATION: 98 % | WEIGHT: 205 LBS | RESPIRATION RATE: 18 BRPM | HEART RATE: 104 BPM | TEMPERATURE: 98.1 F

## 2022-01-01 VITALS
TEMPERATURE: 99.3 F | RESPIRATION RATE: 18 BRPM | HEIGHT: 70 IN | WEIGHT: 203.3 LBS | SYSTOLIC BLOOD PRESSURE: 147 MMHG | OXYGEN SATURATION: 98 % | DIASTOLIC BLOOD PRESSURE: 91 MMHG | BODY MASS INDEX: 29.11 KG/M2 | HEART RATE: 101 BPM

## 2022-01-01 VITALS
HEIGHT: 70 IN | DIASTOLIC BLOOD PRESSURE: 76 MMHG | RESPIRATION RATE: 18 BRPM | TEMPERATURE: 97.6 F | SYSTOLIC BLOOD PRESSURE: 125 MMHG | OXYGEN SATURATION: 92 % | BODY MASS INDEX: 28.35 KG/M2 | WEIGHT: 198 LBS | HEART RATE: 95 BPM

## 2022-01-01 VITALS
BODY MASS INDEX: 26.66 KG/M2 | SYSTOLIC BLOOD PRESSURE: 122 MMHG | TEMPERATURE: 97.6 F | HEIGHT: 69 IN | WEIGHT: 180 LBS | RESPIRATION RATE: 18 BRPM | HEART RATE: 74 BPM | DIASTOLIC BLOOD PRESSURE: 63 MMHG | OXYGEN SATURATION: 90 %

## 2022-01-01 VITALS
WEIGHT: 209.7 LBS | HEART RATE: 80 BPM | DIASTOLIC BLOOD PRESSURE: 87 MMHG | HEIGHT: 70 IN | SYSTOLIC BLOOD PRESSURE: 134 MMHG | RESPIRATION RATE: 16 BRPM | OXYGEN SATURATION: 98 % | TEMPERATURE: 98.5 F | BODY MASS INDEX: 30.02 KG/M2

## 2022-01-01 VITALS
TEMPERATURE: 97.6 F | WEIGHT: 204.7 LBS | DIASTOLIC BLOOD PRESSURE: 88 MMHG | HEART RATE: 90 BPM | SYSTOLIC BLOOD PRESSURE: 155 MMHG | BODY MASS INDEX: 29.8 KG/M2

## 2022-01-01 VITALS — WEIGHT: 197 LBS | BODY MASS INDEX: 29.86 KG/M2 | HEIGHT: 68 IN

## 2022-01-01 VITALS
OXYGEN SATURATION: 96 % | TEMPERATURE: 97.5 F | DIASTOLIC BLOOD PRESSURE: 89 MMHG | SYSTOLIC BLOOD PRESSURE: 152 MMHG | HEART RATE: 81 BPM

## 2022-01-01 VITALS — BODY MASS INDEX: 30.52 KG/M2 | WEIGHT: 209.7 LBS

## 2022-01-01 DIAGNOSIS — M54.14 THORACIC RADICULOPATHY DUE TO NEOPLASM: ICD-10-CM

## 2022-01-01 DIAGNOSIS — G89.3 CANCER ASSOCIATED PAIN: Primary | ICD-10-CM

## 2022-01-01 DIAGNOSIS — G89.3 CANCER ASSOCIATED PAIN: ICD-10-CM

## 2022-01-01 DIAGNOSIS — Z51.5 ADMISSION FOR HOSPICE CARE: Primary | ICD-10-CM

## 2022-01-01 DIAGNOSIS — C7A.1 NEUROENDOCRINE CARCINOMA, HIGH GRADE (HCC): ICD-10-CM

## 2022-01-01 DIAGNOSIS — C61 PRIMARY PROSTATE CANCER WITH METASTASIS FROM PROSTATE TO OTHER SITE (HCC): Primary | ICD-10-CM

## 2022-01-01 DIAGNOSIS — C7A.1 NEUROENDOCRINE CARCINOMA, HIGH GRADE (HCC): Primary | ICD-10-CM

## 2022-01-01 DIAGNOSIS — C61 PRIMARY PROSTATE CANCER WITH METASTASIS FROM PROSTATE TO OTHER SITE (HCC): ICD-10-CM

## 2022-01-01 DIAGNOSIS — R97.8 OTHER ABNORMAL TUMOR MARKERS: ICD-10-CM

## 2022-01-01 DIAGNOSIS — G89.3 PAIN DUE TO MALIGNANT NEOPLASM METASTATIC TO BONE (HCC): ICD-10-CM

## 2022-01-01 DIAGNOSIS — C77.2 METASTASIS TO RETROPERITONEAL LYMPH NODE (HCC): ICD-10-CM

## 2022-01-01 DIAGNOSIS — D64.9 ANEMIA, UNSPECIFIED TYPE: Primary | ICD-10-CM

## 2022-01-01 DIAGNOSIS — N17.9 AKI (ACUTE KIDNEY INJURY) (HCC): Primary | ICD-10-CM

## 2022-01-01 DIAGNOSIS — C79.51 METASTATIC CANCER TO BONE (HCC): Primary | ICD-10-CM

## 2022-01-01 DIAGNOSIS — C61 PROSTATE CANCER (HCC): Primary | ICD-10-CM

## 2022-01-01 DIAGNOSIS — R53.1 GENERALIZED WEAKNESS: ICD-10-CM

## 2022-01-01 DIAGNOSIS — C79.51 PROSTATE CANCER METASTATIC TO BONE (HCC): ICD-10-CM

## 2022-01-01 DIAGNOSIS — F06.4 ANXIETY DISORDER DUE TO GENERAL MEDICAL CONDITION: ICD-10-CM

## 2022-01-01 DIAGNOSIS — C79.51 PAIN DUE TO MALIGNANT NEOPLASM METASTATIC TO BONE (HCC): ICD-10-CM

## 2022-01-01 DIAGNOSIS — S22.030A CLOSED WEDGE COMPRESSION FRACTURE OF T3 VERTEBRA, INITIAL ENCOUNTER (HCC): ICD-10-CM

## 2022-01-01 DIAGNOSIS — C79.51 PROSTATE CANCER METASTATIC TO BONE (HCC): Primary | ICD-10-CM

## 2022-01-01 DIAGNOSIS — C61 MALIGNANT NEOPLASM OF PROSTATE (HCC): Primary | ICD-10-CM

## 2022-01-01 DIAGNOSIS — C61 PROSTATE CANCER METASTATIC TO BONE (HCC): ICD-10-CM

## 2022-01-01 DIAGNOSIS — C79.51 METASTASIS TO BONE (HCC): ICD-10-CM

## 2022-01-01 DIAGNOSIS — R33.9 URINARY RETENTION: Primary | ICD-10-CM

## 2022-01-01 DIAGNOSIS — C61 PROSTATE CANCER METASTATIC TO BONE (HCC): Primary | ICD-10-CM

## 2022-01-01 LAB
ABO + RH BLD: NORMAL
ALBUMIN SERPL-MCNC: 2.5 G/DL (ref 3.2–4.6)
ALBUMIN SERPL-MCNC: 2.6 G/DL (ref 3.2–4.6)
ALBUMIN SERPL-MCNC: 2.7 G/DL (ref 3.2–4.6)
ALBUMIN SERPL-MCNC: 2.8 G/DL (ref 3.2–4.6)
ALBUMIN SERPL-MCNC: 2.9 G/DL (ref 3.2–4.6)
ALBUMIN SERPL-MCNC: 3.1 G/DL (ref 3.2–4.6)
ALBUMIN SERPL-MCNC: 3.2 G/DL (ref 3.2–4.6)
ALBUMIN SERPL-MCNC: 3.3 G/DL (ref 3.2–4.6)
ALBUMIN SERPL-MCNC: 3.8 G/DL (ref 3.2–4.6)
ALBUMIN SERPL-MCNC: 4 G/DL (ref 3.2–4.6)
ALBUMIN SERPL-MCNC: 4.1 G/DL (ref 3.2–4.6)
ALBUMIN/GLOB SERPL: 0.6 {RATIO} (ref 1.2–3.5)
ALBUMIN/GLOB SERPL: 0.7 {RATIO} (ref 1.2–3.5)
ALBUMIN/GLOB SERPL: 0.8 {RATIO} (ref 1.2–3.5)
ALBUMIN/GLOB SERPL: 0.9 {RATIO} (ref 1.2–3.5)
ALBUMIN/GLOB SERPL: 1 {RATIO} (ref 1.2–3.5)
ALBUMIN/GLOB SERPL: 1.1 {RATIO} (ref 1.2–3.5)
ALBUMIN/GLOB SERPL: 1.2 {RATIO} (ref 1.2–3.5)
ALBUMIN/GLOB SERPL: 1.4 {RATIO} (ref 1.2–3.5)
ALBUMIN/GLOB SERPL: 1.7 {RATIO}
ALP SERPL-CCNC: 129 U/L (ref 50–136)
ALP SERPL-CCNC: 167 U/L (ref 50–136)
ALP SERPL-CCNC: 200 U/L (ref 50–136)
ALP SERPL-CCNC: 201 U/L (ref 50–136)
ALP SERPL-CCNC: 209 U/L (ref 50–136)
ALP SERPL-CCNC: 223 U/L (ref 50–136)
ALP SERPL-CCNC: 225 U/L (ref 50–136)
ALP SERPL-CCNC: 225 U/L (ref 50–136)
ALP SERPL-CCNC: 232 U/L (ref 50–136)
ALP SERPL-CCNC: 236 U/L (ref 50–136)
ALP SERPL-CCNC: 237 U/L (ref 50–136)
ALP SERPL-CCNC: 247 U/L (ref 50–136)
ALP SERPL-CCNC: 248 U/L (ref 45–117)
ALP SERPL-CCNC: 258 U/L (ref 50–136)
ALP SERPL-CCNC: 297 U/L (ref 50–136)
ALP SERPL-CCNC: 306 U/L (ref 50–136)
ALP SERPL-CCNC: 327 U/L (ref 50–136)
ALP SERPL-CCNC: 381 U/L (ref 50–136)
ALP SERPL-CCNC: 390 U/L (ref 50–136)
ALP SERPL-CCNC: 400 U/L (ref 50–136)
ALP SERPL-CCNC: 405 U/L (ref 50–136)
ALP SERPL-CCNC: 446 U/L (ref 50–136)
ALP SERPL-CCNC: 452 U/L (ref 50–136)
ALP SERPL-CCNC: 475 U/L (ref 50–136)
ALP SERPL-CCNC: 475 U/L (ref 50–136)
ALP SERPL-CCNC: 481 U/L (ref 50–136)
ALP SERPL-CCNC: 482 U/L (ref 50–136)
ALP SERPL-CCNC: 487 U/L (ref 50–136)
ALP SERPL-CCNC: 499 U/L (ref 50–136)
ALP SERPL-CCNC: 499 U/L (ref 50–136)
ALP SERPL-CCNC: 512 U/L (ref 50–136)
ALP SERPL-CCNC: 520 U/L (ref 50–136)
ALP SERPL-CCNC: 538 U/L (ref 50–136)
ALP SERPL-CCNC: 563 U/L (ref 50–136)
ALP SERPL-CCNC: 591 U/L (ref 50–136)
ALP SERPL-CCNC: 628 U/L (ref 50–136)
ALP SERPL-CCNC: 708 U/L (ref 50–136)
ALT SERPL-CCNC: 108 U/L (ref 12–65)
ALT SERPL-CCNC: 110 U/L (ref 12–65)
ALT SERPL-CCNC: 111 U/L (ref 13–61)
ALT SERPL-CCNC: 124 U/L (ref 12–65)
ALT SERPL-CCNC: 130 U/L (ref 12–65)
ALT SERPL-CCNC: 136 U/L (ref 12–65)
ALT SERPL-CCNC: 144 U/L (ref 12–65)
ALT SERPL-CCNC: 148 U/L (ref 12–65)
ALT SERPL-CCNC: 161 U/L (ref 12–65)
ALT SERPL-CCNC: 167 U/L (ref 12–65)
ALT SERPL-CCNC: 167 U/L (ref 12–65)
ALT SERPL-CCNC: 173 U/L (ref 12–65)
ALT SERPL-CCNC: 178 U/L (ref 12–65)
ALT SERPL-CCNC: 183 U/L (ref 12–65)
ALT SERPL-CCNC: 193 U/L (ref 12–65)
ALT SERPL-CCNC: 196 U/L (ref 12–65)
ALT SERPL-CCNC: 203 U/L (ref 12–65)
ALT SERPL-CCNC: 212 U/L (ref 12–65)
ALT SERPL-CCNC: 217 U/L (ref 12–65)
ALT SERPL-CCNC: 221 U/L (ref 12–65)
ALT SERPL-CCNC: 226 U/L (ref 12–65)
ALT SERPL-CCNC: 238 U/L (ref 12–65)
ALT SERPL-CCNC: 251 U/L (ref 12–65)
ALT SERPL-CCNC: 256 U/L (ref 12–65)
ALT SERPL-CCNC: 262 U/L (ref 12–65)
ALT SERPL-CCNC: 262 U/L (ref 12–65)
ALT SERPL-CCNC: 266 U/L (ref 12–65)
ALT SERPL-CCNC: 280 U/L (ref 12–65)
ALT SERPL-CCNC: 297 U/L (ref 12–65)
ALT SERPL-CCNC: 321 U/L (ref 12–65)
ALT SERPL-CCNC: 58 U/L (ref 12–65)
ALT SERPL-CCNC: 76 U/L (ref 12–65)
ALT SERPL-CCNC: 77 U/L (ref 12–65)
ALT SERPL-CCNC: 77 U/L (ref 12–65)
ALT SERPL-CCNC: 84 U/L (ref 12–65)
ALT SERPL-CCNC: 89 U/L (ref 12–65)
ALT SERPL-CCNC: 93 U/L (ref 12–65)
ANION GAP SERPL CALC-SCNC: 1 MMOL/L (ref 7–16)
ANION GAP SERPL CALC-SCNC: 10 MMOL/L (ref 7–16)
ANION GAP SERPL CALC-SCNC: 11 MMOL/L (ref 7–16)
ANION GAP SERPL CALC-SCNC: 12 MMOL/L (ref 7–16)
ANION GAP SERPL CALC-SCNC: 17 MMOL/L (ref 7–16)
ANION GAP SERPL CALC-SCNC: 2 MMOL/L (ref 7–16)
ANION GAP SERPL CALC-SCNC: 3 MMOL/L (ref 7–16)
ANION GAP SERPL CALC-SCNC: 4 MMOL/L (ref 7–16)
ANION GAP SERPL CALC-SCNC: 5 MMOL/L (ref 7–16)
ANION GAP SERPL CALC-SCNC: 6 MMOL/L (ref 7–16)
ANION GAP SERPL CALC-SCNC: 7 MMOL/L (ref 7–16)
ANION GAP SERPL CALC-SCNC: 8 MMOL/L (ref 7–16)
ANION GAP SERPL CALC-SCNC: 9 MMOL/L (ref 7–16)
APPEARANCE UR: ABNORMAL
APPEARANCE UR: CLEAR
APTT PPP: 37 SEC (ref 24.1–35.1)
AST SERPL-CCNC: 107 U/L (ref 15–37)
AST SERPL-CCNC: 131 U/L (ref 15–37)
AST SERPL-CCNC: 136 U/L (ref 15–37)
AST SERPL-CCNC: 154 U/L (ref 15–37)
AST SERPL-CCNC: 155 U/L (ref 15–37)
AST SERPL-CCNC: 160 U/L (ref 15–37)
AST SERPL-CCNC: 160 U/L (ref 15–37)
AST SERPL-CCNC: 168 U/L (ref 15–37)
AST SERPL-CCNC: 192 U/L (ref 15–37)
AST SERPL-CCNC: 199 U/L (ref 15–37)
AST SERPL-CCNC: 213 U/L (ref 15–37)
AST SERPL-CCNC: 229 U/L (ref 15–37)
AST SERPL-CCNC: 257 U/L (ref 15–37)
AST SERPL-CCNC: 267 U/L (ref 15–37)
AST SERPL-CCNC: 272 U/L (ref 15–37)
AST SERPL-CCNC: 274 U/L (ref 15–37)
AST SERPL-CCNC: 275 U/L (ref 15–37)
AST SERPL-CCNC: 281 U/L (ref 15–37)
AST SERPL-CCNC: 281 U/L (ref 15–37)
AST SERPL-CCNC: 283 U/L (ref 15–37)
AST SERPL-CCNC: 284 U/L (ref 15–37)
AST SERPL-CCNC: 285 U/L (ref 15–37)
AST SERPL-CCNC: 285 U/L (ref 15–37)
AST SERPL-CCNC: 291 U/L (ref 15–37)
AST SERPL-CCNC: 293 U/L (ref 15–37)
AST SERPL-CCNC: 293 U/L (ref 15–37)
AST SERPL-CCNC: 307 U/L (ref 15–37)
AST SERPL-CCNC: 313 U/L (ref 15–37)
AST SERPL-CCNC: 318 U/L (ref 15–37)
AST SERPL-CCNC: 327 U/L (ref 15–37)
AST SERPL-CCNC: 330 U/L (ref 15–37)
AST SERPL-CCNC: 344 U/L (ref 15–37)
AST SERPL-CCNC: 358 U/L (ref 15–37)
AST SERPL-CCNC: 358 U/L (ref 15–37)
AST SERPL-CCNC: 360 U/L (ref 15–37)
AST SERPL-CCNC: 384 U/L (ref 15–37)
AST SERPL-CCNC: 88 U/L (ref 15–37)
BACTERIA SPEC CULT: NORMAL
BACTERIA URNS QL MICRO: ABNORMAL /HPF
BACTERIA URNS QL MICRO: ABNORMAL /HPF
BACTERIA URNS QL MICRO: NEGATIVE /HPF
BACTERIA URNS QL MICRO: NEGATIVE /HPF
BASOPHILS # BLD: 0 K/UL (ref 0–0.2)
BASOPHILS NFR BLD: 0 % (ref 0–2)
BASOPHILS NFR BLD: 1 % (ref 0–2)
BILIRUB DIRECT SERPL-MCNC: 0.7 MG/DL
BILIRUB DIRECT SERPL-MCNC: 1.6 MG/DL
BILIRUB INDIRECT SERPL-MCNC: 0.3 MG/DL (ref 0–1.1)
BILIRUB INDIRECT SERPL-MCNC: 0.3 MG/DL (ref 0–1.1)
BILIRUB SERPL-MCNC: 0.3 MG/DL (ref 0.2–1.1)
BILIRUB SERPL-MCNC: 0.4 MG/DL (ref 0.2–1.1)
BILIRUB SERPL-MCNC: 0.5 MG/DL (ref 0.2–1.1)
BILIRUB SERPL-MCNC: 0.6 MG/DL (ref 0.2–1.1)
BILIRUB SERPL-MCNC: 0.7 MG/DL (ref 0.2–1.1)
BILIRUB SERPL-MCNC: 0.8 MG/DL (ref 0.2–1.1)
BILIRUB SERPL-MCNC: 0.9 MG/DL (ref 0.2–1.1)
BILIRUB SERPL-MCNC: 0.9 MG/DL (ref 0.2–1.1)
BILIRUB SERPL-MCNC: 1 MG/DL (ref 0.2–1.1)
BILIRUB SERPL-MCNC: 1 MG/DL (ref 0.2–1.1)
BILIRUB SERPL-MCNC: 1.1 MG/DL (ref 0.2–1.1)
BILIRUB SERPL-MCNC: 1.1 MG/DL (ref 0.2–1.1)
BILIRUB SERPL-MCNC: 1.2 MG/DL (ref 0.2–1.1)
BILIRUB SERPL-MCNC: 1.4 MG/DL (ref 0.2–1.1)
BILIRUB SERPL-MCNC: 1.6 MG/DL (ref 0.2–1.1)
BILIRUB SERPL-MCNC: 1.9 MG/DL (ref 0.2–1.1)
BILIRUB SERPL-MCNC: 2 MG/DL (ref 0.2–1.1)
BILIRUB SERPL-MCNC: 2.2 MG/DL (ref 0.2–1.1)
BILIRUB SERPL-MCNC: 2.7 MG/DL (ref 0.2–1.1)
BILIRUB SERPL-MCNC: 4 MG/DL (ref 0.2–1.1)
BILIRUB UR QL: NEGATIVE
BLD PROD TYP BPU: NORMAL
BLOOD BANK CMNT PATIENT-IMP: NORMAL
BLOOD BANK DISPENSE STATUS: NORMAL
BLOOD GROUP ANTIBODIES SERPL: NORMAL
BPU ID: NORMAL
BUN SERPL-MCNC: 24 MG/DL (ref 8–23)
BUN SERPL-MCNC: 24 MG/DL (ref 8–23)
BUN SERPL-MCNC: 25 MG/DL (ref 8–23)
BUN SERPL-MCNC: 26 MG/DL (ref 8–23)
BUN SERPL-MCNC: 27 MG/DL (ref 8–23)
BUN SERPL-MCNC: 28 MG/DL (ref 8–23)
BUN SERPL-MCNC: 29 MG/DL (ref 8–23)
BUN SERPL-MCNC: 30 MG/DL (ref 8–23)
BUN SERPL-MCNC: 31 MG/DL (ref 8–23)
BUN SERPL-MCNC: 31 MG/DL (ref 8–23)
BUN SERPL-MCNC: 32 MG/DL (ref 8–23)
BUN SERPL-MCNC: 34 MG/DL (ref 8–23)
BUN SERPL-MCNC: 35 MG/DL (ref 8–23)
BUN SERPL-MCNC: 35 MG/DL (ref 8–23)
BUN SERPL-MCNC: 38 MG/DL (ref 8–23)
BUN SERPL-MCNC: 45 MG/DL (ref 7–18)
BUN SERPL-MCNC: 46 MG/DL (ref 8–23)
CALCIUM SERPL-MCNC: 10.1 MG/DL (ref 8.3–10.4)
CALCIUM SERPL-MCNC: 10.3 MG/DL (ref 8.3–10.4)
CALCIUM SERPL-MCNC: 10.7 MG/DL (ref 8.3–10.4)
CALCIUM SERPL-MCNC: 10.8 MG/DL (ref 8.3–10.4)
CALCIUM SERPL-MCNC: 11.3 MG/DL (ref 8.3–10.4)
CALCIUM SERPL-MCNC: 11.3 MG/DL (ref 8.3–10.4)
CALCIUM SERPL-MCNC: 7.3 MG/DL (ref 8.3–10.4)
CALCIUM SERPL-MCNC: 7.4 MG/DL (ref 8.3–10.4)
CALCIUM SERPL-MCNC: 7.4 MG/DL (ref 8.3–10.4)
CALCIUM SERPL-MCNC: 7.5 MG/DL (ref 8.3–10.4)
CALCIUM SERPL-MCNC: 7.7 MG/DL (ref 8.3–10.4)
CALCIUM SERPL-MCNC: 7.8 MG/DL (ref 8.3–10.4)
CALCIUM SERPL-MCNC: 8 MG/DL (ref 8.3–10.4)
CALCIUM SERPL-MCNC: 8.1 MG/DL (ref 8.3–10.4)
CALCIUM SERPL-MCNC: 8.3 MG/DL (ref 8.3–10.4)
CALCIUM SERPL-MCNC: 8.4 MG/DL (ref 8.3–10.4)
CALCIUM SERPL-MCNC: 8.4 MG/DL (ref 8.3–10.4)
CALCIUM SERPL-MCNC: 8.5 MG/DL (ref 8.3–10.4)
CALCIUM SERPL-MCNC: 8.5 MG/DL (ref 8.3–10.4)
CALCIUM SERPL-MCNC: 8.6 MG/DL (ref 8.3–10.4)
CALCIUM SERPL-MCNC: 8.7 MG/DL (ref 8.3–10.4)
CALCIUM SERPL-MCNC: 8.9 MG/DL (ref 8.3–10.4)
CALCIUM SERPL-MCNC: 9 MG/DL (ref 8.3–10.4)
CALCIUM SERPL-MCNC: 9 MG/DL (ref 8.3–10.4)
CALCIUM SERPL-MCNC: 9.1 MG/DL (ref 8.3–10.4)
CALCIUM SERPL-MCNC: 9.4 MG/DL (ref 8.3–10.4)
CALCIUM SERPL-MCNC: 9.4 MG/DL (ref 8.3–10.4)
CALCIUM SERPL-MCNC: 9.5 MG/DL (ref 8.3–10.4)
CASTS URNS QL MICRO: ABNORMAL /LPF
CEA SERPL-MCNC: 5370.5 NG/ML (ref 0–3)
CEA SERPL-MCNC: 8290.9 NG/ML (ref 0–3)
CHLORIDE SERPL-SCNC: 101 MMOL/L (ref 98–107)
CHLORIDE SERPL-SCNC: 102 MMOL/L (ref 98–107)
CHLORIDE SERPL-SCNC: 103 MMOL/L (ref 98–107)
CHLORIDE SERPL-SCNC: 104 MMOL/L (ref 98–107)
CHLORIDE SERPL-SCNC: 105 MMOL/L (ref 98–107)
CHLORIDE SERPL-SCNC: 106 MMOL/L (ref 98–107)
CHLORIDE SERPL-SCNC: 107 MMOL/L (ref 98–107)
CHLORIDE SERPL-SCNC: 108 MMOL/L (ref 98–107)
CHLORIDE SERPL-SCNC: 108 MMOL/L (ref 98–107)
CHLORIDE SERPL-SCNC: 109 MMOL/L (ref 98–107)
CHLORIDE SERPL-SCNC: 110 MMOL/L (ref 98–107)
CHLORIDE SERPL-SCNC: 98 MMOL/L (ref 98–107)
CO2 SERPL-SCNC: 20 MMOL/L (ref 21–32)
CO2 SERPL-SCNC: 22 MMOL/L (ref 21–32)
CO2 SERPL-SCNC: 23 MMOL/L (ref 21–32)
CO2 SERPL-SCNC: 24 MMOL/L (ref 21–32)
CO2 SERPL-SCNC: 25 MMOL/L (ref 21–32)
CO2 SERPL-SCNC: 26 MMOL/L (ref 21–32)
CO2 SERPL-SCNC: 26 MMOL/L (ref 21–32)
CO2 SERPL-SCNC: 27 MMOL/L (ref 21–32)
CO2 SERPL-SCNC: 28 MMOL/L (ref 21–32)
CO2 SERPL-SCNC: 28 MMOL/L (ref 21–32)
COLOR UR: ABNORMAL
CREAT SERPL-MCNC: 1 MG/DL (ref 0.8–1.5)
CREAT SERPL-MCNC: 1.1 MG/DL (ref 0.8–1.5)
CREAT SERPL-MCNC: 1.2 MG/DL (ref 0.8–1.5)
CREAT SERPL-MCNC: 1.3 MG/DL (ref 0.8–1.5)
CREAT SERPL-MCNC: 1.39 MG/DL (ref 0.8–1.5)
CREAT SERPL-MCNC: 1.4 MG/DL (ref 0.8–1.5)
CREAT SERPL-MCNC: 1.5 MG/DL (ref 0.8–1.5)
CREAT SERPL-MCNC: 1.5 MG/DL (ref 0.8–1.5)
CREAT SERPL-MCNC: 1.58 MG/DL (ref 0.8–1.5)
CREAT SERPL-MCNC: 1.6 MG/DL (ref 0.8–1.5)
CREAT SERPL-MCNC: 1.6 MG/DL (ref 0.8–1.5)
CROSSMATCH RESULT: NORMAL
D DIMER PPP FEU-MCNC: >20 UG/ML(FEU)
DIFFERENTIAL METHOD BLD: ABNORMAL
EOSINOPHIL # BLD: 0 K/UL (ref 0–0.8)
EOSINOPHIL # BLD: 0.1 K/UL (ref 0–0.8)
EOSINOPHIL NFR BLD: 0 % (ref 0.5–7.8)
EOSINOPHIL NFR BLD: 1 % (ref 0.5–7.8)
EPI CELLS #/AREA URNS HPF: ABNORMAL /HPF
ERYTHROCYTE [DISTWIDTH] IN BLOOD BY AUTOMATED COUNT: 16.7 % (ref 11.9–14.6)
ERYTHROCYTE [DISTWIDTH] IN BLOOD BY AUTOMATED COUNT: 17.8 % (ref 11.9–14.6)
ERYTHROCYTE [DISTWIDTH] IN BLOOD BY AUTOMATED COUNT: 18.2 % (ref 11.9–14.6)
ERYTHROCYTE [DISTWIDTH] IN BLOOD BY AUTOMATED COUNT: 18.4 % (ref 11.9–14.6)
ERYTHROCYTE [DISTWIDTH] IN BLOOD BY AUTOMATED COUNT: 18.7 % (ref 11.9–14.6)
ERYTHROCYTE [DISTWIDTH] IN BLOOD BY AUTOMATED COUNT: 19.1 % (ref 11.9–14.6)
ERYTHROCYTE [DISTWIDTH] IN BLOOD BY AUTOMATED COUNT: 20 % (ref 11.9–14.6)
ERYTHROCYTE [DISTWIDTH] IN BLOOD BY AUTOMATED COUNT: 20.2 % (ref 11.9–14.6)
ERYTHROCYTE [DISTWIDTH] IN BLOOD BY AUTOMATED COUNT: 20.4 % (ref 11.9–14.6)
ERYTHROCYTE [DISTWIDTH] IN BLOOD BY AUTOMATED COUNT: 20.5 % (ref 11.9–14.6)
ERYTHROCYTE [DISTWIDTH] IN BLOOD BY AUTOMATED COUNT: 20.6 % (ref 11.9–14.6)
ERYTHROCYTE [DISTWIDTH] IN BLOOD BY AUTOMATED COUNT: 20.7 % (ref 11.9–14.6)
ERYTHROCYTE [DISTWIDTH] IN BLOOD BY AUTOMATED COUNT: 20.8 % (ref 11.9–14.6)
ERYTHROCYTE [DISTWIDTH] IN BLOOD BY AUTOMATED COUNT: 20.9 % (ref 11.9–14.6)
ERYTHROCYTE [DISTWIDTH] IN BLOOD BY AUTOMATED COUNT: 21 % (ref 11.9–14.6)
ERYTHROCYTE [DISTWIDTH] IN BLOOD BY AUTOMATED COUNT: 21 % (ref 11.9–14.6)
ERYTHROCYTE [DISTWIDTH] IN BLOOD BY AUTOMATED COUNT: 21.1 % (ref 11.9–14.6)
ERYTHROCYTE [DISTWIDTH] IN BLOOD BY AUTOMATED COUNT: 21.2 % (ref 11.9–14.6)
ERYTHROCYTE [DISTWIDTH] IN BLOOD BY AUTOMATED COUNT: 21.4 % (ref 11.9–14.6)
ERYTHROCYTE [DISTWIDTH] IN BLOOD BY AUTOMATED COUNT: 21.5 % (ref 11.9–14.6)
ERYTHROCYTE [DISTWIDTH] IN BLOOD BY AUTOMATED COUNT: 21.6 % (ref 11.9–14.6)
ERYTHROCYTE [DISTWIDTH] IN BLOOD BY AUTOMATED COUNT: 21.9 % (ref 11.9–14.6)
ERYTHROCYTE [DISTWIDTH] IN BLOOD BY AUTOMATED COUNT: 22 % (ref 11.9–14.6)
ERYTHROCYTE [DISTWIDTH] IN BLOOD BY AUTOMATED COUNT: 22.1 % (ref 11.9–14.6)
ERYTHROCYTE [DISTWIDTH] IN BLOOD BY AUTOMATED COUNT: 22.2 % (ref 11.9–14.6)
ERYTHROCYTE [DISTWIDTH] IN BLOOD BY AUTOMATED COUNT: 22.2 % (ref 11.9–14.6)
ERYTHROCYTE [DISTWIDTH] IN BLOOD BY AUTOMATED COUNT: 22.3 % (ref 11.9–14.6)
ERYTHROCYTE [DISTWIDTH] IN BLOOD BY AUTOMATED COUNT: 22.4 % (ref 11.9–14.6)
ERYTHROCYTE [DISTWIDTH] IN BLOOD BY AUTOMATED COUNT: 22.4 % (ref 11.9–14.6)
FIBRINOGEN PPP-MCNC: 418 MG/DL (ref 190–501)
GLOBULIN SER CALC-MCNC: 2.4 G/DL (ref 2.3–3.5)
GLOBULIN SER CALC-MCNC: 2.9 G/DL (ref 2.3–3.5)
GLOBULIN SER CALC-MCNC: 2.9 G/DL (ref 2.3–3.5)
GLOBULIN SER CALC-MCNC: 3.1 G/DL (ref 2.3–3.5)
GLOBULIN SER CALC-MCNC: 3.1 G/DL (ref 2.3–3.5)
GLOBULIN SER CALC-MCNC: 3.2 G/DL (ref 2.3–3.5)
GLOBULIN SER CALC-MCNC: 3.3 G/DL (ref 2.3–3.5)
GLOBULIN SER CALC-MCNC: 3.4 G/DL (ref 2.3–3.5)
GLOBULIN SER CALC-MCNC: 3.4 G/DL (ref 2.3–3.5)
GLOBULIN SER CALC-MCNC: 3.5 G/DL (ref 2.3–3.5)
GLOBULIN SER CALC-MCNC: 3.5 G/DL (ref 2.3–3.5)
GLOBULIN SER CALC-MCNC: 3.6 G/DL (ref 2.3–3.5)
GLOBULIN SER CALC-MCNC: 3.7 G/DL (ref 2.3–3.5)
GLOBULIN SER CALC-MCNC: 3.8 G/DL (ref 2.3–3.5)
GLOBULIN SER CALC-MCNC: 3.9 G/DL (ref 2.3–3.5)
GLOBULIN SER CALC-MCNC: 4 G/DL (ref 2.3–3.5)
GLUCOSE SERPL-MCNC: 101 MG/DL (ref 65–100)
GLUCOSE SERPL-MCNC: 102 MG/DL (ref 65–100)
GLUCOSE SERPL-MCNC: 102 MG/DL (ref 65–100)
GLUCOSE SERPL-MCNC: 104 MG/DL (ref 65–100)
GLUCOSE SERPL-MCNC: 106 MG/DL (ref 65–100)
GLUCOSE SERPL-MCNC: 107 MG/DL (ref 65–100)
GLUCOSE SERPL-MCNC: 108 MG/DL (ref 65–100)
GLUCOSE SERPL-MCNC: 109 MG/DL (ref 65–100)
GLUCOSE SERPL-MCNC: 109 MG/DL (ref 65–100)
GLUCOSE SERPL-MCNC: 110 MG/DL (ref 65–100)
GLUCOSE SERPL-MCNC: 111 MG/DL (ref 65–100)
GLUCOSE SERPL-MCNC: 111 MG/DL (ref 65–100)
GLUCOSE SERPL-MCNC: 112 MG/DL (ref 65–100)
GLUCOSE SERPL-MCNC: 113 MG/DL (ref 65–100)
GLUCOSE SERPL-MCNC: 113 MG/DL (ref 65–100)
GLUCOSE SERPL-MCNC: 114 MG/DL (ref 65–100)
GLUCOSE SERPL-MCNC: 115 MG/DL (ref 65–100)
GLUCOSE SERPL-MCNC: 117 MG/DL (ref 65–100)
GLUCOSE SERPL-MCNC: 118 MG/DL (ref 65–100)
GLUCOSE SERPL-MCNC: 119 MG/DL (ref 65–100)
GLUCOSE SERPL-MCNC: 122 MG/DL (ref 65–100)
GLUCOSE SERPL-MCNC: 124 MG/DL (ref 65–100)
GLUCOSE SERPL-MCNC: 136 MG/DL (ref 65–100)
GLUCOSE SERPL-MCNC: 154 MG/DL (ref 65–100)
GLUCOSE SERPL-MCNC: 85 MG/DL (ref 65–100)
GLUCOSE SERPL-MCNC: 87 MG/DL (ref 65–100)
GLUCOSE SERPL-MCNC: 92 MG/DL (ref 65–100)
GLUCOSE SERPL-MCNC: 94 MG/DL (ref 65–100)
GLUCOSE SERPL-MCNC: 95 MG/DL (ref 65–100)
GLUCOSE SERPL-MCNC: 97 MG/DL (ref 65–100)
GLUCOSE UR STRIP.AUTO-MCNC: NEGATIVE MG/DL
HAPTOGLOB SERPL-MCNC: 163 MG/DL (ref 30–200)
HAPTOGLOB SERPL-MCNC: 31 MG/DL (ref 30–200)
HCT VFR BLD AUTO: 19.5 % (ref 41.1–50.3)
HCT VFR BLD AUTO: 19.6 % (ref 41.1–50.3)
HCT VFR BLD AUTO: 21.3 % (ref 41.1–50.3)
HCT VFR BLD AUTO: 21.9 % (ref 41.1–50.3)
HCT VFR BLD AUTO: 21.9 % (ref 41.1–50.3)
HCT VFR BLD AUTO: 22.2 % (ref 41.1–50.3)
HCT VFR BLD AUTO: 22.8 % (ref 41.1–50.3)
HCT VFR BLD AUTO: 22.9 % (ref 41.1–50.3)
HCT VFR BLD AUTO: 23 % (ref 41.1–50.3)
HCT VFR BLD AUTO: 23.1 % (ref 41.1–50.3)
HCT VFR BLD AUTO: 23.1 % (ref 41.1–50.3)
HCT VFR BLD AUTO: 23.2 % (ref 41.1–50.3)
HCT VFR BLD AUTO: 23.3 % (ref 41.1–50.3)
HCT VFR BLD AUTO: 23.4 % (ref 41.1–50.3)
HCT VFR BLD AUTO: 23.5 % (ref 41.1–50.3)
HCT VFR BLD AUTO: 23.6 % (ref 41.1–50.3)
HCT VFR BLD AUTO: 23.7 % (ref 41.1–50.3)
HCT VFR BLD AUTO: 23.9 % (ref 41.1–50.3)
HCT VFR BLD AUTO: 24.1 % (ref 41.1–50.3)
HCT VFR BLD AUTO: 24.2 % (ref 41.1–50.3)
HCT VFR BLD AUTO: 24.4 % (ref 41.1–50.3)
HCT VFR BLD AUTO: 24.5 % (ref 41.1–50.3)
HCT VFR BLD AUTO: 24.5 % (ref 41.1–50.3)
HCT VFR BLD AUTO: 25 % (ref 41.1–50.3)
HCT VFR BLD AUTO: 25.1 % (ref 41.1–50.3)
HCT VFR BLD AUTO: 25.2 % (ref 41.1–50.3)
HCT VFR BLD AUTO: 25.6 % (ref 41.1–50.3)
HCT VFR BLD AUTO: 25.7 % (ref 41.1–50.3)
HCT VFR BLD AUTO: 25.8 % (ref 41.1–50.3)
HCT VFR BLD AUTO: 26.2 % (ref 41.1–50.3)
HCT VFR BLD AUTO: 26.3 % (ref 41.1–50.3)
HCT VFR BLD AUTO: 26.4 % (ref 41.1–50.3)
HCT VFR BLD AUTO: 26.4 % (ref 41.1–50.3)
HCT VFR BLD AUTO: 26.6 %
HCT VFR BLD AUTO: 26.7 % (ref 41.1–50.3)
HCT VFR BLD AUTO: 27 % (ref 41.1–50.3)
HCT VFR BLD AUTO: 27.5 % (ref 41.1–50.3)
HCT VFR BLD AUTO: 27.8 %
HCT VFR BLD AUTO: 28.1 % (ref 41.1–50.3)
HCT VFR BLD AUTO: 28.3 % (ref 41.1–50.3)
HCT VFR BLD AUTO: 28.6 % (ref 41.1–50.3)
HCT VFR BLD AUTO: 29.5 % (ref 41.1–50.3)
HEPARIN INDUCED PLATELET ANTIBODY: NEGATIVE
HGB BLD-MCNC: 6.3 G/DL (ref 13.6–17.2)
HGB BLD-MCNC: 6.4 G/DL (ref 13.6–17.2)
HGB BLD-MCNC: 6.8 G/DL (ref 13.6–17.2)
HGB BLD-MCNC: 6.9 G/DL (ref 13.6–17.2)
HGB BLD-MCNC: 7 G/DL (ref 13.6–17.2)
HGB BLD-MCNC: 7.1 G/DL (ref 13.6–17.2)
HGB BLD-MCNC: 7.1 G/DL (ref 13.6–17.2)
HGB BLD-MCNC: 7.2 G/DL (ref 13.6–17.2)
HGB BLD-MCNC: 7.3 G/DL (ref 13.6–17.2)
HGB BLD-MCNC: 7.4 G/DL (ref 13.6–17.2)
HGB BLD-MCNC: 7.4 G/DL (ref 13.6–17.2)
HGB BLD-MCNC: 7.5 G/DL (ref 13.6–17.2)
HGB BLD-MCNC: 7.5 G/DL (ref 13.6–17.2)
HGB BLD-MCNC: 7.6 G/DL (ref 13.6–17.2)
HGB BLD-MCNC: 7.7 G/DL (ref 13.6–17.2)
HGB BLD-MCNC: 7.9 G/DL (ref 13.6–17.2)
HGB BLD-MCNC: 8 G/DL (ref 13.6–17.2)
HGB BLD-MCNC: 8 G/DL (ref 13.6–17.2)
HGB BLD-MCNC: 8.2 G/DL (ref 13.6–17.2)
HGB BLD-MCNC: 8.3 G/DL (ref 13.6–17.2)
HGB BLD-MCNC: 8.4 G/DL (ref 13.6–17.2)
HGB BLD-MCNC: 8.5 G/DL (ref 13.6–17.2)
HGB BLD-MCNC: 8.7 G/DL (ref 13.6–17.2)
HGB BLD-MCNC: 8.9 G/DL (ref 13.6–17.2)
HGB BLD-MCNC: 9.1 G/DL (ref 13.6–17.2)
HGB BLD-MCNC: 9.4 G/DL (ref 13.6–17.2)
HGB RETIC QN AUTO: 38 PG (ref 29–35)
HGB UR QL STRIP: ABNORMAL
HISTORY CHECK: NORMAL
HIT INTERPRETATION: NEGATIVE
HIT OPTICAL DENSITY: 0.09 ABS
HIT PROFILE: NORMAL
IMM GRANULOCYTES # BLD AUTO: 0.1 K/UL (ref 0–0.5)
IMM GRANULOCYTES # BLD AUTO: 0.2 K/UL (ref 0–0.5)
IMM GRANULOCYTES NFR BLD AUTO: 1 % (ref 0–5)
IMM GRANULOCYTES NFR BLD AUTO: 2 % (ref 0–5)
IMM RETICS NFR: 31.4 % (ref 2.3–13.4)
INR PPP: 1
KETONES UR QL STRIP.AUTO: NEGATIVE MG/DL
LDH SERPL L TO P-CCNC: 1799 U/L (ref 110–210)
LDH SERPL L TO P-CCNC: 2276 U/L (ref 110–210)
LEUKOCYTE ESTERASE UR QL STRIP.AUTO: ABNORMAL
LYMPHOCYTES # BLD: 0.3 K/UL (ref 0.5–4.6)
LYMPHOCYTES # BLD: 0.4 K/UL (ref 0.5–4.6)
LYMPHOCYTES # BLD: 0.5 K/UL (ref 0.5–4.6)
LYMPHOCYTES # BLD: 0.6 K/UL (ref 0.5–4.6)
LYMPHOCYTES # BLD: 0.7 K/UL (ref 0.5–4.6)
LYMPHOCYTES # BLD: 0.8 K/UL (ref 0.5–4.6)
LYMPHOCYTES # BLD: 0.9 K/UL (ref 0.5–4.6)
LYMPHOCYTES # BLD: 1.2 K/UL (ref 0.5–4.6)
LYMPHOCYTES # BLD: 1.5 K/UL (ref 0.5–4.6)
LYMPHOCYTES NFR BLD: 10 % (ref 13–44)
LYMPHOCYTES NFR BLD: 10 % (ref 13–44)
LYMPHOCYTES NFR BLD: 11 % (ref 13–44)
LYMPHOCYTES NFR BLD: 12 % (ref 13–44)
LYMPHOCYTES NFR BLD: 18 % (ref 13–44)
LYMPHOCYTES NFR BLD: 21 % (ref 13–44)
LYMPHOCYTES NFR BLD: 4 % (ref 13–44)
LYMPHOCYTES NFR BLD: 5 % (ref 13–44)
LYMPHOCYTES NFR BLD: 6 % (ref 13–44)
LYMPHOCYTES NFR BLD: 7 % (ref 13–44)
LYMPHOCYTES NFR BLD: 8 % (ref 13–44)
LYMPHOCYTES NFR BLD: 9 % (ref 13–44)
MAGNESIUM SERPL-MCNC: 1.2 MG/DL (ref 1.8–2.4)
MAGNESIUM SERPL-MCNC: 1.2 MG/DL (ref 1.8–2.4)
MAGNESIUM SERPL-MCNC: 1.4 MG/DL (ref 1.8–2.4)
MAGNESIUM SERPL-MCNC: 1.5 MG/DL (ref 1.8–2.4)
MAGNESIUM SERPL-MCNC: 1.6 MG/DL (ref 1.8–2.4)
MAGNESIUM SERPL-MCNC: 1.6 MG/DL (ref 1.8–2.4)
MAGNESIUM SERPL-MCNC: 1.7 MG/DL (ref 1.8–2.4)
MAGNESIUM SERPL-MCNC: 1.9 MG/DL (ref 1.8–2.4)
MAGNESIUM SERPL-MCNC: 2 MG/DL (ref 1.8–2.4)
MAGNESIUM SERPL-MCNC: 2.1 MG/DL (ref 1.8–2.4)
MAGNESIUM SERPL-MCNC: 2.2 MG/DL (ref 1.8–2.4)
MAGNESIUM SERPL-MCNC: 2.3 MG/DL (ref 1.8–2.4)
MAGNESIUM SERPL-MCNC: 2.4 MG/DL (ref 1.8–2.4)
MCH RBC QN AUTO: 31.1 PG (ref 26.1–32.9)
MCH RBC QN AUTO: 31.3 PG (ref 26.1–32.9)
MCH RBC QN AUTO: 31.5 PG (ref 26.1–32.9)
MCH RBC QN AUTO: 31.7 PG (ref 26.1–32.9)
MCH RBC QN AUTO: 31.7 PG (ref 26.1–32.9)
MCH RBC QN AUTO: 31.9 PG (ref 26.1–32.9)
MCH RBC QN AUTO: 32 PG (ref 26.1–32.9)
MCH RBC QN AUTO: 32 PG (ref 26.1–32.9)
MCH RBC QN AUTO: 32.1 PG (ref 26.1–32.9)
MCH RBC QN AUTO: 32.2 PG (ref 26.1–32.9)
MCH RBC QN AUTO: 32.2 PG (ref 26.1–32.9)
MCH RBC QN AUTO: 32.3 PG (ref 26.1–32.9)
MCH RBC QN AUTO: 32.4 PG (ref 26.1–32.9)
MCH RBC QN AUTO: 32.4 PG (ref 26.1–32.9)
MCH RBC QN AUTO: 32.5 PG (ref 26.1–32.9)
MCH RBC QN AUTO: 32.6 PG (ref 26.1–32.9)
MCH RBC QN AUTO: 32.7 PG (ref 26.1–32.9)
MCH RBC QN AUTO: 32.8 PG (ref 26.1–32.9)
MCH RBC QN AUTO: 32.9 PG (ref 26.1–32.9)
MCH RBC QN AUTO: 33 PG (ref 26.1–32.9)
MCH RBC QN AUTO: 33.2 PG (ref 26.1–32.9)
MCH RBC QN AUTO: 33.2 PG (ref 26.1–32.9)
MCH RBC QN AUTO: 33.3 PG (ref 26.1–32.9)
MCH RBC QN AUTO: 33.5 PG (ref 26.1–32.9)
MCH RBC QN AUTO: 33.5 PG (ref 26.1–32.9)
MCH RBC QN AUTO: 33.6 PG (ref 26.1–32.9)
MCH RBC QN AUTO: 33.6 PG (ref 26.1–32.9)
MCH RBC QN AUTO: 33.7 PG (ref 26.1–32.9)
MCH RBC QN AUTO: 33.7 PG (ref 26.1–32.9)
MCH RBC QN AUTO: 34.4 PG (ref 26.1–32.9)
MCH RBC QN AUTO: 34.7 PG (ref 26.1–32.9)
MCH RBC QN AUTO: 35.4 PG (ref 26.1–32.9)
MCHC RBC AUTO-ENTMCNC: 30.3 G/DL (ref 31.4–35)
MCHC RBC AUTO-ENTMCNC: 30.4 G/DL (ref 31.4–35)
MCHC RBC AUTO-ENTMCNC: 30.6 G/DL (ref 31.4–35)
MCHC RBC AUTO-ENTMCNC: 30.6 G/DL (ref 31.4–35)
MCHC RBC AUTO-ENTMCNC: 30.7 G/DL (ref 31.4–35)
MCHC RBC AUTO-ENTMCNC: 30.8 G/DL (ref 31.4–35)
MCHC RBC AUTO-ENTMCNC: 30.9 G/DL (ref 31.4–35)
MCHC RBC AUTO-ENTMCNC: 31 G/DL (ref 31.4–35)
MCHC RBC AUTO-ENTMCNC: 31 G/DL (ref 31.4–35)
MCHC RBC AUTO-ENTMCNC: 31.1 G/DL (ref 31.4–35)
MCHC RBC AUTO-ENTMCNC: 31.2 G/DL (ref 31.4–35)
MCHC RBC AUTO-ENTMCNC: 31.3 G/DL (ref 31.4–35)
MCHC RBC AUTO-ENTMCNC: 31.4 G/DL (ref 31.4–35)
MCHC RBC AUTO-ENTMCNC: 31.6 G/DL (ref 31.4–35)
MCHC RBC AUTO-ENTMCNC: 31.6 G/DL (ref 31.4–35)
MCHC RBC AUTO-ENTMCNC: 31.8 G/DL (ref 31.4–35)
MCHC RBC AUTO-ENTMCNC: 31.9 G/DL (ref 31.4–35)
MCHC RBC AUTO-ENTMCNC: 32.3 G/DL (ref 31.4–35)
MCHC RBC AUTO-ENTMCNC: 32.4 G/DL (ref 31.4–35)
MCHC RBC AUTO-ENTMCNC: 32.7 G/DL (ref 31.4–35)
MCHC RBC AUTO-ENTMCNC: 32.9 G/DL (ref 31.4–35)
MCHC RBC AUTO-ENTMCNC: 32.9 G/DL (ref 31.4–35)
MCHC RBC AUTO-ENTMCNC: 33.2 G/DL (ref 31.4–35)
MCV RBC AUTO: 101.3 FL (ref 79.6–97.8)
MCV RBC AUTO: 101.6 FL (ref 79.6–97.8)
MCV RBC AUTO: 101.9 FL (ref 79.6–97.8)
MCV RBC AUTO: 102 FL (ref 79.6–97.8)
MCV RBC AUTO: 102.1 FL (ref 79.6–97.8)
MCV RBC AUTO: 102.5 FL (ref 79.6–97.8)
MCV RBC AUTO: 102.6 FL (ref 79.6–97.8)
MCV RBC AUTO: 102.8 FL (ref 79.6–97.8)
MCV RBC AUTO: 102.9 FL (ref 79.6–97.8)
MCV RBC AUTO: 103 FL (ref 79.6–97.8)
MCV RBC AUTO: 103 FL (ref 79.6–97.8)
MCV RBC AUTO: 103.4 FL (ref 79.6–97.8)
MCV RBC AUTO: 103.5 FL (ref 79.6–97.8)
MCV RBC AUTO: 103.6 FL (ref 79.6–97.8)
MCV RBC AUTO: 103.6 FL (ref 79.6–97.8)
MCV RBC AUTO: 103.7 FL (ref 79.6–97.8)
MCV RBC AUTO: 103.8 FL (ref 79.6–97.8)
MCV RBC AUTO: 103.8 FL (ref 79.6–97.8)
MCV RBC AUTO: 104 FL (ref 79.6–97.8)
MCV RBC AUTO: 104.1 FL (ref 79.6–97.8)
MCV RBC AUTO: 104.4 FL (ref 79.6–97.8)
MCV RBC AUTO: 104.8 FL (ref 79.6–97.8)
MCV RBC AUTO: 105.2 FL (ref 79.6–97.8)
MCV RBC AUTO: 105.4 FL (ref 79.6–97.8)
MCV RBC AUTO: 105.4 FL (ref 79.6–97.8)
MCV RBC AUTO: 105.9 FL (ref 79.6–97.8)
MCV RBC AUTO: 106 FL (ref 79.6–97.8)
MCV RBC AUTO: 106.5 FL (ref 79.6–97.8)
MCV RBC AUTO: 106.5 FL (ref 79.6–97.8)
MCV RBC AUTO: 106.8 FL (ref 79.6–97.8)
MCV RBC AUTO: 107.3 FL (ref 79.6–97.8)
MCV RBC AUTO: 109.6 FL (ref 79.6–97.8)
MCV RBC AUTO: 96 FL (ref 79.6–97.8)
MCV RBC AUTO: 98.3 FL (ref 79.6–97.8)
MCV RBC AUTO: 99.6 FL (ref 79.6–97.8)
MM INDURATION, POC: 0 MM (ref 0–5)
MM INDURATION, POC: 0 MM (ref 0–5)
MONOCYTES # BLD: 0.5 K/UL (ref 0.1–1.3)
MONOCYTES # BLD: 0.5 K/UL (ref 0.1–1.3)
MONOCYTES # BLD: 0.6 K/UL (ref 0.1–1.3)
MONOCYTES # BLD: 0.7 K/UL (ref 0.1–1.3)
MONOCYTES # BLD: 0.8 K/UL (ref 0.1–1.3)
MONOCYTES # BLD: 0.9 K/UL (ref 0.1–1.3)
MONOCYTES # BLD: 1 K/UL (ref 0.1–1.3)
MONOCYTES # BLD: 1.1 K/UL (ref 0.1–1.3)
MONOCYTES # BLD: 1.1 K/UL (ref 0.1–1.3)
MONOCYTES NFR BLD: 10 % (ref 4–12)
MONOCYTES NFR BLD: 11 % (ref 4–12)
MONOCYTES NFR BLD: 14 % (ref 4–12)
MONOCYTES NFR BLD: 16 % (ref 4–12)
MONOCYTES NFR BLD: 5 % (ref 4–12)
MONOCYTES NFR BLD: 6 % (ref 4–12)
MONOCYTES NFR BLD: 7 % (ref 4–12)
MONOCYTES NFR BLD: 8 % (ref 4–12)
MONOCYTES NFR BLD: 9 % (ref 4–12)
MUCOUS THREADS URNS QL MICRO: ABNORMAL /LPF
NEUTS SEG # BLD: 10.3 K/UL (ref 1.7–8.2)
NEUTS SEG # BLD: 4.2 K/UL (ref 1.7–8.2)
NEUTS SEG # BLD: 4.3 K/UL (ref 1.7–8.2)
NEUTS SEG # BLD: 4.5 K/UL (ref 1.7–8.2)
NEUTS SEG # BLD: 4.6 K/UL (ref 1.7–8.2)
NEUTS SEG # BLD: 4.8 K/UL (ref 1.7–8.2)
NEUTS SEG # BLD: 5.1 K/UL (ref 1.7–8.2)
NEUTS SEG # BLD: 5.3 K/UL (ref 1.7–8.2)
NEUTS SEG # BLD: 5.3 K/UL (ref 1.7–8.2)
NEUTS SEG # BLD: 5.4 K/UL (ref 1.7–8.2)
NEUTS SEG # BLD: 5.6 K/UL (ref 1.7–8.2)
NEUTS SEG # BLD: 5.7 K/UL (ref 1.7–8.2)
NEUTS SEG # BLD: 6.1 K/UL (ref 1.7–8.2)
NEUTS SEG # BLD: 6.3 K/UL (ref 1.7–8.2)
NEUTS SEG # BLD: 6.8 K/UL (ref 1.7–8.2)
NEUTS SEG # BLD: 7 K/UL (ref 1.7–8.2)
NEUTS SEG # BLD: 7 K/UL (ref 1.7–8.2)
NEUTS SEG # BLD: 7.3 K/UL (ref 1.7–8.2)
NEUTS SEG # BLD: 7.4 K/UL (ref 1.7–8.2)
NEUTS SEG # BLD: 7.6 K/UL (ref 1.7–8.2)
NEUTS SEG # BLD: 7.6 K/UL (ref 1.7–8.2)
NEUTS SEG # BLD: 7.7 K/UL (ref 1.7–8.2)
NEUTS SEG # BLD: 7.9 K/UL (ref 1.7–8.2)
NEUTS SEG # BLD: 7.9 K/UL (ref 1.7–8.2)
NEUTS SEG # BLD: 8 K/UL (ref 1.7–8.2)
NEUTS SEG # BLD: 8.1 K/UL (ref 1.7–8.2)
NEUTS SEG # BLD: 8.2 K/UL (ref 1.7–8.2)
NEUTS SEG # BLD: 8.3 K/UL (ref 1.7–8.2)
NEUTS SEG # BLD: 8.9 K/UL (ref 1.7–8.2)
NEUTS SEG NFR BLD: 60 % (ref 43–78)
NEUTS SEG NFR BLD: 67 % (ref 43–78)
NEUTS SEG NFR BLD: 75 % (ref 43–78)
NEUTS SEG NFR BLD: 76 % (ref 43–78)
NEUTS SEG NFR BLD: 77 % (ref 43–78)
NEUTS SEG NFR BLD: 78 % (ref 43–78)
NEUTS SEG NFR BLD: 79 % (ref 43–78)
NEUTS SEG NFR BLD: 80 % (ref 43–78)
NEUTS SEG NFR BLD: 81 % (ref 43–78)
NEUTS SEG NFR BLD: 81 % (ref 43–78)
NEUTS SEG NFR BLD: 82 % (ref 43–78)
NEUTS SEG NFR BLD: 83 % (ref 43–78)
NEUTS SEG NFR BLD: 84 % (ref 43–78)
NEUTS SEG NFR BLD: 85 % (ref 43–78)
NEUTS SEG NFR BLD: 86 % (ref 43–78)
NEUTS SEG NFR BLD: 88 % (ref 43–78)
NITRITE UR QL STRIP.AUTO: NEGATIVE
NRBC # BLD: 0 K/UL (ref 0–0.2)
NRBC # BLD: 0.02 K/UL (ref 0–0.2)
NRBC # BLD: 0.04 K/UL (ref 0–0.2)
NRBC # BLD: 0.06 K/UL (ref 0–0.2)
NRBC # BLD: 0.07 K/UL (ref 0–0.2)
NRBC # BLD: 0.08 K/UL (ref 0–0.2)
NRBC # BLD: 0.08 K/UL (ref 0–0.2)
NRBC # BLD: 0.09 K/UL (ref 0–0.2)
NRBC # BLD: 0.09 K/UL (ref 0–0.2)
NRBC # BLD: 0.11 K/UL (ref 0–0.2)
NRBC # BLD: 0.12 K/UL (ref 0–0.2)
NRBC # BLD: 0.13 K/UL (ref 0–0.2)
NRBC # BLD: 0.18 K/UL (ref 0–0.2)
NRBC # BLD: 0.21 K/UL (ref 0–0.2)
NRBC # BLD: 0.22 K/UL (ref 0–0.2)
OTHER OBSERVATIONS: ABNORMAL
OTHER OBSERVATIONS: ABNORMAL
PATH REV BLD -IMP: NORMAL
PH UR STRIP: 5.5 [PH] (ref 5–9)
PH UR STRIP: 6 [PH] (ref 5–9)
PH UR STRIP: 6.5 [PH] (ref 5–9)
PH UR STRIP: 8.5 [PH] (ref 5–9)
PHOSPHATE SERPL-MCNC: 2.6 MG/DL (ref 2.3–3.7)
PLATELET # BLD AUTO: 104 K/UL (ref 150–450)
PLATELET # BLD AUTO: 17 K/UL (ref 150–450)
PLATELET # BLD AUTO: 21 K/UL (ref 150–450)
PLATELET # BLD AUTO: 21 K/UL (ref 150–450)
PLATELET # BLD AUTO: 28 K/UL (ref 150–450)
PLATELET # BLD AUTO: 29 K/UL (ref 150–450)
PLATELET # BLD AUTO: 31 K/UL (ref 150–450)
PLATELET # BLD AUTO: 32 K/UL (ref 150–450)
PLATELET # BLD AUTO: 34 K/UL (ref 150–450)
PLATELET # BLD AUTO: 34 K/UL (ref 150–450)
PLATELET # BLD AUTO: 35 K/UL (ref 150–450)
PLATELET # BLD AUTO: 37 K/UL (ref 150–450)
PLATELET # BLD AUTO: 38 K/UL (ref 150–450)
PLATELET # BLD AUTO: 41 K/UL (ref 150–450)
PLATELET # BLD AUTO: 42 K/UL (ref 150–450)
PLATELET # BLD AUTO: 49 K/UL (ref 150–450)
PLATELET # BLD AUTO: 49 K/UL (ref 150–450)
PLATELET # BLD AUTO: 51 K/UL (ref 150–450)
PLATELET # BLD AUTO: 51 K/UL (ref 150–450)
PLATELET # BLD AUTO: 53 K/UL (ref 150–450)
PLATELET # BLD AUTO: 53 K/UL (ref 150–450)
PLATELET # BLD AUTO: 54 K/UL (ref 150–450)
PLATELET # BLD AUTO: 55 K/UL (ref 150–450)
PLATELET # BLD AUTO: 56 K/UL (ref 150–450)
PLATELET # BLD AUTO: 59 K/UL (ref 150–450)
PLATELET # BLD AUTO: 60 K/UL (ref 150–450)
PLATELET # BLD AUTO: 62 K/UL (ref 150–450)
PLATELET # BLD AUTO: 63 K/UL (ref 150–450)
PLATELET # BLD AUTO: 65 K/UL (ref 150–450)
PLATELET # BLD AUTO: 65 K/UL (ref 150–450)
PLATELET # BLD AUTO: 66 K/UL (ref 150–450)
PLATELET # BLD AUTO: 68 K/UL (ref 150–450)
PLATELET # BLD AUTO: 75 K/UL (ref 150–450)
PLATELET # BLD AUTO: 75 K/UL (ref 150–450)
PLATELET # BLD AUTO: 79 K/UL (ref 150–450)
PLATELET # BLD AUTO: 83 K/UL (ref 150–450)
PLATELET # BLD AUTO: 84 K/UL (ref 150–450)
PLATELET # BLD AUTO: 90 K/UL (ref 150–450)
PLATELET # BLD AUTO: 91 K/UL (ref 150–450)
PLATELET COMMENT: ABNORMAL
PMV BLD AUTO: 10 FL (ref 9.4–12.3)
PMV BLD AUTO: 10 FL (ref 9.4–12.3)
PMV BLD AUTO: 10.1 FL (ref 9.4–12.3)
PMV BLD AUTO: 10.1 FL (ref 9.4–12.3)
PMV BLD AUTO: 10.2 FL (ref 9.4–12.3)
PMV BLD AUTO: 10.2 FL (ref 9.4–12.3)
PMV BLD AUTO: 10.3 FL (ref 9.4–12.3)
PMV BLD AUTO: 10.4 FL (ref 9.4–12.3)
PMV BLD AUTO: 10.5 FL (ref 9.4–12.3)
PMV BLD AUTO: 10.6 FL (ref 9.4–12.3)
PMV BLD AUTO: 10.7 FL (ref 9.4–12.3)
PMV BLD AUTO: 10.7 FL (ref 9.4–12.3)
PMV BLD AUTO: 10.8 FL (ref 9.4–12.3)
PMV BLD AUTO: 10.8 FL (ref 9.4–12.3)
PMV BLD AUTO: 10.9 FL (ref 9.4–12.3)
PMV BLD AUTO: 11 FL (ref 9.4–12.3)
PMV BLD AUTO: 11 FL (ref 9.4–12.3)
PMV BLD AUTO: 11.1 FL (ref 9.4–12.3)
PMV BLD AUTO: 11.1 FL (ref 9.4–12.3)
PMV BLD AUTO: 11.2 FL (ref 9.4–12.3)
PMV BLD AUTO: 11.4 FL (ref 9.4–12.3)
PMV BLD AUTO: 11.5 FL (ref 9.4–12.3)
PMV BLD AUTO: 11.7 FL (ref 9.4–12.3)
PMV BLD AUTO: 11.8 FL (ref 9.4–12.3)
PMV BLD AUTO: 12 FL (ref 9.4–12.3)
PMV BLD AUTO: 12 FL (ref 9.4–12.3)
PMV BLD AUTO: 12.5 FL (ref 9.4–12.3)
PMV BLD AUTO: 8.8 FL (ref 9.4–12.3)
PMV BLD AUTO: 9.4 FL (ref 9.4–12.3)
PMV BLD AUTO: 9.8 FL (ref 9.4–12.3)
POTASSIUM SERPL-SCNC: 3.1 MMOL/L (ref 3.5–5.1)
POTASSIUM SERPL-SCNC: 3.6 MMOL/L (ref 3.5–5.1)
POTASSIUM SERPL-SCNC: 3.6 MMOL/L (ref 3.5–5.1)
POTASSIUM SERPL-SCNC: 3.7 MMOL/L (ref 3.5–5.1)
POTASSIUM SERPL-SCNC: 3.7 MMOL/L (ref 3.5–5.1)
POTASSIUM SERPL-SCNC: 3.8 MMOL/L (ref 3.5–5.1)
POTASSIUM SERPL-SCNC: 3.9 MMOL/L (ref 3.5–5.1)
POTASSIUM SERPL-SCNC: 4 MMOL/L (ref 3.5–5.1)
POTASSIUM SERPL-SCNC: 4.1 MMOL/L (ref 3.5–5.1)
POTASSIUM SERPL-SCNC: 4.2 MMOL/L (ref 3.5–5.1)
POTASSIUM SERPL-SCNC: 4.3 MMOL/L (ref 3.5–5.1)
POTASSIUM SERPL-SCNC: 4.4 MMOL/L (ref 3.5–5.1)
POTASSIUM SERPL-SCNC: 4.5 MMOL/L (ref 3.5–5.1)
POTASSIUM SERPL-SCNC: 4.7 MMOL/L (ref 3.5–5.1)
PPD, POC: NEGATIVE
PPD, POC: NEGATIVE
PROT SERPL-MCNC: 5.8 G/DL (ref 6.3–8.2)
PROT SERPL-MCNC: 5.8 G/DL (ref 6.3–8.2)
PROT SERPL-MCNC: 5.9 G/DL (ref 6.3–8.2)
PROT SERPL-MCNC: 5.9 G/DL (ref 6.3–8.2)
PROT SERPL-MCNC: 6 G/DL (ref 6.3–8.2)
PROT SERPL-MCNC: 6.1 G/DL (ref 6.3–8.2)
PROT SERPL-MCNC: 6.2 G/DL (ref 6.3–8.2)
PROT SERPL-MCNC: 6.3 G/DL (ref 6.3–8.2)
PROT SERPL-MCNC: 6.4 G/DL (ref 6.3–8.2)
PROT SERPL-MCNC: 6.4 G/DL (ref 6.4–8.2)
PROT SERPL-MCNC: 6.5 G/DL (ref 6.3–8.2)
PROT SERPL-MCNC: 6.6 G/DL (ref 6.3–8.2)
PROT SERPL-MCNC: 6.7 G/DL (ref 6.3–8.2)
PROT SERPL-MCNC: 6.8 G/DL (ref 6.3–8.2)
PROT SERPL-MCNC: 7 G/DL (ref 6.3–8.2)
PROT SERPL-MCNC: 7.1 G/DL (ref 6.3–8.2)
PROT SERPL-MCNC: 7.1 G/DL (ref 6.3–8.2)
PROT UR STRIP-MCNC: 100 MG/DL
PROT UR STRIP-MCNC: 100 MG/DL
PROT UR STRIP-MCNC: 30 MG/DL
PROT UR STRIP-MCNC: 30 MG/DL
PROTHROMBIN TIME: 13.3 SEC (ref 12.6–14.5)
PSA SERPL-MCNC: 7.4 NG/ML
PSA SERPL-MCNC: 8 NG/ML
RBC # BLD AUTO: 1.78 M/UL (ref 4.23–5.6)
RBC # BLD AUTO: 1.86 M/UL (ref 4.23–5.6)
RBC # BLD AUTO: 2.05 M/UL (ref 4.23–5.6)
RBC # BLD AUTO: 2.09 M/UL (ref 4.23–5.6)
RBC # BLD AUTO: 2.14 M/UL (ref 4.23–5.6)
RBC # BLD AUTO: 2.15 M/UL (ref 4.23–5.6)
RBC # BLD AUTO: 2.16 M/UL (ref 4.23–5.6)
RBC # BLD AUTO: 2.17 M/UL (ref 4.23–5.6)
RBC # BLD AUTO: 2.17 M/UL (ref 4.23–5.6)
RBC # BLD AUTO: 2.18 M/UL (ref 4.23–5.6)
RBC # BLD AUTO: 2.19 M/UL (ref 4.23–5.6)
RBC # BLD AUTO: 2.24 M/UL (ref 4.23–5.6)
RBC # BLD AUTO: 2.25 M/UL (ref 4.23–5.6)
RBC # BLD AUTO: 2.26 M/UL (ref 4.23–5.6)
RBC # BLD AUTO: 2.27 M/UL (ref 4.23–5.6)
RBC # BLD AUTO: 2.28 M/UL (ref 4.23–5.6)
RBC # BLD AUTO: 2.29 M/UL (ref 4.23–5.6)
RBC # BLD AUTO: 2.35 M/UL (ref 4.23–5.6)
RBC # BLD AUTO: 2.35 M/UL (ref 4.23–5.6)
RBC # BLD AUTO: 2.37 M/UL (ref 4.23–5.6)
RBC # BLD AUTO: 2.4 M/UL (ref 4.23–5.6)
RBC # BLD AUTO: 2.41 M/UL (ref 4.23–5.6)
RBC # BLD AUTO: 2.42 M/UL (ref 4.23–5.6)
RBC # BLD AUTO: 2.44 M/UL (ref 4.23–5.6)
RBC # BLD AUTO: 2.49 M/UL (ref 4.23–5.6)
RBC # BLD AUTO: 2.51 M/UL (ref 4.23–5.6)
RBC # BLD AUTO: 2.51 M/UL (ref 4.23–5.6)
RBC # BLD AUTO: 2.53 M/UL (ref 4.23–5.6)
RBC # BLD AUTO: 2.53 M/UL (ref 4.23–5.6)
RBC # BLD AUTO: 2.55 M/UL (ref 4.23–5.6)
RBC # BLD AUTO: 2.59 M/UL (ref 4.23–5.6)
RBC # BLD AUTO: 2.6 M/UL (ref 4.23–5.6)
RBC # BLD AUTO: 2.68 M/UL (ref 4.23–5.6)
RBC # BLD AUTO: 2.7 M/UL (ref 4.23–5.6)
RBC # BLD AUTO: 2.7 M/UL (ref 4.23–5.6)
RBC # BLD AUTO: 2.73 M/UL (ref 4.23–5.6)
RBC # BLD AUTO: 2.76 M/UL (ref 4.23–5.6)
RBC # BLD AUTO: 2.82 M/UL (ref 4.23–5.6)
RBC # BLD AUTO: 3 M/UL (ref 4.23–5.6)
RBC #/AREA URNS HPF: >100 /HPF
RBC #/AREA URNS HPF: ABNORMAL /HPF
RBC MORPH BLD: ABNORMAL
RETICS # AUTO: 0.1 M/UL (ref 0.03–0.1)
RETICS/RBC NFR AUTO: 3.9 % (ref 0.3–2)
SARS-COV-2 RDRP RESP QL NAA+PROBE: NOT DETECTED
SERVICE CMNT-IMP: NORMAL
SODIUM SERPL-SCNC: 132 MMOL/L (ref 136–145)
SODIUM SERPL-SCNC: 133 MMOL/L (ref 136–145)
SODIUM SERPL-SCNC: 133 MMOL/L (ref 136–145)
SODIUM SERPL-SCNC: 133 MMOL/L (ref 138–145)
SODIUM SERPL-SCNC: 134 MMOL/L (ref 136–145)
SODIUM SERPL-SCNC: 134 MMOL/L (ref 136–145)
SODIUM SERPL-SCNC: 134 MMOL/L (ref 138–145)
SODIUM SERPL-SCNC: 134 MMOL/L (ref 138–145)
SODIUM SERPL-SCNC: 135 MMOL/L (ref 138–145)
SODIUM SERPL-SCNC: 136 MMOL/L (ref 136–145)
SODIUM SERPL-SCNC: 136 MMOL/L (ref 138–145)
SODIUM SERPL-SCNC: 137 MMOL/L (ref 136–145)
SODIUM SERPL-SCNC: 137 MMOL/L (ref 136–145)
SODIUM SERPL-SCNC: 137 MMOL/L (ref 138–145)
SODIUM SERPL-SCNC: 138 MMOL/L (ref 136–145)
SODIUM SERPL-SCNC: 139 MMOL/L (ref 136–145)
SOURCE: NORMAL
SP GR UR REFRACTOMETRY: 1.01 (ref 1–1.02)
SP GR UR REFRACTOMETRY: 1.02 (ref 1–1.02)
SPECIMEN EXP DATE BLD: NORMAL
UNIT DIVISION: 0
URATE SERPL-MCNC: 12.9 MG/DL (ref 2.6–6)
UROBILINOGEN UR QL STRIP.AUTO: 0.2 EU/DL (ref 0.2–1)
WBC # BLD AUTO: 10.5 K/UL (ref 4.3–11.1)
WBC # BLD AUTO: 12 K/UL (ref 4.3–11.1)
WBC # BLD AUTO: 5.5 K/UL (ref 4.3–11.1)
WBC # BLD AUTO: 5.6 K/UL (ref 4.3–11.1)
WBC # BLD AUTO: 5.8 K/UL (ref 4.3–11.1)
WBC # BLD AUTO: 5.8 K/UL (ref 4.3–11.1)
WBC # BLD AUTO: 6.1 K/UL (ref 4.3–11.1)
WBC # BLD AUTO: 6.4 K/UL (ref 4.3–11.1)
WBC # BLD AUTO: 6.5 K/UL (ref 4.3–11.1)
WBC # BLD AUTO: 6.6 K/UL (ref 4.3–11.1)
WBC # BLD AUTO: 6.7 K/UL (ref 4.3–11.1)
WBC # BLD AUTO: 6.7 K/UL (ref 4.3–11.1)
WBC # BLD AUTO: 6.9 K/UL (ref 4.3–11.1)
WBC # BLD AUTO: 6.9 K/UL (ref 4.3–11.1)
WBC # BLD AUTO: 7.3 K/UL (ref 4.3–11.1)
WBC # BLD AUTO: 7.3 K/UL (ref 4.3–11.1)
WBC # BLD AUTO: 7.4 K/UL (ref 4.3–11.1)
WBC # BLD AUTO: 7.5 K/UL (ref 4.3–11.1)
WBC # BLD AUTO: 7.6 K/UL (ref 4.3–11.1)
WBC # BLD AUTO: 7.7 K/UL (ref 4.3–11.1)
WBC # BLD AUTO: 8.1 K/UL (ref 4.3–11.1)
WBC # BLD AUTO: 8.2 K/UL (ref 4.3–11.1)
WBC # BLD AUTO: 8.2 K/UL (ref 4.3–11.1)
WBC # BLD AUTO: 8.3 K/UL (ref 4.3–11.1)
WBC # BLD AUTO: 8.8 K/UL (ref 4.3–11.1)
WBC # BLD AUTO: 8.9 K/UL (ref 4.3–11.1)
WBC # BLD AUTO: 8.9 K/UL (ref 4.3–11.1)
WBC # BLD AUTO: 9 K/UL (ref 4.3–11.1)
WBC # BLD AUTO: 9.1 K/UL (ref 4.3–11.1)
WBC # BLD AUTO: 9.2 K/UL (ref 4.3–11.1)
WBC # BLD AUTO: 9.4 K/UL (ref 4.3–11.1)
WBC # BLD AUTO: 9.4 K/UL (ref 4.3–11.1)
WBC # BLD AUTO: 9.8 K/UL (ref 4.3–11.1)
WBC # BLD AUTO: 9.8 K/UL (ref 4.3–11.1)
WBC # BLD AUTO: 9.9 K/UL (ref 4.3–11.1)
WBC MORPH BLD: ABNORMAL
WBC URNS QL MICRO: ABNORMAL /HPF
YEAST URNS QL MICRO: ABNORMAL

## 2022-01-01 PROCEDURE — 6370000000 HC RX 637 (ALT 250 FOR IP): Performed by: NURSE PRACTITIONER

## 2022-01-01 PROCEDURE — 86644 CMV ANTIBODY: CPT

## 2022-01-01 PROCEDURE — 80053 COMPREHEN METABOLIC PANEL: CPT

## 2022-01-01 PROCEDURE — 85049 AUTOMATED PLATELET COUNT: CPT

## 2022-01-01 PROCEDURE — G8417 CALC BMI ABV UP PARAM F/U: HCPCS | Performed by: NURSE PRACTITIONER

## 2022-01-01 PROCEDURE — 83735 ASSAY OF MAGNESIUM: CPT

## 2022-01-01 PROCEDURE — 85025 COMPLETE CBC W/AUTO DIFF WBC: CPT

## 2022-01-01 PROCEDURE — 6370000000 HC RX 637 (ALT 250 FOR IP): Performed by: FAMILY MEDICINE

## 2022-01-01 PROCEDURE — 2580000003 HC RX 258: Performed by: INTERNAL MEDICINE

## 2022-01-01 PROCEDURE — APPSS45 APP SPLIT SHARED TIME 31-45 MINUTES: Performed by: NURSE PRACTITIONER

## 2022-01-01 PROCEDURE — 6360000002 HC RX W HCPCS: Performed by: NURSE PRACTITIONER

## 2022-01-01 PROCEDURE — 84550 ASSAY OF BLOOD/URIC ACID: CPT

## 2022-01-01 PROCEDURE — 1100000000 HC RM PRIVATE

## 2022-01-01 PROCEDURE — 86901 BLOOD TYPING SEROLOGIC RH(D): CPT

## 2022-01-01 PROCEDURE — 6370000000 HC RX 637 (ALT 250 FOR IP): Performed by: INTERNAL MEDICINE

## 2022-01-01 PROCEDURE — P9037 PLATE PHERES LEUKOREDU IRRAD: HCPCS

## 2022-01-01 PROCEDURE — 99222 1ST HOSP IP/OBS MODERATE 55: CPT | Performed by: UROLOGY

## 2022-01-01 PROCEDURE — 36415 COLL VENOUS BLD VENIPUNCTURE: CPT

## 2022-01-01 PROCEDURE — 99233 SBSQ HOSP IP/OBS HIGH 50: CPT | Performed by: NURSE PRACTITIONER

## 2022-01-01 PROCEDURE — 96375 TX/PRO/DX INJ NEW DRUG ADDON: CPT

## 2022-01-01 PROCEDURE — 99231 SBSQ HOSP IP/OBS SF/LOW 25: CPT | Performed by: INTERNAL MEDICINE

## 2022-01-01 PROCEDURE — 99232 SBSQ HOSP IP/OBS MODERATE 35: CPT | Performed by: INTERNAL MEDICINE

## 2022-01-01 PROCEDURE — 99233 SBSQ HOSP IP/OBS HIGH 50: CPT | Performed by: INTERNAL MEDICINE

## 2022-01-01 PROCEDURE — 96376 TX/PRO/DX INJ SAME DRUG ADON: CPT

## 2022-01-01 PROCEDURE — 51702 INSERT TEMP BLADDER CATH: CPT

## 2022-01-01 PROCEDURE — G8417 CALC BMI ABV UP PARAM F/U: HCPCS | Performed by: INTERNAL MEDICINE

## 2022-01-01 PROCEDURE — 96374 THER/PROPH/DIAG INJ IV PUSH: CPT

## 2022-01-01 PROCEDURE — 6370000000 HC RX 637 (ALT 250 FOR IP): Performed by: HOSPITALIST

## 2022-01-01 PROCEDURE — 36592 COLLECT BLOOD FROM PICC: CPT

## 2022-01-01 PROCEDURE — 36591 DRAW BLOOD OFF VENOUS DEVICE: CPT

## 2022-01-01 PROCEDURE — 1123F ACP DISCUSS/DSCN MKR DOCD: CPT | Performed by: INTERNAL MEDICINE

## 2022-01-01 PROCEDURE — 99223 1ST HOSP IP/OBS HIGH 75: CPT | Performed by: NURSE PRACTITIONER

## 2022-01-01 PROCEDURE — 99285 EMERGENCY DEPT VISIT HI MDM: CPT

## 2022-01-01 PROCEDURE — 97530 THERAPEUTIC ACTIVITIES: CPT

## 2022-01-01 PROCEDURE — 99239 HOSP IP/OBS DSCHRG MGMT >30: CPT | Performed by: INTERNAL MEDICINE

## 2022-01-01 PROCEDURE — 2580000003 HC RX 258: Performed by: NURSE PRACTITIONER

## 2022-01-01 PROCEDURE — 99232 SBSQ HOSP IP/OBS MODERATE 35: CPT | Performed by: UROLOGY

## 2022-01-01 PROCEDURE — 77334 RADIATION TREATMENT AID(S): CPT

## 2022-01-01 PROCEDURE — 82378 CARCINOEMBRYONIC ANTIGEN: CPT

## 2022-01-01 PROCEDURE — 36430 TRANSFUSION BLD/BLD COMPNT: CPT

## 2022-01-01 PROCEDURE — G8417 CALC BMI ABV UP PARAM F/U: HCPCS | Performed by: PHYSICIAN ASSISTANT

## 2022-01-01 PROCEDURE — 77412 RADIATION TX DELIVERY LVL 3: CPT

## 2022-01-01 PROCEDURE — G8428 CUR MEDS NOT DOCUMENT: HCPCS | Performed by: INTERNAL MEDICINE

## 2022-01-01 PROCEDURE — 77300 RADIATION THERAPY DOSE PLAN: CPT

## 2022-01-01 PROCEDURE — 99211 OFF/OP EST MAY X REQ PHY/QHP: CPT

## 2022-01-01 PROCEDURE — 85018 HEMOGLOBIN: CPT

## 2022-01-01 PROCEDURE — APPSS30 APP SPLIT SHARED TIME 16-30 MINUTES: Performed by: NURSE PRACTITIONER

## 2022-01-01 PROCEDURE — 3017F COLORECTAL CA SCREEN DOC REV: CPT | Performed by: PHYSICIAN ASSISTANT

## 2022-01-01 PROCEDURE — 2500000003 HC RX 250 WO HCPCS: Performed by: NURSE PRACTITIONER

## 2022-01-01 PROCEDURE — 86022 PLATELET ANTIBODIES: CPT

## 2022-01-01 PROCEDURE — 51798 US URINE CAPACITY MEASURE: CPT

## 2022-01-01 PROCEDURE — 71045 X-RAY EXAM CHEST 1 VIEW: CPT

## 2022-01-01 PROCEDURE — 86923 COMPATIBILITY TEST ELECTRIC: CPT

## 2022-01-01 PROCEDURE — 99215 OFFICE O/P EST HI 40 MIN: CPT | Performed by: INTERNAL MEDICINE

## 2022-01-01 PROCEDURE — P9016 RBC LEUKOCYTES REDUCED: HCPCS

## 2022-01-01 PROCEDURE — 96417 CHEMO IV INFUS EACH ADDL SEQ: CPT

## 2022-01-01 PROCEDURE — 85610 PROTHROMBIN TIME: CPT

## 2022-01-01 PROCEDURE — 81001 URINALYSIS AUTO W/SCOPE: CPT

## 2022-01-01 PROCEDURE — 6360000002 HC RX W HCPCS: Performed by: FAMILY MEDICINE

## 2022-01-01 PROCEDURE — 96367 TX/PROPH/DG ADDL SEQ IV INF: CPT

## 2022-01-01 PROCEDURE — P9040 RBC LEUKOREDUCED IRRADIATED: HCPCS

## 2022-01-01 PROCEDURE — 77399 UNLISTED PX MED RADJ PHYSICS: CPT

## 2022-01-01 PROCEDURE — 97535 SELF CARE MNGMENT TRAINING: CPT

## 2022-01-01 PROCEDURE — 99214 OFFICE O/P EST MOD 30 MIN: CPT | Performed by: NURSE PRACTITIONER

## 2022-01-01 PROCEDURE — 97168 OT RE-EVAL EST PLAN CARE: CPT

## 2022-01-01 PROCEDURE — 96361 HYDRATE IV INFUSION ADD-ON: CPT

## 2022-01-01 PROCEDURE — 6360000002 HC RX W HCPCS: Performed by: STUDENT IN AN ORGANIZED HEALTH CARE EDUCATION/TRAINING PROGRAM

## 2022-01-01 PROCEDURE — 77295 3-D RADIOTHERAPY PLAN: CPT

## 2022-01-01 PROCEDURE — 85046 RETICYTE/HGB CONCENTRATE: CPT

## 2022-01-01 PROCEDURE — 97165 OT EVAL LOW COMPLEX 30 MIN: CPT

## 2022-01-01 PROCEDURE — 74018 RADEX ABDOMEN 1 VIEW: CPT

## 2022-01-01 PROCEDURE — 87635 SARS-COV-2 COVID-19 AMP PRB: CPT

## 2022-01-01 PROCEDURE — G8427 DOCREV CUR MEDS BY ELIG CLIN: HCPCS | Performed by: NURSE PRACTITIONER

## 2022-01-01 PROCEDURE — 77280 THER RAD SIMULAJ FIELD SMPL: CPT

## 2022-01-01 PROCEDURE — 72158 MRI LUMBAR SPINE W/O & W/DYE: CPT

## 2022-01-01 PROCEDURE — 72157 MRI CHEST SPINE W/O & W/DYE: CPT

## 2022-01-01 PROCEDURE — 30233R1 TRANSFUSION OF NONAUTOLOGOUS PLATELETS INTO PERIPHERAL VEIN, PERCUTANEOUS APPROACH: ICD-10-PCS | Performed by: NURSE PRACTITIONER

## 2022-01-01 PROCEDURE — 1123F ACP DISCUSS/DSCN MKR DOCD: CPT | Performed by: NURSE PRACTITIONER

## 2022-01-01 PROCEDURE — 87086 URINE CULTURE/COLONY COUNT: CPT

## 2022-01-01 PROCEDURE — 84153 ASSAY OF PSA TOTAL: CPT

## 2022-01-01 PROCEDURE — 6360000002 HC RX W HCPCS: Performed by: INTERNAL MEDICINE

## 2022-01-01 PROCEDURE — 83615 LACTATE (LD) (LDH) ENZYME: CPT

## 2022-01-01 PROCEDURE — 99204 OFFICE O/P NEW MOD 45 MIN: CPT | Performed by: PHYSICIAN ASSISTANT

## 2022-01-01 PROCEDURE — 6360000002 HC RX W HCPCS: Performed by: EMERGENCY MEDICINE

## 2022-01-01 PROCEDURE — 82248 BILIRUBIN DIRECT: CPT

## 2022-01-01 PROCEDURE — 2400000000

## 2022-01-01 PROCEDURE — 85379 FIBRIN DEGRADATION QUANT: CPT

## 2022-01-01 PROCEDURE — 1036F TOBACCO NON-USER: CPT | Performed by: INTERNAL MEDICINE

## 2022-01-01 PROCEDURE — 76775 US EXAM ABDO BACK WALL LIM: CPT

## 2022-01-01 PROCEDURE — G0378 HOSPITAL OBSERVATION PER HR: HCPCS

## 2022-01-01 PROCEDURE — 74177 CT ABD & PELVIS W/CONTRAST: CPT

## 2022-01-01 PROCEDURE — 97162 PT EVAL MOD COMPLEX 30 MIN: CPT

## 2022-01-01 PROCEDURE — 83010 ASSAY OF HAPTOGLOBIN QUANT: CPT

## 2022-01-01 PROCEDURE — 1123F ACP DISCUSS/DSCN MKR DOCD: CPT | Performed by: PHYSICIAN ASSISTANT

## 2022-01-01 PROCEDURE — 51701 INSERT BLADDER CATHETER: CPT

## 2022-01-01 PROCEDURE — 85730 THROMBOPLASTIN TIME PARTIAL: CPT

## 2022-01-01 PROCEDURE — 99222 1ST HOSP IP/OBS MODERATE 55: CPT | Performed by: INTERNAL MEDICINE

## 2022-01-01 PROCEDURE — 96413 CHEMO IV INFUSION 1 HR: CPT

## 2022-01-01 PROCEDURE — 1036F TOBACCO NON-USER: CPT | Performed by: PHYSICIAN ASSISTANT

## 2022-01-01 PROCEDURE — 30233N1 TRANSFUSION OF NONAUTOLOGOUS RED BLOOD CELLS INTO PERIPHERAL VEIN, PERCUTANEOUS APPROACH: ICD-10-PCS | Performed by: INTERNAL MEDICINE

## 2022-01-01 PROCEDURE — 84100 ASSAY OF PHOSPHORUS: CPT

## 2022-01-01 PROCEDURE — 6360000004 HC RX CONTRAST MEDICATION: Performed by: NURSE PRACTITIONER

## 2022-01-01 PROCEDURE — 96372 THER/PROPH/DIAG INJ SC/IM: CPT

## 2022-01-01 PROCEDURE — 6360000004 HC RX CONTRAST MEDICATION: Performed by: INTERNAL MEDICINE

## 2022-01-01 PROCEDURE — 2580000003 HC RX 258: Performed by: EMERGENCY MEDICINE

## 2022-01-01 PROCEDURE — 3017F COLORECTAL CA SCREEN DOC REV: CPT | Performed by: INTERNAL MEDICINE

## 2022-01-01 PROCEDURE — 2580000003 HC RX 258: Performed by: STUDENT IN AN ORGANIZED HEALTH CARE EDUCATION/TRAINING PROGRAM

## 2022-01-01 PROCEDURE — 2700000000 HC OXYGEN THERAPY PER DAY

## 2022-01-01 PROCEDURE — A9579 GAD-BASE MR CONTRAST NOS,1ML: HCPCS | Performed by: INTERNAL MEDICINE

## 2022-01-01 PROCEDURE — G8427 DOCREV CUR MEDS BY ELIG CLIN: HCPCS | Performed by: PHYSICIAN ASSISTANT

## 2022-01-01 PROCEDURE — APPSS60 APP SPLIT SHARED TIME 46-60 MINUTES: Performed by: NURSE PRACTITIONER

## 2022-01-01 PROCEDURE — 2580000003 HC RX 258: Performed by: FAMILY MEDICINE

## 2022-01-01 PROCEDURE — 1036F TOBACCO NON-USER: CPT | Performed by: NURSE PRACTITIONER

## 2022-01-01 PROCEDURE — 3017F COLORECTAL CA SCREEN DOC REV: CPT | Performed by: NURSE PRACTITIONER

## 2022-01-01 PROCEDURE — 85384 FIBRINOGEN ACTIVITY: CPT

## 2022-01-01 RX ORDER — SODIUM CHLORIDE 9 MG/ML
INJECTION, SOLUTION INTRAVENOUS PRN
Status: DISCONTINUED | OUTPATIENT
Start: 2022-01-01 | End: 2022-01-01

## 2022-01-01 RX ORDER — SENNA AND DOCUSATE SODIUM 50; 8.6 MG/1; MG/1
2 TABLET, FILM COATED ORAL DAILY
Status: DISCONTINUED | OUTPATIENT
Start: 2022-01-01 | End: 2022-01-01

## 2022-01-01 RX ORDER — LORAZEPAM 2 MG/ML
1 INJECTION INTRAMUSCULAR EVERY 6 HOURS PRN
Status: DISCONTINUED | OUTPATIENT
Start: 2022-01-01 | End: 2022-01-01

## 2022-01-01 RX ORDER — DIPHENHYDRAMINE HCL 25 MG
25 CAPSULE ORAL EVERY 6 HOURS PRN
Status: DISCONTINUED | OUTPATIENT
Start: 2022-01-01 | End: 2022-01-01

## 2022-01-01 RX ORDER — CALCIUM CARBONATE 200(500)MG
500 TABLET,CHEWABLE ORAL 3 TIMES DAILY
Status: DISCONTINUED | OUTPATIENT
Start: 2022-01-01 | End: 2022-01-01 | Stop reason: HOSPADM

## 2022-01-01 RX ORDER — SODIUM CHLORIDE 9 MG/ML
INJECTION, SOLUTION INTRAVENOUS PRN
Status: DISCONTINUED | OUTPATIENT
Start: 2022-01-01 | End: 2022-01-01 | Stop reason: SDUPTHER

## 2022-01-01 RX ORDER — ONDANSETRON 2 MG/ML
8 INJECTION INTRAMUSCULAR; INTRAVENOUS ONCE
Status: COMPLETED | OUTPATIENT
Start: 2022-01-01 | End: 2022-01-01

## 2022-01-01 RX ORDER — HYDROMORPHONE HYDROCHLORIDE 1 MG/ML
1 INJECTION, SOLUTION INTRAMUSCULAR; INTRAVENOUS; SUBCUTANEOUS
Status: DISCONTINUED | OUTPATIENT
Start: 2022-01-01 | End: 2022-01-01

## 2022-01-01 RX ORDER — HYDROMORPHONE HYDROCHLORIDE 1 MG/ML
1 INJECTION, SOLUTION INTRAMUSCULAR; INTRAVENOUS; SUBCUTANEOUS ONCE
Status: COMPLETED | OUTPATIENT
Start: 2022-01-01 | End: 2022-01-01

## 2022-01-01 RX ORDER — SODIUM CHLORIDE 9 MG/ML
INJECTION, SOLUTION INTRAVENOUS PRN
Status: DISCONTINUED | OUTPATIENT
Start: 2022-01-01 | End: 2022-01-01 | Stop reason: HOSPADM

## 2022-01-01 RX ORDER — ACETAMINOPHEN 650 MG/1
650 SUPPOSITORY RECTAL EVERY 6 HOURS PRN
Status: DISCONTINUED | OUTPATIENT
Start: 2022-01-01 | End: 2022-01-01 | Stop reason: HOSPADM

## 2022-01-01 RX ORDER — SODIUM CHLORIDE 0.9 % (FLUSH) 0.9 %
5-40 SYRINGE (ML) INJECTION EVERY 12 HOURS SCHEDULED
Status: DISCONTINUED | OUTPATIENT
Start: 2022-01-01 | End: 2022-01-01 | Stop reason: HOSPADM

## 2022-01-01 RX ORDER — HYDROMORPHONE HYDROCHLORIDE 1 MG/ML
0.25 INJECTION, SOLUTION INTRAMUSCULAR; INTRAVENOUS; SUBCUTANEOUS EVERY 4 HOURS PRN
Status: DISCONTINUED | OUTPATIENT
Start: 2022-01-01 | End: 2022-01-01

## 2022-01-01 RX ORDER — FENTANYL 75 UG/H
1 PATCH TRANSDERMAL
Status: DISCONTINUED | OUTPATIENT
Start: 2022-01-01 | End: 2022-01-01 | Stop reason: HOSPADM

## 2022-01-01 RX ORDER — ACETAMINOPHEN 325 MG/1
650 TABLET ORAL SEE ADMIN INSTRUCTIONS
Status: COMPLETED | OUTPATIENT
Start: 2022-01-01 | End: 2022-01-01

## 2022-01-01 RX ORDER — LACTULOSE 10 G/15ML
20 SOLUTION ORAL 3 TIMES DAILY PRN
Qty: 30 EACH | Refills: 0 | Status: ON HOLD | OUTPATIENT
Start: 2022-01-01 | End: 2022-01-01 | Stop reason: HOSPADM

## 2022-01-01 RX ORDER — DIPHENHYDRAMINE HYDROCHLORIDE 50 MG/ML
25 INJECTION INTRAMUSCULAR; INTRAVENOUS
Status: COMPLETED | OUTPATIENT
Start: 2022-01-01 | End: 2022-01-01

## 2022-01-01 RX ORDER — SODIUM CHLORIDE 9 MG/ML
5-250 INJECTION, SOLUTION INTRAVENOUS PRN
Status: DISCONTINUED | OUTPATIENT
Start: 2022-01-01 | End: 2022-01-01 | Stop reason: HOSPADM

## 2022-01-01 RX ORDER — PANTOPRAZOLE SODIUM 40 MG/1
40 TABLET, DELAYED RELEASE ORAL
Status: DISCONTINUED | OUTPATIENT
Start: 2022-01-01 | End: 2022-01-01 | Stop reason: HOSPADM

## 2022-01-01 RX ORDER — SODIUM CHLORIDE, SODIUM LACTATE, POTASSIUM CHLORIDE, AND CALCIUM CHLORIDE .6; .31; .03; .02 G/100ML; G/100ML; G/100ML; G/100ML
1000 INJECTION, SOLUTION INTRAVENOUS ONCE
Status: DISCONTINUED | OUTPATIENT
Start: 2022-01-01 | End: 2022-01-01

## 2022-01-01 RX ORDER — ZOLEDRONIC ACID 0.04 MG/ML
4 INJECTION, SOLUTION INTRAVENOUS ONCE
Status: COMPLETED | OUTPATIENT
Start: 2022-01-01 | End: 2022-01-01

## 2022-01-01 RX ORDER — ONDANSETRON 2 MG/ML
4 INJECTION INTRAMUSCULAR; INTRAVENOUS EVERY 6 HOURS PRN
Status: DISCONTINUED | OUTPATIENT
Start: 2022-01-01 | End: 2022-01-01 | Stop reason: HOSPADM

## 2022-01-01 RX ORDER — ACETAMINOPHEN 325 MG/1
650 TABLET ORAL EVERY 6 HOURS PRN
Status: DISCONTINUED | OUTPATIENT
Start: 2022-01-01 | End: 2022-01-01

## 2022-01-01 RX ORDER — POTASSIUM CHLORIDE 20 MEQ/1
40 TABLET, EXTENDED RELEASE ORAL EVERY 6 HOURS
Status: COMPLETED | OUTPATIENT
Start: 2022-01-01 | End: 2022-01-01

## 2022-01-01 RX ORDER — LIDOCAINE AND PRILOCAINE 25; 25 MG/G; MG/G
CREAM TOPICAL ONCE
Status: DISCONTINUED | OUTPATIENT
Start: 2022-01-01 | End: 2022-01-01 | Stop reason: HOSPADM

## 2022-01-01 RX ORDER — HYDROMORPHONE HYDROCHLORIDE 2 MG/1
4 TABLET ORAL EVERY 4 HOURS PRN
Status: DISCONTINUED | OUTPATIENT
Start: 2022-01-01 | End: 2022-01-01 | Stop reason: HOSPADM

## 2022-01-01 RX ORDER — HYDROMORPHONE HYDROCHLORIDE 2 MG/ML
1.5 INJECTION, SOLUTION INTRAMUSCULAR; INTRAVENOUS; SUBCUTANEOUS
Status: DISCONTINUED | OUTPATIENT
Start: 2022-01-01 | End: 2022-01-01 | Stop reason: HOSPADM

## 2022-01-01 RX ORDER — FENTANYL 75 UG/H
1 PATCH TRANSDERMAL
Status: DISCONTINUED | OUTPATIENT
Start: 2022-01-01 | End: 2022-01-01

## 2022-01-01 RX ORDER — SODIUM CHLORIDE 0.9 % (FLUSH) 0.9 %
10 SYRINGE (ML) INJECTION PRN
Status: CANCELLED | OUTPATIENT
Start: 2022-01-01

## 2022-01-01 RX ORDER — LORAZEPAM 1 MG/1
1 TABLET ORAL 3 TIMES DAILY PRN
Qty: 90 TABLET | Refills: 0 | Status: SHIPPED | OUTPATIENT
Start: 2022-01-01 | End: 2022-01-01

## 2022-01-01 RX ORDER — CYCLOBENZAPRINE HCL 10 MG
10 TABLET ORAL 3 TIMES DAILY PRN
Status: ON HOLD | COMMUNITY
Start: 2022-01-01 | End: 2022-01-01 | Stop reason: HOSPADM

## 2022-01-01 RX ORDER — HYDROMORPHONE HYDROCHLORIDE 1 MG/ML
0.5 INJECTION, SOLUTION INTRAMUSCULAR; INTRAVENOUS; SUBCUTANEOUS ONCE
Status: COMPLETED | OUTPATIENT
Start: 2022-01-01 | End: 2022-01-01

## 2022-01-01 RX ORDER — HYDROMORPHONE HYDROCHLORIDE 1 MG/ML
1 INJECTION, SOLUTION INTRAMUSCULAR; INTRAVENOUS; SUBCUTANEOUS
Status: COMPLETED | OUTPATIENT
Start: 2022-01-01 | End: 2022-01-01

## 2022-01-01 RX ORDER — SENNA AND DOCUSATE SODIUM 50; 8.6 MG/1; MG/1
2 TABLET, FILM COATED ORAL DAILY
Qty: 60 TABLET | Refills: 0 | Status: ON HOLD | OUTPATIENT
Start: 2022-01-01 | End: 2022-01-01 | Stop reason: HOSPADM

## 2022-01-01 RX ORDER — ACETAMINOPHEN 650 MG/1
650 SUPPOSITORY RECTAL EVERY 6 HOURS PRN
Status: DISCONTINUED | OUTPATIENT
Start: 2022-01-01 | End: 2022-01-01

## 2022-01-01 RX ORDER — SODIUM CHLORIDE 0.9 % (FLUSH) 0.9 %
5-40 SYRINGE (ML) INJECTION PRN
Status: DISCONTINUED | OUTPATIENT
Start: 2022-01-01 | End: 2022-01-01 | Stop reason: HOSPADM

## 2022-01-01 RX ORDER — POLYETHYLENE GLYCOL 3350 17 G/17G
17 POWDER, FOR SOLUTION ORAL DAILY
Qty: 527 G | Refills: 1 | Status: ON HOLD | OUTPATIENT
Start: 2022-01-01 | End: 2022-01-01 | Stop reason: HOSPADM

## 2022-01-01 RX ORDER — LACTULOSE 10 G/15ML
20 SOLUTION ORAL 3 TIMES DAILY
Status: DISCONTINUED | OUTPATIENT
Start: 2022-01-01 | End: 2022-01-01 | Stop reason: HOSPADM

## 2022-01-01 RX ORDER — LORAZEPAM 2 MG/ML
1 INJECTION INTRAMUSCULAR
Status: DISCONTINUED | OUTPATIENT
Start: 2022-01-01 | End: 2022-01-01 | Stop reason: HOSPADM

## 2022-01-01 RX ORDER — SODIUM CHLORIDE 9 MG/ML
5-250 INJECTION, SOLUTION INTRAVENOUS PRN
Status: CANCELLED | OUTPATIENT
Start: 2022-01-01

## 2022-01-01 RX ORDER — LACTULOSE 10 G/15ML
20 SOLUTION ORAL ONCE
Status: DISCONTINUED | OUTPATIENT
Start: 2022-01-01 | End: 2022-01-01

## 2022-01-01 RX ORDER — OXYCODONE HYDROCHLORIDE 10 MG/1
10 TABLET ORAL EVERY 4 HOURS PRN
Qty: 84 TABLET | Refills: 0 | Status: ON HOLD | OUTPATIENT
Start: 2022-01-01 | End: 2022-01-01 | Stop reason: HOSPADM

## 2022-01-01 RX ORDER — LACTULOSE 10 G/15ML
20 SOLUTION ORAL 2 TIMES DAILY PRN
Status: DISCONTINUED | OUTPATIENT
Start: 2022-01-01 | End: 2022-01-01

## 2022-01-01 RX ORDER — NALOXONE HYDROCHLORIDE 0.4 MG/ML
0.04 INJECTION, SOLUTION INTRAMUSCULAR; INTRAVENOUS; SUBCUTANEOUS PRN
Status: DISCONTINUED | OUTPATIENT
Start: 2022-01-01 | End: 2022-01-01 | Stop reason: HOSPADM

## 2022-01-01 RX ORDER — POLYETHYLENE GLYCOL 3350 17 G/17G
17 POWDER, FOR SOLUTION ORAL DAILY
Status: DISCONTINUED | OUTPATIENT
Start: 2022-01-01 | End: 2022-01-01

## 2022-01-01 RX ORDER — FAMOTIDINE 10 MG/ML
20 INJECTION, SOLUTION INTRAVENOUS
Status: CANCELLED | OUTPATIENT
Start: 2022-01-01

## 2022-01-01 RX ORDER — SENNA AND DOCUSATE SODIUM 50; 8.6 MG/1; MG/1
2 TABLET, FILM COATED ORAL 2 TIMES DAILY
Status: DISCONTINUED | OUTPATIENT
Start: 2022-01-01 | End: 2022-01-01

## 2022-01-01 RX ORDER — MAGNESIUM HYDROXIDE/ALUMINUM HYDROXICE/SIMETHICONE 120; 1200; 1200 MG/30ML; MG/30ML; MG/30ML
20 SUSPENSION ORAL EVERY 6 HOURS PRN
Status: DISCONTINUED | OUTPATIENT
Start: 2022-01-01 | End: 2022-01-01 | Stop reason: HOSPADM

## 2022-01-01 RX ORDER — HYDROMORPHONE HYDROCHLORIDE 4 MG/1
4 TABLET ORAL EVERY 4 HOURS PRN
Qty: 30 TABLET | Refills: 0 | Status: SHIPPED | OUTPATIENT
Start: 2022-01-01 | End: 2022-09-14

## 2022-01-01 RX ORDER — DEXAMETHASONE 4 MG/1
4 TABLET ORAL 2 TIMES DAILY WITH MEALS
Qty: 60 TABLET | Refills: 0 | Status: SHIPPED | OUTPATIENT
Start: 2022-01-01 | End: 2022-01-01

## 2022-01-01 RX ORDER — DEXAMETHASONE 2 MG/1
2 TABLET ORAL
Qty: 10 TABLET | Refills: 0 | Status: ON HOLD | OUTPATIENT
Start: 2022-01-01 | End: 2022-01-01 | Stop reason: HOSPADM

## 2022-01-01 RX ORDER — MAGNESIUM SULFATE IN WATER 40 MG/ML
2000 INJECTION, SOLUTION INTRAVENOUS ONCE
Status: COMPLETED | OUTPATIENT
Start: 2022-01-01 | End: 2022-01-01

## 2022-01-01 RX ORDER — ZINC OXIDE
OINTMENT (GRAM) TOPICAL 4 TIMES DAILY PRN
Status: DISCONTINUED | OUTPATIENT
Start: 2022-01-01 | End: 2022-01-01 | Stop reason: SDUPTHER

## 2022-01-01 RX ORDER — DEXAMETHASONE 4 MG/1
2 TABLET ORAL NIGHTLY
Status: DISCONTINUED | OUTPATIENT
Start: 2022-01-01 | End: 2022-01-01 | Stop reason: HOSPADM

## 2022-01-01 RX ORDER — DEXAMETHASONE 4 MG/1
4 TABLET ORAL
Status: DISCONTINUED | OUTPATIENT
Start: 2022-01-01 | End: 2022-01-01 | Stop reason: HOSPADM

## 2022-01-01 RX ORDER — HYDROMORPHONE HYDROCHLORIDE 1 MG/ML
1 INJECTION, SOLUTION INTRAMUSCULAR; INTRAVENOUS; SUBCUTANEOUS EVERY 4 HOURS PRN
Status: DISCONTINUED | OUTPATIENT
Start: 2022-01-01 | End: 2022-01-01

## 2022-01-01 RX ORDER — SODIUM CHLORIDE 0.9 % (FLUSH) 0.9 %
10 SYRINGE (ML) INJECTION
Status: COMPLETED | OUTPATIENT
Start: 2022-01-01 | End: 2022-01-01

## 2022-01-01 RX ORDER — LIDOCAINE AND PRILOCAINE 25; 25 MG/G; MG/G
CREAM TOPICAL
Qty: 30 G | Refills: 1 | Status: ON HOLD | OUTPATIENT
Start: 2022-01-01 | End: 2022-01-01 | Stop reason: HOSPADM

## 2022-01-01 RX ORDER — HYDROMORPHONE HYDROCHLORIDE 1 MG/ML
0.5 INJECTION, SOLUTION INTRAMUSCULAR; INTRAVENOUS; SUBCUTANEOUS EVERY 4 HOURS PRN
Status: DISCONTINUED | OUTPATIENT
Start: 2022-01-01 | End: 2022-01-01

## 2022-01-01 RX ORDER — 0.9 % SODIUM CHLORIDE 0.9 %
100 INTRAVENOUS SOLUTION INTRAVENOUS
Status: COMPLETED | OUTPATIENT
Start: 2022-01-01 | End: 2022-01-01

## 2022-01-01 RX ORDER — POLYETHYLENE GLYCOL 3350 17 G/17G
17 POWDER, FOR SOLUTION ORAL DAILY PRN
Status: DISCONTINUED | OUTPATIENT
Start: 2022-01-01 | End: 2022-01-01

## 2022-01-01 RX ORDER — SENNA AND DOCUSATE SODIUM 50; 8.6 MG/1; MG/1
2 TABLET, FILM COATED ORAL DAILY
Status: DISCONTINUED | OUTPATIENT
Start: 2022-01-01 | End: 2022-01-01 | Stop reason: HOSPADM

## 2022-01-01 RX ORDER — LORAZEPAM 1 MG/1
1 TABLET ORAL EVERY 8 HOURS PRN
Qty: 30 TABLET | Refills: 1 | Status: SHIPPED | OUTPATIENT
Start: 2022-01-01 | End: 2022-01-01

## 2022-01-01 RX ORDER — POLYETHYLENE GLYCOL 3350 17 G/17G
17 POWDER, FOR SOLUTION ORAL DAILY
Status: DISCONTINUED | OUTPATIENT
Start: 2022-01-01 | End: 2022-01-01 | Stop reason: HOSPADM

## 2022-01-01 RX ORDER — OMEPRAZOLE 40 MG/1
40 CAPSULE, DELAYED RELEASE ORAL DAILY
Qty: 30 CAPSULE | Refills: 3 | Status: ON HOLD | OUTPATIENT
Start: 2022-01-01 | End: 2022-01-01 | Stop reason: HOSPADM

## 2022-01-01 RX ORDER — MAGNESIUM SULFATE HEPTAHYDRATE 40 MG/ML
2000 INJECTION, SOLUTION INTRAVENOUS ONCE
Status: COMPLETED | OUTPATIENT
Start: 2022-01-01 | End: 2022-01-01

## 2022-01-01 RX ORDER — DIPHENHYDRAMINE HYDROCHLORIDE 50 MG/ML
50 INJECTION INTRAMUSCULAR; INTRAVENOUS
Status: CANCELLED | OUTPATIENT
Start: 2022-01-01

## 2022-01-01 RX ORDER — ONDANSETRON 2 MG/ML
8 INJECTION INTRAMUSCULAR; INTRAVENOUS ONCE
Status: CANCELLED | OUTPATIENT
Start: 2022-01-01 | End: 2022-01-01

## 2022-01-01 RX ORDER — PHENAZOPYRIDINE HYDROCHLORIDE 95 MG/1
95 TABLET ORAL 3 TIMES DAILY PRN
Status: DISCONTINUED | OUTPATIENT
Start: 2022-01-01 | End: 2022-01-01 | Stop reason: HOSPADM

## 2022-01-01 RX ORDER — FENTANYL 75 UG/H
1 PATCH TRANSDERMAL
Qty: 15 PATCH | Refills: 0 | Status: SHIPPED | OUTPATIENT
Start: 2022-01-01 | End: 2022-09-14

## 2022-01-01 RX ORDER — CYCLOBENZAPRINE HCL 10 MG
10 TABLET ORAL 3 TIMES DAILY PRN
Status: DISCONTINUED | OUTPATIENT
Start: 2022-01-01 | End: 2022-01-01 | Stop reason: HOSPADM

## 2022-01-01 RX ORDER — TAMSULOSIN HYDROCHLORIDE 0.4 MG/1
0.4 CAPSULE ORAL DAILY
Qty: 30 CAPSULE | Refills: 3 | Status: ON HOLD | OUTPATIENT
Start: 2022-01-01 | End: 2022-01-01 | Stop reason: HOSPADM

## 2022-01-01 RX ORDER — ONDANSETRON 2 MG/ML
4 INJECTION INTRAMUSCULAR; INTRAVENOUS
Status: COMPLETED | OUTPATIENT
Start: 2022-01-01 | End: 2022-01-01

## 2022-01-01 RX ORDER — HEPARIN SODIUM (PORCINE) LOCK FLUSH IV SOLN 100 UNIT/ML 100 UNIT/ML
500 SOLUTION INTRAVENOUS PRN
Status: CANCELLED | OUTPATIENT
Start: 2022-01-01

## 2022-01-01 RX ORDER — PREDNISONE 10 MG/1
10 TABLET ORAL DAILY
Qty: 30 TABLET | Refills: 0 | Status: SHIPPED | OUTPATIENT
Start: 2022-01-01 | End: 2022-01-01 | Stop reason: CLARIF

## 2022-01-01 RX ORDER — MAGNESIUM SULFATE IN WATER 40 MG/ML
4000 INJECTION, SOLUTION INTRAVENOUS ONCE
Status: COMPLETED | OUTPATIENT
Start: 2022-01-01 | End: 2022-01-01

## 2022-01-01 RX ORDER — SODIUM CHLORIDE 0.9 % (FLUSH) 0.9 %
5-40 SYRINGE (ML) INJECTION PRN
Status: CANCELLED | OUTPATIENT
Start: 2022-01-01

## 2022-01-01 RX ORDER — MORPHINE SULFATE 4 MG/ML
4 INJECTION INTRAVENOUS
Status: DISCONTINUED | OUTPATIENT
Start: 2022-01-01 | End: 2022-01-01 | Stop reason: HOSPADM

## 2022-01-01 RX ORDER — DEXAMETHASONE 4 MG/1
4 TABLET ORAL EVERY 12 HOURS SCHEDULED
Status: DISCONTINUED | OUTPATIENT
Start: 2022-01-01 | End: 2022-01-01

## 2022-01-01 RX ORDER — ALBUTEROL SULFATE 90 UG/1
4 AEROSOL, METERED RESPIRATORY (INHALATION) PRN
Status: CANCELLED | OUTPATIENT
Start: 2022-01-01

## 2022-01-01 RX ORDER — LANOLIN ALCOHOL/MO/W.PET/CERES
400 CREAM (GRAM) TOPICAL 4 TIMES DAILY
Status: DISCONTINUED | OUTPATIENT
Start: 2022-01-01 | End: 2022-01-01 | Stop reason: HOSPADM

## 2022-01-01 RX ORDER — OXYCODONE HYDROCHLORIDE 5 MG/1
10 TABLET ORAL EVERY 4 HOURS PRN
Status: DISCONTINUED | OUTPATIENT
Start: 2022-01-01 | End: 2022-01-01

## 2022-01-01 RX ORDER — LANOLIN ALCOHOL/MO/W.PET/CERES
400 CREAM (GRAM) TOPICAL 3 TIMES DAILY
Qty: 90 TABLET | Refills: 0 | Status: ON HOLD | OUTPATIENT
Start: 2022-01-01 | End: 2022-01-01 | Stop reason: HOSPADM

## 2022-01-01 RX ORDER — FINASTERIDE 5 MG/1
5 TABLET, FILM COATED ORAL DAILY
Status: DISCONTINUED | OUTPATIENT
Start: 2022-01-01 | End: 2022-01-01 | Stop reason: HOSPADM

## 2022-01-01 RX ORDER — FENTANYL 50 UG/H
1 PATCH TRANSDERMAL
Status: DISCONTINUED | OUTPATIENT
Start: 2022-01-01 | End: 2022-01-01

## 2022-01-01 RX ORDER — 0.9 % SODIUM CHLORIDE 0.9 %
500 INTRAVENOUS SOLUTION INTRAVENOUS
Status: COMPLETED | OUTPATIENT
Start: 2022-01-01 | End: 2022-01-01

## 2022-01-01 RX ORDER — LANOLIN ALCOHOL/MO/W.PET/CERES
400 CREAM (GRAM) TOPICAL 3 TIMES DAILY
Status: DISCONTINUED | OUTPATIENT
Start: 2022-01-01 | End: 2022-01-01

## 2022-01-01 RX ORDER — CYCLOBENZAPRINE HCL 10 MG
10 TABLET ORAL 3 TIMES DAILY PRN
Qty: 30 TABLET | Refills: 0 | Status: ON HOLD | OUTPATIENT
Start: 2022-01-01 | End: 2022-01-01 | Stop reason: HOSPADM

## 2022-01-01 RX ORDER — DIPHENHYDRAMINE HCL 25 MG
25 CAPSULE ORAL SEE ADMIN INSTRUCTIONS
Status: COMPLETED | OUTPATIENT
Start: 2022-01-01 | End: 2022-01-01

## 2022-01-01 RX ORDER — ACETAMINOPHEN 325 MG/1
650 TABLET ORAL EVERY 6 HOURS PRN
Status: DISCONTINUED | OUTPATIENT
Start: 2022-01-01 | End: 2022-01-01 | Stop reason: HOSPADM

## 2022-01-01 RX ORDER — LORAZEPAM 1 MG/1
1 TABLET ORAL 3 TIMES DAILY PRN
Status: DISCONTINUED | OUTPATIENT
Start: 2022-01-01 | End: 2022-01-01 | Stop reason: HOSPADM

## 2022-01-01 RX ORDER — SODIUM CHLORIDE 0.9 % (FLUSH) 0.9 %
10 SYRINGE (ML) INJECTION PRN
Status: DISCONTINUED | OUTPATIENT
Start: 2022-01-01 | End: 2022-01-01 | Stop reason: HOSPADM

## 2022-01-01 RX ORDER — SODIUM CHLORIDE 9 MG/ML
5-40 INJECTION INTRAVENOUS PRN
Status: CANCELLED | OUTPATIENT
Start: 2022-01-01

## 2022-01-01 RX ORDER — ONDANSETRON 4 MG/1
4 TABLET, ORALLY DISINTEGRATING ORAL EVERY 8 HOURS PRN
Status: DISCONTINUED | OUTPATIENT
Start: 2022-01-01 | End: 2022-01-01 | Stop reason: HOSPADM

## 2022-01-01 RX ORDER — SODIUM CHLORIDE 9 MG/ML
INJECTION, SOLUTION INTRAVENOUS CONTINUOUS
Status: ACTIVE | OUTPATIENT
Start: 2022-01-01 | End: 2022-01-01

## 2022-01-01 RX ORDER — ACETAMINOPHEN 325 MG/1
650 TABLET ORAL
Status: CANCELLED | OUTPATIENT
Start: 2022-01-01

## 2022-01-01 RX ORDER — MAGNESIUM SULFATE 1 G/100ML
1000 INJECTION INTRAVENOUS ONCE
Status: COMPLETED | OUTPATIENT
Start: 2022-01-01 | End: 2022-01-01

## 2022-01-01 RX ORDER — DROPERIDOL 2.5 MG/ML
2.5 INJECTION, SOLUTION INTRAMUSCULAR; INTRAVENOUS ONCE
Status: COMPLETED | OUTPATIENT
Start: 2022-01-01 | End: 2022-01-01

## 2022-01-01 RX ORDER — DIPHENHYDRAMINE HCL 25 MG
25 CAPSULE ORAL EVERY 6 HOURS PRN
Status: DISCONTINUED | OUTPATIENT
Start: 2022-01-01 | End: 2022-01-01 | Stop reason: HOSPADM

## 2022-01-01 RX ORDER — BISACODYL 10 MG
10 SUPPOSITORY, RECTAL RECTAL DAILY PRN
Status: DISCONTINUED | OUTPATIENT
Start: 2022-01-01 | End: 2022-01-01 | Stop reason: HOSPADM

## 2022-01-01 RX ORDER — FINASTERIDE 5 MG/1
5 TABLET, FILM COATED ORAL DAILY
Qty: 30 TABLET | Refills: 3 | Status: ON HOLD | OUTPATIENT
Start: 2022-01-01 | End: 2022-01-01 | Stop reason: HOSPADM

## 2022-01-01 RX ORDER — EPINEPHRINE 1 MG/ML
0.3 INJECTION, SOLUTION, CONCENTRATE INTRAVENOUS PRN
Status: CANCELLED | OUTPATIENT
Start: 2022-01-01

## 2022-01-01 RX ORDER — SODIUM CHLORIDE 9 MG/ML
INJECTION, SOLUTION INTRAVENOUS CONTINUOUS
Status: CANCELLED | OUTPATIENT
Start: 2022-01-01

## 2022-01-01 RX ORDER — SENNA AND DOCUSATE SODIUM 50; 8.6 MG/1; MG/1
2 TABLET, FILM COATED ORAL 2 TIMES DAILY PRN
Status: DISCONTINUED | OUTPATIENT
Start: 2022-01-01 | End: 2022-01-01

## 2022-01-01 RX ORDER — MEPERIDINE HYDROCHLORIDE 50 MG/ML
12.5 INJECTION INTRAMUSCULAR; INTRAVENOUS; SUBCUTANEOUS PRN
Status: CANCELLED | OUTPATIENT
Start: 2022-01-01

## 2022-01-01 RX ORDER — HYDROMORPHONE HYDROCHLORIDE 2 MG/1
2 TABLET ORAL EVERY 4 HOURS PRN
Status: DISCONTINUED | OUTPATIENT
Start: 2022-01-01 | End: 2022-01-01 | Stop reason: HOSPADM

## 2022-01-01 RX ORDER — DOCUSATE SODIUM 100 MG/1
100 CAPSULE, LIQUID FILLED ORAL 2 TIMES DAILY PRN
Status: DISCONTINUED | OUTPATIENT
Start: 2022-01-01 | End: 2022-01-01 | Stop reason: HOSPADM

## 2022-01-01 RX ORDER — ONDANSETRON 2 MG/ML
8 INJECTION INTRAMUSCULAR; INTRAVENOUS
Status: CANCELLED | OUTPATIENT
Start: 2022-01-01

## 2022-01-01 RX ORDER — ENOXAPARIN SODIUM 100 MG/ML
40 INJECTION SUBCUTANEOUS DAILY
Status: DISCONTINUED | OUTPATIENT
Start: 2022-01-01 | End: 2022-01-01 | Stop reason: HOSPADM

## 2022-01-01 RX ORDER — HYDROMORPHONE HYDROCHLORIDE 2 MG/1
2 TABLET ORAL EVERY 4 HOURS PRN
Qty: 30 TABLET | Refills: 0 | Status: SHIPPED | OUTPATIENT
Start: 2022-01-01 | End: 2022-09-14

## 2022-01-01 RX ORDER — PREDNISONE 10 MG/1
10 TABLET ORAL DAILY
Qty: 30 TABLET | Refills: 1 | Status: SHIPPED | OUTPATIENT
Start: 2022-01-01 | End: 2022-01-01 | Stop reason: SDUPTHER

## 2022-01-01 RX ORDER — TAMSULOSIN HYDROCHLORIDE 0.4 MG/1
0.4 CAPSULE ORAL DAILY
Status: DISCONTINUED | OUTPATIENT
Start: 2022-01-01 | End: 2022-01-01 | Stop reason: HOSPADM

## 2022-01-01 RX ORDER — POLYETHYLENE GLYCOL 3350 17 G/17G
17 POWDER, FOR SOLUTION ORAL DAILY PRN
Status: DISCONTINUED | OUTPATIENT
Start: 2022-01-01 | End: 2022-01-01 | Stop reason: HOSPADM

## 2022-01-01 RX ADMIN — Medication 400 MG: at 08:26

## 2022-01-01 RX ADMIN — SENNOSIDES AND DOCUSATE SODIUM 2 TABLET: 8.6; 5 TABLET ORAL at 19:47

## 2022-01-01 RX ADMIN — POLYETHYLENE GLYCOL 3350 17 G: 17 POWDER, FOR SOLUTION ORAL at 08:25

## 2022-01-01 RX ADMIN — POLYETHYLENE GLYCOL 3350 17 G: 17 POWDER, FOR SOLUTION ORAL at 08:13

## 2022-01-01 RX ADMIN — Medication 400 MG: at 20:06

## 2022-01-01 RX ADMIN — PANTOPRAZOLE SODIUM 40 MG: 40 TABLET, DELAYED RELEASE ORAL at 06:10

## 2022-01-01 RX ADMIN — SENNOSIDES AND DOCUSATE SODIUM 2 TABLET: 8.6; 5 TABLET ORAL at 08:52

## 2022-01-01 RX ADMIN — Medication 400 MG: at 08:07

## 2022-01-01 RX ADMIN — CALCIUM CARBONATE (ANTACID) CHEW TAB 500 MG 500 MG: 500 CHEW TAB at 13:57

## 2022-01-01 RX ADMIN — CALCIUM CARBONATE (ANTACID) CHEW TAB 500 MG 500 MG: 500 CHEW TAB at 15:23

## 2022-01-01 RX ADMIN — Medication 400 MG: at 14:24

## 2022-01-01 RX ADMIN — HYDROMORPHONE HYDROCHLORIDE 1 MG: 1 INJECTION, SOLUTION INTRAMUSCULAR; INTRAVENOUS; SUBCUTANEOUS at 00:10

## 2022-01-01 RX ADMIN — FINASTERIDE 5 MG: 5 TABLET, FILM COATED ORAL at 08:50

## 2022-01-01 RX ADMIN — Medication 400 MG: at 15:59

## 2022-01-01 RX ADMIN — Medication 400 MG: at 20:51

## 2022-01-01 RX ADMIN — Medication 400 MG: at 07:39

## 2022-01-01 RX ADMIN — OXYCODONE 10 MG: 5 TABLET ORAL at 10:31

## 2022-01-01 RX ADMIN — Medication 400 MG: at 14:09

## 2022-01-01 RX ADMIN — SODIUM CHLORIDE, PRESERVATIVE FREE 5 ML: 5 INJECTION INTRAVENOUS at 08:58

## 2022-01-01 RX ADMIN — HYDROMORPHONE HYDROCHLORIDE 1 MG: 1 INJECTION, SOLUTION INTRAMUSCULAR; INTRAVENOUS; SUBCUTANEOUS at 21:38

## 2022-01-01 RX ADMIN — NALOXEGOL OXALATE 25 MG: 25 TABLET, FILM COATED ORAL at 10:26

## 2022-01-01 RX ADMIN — DEXAMETHASONE 4 MG: 4 TABLET ORAL at 21:38

## 2022-01-01 RX ADMIN — URINARY PAIN RELIEF 95 MG: 95 TABLET ORAL at 20:48

## 2022-01-01 RX ADMIN — Medication 400 MG: at 16:47

## 2022-01-01 RX ADMIN — Medication 400 MG: at 08:40

## 2022-01-01 RX ADMIN — CALCIUM CARBONATE (ANTACID) CHEW TAB 500 MG 500 MG: 500 CHEW TAB at 08:50

## 2022-01-01 RX ADMIN — DIPHENHYDRAMINE HYDROCHLORIDE 25 MG: 25 CAPSULE ORAL at 13:18

## 2022-01-01 RX ADMIN — HYDROMORPHONE HYDROCHLORIDE 4 MG: 2 TABLET ORAL at 03:47

## 2022-01-01 RX ADMIN — CALCIUM CARBONATE (ANTACID) CHEW TAB 500 MG 500 MG: 500 CHEW TAB at 08:52

## 2022-01-01 RX ADMIN — POLYETHYLENE GLYCOL 3350 17 G: 17 POWDER, FOR SOLUTION ORAL at 08:46

## 2022-01-01 RX ADMIN — PANTOPRAZOLE SODIUM 40 MG: 40 TABLET, DELAYED RELEASE ORAL at 07:59

## 2022-01-01 RX ADMIN — PANTOPRAZOLE SODIUM 40 MG: 40 TABLET, DELAYED RELEASE ORAL at 07:51

## 2022-01-01 RX ADMIN — NALOXEGOL OXALATE 25 MG: 25 TABLET, FILM COATED ORAL at 06:29

## 2022-01-01 RX ADMIN — Medication 400 MG: at 08:42

## 2022-01-01 RX ADMIN — ENOXAPARIN SODIUM 40 MG: 100 INJECTION SUBCUTANEOUS at 08:34

## 2022-01-01 RX ADMIN — Medication 400 MG: at 13:52

## 2022-01-01 RX ADMIN — SODIUM CHLORIDE 500 ML: 900 INJECTION, SOLUTION INTRAVENOUS at 11:27

## 2022-01-01 RX ADMIN — HYDROMORPHONE HYDROCHLORIDE 4 MG: 2 TABLET ORAL at 08:15

## 2022-01-01 RX ADMIN — CYCLOBENZAPRINE 10 MG: 10 TABLET, FILM COATED ORAL at 12:03

## 2022-01-01 RX ADMIN — LORAZEPAM 1 MG: 1 TABLET ORAL at 21:42

## 2022-01-01 RX ADMIN — FENTANYL CITRATE 20 ML: 50 INJECTION INTRAVENOUS at 21:10

## 2022-01-01 RX ADMIN — Medication 400 MG: at 12:51

## 2022-01-01 RX ADMIN — HYDROMORPHONE HYDROCHLORIDE 1.5 MG: 2 INJECTION, SOLUTION INTRAMUSCULAR; INTRAVENOUS; SUBCUTANEOUS at 02:04

## 2022-01-01 RX ADMIN — CYCLOBENZAPRINE 10 MG: 10 TABLET, FILM COATED ORAL at 21:11

## 2022-01-01 RX ADMIN — HYDROMORPHONE HYDROCHLORIDE 4 MG: 2 TABLET ORAL at 03:18

## 2022-01-01 RX ADMIN — GADOTERIDOL 19 ML: 279.3 INJECTION, SOLUTION INTRAVENOUS at 15:05

## 2022-01-01 RX ADMIN — HYDROMORPHONE HYDROCHLORIDE 4 MG: 2 TABLET ORAL at 14:07

## 2022-01-01 RX ADMIN — CEFTRIAXONE 1000 MG: 1 INJECTION, POWDER, FOR SOLUTION INTRAMUSCULAR; INTRAVENOUS at 15:00

## 2022-01-01 RX ADMIN — TAMSULOSIN HYDROCHLORIDE 0.4 MG: 0.4 CAPSULE ORAL at 08:12

## 2022-01-01 RX ADMIN — SODIUM CHLORIDE, PRESERVATIVE FREE 10 ML: 5 INJECTION INTRAVENOUS at 08:19

## 2022-01-01 RX ADMIN — PANTOPRAZOLE SODIUM 40 MG: 40 TABLET, DELAYED RELEASE ORAL at 08:07

## 2022-01-01 RX ADMIN — SODIUM CHLORIDE, PRESERVATIVE FREE 10 ML: 5 INJECTION INTRAVENOUS at 22:52

## 2022-01-01 RX ADMIN — HYDROMORPHONE HYDROCHLORIDE 4 MG: 2 TABLET ORAL at 02:29

## 2022-01-01 RX ADMIN — HYDROMORPHONE HYDROCHLORIDE 4 MG: 2 TABLET ORAL at 18:45

## 2022-01-01 RX ADMIN — DIPHENHYDRAMINE HYDROCHLORIDE 25 MG: 25 CAPSULE ORAL at 01:10

## 2022-01-01 RX ADMIN — Medication 400 MG: at 20:34

## 2022-01-01 RX ADMIN — HYDROMORPHONE HYDROCHLORIDE 4 MG: 2 TABLET ORAL at 04:55

## 2022-01-01 RX ADMIN — HYDROMORPHONE HYDROCHLORIDE 4 MG: 2 TABLET ORAL at 02:24

## 2022-01-01 RX ADMIN — SODIUM CHLORIDE, PRESERVATIVE FREE 10 ML: 5 INJECTION INTRAVENOUS at 21:27

## 2022-01-01 RX ADMIN — DEXAMETHASONE 2 MG: 4 TABLET ORAL at 20:45

## 2022-01-01 RX ADMIN — DEXAMETHASONE 4 MG: 4 TABLET ORAL at 21:13

## 2022-01-01 RX ADMIN — Medication 400 MG: at 16:02

## 2022-01-01 RX ADMIN — Medication 400 MG: at 08:12

## 2022-01-01 RX ADMIN — HYDROMORPHONE HYDROCHLORIDE 4 MG: 2 TABLET ORAL at 20:11

## 2022-01-01 RX ADMIN — SODIUM CHLORIDE, PRESERVATIVE FREE 10 ML: 5 INJECTION INTRAVENOUS at 21:06

## 2022-01-01 RX ADMIN — ENOXAPARIN SODIUM 40 MG: 100 INJECTION SUBCUTANEOUS at 09:00

## 2022-01-01 RX ADMIN — IOPAMIDOL 100 ML: 755 INJECTION, SOLUTION INTRAVENOUS at 16:08

## 2022-01-01 RX ADMIN — HYDROMORPHONE HYDROCHLORIDE 1 MG: 1 INJECTION, SOLUTION INTRAMUSCULAR; INTRAVENOUS; SUBCUTANEOUS at 14:24

## 2022-01-01 RX ADMIN — Medication 400 MG: at 21:08

## 2022-01-01 RX ADMIN — SENNOSIDES AND DOCUSATE SODIUM 2 TABLET: 8.6; 5 TABLET ORAL at 08:49

## 2022-01-01 RX ADMIN — Medication 400 MG: at 16:07

## 2022-01-01 RX ADMIN — CALCIUM CARBONATE (ANTACID) CHEW TAB 500 MG 500 MG: 500 CHEW TAB at 08:43

## 2022-01-01 RX ADMIN — HYDROMORPHONE HYDROCHLORIDE 2 MG: 2 TABLET ORAL at 09:20

## 2022-01-01 RX ADMIN — PANTOPRAZOLE SODIUM 40 MG: 40 TABLET, DELAYED RELEASE ORAL at 07:53

## 2022-01-01 RX ADMIN — DIATRIZOATE MEGLUMINE AND DIATRIZOATE SODIUM 15 ML: 660; 100 LIQUID ORAL; RECTAL at 14:44

## 2022-01-01 RX ADMIN — POLYETHYLENE GLYCOL 3350 17 G: 17 POWDER, FOR SOLUTION ORAL at 08:08

## 2022-01-01 RX ADMIN — CYCLOBENZAPRINE 10 MG: 10 TABLET, FILM COATED ORAL at 09:13

## 2022-01-01 RX ADMIN — Medication 400 MG: at 16:37

## 2022-01-01 RX ADMIN — FINASTERIDE 5 MG: 5 TABLET, FILM COATED ORAL at 08:14

## 2022-01-01 RX ADMIN — OXYCODONE 10 MG: 5 TABLET ORAL at 01:55

## 2022-01-01 RX ADMIN — Medication 400 MG: at 16:40

## 2022-01-01 RX ADMIN — Medication 400 MG: at 14:28

## 2022-01-01 RX ADMIN — HYDROMORPHONE HYDROCHLORIDE 0.25 MG: 1 INJECTION, SOLUTION INTRAMUSCULAR; INTRAVENOUS; SUBCUTANEOUS at 09:57

## 2022-01-01 RX ADMIN — NALOXEGOL OXALATE 25 MG: 25 TABLET, FILM COATED ORAL at 08:40

## 2022-01-01 RX ADMIN — SODIUM CHLORIDE 100 ML/HR: 9 INJECTION, SOLUTION INTRAVENOUS at 09:30

## 2022-01-01 RX ADMIN — NALOXEGOL OXALATE 25 MG: 25 TABLET, FILM COATED ORAL at 08:18

## 2022-01-01 RX ADMIN — CYCLOBENZAPRINE 10 MG: 10 TABLET, FILM COATED ORAL at 15:44

## 2022-01-01 RX ADMIN — DIPHENHYDRAMINE HYDROCHLORIDE 25 MG: 25 CAPSULE ORAL at 12:51

## 2022-01-01 RX ADMIN — MAGNESIUM SULFATE HEPTAHYDRATE 4000 MG: 40 INJECTION, SOLUTION INTRAVENOUS at 09:45

## 2022-01-01 RX ADMIN — HYDROMORPHONE HYDROCHLORIDE 1 MG: 1 INJECTION, SOLUTION INTRAMUSCULAR; INTRAVENOUS; SUBCUTANEOUS at 05:42

## 2022-01-01 RX ADMIN — Medication 10 ML: at 08:13

## 2022-01-01 RX ADMIN — DEXAMETHASONE 4 MG: 4 TABLET ORAL at 08:24

## 2022-01-01 RX ADMIN — Medication 400 MG: at 08:34

## 2022-01-01 RX ADMIN — SENNOSIDES AND DOCUSATE SODIUM 2 TABLET: 8.6; 5 TABLET ORAL at 08:10

## 2022-01-01 RX ADMIN — PANTOPRAZOLE SODIUM 40 MG: 40 TABLET, DELAYED RELEASE ORAL at 08:50

## 2022-01-01 RX ADMIN — SENNOSIDES AND DOCUSATE SODIUM 2 TABLET: 8.6; 5 TABLET ORAL at 21:42

## 2022-01-01 RX ADMIN — HYDROMORPHONE HYDROCHLORIDE 0.25 MG: 1 INJECTION, SOLUTION INTRAMUSCULAR; INTRAVENOUS; SUBCUTANEOUS at 22:08

## 2022-01-01 RX ADMIN — CALCIUM CARBONATE (ANTACID) CHEW TAB 500 MG 500 MG: 500 CHEW TAB at 08:07

## 2022-01-01 RX ADMIN — CALCIUM CARBONATE (ANTACID) CHEW TAB 500 MG 500 MG: 500 CHEW TAB at 08:11

## 2022-01-01 RX ADMIN — Medication 400 MG: at 07:58

## 2022-01-01 RX ADMIN — ENOXAPARIN SODIUM 40 MG: 100 INJECTION SUBCUTANEOUS at 08:22

## 2022-01-01 RX ADMIN — ONDANSETRON 8 MG: 2 INJECTION INTRAMUSCULAR; INTRAVENOUS at 10:06

## 2022-01-01 RX ADMIN — Medication 400 MG: at 17:36

## 2022-01-01 RX ADMIN — SODIUM CHLORIDE, PRESERVATIVE FREE 10 ML: 5 INJECTION INTRAVENOUS at 09:00

## 2022-01-01 RX ADMIN — CALCIUM CARBONATE (ANTACID) CHEW TAB 500 MG 500 MG: 500 CHEW TAB at 19:47

## 2022-01-01 RX ADMIN — SODIUM CHLORIDE, PRESERVATIVE FREE 10 ML: 5 INJECTION INTRAVENOUS at 21:07

## 2022-01-01 RX ADMIN — MORPHINE SULFATE 4 MG: 4 INJECTION INTRAVENOUS at 11:49

## 2022-01-01 RX ADMIN — CALCIUM CARBONATE (ANTACID) CHEW TAB 500 MG 500 MG: 500 CHEW TAB at 14:45

## 2022-01-01 RX ADMIN — FOSAPREPITANT 150 MG: 150 INJECTION, POWDER, LYOPHILIZED, FOR SOLUTION INTRAVENOUS at 10:30

## 2022-01-01 RX ADMIN — TAMSULOSIN HYDROCHLORIDE 0.4 MG: 0.4 CAPSULE ORAL at 08:58

## 2022-01-01 RX ADMIN — SODIUM CHLORIDE, PRESERVATIVE FREE 10 ML: 5 INJECTION INTRAVENOUS at 20:17

## 2022-01-01 RX ADMIN — MORPHINE SULFATE 4 MG: 4 INJECTION INTRAVENOUS at 19:24

## 2022-01-01 RX ADMIN — TAMSULOSIN HYDROCHLORIDE 0.4 MG: 0.4 CAPSULE ORAL at 08:50

## 2022-01-01 RX ADMIN — TAMSULOSIN HYDROCHLORIDE 0.4 MG: 0.4 CAPSULE ORAL at 09:43

## 2022-01-01 RX ADMIN — DEXAMETHASONE 4 MG: 4 TABLET ORAL at 08:11

## 2022-01-01 RX ADMIN — PANTOPRAZOLE SODIUM 40 MG: 40 TABLET, DELAYED RELEASE ORAL at 06:30

## 2022-01-01 RX ADMIN — CALCIUM CARBONATE (ANTACID) CHEW TAB 500 MG 500 MG: 500 CHEW TAB at 08:02

## 2022-01-01 RX ADMIN — PANTOPRAZOLE SODIUM 40 MG: 40 TABLET, DELAYED RELEASE ORAL at 08:01

## 2022-01-01 RX ADMIN — OXYCODONE 10 MG: 5 TABLET ORAL at 22:47

## 2022-01-01 RX ADMIN — LORAZEPAM 1 MG: 1 TABLET ORAL at 20:59

## 2022-01-01 RX ADMIN — LORAZEPAM 1 MG: 1 TABLET ORAL at 09:01

## 2022-01-01 RX ADMIN — SODIUM CHLORIDE, PRESERVATIVE FREE 10 ML: 5 INJECTION INTRAVENOUS at 08:44

## 2022-01-01 RX ADMIN — HYDROMORPHONE HYDROCHLORIDE 4 MG: 2 TABLET ORAL at 15:05

## 2022-01-01 RX ADMIN — DEXAMETHASONE SODIUM PHOSPHATE 12 MG: 4 INJECTION, SOLUTION INTRAMUSCULAR; INTRAVENOUS at 10:09

## 2022-01-01 RX ADMIN — SODIUM CHLORIDE, PRESERVATIVE FREE 10 ML: 5 INJECTION INTRAVENOUS at 21:26

## 2022-01-01 RX ADMIN — POLYETHYLENE GLYCOL 3350 17 G: 17 POWDER, FOR SOLUTION ORAL at 08:05

## 2022-01-01 RX ADMIN — LACTULOSE 20 G: 20 SOLUTION ORAL at 14:26

## 2022-01-01 RX ADMIN — SENNOSIDES AND DOCUSATE SODIUM 2 TABLET: 8.6; 5 TABLET ORAL at 08:12

## 2022-01-01 RX ADMIN — POLYETHYLENE GLYCOL 3350 17 G: 17 POWDER, FOR SOLUTION ORAL at 08:56

## 2022-01-01 RX ADMIN — PANTOPRAZOLE SODIUM 40 MG: 40 TABLET, DELAYED RELEASE ORAL at 08:08

## 2022-01-01 RX ADMIN — Medication 400 MG: at 16:32

## 2022-01-01 RX ADMIN — LORAZEPAM 1 MG: 1 TABLET ORAL at 08:26

## 2022-01-01 RX ADMIN — CALCIUM CARBONATE (ANTACID) CHEW TAB 500 MG 500 MG: 500 CHEW TAB at 08:17

## 2022-01-01 RX ADMIN — SENNOSIDES AND DOCUSATE SODIUM 2 TABLET: 8.6; 5 TABLET ORAL at 21:08

## 2022-01-01 RX ADMIN — Medication 400 MG: at 08:14

## 2022-01-01 RX ADMIN — DEXAMETHASONE 4 MG: 4 TABLET ORAL at 20:48

## 2022-01-01 RX ADMIN — Medication 400 MG: at 21:16

## 2022-01-01 RX ADMIN — SODIUM CHLORIDE, PRESERVATIVE FREE 10 ML: 5 INJECTION INTRAVENOUS at 08:11

## 2022-01-01 RX ADMIN — SENNOSIDES AND DOCUSATE SODIUM 2 TABLET: 8.6; 5 TABLET ORAL at 12:13

## 2022-01-01 RX ADMIN — CALCIUM CARBONATE (ANTACID) CHEW TAB 500 MG 500 MG: 500 CHEW TAB at 08:41

## 2022-01-01 RX ADMIN — CYCLOBENZAPRINE 10 MG: 10 TABLET, FILM COATED ORAL at 23:40

## 2022-01-01 RX ADMIN — Medication 400 MG: at 08:50

## 2022-01-01 RX ADMIN — TAMSULOSIN HYDROCHLORIDE 0.4 MG: 0.4 CAPSULE ORAL at 08:14

## 2022-01-01 RX ADMIN — PANTOPRAZOLE SODIUM 40 MG: 40 TABLET, DELAYED RELEASE ORAL at 06:29

## 2022-01-01 RX ADMIN — HYDROMORPHONE HYDROCHLORIDE 4 MG: 2 TABLET ORAL at 14:45

## 2022-01-01 RX ADMIN — NALOXEGOL OXALATE 25 MG: 25 TABLET, FILM COATED ORAL at 09:00

## 2022-01-01 RX ADMIN — Medication 400 MG: at 14:48

## 2022-01-01 RX ADMIN — HYDROMORPHONE HYDROCHLORIDE 2 MG: 2 TABLET ORAL at 09:08

## 2022-01-01 RX ADMIN — SENNOSIDES AND DOCUSATE SODIUM 2 TABLET: 8.6; 5 TABLET ORAL at 08:57

## 2022-01-01 RX ADMIN — CALCIUM CARBONATE (ANTACID) CHEW TAB 500 MG 500 MG: 500 CHEW TAB at 14:28

## 2022-01-01 RX ADMIN — HYDROMORPHONE HYDROCHLORIDE 4 MG: 2 TABLET ORAL at 08:03

## 2022-01-01 RX ADMIN — Medication 400 MG: at 14:08

## 2022-01-01 RX ADMIN — DIPHENHYDRAMINE HYDROCHLORIDE 25 MG: 25 CAPSULE ORAL at 03:56

## 2022-01-01 RX ADMIN — Medication 400 MG: at 21:24

## 2022-01-01 RX ADMIN — HYDROMORPHONE HYDROCHLORIDE 1 MG: 1 INJECTION, SOLUTION INTRAMUSCULAR; INTRAVENOUS; SUBCUTANEOUS at 15:48

## 2022-01-01 RX ADMIN — DEXAMETHASONE 4 MG: 4 TABLET ORAL at 07:40

## 2022-01-01 RX ADMIN — SODIUM CHLORIDE, PRESERVATIVE FREE 10 ML: 5 INJECTION INTRAVENOUS at 14:41

## 2022-01-01 RX ADMIN — HYDROMORPHONE HYDROCHLORIDE 4 MG: 2 TABLET ORAL at 20:34

## 2022-01-01 RX ADMIN — CALCIUM CARBONATE (ANTACID) CHEW TAB 500 MG 500 MG: 500 CHEW TAB at 13:29

## 2022-01-01 RX ADMIN — Medication 400 MG: at 21:42

## 2022-01-01 RX ADMIN — HYDROMORPHONE HYDROCHLORIDE 4 MG: 2 TABLET ORAL at 12:01

## 2022-01-01 RX ADMIN — SODIUM CHLORIDE, PRESERVATIVE FREE 10 ML: 5 INJECTION INTRAVENOUS at 21:08

## 2022-01-01 RX ADMIN — SENNOSIDES AND DOCUSATE SODIUM 2 TABLET: 8.6; 5 TABLET ORAL at 08:32

## 2022-01-01 RX ADMIN — LORAZEPAM 1 MG: 1 TABLET ORAL at 22:07

## 2022-01-01 RX ADMIN — HYDROMORPHONE HYDROCHLORIDE 4 MG: 2 TABLET ORAL at 22:54

## 2022-01-01 RX ADMIN — SODIUM CHLORIDE, PRESERVATIVE FREE 10 ML: 5 INJECTION INTRAVENOUS at 20:22

## 2022-01-01 RX ADMIN — BISACODYL 10 MG: 10 SUPPOSITORY RECTAL at 17:58

## 2022-01-01 RX ADMIN — PANTOPRAZOLE SODIUM 40 MG: 40 TABLET, DELAYED RELEASE ORAL at 08:11

## 2022-01-01 RX ADMIN — HYDROMORPHONE HYDROCHLORIDE 4 MG: 2 TABLET ORAL at 06:44

## 2022-01-01 RX ADMIN — HYDROMORPHONE HYDROCHLORIDE 1.5 MG: 2 INJECTION, SOLUTION INTRAMUSCULAR; INTRAVENOUS; SUBCUTANEOUS at 06:40

## 2022-01-01 RX ADMIN — HYDROMORPHONE HYDROCHLORIDE 4 MG: 2 TABLET ORAL at 08:08

## 2022-01-01 RX ADMIN — CYCLOBENZAPRINE 10 MG: 10 TABLET, FILM COATED ORAL at 22:09

## 2022-01-01 RX ADMIN — URINARY PAIN RELIEF 95 MG: 95 TABLET ORAL at 15:48

## 2022-01-01 RX ADMIN — HYDROMORPHONE HYDROCHLORIDE 4 MG: 2 TABLET ORAL at 01:09

## 2022-01-01 RX ADMIN — CYCLOBENZAPRINE 10 MG: 10 TABLET, FILM COATED ORAL at 18:15

## 2022-01-01 RX ADMIN — Medication 400 MG: at 22:32

## 2022-01-01 RX ADMIN — SODIUM CHLORIDE, POTASSIUM CHLORIDE, SODIUM LACTATE AND CALCIUM CHLORIDE 1000 ML: 600; 310; 30; 20 INJECTION, SOLUTION INTRAVENOUS at 19:31

## 2022-01-01 RX ADMIN — HYDROMORPHONE HYDROCHLORIDE 1 MG: 1 INJECTION, SOLUTION INTRAMUSCULAR; INTRAVENOUS; SUBCUTANEOUS at 07:20

## 2022-01-01 RX ADMIN — Medication 400 MG: at 08:11

## 2022-01-01 RX ADMIN — PANTOPRAZOLE SODIUM 40 MG: 40 TABLET, DELAYED RELEASE ORAL at 08:47

## 2022-01-01 RX ADMIN — DEXAMETHASONE 4 MG: 4 TABLET ORAL at 08:51

## 2022-01-01 RX ADMIN — Medication 400 MG: at 16:56

## 2022-01-01 RX ADMIN — SENNOSIDES AND DOCUSATE SODIUM 2 TABLET: 8.6; 5 TABLET ORAL at 09:21

## 2022-01-01 RX ADMIN — Medication 400 MG: at 08:03

## 2022-01-01 RX ADMIN — DEXAMETHASONE 2 MG: 4 TABLET ORAL at 20:31

## 2022-01-01 RX ADMIN — DEXAMETHASONE 4 MG: 4 TABLET ORAL at 20:34

## 2022-01-01 RX ADMIN — SODIUM CHLORIDE, PRESERVATIVE FREE 10 ML: 5 INJECTION INTRAVENOUS at 21:16

## 2022-01-01 RX ADMIN — Medication 400 MG: at 09:43

## 2022-01-01 RX ADMIN — Medication 400 MG: at 12:39

## 2022-01-01 RX ADMIN — CALCIUM CARBONATE (ANTACID) CHEW TAB 500 MG 500 MG: 500 CHEW TAB at 21:15

## 2022-01-01 RX ADMIN — POTASSIUM CHLORIDE 40 MEQ: 20 TABLET, EXTENDED RELEASE ORAL at 09:44

## 2022-01-01 RX ADMIN — DEXAMETHASONE 4 MG: 4 TABLET ORAL at 20:11

## 2022-01-01 RX ADMIN — HYDROMORPHONE HYDROCHLORIDE 2 MG: 2 TABLET ORAL at 12:41

## 2022-01-01 RX ADMIN — TAMSULOSIN HYDROCHLORIDE 0.4 MG: 0.4 CAPSULE ORAL at 07:59

## 2022-01-01 RX ADMIN — ENOXAPARIN SODIUM 40 MG: 100 INJECTION SUBCUTANEOUS at 08:23

## 2022-01-01 RX ADMIN — HYDROMORPHONE HYDROCHLORIDE 4 MG: 2 TABLET ORAL at 06:30

## 2022-01-01 RX ADMIN — SODIUM CHLORIDE, PRESERVATIVE FREE 10 ML: 5 INJECTION INTRAVENOUS at 08:12

## 2022-01-01 RX ADMIN — SODIUM CHLORIDE, PRESERVATIVE FREE 10 ML: 5 INJECTION INTRAVENOUS at 20:48

## 2022-01-01 RX ADMIN — TAMSULOSIN HYDROCHLORIDE 0.4 MG: 0.4 CAPSULE ORAL at 08:11

## 2022-01-01 RX ADMIN — HYDROMORPHONE HYDROCHLORIDE 4 MG: 2 TABLET ORAL at 17:25

## 2022-01-01 RX ADMIN — HYDROMORPHONE HYDROCHLORIDE 4 MG: 2 TABLET ORAL at 19:30

## 2022-01-01 RX ADMIN — TAMSULOSIN HYDROCHLORIDE 0.4 MG: 0.4 CAPSULE ORAL at 08:44

## 2022-01-01 RX ADMIN — SODIUM CHLORIDE, PRESERVATIVE FREE 5 ML: 5 INJECTION INTRAVENOUS at 21:30

## 2022-01-01 RX ADMIN — POTASSIUM CHLORIDE 40 MEQ: 20 TABLET, EXTENDED RELEASE ORAL at 20:26

## 2022-01-01 RX ADMIN — SENNOSIDES AND DOCUSATE SODIUM 2 TABLET: 8.6; 5 TABLET ORAL at 21:16

## 2022-01-01 RX ADMIN — Medication 400 MG: at 11:55

## 2022-01-01 RX ADMIN — Medication 400 MG: at 16:08

## 2022-01-01 RX ADMIN — HYDROMORPHONE HYDROCHLORIDE 4 MG: 2 TABLET ORAL at 02:58

## 2022-01-01 RX ADMIN — ONDANSETRON 4 MG: 2 INJECTION INTRAMUSCULAR; INTRAVENOUS at 10:42

## 2022-01-01 RX ADMIN — FINASTERIDE 5 MG: 5 TABLET, FILM COATED ORAL at 08:12

## 2022-01-01 RX ADMIN — ACETAMINOPHEN 650 MG: 325 TABLET, FILM COATED ORAL at 12:42

## 2022-01-01 RX ADMIN — LORAZEPAM 1 MG: 1 TABLET ORAL at 12:54

## 2022-01-01 RX ADMIN — PANTOPRAZOLE SODIUM 40 MG: 40 TABLET, DELAYED RELEASE ORAL at 08:57

## 2022-01-01 RX ADMIN — LORAZEPAM 1 MG: 1 TABLET ORAL at 09:36

## 2022-01-01 RX ADMIN — SODIUM CHLORIDE, PRESERVATIVE FREE 10 ML: 5 INJECTION INTRAVENOUS at 08:42

## 2022-01-01 RX ADMIN — SODIUM CHLORIDE, PRESERVATIVE FREE 10 ML: 5 INJECTION INTRAVENOUS at 08:51

## 2022-01-01 RX ADMIN — HYDROMORPHONE HYDROCHLORIDE 4 MG: 2 TABLET ORAL at 00:09

## 2022-01-01 RX ADMIN — MORPHINE SULFATE 4 MG: 4 INJECTION INTRAVENOUS at 14:40

## 2022-01-01 RX ADMIN — LORAZEPAM 1 MG: 1 TABLET ORAL at 22:10

## 2022-01-01 RX ADMIN — DEXAMETHASONE 4 MG: 4 TABLET ORAL at 20:59

## 2022-01-01 RX ADMIN — Medication 400 MG: at 20:45

## 2022-01-01 RX ADMIN — DEXAMETHASONE 4 MG: 4 TABLET ORAL at 20:16

## 2022-01-01 RX ADMIN — PANTOPRAZOLE SODIUM 40 MG: 40 TABLET, DELAYED RELEASE ORAL at 08:33

## 2022-01-01 RX ADMIN — Medication 400 MG: at 08:52

## 2022-01-01 RX ADMIN — NALOXEGOL OXALATE 25 MG: 25 TABLET, FILM COATED ORAL at 10:38

## 2022-01-01 RX ADMIN — FINASTERIDE 5 MG: 5 TABLET, FILM COATED ORAL at 07:40

## 2022-01-01 RX ADMIN — POTASSIUM CHLORIDE 40 MEQ: 20 TABLET, EXTENDED RELEASE ORAL at 13:31

## 2022-01-01 RX ADMIN — SODIUM CHLORIDE, PRESERVATIVE FREE 10 ML: 5 INJECTION INTRAVENOUS at 22:40

## 2022-01-01 RX ADMIN — NALOXEGOL OXALATE 12.5 MG: 12.5 TABLET, FILM COATED ORAL at 07:59

## 2022-01-01 RX ADMIN — Medication 400 MG: at 15:42

## 2022-01-01 RX ADMIN — Medication 400 MG: at 21:26

## 2022-01-01 RX ADMIN — CALCIUM CARBONATE (ANTACID) CHEW TAB 500 MG 500 MG: 500 CHEW TAB at 14:20

## 2022-01-01 RX ADMIN — SODIUM CHLORIDE, PRESERVATIVE FREE 10 ML: 5 INJECTION INTRAVENOUS at 08:04

## 2022-01-01 RX ADMIN — Medication 400 MG: at 13:34

## 2022-01-01 RX ADMIN — HYDROMORPHONE HYDROCHLORIDE 4 MG: 2 TABLET ORAL at 11:07

## 2022-01-01 RX ADMIN — TAMSULOSIN HYDROCHLORIDE 0.4 MG: 0.4 CAPSULE ORAL at 08:24

## 2022-01-01 RX ADMIN — SODIUM CHLORIDE, PRESERVATIVE FREE 10 ML: 5 INJECTION INTRAVENOUS at 08:31

## 2022-01-01 RX ADMIN — SENNOSIDES AND DOCUSATE SODIUM 2 TABLET: 8.6; 5 TABLET ORAL at 08:17

## 2022-01-01 RX ADMIN — HYDROMORPHONE HYDROCHLORIDE 2 MG: 2 TABLET ORAL at 17:32

## 2022-01-01 RX ADMIN — HYDROMORPHONE HYDROCHLORIDE 4 MG: 2 TABLET ORAL at 16:02

## 2022-01-01 RX ADMIN — HYDROMORPHONE HYDROCHLORIDE 4 MG: 2 TABLET ORAL at 00:44

## 2022-01-01 RX ADMIN — SODIUM CHLORIDE, PRESERVATIVE FREE 10 ML: 5 INJECTION INTRAVENOUS at 16:08

## 2022-01-01 RX ADMIN — HYDROMORPHONE HYDROCHLORIDE 4 MG: 2 TABLET ORAL at 22:49

## 2022-01-01 RX ADMIN — HYDROMORPHONE HYDROCHLORIDE 4 MG: 2 TABLET ORAL at 10:34

## 2022-01-01 RX ADMIN — HYDROMORPHONE HYDROCHLORIDE 4 MG: 2 TABLET ORAL at 10:12

## 2022-01-01 RX ADMIN — PANTOPRAZOLE SODIUM 40 MG: 40 TABLET, DELAYED RELEASE ORAL at 08:03

## 2022-01-01 RX ADMIN — HYDROMORPHONE HYDROCHLORIDE 0.5 MG: 1 INJECTION, SOLUTION INTRAMUSCULAR; INTRAVENOUS; SUBCUTANEOUS at 10:57

## 2022-01-01 RX ADMIN — SODIUM CHLORIDE, PRESERVATIVE FREE 10 ML: 5 INJECTION INTRAVENOUS at 20:38

## 2022-01-01 RX ADMIN — SODIUM CHLORIDE, PRESERVATIVE FREE 10 ML: 5 INJECTION INTRAVENOUS at 08:17

## 2022-01-01 RX ADMIN — HYDROMORPHONE HYDROCHLORIDE 0.25 MG: 1 INJECTION, SOLUTION INTRAMUSCULAR; INTRAVENOUS; SUBCUTANEOUS at 03:41

## 2022-01-01 RX ADMIN — LORAZEPAM 1 MG: 1 TABLET ORAL at 20:06

## 2022-01-01 RX ADMIN — FINASTERIDE 5 MG: 5 TABLET, FILM COATED ORAL at 08:56

## 2022-01-01 RX ADMIN — Medication 400 MG: at 17:57

## 2022-01-01 RX ADMIN — Medication 400 MG: at 16:41

## 2022-01-01 RX ADMIN — NALOXEGOL OXALATE 12.5 MG: 12.5 TABLET, FILM COATED ORAL at 11:45

## 2022-01-01 RX ADMIN — CALCIUM CARBONATE (ANTACID) CHEW TAB 500 MG 500 MG: 500 CHEW TAB at 21:08

## 2022-01-01 RX ADMIN — CALCIUM CARBONATE (ANTACID) CHEW TAB 500 MG 500 MG: 500 CHEW TAB at 20:37

## 2022-01-01 RX ADMIN — CALCIUM CARBONATE (ANTACID) CHEW TAB 500 MG 500 MG: 500 CHEW TAB at 08:24

## 2022-01-01 RX ADMIN — CALCIUM CARBONATE (ANTACID) CHEW TAB 500 MG 500 MG: 500 CHEW TAB at 20:06

## 2022-01-01 RX ADMIN — SODIUM CHLORIDE, PRESERVATIVE FREE 10 ML: 5 INJECTION INTRAVENOUS at 08:14

## 2022-01-01 RX ADMIN — DEXAMETHASONE 4 MG: 4 TABLET ORAL at 08:50

## 2022-01-01 RX ADMIN — NALOXEGOL OXALATE 12.5 MG: 12.5 TABLET, FILM COATED ORAL at 06:30

## 2022-01-01 RX ADMIN — SODIUM CHLORIDE, PRESERVATIVE FREE 10 ML: 5 INJECTION INTRAVENOUS at 21:52

## 2022-01-01 RX ADMIN — ALTEPLASE 1 MG: 2.2 INJECTION, POWDER, LYOPHILIZED, FOR SOLUTION INTRAVENOUS at 21:55

## 2022-01-01 RX ADMIN — SODIUM CHLORIDE, PRESERVATIVE FREE 10 ML: 5 INJECTION INTRAVENOUS at 20:12

## 2022-01-01 RX ADMIN — HYDROMORPHONE HYDROCHLORIDE 4 MG: 2 TABLET ORAL at 20:16

## 2022-01-01 RX ADMIN — DEXAMETHASONE 4 MG: 4 TABLET ORAL at 08:58

## 2022-01-01 RX ADMIN — SENNOSIDES AND DOCUSATE SODIUM 2 TABLET: 8.6; 5 TABLET ORAL at 08:24

## 2022-01-01 RX ADMIN — HYDROMORPHONE HYDROCHLORIDE 1 MG: 1 INJECTION, SOLUTION INTRAMUSCULAR; INTRAVENOUS; SUBCUTANEOUS at 23:07

## 2022-01-01 RX ADMIN — HYDROMORPHONE HYDROCHLORIDE 4 MG: 2 TABLET ORAL at 10:11

## 2022-01-01 RX ADMIN — Medication 400 MG: at 12:16

## 2022-01-01 RX ADMIN — HYDROMORPHONE HYDROCHLORIDE 4 MG: 2 TABLET ORAL at 12:43

## 2022-01-01 RX ADMIN — FINASTERIDE 5 MG: 5 TABLET, FILM COATED ORAL at 08:58

## 2022-01-01 RX ADMIN — LORAZEPAM 1 MG: 1 TABLET ORAL at 17:03

## 2022-01-01 RX ADMIN — LACTULOSE 20 G: 20 SOLUTION ORAL at 14:20

## 2022-01-01 RX ADMIN — POLYETHYLENE GLYCOL 3350 17 G: 17 POWDER, FOR SOLUTION ORAL at 07:54

## 2022-01-01 RX ADMIN — CALCIUM CARBONATE (ANTACID) CHEW TAB 500 MG 500 MG: 500 CHEW TAB at 20:53

## 2022-01-01 RX ADMIN — HYDROMORPHONE HYDROCHLORIDE 1.5 MG: 2 INJECTION, SOLUTION INTRAMUSCULAR; INTRAVENOUS; SUBCUTANEOUS at 00:21

## 2022-01-01 RX ADMIN — ENOXAPARIN SODIUM 40 MG: 100 INJECTION SUBCUTANEOUS at 08:24

## 2022-01-01 RX ADMIN — CEFTRIAXONE 1000 MG: 1 INJECTION, POWDER, FOR SOLUTION INTRAMUSCULAR; INTRAVENOUS at 14:57

## 2022-01-01 RX ADMIN — NALOXEGOL OXALATE 25 MG: 25 TABLET, FILM COATED ORAL at 08:31

## 2022-01-01 RX ADMIN — Medication 400 MG: at 16:24

## 2022-01-01 RX ADMIN — HYDROMORPHONE HYDROCHLORIDE 1.5 MG: 2 INJECTION, SOLUTION INTRAMUSCULAR; INTRAVENOUS; SUBCUTANEOUS at 21:13

## 2022-01-01 RX ADMIN — DEXAMETHASONE 4 MG: 4 TABLET ORAL at 21:26

## 2022-01-01 RX ADMIN — Medication 400 MG: at 12:43

## 2022-01-01 RX ADMIN — FINASTERIDE 5 MG: 5 TABLET, FILM COATED ORAL at 08:22

## 2022-01-01 RX ADMIN — HYDROMORPHONE HYDROCHLORIDE 4 MG: 2 TABLET ORAL at 11:54

## 2022-01-01 RX ADMIN — PANTOPRAZOLE SODIUM 40 MG: 40 TABLET, DELAYED RELEASE ORAL at 08:12

## 2022-01-01 RX ADMIN — DEXAMETHASONE 4 MG: 4 TABLET ORAL at 22:32

## 2022-01-01 RX ADMIN — NALOXEGOL OXALATE 25 MG: 25 TABLET, FILM COATED ORAL at 06:14

## 2022-01-01 RX ADMIN — Medication 400 MG: at 12:42

## 2022-01-01 RX ADMIN — HYDROMORPHONE HYDROCHLORIDE 1 MG: 1 INJECTION, SOLUTION INTRAMUSCULAR; INTRAVENOUS; SUBCUTANEOUS at 11:16

## 2022-01-01 RX ADMIN — HYDROMORPHONE HYDROCHLORIDE 4 MG: 2 TABLET ORAL at 23:43

## 2022-01-01 RX ADMIN — TAMSULOSIN HYDROCHLORIDE 0.4 MG: 0.4 CAPSULE ORAL at 08:57

## 2022-01-01 RX ADMIN — LORAZEPAM 1 MG: 1 TABLET ORAL at 13:24

## 2022-01-01 RX ADMIN — HYDROMORPHONE HYDROCHLORIDE 4 MG: 2 TABLET ORAL at 21:22

## 2022-01-01 RX ADMIN — CYCLOBENZAPRINE 10 MG: 10 TABLET, FILM COATED ORAL at 21:08

## 2022-01-01 RX ADMIN — SODIUM CHLORIDE, PRESERVATIVE FREE 10 ML: 5 INJECTION INTRAVENOUS at 08:58

## 2022-01-01 RX ADMIN — PANTOPRAZOLE SODIUM 40 MG: 40 TABLET, DELAYED RELEASE ORAL at 06:22

## 2022-01-01 RX ADMIN — SODIUM CHLORIDE, PRESERVATIVE FREE 10 ML: 5 INJECTION INTRAVENOUS at 21:31

## 2022-01-01 RX ADMIN — MAGNESIUM SULFATE HEPTAHYDRATE 2000 MG: 40 INJECTION, SOLUTION INTRAVENOUS at 10:10

## 2022-01-01 RX ADMIN — NALOXEGOL OXALATE 25 MG: 25 TABLET, FILM COATED ORAL at 08:22

## 2022-01-01 RX ADMIN — TAMSULOSIN HYDROCHLORIDE 0.4 MG: 0.4 CAPSULE ORAL at 08:01

## 2022-01-01 RX ADMIN — CALCIUM CARBONATE (ANTACID) CHEW TAB 500 MG 500 MG: 500 CHEW TAB at 08:14

## 2022-01-01 RX ADMIN — HYDROMORPHONE HYDROCHLORIDE 2 MG: 2 TABLET ORAL at 14:38

## 2022-01-01 RX ADMIN — DEXAMETHASONE 4 MG: 4 TABLET ORAL at 08:07

## 2022-01-01 RX ADMIN — TAMSULOSIN HYDROCHLORIDE 0.4 MG: 0.4 CAPSULE ORAL at 08:32

## 2022-01-01 RX ADMIN — DEXAMETHASONE 4 MG: 4 TABLET ORAL at 19:47

## 2022-01-01 RX ADMIN — CALCIUM CARBONATE (ANTACID) CHEW TAB 500 MG 500 MG: 500 CHEW TAB at 15:59

## 2022-01-01 RX ADMIN — CYCLOBENZAPRINE 10 MG: 10 TABLET, FILM COATED ORAL at 12:51

## 2022-01-01 RX ADMIN — DEXAMETHASONE 4 MG: 4 TABLET ORAL at 20:27

## 2022-01-01 RX ADMIN — DEXAMETHASONE 4 MG: 4 TABLET ORAL at 21:43

## 2022-01-01 RX ADMIN — PANTOPRAZOLE SODIUM 40 MG: 40 TABLET, DELAYED RELEASE ORAL at 06:14

## 2022-01-01 RX ADMIN — POLYETHYLENE GLYCOL 3350 17 G: 17 POWDER, FOR SOLUTION ORAL at 08:49

## 2022-01-01 RX ADMIN — Medication 400 MG: at 20:24

## 2022-01-01 RX ADMIN — TAMSULOSIN HYDROCHLORIDE 0.4 MG: 0.4 CAPSULE ORAL at 08:22

## 2022-01-01 RX ADMIN — DEXAMETHASONE 4 MG: 4 TABLET ORAL at 08:15

## 2022-01-01 RX ADMIN — HYDROMORPHONE HYDROCHLORIDE 1 MG: 1 INJECTION, SOLUTION INTRAMUSCULAR; INTRAVENOUS; SUBCUTANEOUS at 14:14

## 2022-01-01 RX ADMIN — Medication 400 MG: at 16:50

## 2022-01-01 RX ADMIN — DIPHENHYDRAMINE HYDROCHLORIDE 25 MG: 25 CAPSULE ORAL at 12:42

## 2022-01-01 RX ADMIN — CALCIUM CARBONATE (ANTACID) CHEW TAB 500 MG 500 MG: 500 CHEW TAB at 21:13

## 2022-01-01 RX ADMIN — SENNOSIDES AND DOCUSATE SODIUM 2 TABLET: 8.6; 5 TABLET ORAL at 20:27

## 2022-01-01 RX ADMIN — Medication 400 MG: at 21:28

## 2022-01-01 RX ADMIN — SODIUM CHLORIDE, PRESERVATIVE FREE 10 ML: 5 INJECTION INTRAVENOUS at 08:55

## 2022-01-01 RX ADMIN — CYCLOBENZAPRINE 10 MG: 10 TABLET, FILM COATED ORAL at 23:01

## 2022-01-01 RX ADMIN — HYDROMORPHONE HYDROCHLORIDE 4 MG: 2 TABLET ORAL at 14:52

## 2022-01-01 RX ADMIN — TAMSULOSIN HYDROCHLORIDE 0.4 MG: 0.4 CAPSULE ORAL at 08:17

## 2022-01-01 RX ADMIN — PANTOPRAZOLE SODIUM 40 MG: 40 TABLET, DELAYED RELEASE ORAL at 08:41

## 2022-01-01 RX ADMIN — SENNOSIDES AND DOCUSATE SODIUM 2 TABLET: 8.6; 5 TABLET ORAL at 08:03

## 2022-01-01 RX ADMIN — HYDROMORPHONE HYDROCHLORIDE 1 MG: 1 INJECTION, SOLUTION INTRAMUSCULAR; INTRAVENOUS; SUBCUTANEOUS at 09:08

## 2022-01-01 RX ADMIN — SODIUM CHLORIDE, PRESERVATIVE FREE 10 ML: 5 INJECTION INTRAVENOUS at 20:34

## 2022-01-01 RX ADMIN — SODIUM CHLORIDE: 9 INJECTION, SOLUTION INTRAVENOUS at 09:45

## 2022-01-01 RX ADMIN — Medication 400 MG: at 08:10

## 2022-01-01 RX ADMIN — CALCIUM CARBONATE (ANTACID) CHEW TAB 500 MG 500 MG: 500 CHEW TAB at 13:49

## 2022-01-01 RX ADMIN — SODIUM CHLORIDE, PRESERVATIVE FREE 10 ML: 5 INJECTION INTRAVENOUS at 20:59

## 2022-01-01 RX ADMIN — NALOXEGOL OXALATE 12.5 MG: 12.5 TABLET, FILM COATED ORAL at 08:13

## 2022-01-01 RX ADMIN — MORPHINE SULFATE 4 MG: 4 INJECTION INTRAVENOUS at 09:54

## 2022-01-01 RX ADMIN — OXYCODONE 10 MG: 5 TABLET ORAL at 06:47

## 2022-01-01 RX ADMIN — HYDROMORPHONE HYDROCHLORIDE 1 MG: 1 INJECTION, SOLUTION INTRAMUSCULAR; INTRAVENOUS; SUBCUTANEOUS at 16:48

## 2022-01-01 RX ADMIN — CYCLOBENZAPRINE 10 MG: 10 TABLET, FILM COATED ORAL at 05:35

## 2022-01-01 RX ADMIN — NALOXEGOL OXALATE 12.5 MG: 12.5 TABLET, FILM COATED ORAL at 08:49

## 2022-01-01 RX ADMIN — ACETAMINOPHEN 650 MG: 325 TABLET, FILM COATED ORAL at 15:14

## 2022-01-01 RX ADMIN — CARBOPLATIN 598.8 MG: 10 INJECTION, SOLUTION INTRAVENOUS at 12:13

## 2022-01-01 RX ADMIN — CALCIUM CARBONATE (ANTACID) CHEW TAB 500 MG 500 MG: 500 CHEW TAB at 21:42

## 2022-01-01 RX ADMIN — Medication 400 MG: at 08:43

## 2022-01-01 RX ADMIN — SODIUM CHLORIDE, PRESERVATIVE FREE 10 ML: 5 INJECTION INTRAVENOUS at 22:24

## 2022-01-01 RX ADMIN — NALOXEGOL OXALATE 25 MG: 25 TABLET, FILM COATED ORAL at 08:03

## 2022-01-01 RX ADMIN — FENTANYL CITRATE 20 ML: 50 INJECTION INTRAVENOUS at 20:37

## 2022-01-01 RX ADMIN — NALOXEGOL OXALATE 25 MG: 25 TABLET, FILM COATED ORAL at 06:22

## 2022-01-01 RX ADMIN — TAMSULOSIN HYDROCHLORIDE 0.4 MG: 0.4 CAPSULE ORAL at 08:03

## 2022-01-01 RX ADMIN — CEFTRIAXONE 1000 MG: 1 INJECTION, POWDER, FOR SOLUTION INTRAMUSCULAR; INTRAVENOUS at 11:13

## 2022-01-01 RX ADMIN — CEFTRIAXONE 1000 MG: 1 INJECTION, POWDER, FOR SOLUTION INTRAMUSCULAR; INTRAVENOUS at 15:33

## 2022-01-01 RX ADMIN — DEXAMETHASONE 4 MG: 4 TABLET ORAL at 20:22

## 2022-01-01 RX ADMIN — HYDROMORPHONE HYDROCHLORIDE 1 MG: 1 INJECTION, SOLUTION INTRAMUSCULAR; INTRAVENOUS; SUBCUTANEOUS at 20:47

## 2022-01-01 RX ADMIN — SODIUM CHLORIDE, PRESERVATIVE FREE 5 ML: 5 INJECTION INTRAVENOUS at 21:14

## 2022-01-01 RX ADMIN — DEXAMETHASONE 4 MG: 4 TABLET ORAL at 09:43

## 2022-01-01 RX ADMIN — LORAZEPAM 1 MG: 1 TABLET ORAL at 06:46

## 2022-01-01 RX ADMIN — HYDROMORPHONE HYDROCHLORIDE 4 MG: 2 TABLET ORAL at 04:05

## 2022-01-01 RX ADMIN — HYDROMORPHONE HYDROCHLORIDE 4 MG: 2 TABLET ORAL at 04:00

## 2022-01-01 RX ADMIN — HYDROMORPHONE HYDROCHLORIDE 4 MG: 2 TABLET ORAL at 21:31

## 2022-01-01 RX ADMIN — DIPHENHYDRAMINE HYDROCHLORIDE 25 MG: 25 CAPSULE ORAL at 15:50

## 2022-01-01 RX ADMIN — Medication 400 MG: at 08:31

## 2022-01-01 RX ADMIN — HYDROMORPHONE HYDROCHLORIDE 1 MG: 1 INJECTION, SOLUTION INTRAMUSCULAR; INTRAVENOUS; SUBCUTANEOUS at 10:37

## 2022-01-01 RX ADMIN — HYDROMORPHONE HYDROCHLORIDE 4 MG: 2 TABLET ORAL at 00:39

## 2022-01-01 RX ADMIN — SENNOSIDES AND DOCUSATE SODIUM 2 TABLET: 8.6; 5 TABLET ORAL at 08:41

## 2022-01-01 RX ADMIN — DIPHENHYDRAMINE HYDROCHLORIDE 25 MG: 25 CAPSULE ORAL at 10:18

## 2022-01-01 RX ADMIN — CYCLOBENZAPRINE 10 MG: 10 TABLET, FILM COATED ORAL at 11:07

## 2022-01-01 RX ADMIN — POLYETHYLENE GLYCOL 3350 17 G: 17 POWDER, FOR SOLUTION ORAL at 10:33

## 2022-01-01 RX ADMIN — HYDROMORPHONE HYDROCHLORIDE 1 MG: 1 INJECTION, SOLUTION INTRAMUSCULAR; INTRAVENOUS; SUBCUTANEOUS at 20:15

## 2022-01-01 RX ADMIN — DEXAMETHASONE 4 MG: 4 TABLET ORAL at 08:33

## 2022-01-01 RX ADMIN — LORAZEPAM 1 MG: 1 TABLET ORAL at 19:46

## 2022-01-01 RX ADMIN — DIPHENHYDRAMINE HYDROCHLORIDE 25 MG: 25 CAPSULE ORAL at 15:15

## 2022-01-01 RX ADMIN — HYDROMORPHONE HYDROCHLORIDE 1 MG: 1 INJECTION, SOLUTION INTRAMUSCULAR; INTRAVENOUS; SUBCUTANEOUS at 09:23

## 2022-01-01 RX ADMIN — SODIUM CHLORIDE, PRESERVATIVE FREE 10 ML: 5 INJECTION INTRAVENOUS at 20:28

## 2022-01-01 RX ADMIN — SODIUM CHLORIDE, PRESERVATIVE FREE 10 ML: 5 INJECTION INTRAVENOUS at 08:47

## 2022-01-01 RX ADMIN — LACTULOSE 20 G: 20 SOLUTION ORAL at 08:42

## 2022-01-01 RX ADMIN — DEXAMETHASONE 4 MG: 4 TABLET ORAL at 06:30

## 2022-01-01 RX ADMIN — PANTOPRAZOLE SODIUM 40 MG: 40 TABLET, DELAYED RELEASE ORAL at 08:58

## 2022-01-01 RX ADMIN — CALCIUM CARBONATE (ANTACID) CHEW TAB 500 MG 500 MG: 500 CHEW TAB at 14:25

## 2022-01-01 RX ADMIN — LORAZEPAM 1 MG: 1 TABLET ORAL at 02:18

## 2022-01-01 RX ADMIN — DEXAMETHASONE 4 MG: 4 TABLET ORAL at 08:40

## 2022-01-01 RX ADMIN — DIPHENHYDRAMINE HYDROCHLORIDE 25 MG: 25 CAPSULE ORAL at 02:15

## 2022-01-01 RX ADMIN — FINASTERIDE 5 MG: 5 TABLET, FILM COATED ORAL at 08:07

## 2022-01-01 RX ADMIN — POLYETHYLENE GLYCOL 3350 17 G: 17 POWDER, FOR SOLUTION ORAL at 07:53

## 2022-01-01 RX ADMIN — SODIUM CHLORIDE, PRESERVATIVE FREE 10 ML: 5 INJECTION INTRAVENOUS at 22:56

## 2022-01-01 RX ADMIN — SODIUM CHLORIDE, PRESERVATIVE FREE 10 ML: 5 INJECTION INTRAVENOUS at 20:11

## 2022-01-01 RX ADMIN — Medication 400 MG: at 08:57

## 2022-01-01 RX ADMIN — SODIUM CHLORIDE, PRESERVATIVE FREE 10 ML: 5 INJECTION INTRAVENOUS at 07:41

## 2022-01-01 RX ADMIN — SODIUM CHLORIDE, PRESERVATIVE FREE 10 ML: 5 INJECTION INTRAVENOUS at 08:33

## 2022-01-01 RX ADMIN — Medication 400 MG: at 13:00

## 2022-01-01 RX ADMIN — DEXAMETHASONE 4 MG: 4 TABLET ORAL at 20:42

## 2022-01-01 RX ADMIN — DEXAMETHASONE 4 MG: 4 TABLET ORAL at 08:47

## 2022-01-01 RX ADMIN — POLYETHYLENE GLYCOL 3350 17 G: 17 POWDER, FOR SOLUTION ORAL at 09:00

## 2022-01-01 RX ADMIN — Medication 400 MG: at 22:16

## 2022-01-01 RX ADMIN — FINASTERIDE 5 MG: 5 TABLET, FILM COATED ORAL at 08:47

## 2022-01-01 RX ADMIN — CEFTRIAXONE 1000 MG: 1 INJECTION, POWDER, FOR SOLUTION INTRAMUSCULAR; INTRAVENOUS at 09:49

## 2022-01-01 RX ADMIN — CYCLOBENZAPRINE 10 MG: 10 TABLET, FILM COATED ORAL at 01:32

## 2022-01-01 RX ADMIN — LORAZEPAM 1 MG: 1 TABLET ORAL at 20:48

## 2022-01-01 RX ADMIN — LORAZEPAM 1 MG: 1 TABLET ORAL at 21:53

## 2022-01-01 RX ADMIN — DEXAMETHASONE 4 MG: 4 TABLET ORAL at 08:25

## 2022-01-01 RX ADMIN — CALCIUM CARBONATE (ANTACID) CHEW TAB 500 MG 500 MG: 500 CHEW TAB at 20:15

## 2022-01-01 RX ADMIN — TAMSULOSIN HYDROCHLORIDE 0.4 MG: 0.4 CAPSULE ORAL at 08:40

## 2022-01-01 RX ADMIN — HYDROMORPHONE HYDROCHLORIDE 4 MG: 2 TABLET ORAL at 19:45

## 2022-01-01 RX ADMIN — CALCIUM CARBONATE (ANTACID) CHEW TAB 500 MG 500 MG: 500 CHEW TAB at 21:28

## 2022-01-01 RX ADMIN — HYDROMORPHONE HYDROCHLORIDE 1 MG: 1 INJECTION, SOLUTION INTRAMUSCULAR; INTRAVENOUS; SUBCUTANEOUS at 06:37

## 2022-01-01 RX ADMIN — ALTEPLASE 1 MG: 2.2 INJECTION, POWDER, LYOPHILIZED, FOR SOLUTION INTRAVENOUS at 19:42

## 2022-01-01 RX ADMIN — LORAZEPAM 1 MG: 1 TABLET ORAL at 21:16

## 2022-01-01 RX ADMIN — SODIUM CHLORIDE, PRESERVATIVE FREE 20 ML: 5 INJECTION INTRAVENOUS at 10:17

## 2022-01-01 RX ADMIN — SENNOSIDES AND DOCUSATE SODIUM 2 TABLET: 8.6; 5 TABLET ORAL at 22:09

## 2022-01-01 RX ADMIN — PANTOPRAZOLE SODIUM 40 MG: 40 TABLET, DELAYED RELEASE ORAL at 08:18

## 2022-01-01 RX ADMIN — FINASTERIDE 5 MG: 5 TABLET, FILM COATED ORAL at 08:34

## 2022-01-01 RX ADMIN — HYDROMORPHONE HYDROCHLORIDE 2 MG: 2 TABLET ORAL at 05:32

## 2022-01-01 RX ADMIN — LORAZEPAM 1 MG: 1 TABLET ORAL at 06:01

## 2022-01-01 RX ADMIN — DROPERIDOL 2.5 MG: 2.5 INJECTION, SOLUTION INTRAMUSCULAR; INTRAVENOUS at 19:24

## 2022-01-01 RX ADMIN — DIPHENHYDRAMINE HYDROCHLORIDE 25 MG: 25 CAPSULE ORAL at 10:19

## 2022-01-01 RX ADMIN — DEXAMETHASONE 4 MG: 4 TABLET ORAL at 08:57

## 2022-01-01 RX ADMIN — CALCIUM CARBONATE (ANTACID) CHEW TAB 500 MG 500 MG: 500 CHEW TAB at 14:53

## 2022-01-01 RX ADMIN — Medication 400 MG: at 20:21

## 2022-01-01 RX ADMIN — FENTANYL CITRATE 20 ML: 50 INJECTION INTRAVENOUS at 02:10

## 2022-01-01 RX ADMIN — SODIUM CHLORIDE, PRESERVATIVE FREE 10 ML: 5 INJECTION INTRAVENOUS at 08:02

## 2022-01-01 RX ADMIN — CYCLOBENZAPRINE 10 MG: 10 TABLET, FILM COATED ORAL at 22:10

## 2022-01-01 RX ADMIN — SODIUM CHLORIDE, PRESERVATIVE FREE 10 ML: 5 INJECTION INTRAVENOUS at 20:42

## 2022-01-01 RX ADMIN — SODIUM CHLORIDE, PRESERVATIVE FREE 10 ML: 5 INJECTION INTRAVENOUS at 21:21

## 2022-01-01 RX ADMIN — CEFTRIAXONE 1000 MG: 1 INJECTION, POWDER, FOR SOLUTION INTRAMUSCULAR; INTRAVENOUS at 10:00

## 2022-01-01 RX ADMIN — FINASTERIDE 5 MG: 5 TABLET, FILM COATED ORAL at 08:01

## 2022-01-01 RX ADMIN — DEXAMETHASONE 4 MG: 4 TABLET ORAL at 20:24

## 2022-01-01 RX ADMIN — TAMSULOSIN HYDROCHLORIDE 0.4 MG: 0.4 CAPSULE ORAL at 08:47

## 2022-01-01 RX ADMIN — HYDROMORPHONE HYDROCHLORIDE 4 MG: 2 TABLET ORAL at 22:28

## 2022-01-01 RX ADMIN — CALCIUM CARBONATE (ANTACID) CHEW TAB 500 MG 500 MG: 500 CHEW TAB at 20:27

## 2022-01-01 RX ADMIN — SODIUM CHLORIDE, PRESERVATIVE FREE 5 ML: 5 INJECTION INTRAVENOUS at 20:59

## 2022-01-01 RX ADMIN — LORAZEPAM 1 MG: 1 TABLET ORAL at 23:35

## 2022-01-01 RX ADMIN — ACETAMINOPHEN 650 MG: 325 TABLET, FILM COATED ORAL at 10:18

## 2022-01-01 RX ADMIN — FINASTERIDE 5 MG: 5 TABLET, FILM COATED ORAL at 08:24

## 2022-01-01 RX ADMIN — PANTOPRAZOLE SODIUM 40 MG: 40 TABLET, DELAYED RELEASE ORAL at 08:15

## 2022-01-01 RX ADMIN — DEXAMETHASONE 4 MG: 4 TABLET ORAL at 08:13

## 2022-01-01 RX ADMIN — CYCLOBENZAPRINE 10 MG: 10 TABLET, FILM COATED ORAL at 19:59

## 2022-01-01 RX ADMIN — SODIUM CHLORIDE, PRESERVATIVE FREE 10 ML: 5 INJECTION INTRAVENOUS at 08:25

## 2022-01-01 RX ADMIN — Medication 400 MG: at 17:15

## 2022-01-01 RX ADMIN — PANTOPRAZOLE SODIUM 40 MG: 40 TABLET, DELAYED RELEASE ORAL at 08:52

## 2022-01-01 RX ADMIN — Medication 400 MG: at 15:05

## 2022-01-01 RX ADMIN — SENNOSIDES AND DOCUSATE SODIUM 2 TABLET: 8.6; 5 TABLET ORAL at 21:26

## 2022-01-01 RX ADMIN — LORAZEPAM 1 MG: 1 TABLET ORAL at 13:34

## 2022-01-01 RX ADMIN — PANTOPRAZOLE SODIUM 40 MG: 40 TABLET, DELAYED RELEASE ORAL at 06:05

## 2022-01-01 RX ADMIN — CALCIUM CARBONATE (ANTACID) CHEW TAB 500 MG 500 MG: 500 CHEW TAB at 15:28

## 2022-01-01 RX ADMIN — NALOXEGOL OXALATE 25 MG: 25 TABLET, FILM COATED ORAL at 09:26

## 2022-01-01 RX ADMIN — CALCIUM CARBONATE (ANTACID) CHEW TAB 500 MG 500 MG: 500 CHEW TAB at 08:47

## 2022-01-01 RX ADMIN — CALCIUM CARBONATE (ANTACID) CHEW TAB 500 MG 500 MG: 500 CHEW TAB at 14:54

## 2022-01-01 RX ADMIN — SODIUM CHLORIDE, PRESERVATIVE FREE 10 ML: 5 INJECTION INTRAVENOUS at 20:47

## 2022-01-01 RX ADMIN — HYDROMORPHONE HYDROCHLORIDE 4 MG: 2 TABLET ORAL at 17:37

## 2022-01-01 RX ADMIN — HYDROMORPHONE HYDROCHLORIDE 4 MG: 2 TABLET ORAL at 08:57

## 2022-01-01 RX ADMIN — NALOXEGOL OXALATE 12.5 MG: 12.5 TABLET, FILM COATED ORAL at 08:00

## 2022-01-01 RX ADMIN — SENNOSIDES AND DOCUSATE SODIUM 2 TABLET: 8.6; 5 TABLET ORAL at 21:28

## 2022-01-01 RX ADMIN — LORAZEPAM 1 MG: 1 TABLET ORAL at 18:14

## 2022-01-01 RX ADMIN — HYDROMORPHONE HYDROCHLORIDE 4 MG: 2 TABLET ORAL at 23:00

## 2022-01-01 RX ADMIN — SENNOSIDES AND DOCUSATE SODIUM 2 TABLET: 8.6; 5 TABLET ORAL at 08:22

## 2022-01-01 RX ADMIN — PANTOPRAZOLE SODIUM 40 MG: 40 TABLET, DELAYED RELEASE ORAL at 05:57

## 2022-01-01 RX ADMIN — HYDROMORPHONE HYDROCHLORIDE 4 MG: 2 TABLET ORAL at 19:40

## 2022-01-01 RX ADMIN — HYDROMORPHONE HYDROCHLORIDE 4 MG: 2 TABLET ORAL at 11:46

## 2022-01-01 RX ADMIN — TUBERCULIN PURIFIED PROTEIN DERIVATIVE 5 UNITS: 5 INJECTION, SOLUTION INTRADERMAL at 12:52

## 2022-01-01 RX ADMIN — DEXAMETHASONE 4 MG: 4 TABLET ORAL at 08:14

## 2022-01-01 RX ADMIN — HYDROMORPHONE HYDROCHLORIDE 1 MG: 1 INJECTION, SOLUTION INTRAMUSCULAR; INTRAVENOUS; SUBCUTANEOUS at 00:54

## 2022-01-01 RX ADMIN — CALCIUM CARBONATE (ANTACID) CHEW TAB 500 MG 500 MG: 500 CHEW TAB at 09:25

## 2022-01-01 RX ADMIN — SODIUM CHLORIDE, PRESERVATIVE FREE 10 ML: 5 INJECTION INTRAVENOUS at 08:08

## 2022-01-01 RX ADMIN — Medication 1 MG: at 23:03

## 2022-01-01 RX ADMIN — FINASTERIDE 5 MG: 5 TABLET, FILM COATED ORAL at 08:31

## 2022-01-01 RX ADMIN — ACETAMINOPHEN 650 MG: 325 TABLET ORAL at 22:39

## 2022-01-01 RX ADMIN — CEFTRIAXONE 1000 MG: 1 INJECTION, POWDER, FOR SOLUTION INTRAMUSCULAR; INTRAVENOUS at 09:45

## 2022-01-01 RX ADMIN — MORPHINE SULFATE 4 MG: 4 INJECTION INTRAVENOUS at 04:58

## 2022-01-01 RX ADMIN — CALCIUM CARBONATE (ANTACID) CHEW TAB 500 MG 500 MG: 500 CHEW TAB at 08:31

## 2022-01-01 RX ADMIN — HYDROMORPHONE HYDROCHLORIDE 4 MG: 2 TABLET ORAL at 07:19

## 2022-01-01 RX ADMIN — CYCLOBENZAPRINE 10 MG: 10 TABLET, FILM COATED ORAL at 22:40

## 2022-01-01 RX ADMIN — NALOXEGOL OXALATE 12.5 MG: 12.5 TABLET, FILM COATED ORAL at 08:43

## 2022-01-01 RX ADMIN — CALCIUM CARBONATE (ANTACID) CHEW TAB 500 MG 500 MG: 500 CHEW TAB at 08:33

## 2022-01-01 RX ADMIN — CALCIUM CARBONATE (ANTACID) CHEW TAB 500 MG 500 MG: 500 CHEW TAB at 19:59

## 2022-01-01 RX ADMIN — HYDROMORPHONE HYDROCHLORIDE 0.5 MG: 1 INJECTION, SOLUTION INTRAMUSCULAR; INTRAVENOUS; SUBCUTANEOUS at 05:21

## 2022-01-01 RX ADMIN — SENNOSIDES AND DOCUSATE SODIUM 2 TABLET: 8.6; 5 TABLET ORAL at 07:57

## 2022-01-01 RX ADMIN — CYCLOBENZAPRINE 10 MG: 10 TABLET, FILM COATED ORAL at 07:19

## 2022-01-01 RX ADMIN — HYDROMORPHONE HYDROCHLORIDE 4 MG: 2 TABLET ORAL at 21:15

## 2022-01-01 RX ADMIN — FINASTERIDE 5 MG: 5 TABLET, FILM COATED ORAL at 08:43

## 2022-01-01 RX ADMIN — HYDROMORPHONE HYDROCHLORIDE 0.5 MG: 1 INJECTION, SOLUTION INTRAMUSCULAR; INTRAVENOUS; SUBCUTANEOUS at 01:46

## 2022-01-01 RX ADMIN — Medication 400 MG: at 08:47

## 2022-01-01 RX ADMIN — DEXAMETHASONE 4 MG: 4 TABLET ORAL at 21:28

## 2022-01-01 RX ADMIN — Medication 400 MG: at 09:22

## 2022-01-01 RX ADMIN — ENOXAPARIN SODIUM 40 MG: 100 INJECTION SUBCUTANEOUS at 08:49

## 2022-01-01 RX ADMIN — SODIUM CHLORIDE, PRESERVATIVE FREE 5 ML: 5 INJECTION INTRAVENOUS at 08:14

## 2022-01-01 RX ADMIN — HYDROMORPHONE HYDROCHLORIDE 1.5 MG: 2 INJECTION, SOLUTION INTRAMUSCULAR; INTRAVENOUS; SUBCUTANEOUS at 20:51

## 2022-01-01 RX ADMIN — TAMSULOSIN HYDROCHLORIDE 0.4 MG: 0.4 CAPSULE ORAL at 08:00

## 2022-01-01 RX ADMIN — HYDROMORPHONE HYDROCHLORIDE 4 MG: 2 TABLET ORAL at 03:10

## 2022-01-01 RX ADMIN — SODIUM CHLORIDE, PRESERVATIVE FREE 5 ML: 5 INJECTION INTRAVENOUS at 08:07

## 2022-01-01 RX ADMIN — Medication 400 MG: at 16:20

## 2022-01-01 RX ADMIN — HYDROMORPHONE HYDROCHLORIDE 4 MG: 2 TABLET ORAL at 04:41

## 2022-01-01 RX ADMIN — SODIUM CHLORIDE, PRESERVATIVE FREE 20 ML: 5 INJECTION INTRAVENOUS at 08:10

## 2022-01-01 RX ADMIN — HYDROMORPHONE HYDROCHLORIDE 1 MG: 1 INJECTION, SOLUTION INTRAMUSCULAR; INTRAVENOUS; SUBCUTANEOUS at 17:17

## 2022-01-01 RX ADMIN — DEXAMETHASONE 2 MG: 4 TABLET ORAL at 19:59

## 2022-01-01 RX ADMIN — ACETAMINOPHEN 650 MG: 325 TABLET, FILM COATED ORAL at 03:56

## 2022-01-01 RX ADMIN — LACTULOSE 20 G: 20 SOLUTION ORAL at 16:02

## 2022-01-01 RX ADMIN — SODIUM CHLORIDE, PRESERVATIVE FREE 10 ML: 5 INJECTION INTRAVENOUS at 08:24

## 2022-01-01 RX ADMIN — HYDROMORPHONE HYDROCHLORIDE 4 MG: 2 TABLET ORAL at 18:40

## 2022-01-01 RX ADMIN — TAMSULOSIN HYDROCHLORIDE 0.4 MG: 0.4 CAPSULE ORAL at 08:31

## 2022-01-01 RX ADMIN — NALOXEGOL OXALATE 25 MG: 25 TABLET, FILM COATED ORAL at 06:10

## 2022-01-01 RX ADMIN — Medication 400 MG: at 08:33

## 2022-01-01 RX ADMIN — Medication 400 MG: at 13:36

## 2022-01-01 RX ADMIN — Medication 400 MG: at 12:36

## 2022-01-01 RX ADMIN — HYDROMORPHONE HYDROCHLORIDE 4 MG: 2 TABLET ORAL at 03:09

## 2022-01-01 RX ADMIN — CALCIUM CARBONATE (ANTACID) CHEW TAB 500 MG 500 MG: 500 CHEW TAB at 08:26

## 2022-01-01 RX ADMIN — ENOXAPARIN SODIUM 40 MG: 100 INJECTION SUBCUTANEOUS at 08:47

## 2022-01-01 RX ADMIN — POLYETHYLENE GLYCOL 3350 17 G: 17 POWDER, FOR SOLUTION ORAL at 08:10

## 2022-01-01 RX ADMIN — SENNOSIDES AND DOCUSATE SODIUM 2 TABLET: 8.6; 5 TABLET ORAL at 08:46

## 2022-01-01 RX ADMIN — SENNOSIDES AND DOCUSATE SODIUM 2 TABLET: 8.6; 5 TABLET ORAL at 08:50

## 2022-01-01 RX ADMIN — DEXAMETHASONE 4 MG: 4 TABLET ORAL at 22:16

## 2022-01-01 RX ADMIN — DOCETAXEL ANHYDROUS 161 MG: 10 INJECTION, SOLUTION INTRAVENOUS at 11:06

## 2022-01-01 RX ADMIN — DEXAMETHASONE 4 MG: 4 TABLET ORAL at 08:22

## 2022-01-01 RX ADMIN — SODIUM CHLORIDE, PRESERVATIVE FREE 5 ML: 5 INJECTION INTRAVENOUS at 08:31

## 2022-01-01 RX ADMIN — CALCIUM CARBONATE (ANTACID) CHEW TAB 500 MG 500 MG: 500 CHEW TAB at 21:52

## 2022-01-01 RX ADMIN — Medication 400 MG: at 14:30

## 2022-01-01 RX ADMIN — HYDROMORPHONE HYDROCHLORIDE 0.25 MG: 1 INJECTION, SOLUTION INTRAMUSCULAR; INTRAVENOUS; SUBCUTANEOUS at 17:11

## 2022-01-01 RX ADMIN — NALOXEGOL OXALATE 25 MG: 25 TABLET, FILM COATED ORAL at 08:54

## 2022-01-01 RX ADMIN — HYDROMORPHONE HYDROCHLORIDE 4 MG: 2 TABLET ORAL at 21:08

## 2022-01-01 RX ADMIN — DEXAMETHASONE 4 MG: 4 TABLET ORAL at 22:34

## 2022-01-01 RX ADMIN — PANTOPRAZOLE SODIUM 40 MG: 40 TABLET, DELAYED RELEASE ORAL at 09:21

## 2022-01-01 RX ADMIN — Medication 400 MG: at 12:13

## 2022-01-01 RX ADMIN — SODIUM CHLORIDE, PRESERVATIVE FREE 10 ML: 5 INJECTION INTRAVENOUS at 08:43

## 2022-01-01 RX ADMIN — LORAZEPAM 1 MG: 1 TABLET ORAL at 11:12

## 2022-01-01 RX ADMIN — Medication 400 MG: at 12:03

## 2022-01-01 RX ADMIN — Medication 400 MG: at 21:05

## 2022-01-01 RX ADMIN — CYCLOBENZAPRINE 10 MG: 10 TABLET, FILM COATED ORAL at 01:36

## 2022-01-01 RX ADMIN — Medication 400 MG: at 12:53

## 2022-01-01 RX ADMIN — PANTOPRAZOLE SODIUM 40 MG: 40 TABLET, DELAYED RELEASE ORAL at 08:14

## 2022-01-01 RX ADMIN — HYDROMORPHONE HYDROCHLORIDE 4 MG: 2 TABLET ORAL at 20:01

## 2022-01-01 RX ADMIN — DOCUSATE SODIUM 100 MG: 100 CAPSULE, LIQUID FILLED ORAL at 14:06

## 2022-01-01 RX ADMIN — HYDROMORPHONE HYDROCHLORIDE 4 MG: 2 TABLET ORAL at 21:29

## 2022-01-01 RX ADMIN — HYDROMORPHONE HYDROCHLORIDE 2 MG: 2 TABLET ORAL at 10:06

## 2022-01-01 RX ADMIN — HYDROMORPHONE HYDROCHLORIDE 1 MG: 1 INJECTION, SOLUTION INTRAMUSCULAR; INTRAVENOUS; SUBCUTANEOUS at 12:58

## 2022-01-01 RX ADMIN — HYDROMORPHONE HYDROCHLORIDE 4 MG: 2 TABLET ORAL at 02:20

## 2022-01-01 RX ADMIN — NALOXEGOL OXALATE 25 MG: 25 TABLET, FILM COATED ORAL at 06:30

## 2022-01-01 RX ADMIN — DEXAMETHASONE 4 MG: 4 TABLET ORAL at 08:42

## 2022-01-01 RX ADMIN — HYDROMORPHONE HYDROCHLORIDE 4 MG: 2 TABLET ORAL at 05:30

## 2022-01-01 RX ADMIN — SODIUM CHLORIDE, PRESERVATIVE FREE 10 ML: 5 INJECTION INTRAVENOUS at 09:23

## 2022-01-01 RX ADMIN — CALCIUM CARBONATE (ANTACID) CHEW TAB 500 MG 500 MG: 500 CHEW TAB at 07:39

## 2022-01-01 RX ADMIN — FINASTERIDE 5 MG: 5 TABLET, FILM COATED ORAL at 09:22

## 2022-01-01 RX ADMIN — TAMSULOSIN HYDROCHLORIDE 0.4 MG: 0.4 CAPSULE ORAL at 08:52

## 2022-01-01 RX ADMIN — NALOXEGOL OXALATE 25 MG: 25 TABLET, FILM COATED ORAL at 05:57

## 2022-01-01 RX ADMIN — CYCLOBENZAPRINE 10 MG: 10 TABLET, FILM COATED ORAL at 07:59

## 2022-01-01 RX ADMIN — LORAZEPAM 1 MG: 1 TABLET ORAL at 04:58

## 2022-01-01 RX ADMIN — SODIUM CHLORIDE, PRESERVATIVE FREE 10 ML: 5 INJECTION INTRAVENOUS at 08:59

## 2022-01-01 RX ADMIN — HYDROMORPHONE HYDROCHLORIDE 4 MG: 2 TABLET ORAL at 08:32

## 2022-01-01 RX ADMIN — Medication 400 MG: at 16:26

## 2022-01-01 RX ADMIN — FINASTERIDE 5 MG: 5 TABLET, FILM COATED ORAL at 08:42

## 2022-01-01 RX ADMIN — TAMSULOSIN HYDROCHLORIDE 0.4 MG: 0.4 CAPSULE ORAL at 08:06

## 2022-01-01 RX ADMIN — DEXAMETHASONE 4 MG: 4 TABLET ORAL at 08:02

## 2022-01-01 RX ADMIN — HYDROMORPHONE HYDROCHLORIDE 1 MG: 1 INJECTION, SOLUTION INTRAMUSCULAR; INTRAVENOUS; SUBCUTANEOUS at 04:56

## 2022-01-01 RX ADMIN — SENNOSIDES AND DOCUSATE SODIUM 2 TABLET: 8.6; 5 TABLET ORAL at 08:56

## 2022-01-01 RX ADMIN — Medication 400 MG: at 16:31

## 2022-01-01 RX ADMIN — POLYETHYLENE GLYCOL 3350 17 G: 17 POWDER, FOR SOLUTION ORAL at 08:31

## 2022-01-01 RX ADMIN — Medication 400 MG: at 19:30

## 2022-01-01 RX ADMIN — ACETAMINOPHEN 650 MG: 325 TABLET ORAL at 15:50

## 2022-01-01 RX ADMIN — MAGNESIUM SULFATE HEPTAHYDRATE 2000 MG: 40 INJECTION, SOLUTION INTRAVENOUS at 08:57

## 2022-01-01 RX ADMIN — SODIUM CHLORIDE, PRESERVATIVE FREE 10 ML: 5 INJECTION INTRAVENOUS at 22:32

## 2022-01-01 RX ADMIN — HYDROMORPHONE HYDROCHLORIDE 4 MG: 2 TABLET ORAL at 16:38

## 2022-01-01 RX ADMIN — DEXAMETHASONE 4 MG: 4 TABLET ORAL at 20:06

## 2022-01-01 RX ADMIN — POLYETHYLENE GLYCOL 3350 17 G: 17 POWDER, FOR SOLUTION ORAL at 08:22

## 2022-01-01 RX ADMIN — SODIUM CHLORIDE, PRESERVATIVE FREE 10 ML: 5 INJECTION INTRAVENOUS at 20:25

## 2022-01-01 RX ADMIN — ACETAMINOPHEN 650 MG: 325 TABLET, FILM COATED ORAL at 12:39

## 2022-01-01 RX ADMIN — LORAZEPAM 1 MG: 1 TABLET ORAL at 08:13

## 2022-01-01 RX ADMIN — ENOXAPARIN SODIUM 40 MG: 100 INJECTION SUBCUTANEOUS at 10:28

## 2022-01-01 RX ADMIN — LORAZEPAM 1 MG: 1 TABLET ORAL at 21:13

## 2022-01-01 RX ADMIN — Medication 400 MG: at 08:17

## 2022-01-01 RX ADMIN — SENNOSIDES AND DOCUSATE SODIUM 2 TABLET: 8.6; 5 TABLET ORAL at 08:08

## 2022-01-01 RX ADMIN — CYCLOBENZAPRINE 10 MG: 10 TABLET, FILM COATED ORAL at 09:36

## 2022-01-01 RX ADMIN — SODIUM CHLORIDE, PRESERVATIVE FREE 10 ML: 5 INJECTION INTRAVENOUS at 08:34

## 2022-01-01 RX ADMIN — FINASTERIDE 5 MG: 5 TABLET, FILM COATED ORAL at 08:17

## 2022-01-01 RX ADMIN — ENOXAPARIN SODIUM 40 MG: 100 INJECTION SUBCUTANEOUS at 08:57

## 2022-01-01 RX ADMIN — CYCLOBENZAPRINE 10 MG: 10 TABLET, FILM COATED ORAL at 20:04

## 2022-01-01 RX ADMIN — SENNOSIDES AND DOCUSATE SODIUM 2 TABLET: 8.6; 5 TABLET ORAL at 08:25

## 2022-01-01 RX ADMIN — ACETAMINOPHEN 650 MG: 325 TABLET, FILM COATED ORAL at 13:18

## 2022-01-01 RX ADMIN — HYDROMORPHONE HYDROCHLORIDE 2 MG: 2 TABLET ORAL at 08:00

## 2022-01-01 RX ADMIN — LORAZEPAM 1 MG: 1 TABLET ORAL at 15:44

## 2022-01-01 RX ADMIN — MORPHINE SULFATE 4 MG: 4 INJECTION INTRAVENOUS at 22:40

## 2022-01-01 RX ADMIN — Medication 400 MG: at 20:42

## 2022-01-01 RX ADMIN — CALCIUM CARBONATE (ANTACID) CHEW TAB 500 MG 500 MG: 500 CHEW TAB at 14:07

## 2022-01-01 RX ADMIN — Medication 400 MG: at 08:01

## 2022-01-01 RX ADMIN — HYDROMORPHONE HYDROCHLORIDE 1 MG: 1 INJECTION, SOLUTION INTRAMUSCULAR; INTRAVENOUS; SUBCUTANEOUS at 10:42

## 2022-01-01 RX ADMIN — FINASTERIDE 5 MG: 5 TABLET, FILM COATED ORAL at 08:02

## 2022-01-01 RX ADMIN — Medication 400 MG: at 08:02

## 2022-01-01 RX ADMIN — DEXAMETHASONE 4 MG: 4 TABLET ORAL at 08:17

## 2022-01-01 RX ADMIN — Medication 400 MG: at 17:03

## 2022-01-01 RX ADMIN — PANTOPRAZOLE SODIUM 40 MG: 40 TABLET, DELAYED RELEASE ORAL at 08:26

## 2022-01-01 RX ADMIN — HYDROMORPHONE HYDROCHLORIDE 4 MG: 2 TABLET ORAL at 12:24

## 2022-01-01 RX ADMIN — ONDANSETRON 4 MG: 2 INJECTION INTRAMUSCULAR; INTRAVENOUS at 17:17

## 2022-01-01 RX ADMIN — HYDROMORPHONE HYDROCHLORIDE 1 MG: 1 INJECTION, SOLUTION INTRAMUSCULAR; INTRAVENOUS; SUBCUTANEOUS at 18:33

## 2022-01-01 RX ADMIN — DEXAMETHASONE 2 MG: 4 TABLET ORAL at 20:05

## 2022-01-01 RX ADMIN — HYDROMORPHONE HYDROCHLORIDE 4 MG: 2 TABLET ORAL at 20:59

## 2022-01-01 RX ADMIN — PANTOPRAZOLE SODIUM 40 MG: 40 TABLET, DELAYED RELEASE ORAL at 08:22

## 2022-01-01 RX ADMIN — NALOXEGOL OXALATE 25 MG: 25 TABLET, FILM COATED ORAL at 06:05

## 2022-01-01 RX ADMIN — DEXAMETHASONE 4 MG: 4 TABLET ORAL at 08:34

## 2022-01-01 RX ADMIN — CYCLOBENZAPRINE 10 MG: 10 TABLET, FILM COATED ORAL at 18:14

## 2022-01-01 RX ADMIN — Medication 400 MG: at 15:17

## 2022-01-01 RX ADMIN — CALCIUM CARBONATE (ANTACID) CHEW TAB 500 MG 500 MG: 500 CHEW TAB at 14:39

## 2022-01-01 RX ADMIN — OXYCODONE 10 MG: 5 TABLET ORAL at 17:32

## 2022-01-01 RX ADMIN — SODIUM CHLORIDE, PRESERVATIVE FREE 10 ML: 5 INJECTION INTRAVENOUS at 20:08

## 2022-01-01 RX ADMIN — POLYETHYLENE GLYCOL 3350 17 G: 17 POWDER, FOR SOLUTION ORAL at 09:20

## 2022-01-01 RX ADMIN — SENNOSIDES AND DOCUSATE SODIUM 2 TABLET: 8.6; 5 TABLET ORAL at 21:13

## 2022-01-01 RX ADMIN — ZOLEDRONIC ACID 4 MG: 0.04 INJECTION, SOLUTION INTRAVENOUS at 12:43

## 2022-01-01 RX ADMIN — LORAZEPAM 1 MG: 1 TABLET ORAL at 01:32

## 2022-01-01 RX ADMIN — SODIUM CHLORIDE, PRESERVATIVE FREE 10 ML: 5 INJECTION INTRAVENOUS at 21:14

## 2022-01-01 RX ADMIN — DEXAMETHASONE 4 MG: 4 TABLET ORAL at 08:19

## 2022-01-01 RX ADMIN — HYDROMORPHONE HYDROCHLORIDE 0.5 MG: 1 INJECTION, SOLUTION INTRAMUSCULAR; INTRAVENOUS; SUBCUTANEOUS at 15:30

## 2022-01-01 RX ADMIN — Medication 400 MG: at 20:11

## 2022-01-01 RX ADMIN — FENTANYL CITRATE 20 ML: 50 INJECTION INTRAVENOUS at 08:09

## 2022-01-01 RX ADMIN — Medication 400 MG: at 20:37

## 2022-01-01 RX ADMIN — CYCLOBENZAPRINE 10 MG: 10 TABLET, FILM COATED ORAL at 21:26

## 2022-01-01 RX ADMIN — HYDROMORPHONE HYDROCHLORIDE 4 MG: 2 TABLET ORAL at 13:59

## 2022-01-01 RX ADMIN — CALCIUM CARBONATE (ANTACID) CHEW TAB 500 MG 500 MG: 500 CHEW TAB at 15:33

## 2022-01-01 RX ADMIN — Medication 400 MG: at 22:34

## 2022-01-01 RX ADMIN — DEXAMETHASONE 4 MG: 4 TABLET ORAL at 21:05

## 2022-01-01 RX ADMIN — TAMSULOSIN HYDROCHLORIDE 0.4 MG: 0.4 CAPSULE ORAL at 07:40

## 2022-01-01 RX ADMIN — OXYCODONE 10 MG: 5 TABLET ORAL at 21:10

## 2022-01-01 RX ADMIN — Medication 400 MG: at 11:47

## 2022-01-01 RX ADMIN — SENNOSIDES AND DOCUSATE SODIUM 2 TABLET: 8.6; 5 TABLET ORAL at 07:39

## 2022-01-01 RX ADMIN — Medication 400 MG: at 21:52

## 2022-01-01 RX ADMIN — DEXAMETHASONE 4 MG: 4 TABLET ORAL at 21:08

## 2022-01-01 RX ADMIN — DEXAMETHASONE 2 MG: 4 TABLET ORAL at 20:41

## 2022-01-01 RX ADMIN — CALCIUM CARBONATE (ANTACID) CHEW TAB 500 MG 500 MG: 500 CHEW TAB at 14:09

## 2022-01-01 RX ADMIN — CALCIUM CARBONATE (ANTACID) CHEW TAB 500 MG 500 MG: 500 CHEW TAB at 08:56

## 2022-01-01 RX ADMIN — LORAZEPAM 1 MG: 1 TABLET ORAL at 20:34

## 2022-01-01 RX ADMIN — OXYCODONE 10 MG: 5 TABLET ORAL at 01:06

## 2022-01-01 RX ADMIN — Medication 400 MG: at 15:56

## 2022-01-01 RX ADMIN — HYDROMORPHONE HYDROCHLORIDE 4 MG: 2 TABLET ORAL at 05:34

## 2022-01-01 RX ADMIN — Medication 400 MG: at 21:13

## 2022-01-01 RX ADMIN — HYDROMORPHONE HYDROCHLORIDE 4 MG: 2 TABLET ORAL at 03:44

## 2022-01-01 RX ADMIN — SODIUM CHLORIDE, PRESERVATIVE FREE 10 ML: 5 INJECTION INTRAVENOUS at 12:55

## 2022-01-01 RX ADMIN — CALCIUM CARBONATE (ANTACID) CHEW TAB 500 MG 500 MG: 500 CHEW TAB at 14:44

## 2022-01-01 RX ADMIN — HYDROMORPHONE HYDROCHLORIDE 4 MG: 2 TABLET ORAL at 20:31

## 2022-01-01 RX ADMIN — DEXAMETHASONE 4 MG: 4 TABLET ORAL at 08:31

## 2022-01-01 RX ADMIN — SODIUM CHLORIDE, PRESERVATIVE FREE 10 ML: 5 INJECTION INTRAVENOUS at 08:38

## 2022-01-01 RX ADMIN — TAMSULOSIN HYDROCHLORIDE 0.4 MG: 0.4 CAPSULE ORAL at 08:42

## 2022-01-01 RX ADMIN — SODIUM CHLORIDE 100 ML: 900 INJECTION, SOLUTION INTRAVENOUS at 16:08

## 2022-01-01 RX ADMIN — CYCLOBENZAPRINE 10 MG: 10 TABLET, FILM COATED ORAL at 16:47

## 2022-01-01 RX ADMIN — LORAZEPAM 1 MG: 1 TABLET ORAL at 08:34

## 2022-01-01 RX ADMIN — DEXAMETHASONE 4 MG: 4 TABLET ORAL at 21:16

## 2022-01-01 RX ADMIN — CYCLOBENZAPRINE 10 MG: 10 TABLET, FILM COATED ORAL at 20:58

## 2022-01-01 RX ADMIN — DEXAMETHASONE 4 MG: 4 TABLET ORAL at 21:24

## 2022-01-01 RX ADMIN — SODIUM CHLORIDE, PRESERVATIVE FREE 10 ML: 5 INJECTION INTRAVENOUS at 00:13

## 2022-01-01 RX ADMIN — DEXAMETHASONE 4 MG: 4 TABLET ORAL at 22:09

## 2022-01-01 RX ADMIN — Medication 400 MG: at 20:16

## 2022-01-01 RX ADMIN — Medication 400 MG: at 22:09

## 2022-01-01 RX ADMIN — Medication 400 MG: at 20:53

## 2022-01-01 RX ADMIN — SODIUM CHLORIDE, PRESERVATIVE FREE 5 ML: 5 INJECTION INTRAVENOUS at 09:12

## 2022-01-01 RX ADMIN — Medication 400 MG: at 08:24

## 2022-01-01 RX ADMIN — Medication 400 MG: at 15:45

## 2022-01-01 RX ADMIN — Medication 400 MG: at 15:55

## 2022-01-01 RX ADMIN — SODIUM CHLORIDE, PRESERVATIVE FREE 10 ML: 5 INJECTION INTRAVENOUS at 11:50

## 2022-01-01 RX ADMIN — HYDROMORPHONE HYDROCHLORIDE 1.5 MG: 2 INJECTION, SOLUTION INTRAMUSCULAR; INTRAVENOUS; SUBCUTANEOUS at 17:58

## 2022-01-01 RX ADMIN — DEXAMETHASONE 4 MG: 4 TABLET ORAL at 09:22

## 2022-01-01 RX ADMIN — CALCIUM CARBONATE (ANTACID) CHEW TAB 500 MG 500 MG: 500 CHEW TAB at 13:36

## 2022-01-01 RX ADMIN — HYDROMORPHONE HYDROCHLORIDE 4 MG: 2 TABLET ORAL at 19:00

## 2022-01-01 RX ADMIN — HYDROMORPHONE HYDROCHLORIDE 4 MG: 2 TABLET ORAL at 23:32

## 2022-01-01 RX ADMIN — Medication 400 MG: at 20:59

## 2022-01-01 RX ADMIN — POLYETHYLENE GLYCOL 3350 17 G: 17 POWDER, FOR SOLUTION ORAL at 08:23

## 2022-01-01 RX ADMIN — URINARY PAIN RELIEF 95 MG: 95 TABLET ORAL at 09:45

## 2022-01-01 RX ADMIN — CYCLOBENZAPRINE 10 MG: 10 TABLET, FILM COATED ORAL at 01:22

## 2022-01-01 RX ADMIN — SENNOSIDES AND DOCUSATE SODIUM 2 TABLET: 8.6; 5 TABLET ORAL at 21:52

## 2022-01-01 RX ADMIN — SODIUM CHLORIDE, PRESERVATIVE FREE 10 ML: 5 INJECTION INTRAVENOUS at 03:40

## 2022-01-01 RX ADMIN — LORAZEPAM 1 MG: 1 TABLET ORAL at 12:03

## 2022-01-01 RX ADMIN — SENNOSIDES AND DOCUSATE SODIUM 2 TABLET: 8.6; 5 TABLET ORAL at 20:06

## 2022-01-01 RX ADMIN — DEXAMETHASONE 4 MG: 4 TABLET ORAL at 20:51

## 2022-01-01 RX ADMIN — SODIUM CHLORIDE, PRESERVATIVE FREE 10 ML: 5 INJECTION INTRAVENOUS at 00:11

## 2022-01-01 RX ADMIN — Medication 400 MG: at 20:31

## 2022-01-01 RX ADMIN — POLYETHYLENE GLYCOL 3350 17 G: 17 POWDER, FOR SOLUTION ORAL at 07:37

## 2022-01-01 RX ADMIN — HYDROMORPHONE HYDROCHLORIDE 0.5 MG: 1 INJECTION, SOLUTION INTRAMUSCULAR; INTRAVENOUS; SUBCUTANEOUS at 20:26

## 2022-01-01 RX ADMIN — SENNOSIDES AND DOCUSATE SODIUM 2 TABLET: 8.6; 5 TABLET ORAL at 08:31

## 2022-01-01 RX ADMIN — DEXAMETHASONE 2 MG: 4 TABLET ORAL at 20:53

## 2022-01-01 RX ADMIN — FINASTERIDE 5 MG: 5 TABLET, FILM COATED ORAL at 08:52

## 2022-01-01 RX ADMIN — Medication 400 MG: at 08:59

## 2022-01-01 RX ADMIN — HYDROMORPHONE HYDROCHLORIDE 4 MG: 2 TABLET ORAL at 20:45

## 2022-01-01 RX ADMIN — FINASTERIDE 5 MG: 5 TABLET, FILM COATED ORAL at 07:57

## 2022-01-01 RX ADMIN — FINASTERIDE 5 MG: 5 TABLET, FILM COATED ORAL at 08:11

## 2022-01-01 RX ADMIN — OXYCODONE 10 MG: 5 TABLET ORAL at 08:10

## 2022-01-01 RX ADMIN — LACTULOSE 20 G: 20 SOLUTION ORAL at 18:16

## 2022-01-01 RX ADMIN — SENNOSIDES AND DOCUSATE SODIUM 2 TABLET: 8.6; 5 TABLET ORAL at 20:24

## 2022-01-01 RX ADMIN — SODIUM CHLORIDE, PRESERVATIVE FREE 5 ML: 5 INJECTION INTRAVENOUS at 21:11

## 2022-01-01 RX ADMIN — LORAZEPAM 1 MG: 1 TABLET ORAL at 09:45

## 2022-01-01 RX ADMIN — CALCIUM CARBONATE (ANTACID) CHEW TAB 500 MG 500 MG: 500 CHEW TAB at 20:24

## 2022-01-01 RX ADMIN — MAGNESIUM SULFATE HEPTAHYDRATE 1000 MG: 1 INJECTION, SOLUTION INTRAVENOUS at 11:20

## 2022-01-01 RX ADMIN — SODIUM CHLORIDE, PRESERVATIVE FREE 5 ML: 5 INJECTION INTRAVENOUS at 08:32

## 2022-01-01 RX ADMIN — HYDROMORPHONE HYDROCHLORIDE 4 MG: 2 TABLET ORAL at 21:26

## 2022-01-01 RX ADMIN — HYDROMORPHONE HYDROCHLORIDE 4 MG: 2 TABLET ORAL at 21:25

## 2022-01-01 RX ADMIN — HYDROMORPHONE HYDROCHLORIDE 4 MG: 2 TABLET ORAL at 12:07

## 2022-01-01 RX ADMIN — TAMSULOSIN HYDROCHLORIDE 0.4 MG: 0.4 CAPSULE ORAL at 08:26

## 2022-01-01 RX ADMIN — CALCIUM CARBONATE (ANTACID) CHEW TAB 500 MG 500 MG: 500 CHEW TAB at 13:46

## 2022-01-01 RX ADMIN — HYDROMORPHONE HYDROCHLORIDE 4 MG: 2 TABLET ORAL at 15:58

## 2022-01-01 RX ADMIN — DIPHENHYDRAMINE HYDROCHLORIDE 25 MG: 25 CAPSULE ORAL at 03:00

## 2022-01-01 RX ADMIN — DEXAMETHASONE 4 MG: 4 TABLET ORAL at 21:52

## 2022-01-01 RX ADMIN — CALCIUM CARBONATE (ANTACID) CHEW TAB 500 MG 500 MG: 500 CHEW TAB at 08:13

## 2022-01-01 RX ADMIN — NALOXEGOL OXALATE 12.5 MG: 12.5 TABLET, FILM COATED ORAL at 08:22

## 2022-01-01 RX ADMIN — CALCIUM CARBONATE (ANTACID) CHEW TAB 500 MG 500 MG: 500 CHEW TAB at 13:47

## 2022-01-01 RX ADMIN — Medication 400 MG: at 21:38

## 2022-01-01 RX ADMIN — HYDROMORPHONE HYDROCHLORIDE 4 MG: 2 TABLET ORAL at 09:09

## 2022-01-01 RX ADMIN — CALCIUM CARBONATE (ANTACID) CHEW TAB 500 MG 500 MG: 500 CHEW TAB at 21:05

## 2022-01-01 RX ADMIN — HYDROMORPHONE HYDROCHLORIDE 4 MG: 2 TABLET ORAL at 15:48

## 2022-01-01 RX ADMIN — OXYCODONE 10 MG: 5 TABLET ORAL at 19:45

## 2022-01-01 RX ADMIN — CALCIUM CARBONATE (ANTACID) CHEW TAB 500 MG 500 MG: 500 CHEW TAB at 15:55

## 2022-01-01 RX ADMIN — PANTOPRAZOLE SODIUM 40 MG: 40 TABLET, DELAYED RELEASE ORAL at 08:24

## 2022-01-01 RX ADMIN — SENNOSIDES AND DOCUSATE SODIUM 2 TABLET: 8.6; 5 TABLET ORAL at 08:01

## 2022-01-01 RX ADMIN — HYDROMORPHONE HYDROCHLORIDE 4 MG: 2 TABLET ORAL at 12:36

## 2022-01-01 RX ADMIN — Medication 400 MG: at 11:12

## 2022-01-01 RX ADMIN — SENNOSIDES AND DOCUSATE SODIUM 2 TABLET: 8.6; 5 TABLET ORAL at 20:21

## 2022-01-01 RX ADMIN — SODIUM CHLORIDE, PRESERVATIVE FREE 10 ML: 5 INJECTION INTRAVENOUS at 22:29

## 2022-01-01 RX ADMIN — DIPHENHYDRAMINE HYDROCHLORIDE 25 MG: 50 INJECTION, SOLUTION INTRAMUSCULAR; INTRAVENOUS at 19:24

## 2022-01-01 RX ADMIN — POLYETHYLENE GLYCOL 3350 17 G: 17 POWDER, FOR SOLUTION ORAL at 08:58

## 2022-01-01 RX ADMIN — HYDROMORPHONE HYDROCHLORIDE 4 MG: 2 TABLET ORAL at 03:41

## 2022-01-01 RX ADMIN — HYDROMORPHONE HYDROCHLORIDE 4 MG: 2 TABLET ORAL at 16:50

## 2022-01-01 RX ADMIN — SODIUM CHLORIDE, PRESERVATIVE FREE 10 ML: 5 INJECTION INTRAVENOUS at 20:30

## 2022-01-01 RX ADMIN — HYDROMORPHONE HYDROCHLORIDE 4 MG: 2 TABLET ORAL at 15:08

## 2022-01-01 RX ADMIN — SODIUM CHLORIDE, PRESERVATIVE FREE 10 ML: 5 INJECTION INTRAVENOUS at 22:28

## 2022-01-01 RX ADMIN — POLYETHYLENE GLYCOL 3350 17 G: 17 POWDER, FOR SOLUTION ORAL at 08:34

## 2022-01-01 RX ADMIN — CALCIUM CARBONATE (ANTACID) CHEW TAB 500 MG 500 MG: 500 CHEW TAB at 20:45

## 2022-01-01 RX ADMIN — HYDROMORPHONE HYDROCHLORIDE 1 MG: 1 INJECTION, SOLUTION INTRAMUSCULAR; INTRAVENOUS; SUBCUTANEOUS at 01:55

## 2022-01-01 RX ADMIN — DEXAMETHASONE 4 MG: 4 TABLET ORAL at 08:12

## 2022-01-01 RX ADMIN — DEXAMETHASONE 4 MG: 4 TABLET ORAL at 12:13

## 2022-01-01 RX ADMIN — Medication 400 MG: at 09:00

## 2022-01-01 RX ADMIN — SENNOSIDES AND DOCUSATE SODIUM 2 TABLET: 8.6; 5 TABLET ORAL at 08:06

## 2022-01-01 RX ADMIN — Medication 400 MG: at 19:47

## 2022-01-01 RX ADMIN — NALOXEGOL OXALATE 25 MG: 25 TABLET, FILM COATED ORAL at 08:06

## 2022-01-01 RX ADMIN — CYCLOBENZAPRINE 10 MG: 10 TABLET, FILM COATED ORAL at 22:55

## 2022-01-01 RX ADMIN — Medication 400 MG: at 08:22

## 2022-01-01 RX ADMIN — DEXAMETHASONE 4 MG: 4 TABLET ORAL at 08:52

## 2022-01-01 RX ADMIN — HYDROMORPHONE HYDROCHLORIDE 4 MG: 2 TABLET ORAL at 08:25

## 2022-01-01 RX ADMIN — OXYCODONE 10 MG: 5 TABLET ORAL at 11:29

## 2022-01-01 RX ADMIN — DEXAMETHASONE 4 MG: 4 TABLET ORAL at 20:37

## 2022-01-01 RX ADMIN — FINASTERIDE 5 MG: 5 TABLET, FILM COATED ORAL at 08:26

## 2022-01-01 RX ADMIN — ACETAMINOPHEN 650 MG: 325 TABLET, FILM COATED ORAL at 12:51

## 2022-01-01 RX ADMIN — SODIUM CHLORIDE, PRESERVATIVE FREE 20 ML: 5 INJECTION INTRAVENOUS at 09:54

## 2022-01-01 RX ADMIN — HYDROMORPHONE HYDROCHLORIDE 4 MG: 2 TABLET ORAL at 09:21

## 2022-01-01 RX ADMIN — DIPHENHYDRAMINE HYDROCHLORIDE 25 MG: 25 CAPSULE ORAL at 12:39

## 2022-01-01 RX ADMIN — HYDROMORPHONE HYDROCHLORIDE 4 MG: 2 TABLET ORAL at 22:40

## 2022-01-01 RX ADMIN — PEGFILGRASTIM 6 MG: 6 INJECTION SUBCUTANEOUS at 16:38

## 2022-01-01 RX ADMIN — PANTOPRAZOLE SODIUM 40 MG: 40 TABLET, DELAYED RELEASE ORAL at 08:34

## 2022-01-01 RX ADMIN — CALCIUM CARBONATE (ANTACID) CHEW TAB 500 MG 500 MG: 500 CHEW TAB at 22:09

## 2022-01-01 RX ADMIN — Medication 400 MG: at 15:15

## 2022-01-01 RX ADMIN — POLYETHYLENE GLYCOL 3350 17 G: 17 POWDER, FOR SOLUTION ORAL at 08:18

## 2022-01-01 RX ADMIN — Medication 400 MG: at 21:27

## 2022-01-01 RX ADMIN — HYDROMORPHONE HYDROCHLORIDE 4 MG: 2 TABLET ORAL at 15:23

## 2022-01-01 RX ADMIN — HYDROMORPHONE HYDROCHLORIDE 4 MG: 2 TABLET ORAL at 08:22

## 2022-01-01 RX ADMIN — LORAZEPAM 0.5 MG: 1 TABLET ORAL at 19:07

## 2022-01-01 RX ADMIN — CYCLOBENZAPRINE 10 MG: 10 TABLET, FILM COATED ORAL at 20:51

## 2022-01-01 RX ADMIN — POLYETHYLENE GLYCOL 3350 17 G: 17 POWDER, FOR SOLUTION ORAL at 08:40

## 2022-01-01 RX ADMIN — LORAZEPAM 1 MG: 1 TABLET ORAL at 17:00

## 2022-01-01 RX ADMIN — HYDROMORPHONE HYDROCHLORIDE 1 MG: 1 INJECTION, SOLUTION INTRAMUSCULAR; INTRAVENOUS; SUBCUTANEOUS at 05:58

## 2022-01-01 RX ADMIN — CYCLOBENZAPRINE 10 MG: 10 TABLET, FILM COATED ORAL at 23:49

## 2022-01-01 RX ADMIN — DEXAMETHASONE 4 MG: 4 TABLET ORAL at 07:58

## 2022-01-01 RX ADMIN — HYDROMORPHONE HYDROCHLORIDE 4 MG: 2 TABLET ORAL at 06:14

## 2022-01-01 RX ADMIN — MAGNESIUM SULFATE HEPTAHYDRATE 2000 MG: 40 INJECTION, SOLUTION INTRAVENOUS at 11:25

## 2022-01-01 RX ADMIN — Medication 400 MG: at 12:31

## 2022-01-01 RX ADMIN — POLYETHYLENE GLYCOL 3350 17 G: 17 POWDER, FOR SOLUTION ORAL at 08:53

## 2022-01-01 RX ADMIN — HYDROMORPHONE HYDROCHLORIDE 4 MG: 2 TABLET ORAL at 03:32

## 2022-01-01 RX ADMIN — HYDROMORPHONE HYDROCHLORIDE 1.5 MG: 2 INJECTION, SOLUTION INTRAMUSCULAR; INTRAVENOUS; SUBCUTANEOUS at 02:57

## 2022-01-01 RX ADMIN — FINASTERIDE 5 MG: 5 TABLET, FILM COATED ORAL at 10:57

## 2022-01-01 RX ADMIN — DEXAMETHASONE 4 MG: 4 TABLET ORAL at 09:00

## 2022-01-01 RX ADMIN — HYDROMORPHONE HYDROCHLORIDE 1 MG: 1 INJECTION, SOLUTION INTRAMUSCULAR; INTRAVENOUS; SUBCUTANEOUS at 06:53

## 2022-01-01 RX ADMIN — Medication 400 MG: at 15:41

## 2022-01-01 RX ADMIN — PANTOPRAZOLE SODIUM 40 MG: 40 TABLET, DELAYED RELEASE ORAL at 08:02

## 2022-01-01 RX ADMIN — HYDROMORPHONE HYDROCHLORIDE 4 MG: 2 TABLET ORAL at 12:51

## 2022-01-01 RX ADMIN — CALCIUM CARBONATE (ANTACID) CHEW TAB 500 MG 500 MG: 500 CHEW TAB at 14:08

## 2022-01-01 RX ADMIN — FINASTERIDE 5 MG: 5 TABLET, FILM COATED ORAL at 08:03

## 2022-01-01 RX ADMIN — FINASTERIDE 5 MG: 5 TABLET, FILM COATED ORAL at 08:40

## 2022-01-01 RX ADMIN — SENNOSIDES AND DOCUSATE SODIUM 2 TABLET: 8.6; 5 TABLET ORAL at 08:33

## 2022-01-01 RX ADMIN — LORAZEPAM 1 MG: 1 TABLET ORAL at 23:41

## 2022-01-01 RX ADMIN — HYDROMORPHONE HYDROCHLORIDE 4 MG: 2 TABLET ORAL at 11:16

## 2022-01-01 RX ADMIN — CALCIUM CARBONATE (ANTACID) CHEW TAB 500 MG 500 MG: 500 CHEW TAB at 07:59

## 2022-01-01 RX ADMIN — HYDROMORPHONE HYDROCHLORIDE 1 MG: 1 INJECTION, SOLUTION INTRAMUSCULAR; INTRAVENOUS; SUBCUTANEOUS at 02:24

## 2022-01-01 ASSESSMENT — PAIN SCALES - GENERAL
PAINLEVEL_OUTOF10: 0
PAINLEVEL_OUTOF10: 0
PAINLEVEL_OUTOF10: 8
PAINLEVEL_OUTOF10: 8
PAINLEVEL_OUTOF10: 0
PAINLEVEL_OUTOF10: 6
PAINLEVEL_OUTOF10: 10
PAINLEVEL_OUTOF10: 10
PAINLEVEL_OUTOF10: 6
PAINLEVEL_OUTOF10: 6
PAINLEVEL_OUTOF10: 7
PAINLEVEL_OUTOF10: 0
PAINLEVEL_OUTOF10: 10
PAINLEVEL_OUTOF10: 0
PAINLEVEL_OUTOF10: 3
PAINLEVEL_OUTOF10: 0
PAINLEVEL_OUTOF10: 0
PAINLEVEL_OUTOF10: 10
PAINLEVEL_OUTOF10: 5
PAINLEVEL_OUTOF10: 0
PAINLEVEL_OUTOF10: 3
PAINLEVEL_OUTOF10: 9
PAINLEVEL_OUTOF10: 8
PAINLEVEL_OUTOF10: 0
PAINLEVEL_OUTOF10: 9
PAINLEVEL_OUTOF10: 10
PAINLEVEL_OUTOF10: 10
PAINLEVEL_OUTOF10: 4
PAINLEVEL_OUTOF10: 0
PAINLEVEL_OUTOF10: 7
PAINLEVEL_OUTOF10: 9
PAINLEVEL_OUTOF10: 9
PAINLEVEL_OUTOF10: 3
PAINLEVEL_OUTOF10: 7
PAINLEVEL_OUTOF10: 6
PAINLEVEL_OUTOF10: 8
PAINLEVEL_OUTOF10: 10
PAINLEVEL_OUTOF10: 0
PAINLEVEL_OUTOF10: 10
PAINLEVEL_OUTOF10: 0
PAINLEVEL_OUTOF10: 9
PAINLEVEL_OUTOF10: 0
PAINLEVEL_OUTOF10: 0
PAINLEVEL_OUTOF10: 3
PAINLEVEL_OUTOF10: 8
PAINLEVEL_OUTOF10: 0
PAINLEVEL_OUTOF10: 7
PAINLEVEL_OUTOF10: 2
PAINLEVEL_OUTOF10: 9
PAINLEVEL_OUTOF10: 0
PAINLEVEL_OUTOF10: 10
PAINLEVEL_OUTOF10: 10
PAINLEVEL_OUTOF10: 0
PAINLEVEL_OUTOF10: 5
PAINLEVEL_OUTOF10: 8
PAINLEVEL_OUTOF10: 5
PAINLEVEL_OUTOF10: 0
PAINLEVEL_OUTOF10: 10
PAINLEVEL_OUTOF10: 7
PAINLEVEL_OUTOF10: 3
PAINLEVEL_OUTOF10: 2
PAINLEVEL_OUTOF10: 6
PAINLEVEL_OUTOF10: 8
PAINLEVEL_OUTOF10: 7
PAINLEVEL_OUTOF10: 7
PAINLEVEL_OUTOF10: 2
PAINLEVEL_OUTOF10: 10
PAINLEVEL_OUTOF10: 9
PAINLEVEL_OUTOF10: 7
PAINLEVEL_OUTOF10: 9
PAINLEVEL_OUTOF10: 9
PAINLEVEL_OUTOF10: 8
PAINLEVEL_OUTOF10: 0
PAINLEVEL_OUTOF10: 0
PAINLEVEL_OUTOF10: 9
PAINLEVEL_OUTOF10: 7
PAINLEVEL_OUTOF10: 8
PAINLEVEL_OUTOF10: 0
PAINLEVEL_OUTOF10: 10
PAINLEVEL_OUTOF10: 4
PAINLEVEL_OUTOF10: 7
PAINLEVEL_OUTOF10: 3
PAINLEVEL_OUTOF10: 0
PAINLEVEL_OUTOF10: 6
PAINLEVEL_OUTOF10: 0
PAINLEVEL_OUTOF10: 10
PAINLEVEL_OUTOF10: 0
PAINLEVEL_OUTOF10: 6
PAINLEVEL_OUTOF10: 10
PAINLEVEL_OUTOF10: 8
PAINLEVEL_OUTOF10: 8
PAINLEVEL_OUTOF10: 10
PAINLEVEL_OUTOF10: 0
PAINLEVEL_OUTOF10: 8
PAINLEVEL_OUTOF10: 0
PAINLEVEL_OUTOF10: 0
PAINLEVEL_OUTOF10: 10
PAINLEVEL_OUTOF10: 0
PAINLEVEL_OUTOF10: 0
PAINLEVEL_OUTOF10: 7
PAINLEVEL_OUTOF10: 8
PAINLEVEL_OUTOF10: 2
PAINLEVEL_OUTOF10: 0
PAINLEVEL_OUTOF10: 0
PAINLEVEL_OUTOF10: 7
PAINLEVEL_OUTOF10: 6
PAINLEVEL_OUTOF10: 10
PAINLEVEL_OUTOF10: 6
PAINLEVEL_OUTOF10: 9
PAINLEVEL_OUTOF10: 0
PAINLEVEL_OUTOF10: 8
PAINLEVEL_OUTOF10: 8
PAINLEVEL_OUTOF10: 0
PAINLEVEL_OUTOF10: 9
PAINLEVEL_OUTOF10: 6
PAINLEVEL_OUTOF10: 10
PAINLEVEL_OUTOF10: 8
PAINLEVEL_OUTOF10: 0
PAINLEVEL_OUTOF10: 8
PAINLEVEL_OUTOF10: 7
PAINLEVEL_OUTOF10: 7
PAINLEVEL_OUTOF10: 10
PAINLEVEL_OUTOF10: 7
PAINLEVEL_OUTOF10: 0
PAINLEVEL_OUTOF10: 8
PAINLEVEL_OUTOF10: 10
PAINLEVEL_OUTOF10: 0
PAINLEVEL_OUTOF10: 7
PAINLEVEL_OUTOF10: 9
PAINLEVEL_OUTOF10: 5
PAINLEVEL_OUTOF10: 10
PAINLEVEL_OUTOF10: 6
PAINLEVEL_OUTOF10: 3
PAINLEVEL_OUTOF10: 8
PAINLEVEL_OUTOF10: 0
PAINLEVEL_OUTOF10: 8
PAINLEVEL_OUTOF10: 10
PAINLEVEL_OUTOF10: 5
PAINLEVEL_OUTOF10: 5
PAINLEVEL_OUTOF10: 8
PAINLEVEL_OUTOF10: 10
PAINLEVEL_OUTOF10: 7
PAINLEVEL_OUTOF10: 7
PAINLEVEL_OUTOF10: 10
PAINLEVEL_OUTOF10: 0
PAINLEVEL_OUTOF10: 8
PAINLEVEL_OUTOF10: 10
PAINLEVEL_OUTOF10: 8
PAINLEVEL_OUTOF10: 10
PAINLEVEL_OUTOF10: 8
PAINLEVEL_OUTOF10: 2
PAINLEVEL_OUTOF10: 7
PAINLEVEL_OUTOF10: 1
PAINLEVEL_OUTOF10: 6
PAINLEVEL_OUTOF10: 6
PAINLEVEL_OUTOF10: 8
PAINLEVEL_OUTOF10: 0
PAINLEVEL_OUTOF10: 9
PAINLEVEL_OUTOF10: 7
PAINLEVEL_OUTOF10: 10
PAINLEVEL_OUTOF10: 0
PAINLEVEL_OUTOF10: 0
PAINLEVEL_OUTOF10: 4
PAINLEVEL_OUTOF10: 7
PAINLEVEL_OUTOF10: 6
PAINLEVEL_OUTOF10: 6
PAINLEVEL_OUTOF10: 8
PAINLEVEL_OUTOF10: 0
PAINLEVEL_OUTOF10: 6
PAINLEVEL_OUTOF10: 9
PAINLEVEL_OUTOF10: 8
PAINLEVEL_OUTOF10: 6
PAINLEVEL_OUTOF10: 8
PAINLEVEL_OUTOF10: 2
PAINLEVEL_OUTOF10: 0
PAINLEVEL_OUTOF10: 0
PAINLEVEL_OUTOF10: 8
PAINLEVEL_OUTOF10: 2
PAINLEVEL_OUTOF10: 8
PAINLEVEL_OUTOF10: 0
PAINLEVEL_OUTOF10: 8
PAINLEVEL_OUTOF10: 7
PAINLEVEL_OUTOF10: 5
PAINLEVEL_OUTOF10: 0
PAINLEVEL_OUTOF10: 7
PAINLEVEL_OUTOF10: 0
PAINLEVEL_OUTOF10: 5
PAINLEVEL_OUTOF10: 9
PAINLEVEL_OUTOF10: 2
PAINLEVEL_OUTOF10: 7
PAINLEVEL_OUTOF10: 10
PAINLEVEL_OUTOF10: 7
PAINLEVEL_OUTOF10: 7
PAINLEVEL_OUTOF10: 8
PAINLEVEL_OUTOF10: 10
PAINLEVEL_OUTOF10: 5
PAINLEVEL_OUTOF10: 9
PAINLEVEL_OUTOF10: 0
PAINLEVEL_OUTOF10: 0
PAINLEVEL_OUTOF10: 8
PAINLEVEL_OUTOF10: 7
PAINLEVEL_OUTOF10: 7
PAINLEVEL_OUTOF10: 0
PAINLEVEL_OUTOF10: 6
PAINLEVEL_OUTOF10: 6
PAINLEVEL_OUTOF10: 9
PAINLEVEL_OUTOF10: 2
PAINLEVEL_OUTOF10: 8
PAINLEVEL_OUTOF10: 8
PAINLEVEL_OUTOF10: 3
PAINLEVEL_OUTOF10: 0
PAINLEVEL_OUTOF10: 0
PAINLEVEL_OUTOF10: 4
PAINLEVEL_OUTOF10: 9
PAINLEVEL_OUTOF10: 8
PAINLEVEL_OUTOF10: 3
PAINLEVEL_OUTOF10: 6
PAINLEVEL_OUTOF10: 7
PAINLEVEL_OUTOF10: 1
PAINLEVEL_OUTOF10: 6
PAINLEVEL_OUTOF10: 10
PAINLEVEL_OUTOF10: 10
PAINLEVEL_OUTOF10: 7
PAINLEVEL_OUTOF10: 0
PAINLEVEL_OUTOF10: 6
PAINLEVEL_OUTOF10: 10
PAINLEVEL_OUTOF10: 9
PAINLEVEL_OUTOF10: 9
PAINLEVEL_OUTOF10: 6
PAINLEVEL_OUTOF10: 3
PAINLEVEL_OUTOF10: 10
PAINLEVEL_OUTOF10: 8
PAINLEVEL_OUTOF10: 8
PAINLEVEL_OUTOF10: 0
PAINLEVEL_OUTOF10: 4
PAINLEVEL_OUTOF10: 7
PAINLEVEL_OUTOF10: 9
PAINLEVEL_OUTOF10: 6
PAINLEVEL_OUTOF10: 8
PAINLEVEL_OUTOF10: 10
PAINLEVEL_OUTOF10: 0
PAINLEVEL_OUTOF10: 5
PAINLEVEL_OUTOF10: 0
PAINLEVEL_OUTOF10: 8
PAINLEVEL_OUTOF10: 0
PAINLEVEL_OUTOF10: 4
PAINLEVEL_OUTOF10: 7
PAINLEVEL_OUTOF10: 0
PAINLEVEL_OUTOF10: 0
PAINLEVEL_OUTOF10: 6
PAINLEVEL_OUTOF10: 0
PAINLEVEL_OUTOF10: 9
PAINLEVEL_OUTOF10: 7
PAINLEVEL_OUTOF10: 9
PAINLEVEL_OUTOF10: 7
PAINLEVEL_OUTOF10: 9
PAINLEVEL_OUTOF10: 6
PAINLEVEL_OUTOF10: 10
PAINLEVEL_OUTOF10: 0
PAINLEVEL_OUTOF10: 0
PAINLEVEL_OUTOF10: 3
PAINLEVEL_OUTOF10: 3
PAINLEVEL_OUTOF10: 0
PAINLEVEL_OUTOF10: 8
PAINLEVEL_OUTOF10: 2
PAINLEVEL_OUTOF10: 3
PAINLEVEL_OUTOF10: 0
PAINLEVEL_OUTOF10: 8
PAINLEVEL_OUTOF10: 6
PAINLEVEL_OUTOF10: 8
PAINLEVEL_OUTOF10: 0
PAINLEVEL_OUTOF10: 0
PAINLEVEL_OUTOF10: 6
PAINLEVEL_OUTOF10: 10
PAINLEVEL_OUTOF10: 0
PAINLEVEL_OUTOF10: 0
PAINLEVEL_OUTOF10: 4
PAINLEVEL_OUTOF10: 0
PAINLEVEL_OUTOF10: 5
PAINLEVEL_OUTOF10: 3
PAINLEVEL_OUTOF10: 5
PAINLEVEL_OUTOF10: 10
PAINLEVEL_OUTOF10: 8
PAINLEVEL_OUTOF10: 6
PAINLEVEL_OUTOF10: 0
PAINLEVEL_OUTOF10: 10
PAINLEVEL_OUTOF10: 10
PAINLEVEL_OUTOF10: 9

## 2022-01-01 ASSESSMENT — PAIN DESCRIPTION - LOCATION
LOCATION: ABDOMEN
LOCATION: RIB CAGE
LOCATION: ABDOMEN;BACK;GENERALIZED
LOCATION: GENERALIZED
LOCATION: SHOULDER
LOCATION: RIB CAGE
LOCATION: HIP
LOCATION: BACK
LOCATION: LEG;STERNUM
LOCATION: ABDOMEN
LOCATION: ABDOMEN
LOCATION: GENERALIZED
LOCATION: BACK
LOCATION: ABDOMEN
LOCATION: KNEE;LEG
LOCATION: BACK;HIP;KNEE;LEG
LOCATION: ABDOMEN
LOCATION: BACK
LOCATION: ABDOMEN;PELVIS
LOCATION: BACK
LOCATION: ABDOMEN;CHEST
LOCATION: RIB CAGE
LOCATION: CHEST;ABDOMEN
LOCATION: BACK
LOCATION: PENIS;RECTUM
LOCATION: RIB CAGE
LOCATION: GENERALIZED
LOCATION: ABDOMEN
LOCATION: LEG;STERNUM
LOCATION: LEG;STERNUM
LOCATION: ABDOMEN
LOCATION: BACK;CHEST
LOCATION: GENERALIZED
LOCATION: RIB CAGE
LOCATION: ABDOMEN
LOCATION: ABDOMEN;PENIS;RECTUM
LOCATION: RIB CAGE
LOCATION: HEAD
LOCATION: ABDOMEN;BACK
LOCATION: RIB CAGE
LOCATION: RIB CAGE
LOCATION: GENERALIZED;BACK
LOCATION: ABDOMEN;CHEST
LOCATION: BACK
LOCATION: GENERALIZED
LOCATION: ABDOMEN
LOCATION: ABDOMEN;PENIS;RECTUM
LOCATION: ABDOMEN
LOCATION: ABDOMEN;CHEST
LOCATION: CHEST;RIB CAGE;SHOULDER
LOCATION: BACK;CHEST
LOCATION: CHEST
LOCATION: RIB CAGE
LOCATION: GENERALIZED
LOCATION: HIP
LOCATION: CHEST;RIB CAGE
LOCATION: ABDOMEN
LOCATION: GENERALIZED
LOCATION: RIB CAGE
LOCATION: ABDOMEN
LOCATION: HIP;LEG;KNEE
LOCATION: BACK;ABDOMEN
LOCATION: HIP;ANKLE
LOCATION: CHEST;BACK
LOCATION: BACK;STERNUM
LOCATION: ABDOMEN
LOCATION: BACK;RIB CAGE
LOCATION: ABDOMEN
LOCATION: CHEST
LOCATION: CHEST;RIB CAGE
LOCATION: GENERALIZED
LOCATION: CHEST
LOCATION: BACK;CHEST
LOCATION: BACK
LOCATION: GENERALIZED
LOCATION: RIB CAGE;SHOULDER
LOCATION: ABDOMEN;CHEST;RIB CAGE
LOCATION: ABDOMEN;RECTUM
LOCATION: RIB CAGE
LOCATION: CHEST;RIB CAGE
LOCATION: GENERALIZED
LOCATION: ABDOMEN;PELVIS
LOCATION: BACK;CHEST
LOCATION: BACK;GENERALIZED
LOCATION: ABDOMEN
LOCATION: BACK;GENERALIZED
LOCATION: BACK
LOCATION: GENERALIZED
LOCATION: ABDOMEN
LOCATION: BACK
LOCATION: BACK
LOCATION: LEG;HIP;KNEE
LOCATION: BACK
LOCATION: ABDOMEN
LOCATION: BACK;RIB CAGE
LOCATION: ABDOMEN
LOCATION: GENERALIZED
LOCATION: BACK;RIB CAGE
LOCATION: GENERALIZED
LOCATION: ABDOMEN
LOCATION: GENERALIZED
LOCATION: BACK;CHEST
LOCATION: ABDOMEN
LOCATION: RIB CAGE
LOCATION: GENERALIZED
LOCATION: ABDOMEN
LOCATION: BACK;KNEE;LEG;HIP
LOCATION: STERNUM;LEG
LOCATION: CHEST;RIB CAGE
LOCATION: RIB CAGE
LOCATION: GENERALIZED
LOCATION: GENERALIZED
LOCATION: ABDOMEN
LOCATION: GENERALIZED
LOCATION: GENERALIZED
LOCATION: ABDOMEN
LOCATION: BACK
LOCATION: GENERALIZED
LOCATION: GENERALIZED
LOCATION: RIB CAGE
LOCATION: BACK
LOCATION: ABDOMEN
LOCATION: CHEST;BACK
LOCATION: BACK
LOCATION: RIB CAGE;BACK
LOCATION: ABDOMEN
LOCATION: BACK
LOCATION: HEAD
LOCATION: RIB CAGE;SHOULDER
LOCATION: BACK;GENERALIZED
LOCATION: BACK
LOCATION: RIB CAGE
LOCATION: BACK
LOCATION: RIB CAGE
LOCATION: GENERALIZED
LOCATION: GENERALIZED
LOCATION: ABDOMEN
LOCATION: RIB CAGE
LOCATION: ABDOMEN;CHEST;RIB CAGE
LOCATION: CHEST;ABDOMEN
LOCATION: GENERALIZED
LOCATION: BACK;LEG;HIP
LOCATION: ABDOMEN
LOCATION: BACK

## 2022-01-01 ASSESSMENT — PAIN DESCRIPTION - DESCRIPTORS
DESCRIPTORS: PRESSURE;SHARP
DESCRIPTORS: ACHING;SHOOTING
DESCRIPTORS: ACHING;STABBING;THROBBING
DESCRIPTORS: ACHING
DESCRIPTORS: ACHING;PRESSURE;SHARP
DESCRIPTORS: ACHING
DESCRIPTORS: ACHING
DESCRIPTORS: ACHING;STABBING
DESCRIPTORS: ACHING
DESCRIPTORS: ACHING;OTHER (COMMENT)
DESCRIPTORS: ACHING;GNAWING
DESCRIPTORS: ACHING;OTHER (COMMENT)
DESCRIPTORS: ACHING;DULL
DESCRIPTORS: ACHING
DESCRIPTORS: ACHING;SHOOTING
DESCRIPTORS: ACHING
DESCRIPTORS: STABBING
DESCRIPTORS: ACHING;STABBING
DESCRIPTORS: ACHING;DISCOMFORT
DESCRIPTORS: ACHING;TEARING
DESCRIPTORS: ACHING;DISCOMFORT
DESCRIPTORS: ACHING;STABBING;THROBBING
DESCRIPTORS: ACHING
DESCRIPTORS: ACHING;SORE
DESCRIPTORS: ACHING
DESCRIPTORS: ACHING;SHARP
DESCRIPTORS: PRESSURE;DISCOMFORT
DESCRIPTORS: ACHING
DESCRIPTORS: ACHING
DESCRIPTORS: STABBING;PRESSURE
DESCRIPTORS: STABBING;SHARP
DESCRIPTORS: GNAWING
DESCRIPTORS: ACHING
DESCRIPTORS: ACHING
DESCRIPTORS: ACHING;SHARP;THROBBING;TIGHTNESS
DESCRIPTORS: ACHING;SHARP
DESCRIPTORS: PRESSURE
DESCRIPTORS: ACHING
DESCRIPTORS: ACHING
DESCRIPTORS: ACHING;STABBING;SHOOTING
DESCRIPTORS: ACHING
DESCRIPTORS: ACHING
DESCRIPTORS: ACHING;SHARP
DESCRIPTORS: ACHING
DESCRIPTORS: JABBING
DESCRIPTORS: ACHING;BURNING
DESCRIPTORS: ACHING;SHARP
DESCRIPTORS: ACHING;DISCOMFORT
DESCRIPTORS: STABBING;SHARP
DESCRIPTORS: ACHING
DESCRIPTORS: SHARP;SHOOTING;SORE;ACHING
DESCRIPTORS: ACHING
DESCRIPTORS: PRESSURE;ACHING
DESCRIPTORS: PRESSURE;DISCOMFORT
DESCRIPTORS: ACHING;SPASM;SHOOTING
DESCRIPTORS: PRESSURE
DESCRIPTORS: DULL;ACHING
DESCRIPTORS: ACHING
DESCRIPTORS: ACHING;PRESSURE;SORE
DESCRIPTORS: ACHING;SHOOTING
DESCRIPTORS: ACHING;STABBING;THROBBING
DESCRIPTORS: ACHING;GNAWING;CRAMPING
DESCRIPTORS: ACHING
DESCRIPTORS: ACHING;SORE
DESCRIPTORS: ACHING
DESCRIPTORS: ACHING;STABBING;THROBBING
DESCRIPTORS: ACHING
DESCRIPTORS: ACHING;PRESSURE;STABBING;THROBBING
DESCRIPTORS: ACHING
DESCRIPTORS: SHOOTING;STABBING
DESCRIPTORS: PRESSURE
DESCRIPTORS: ACHING
DESCRIPTORS: ACHING;STABBING
DESCRIPTORS: ACHING;SHOOTING;SHARP
DESCRIPTORS: ACHING;BURNING;PRESSURE
DESCRIPTORS: ACHING;SORE
DESCRIPTORS: ACHING
DESCRIPTORS: ACHING;DISCOMFORT
DESCRIPTORS: ACHING
DESCRIPTORS: SHOOTING
DESCRIPTORS: ACHING
DESCRIPTORS: ACHING
DESCRIPTORS: ACHING;PRESSURE
DESCRIPTORS: ACHING
DESCRIPTORS: STABBING;PRESSURE
DESCRIPTORS: ACHING
DESCRIPTORS: ACHING;SHOOTING;SHARP
DESCRIPTORS: ACHING
DESCRIPTORS: ACHING;DISCOMFORT
DESCRIPTORS: PRESSURE
DESCRIPTORS: ACHING
DESCRIPTORS: ACHING;BURNING;CRAMPING;SHARP
DESCRIPTORS: ACHING
DESCRIPTORS: ACHING
DESCRIPTORS: ACHING;BURNING;PRESSURE
DESCRIPTORS: ACHING
DESCRIPTORS: ACHING;DISCOMFORT
DESCRIPTORS: ACHING;STABBING;THROBBING;BURNING
DESCRIPTORS: ACHING
DESCRIPTORS: ACHING;OTHER (COMMENT)
DESCRIPTORS: ACHING
DESCRIPTORS: DISCOMFORT;PRESSURE
DESCRIPTORS: ACHING;SORE;TIGHTNESS
DESCRIPTORS: ACHING
DESCRIPTORS: PENETRATING;SHARP;SHOOTING
DESCRIPTORS: ACHING
DESCRIPTORS: ACHING;STABBING;SHOOTING;SORE
DESCRIPTORS: PRESSURE
DESCRIPTORS: ACHING;DISCOMFORT
DESCRIPTORS: ACHING;BURNING;STABBING;THROBBING
DESCRIPTORS: ACHING
DESCRIPTORS: ACHING
DESCRIPTORS: ACHING;PRESSURE
DESCRIPTORS: ACHING

## 2022-01-01 ASSESSMENT — PAIN SCALES - WONG BAKER
WONGBAKER_NUMERICALRESPONSE: 0;2
WONGBAKER_NUMERICALRESPONSE: 0
WONGBAKER_NUMERICALRESPONSE: 2
WONGBAKER_NUMERICALRESPONSE: 0
WONGBAKER_NUMERICALRESPONSE: 0
WONGBAKER_NUMERICALRESPONSE: 2
WONGBAKER_NUMERICALRESPONSE: 0
WONGBAKER_NUMERICALRESPONSE: 2
WONGBAKER_NUMERICALRESPONSE: 0
WONGBAKER_NUMERICALRESPONSE: 2
WONGBAKER_NUMERICALRESPONSE: 0
WONGBAKER_NUMERICALRESPONSE: 0

## 2022-01-01 ASSESSMENT — PAIN DESCRIPTION - PAIN TYPE
TYPE: CHRONIC PAIN
TYPE: ACUTE PAIN
TYPE: CHRONIC PAIN
TYPE: ACUTE PAIN
TYPE: CHRONIC PAIN
TYPE: ACUTE PAIN
TYPE: CHRONIC PAIN
TYPE: ACUTE PAIN
TYPE: CHRONIC PAIN
TYPE: ACUTE PAIN
TYPE: CHRONIC PAIN
TYPE: CHRONIC PAIN;ACUTE PAIN
TYPE: CHRONIC PAIN
TYPE: ACUTE PAIN
TYPE: CHRONIC PAIN
TYPE: ACUTE PAIN
TYPE: CHRONIC PAIN
TYPE: ACUTE PAIN
TYPE: CHRONIC PAIN
TYPE: ACUTE PAIN
TYPE: CHRONIC PAIN
TYPE: ACUTE PAIN

## 2022-01-01 ASSESSMENT — PAIN - FUNCTIONAL ASSESSMENT
PAIN_FUNCTIONAL_ASSESSMENT: ACTIVITIES ARE NOT PREVENTED
PAIN_FUNCTIONAL_ASSESSMENT: PREVENTS OR INTERFERES SOME ACTIVE ACTIVITIES AND ADLS
PAIN_FUNCTIONAL_ASSESSMENT: ACTIVITIES ARE NOT PREVENTED
PAIN_FUNCTIONAL_ASSESSMENT: ACTIVITIES ARE NOT PREVENTED
PAIN_FUNCTIONAL_ASSESSMENT: PREVENTS OR INTERFERES SOME ACTIVE ACTIVITIES AND ADLS
PAIN_FUNCTIONAL_ASSESSMENT: PREVENTS OR INTERFERES WITH ALL ACTIVE AND SOME PASSIVE ACTIVITIES
PAIN_FUNCTIONAL_ASSESSMENT: PREVENTS OR INTERFERES WITH MANY ACTIVE NOT PASSIVE ACTIVITIES
PAIN_FUNCTIONAL_ASSESSMENT: PREVENTS OR INTERFERES SOME ACTIVE ACTIVITIES AND ADLS
PAIN_FUNCTIONAL_ASSESSMENT: PREVENTS OR INTERFERES WITH ALL ACTIVE AND SOME PASSIVE ACTIVITIES
PAIN_FUNCTIONAL_ASSESSMENT: PREVENTS OR INTERFERES SOME ACTIVE ACTIVITIES AND ADLS
PAIN_FUNCTIONAL_ASSESSMENT: ACTIVITIES ARE NOT PREVENTED
PAIN_FUNCTIONAL_ASSESSMENT: PREVENTS OR INTERFERES WITH MANY ACTIVE NOT PASSIVE ACTIVITIES
PAIN_FUNCTIONAL_ASSESSMENT: PREVENTS OR INTERFERES SOME ACTIVE ACTIVITIES AND ADLS
PAIN_FUNCTIONAL_ASSESSMENT: PREVENTS OR INTERFERES WITH MANY ACTIVE NOT PASSIVE ACTIVITIES
PAIN_FUNCTIONAL_ASSESSMENT: ACTIVITIES ARE NOT PREVENTED
PAIN_FUNCTIONAL_ASSESSMENT: ACTIVITIES ARE NOT PREVENTED
PAIN_FUNCTIONAL_ASSESSMENT: PREVENTS OR INTERFERES WITH ALL ACTIVE AND SOME PASSIVE ACTIVITIES
PAIN_FUNCTIONAL_ASSESSMENT: ACTIVITIES ARE NOT PREVENTED
PAIN_FUNCTIONAL_ASSESSMENT: ACTIVITIES ARE NOT PREVENTED
PAIN_FUNCTIONAL_ASSESSMENT: PREVENTS OR INTERFERES SOME ACTIVE ACTIVITIES AND ADLS
PAIN_FUNCTIONAL_ASSESSMENT: 0-10
PAIN_FUNCTIONAL_ASSESSMENT: PREVENTS OR INTERFERES SOME ACTIVE ACTIVITIES AND ADLS
PAIN_FUNCTIONAL_ASSESSMENT: PREVENTS OR INTERFERES SOME ACTIVE ACTIVITIES AND ADLS
PAIN_FUNCTIONAL_ASSESSMENT: ACTIVITIES ARE NOT PREVENTED
PAIN_FUNCTIONAL_ASSESSMENT: PREVENTS OR INTERFERES SOME ACTIVE ACTIVITIES AND ADLS
PAIN_FUNCTIONAL_ASSESSMENT: ACTIVITIES ARE NOT PREVENTED
PAIN_FUNCTIONAL_ASSESSMENT: PREVENTS OR INTERFERES WITH ALL ACTIVE AND SOME PASSIVE ACTIVITIES
PAIN_FUNCTIONAL_ASSESSMENT: PREVENTS OR INTERFERES SOME ACTIVE ACTIVITIES AND ADLS
PAIN_FUNCTIONAL_ASSESSMENT: PREVENTS OR INTERFERES SOME ACTIVE ACTIVITIES AND ADLS
PAIN_FUNCTIONAL_ASSESSMENT: ACTIVITIES ARE NOT PREVENTED
PAIN_FUNCTIONAL_ASSESSMENT: ACTIVITIES ARE NOT PREVENTED
PAIN_FUNCTIONAL_ASSESSMENT: PREVENTS OR INTERFERES SOME ACTIVE ACTIVITIES AND ADLS
PAIN_FUNCTIONAL_ASSESSMENT: ACTIVITIES ARE NOT PREVENTED
PAIN_FUNCTIONAL_ASSESSMENT: PREVENTS OR INTERFERES WITH ALL ACTIVE AND SOME PASSIVE ACTIVITIES
PAIN_FUNCTIONAL_ASSESSMENT: ACTIVITIES ARE NOT PREVENTED
PAIN_FUNCTIONAL_ASSESSMENT: PREVENTS OR INTERFERES SOME ACTIVE ACTIVITIES AND ADLS
PAIN_FUNCTIONAL_ASSESSMENT: PREVENTS OR INTERFERES WITH MANY ACTIVE NOT PASSIVE ACTIVITIES
PAIN_FUNCTIONAL_ASSESSMENT: PREVENTS OR INTERFERES SOME ACTIVE ACTIVITIES AND ADLS
PAIN_FUNCTIONAL_ASSESSMENT: ACTIVITIES ARE NOT PREVENTED
PAIN_FUNCTIONAL_ASSESSMENT: PREVENTS OR INTERFERES SOME ACTIVE ACTIVITIES AND ADLS
PAIN_FUNCTIONAL_ASSESSMENT: ACTIVITIES ARE NOT PREVENTED
PAIN_FUNCTIONAL_ASSESSMENT: PREVENTS OR INTERFERES SOME ACTIVE ACTIVITIES AND ADLS
PAIN_FUNCTIONAL_ASSESSMENT: PREVENTS OR INTERFERES WITH ALL ACTIVE AND SOME PASSIVE ACTIVITIES
PAIN_FUNCTIONAL_ASSESSMENT: PREVENTS OR INTERFERES WITH ALL ACTIVE AND SOME PASSIVE ACTIVITIES
PAIN_FUNCTIONAL_ASSESSMENT: PREVENTS OR INTERFERES SOME ACTIVE ACTIVITIES AND ADLS
PAIN_FUNCTIONAL_ASSESSMENT: PREVENTS OR INTERFERES SOME ACTIVE ACTIVITIES AND ADLS
PAIN_FUNCTIONAL_ASSESSMENT: ACTIVITIES ARE NOT PREVENTED
PAIN_FUNCTIONAL_ASSESSMENT: PREVENTS OR INTERFERES SOME ACTIVE ACTIVITIES AND ADLS
PAIN_FUNCTIONAL_ASSESSMENT: ACTIVITIES ARE NOT PREVENTED
PAIN_FUNCTIONAL_ASSESSMENT: ACTIVITIES ARE NOT PREVENTED
PAIN_FUNCTIONAL_ASSESSMENT: PREVENTS OR INTERFERES SOME ACTIVE ACTIVITIES AND ADLS
PAIN_FUNCTIONAL_ASSESSMENT: PREVENTS OR INTERFERES SOME ACTIVE ACTIVITIES AND ADLS
PAIN_FUNCTIONAL_ASSESSMENT: ACTIVITIES ARE NOT PREVENTED
PAIN_FUNCTIONAL_ASSESSMENT: PREVENTS OR INTERFERES WITH ALL ACTIVE AND SOME PASSIVE ACTIVITIES
PAIN_FUNCTIONAL_ASSESSMENT: PREVENTS OR INTERFERES SOME ACTIVE ACTIVITIES AND ADLS
PAIN_FUNCTIONAL_ASSESSMENT: ACTIVITIES ARE NOT PREVENTED
PAIN_FUNCTIONAL_ASSESSMENT: PREVENTS OR INTERFERES WITH MANY ACTIVE NOT PASSIVE ACTIVITIES
PAIN_FUNCTIONAL_ASSESSMENT: ACTIVITIES ARE NOT PREVENTED
PAIN_FUNCTIONAL_ASSESSMENT: PREVENTS OR INTERFERES SOME ACTIVE ACTIVITIES AND ADLS
PAIN_FUNCTIONAL_ASSESSMENT: PREVENTS OR INTERFERES WITH MANY ACTIVE NOT PASSIVE ACTIVITIES
PAIN_FUNCTIONAL_ASSESSMENT: ACTIVITIES ARE NOT PREVENTED
PAIN_FUNCTIONAL_ASSESSMENT: PREVENTS OR INTERFERES SOME ACTIVE ACTIVITIES AND ADLS
PAIN_FUNCTIONAL_ASSESSMENT: 0-10
PAIN_FUNCTIONAL_ASSESSMENT: PREVENTS OR INTERFERES SOME ACTIVE ACTIVITIES AND ADLS
PAIN_FUNCTIONAL_ASSESSMENT: PREVENTS OR INTERFERES SOME ACTIVE ACTIVITIES AND ADLS
PAIN_FUNCTIONAL_ASSESSMENT: PREVENTS OR INTERFERES WITH ALL ACTIVE AND SOME PASSIVE ACTIVITIES
PAIN_FUNCTIONAL_ASSESSMENT: PREVENTS OR INTERFERES WITH MANY ACTIVE NOT PASSIVE ACTIVITIES
PAIN_FUNCTIONAL_ASSESSMENT: PREVENTS OR INTERFERES SOME ACTIVE ACTIVITIES AND ADLS
PAIN_FUNCTIONAL_ASSESSMENT: ACTIVITIES ARE NOT PREVENTED
PAIN_FUNCTIONAL_ASSESSMENT: PREVENTS OR INTERFERES SOME ACTIVE ACTIVITIES AND ADLS
PAIN_FUNCTIONAL_ASSESSMENT: ACTIVITIES ARE NOT PREVENTED
PAIN_FUNCTIONAL_ASSESSMENT: PREVENTS OR INTERFERES WITH ALL ACTIVE AND SOME PASSIVE ACTIVITIES
PAIN_FUNCTIONAL_ASSESSMENT: ACTIVITIES ARE NOT PREVENTED
PAIN_FUNCTIONAL_ASSESSMENT: PREVENTS OR INTERFERES SOME ACTIVE ACTIVITIES AND ADLS
PAIN_FUNCTIONAL_ASSESSMENT: PREVENTS OR INTERFERES WITH MANY ACTIVE NOT PASSIVE ACTIVITIES
PAIN_FUNCTIONAL_ASSESSMENT: PREVENTS OR INTERFERES SOME ACTIVE ACTIVITIES AND ADLS
PAIN_FUNCTIONAL_ASSESSMENT: PREVENTS OR INTERFERES SOME ACTIVE ACTIVITIES AND ADLS
PAIN_FUNCTIONAL_ASSESSMENT: ACTIVITIES ARE NOT PREVENTED
PAIN_FUNCTIONAL_ASSESSMENT: PREVENTS OR INTERFERES SOME ACTIVE ACTIVITIES AND ADLS
PAIN_FUNCTIONAL_ASSESSMENT: ACTIVITIES ARE NOT PREVENTED
PAIN_FUNCTIONAL_ASSESSMENT: PREVENTS OR INTERFERES SOME ACTIVE ACTIVITIES AND ADLS
PAIN_FUNCTIONAL_ASSESSMENT: PREVENTS OR INTERFERES WITH ALL ACTIVE AND SOME PASSIVE ACTIVITIES

## 2022-01-01 ASSESSMENT — PAIN DESCRIPTION - DIRECTION
RADIATING_TOWARDS: GENERALIZED
RADIATING_TOWARDS: BACK
RADIATING_TOWARDS: GENERALIZED
RADIATING_TOWARDS: GENERALIZED
RADIATING_TOWARDS: BACK
RADIATING_TOWARDS: BACK
RADIATING_TOWARDS: GENERALIZED
RADIATING_TOWARDS: NOT RADIATING
RADIATING_TOWARDS: GENERALIZED

## 2022-01-01 ASSESSMENT — PAIN DESCRIPTION - ONSET
ONSET: ON-GOING
ONSET: GRADUAL
ONSET: ON-GOING
ONSET: GRADUAL
ONSET: ON-GOING
ONSET: GRADUAL
ONSET: ON-GOING
ONSET: GRADUAL
ONSET: ON-GOING
ONSET: PROGRESSIVE
ONSET: AWAKENED FROM SLEEP
ONSET: ON-GOING
ONSET: ON-GOING
ONSET: AWAKENED FROM SLEEP
ONSET: ON-GOING
ONSET: PROGRESSIVE
ONSET: ON-GOING
ONSET: AWAKENED FROM SLEEP
ONSET: ON-GOING
ONSET: GRADUAL
ONSET: ON-GOING
ONSET: GRADUAL
ONSET: ON-GOING

## 2022-01-01 ASSESSMENT — PAIN DESCRIPTION - ORIENTATION
ORIENTATION: POSTERIOR
ORIENTATION: RIGHT;LEFT
ORIENTATION: ANTERIOR;LEFT;RIGHT
ORIENTATION: MID
ORIENTATION: ANTERIOR
ORIENTATION: INNER
ORIENTATION: RIGHT;LEFT
ORIENTATION: MID
ORIENTATION: POSTERIOR;INNER
ORIENTATION: ANTERIOR
ORIENTATION: INNER
ORIENTATION: ANTERIOR
ORIENTATION: LOWER
ORIENTATION: OTHER (COMMENT)
ORIENTATION: POSTERIOR;LOWER
ORIENTATION: OTHER (COMMENT)
ORIENTATION: RIGHT;LEFT;MID
ORIENTATION: MID
ORIENTATION: MID
ORIENTATION: RIGHT
ORIENTATION: INNER
ORIENTATION: LEFT;RIGHT;MID
ORIENTATION: LOWER
ORIENTATION: LEFT;LOWER
ORIENTATION: LOWER
ORIENTATION: MID;LOWER
ORIENTATION: MID
ORIENTATION: LOWER
ORIENTATION: OTHER (COMMENT)
ORIENTATION: RIGHT;LEFT
ORIENTATION: RIGHT;LEFT
ORIENTATION: RIGHT
ORIENTATION: LOWER
ORIENTATION: MID
ORIENTATION: LEFT
ORIENTATION: MID
ORIENTATION: ANTERIOR;MID;LOWER
ORIENTATION: LEFT;LOWER
ORIENTATION: LOWER
ORIENTATION: LOWER;MID
ORIENTATION: RIGHT;LEFT;ANTERIOR
ORIENTATION: LOWER
ORIENTATION: MID
ORIENTATION: OTHER (COMMENT)
ORIENTATION: POSTERIOR;ANTERIOR
ORIENTATION: MID
ORIENTATION: INNER
ORIENTATION: RIGHT
ORIENTATION: MID
ORIENTATION: LOWER
ORIENTATION: MID
ORIENTATION: RIGHT;LEFT;ANTERIOR
ORIENTATION: POSTERIOR
ORIENTATION: ANTERIOR;LEFT;RIGHT
ORIENTATION: ANTERIOR;LOWER
ORIENTATION: LEFT;RIGHT;ANTERIOR;MID
ORIENTATION: LOWER;MID
ORIENTATION: LOWER
ORIENTATION: MID
ORIENTATION: OTHER (COMMENT)
ORIENTATION: INNER
ORIENTATION: OTHER (COMMENT)
ORIENTATION: POSTERIOR
ORIENTATION: LEFT;RIGHT
ORIENTATION: MID
ORIENTATION: MID;LEFT;RIGHT
ORIENTATION: INNER;POSTERIOR
ORIENTATION: INNER
ORIENTATION: ANTERIOR
ORIENTATION: MID;RIGHT;LEFT
ORIENTATION: RIGHT;ANTERIOR
ORIENTATION: ANTERIOR
ORIENTATION: RIGHT;LEFT;UPPER
ORIENTATION: ANTERIOR;RIGHT
ORIENTATION: RIGHT;OUTER
ORIENTATION: INNER
ORIENTATION: MID
ORIENTATION: RIGHT
ORIENTATION: LOWER
ORIENTATION: MID;LOWER
ORIENTATION: ANTERIOR
ORIENTATION: INNER
ORIENTATION: LEFT;RIGHT
ORIENTATION: RIGHT;LEFT
ORIENTATION: MID
ORIENTATION: LOWER
ORIENTATION: DISTAL
ORIENTATION: MID;LOWER
ORIENTATION: POSTERIOR;LOWER
ORIENTATION: MID;LOWER
ORIENTATION: MID
ORIENTATION: MID
ORIENTATION: ANTERIOR;LOWER
ORIENTATION: MID
ORIENTATION: MID;RIGHT;LEFT
ORIENTATION: POSTERIOR
ORIENTATION: MID
ORIENTATION: ANTERIOR
ORIENTATION: LEFT;RIGHT
ORIENTATION: MID
ORIENTATION: OTHER (COMMENT)
ORIENTATION: MID
ORIENTATION: OTHER (COMMENT)
ORIENTATION: ANTERIOR;POSTERIOR
ORIENTATION: LOWER;MID
ORIENTATION: LOWER
ORIENTATION: LEFT;LOWER
ORIENTATION: ANTERIOR
ORIENTATION: MID
ORIENTATION: RIGHT;LEFT
ORIENTATION: ANTERIOR
ORIENTATION: LOWER;RIGHT
ORIENTATION: ANTERIOR
ORIENTATION: LEFT;RIGHT;UPPER
ORIENTATION: RIGHT;LEFT
ORIENTATION: RIGHT;MID
ORIENTATION: ANTERIOR;LOWER;MID
ORIENTATION: MID;LOWER
ORIENTATION: ANTERIOR;MID
ORIENTATION: OTHER (COMMENT)
ORIENTATION: MID
ORIENTATION: LOWER;MID
ORIENTATION: MID
ORIENTATION: RIGHT
ORIENTATION: LOWER
ORIENTATION: POSTERIOR
ORIENTATION: LEFT
ORIENTATION: LEFT;RIGHT
ORIENTATION: OTHER (COMMENT)
ORIENTATION: RIGHT;LEFT;MID
ORIENTATION: MID;LOWER
ORIENTATION: RIGHT;LEFT

## 2022-01-01 ASSESSMENT — PAIN DESCRIPTION - FREQUENCY
FREQUENCY: CONTINUOUS
FREQUENCY: INTERMITTENT
FREQUENCY: INTERMITTENT
FREQUENCY: CONTINUOUS
FREQUENCY: INTERMITTENT
FREQUENCY: CONTINUOUS
FREQUENCY: INTERMITTENT
FREQUENCY: INTERMITTENT
FREQUENCY: CONTINUOUS
FREQUENCY: INTERMITTENT
FREQUENCY: CONTINUOUS
FREQUENCY: INTERMITTENT
FREQUENCY: CONTINUOUS
FREQUENCY: CONTINUOUS
FREQUENCY: INTERMITTENT
FREQUENCY: CONTINUOUS
FREQUENCY: INTERMITTENT
FREQUENCY: CONTINUOUS
FREQUENCY: INTERMITTENT
FREQUENCY: CONTINUOUS
FREQUENCY: CONTINUOUS
FREQUENCY: INTERMITTENT
FREQUENCY: CONTINUOUS
FREQUENCY: INTERMITTENT
FREQUENCY: CONTINUOUS
FREQUENCY: CONTINUOUS
FREQUENCY: INTERMITTENT
FREQUENCY: CONTINUOUS
FREQUENCY: INTERMITTENT
FREQUENCY: CONTINUOUS
FREQUENCY: INTERMITTENT
FREQUENCY: CONTINUOUS

## 2022-01-01 ASSESSMENT — PATIENT HEALTH QUESTIONNAIRE - PHQ9
SUM OF ALL RESPONSES TO PHQ QUESTIONS 1-9: 0
1. LITTLE INTEREST OR PLEASURE IN DOING THINGS: 0
SUM OF ALL RESPONSES TO PHQ QUESTIONS 1-9: 0
SUM OF ALL RESPONSES TO PHQ9 QUESTIONS 1 & 2: 0
SUM OF ALL RESPONSES TO PHQ QUESTIONS 1-9: 0
2. FEELING DOWN, DEPRESSED OR HOPELESS: 0
2. FEELING DOWN, DEPRESSED OR HOPELESS: 0

## 2022-01-01 ASSESSMENT — ENCOUNTER SYMPTOMS
DIARRHEA: 0
VOMITING: 0
COUGH: 0
PHOTOPHOBIA: 0
SHORTNESS OF BREATH: 0
CHEST TIGHTNESS: 0
BACK PAIN: 0
DIARRHEA: 0
BACK PAIN: 0
ABDOMINAL PAIN: 0
SHORTNESS OF BREATH: 0
SINUS PAIN: 0
EYE DISCHARGE: 0
NAUSEA: 0
VOMITING: 0
NAUSEA: 0

## 2022-01-05 ENCOUNTER — HOSPITAL ENCOUNTER (OUTPATIENT)
Dept: INFUSION THERAPY | Age: 67
Discharge: HOME | End: 2022-01-05
Payer: MEDICARE

## 2022-01-05 DIAGNOSIS — C77.2 METASTASIS TO RETROPERITONEAL LYMPH NODE (HCC): ICD-10-CM

## 2022-01-05 DIAGNOSIS — C7A.1 NEUROENDOCRINE CARCINOMA, HIGH GRADE (HCC): Primary | ICD-10-CM

## 2022-01-05 DIAGNOSIS — T45.1X5A ANEMIA DUE TO CHEMOTHERAPY: ICD-10-CM

## 2022-01-05 DIAGNOSIS — D64.81 ANEMIA DUE TO CHEMOTHERAPY: ICD-10-CM

## 2022-01-05 DIAGNOSIS — C7A.1 NEUROENDOCRINE CARCINOMA, HIGH GRADE (HCC): ICD-10-CM

## 2022-01-05 DIAGNOSIS — E87.6 HYPOKALEMIA: ICD-10-CM

## 2022-01-05 DIAGNOSIS — C61 PRIMARY PROSTATE CANCER WITH METASTASIS FROM PROSTATE TO OTHER SITE (HCC): ICD-10-CM

## 2022-01-05 LAB
ALBUMIN SERPL-MCNC: 3.2 G/DL (ref 3.2–4.6)
ALBUMIN/GLOB SERPL: 0.8 {RATIO} (ref 1.2–3.5)
ALP SERPL-CCNC: 81 U/L (ref 50–136)
ALT SERPL-CCNC: 31 U/L (ref 12–65)
ANION GAP SERPL CALC-SCNC: 10 MMOL/L (ref 7–16)
AST SERPL-CCNC: 15 U/L (ref 15–37)
BASOPHILS # BLD: 0 K/UL (ref 0–0.2)
BASOPHILS NFR BLD: 0 % (ref 0–2)
BILIRUB SERPL-MCNC: 0.4 MG/DL (ref 0.2–1.1)
BUN SERPL-MCNC: 19 MG/DL (ref 8–23)
CALCIUM SERPL-MCNC: 8.6 MG/DL (ref 8.3–10.4)
CEA SERPL-MCNC: 156.4 NG/ML (ref 0–3)
CHLORIDE SERPL-SCNC: 102 MMOL/L (ref 98–107)
CO2 SERPL-SCNC: 23 MMOL/L (ref 21–32)
CREAT SERPL-MCNC: 0.9 MG/DL (ref 0.8–1.5)
DIFFERENTIAL METHOD BLD: ABNORMAL
EOSINOPHIL # BLD: 0 K/UL (ref 0–0.8)
EOSINOPHIL NFR BLD: 1 % (ref 0.5–7.8)
ERYTHROCYTE [DISTWIDTH] IN BLOOD BY AUTOMATED COUNT: 16.4 % (ref 11.9–14.6)
GLOBULIN SER CALC-MCNC: 4.2 G/DL (ref 2.3–3.5)
GLUCOSE SERPL-MCNC: 114 MG/DL (ref 65–100)
HCT VFR BLD AUTO: 28.2 %
HGB BLD-MCNC: 9.2 G/DL (ref 13.6–17.2)
IMM GRANULOCYTES # BLD AUTO: 0 K/UL (ref 0–0.5)
IMM GRANULOCYTES NFR BLD AUTO: 1 % (ref 0–5)
LYMPHOCYTES # BLD: 0.8 K/UL (ref 0.5–4.6)
LYMPHOCYTES NFR BLD: 13 % (ref 13–44)
MCH RBC QN AUTO: 31.5 PG (ref 26.1–32.9)
MCHC RBC AUTO-ENTMCNC: 32.6 G/DL (ref 31.4–35)
MCV RBC AUTO: 96.6 FL (ref 79.6–97.8)
MONOCYTES # BLD: 1.3 K/UL (ref 0.1–1.3)
MONOCYTES NFR BLD: 21 % (ref 4–12)
NEUTS SEG # BLD: 4.1 K/UL (ref 1.7–8.2)
NEUTS SEG NFR BLD: 65 % (ref 43–78)
NRBC # BLD: 0 K/UL (ref 0–0.2)
PLATELET # BLD AUTO: 206 K/UL (ref 150–450)
PMV BLD AUTO: 8.7 FL (ref 9.4–12.3)
POTASSIUM SERPL-SCNC: 3.8 MMOL/L (ref 3.5–5.1)
PROT SERPL-MCNC: 7.4 G/DL (ref 6.3–8.2)
PSA SERPL-MCNC: 5.5 NG/ML
RBC # BLD AUTO: 2.92 M/UL (ref 4.23–5.6)
SODIUM SERPL-SCNC: 135 MMOL/L (ref 136–145)
WBC # BLD AUTO: 6.4 K/UL (ref 4.3–11.1)

## 2022-01-05 PROCEDURE — 96375 TX/PRO/DX INJ NEW DRUG ADDON: CPT

## 2022-01-05 PROCEDURE — 80053 COMPREHEN METABOLIC PANEL: CPT

## 2022-01-05 PROCEDURE — 85025 COMPLETE CBC W/AUTO DIFF WBC: CPT

## 2022-01-05 PROCEDURE — 36591 DRAW BLOOD OFF VENOUS DEVICE: CPT

## 2022-01-05 PROCEDURE — 74011000250 HC RX REV CODE- 250: Performed by: INTERNAL MEDICINE

## 2022-01-05 PROCEDURE — 84153 ASSAY OF PSA TOTAL: CPT

## 2022-01-05 PROCEDURE — 96413 CHEMO IV INFUSION 1 HR: CPT

## 2022-01-05 PROCEDURE — 82378 CARCINOEMBRYONIC ANTIGEN: CPT

## 2022-01-05 PROCEDURE — 96372 THER/PROPH/DIAG INJ SC/IM: CPT

## 2022-01-05 PROCEDURE — 74011250636 HC RX REV CODE- 250/636: Performed by: INTERNAL MEDICINE

## 2022-01-05 RX ORDER — ONDANSETRON 2 MG/ML
8 INJECTION INTRAMUSCULAR; INTRAVENOUS ONCE
Status: COMPLETED | OUTPATIENT
Start: 2022-01-05 | End: 2022-01-05

## 2022-01-05 RX ORDER — SODIUM CHLORIDE 0.9 % (FLUSH) 0.9 %
10 SYRINGE (ML) INJECTION AS NEEDED
Status: DISCONTINUED | OUTPATIENT
Start: 2022-01-05 | End: 2022-01-07 | Stop reason: HOSPADM

## 2022-01-05 RX ORDER — SODIUM CHLORIDE 0.9 % (FLUSH) 0.9 %
10 SYRINGE (ML) INJECTION AS NEEDED
Status: ACTIVE | OUTPATIENT
Start: 2022-01-05 | End: 2022-01-05

## 2022-01-05 RX ORDER — DEXAMETHASONE SODIUM PHOSPHATE 4 MG/ML
8 INJECTION, SOLUTION INTRA-ARTICULAR; INTRALESIONAL; INTRAMUSCULAR; INTRAVENOUS; SOFT TISSUE ONCE
Status: COMPLETED | OUTPATIENT
Start: 2022-01-05 | End: 2022-01-05

## 2022-01-05 RX ORDER — SODIUM CHLORIDE 9 MG/ML
25 INJECTION, SOLUTION INTRAVENOUS CONTINUOUS
Status: ACTIVE | OUTPATIENT
Start: 2022-01-05 | End: 2022-01-05

## 2022-01-05 RX ADMIN — SODIUM CHLORIDE 25 ML/HR: 9 INJECTION, SOLUTION INTRAVENOUS at 11:35

## 2022-01-05 RX ADMIN — SODIUM CHLORIDE, PRESERVATIVE FREE 10 ML: 5 INJECTION INTRAVENOUS at 11:31

## 2022-01-05 RX ADMIN — LURBINECTEDIN 6.9 MG: 0.5 INJECTION, POWDER, LYOPHILIZED, FOR SOLUTION INTRAVENOUS at 12:12

## 2022-01-05 RX ADMIN — SODIUM CHLORIDE, PRESERVATIVE FREE 10 ML: 5 INJECTION INTRAVENOUS at 13:20

## 2022-01-05 RX ADMIN — Medication 10 ML: at 09:45

## 2022-01-05 RX ADMIN — DEXAMETHASONE SODIUM PHOSPHATE 8 MG: 4 INJECTION INTRA-ARTICULAR; INTRALESIONAL; INTRAMUSCULAR; INTRAVENOUS; SOFT TISSUE at 11:32

## 2022-01-05 RX ADMIN — ONDANSETRON 8 MG: 2 INJECTION INTRAMUSCULAR; INTRAVENOUS at 11:33

## 2022-01-05 RX ADMIN — EPOETIN ALFA-EPBX 120000 UNITS: 40000 INJECTION, SOLUTION INTRAVENOUS; SUBCUTANEOUS at 13:17

## 2022-01-05 NOTE — PROGRESS NOTES
Patient arrived to port lab for port access and lab draw   [address] accessed and labs drawn per protocol   Port remains accessed  Patient discharged ambulatory from port lab

## 2022-01-05 NOTE — PROGRESS NOTES
Arrived to the [facility]. Ina Dumont and retperla completed. Patient tolerated well. Any issues or concerns during appointment: none. Patient aware of next infusion appointment on 1/26/2022 at 1:30pm  Discharged ambulatory.

## 2022-01-26 ENCOUNTER — HOSPITAL ENCOUNTER (OUTPATIENT)
Dept: INFUSION THERAPY | Age: 67
Discharge: HOME | End: 2022-01-26
Payer: MEDICARE

## 2022-01-26 DIAGNOSIS — C77.2 METASTASIS TO RETROPERITONEAL LYMPH NODE (HCC): ICD-10-CM

## 2022-01-26 DIAGNOSIS — C77.2 METASTASIS TO RETROPERITONEAL LYMPH NODE (HCC): Primary | ICD-10-CM

## 2022-01-26 DIAGNOSIS — D64.81 ANEMIA DUE TO CHEMOTHERAPY: ICD-10-CM

## 2022-01-26 DIAGNOSIS — C79.51 PROSTATE CANCER METASTATIC TO BONE (HCC): ICD-10-CM

## 2022-01-26 DIAGNOSIS — C61 PRIMARY PROSTATE CANCER WITH METASTASIS FROM PROSTATE TO OTHER SITE (HCC): ICD-10-CM

## 2022-01-26 DIAGNOSIS — C61 PROSTATE CANCER METASTATIC TO BONE (HCC): ICD-10-CM

## 2022-01-26 DIAGNOSIS — T45.1X5A ANEMIA DUE TO CHEMOTHERAPY: ICD-10-CM

## 2022-01-26 DIAGNOSIS — E87.6 HYPOKALEMIA: ICD-10-CM

## 2022-01-26 LAB
ALBUMIN SERPL-MCNC: 2.8 G/DL (ref 3.2–4.6)
ALBUMIN/GLOB SERPL: 0.6 {RATIO} (ref 1.2–3.5)
ALP SERPL-CCNC: 70 U/L (ref 50–136)
ALT SERPL-CCNC: 51 U/L (ref 12–65)
ANION GAP SERPL CALC-SCNC: 7 MMOL/L (ref 7–16)
AST SERPL-CCNC: 28 U/L (ref 15–37)
BASOPHILS # BLD: 0 K/UL (ref 0–0.2)
BASOPHILS NFR BLD: 0 % (ref 0–2)
BILIRUB SERPL-MCNC: 0.3 MG/DL (ref 0.2–1.1)
BUN SERPL-MCNC: 25 MG/DL (ref 8–23)
CALCIUM SERPL-MCNC: 8.9 MG/DL (ref 8.3–10.4)
CEA SERPL-MCNC: 167.4 NG/ML (ref 0–3)
CHLORIDE SERPL-SCNC: 104 MMOL/L (ref 98–107)
CO2 SERPL-SCNC: 24 MMOL/L (ref 21–32)
CREAT SERPL-MCNC: 0.8 MG/DL (ref 0.8–1.5)
DIFFERENTIAL METHOD BLD: ABNORMAL
EOSINOPHIL # BLD: 0 K/UL (ref 0–0.8)
EOSINOPHIL NFR BLD: 1 % (ref 0.5–7.8)
ERYTHROCYTE [DISTWIDTH] IN BLOOD BY AUTOMATED COUNT: 17.2 % (ref 11.9–14.6)
GLOBULIN SER CALC-MCNC: 4.7 G/DL (ref 2.3–3.5)
GLUCOSE SERPL-MCNC: 141 MG/DL (ref 65–100)
HCT VFR BLD AUTO: 26.8 %
HGB BLD-MCNC: 8.5 G/DL (ref 13.6–17.2)
IMM GRANULOCYTES # BLD AUTO: 0.1 K/UL (ref 0–0.5)
IMM GRANULOCYTES NFR BLD AUTO: 2 % (ref 0–5)
LYMPHOCYTES # BLD: 1.1 K/UL (ref 0.5–4.6)
LYMPHOCYTES NFR BLD: 16 % (ref 13–44)
MCH RBC QN AUTO: 31.1 PG (ref 26.1–32.9)
MCHC RBC AUTO-ENTMCNC: 31.7 G/DL (ref 31.4–35)
MCV RBC AUTO: 98.2 FL (ref 79.6–97.8)
MONOCYTES # BLD: 1.1 K/UL (ref 0.1–1.3)
MONOCYTES NFR BLD: 15 % (ref 4–12)
NEUTS SEG # BLD: 4.9 K/UL (ref 1.7–8.2)
NEUTS SEG NFR BLD: 67 % (ref 43–78)
NRBC # BLD: 0 K/UL (ref 0–0.2)
PLATELET # BLD AUTO: 219 K/UL (ref 150–450)
PMV BLD AUTO: 9.1 FL (ref 9.4–12.3)
POTASSIUM SERPL-SCNC: 3.6 MMOL/L (ref 3.5–5.1)
PROT SERPL-MCNC: 7.5 G/DL (ref 6.3–8.2)
PSA SERPL-MCNC: 7 NG/ML
RBC # BLD AUTO: 2.73 M/UL (ref 4.23–5.6)
SODIUM SERPL-SCNC: 135 MMOL/L (ref 136–145)
WBC # BLD AUTO: 7.2 K/UL (ref 4.3–11.1)

## 2022-01-26 PROCEDURE — 82378 CARCINOEMBRYONIC ANTIGEN: CPT

## 2022-01-26 PROCEDURE — 36591 DRAW BLOOD OFF VENOUS DEVICE: CPT

## 2022-01-26 PROCEDURE — 74011000250 HC RX REV CODE- 250: Performed by: NURSE PRACTITIONER

## 2022-01-26 PROCEDURE — 96372 THER/PROPH/DIAG INJ SC/IM: CPT

## 2022-01-26 PROCEDURE — 84153 ASSAY OF PSA TOTAL: CPT

## 2022-01-26 PROCEDURE — 85025 COMPLETE CBC W/AUTO DIFF WBC: CPT

## 2022-01-26 PROCEDURE — 96413 CHEMO IV INFUSION 1 HR: CPT

## 2022-01-26 PROCEDURE — 80053 COMPREHEN METABOLIC PANEL: CPT

## 2022-01-26 PROCEDURE — 96375 TX/PRO/DX INJ NEW DRUG ADDON: CPT

## 2022-01-26 PROCEDURE — 74011250636 HC RX REV CODE- 250/636: Performed by: NURSE PRACTITIONER

## 2022-01-26 RX ORDER — ONDANSETRON 2 MG/ML
8 INJECTION INTRAMUSCULAR; INTRAVENOUS ONCE
Status: COMPLETED | OUTPATIENT
Start: 2022-01-26 | End: 2022-01-26

## 2022-01-26 RX ORDER — DEXAMETHASONE SODIUM PHOSPHATE 4 MG/ML
8 INJECTION, SOLUTION INTRA-ARTICULAR; INTRALESIONAL; INTRAMUSCULAR; INTRAVENOUS; SOFT TISSUE ONCE
Status: COMPLETED | OUTPATIENT
Start: 2022-01-26 | End: 2022-01-26

## 2022-01-26 RX ORDER — SODIUM CHLORIDE 0.9 % (FLUSH) 0.9 %
10 SYRINGE (ML) INJECTION AS NEEDED
Status: DISCONTINUED | OUTPATIENT
Start: 2022-01-26 | End: 2022-01-28 | Stop reason: HOSPADM

## 2022-01-26 RX ORDER — SODIUM CHLORIDE 9 MG/ML
25 INJECTION, SOLUTION INTRAVENOUS CONTINUOUS
Status: DISCONTINUED | OUTPATIENT
Start: 2022-01-26 | End: 2022-01-27 | Stop reason: HOSPADM

## 2022-01-26 RX ORDER — SODIUM CHLORIDE 0.9 % (FLUSH) 0.9 %
10 SYRINGE (ML) INJECTION AS NEEDED
Status: DISCONTINUED | OUTPATIENT
Start: 2022-01-26 | End: 2022-01-27 | Stop reason: HOSPADM

## 2022-01-26 RX ADMIN — Medication 10 ML: at 11:51

## 2022-01-26 RX ADMIN — SODIUM CHLORIDE, PRESERVATIVE FREE 10 ML: 5 INJECTION INTRAVENOUS at 15:30

## 2022-01-26 RX ADMIN — EPOETIN ALFA-EPBX 120000 UNITS: 40000 INJECTION, SOLUTION INTRAVENOUS; SUBCUTANEOUS at 14:27

## 2022-01-26 RX ADMIN — SODIUM CHLORIDE, PRESERVATIVE FREE 10 ML: 5 INJECTION INTRAVENOUS at 13:43

## 2022-01-26 RX ADMIN — LURBINECTEDIN 6.9 MG: 0.5 INJECTION, POWDER, LYOPHILIZED, FOR SOLUTION INTRAVENOUS at 14:24

## 2022-01-26 RX ADMIN — SODIUM CHLORIDE 25 ML/HR: 9 INJECTION, SOLUTION INTRAVENOUS at 13:43

## 2022-01-26 RX ADMIN — DEXAMETHASONE SODIUM PHOSPHATE 8 MG: 4 INJECTION, SOLUTION INTRAMUSCULAR; INTRAVENOUS at 13:46

## 2022-01-26 RX ADMIN — ONDANSETRON 8 MG: 2 INJECTION INTRAMUSCULAR; INTRAVENOUS at 13:44

## 2022-01-26 NOTE — PROGRESS NOTES
Patient arrived to port lab for port access and lab draw   Fronie Breaker accessed and labs drawn per protocol   Port remains accessed  Patient discharged from port lab

## 2022-01-26 NOTE — PROGRESS NOTES
Patient arrived ambulatory to infusion area. C4D1 Zepzelca completed. Retacrit administered. Patient tolerated treatment well today. Port de-accessed. Discharged ambulatory. Patient aware of next infusion appt on 2/16.

## 2022-02-16 ENCOUNTER — HOSPITAL ENCOUNTER (OUTPATIENT)
Dept: INFUSION THERAPY | Age: 67
Discharge: HOME | End: 2022-02-16
Payer: MEDICARE

## 2022-02-16 DIAGNOSIS — E87.6 HYPOKALEMIA: Primary | ICD-10-CM

## 2022-02-16 DIAGNOSIS — C77.2 METASTASIS TO RETROPERITONEAL LYMPH NODE (HCC): ICD-10-CM

## 2022-02-16 DIAGNOSIS — C61 PRIMARY PROSTATE CANCER WITH METASTASIS FROM PROSTATE TO OTHER SITE (HCC): ICD-10-CM

## 2022-02-16 DIAGNOSIS — C79.51 PROSTATE CANCER METASTATIC TO BONE (HCC): ICD-10-CM

## 2022-02-16 DIAGNOSIS — D64.81 ANEMIA DUE TO CHEMOTHERAPY: ICD-10-CM

## 2022-02-16 DIAGNOSIS — T45.1X5A ANEMIA DUE TO CHEMOTHERAPY: ICD-10-CM

## 2022-02-16 DIAGNOSIS — C61 PROSTATE CANCER METASTATIC TO BONE (HCC): ICD-10-CM

## 2022-02-16 DIAGNOSIS — C7A.1 NEUROENDOCRINE CARCINOMA, HIGH GRADE (HCC): ICD-10-CM

## 2022-02-16 LAB
ALBUMIN SERPL-MCNC: 3.5 G/DL (ref 3.2–4.6)
ALBUMIN/GLOB SERPL: 0.9 {RATIO} (ref 1.2–3.5)
ALP SERPL-CCNC: 76 U/L (ref 50–136)
ALT SERPL-CCNC: 37 U/L (ref 12–65)
ANION GAP SERPL CALC-SCNC: 7 MMOL/L (ref 7–16)
AST SERPL-CCNC: 19 U/L (ref 15–37)
BASOPHILS # BLD: 0 K/UL (ref 0–0.2)
BASOPHILS NFR BLD: 0 % (ref 0–2)
BILIRUB SERPL-MCNC: 0.3 MG/DL (ref 0.2–1.1)
BUN SERPL-MCNC: 21 MG/DL (ref 8–23)
CALCIUM SERPL-MCNC: 9.6 MG/DL (ref 8.3–10.4)
CEA SERPL-MCNC: 212.7 NG/ML (ref 0–3)
CHLORIDE SERPL-SCNC: 105 MMOL/L (ref 98–107)
CO2 SERPL-SCNC: 24 MMOL/L (ref 21–32)
CREAT SERPL-MCNC: 0.9 MG/DL (ref 0.8–1.5)
DIFFERENTIAL METHOD BLD: ABNORMAL
EOSINOPHIL # BLD: 0 K/UL (ref 0–0.8)
EOSINOPHIL NFR BLD: 0 % (ref 0.5–7.8)
ERYTHROCYTE [DISTWIDTH] IN BLOOD BY AUTOMATED COUNT: 18.9 % (ref 11.9–14.6)
GLOBULIN SER CALC-MCNC: 3.8 G/DL (ref 2.3–3.5)
GLUCOSE SERPL-MCNC: 140 MG/DL (ref 65–100)
HCT VFR BLD AUTO: 29.8 %
HGB BLD-MCNC: 9.5 G/DL (ref 13.6–17.2)
IMM GRANULOCYTES # BLD AUTO: 0.1 K/UL (ref 0–0.5)
IMM GRANULOCYTES NFR BLD AUTO: 1 % (ref 0–5)
LYMPHOCYTES # BLD: 1.3 K/UL (ref 0.5–4.6)
LYMPHOCYTES NFR BLD: 22 % (ref 13–44)
MCH RBC QN AUTO: 32 PG (ref 26.1–32.9)
MCHC RBC AUTO-ENTMCNC: 31.9 G/DL (ref 31.4–35)
MCV RBC AUTO: 100.3 FL (ref 79.6–97.8)
MONOCYTES # BLD: 1.1 K/UL (ref 0.1–1.3)
MONOCYTES NFR BLD: 18 % (ref 4–12)
NEUTS SEG # BLD: 3.3 K/UL (ref 1.7–8.2)
NEUTS SEG NFR BLD: 59 % (ref 43–78)
NRBC # BLD: 0 K/UL (ref 0–0.2)
PLATELET # BLD AUTO: 177 K/UL (ref 150–450)
PMV BLD AUTO: 8.6 FL (ref 9.4–12.3)
POTASSIUM SERPL-SCNC: 3.8 MMOL/L (ref 3.5–5.1)
PROT SERPL-MCNC: 7.3 G/DL (ref 6.3–8.2)
PSA SERPL-MCNC: 6 NG/ML
RBC # BLD AUTO: 2.97 M/UL (ref 4.23–5.6)
SODIUM SERPL-SCNC: 136 MMOL/L (ref 136–145)
WBC # BLD AUTO: 5.8 K/UL (ref 4.3–11.1)

## 2022-02-16 PROCEDURE — 36591 DRAW BLOOD OFF VENOUS DEVICE: CPT

## 2022-02-16 PROCEDURE — 96375 TX/PRO/DX INJ NEW DRUG ADDON: CPT

## 2022-02-16 PROCEDURE — 96372 THER/PROPH/DIAG INJ SC/IM: CPT

## 2022-02-16 PROCEDURE — 74011250636 HC RX REV CODE- 250/636: Performed by: INTERNAL MEDICINE

## 2022-02-16 PROCEDURE — 80053 COMPREHEN METABOLIC PANEL: CPT

## 2022-02-16 PROCEDURE — 85025 COMPLETE CBC W/AUTO DIFF WBC: CPT

## 2022-02-16 PROCEDURE — 96413 CHEMO IV INFUSION 1 HR: CPT

## 2022-02-16 PROCEDURE — 84153 ASSAY OF PSA TOTAL: CPT

## 2022-02-16 PROCEDURE — 74011000250 HC RX REV CODE- 250: Performed by: INTERNAL MEDICINE

## 2022-02-16 PROCEDURE — 82378 CARCINOEMBRYONIC ANTIGEN: CPT

## 2022-02-16 RX ORDER — ONDANSETRON 2 MG/ML
8 INJECTION INTRAMUSCULAR; INTRAVENOUS ONCE
Status: COMPLETED | OUTPATIENT
Start: 2022-02-16 | End: 2022-02-16

## 2022-02-16 RX ORDER — SODIUM CHLORIDE 0.9 % (FLUSH) 0.9 %
10 SYRINGE (ML) INJECTION AS NEEDED
Status: ACTIVE | OUTPATIENT
Start: 2022-02-16 | End: 2022-02-16

## 2022-02-16 RX ORDER — DEXAMETHASONE SODIUM PHOSPHATE 4 MG/ML
8 INJECTION, SOLUTION INTRA-ARTICULAR; INTRALESIONAL; INTRAMUSCULAR; INTRAVENOUS; SOFT TISSUE ONCE
Status: COMPLETED | OUTPATIENT
Start: 2022-02-16 | End: 2022-02-16

## 2022-02-16 RX ORDER — SODIUM CHLORIDE 9 MG/ML
25 INJECTION, SOLUTION INTRAVENOUS CONTINUOUS
Status: ACTIVE | OUTPATIENT
Start: 2022-02-16 | End: 2022-02-16

## 2022-02-16 RX ORDER — SODIUM CHLORIDE 0.9 % (FLUSH) 0.9 %
10 SYRINGE (ML) INJECTION AS NEEDED
Status: DISCONTINUED | OUTPATIENT
Start: 2022-02-16 | End: 2022-02-18 | Stop reason: HOSPADM

## 2022-02-16 RX ADMIN — SODIUM CHLORIDE 25 ML/HR: 900 INJECTION, SOLUTION INTRAVENOUS at 11:39

## 2022-02-16 RX ADMIN — SODIUM CHLORIDE, PRESERVATIVE FREE 10 ML: 5 INJECTION INTRAVENOUS at 11:38

## 2022-02-16 RX ADMIN — ONDANSETRON 8 MG: 2 INJECTION INTRAMUSCULAR; INTRAVENOUS at 11:39

## 2022-02-16 RX ADMIN — Medication 10 ML: at 10:13

## 2022-02-16 RX ADMIN — EPOETIN ALFA-EPBX 120000 UNITS: 40000 INJECTION, SOLUTION INTRAVENOUS; SUBCUTANEOUS at 12:12

## 2022-02-16 RX ADMIN — LURBINECTEDIN 6.9 MG: 0.5 INJECTION, POWDER, LYOPHILIZED, FOR SOLUTION INTRAVENOUS at 12:10

## 2022-02-16 RX ADMIN — SODIUM CHLORIDE, PRESERVATIVE FREE 10 ML: 5 INJECTION INTRAVENOUS at 13:15

## 2022-02-16 RX ADMIN — DEXAMETHASONE SODIUM PHOSPHATE 8 MG: 4 INJECTION, SOLUTION INTRAMUSCULAR; INTRAVENOUS at 11:41

## 2022-02-16 NOTE — PROGRESS NOTES
Patient arrived ambulatory to infusion area. C5D1 Zepzelca completed. Retacrit completed. Patient tolerated well. Port deaccessed. Discharged ambulatory. Patient aware of next infusion appt 3/9.

## 2022-03-09 ENCOUNTER — HOSPITAL ENCOUNTER (OUTPATIENT)
Dept: INFUSION THERAPY | Age: 67
Discharge: HOME | End: 2022-03-09
Payer: MEDICARE

## 2022-03-09 DIAGNOSIS — C61 PROSTATE CANCER METASTATIC TO BONE (HCC): ICD-10-CM

## 2022-03-09 DIAGNOSIS — C61 PRIMARY PROSTATE CANCER WITH METASTASIS FROM PROSTATE TO OTHER SITE (HCC): ICD-10-CM

## 2022-03-09 DIAGNOSIS — C79.51 PROSTATE CANCER METASTATIC TO BONE (HCC): ICD-10-CM

## 2022-03-09 DIAGNOSIS — E87.6 HYPOKALEMIA: Primary | ICD-10-CM

## 2022-03-09 DIAGNOSIS — C77.2 METASTASIS TO RETROPERITONEAL LYMPH NODE (HCC): ICD-10-CM

## 2022-03-09 LAB
ALBUMIN SERPL-MCNC: 3.6 G/DL (ref 3.2–4.6)
ALBUMIN/GLOB SERPL: 1 {RATIO} (ref 1.2–3.5)
ALP SERPL-CCNC: 70 U/L (ref 50–136)
ALT SERPL-CCNC: 42 U/L (ref 12–65)
ANION GAP SERPL CALC-SCNC: 5 MMOL/L (ref 7–16)
AST SERPL-CCNC: 27 U/L (ref 15–37)
BASOPHILS # BLD: 0 K/UL (ref 0–0.2)
BASOPHILS NFR BLD: 0 % (ref 0–2)
BILIRUB SERPL-MCNC: 0.3 MG/DL (ref 0.2–1.1)
BUN SERPL-MCNC: 20 MG/DL (ref 8–23)
CALCIUM SERPL-MCNC: 9 MG/DL (ref 8.3–10.4)
CEA SERPL-MCNC: 344 NG/ML (ref 0–3)
CHLORIDE SERPL-SCNC: 105 MMOL/L (ref 98–107)
CO2 SERPL-SCNC: 26 MMOL/L (ref 21–32)
CREAT SERPL-MCNC: 1 MG/DL (ref 0.8–1.5)
DIFFERENTIAL METHOD BLD: ABNORMAL
EOSINOPHIL # BLD: 0.1 K/UL (ref 0–0.8)
EOSINOPHIL NFR BLD: 1 % (ref 0.5–7.8)
ERYTHROCYTE [DISTWIDTH] IN BLOOD BY AUTOMATED COUNT: 17.7 % (ref 11.9–14.6)
GLOBULIN SER CALC-MCNC: 3.7 G/DL (ref 2.3–3.5)
GLUCOSE SERPL-MCNC: 126 MG/DL (ref 65–100)
HCT VFR BLD AUTO: 31.9 %
HGB BLD-MCNC: 10.3 G/DL (ref 13.6–17.2)
IMM GRANULOCYTES # BLD AUTO: 0.1 K/UL (ref 0–0.5)
IMM GRANULOCYTES NFR BLD AUTO: 1 % (ref 0–5)
LYMPHOCYTES # BLD: 0.9 K/UL (ref 0.5–4.6)
LYMPHOCYTES NFR BLD: 17 % (ref 13–44)
MCH RBC QN AUTO: 32.5 PG (ref 26.1–32.9)
MCHC RBC AUTO-ENTMCNC: 32.3 G/DL (ref 31.4–35)
MCV RBC AUTO: 100.6 FL (ref 79.6–97.8)
MONOCYTES # BLD: 0.8 K/UL (ref 0.1–1.3)
MONOCYTES NFR BLD: 16 % (ref 4–12)
NEUTS SEG # BLD: 3.5 K/UL (ref 1.7–8.2)
NEUTS SEG NFR BLD: 65 % (ref 43–78)
NRBC # BLD: 0 K/UL (ref 0–0.2)
PLATELET # BLD AUTO: 210 K/UL (ref 150–450)
PMV BLD AUTO: 8.4 FL (ref 9.4–12.3)
POTASSIUM SERPL-SCNC: 3.8 MMOL/L (ref 3.5–5.1)
PROT SERPL-MCNC: 7.3 G/DL (ref 6.3–8.2)
PSA SERPL-MCNC: 5.8 NG/ML
RBC # BLD AUTO: 3.17 M/UL (ref 4.23–5.6)
SODIUM SERPL-SCNC: 136 MMOL/L (ref 136–145)
WBC # BLD AUTO: 5.4 K/UL (ref 4.3–11.1)

## 2022-03-09 PROCEDURE — 80053 COMPREHEN METABOLIC PANEL: CPT

## 2022-03-09 PROCEDURE — 36591 DRAW BLOOD OFF VENOUS DEVICE: CPT

## 2022-03-09 PROCEDURE — 74011250636 HC RX REV CODE- 250/636: Performed by: INTERNAL MEDICINE

## 2022-03-09 PROCEDURE — 96375 TX/PRO/DX INJ NEW DRUG ADDON: CPT

## 2022-03-09 PROCEDURE — 74011000250 HC RX REV CODE- 250: Performed by: INTERNAL MEDICINE

## 2022-03-09 PROCEDURE — 82378 CARCINOEMBRYONIC ANTIGEN: CPT

## 2022-03-09 PROCEDURE — 96413 CHEMO IV INFUSION 1 HR: CPT

## 2022-03-09 PROCEDURE — 84153 ASSAY OF PSA TOTAL: CPT

## 2022-03-09 PROCEDURE — 85025 COMPLETE CBC W/AUTO DIFF WBC: CPT

## 2022-03-09 RX ORDER — SODIUM CHLORIDE 9 MG/ML
25 INJECTION, SOLUTION INTRAVENOUS CONTINUOUS
Status: DISCONTINUED | OUTPATIENT
Start: 2022-03-09 | End: 2022-03-10 | Stop reason: HOSPADM

## 2022-03-09 RX ORDER — SODIUM CHLORIDE 0.9 % (FLUSH) 0.9 %
10 SYRINGE (ML) INJECTION AS NEEDED
Status: DISCONTINUED | OUTPATIENT
Start: 2022-03-09 | End: 2022-03-11 | Stop reason: HOSPADM

## 2022-03-09 RX ORDER — DEXAMETHASONE SODIUM PHOSPHATE 4 MG/ML
8 INJECTION, SOLUTION INTRA-ARTICULAR; INTRALESIONAL; INTRAMUSCULAR; INTRAVENOUS; SOFT TISSUE ONCE
Status: COMPLETED | OUTPATIENT
Start: 2022-03-09 | End: 2022-03-09

## 2022-03-09 RX ORDER — SODIUM CHLORIDE 0.9 % (FLUSH) 0.9 %
10 SYRINGE (ML) INJECTION AS NEEDED
Status: DISCONTINUED | OUTPATIENT
Start: 2022-03-09 | End: 2022-03-10 | Stop reason: HOSPADM

## 2022-03-09 RX ORDER — HYDROCORTISONE SODIUM SUCCINATE 100 MG/2ML
100 INJECTION, POWDER, FOR SOLUTION INTRAMUSCULAR; INTRAVENOUS AS NEEDED
Status: DISCONTINUED | OUTPATIENT
Start: 2022-03-09 | End: 2022-03-10 | Stop reason: HOSPADM

## 2022-03-09 RX ORDER — ONDANSETRON 2 MG/ML
8 INJECTION INTRAMUSCULAR; INTRAVENOUS ONCE
Status: COMPLETED | OUTPATIENT
Start: 2022-03-09 | End: 2022-03-09

## 2022-03-09 RX ORDER — DIPHENHYDRAMINE HYDROCHLORIDE 50 MG/ML
50 INJECTION, SOLUTION INTRAMUSCULAR; INTRAVENOUS AS NEEDED
Status: DISCONTINUED | OUTPATIENT
Start: 2022-03-09 | End: 2022-03-10 | Stop reason: HOSPADM

## 2022-03-09 RX ADMIN — SODIUM CHLORIDE, PRESERVATIVE FREE 10 ML: 5 INJECTION INTRAVENOUS at 14:15

## 2022-03-09 RX ADMIN — Medication 10 ML: at 12:42

## 2022-03-09 RX ADMIN — LURBINECTEDIN 6.9 MG: 0.5 INJECTION, POWDER, LYOPHILIZED, FOR SOLUTION INTRAVENOUS at 15:00

## 2022-03-09 RX ADMIN — SODIUM CHLORIDE, PRESERVATIVE FREE 10 ML: 5 INJECTION INTRAVENOUS at 16:01

## 2022-03-09 RX ADMIN — DEXAMETHASONE SODIUM PHOSPHATE 8 MG: 4 INJECTION, SOLUTION INTRAMUSCULAR; INTRAVENOUS at 14:21

## 2022-03-09 RX ADMIN — SODIUM CHLORIDE 25 ML/HR: 9 INJECTION, SOLUTION INTRAVENOUS at 14:15

## 2022-03-09 RX ADMIN — ONDANSETRON 8 MG: 2 INJECTION INTRAMUSCULAR; INTRAVENOUS at 14:23

## 2022-03-09 NOTE — ADDENDUM NOTE
Encounter addended by: Rupali Morton, [facility] on: 3/9/2022 2:17 PM   Actions taken: i-Vent created or edited

## 2022-03-09 NOTE — PROGRESS NOTES
[facility] Assessment completed, labs reviewed. Jackeline Rogers completed. Patient tolerated well. Any issues or concerns during appointment: No.  Patient aware of next infusion appointment on 04/06/22 @1430. Patient instructed to call provider with temperature of 100.4 or greater or nausea/vomiting/ diarrhea or pain not controlled by medications  Discharged ambulatory.

## 2022-03-18 PROBLEM — R97.20 ELEVATED PSA: Status: ACTIVE | Noted: 2017-01-31

## 2022-03-19 PROBLEM — R52 UNCONTROLLED PAIN: Status: ACTIVE | Noted: 2020-10-13

## 2022-03-19 PROBLEM — R10.2 PELVIC PAIN: Status: ACTIVE | Noted: 2017-01-31

## 2022-03-19 PROBLEM — N40.2 PROSTATIC NODULE: Status: ACTIVE | Noted: 2017-01-31

## 2022-03-19 PROBLEM — E87.6 HYPOKALEMIA: Status: ACTIVE | Noted: 2020-11-25

## 2022-03-19 PROBLEM — C7A.1 NEUROENDOCRINE CARCINOMA, HIGH GRADE (HCC): Status: ACTIVE | Noted: 2021-08-02

## 2022-03-19 PROBLEM — D64.81 ANEMIA DUE TO CHEMOTHERAPY: Status: ACTIVE | Noted: 2021-01-05

## 2022-03-19 PROBLEM — N62 GYNECOMASTIA, MALE: Status: ACTIVE | Noted: 2019-03-07

## 2022-03-19 PROBLEM — T45.1X5A ANEMIA DUE TO CHEMOTHERAPY: Status: ACTIVE | Noted: 2021-01-05

## 2022-03-19 PROBLEM — Z80.42 FAMILY HISTORY OF PROSTATE CANCER IN FATHER: Status: ACTIVE | Noted: 2017-01-31

## 2022-03-19 PROBLEM — C61 PRIMARY PROSTATE CANCER WITH METASTASIS FROM PROSTATE TO OTHER SITE (HCC): Status: ACTIVE | Noted: 2017-05-03

## 2022-03-19 PROBLEM — C77.2 METASTASIS TO RETROPERITONEAL LYMPH NODE (HCC): Status: ACTIVE | Noted: 2017-05-11

## 2022-03-20 PROBLEM — N41.0 ACUTE PROSTATITIS: Status: ACTIVE | Noted: 2017-01-31

## 2022-03-22 ENCOUNTER — HOSPITAL ENCOUNTER (OUTPATIENT)
Dept: SURGERY | Age: 67
Discharge: HOME | End: 2022-03-22
Payer: MEDICARE

## 2022-03-22 VITALS
BODY MASS INDEX: 30.82 KG/M2 | WEIGHT: 215.3 LBS | TEMPERATURE: 97.9 F | DIASTOLIC BLOOD PRESSURE: 81 MMHG | HEIGHT: 70 IN | SYSTOLIC BLOOD PRESSURE: 165 MMHG | HEART RATE: 79 BPM | OXYGEN SATURATION: 99 % | RESPIRATION RATE: 18 BRPM

## 2022-03-22 LAB
ANION GAP SERPL CALC-SCNC: 7 MMOL/L (ref 7–16)
APPEARANCE UR: CLEAR
BACTERIA URNS QL MICRO: 0 /HPF
BILIRUB UR QL: NEGATIVE
BUN SERPL-MCNC: 19 MG/DL (ref 8–23)
CALCIUM SERPL-MCNC: 9.3 MG/DL (ref 8.3–10.4)
CASTS URNS QL MICRO: 0 /LPF
CHLORIDE SERPL-SCNC: 105 MMOL/L (ref 98–107)
CO2 SERPL-SCNC: 24 MMOL/L (ref 21–32)
COLOR UR: YELLOW
CREAT SERPL-MCNC: 0.91 MG/DL (ref 0.8–1.5)
EPI CELLS #/AREA URNS HPF: ABNORMAL /HPF
ERYTHROCYTE [DISTWIDTH] IN BLOOD BY AUTOMATED COUNT: 16.9 % (ref 11.9–14.6)
GLUCOSE SERPL-MCNC: 100 MG/DL (ref 65–100)
GLUCOSE UR STRIP.AUTO-MCNC: NEGATIVE MG/DL
HCT VFR BLD AUTO: 30.2 % (ref 41.1–50.3)
HGB BLD-MCNC: 10.1 G/DL (ref 13.6–17.2)
HGB UR QL STRIP: NEGATIVE
INR PPP: 0.9
KETONES UR QL STRIP.AUTO: NEGATIVE MG/DL
LEUKOCYTE ESTERASE UR QL STRIP.AUTO: ABNORMAL
MCH RBC QN AUTO: 33 PG (ref 26.1–32.9)
MCHC RBC AUTO-ENTMCNC: 33.4 G/DL (ref 31.4–35)
MCV RBC AUTO: 98.7 FL (ref 79.6–97.8)
NITRITE UR QL STRIP.AUTO: NEGATIVE
NRBC # BLD: 0 K/UL (ref 0–0.2)
PH UR STRIP: 5 [PH] (ref 5–9)
PLATELET # BLD AUTO: 144 K/UL (ref 150–450)
PMV BLD AUTO: 10 FL (ref 9.4–12.3)
POTASSIUM SERPL-SCNC: 3.9 MMOL/L (ref 3.5–5.1)
PROT UR STRIP-MCNC: NEGATIVE MG/DL
PROTHROMBIN TIME: 12.7 SEC (ref 12.6–14.5)
RBC # BLD AUTO: 3.06 M/UL (ref 4.23–5.6)
RBC #/AREA URNS HPF: 0 /HPF
SODIUM SERPL-SCNC: 136 MMOL/L (ref 136–145)
SP GR UR REFRACTOMETRY: 1.01 (ref 1–1.02)
UROBILINOGEN UR QL STRIP.AUTO: 0.2 EU/DL (ref 0.2–1)
WBC # BLD AUTO: 3.5 K/UL (ref 4.3–11.1)
WBC URNS QL MICRO: ABNORMAL /HPF

## 2022-03-22 PROCEDURE — 87086 URINE CULTURE/COLONY COUNT: CPT

## 2022-03-22 PROCEDURE — 85027 COMPLETE CBC AUTOMATED: CPT

## 2022-03-22 PROCEDURE — 85610 PROTHROMBIN TIME: CPT

## 2022-03-22 PROCEDURE — 87186 SC STD MICRODIL/AGAR DIL: CPT

## 2022-03-22 PROCEDURE — 81001 URINALYSIS AUTO W/SCOPE: CPT

## 2022-03-22 PROCEDURE — 80048 BASIC METABOLIC PNL TOTAL CA: CPT

## 2022-03-22 PROCEDURE — 87088 URINE BACTERIA CULTURE: CPT

## 2022-03-22 RX ORDER — PREDNISONE 5 MG/1
TABLET ORAL DAILY
COMMUNITY
End: 2022-03-25

## 2022-03-22 RX ORDER — NAPROXEN 250 MG/1
250 TABLET ORAL 2 TIMES DAILY WITH MEALS
COMMUNITY

## 2022-03-22 NOTE — PERIOP NOTES
Patient verified name and     Order for consent was found in EHR and matches case posting; patient verified. Type II surgery, walk in assessment complete. Labs per surgeon: CBC, BMP, PT/INR, UA, UCX. Labs per anesthesia protocol: Hgb, Potassium, Creat. inculded in surgeon's labs. Type and Screen DOS. EKG: none per protocol. Hospital approved surgical skin cleanser and instructions given per hospital policy. Patient provided with and instructed on educational handouts including Guide to Surgery, Pain Management, Hand Hygiene, Blood Transfusion Education, and [facility] Brochure. Patient answered medical/surgical history questions at their best of ability. All prior to admission medications documented in [facility]. Original medication prescription bottle were visualized during patient appointment. Patient instructed to hold all vitamins 7 days prior to surgery and NSAIDS 5 days prior to surgery, patient verbalized understanding. Patient teach back successful and patient demonstrates knowledge of instructions.

## 2022-03-22 NOTE — PERIOP NOTES
PLEASE CONTINUE TAKING ALL PRESCRIPTION MEDICATIONS UP TO THE DAY OF SURGERY UNLESS OTHERWISE DIRECTED BELOW. DISCONTINUE all vitamins and supplements 7 days prior to surgery. DISCONTINUE Non-Steriodal Anti-Inflammatory (NSAIDS) such as Advil and Aleve 5 days prior to surgery. Home Medications to take  the day of surgery   Tamsulosin (Flomax)   Prednisone           Home Medications   to Hold           Comments      On the day before surgery please take Acetaminophen 1000mg in the morning and then again before bed. You may substitute for Tylenol 650 mg. Please do not bring home medications with you on the day of surgery unless otherwise directed by your nurse. If you are instructed to bring home medications, please give them to your nurse as they will be administered by the nursing staff. If you have any questions, please call [facility] [phone] or [facility] [phone]. A copy of this note was provided to the patient for reference.

## 2022-03-22 NOTE — PERIOP NOTES
Recent Results (from the past 12 hour(s))   CBC W/O DIFF    Collection Time: 03/22/22  1:45 PM   Result Value Ref Range    WBC 3.5 (L) 4.3 - 11.1 K/uL    RBC 3.06 (L) 4.23 - 5.6 M/uL    HGB 10.1 (L) 13.6 - 17.2 g/dL    HCT 30.2 (L) 41.1 - 50.3 %    MCV 98.7 (H) 79.6 - 97.8 FL    MCH 33.0 (H) 26.1 - 32.9 PG    MCHC 33.4 31.4 - 35.0 g/dL    RDW 16.9 (H) 11.9 - 14.6 %    PLATELET 770 (L) 446 - 450 K/uL    MPV 10.0 9.4 - 12.3 FL    ABSOLUTE NRBC 0.00 0.0 - 0.2 K/uL   METABOLIC PANEL, BASIC    Collection Time: 03/22/22  1:45 PM   Result Value Ref Range    Sodium 136 136 - 145 mmol/L    Potassium 3.9 3.5 - 5.1 mmol/L    Chloride 105 98 - 107 mmol/L    CO2 24 21 - 32 mmol/L    Anion gap 7 7 - 16 mmol/L    Glucose 100 65 - 100 mg/dL    BUN 19 8 - 23 MG/DL    Creatinine 0.91 0.8 - 1.5 MG/DL    GFR est AA >60 >60 ml/min/1.73m2    GFR est non-AA >60 >60 ml/min/1.73m2    Calcium 9.3 8.3 - 10.4 MG/DL   PROTHROMBIN TIME + INR    Collection Time: 03/22/22  1:45 PM   Result Value Ref Range    Prothrombin time 12.7 12.6 - 14.5 sec    INR 0.9     URINALYSIS W/ RFLX MICROSCOPIC    Collection Time: 03/22/22  1:45 PM   Result Value Ref Range    Color YELLOW      Appearance CLEAR      Specific gravity 1.007 1.001 - 1.023      pH (UA) 5.0 5.0 - 9.0      Protein Negative NEG mg/dL    Glucose Negative mg/dL    Ketone Negative NEG mg/dL    Bilirubin Negative NEG      Blood Negative NEG      Urobilinogen 0.2 0.2 - 1.0 EU/dL    Nitrites Negative NEG      Leukocyte Esterase SMALL (A) NEG      WBC 10-20 0 /hpf    RBC 0 0 /hpf    Epithelial cells 0-3 0 /hpf    Bacteria 0 0 /hpf    Casts 0 0 /lpf     Labs routed to Dr. Aida Savage. Message sent to VERONICA Naidu in office to notify Luzmaria Berkowitz of abnormal labs.

## 2022-03-24 ENCOUNTER — HOSPITAL ENCOUNTER (OUTPATIENT)
Dept: PET IMAGING | Age: 67
Discharge: HOME | End: 2022-03-24
Payer: MEDICARE

## 2022-03-24 DIAGNOSIS — C61 PROSTATE CANCER METASTATIC TO BONE (HCC): ICD-10-CM

## 2022-03-24 DIAGNOSIS — C79.51 PROSTATE CANCER METASTATIC TO BONE (HCC): ICD-10-CM

## 2022-03-24 DIAGNOSIS — C77.2 METASTASIS TO RETROPERITONEAL LYMPH NODE (HCC): ICD-10-CM

## 2022-03-24 LAB
GLUCOSE BLD STRIP.AUTO-MCNC: 109 MG/DL (ref 65–100)
SERVICE CMNT-IMP: ABNORMAL

## 2022-03-24 PROCEDURE — 82962 GLUCOSE BLOOD TEST: CPT

## 2022-03-24 PROCEDURE — 74011000636 HC RX REV CODE- 636: Performed by: INTERNAL MEDICINE

## 2022-03-24 PROCEDURE — A9552 F18 FDG: HCPCS

## 2022-03-24 RX ORDER — FLUDEOXYGLUCOSE F18 300 MCI/ML
10 INJECTION INTRAVENOUS ONCE
Status: COMPLETED | OUTPATIENT
Start: 2022-03-24 | End: 2022-03-24

## 2022-03-24 RX ORDER — SODIUM CHLORIDE 0.9 % (FLUSH) 0.9 %
10 SYRINGE (ML) INJECTION
Status: COMPLETED | OUTPATIENT
Start: 2022-03-24 | End: 2022-03-24

## 2022-03-24 RX ADMIN — FLUDEOXYGLUCOSE F18 12.81 MILLICURIE: 300 INJECTION INTRAVENOUS at 13:18

## 2022-03-24 RX ADMIN — DIATRIZOATE MEGLUMINE AND DIATRIZOATE SODIUM 10 ML: 660; 100 LIQUID ORAL; RECTAL at 13:18

## 2022-03-24 RX ADMIN — Medication 10 ML: at 13:18

## 2022-03-25 LAB
BACTERIA SPEC CULT: ABNORMAL
SERVICE CMNT-IMP: ABNORMAL

## 2022-03-30 ENCOUNTER — ANESTHESIA EVENT (OUTPATIENT)
Dept: SURGERY | Age: 67
End: 2022-03-30
Payer: MEDICARE

## 2022-03-31 ENCOUNTER — ANESTHESIA (OUTPATIENT)
Dept: SURGERY | Age: 67
End: 2022-03-31
Payer: MEDICARE

## 2022-03-31 ENCOUNTER — HOSPITAL ENCOUNTER (OUTPATIENT)
Age: 67
Setting detail: OBSERVATION
Discharge: HOME | End: 2022-04-02
Attending: UROLOGY | Admitting: UROLOGY
Payer: MEDICARE

## 2022-03-31 DIAGNOSIS — Z90.79 S/P TURP: ICD-10-CM

## 2022-03-31 PROBLEM — C61 PROSTATE CANCER (HCC): Status: ACTIVE | Noted: 2022-03-31

## 2022-03-31 PROBLEM — C61 PROSTATE CANCER (HCC): Status: ACTIVE | Noted: 2022-01-01

## 2022-03-31 LAB
ABO + RH BLD: NORMAL
ANION GAP SERPL CALC-SCNC: 5 MMOL/L (ref 7–16)
BLOOD GROUP ANTIBODIES SERPL: NORMAL
BUN SERPL-MCNC: 16 MG/DL (ref 8–23)
CALCIUM SERPL-MCNC: 9.2 MG/DL (ref 8.3–10.4)
CHLORIDE SERPL-SCNC: 105 MMOL/L (ref 98–107)
CO2 SERPL-SCNC: 26 MMOL/L (ref 21–32)
CREAT SERPL-MCNC: 1 MG/DL (ref 0.8–1.5)
GLUCOSE SERPL-MCNC: 129 MG/DL (ref 65–100)
HCT VFR BLD AUTO: 34.8 % (ref 41.1–50.3)
HGB BLD-MCNC: 11 G/DL (ref 13.6–17.2)
POTASSIUM SERPL-SCNC: 4.5 MMOL/L (ref 3.5–5.1)
SODIUM SERPL-SCNC: 136 MMOL/L (ref 138–145)
SPECIMEN EXP DATE BLD: NORMAL

## 2022-03-31 PROCEDURE — 74011250637 HC RX REV CODE- 250/637: Performed by: ANESTHESIOLOGY

## 2022-03-31 PROCEDURE — 77030005206: Performed by: UROLOGY

## 2022-03-31 PROCEDURE — 76060000032 HC ANESTHESIA 0.5 TO 1 HR: Performed by: UROLOGY

## 2022-03-31 PROCEDURE — 94760 N-INVAS EAR/PLS OXIMETRY 1: CPT

## 2022-03-31 PROCEDURE — 77030019908 HC STETH ESOPH SIMS -A: Performed by: ANESTHESIOLOGY

## 2022-03-31 PROCEDURE — 88341 IMHCHEM/IMCYTCHM EA ADD ANTB: CPT

## 2022-03-31 PROCEDURE — 74011250636 HC RX REV CODE- 250/636: Performed by: UROLOGY

## 2022-03-31 PROCEDURE — 80048 BASIC METABOLIC PNL TOTAL CA: CPT

## 2022-03-31 PROCEDURE — 74011250636 HC RX REV CODE- 250/636: Performed by: ANESTHESIOLOGY

## 2022-03-31 PROCEDURE — 86900 BLOOD TYPING SEROLOGIC ABO: CPT

## 2022-03-31 PROCEDURE — 88342 IMHCHEM/IMCYTCHM 1ST ANTB: CPT

## 2022-03-31 PROCEDURE — 76210000002 HC OR PH I REC 3 TO 3.5 HR: Performed by: UROLOGY

## 2022-03-31 PROCEDURE — 74011000250 HC RX REV CODE- 250: Performed by: NURSE ANESTHETIST, CERTIFIED REGISTERED

## 2022-03-31 PROCEDURE — 74011250637 HC RX REV CODE- 250/637: Performed by: UROLOGY

## 2022-03-31 PROCEDURE — 76010000138 HC OR TIME 0.5 TO 1 HR: Performed by: UROLOGY

## 2022-03-31 PROCEDURE — 88305 TISSUE EXAM BY PATHOLOGIST: CPT

## 2022-03-31 PROCEDURE — 74011250636 HC RX REV CODE- 250/636: Performed by: NURSE ANESTHETIST, CERTIFIED REGISTERED

## 2022-03-31 PROCEDURE — G0378 HOSPITAL OBSERVATION PER HR: HCPCS

## 2022-03-31 PROCEDURE — 77030022427 HC ELECTRD RESCTCS CT STOR -C: Performed by: UROLOGY

## 2022-03-31 PROCEDURE — 77030010509 HC AIRWY LMA MSK TELE -A: Performed by: ANESTHESIOLOGY

## 2022-03-31 PROCEDURE — 77030040922 HC BLNKT HYPOTHRM STRY -A: Performed by: ANESTHESIOLOGY

## 2022-03-31 PROCEDURE — 52601 PROSTATECTOMY (TURP): CPT | Performed by: UROLOGY

## 2022-03-31 PROCEDURE — 85018 HEMOGLOBIN: CPT

## 2022-03-31 PROCEDURE — 77030040831 HC BAG URINE DRNG MDII -A: Performed by: UROLOGY

## 2022-03-31 PROCEDURE — 77030005546 HC CATH URETH FOL 3W BARD -A: Performed by: UROLOGY

## 2022-03-31 PROCEDURE — 36415 COLL VENOUS BLD VENIPUNCTURE: CPT

## 2022-03-31 PROCEDURE — 74011000250 HC RX REV CODE- 250: Performed by: ANESTHESIOLOGY

## 2022-03-31 PROCEDURE — 2709999900 HC NON-CHARGEABLE SUPPLY: Performed by: UROLOGY

## 2022-03-31 RX ORDER — OXYCODONE AND ACETAMINOPHEN 5; 325 MG/1; MG/1
1 TABLET ORAL AS NEEDED
Status: DISCONTINUED | OUTPATIENT
Start: 2022-03-31 | End: 2022-04-01

## 2022-03-31 RX ORDER — LIDOCAINE HYDROCHLORIDE 10 MG/ML
0.1 INJECTION INFILTRATION; PERINEURAL AS NEEDED
Status: DISCONTINUED | OUTPATIENT
Start: 2022-03-31 | End: 2022-03-31 | Stop reason: HOSPADM

## 2022-03-31 RX ORDER — NITROFURANTOIN MACROCRYSTALS 50 MG/1
50 CAPSULE ORAL EVERY 6 HOURS
Status: DISCONTINUED | OUTPATIENT
Start: 2022-03-31 | End: 2022-04-02 | Stop reason: HOSPADM

## 2022-03-31 RX ORDER — LIDOCAINE HYDROCHLORIDE 20 MG/ML
INJECTION, SOLUTION EPIDURAL; INFILTRATION; INTRACAUDAL; PERINEURAL AS NEEDED
Status: DISCONTINUED | OUTPATIENT
Start: 2022-03-31 | End: 2022-03-31 | Stop reason: HOSPADM

## 2022-03-31 RX ORDER — PREDNISONE 5 MG/1
5 TABLET ORAL DAILY
Status: DISCONTINUED | OUTPATIENT
Start: 2022-04-01 | End: 2022-04-02 | Stop reason: HOSPADM

## 2022-03-31 RX ORDER — ATROPA BELLADONNA AND OPIUM 16.2; 3 MG/1; MG/1
1 SUPPOSITORY RECTAL
Status: DISCONTINUED | OUTPATIENT
Start: 2022-03-31 | End: 2022-04-02 | Stop reason: HOSPADM

## 2022-03-31 RX ORDER — SODIUM CHLORIDE 0.9 % (FLUSH) 0.9 %
5-40 SYRINGE (ML) INJECTION AS NEEDED
Status: DISCONTINUED | OUTPATIENT
Start: 2022-03-31 | End: 2022-03-31 | Stop reason: HOSPADM

## 2022-03-31 RX ORDER — HYDROMORPHONE HYDROCHLORIDE 2 MG/ML
0.5 INJECTION, SOLUTION INTRAMUSCULAR; INTRAVENOUS; SUBCUTANEOUS
Status: DISCONTINUED | OUTPATIENT
Start: 2022-03-31 | End: 2022-04-01

## 2022-03-31 RX ORDER — ONDANSETRON 2 MG/ML
INJECTION INTRAMUSCULAR; INTRAVENOUS AS NEEDED
Status: DISCONTINUED | OUTPATIENT
Start: 2022-03-31 | End: 2022-03-31 | Stop reason: HOSPADM

## 2022-03-31 RX ORDER — SODIUM CHLORIDE 0.9 % (FLUSH) 0.9 %
5-40 SYRINGE (ML) INJECTION AS NEEDED
Status: DISCONTINUED | OUTPATIENT
Start: 2022-03-31 | End: 2022-04-02 | Stop reason: HOSPADM

## 2022-03-31 RX ORDER — HYDROCODONE BITARTRATE AND ACETAMINOPHEN 5; 325 MG/1; MG/1
1 TABLET ORAL
Status: DISCONTINUED | OUTPATIENT
Start: 2022-03-31 | End: 2022-04-02 | Stop reason: HOSPADM

## 2022-03-31 RX ORDER — MIDAZOLAM HYDROCHLORIDE 1 MG/ML
1 INJECTION, SOLUTION INTRAMUSCULAR; INTRAVENOUS ONCE
Status: COMPLETED | OUTPATIENT
Start: 2022-03-31 | End: 2022-03-31

## 2022-03-31 RX ORDER — PROPOFOL 10 MG/ML
INJECTION, EMULSION INTRAVENOUS AS NEEDED
Status: DISCONTINUED | OUTPATIENT
Start: 2022-03-31 | End: 2022-03-31 | Stop reason: HOSPADM

## 2022-03-31 RX ORDER — OXYCODONE HYDROCHLORIDE 5 MG/1
5 TABLET ORAL
Status: DISCONTINUED | OUTPATIENT
Start: 2022-03-31 | End: 2022-04-01

## 2022-03-31 RX ORDER — SODIUM CHLORIDE 9 MG/ML
50 INJECTION, SOLUTION INTRAVENOUS CONTINUOUS
Status: DISCONTINUED | OUTPATIENT
Start: 2022-03-31 | End: 2022-03-31 | Stop reason: HOSPADM

## 2022-03-31 RX ORDER — DOCUSATE SODIUM 100 MG/1
100 CAPSULE, LIQUID FILLED ORAL 2 TIMES DAILY
Status: DISCONTINUED | OUTPATIENT
Start: 2022-03-31 | End: 2022-04-02 | Stop reason: HOSPADM

## 2022-03-31 RX ORDER — MIDAZOLAM HYDROCHLORIDE 1 MG/ML
2 INJECTION, SOLUTION INTRAMUSCULAR; INTRAVENOUS
Status: DISCONTINUED | OUTPATIENT
Start: 2022-03-31 | End: 2022-03-31 | Stop reason: HOSPADM

## 2022-03-31 RX ORDER — DEXAMETHASONE SODIUM PHOSPHATE 4 MG/ML
INJECTION, SOLUTION INTRA-ARTICULAR; INTRALESIONAL; INTRAMUSCULAR; INTRAVENOUS; SOFT TISSUE AS NEEDED
Status: DISCONTINUED | OUTPATIENT
Start: 2022-03-31 | End: 2022-03-31 | Stop reason: HOSPADM

## 2022-03-31 RX ORDER — MORPHINE SULFATE 2 MG/ML
2 INJECTION, SOLUTION INTRAMUSCULAR; INTRAVENOUS
Status: DISCONTINUED | OUTPATIENT
Start: 2022-03-31 | End: 2022-04-02 | Stop reason: HOSPADM

## 2022-03-31 RX ORDER — OXYBUTYNIN CHLORIDE 5 MG/1
5 TABLET ORAL
Status: DISCONTINUED | OUTPATIENT
Start: 2022-03-31 | End: 2022-04-02 | Stop reason: HOSPADM

## 2022-03-31 RX ORDER — DIPHENHYDRAMINE HYDROCHLORIDE 50 MG/ML
12.5 INJECTION, SOLUTION INTRAMUSCULAR; INTRAVENOUS
Status: DISCONTINUED | OUTPATIENT
Start: 2022-03-31 | End: 2022-04-01

## 2022-03-31 RX ORDER — SODIUM CHLORIDE, SODIUM LACTATE, POTASSIUM CHLORIDE, CALCIUM CHLORIDE 600; 310; 30; 20 MG/100ML; MG/100ML; MG/100ML; MG/100ML
75 INJECTION, SOLUTION INTRAVENOUS CONTINUOUS
Status: DISCONTINUED | OUTPATIENT
Start: 2022-03-31 | End: 2022-03-31 | Stop reason: HOSPADM

## 2022-03-31 RX ORDER — MIDAZOLAM HYDROCHLORIDE 1 MG/ML
2 INJECTION, SOLUTION INTRAMUSCULAR; INTRAVENOUS ONCE
Status: DISCONTINUED | OUTPATIENT
Start: 2022-03-31 | End: 2022-03-31 | Stop reason: HOSPADM

## 2022-03-31 RX ORDER — SODIUM CHLORIDE 0.9 % (FLUSH) 0.9 %
5-40 SYRINGE (ML) INJECTION EVERY 8 HOURS
Status: DISCONTINUED | OUTPATIENT
Start: 2022-03-31 | End: 2022-03-31 | Stop reason: HOSPADM

## 2022-03-31 RX ORDER — FENTANYL CITRATE 50 UG/ML
25 INJECTION, SOLUTION INTRAMUSCULAR; INTRAVENOUS ONCE
Status: DISCONTINUED | OUTPATIENT
Start: 2022-03-31 | End: 2022-03-31 | Stop reason: HOSPADM

## 2022-03-31 RX ORDER — SODIUM CHLORIDE 0.9 % (FLUSH) 0.9 %
5-40 SYRINGE (ML) INJECTION EVERY 8 HOURS
Status: DISCONTINUED | OUTPATIENT
Start: 2022-03-31 | End: 2022-04-02 | Stop reason: HOSPADM

## 2022-03-31 RX ORDER — SODIUM CHLORIDE 9 MG/ML
75 INJECTION, SOLUTION INTRAVENOUS CONTINUOUS
Status: DISCONTINUED | OUTPATIENT
Start: 2022-03-31 | End: 2022-04-02 | Stop reason: HOSPADM

## 2022-03-31 RX ORDER — ACETAMINOPHEN 325 MG/1
650 TABLET ORAL
Status: DISCONTINUED | OUTPATIENT
Start: 2022-03-31 | End: 2022-04-02 | Stop reason: HOSPADM

## 2022-03-31 RX ORDER — SODIUM CHLORIDE, SODIUM LACTATE, POTASSIUM CHLORIDE, CALCIUM CHLORIDE 600; 310; 30; 20 MG/100ML; MG/100ML; MG/100ML; MG/100ML
100 INJECTION, SOLUTION INTRAVENOUS CONTINUOUS
Status: DISCONTINUED | OUTPATIENT
Start: 2022-03-31 | End: 2022-04-01

## 2022-03-31 RX ORDER — LEVOFLOXACIN 5 MG/ML
500 INJECTION, SOLUTION INTRAVENOUS ONCE
Status: COMPLETED | OUTPATIENT
Start: 2022-03-31 | End: 2022-03-31

## 2022-03-31 RX ORDER — ONDANSETRON 2 MG/ML
4 INJECTION INTRAMUSCULAR; INTRAVENOUS
Status: DISCONTINUED | OUTPATIENT
Start: 2022-03-31 | End: 2022-04-02 | Stop reason: HOSPADM

## 2022-03-31 RX ORDER — FAMOTIDINE 20 MG/1
20 TABLET, FILM COATED ORAL ONCE
Status: COMPLETED | OUTPATIENT
Start: 2022-03-31 | End: 2022-03-31

## 2022-03-31 RX ORDER — HYDROMORPHONE HYDROCHLORIDE 2 MG/ML
INJECTION, SOLUTION INTRAMUSCULAR; INTRAVENOUS; SUBCUTANEOUS AS NEEDED
Status: DISCONTINUED | OUTPATIENT
Start: 2022-03-31 | End: 2022-03-31 | Stop reason: HOSPADM

## 2022-03-31 RX ADMIN — MORPHINE SULFATE 2 MG: 2 INJECTION, SOLUTION INTRAMUSCULAR; INTRAVENOUS at 17:38

## 2022-03-31 RX ADMIN — LIDOCAINE HYDROCHLORIDE 100 MG: 20 INJECTION, SOLUTION EPIDURAL; INFILTRATION; INTRACAUDAL; PERINEURAL at 11:07

## 2022-03-31 RX ADMIN — PROPOFOL 200 MG: 10 INJECTION, EMULSION INTRAVENOUS at 11:07

## 2022-03-31 RX ADMIN — DEXAMETHASONE SODIUM PHOSPHATE 4 MG: 4 INJECTION, SOLUTION INTRAMUSCULAR; INTRAVENOUS at 11:31

## 2022-03-31 RX ADMIN — SODIUM CHLORIDE 75 ML/HR: 9 INJECTION, SOLUTION INTRAVENOUS at 17:44

## 2022-03-31 RX ADMIN — HYDROMORPHONE HYDROCHLORIDE 1 MG: 2 INJECTION INTRAMUSCULAR; INTRAVENOUS; SUBCUTANEOUS at 11:39

## 2022-03-31 RX ADMIN — MORPHINE SULFATE 2 MG: 2 INJECTION, SOLUTION INTRAMUSCULAR; INTRAVENOUS at 23:22

## 2022-03-31 RX ADMIN — HYDROMORPHONE HYDROCHLORIDE 0.5 MG: 2 INJECTION INTRAMUSCULAR; INTRAVENOUS; SUBCUTANEOUS at 12:29

## 2022-03-31 RX ADMIN — ONDANSETRON 4 MG: 2 INJECTION INTRAMUSCULAR; INTRAVENOUS at 11:32

## 2022-03-31 RX ADMIN — MORPHINE SULFATE 2 MG: 2 INJECTION, SOLUTION INTRAMUSCULAR; INTRAVENOUS at 19:57

## 2022-03-31 RX ADMIN — MIDAZOLAM 1 MG: 1 INJECTION INTRAMUSCULAR; INTRAVENOUS at 12:40

## 2022-03-31 RX ADMIN — SODIUM CHLORIDE, PRESERVATIVE FREE 10 ML: 5 INJECTION INTRAVENOUS at 21:38

## 2022-03-31 RX ADMIN — SODIUM CHLORIDE, PRESERVATIVE FREE 10 ML: 5 INJECTION INTRAVENOUS at 17:42

## 2022-03-31 RX ADMIN — HYDROMORPHONE HYDROCHLORIDE 0.5 MG: 2 INJECTION INTRAMUSCULAR; INTRAVENOUS; SUBCUTANEOUS at 12:19

## 2022-03-31 RX ADMIN — DOCUSATE SODIUM 100 MG: 100 CAPSULE, LIQUID FILLED ORAL at 18:00

## 2022-03-31 RX ADMIN — HYDROMORPHONE HYDROCHLORIDE 0.5 MG: 2 INJECTION INTRAMUSCULAR; INTRAVENOUS; SUBCUTANEOUS at 12:24

## 2022-03-31 RX ADMIN — FAMOTIDINE 20 MG: 20 TABLET ORAL at 10:14

## 2022-03-31 RX ADMIN — NITROFURANTOIN MACROCRYSTALS 50 MG: 50 CAPSULE ORAL at 17:39

## 2022-03-31 RX ADMIN — HYDROMORPHONE HYDROCHLORIDE 0.4 MG: 2 INJECTION INTRAMUSCULAR; INTRAVENOUS; SUBCUTANEOUS at 11:27

## 2022-03-31 RX ADMIN — HYDROMORPHONE HYDROCHLORIDE 0.6 MG: 2 INJECTION INTRAMUSCULAR; INTRAVENOUS; SUBCUTANEOUS at 11:17

## 2022-03-31 RX ADMIN — SODIUM CHLORIDE, SODIUM LACTATE, POTASSIUM CHLORIDE, AND CALCIUM CHLORIDE 75 ML/HR: 600; 310; 30; 20 INJECTION, SOLUTION INTRAVENOUS at 10:13

## 2022-03-31 RX ADMIN — NITROFURANTOIN MACROCRYSTALS 50 MG: 50 CAPSULE ORAL at 23:22

## 2022-03-31 RX ADMIN — LEVOFLOXACIN 500 MG: 5 INJECTION, SOLUTION INTRAVENOUS at 11:01

## 2022-03-31 NOTE — BRIEF OP NOTE
Brief Postoperative Note    Patient: Candy Reid  YOB: 1955  MRN: [id number]    Date of Procedure: 3/31/2022     Pre-Op Diagnosis: Benign prostatic hyperplasia with lower urinary tract symptoms, symptom details unspecified [N40.1]  Prostate cancer ([address] Darylradha Peterson    Post-Op Diagnosis: Same     Procedure(s):  CYSTOSCOPY TRANSURETHRAL RESECTION OF PROSTATE (CHANNEL TURP)    Surgeon(s):  Darron Berkowitz DO    Surgical Assistant: None    Anesthesia: General     Estimated Blood Loss (mL): 544UB    Complications: none immediate    Specimens:   ID Type Source Tests Collected by Time Destination   1 : Prostate chips Fresh Prostate  Sarbjit Fallon DO 3/31/2022 1141 Pathology        Implants: * No implants in log *    Drains: * No LDAs found *    Findings: see op note    Electronically Signed by Ragini Veloz DO on 3/31/2022 at 12:11 PM

## 2022-03-31 NOTE — PROGRESS NOTES
Pt admitted from IR pt is alert and oriented rr are even and unlabored lung sounds are clear no distress noted. Pt has TURP in place patent urine is clear. Pt has no c/o pain at current time. Skin intact no issues noted dual skin assessment done with Ayush Pablo. IV fluids infusing, safety measures in place will continue to monitor.

## 2022-03-31 NOTE — ANESTHESIA POSTPROCEDURE EVALUATION
Procedure(s):  CYSTOSCOPY TRANSURETHRAL RESECTION OF PROSTATE.    general    Anesthesia Post Evaluation      Multimodal analgesia: multimodal analgesia used between 6 hours prior to anesthesia start to PACU discharge  Patient location during evaluation: bedside  Patient participation: complete - patient participated  Level of consciousness: awake  Pain management: adequate  Airway patency: patent  Anesthetic complications: no  Cardiovascular status: acceptable and stable  Respiratory status: acceptable and room air  Hydration status: acceptable  Comments: Blood pressure 133/81, pulse (!) 58, temperature 36.8 °C (98.2 °F), resp. rate 16, height 5' 9.5\" (1.765 m), weight 96.2 kg (212 lb), SpO2 99 %. Post anesthesia nausea and vomiting:  none  Final Post Anesthesia Temperature Assessment:  Normothermia (36.0-37.5 degrees C)      INITIAL Post-op Vital signs:   Vitals Value Taken Time   /66 03/31/22 1409   Temp 36.8 °C (98.2 °F) 03/31/22 1400   Pulse 49 03/31/22 1452   Resp 14 03/31/22 1400   SpO2 98 % 03/31/22 1452   Vitals shown include unvalidated device data.

## 2022-03-31 NOTE — OP NOTES
[address] [facility]  OPERATIVE REPORT    Name:  Leslie Angel  MR#:  [id number]  :  1955  ACCOUNT #:  [de-identified]  DATE OF SERVICE:  2022    PREOPERATIVE DIAGNOSES:  Benign prostatic hyperplasia and prostate cancer. POSTOPERATIVE DIAGNOSES:  Benign prostatic hyperplasia and prostate cancer. PROCEDURE PERFORMED:  Channel transurethral resection of the prostate. SURGEON:  José Berkowitz DO    ASSISTANT:  None. ANESTHESIA:  General.    COMPLICATIONS:  None immediate. SPECIMENS REMOVED:  Prostate chips. IMPLANTS:  None. ESTIMATED BLOOD LOSS:  100 mL    CLINICAL HISTORY:  This is a 50-year-old gentleman who has a history of metastatic [place] 9 adenocarcinoma of the prostate. He is currently receiving immunotherapy under the direction of Dr. Tommy Valenzuela with Oncology. He reports progressive lower urinary tract symptoms and recent cystoscopy revealed significant nodular prostate growth causing obstruction. All risks, benefits and alternatives to the above-mentioned procedure have been reviewed and he is willing to proceed at this time. PROCEDURE:  Patient consent was obtained. The patient was brought back to the operating room, at which time he was placed in the supine position. After the uneventful induction of general anesthesia, he was then placed in a dorsal lithotomy position. His genital area was prepped and draped and a sterile field applied. I initially attempted to pass a 28-Eritrean resectoscope into the meatus. However, due to meatal stenosis, I was unable to advance. The meatus was dilated up to 28-Eritrean using [employer] sounds. The 28-Eritrean resectoscope was then passed into the urethra and advanced using obturator guidance. I did meet resistance at the prostate. Therefore, the scope was assembled and further advanced under direct vision. There did appear to be obstruction caused likely by prostate cancer growth.   The bladder was systematically surveyed. Mild bladder trabeculations were noted throughout the bladder. There were no obvious mucosal abnormalities. There did appear to be extension of his prostate cancer into the base of the bladder. However, the ureteral orifices appeared involved. Using the resectoscope, I began by resecting the posterior component of the prostate with care taken to avoid injury to the ureteral orifices bilaterally. The right and left lateral lobes were then resected down to the capsule, respectively. A small amount of tissue was also resected at the 12 o'clock position. Following creation of adequate prostate channel, all prostate chips were evacuated using the resectoscope. The distal margin of resection was the verumontanum. Following evacuation of all prostate chips, hemostasis was obtained using spot electrocautery. Following complete hemostasis, the resectoscope was removed and a 24-Lao three-way catheter was placed to continuous bladder irrigation. The patient tolerated the procedure well. Estimated blood loss was 100 mL.       SAVANNAH ARIZA DO      [id number]  D:  03/31/2022 12:20  T:  03/31/2022 14:10  JOB #:  [id number]

## 2022-03-31 NOTE — H&P
[facility]  [place], [address]  [phone]    Lizbeth Pereira  : 1955     HPI   78 y.o., male returns in follow up for CaP/BPH. He cont to report sig freq, hesitancy and decreased emptying on Flomax. History of metastatic [place] 9 CaP. S/P bilateral orchiectomy on 2017. Currently on lurbinectedin under direction of Dr. Jennifer Salinas. Cysto on 22 showed sig nodular prostate growth obstruction. Past Medical History:   Diagnosis Date    Claustrophobia     Ear problems     Elevated PSA     Former light cigarette smoker (1-9 per day)     smoked for 35 years.   quit -    History of multiple allergies     Nicotine vapor product user     Personal history of prostate cancer     Prostate cancer ([facility] [address]     prostate, neuroendocrine, mets to bone     Past Surgical History:   Procedure Laterality Date    BIOPSY PROSTATE      HX COLONOSCOPY  2020    HX HEENT      teeth removed     HX ORCHIECTOMY Bilateral 2017    IR KYPHOPLASTY LUMBAR  10/14/2020    IR KYPHOPLASTY THORACIC  10/29/2020     Current Facility-Administered Medications   Medication Dose Route Frequency Provider Last Rate Last Admin    lidocaine (XYLOCAINE) 10 mg/mL (1 %) injection 0.1 mL  0.1 mL SubCUTAneous PRN Tracy Nunez MD        lactated Ringers infusion  75 mL/hr IntraVENous CONTINUOUS Tracy Nunez MD 75 mL/hr at 22 1013 75 mL/hr at 22 1013    lactated ringers bolus infusion 1,000 mL  1,000 mL IntraVENous ONCE Tracy Nunez MD        0.9% sodium chloride infusion  50 mL/hr IntraVENous CONTINUOUS Tracy Nunez MD        sodium chloride (NS) flush 5-40 mL  5-40 mL IntraVENous Q8H Debra Cid MD        sodium chloride (NS) flush 5-40 mL  5-40 mL IntraVENous PRN Tracy Nunez MD        fentaNYL citrate (PF) injection 25 mcg  25 mcg IntraVENous ONCE Tracy Nunez MD        midazolam (VERSED) injection 2 mg  2 mg IntraVENous ONCE PRN Viktoria Suarez MD        midazolam (VERSED) injection 2 mg  2 mg IntraVENous ONCE Armando Cid MD        levoFLOXacin [facility]) 500 mg in D5W IVPB  500 mg IntraVENous ONCE Trista Berkowitz DO   Held at 22 1014     Allergies   Allergen Reactions    Turmeric Nausea and Vomiting     Social History     Socioeconomic History    Marital status: SINGLE     Spouse name: Not on file    Number of children: Not on file    Years of education: Not on file    Highest education level: Not on file   Occupational History    Not on file   Tobacco Use    Smoking status: Former Smoker     Packs/day: 0.50     Years: 35.00     Pack years: 17.50     Quit date: 2016     Years since quittin.3    Smokeless tobacco: Never Used   Vaping Use    Vaping Use: Former   Substance and Sexual Activity    Alcohol use: Yes     Comment: once every 3 weeks    Drug use: No    Sexual activity: Never   Other Topics Concern    Not on file   Social History Narrative    Not on file     Social Determinants of Health     Financial Resource Strain:     Difficulty of Paying Living Expenses: Not on file   Food Insecurity:     Worried About [facility] in the Last Year: Not on file    [place] of Food in the Last Year: Not on file   Transportation Needs:     Lack of Transportation (Medical): Not on file    Lack of Transportation (Non-Medical):  Not on file   Physical Activity:     Days of Exercise per Week: Not on file    Minutes of Exercise per Session: Not on file   Stress:     Feeling of Stress : Not on file   Social Connections:     Frequency of Communication with Friends and Family: Not on file    Frequency of Social Gatherings with Friends and Family: Not on file    Attends Buddhism Services: Not on file    Active Member of Clubs or Organizations: Not on file    Attends Club or Organization Meetings: Not on file    Marital Status: Not on file   Intimate Partner Violence:  Fear of Current or Ex-Partner: Not on file    Emotionally Abused: Not on file    Physically Abused: Not on file    Sexually Abused: Not on file   Housing Stability:     Unable to Pay for Housing in the Last Year: Not on file    Number of Places Lived in the Last Year: Not on file    Unstable Housing in the Last Year: Not on file     Family History   Problem Relation Age of Onset    Prostate Cancer Father     Hypertension Father     Cancer Father         prostate cancer       Review of Systems  All systems reviewed and are negative at this time. Physical Exam  Visit Vitals  BP (!) 147/67   Pulse 62   Temp 98.5 °F (36.9 °C)   Resp 14   Ht 5' 9.5\" (1.765 m)   Wt 212 lb (96.2 kg)   SpO2 98%   BMI 30.86 kg/m²     General appearance - alert, well appearing, and in no distress  Mental status - alert and oriented  Eyes - extraocular eye movements intact, sclera anicteric  Nose - normal and patent, no erythema or discharge  Mouth - mucous membranes moist  Chest - clear to auscultation bilaterally  Heart - normal rate, regular rhythm  Abdomen - soft, nontender, nondistended, no masses or organomegaly  Neurological - normal speech, no focal findings or movement disorder noted  Skin - normal coloration and turgor        Assessment/Plan  Prostate cancer/BPH. He has elected to proceed with a channel TURP. All risks, benefits and alternatives to the above mentioned procedure were discussed and the patient is willing to proceed at this time.     Hakeem Berkowitz DO

## 2022-03-31 NOTE — PERIOP NOTES
TRANSFER - OUT REPORT:    Verbal report given to Vikram Hines on Robinson Bonilla  being transferred to [address] [place] [address] for inpatient care. Report consisted of patients Situation, Background, Assessment and   Recommendations(SBAR). Information from the following report(s) SBAR, Kardex, OR Summary, Procedure Summary, Intake/Output, MAR, Recent Results and Med Rec Status was reviewed with the receiving nurse. Lines:   Venous Access Device 8Fr PowerPort 10/29/20 Upper chest (subclavicular area, right (Active)       Peripheral IV 03/31/22 Left Hand (Active)   Site Assessment Clean, dry, & intact 03/31/22 1157   Phlebitis Assessment 0 03/31/22 1157   Infiltration Assessment 0 03/31/22 1157   Dressing Status Clean, dry, & intact 03/31/22 1157   Dressing Type Transparent 03/31/22 1157   Hub Color/Line Status Patent; Infusing 03/31/22 1157        Opportunity for questions and clarification was provided. Patient transported with:   2 belonging bags, IV, CBI. VTE prophylaxis orders have been written for Robinson Bonilla. Patient and family not given floor number and nurses name. Number given was called multiple times with no answer.

## 2022-03-31 NOTE — PROGRESS NOTES
TRANSFER - IN REPORT:    Verbal report received from RN(name) on Jessica Elms  being received from IR(unit) for ordered procedure      Report consisted of patients Situation, Background, Assessment and   Recommendations(SBAR). Information from the following report(s) Procedure Summary was reviewed with the receiving nurse. Opportunity for questions and clarification was provided. Assessment completed upon patients arrival to unit and care assumed.

## 2022-03-31 NOTE — ANESTHESIA PREPROCEDURE EVALUATION
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)   (+) Neuroendocrine carcinoma, high grade (HCC)   (+) Primary prostate cancer with metastasis from prostate to other site [facility])       Anesthetic History   No history of anesthetic complications            Review of Systems / Medical History  Patient summary reviewed and pertinent labs reviewed    Pulmonary          Smoker (vapes)         Neuro/Psych   Within defined limits           Cardiovascular              Pacemaker  Pertinent negatives: No CAD, dysrhythmias and cardiac stents  Exercise tolerance: >4 METS     GI/Hepatic/Renal  Within defined limits              Endo/Other        Cancer (prostate)     Other Findings              Physical Exam    Airway  Mallampati: II  TM Distance: 4 - 6 cm  Neck ROM: normal range of motion   Mouth opening: Normal     Cardiovascular  Regular rate and rhythm,  S1 and S2 normal,  no murmur, click, rub, or gallop  Rhythm: regular  Rate: normal         Dental  No notable dental hx       Pulmonary  Breath sounds clear to auscultation               Abdominal  GI exam deferred      Comments: Right subclavian port Other Findings            Anesthetic Plan    ASA: 2  Anesthesia type: general          Induction: Intravenous  Anesthetic plan and risks discussed with: Patient

## 2022-04-01 LAB
ANION GAP SERPL CALC-SCNC: 7 MMOL/L (ref 7–16)
BUN SERPL-MCNC: 19 MG/DL (ref 8–23)
CALCIUM SERPL-MCNC: 8.7 MG/DL (ref 8.3–10.4)
CHLORIDE SERPL-SCNC: 106 MMOL/L (ref 98–107)
CO2 SERPL-SCNC: 25 MMOL/L (ref 21–32)
CREAT SERPL-MCNC: 1.1 MG/DL (ref 0.8–1.5)
GLUCOSE SERPL-MCNC: 115 MG/DL (ref 65–100)
HCT VFR BLD AUTO: 32.2 % (ref 41.1–50.3)
HGB BLD-MCNC: 10.5 G/DL (ref 13.6–17.2)
POTASSIUM SERPL-SCNC: 3.9 MMOL/L (ref 3.5–5.1)
SODIUM SERPL-SCNC: 138 MMOL/L (ref 138–145)

## 2022-04-01 PROCEDURE — 74011250637 HC RX REV CODE- 250/637: Performed by: ANESTHESIOLOGY

## 2022-04-01 PROCEDURE — 74011636637 HC RX REV CODE- 636/637: Performed by: UROLOGY

## 2022-04-01 PROCEDURE — 36415 COLL VENOUS BLD VENIPUNCTURE: CPT

## 2022-04-01 PROCEDURE — G0378 HOSPITAL OBSERVATION PER HR: HCPCS

## 2022-04-01 PROCEDURE — 77030040832 HC IRR TRAY MDII -A

## 2022-04-01 PROCEDURE — 74011250637 HC RX REV CODE- 250/637: Performed by: UROLOGY

## 2022-04-01 PROCEDURE — 80048 BASIC METABOLIC PNL TOTAL CA: CPT

## 2022-04-01 PROCEDURE — 74011000250 HC RX REV CODE- 250: Performed by: ANESTHESIOLOGY

## 2022-04-01 PROCEDURE — 77030018836 HC SOL IRR NACL ICUM -A

## 2022-04-01 PROCEDURE — 74011250636 HC RX REV CODE- 250/636: Performed by: UROLOGY

## 2022-04-01 PROCEDURE — 85018 HEMOGLOBIN: CPT

## 2022-04-01 PROCEDURE — 2709999900 HC NON-CHARGEABLE SUPPLY

## 2022-04-01 RX ADMIN — DOCUSATE SODIUM 100 MG: 100 CAPSULE, LIQUID FILLED ORAL at 17:27

## 2022-04-01 RX ADMIN — HYDROCODONE BITARTRATE AND ACETAMINOPHEN 1 TABLET: 5; 325 TABLET ORAL at 17:27

## 2022-04-01 RX ADMIN — SODIUM CHLORIDE, PRESERVATIVE FREE 10 ML: 5 INJECTION INTRAVENOUS at 21:14

## 2022-04-01 RX ADMIN — HYDROCODONE BITARTRATE AND ACETAMINOPHEN 1 TABLET: 5; 325 TABLET ORAL at 12:54

## 2022-04-01 RX ADMIN — DOCUSATE SODIUM 100 MG: 100 CAPSULE, LIQUID FILLED ORAL at 08:26

## 2022-04-01 RX ADMIN — OXYCODONE AND ACETAMINOPHEN 1 TABLET: 5; 325 TABLET ORAL at 05:47

## 2022-04-01 RX ADMIN — SODIUM CHLORIDE, PRESERVATIVE FREE 10 ML: 5 INJECTION INTRAVENOUS at 13:02

## 2022-04-01 RX ADMIN — NITROFURANTOIN MACROCRYSTALS 50 MG: 50 CAPSULE ORAL at 17:27

## 2022-04-01 RX ADMIN — SODIUM CHLORIDE, PRESERVATIVE FREE 10 ML: 5 INJECTION INTRAVENOUS at 05:22

## 2022-04-01 RX ADMIN — OXYBUTYNIN CHLORIDE 5 MG: 5 TABLET ORAL at 08:26

## 2022-04-01 RX ADMIN — OXYBUTYNIN CHLORIDE 5 MG: 5 TABLET ORAL at 17:27

## 2022-04-01 RX ADMIN — NITROFURANTOIN MACROCRYSTALS 50 MG: 50 CAPSULE ORAL at 12:50

## 2022-04-01 RX ADMIN — SODIUM CHLORIDE 75 ML/HR: 9 INJECTION, SOLUTION INTRAVENOUS at 05:21

## 2022-04-01 RX ADMIN — PREDNISONE 5 MG: 5 TABLET ORAL at 08:26

## 2022-04-01 RX ADMIN — NITROFURANTOIN MACROCRYSTALS 50 MG: 50 CAPSULE ORAL at 05:22

## 2022-04-01 RX ADMIN — NITROFURANTOIN MACROCRYSTALS 50 MG: 50 CAPSULE ORAL at 23:13

## 2022-04-01 NOTE — PROGRESS NOTES
Admit Date: 3/31/2022    Subjective:     Tyrone Rodriguez is POD 1 TURP. Urine clear/yellow with mod cbi. No complaints. VSS.     Objective:     Patient Vitals for the past 8 hrs:   BP Temp Pulse Resp SpO2   04/01/22 0733 124/73 98 °F (36.7 °C) 63 19 93 %   04/01/22 0345 122/70 98 °F (36.7 °C) 66 18 94 %     04/01 0701 - 04/01 1900  In: -   Out: 2000 [Urine:2000]  03/30 1901 - 04/01 0700  In: 81441 [I.V.:500]  Out: 13190 [OBVHP:33691]    Physical Exam:  GENERAL: alert, cooperative, no distress  LUNG: clear to auscultation bilaterally  HEART: regular rate and rhythm, S1, S2   ABDOMEN: soft, non-tender  NEUROLOGIC: AOx3    Data Review   Recent Results (from the past 24 hour(s))   HGB & HCT    Collection Time: 03/31/22  3:24 PM   Result Value Ref Range    HGB 11.0 (L) 13.6 - 17.2 g/dL    HCT 34.8 (L) 41.1 - 05.6 %   METABOLIC PANEL, BASIC    Collection Time: 03/31/22  3:24 PM   Result Value Ref Range    Sodium 136 (L) 138 - 145 mmol/L    Potassium 4.5 3.5 - 5.1 mmol/L    Chloride 105 98 - 107 mmol/L    CO2 26 21 - 32 mmol/L    Anion gap 5 (L) 7 - 16 mmol/L    Glucose 129 (H) 65 - 100 mg/dL    BUN 16 8 - 23 MG/DL    Creatinine 1.00 0.8 - 1.5 MG/DL    GFR est AA >60 >60 ml/min/1.73m2    GFR est non-AA >60 >60 ml/min/1.73m2    Calcium 9.2 8.3 - 46.5 MG/DL   METABOLIC PANEL, BASIC    Collection Time: 04/01/22  5:49 AM   Result Value Ref Range    Sodium 138 138 - 145 mmol/L    Potassium 3.9 3.5 - 5.1 mmol/L    Chloride 106 98 - 107 mmol/L    CO2 25 21 - 32 mmol/L    Anion gap 7 7 - 16 mmol/L    Glucose 115 (H) 65 - 100 mg/dL    BUN 19 8 - 23 MG/DL    Creatinine 1.10 0.8 - 1.5 MG/DL    GFR est AA >60 >60 ml/min/1.73m2    GFR est non-AA >60 >60 ml/min/1.73m2    Calcium 8.7 8.3 - 10.4 MG/DL   HGB & HCT    Collection Time: 04/01/22  5:49 AM   Result Value Ref Range    HGB 10.5 (L) 13.6 - 17.2 g/dL    HCT 32.2 (L) 41.1 - 50.3 %       Assessment:     Active Problems:    S/P TURP (3/31/2022)      Prostate cancer ([facility] [address] (3/31/2022)      POD 1:    POSTOPERATIVE DIAGNOSES:  Benign prostatic hyperplasia and prostate cancer. PROCEDURE PERFORMED:  Channel transurethral resection of the prostate. Afebrile, VSS  Hgb 10.1  Cr 1.10  Urine culture 3/22- E faecalis- on nitrofurantoin    Plan:     Continue CBI. Drip weaned. Titrate drip to keep clear and wean as tolerated. Manually irrigate as needed. Continue IVF. Ambulate. Continue incentive spirometer. Continue macrodantin. Will go ahead and arrange follow up appt in 2 weeks with NP. Rachel Beltran, DAFNE  [facility]    I have reviewed the above note and examined the patient. I agree with the exam, assessment and plan. Wean CBI. Ambulate.     Kaiser Berkowitz, DO

## 2022-04-01 NOTE — PROGRESS NOTES
Pt is stable with bed in lowest position, wheels locked, and call light within reach. Hourly rounds completed. VSS. IV is patent and dressing is clean, dry, and intact. Pain treated per MAR. Pt encouraged to ambulate. CBI draining and patent. Yellow/straw in color. Report to be given to day shift nurse.

## 2022-04-01 NOTE — PROGRESS NOTES
Problem: Falls - Risk of  Goal: *Absence of Falls  Description: Document Remy Sites Fall Risk and appropriate interventions in the flowsheet.   Outcome: Progressing Towards Goal  Note: Fall Risk Interventions:            Medication Interventions: Teach patient to arise slowly                   Problem: Patient Education: Go to Patient Education Activity  Goal: Patient/Family Education  Outcome: Progressing Towards Goal

## 2022-04-01 NOTE — PROGRESS NOTES
CM met with patient to complete assessment. Patient presented alert and oriented. Demographics verified. Patient resides with his housemate in a two story home with 2 steps at the main entrance. At baseline, the patient is independent with completing ADL's and drives. Patient denies any DME use. Patient receives his prescription medications from [address] [facility] on [address] [place] [address] in [facility] and denies any difficulty with obtaining medications in the community. Discharge planning: PT/OT has not been consulted. Patient denies any history of [facility] services and rehab. Patient anticipates returning home at discharge. No CM needs identified or voiced. CM following plan of care. Care Management Interventions  PCP Verified by CM: Yes  Mode of Transport at Discharge: Other (see comment) (Patient's Son Paris Bonilla [phone].)  Transition of Care Consult (CM Consult): Discharge Planning  Physical Therapy Consult: No  Occupational Therapy Consult: No  Support Systems: Spouse/Significant Other  Confirm Follow Up Transport: Self  The Plan for Transition of Care is Related to the Following Treatment Goals : Return to baseline. The Patient and/or Patient Representative was Provided with a Choice of Provider and Agrees with the Discharge Plan?: Yes  Name of the Patient Representative Who was Provided with a Choice of Provider and Agrees with the Discharge Plan: Patient.   Ethel Resource Information Provided?: No  Discharge Location  Patient Expects to be Discharged to[de-identified] Home

## 2022-04-01 NOTE — PROGRESS NOTES
Hourly rounding completed during shift. All needs met at this time. CBI running at a slow gtt with clear drainage/urine noted in gallo. Pain treated per md order and effective per pt. Bed L/L with call bell in reach. Report given to oncoming RN.

## 2022-04-01 NOTE — PROGRESS NOTES
Veronica bag with clear drainage. Attempt to irrigate. Pt could not tolerate irrigation. Forward flushed 30 cc's gently,  None returned.

## 2022-04-02 VITALS
WEIGHT: 212 LBS | BODY MASS INDEX: 30.35 KG/M2 | DIASTOLIC BLOOD PRESSURE: 58 MMHG | HEART RATE: 65 BPM | OXYGEN SATURATION: 93 % | SYSTOLIC BLOOD PRESSURE: 121 MMHG | RESPIRATION RATE: 18 BRPM | TEMPERATURE: 98.1 F | HEIGHT: 70 IN

## 2022-04-02 PROCEDURE — G0378 HOSPITAL OBSERVATION PER HR: HCPCS

## 2022-04-02 PROCEDURE — 74011636637 HC RX REV CODE- 636/637: Performed by: UROLOGY

## 2022-04-02 PROCEDURE — 96374 THER/PROPH/DIAG INJ IV PUSH: CPT

## 2022-04-02 PROCEDURE — 74011000250 HC RX REV CODE- 250: Performed by: ANESTHESIOLOGY

## 2022-04-02 PROCEDURE — 74011250636 HC RX REV CODE- 250/636: Performed by: UROLOGY

## 2022-04-02 PROCEDURE — 74011250637 HC RX REV CODE- 250/637: Performed by: UROLOGY

## 2022-04-02 RX ORDER — PHENAZOPYRIDINE HYDROCHLORIDE 200 MG/1
200 TABLET, FILM COATED ORAL
Qty: 12 TABLET | Refills: 2 | Status: SHIPPED | OUTPATIENT
Start: 2022-04-02 | End: 2022-04-02 | Stop reason: SDUPTHER

## 2022-04-02 RX ADMIN — SODIUM CHLORIDE, PRESERVATIVE FREE 10 ML: 5 INJECTION INTRAVENOUS at 05:42

## 2022-04-02 RX ADMIN — OXYBUTYNIN CHLORIDE 5 MG: 5 TABLET ORAL at 08:33

## 2022-04-02 RX ADMIN — NITROFURANTOIN MACROCRYSTALS 50 MG: 50 CAPSULE ORAL at 12:33

## 2022-04-02 RX ADMIN — HYDROCODONE BITARTRATE AND ACETAMINOPHEN 1 TABLET: 5; 325 TABLET ORAL at 02:26

## 2022-04-02 RX ADMIN — DOCUSATE SODIUM 100 MG: 100 CAPSULE, LIQUID FILLED ORAL at 08:33

## 2022-04-02 RX ADMIN — MORPHINE SULFATE 2 MG: 2 INJECTION, SOLUTION INTRAMUSCULAR; INTRAVENOUS at 08:36

## 2022-04-02 RX ADMIN — SODIUM CHLORIDE 75 ML/HR: 9 INJECTION, SOLUTION INTRAVENOUS at 08:40

## 2022-04-02 RX ADMIN — SODIUM CHLORIDE, PRESERVATIVE FREE 10 ML: 5 INJECTION INTRAVENOUS at 13:42

## 2022-04-02 RX ADMIN — PREDNISONE 5 MG: 5 TABLET ORAL at 08:33

## 2022-04-02 RX ADMIN — NITROFURANTOIN MACROCRYSTALS 50 MG: 50 CAPSULE ORAL at 05:54

## 2022-04-02 NOTE — PROGRESS NOTES
The patient's Veronica has been removed and he subsequently voided without difficulty. Will discharge home today.

## 2022-04-02 NOTE — DISCHARGE INSTRUCTIONS
I have reviewed discharge instructions with the patient. The patient  DISCHARGE SUMMARY from Nurse    PATIENT INSTRUCTIONS:    After general anesthesia or intravenous sedation, for 24 hours or while taking prescription Narcotics:  · Limit your activities  · Do not drive and operate hazardous machinery  · Do not make important personal or business decisions  · Do  not drink alcoholic beverages  · If you have not urinated within 8 hours after discharge, please contact your surgeon on call. Report the following to your surgeon:  · Excessive pain, swelling, redness or odor of or around the surgical area  · Temperature over 100.5  · Nausea and vomiting lasting longer than 4 hours or if unable to take medications  · Any signs of decreased circulation or nerve impairment to extremity: change in color, persistent  numbness, tingling, coldness or increase pain  · Any questions    *  Please give a list of your current medications to your Primary Care Provider. *  Please update this list whenever your medications are discontinued, doses are      changed, or new medications (including over-the-counter products) are added. *  Please carry medication information at all times in case of emergency situations. These are general instructions for a healthy lifestyle:    No smoking/ No tobacco products/ Avoid exposure to second hand smoke  Surgeon General's Warning:  Quitting smoking now greatly reduces serious risk to your health. Obesity, smoking, and sedentary lifestyle greatly increases your risk for illness    A healthy diet, regular physical exercise & weight monitoring are important for maintaining a healthy lifestyle    You may be retaining fluid if you have a history of heart failure or if you experience any of the following symptoms:  Weight gain of 3 pounds or more overnight or 5 pounds in a week, increased swelling in our hands or feet or shortness of breath while lying flat in bed.   Please call your doctor as soon as you notice any of these symptoms; do not wait until your next office visit. The discharge information has been reviewed with the patient. The patient verbalized understanding. Discharge medications reviewed with the patient and appropriate educational materials and side effects teaching were provided. ___________________________________________________________________________________________________________________________________ verbalized understanding.

## 2022-04-02 NOTE — PROGRESS NOTES
Problem: Falls - Risk of  Goal: *Absence of Falls  Description: Document Lurdes Locket Fall Risk and appropriate interventions in the flowsheet.   Outcome: Progressing Towards Goal  Note: Fall Risk Interventions:            Medication Interventions: Evaluate medications/consider consulting pharmacy,Patient to call before getting OOB,Teach patient to arise slowly                   Problem: Patient Education: Go to Patient Education Activity  Goal: Patient/Family Education  Outcome: Progressing Towards Goal     Problem: Infection - Risk of, Urinary Catheter-Associated Urinary Tract Infection  Goal: *Absence of infection signs and symptoms  Outcome: Progressing Towards Goal     Problem: Patient Education: Go to Patient Education Activity  Goal: Patient/Family Education  Outcome: Progressing Towards Goal

## 2022-04-02 NOTE — PROGRESS NOTES
Pt's D/C instructions completed. Verbalized understanding of all instructions including diet, activity, s/sx to alert MD, medications, wound care, and f/u appointment. Family at [facility].

## 2022-04-02 NOTE — PROGRESS NOTES
Pt is for discharge home today with family and no needs/supportive care orders recieved for CM at this time. Pt was provided a MOON letter about his observation status yesterday that he refused to sign but after reviewing it today with his family he signed his copy and a copy of the signed form was added to the chart and pt also retained a signed copy. Document list updated. Care Management Interventions  PCP Verified by CM: Yes  Mode of Transport at Discharge: Other (see comment) (Patient's Son Shawnee Sandifer [phone].)  Transition of Care Consult (CM Consult): Discharge Planning  Physical Therapy Consult: No  Occupational Therapy Consult: No  Support Systems: Spouse/Significant Other  Confirm Follow Up Transport: Self  The Plan for Transition of Care is Related to the Following Treatment Goals : Return to baseline. The Patient and/or Patient Representative was Provided with a Choice of Provider and Agrees with the Discharge Plan?: Yes  Name of the Patient Representative Who was Provided with a Choice of Provider and Agrees with the Discharge Plan: Patient.    Resource Information Provided?: No  Discharge Location  Patient Expects to be Discharged to[de-identified] Home

## 2022-04-02 NOTE — PROGRESS NOTES
Call placed to Dr Avelina Lefort per Pt's request to have prescription changed to [facility] [phone]

## 2022-04-05 NOTE — DISCHARGE SUMMARY
[address] [place] [address] [address]     Name:  Susan Ramsey  MR#:  [id number]  :  1955  ACCOUNT #:  [de-identified]  ADMIT DATE:  2022  DISCHARGE DATE:  2022    DISCHARGE DIAGNOSES  1. Prostate cancer. 2.  Benign prostatic hyperplasia. PROCEDURE:  Transurethral resection of the prostate on 2022. CLINICAL HISTORY:  This is a 26-year-old gentleman who I have followed for a history of metastatic [place] 9 prostate cancer. He reports progressive lower urinary tract symptoms despite medical therapy and has elected to proceed with the above-mentioned procedure. All risks, benefits and alternatives to the procedure have been discussed. He is willing to proceed at this time. HOSPITAL COURSE:  The patient underwent an uneventful channel TURP in the OR. He was admitted to the floor postoperatively on continuous bladder irrigation. His continuous bladder irrigation was slowly titrated off and the patient's catheter was removed on postoperative day #2. The patient voided without difficulty and was discharged home. He will follow up in the office in 2 weeks for a postoperative visit, as well as to review his pathology report. New prescriptions were given for Pyridium 200 mg p.o. t.i.d. p.r.n. dysuria and #12 with two refills as well as Norco 5/325 one every 4 hours p.r.n. pain #12.            [address],[facility], DO      [id number]  D:  2022 13:52  T:  2022 0:14  JOB #:  [id number]

## 2022-04-25 ENCOUNTER — HOSPITAL ENCOUNTER (OUTPATIENT)
Dept: LAB | Age: 67
Discharge: HOME | End: 2022-04-25
Payer: MEDICARE

## 2022-04-25 DIAGNOSIS — C7A.1 NEUROENDOCRINE CARCINOMA, HIGH GRADE (HCC): ICD-10-CM

## 2022-04-25 DIAGNOSIS — C61 PRIMARY PROSTATE CANCER WITH METASTASIS FROM PROSTATE TO OTHER SITE (HCC): ICD-10-CM

## 2022-04-25 DIAGNOSIS — R30.0 DYSURIA: ICD-10-CM

## 2022-04-25 LAB
ALBUMIN SERPL-MCNC: 3.8 G/DL (ref 3.2–4.6)
ALBUMIN/GLOB SERPL: 1 {RATIO} (ref 1.2–3.5)
ALP SERPL-CCNC: 86 U/L (ref 50–136)
ALT SERPL-CCNC: 36 U/L (ref 12–65)
ANION GAP SERPL CALC-SCNC: 7 MMOL/L (ref 7–16)
APPEARANCE UR: CLEAR
AST SERPL-CCNC: 33 U/L (ref 15–37)
BACTERIA URNS QL MICRO: ABNORMAL /HPF
BASOPHILS # BLD: 0 K/UL (ref 0–0.2)
BASOPHILS NFR BLD: 0 % (ref 0–2)
BILIRUB SERPL-MCNC: 0.4 MG/DL (ref 0.2–1.1)
BILIRUB UR QL: NEGATIVE
BUN SERPL-MCNC: 15 MG/DL (ref 8–23)
CALCIUM SERPL-MCNC: 9.3 MG/DL (ref 8.3–10.4)
CASTS URNS QL MICRO: 0 /LPF
CEA SERPL-MCNC: 1227.3 NG/ML (ref 0–3)
CHLORIDE SERPL-SCNC: 104 MMOL/L (ref 98–107)
CO2 SERPL-SCNC: 26 MMOL/L (ref 21–32)
COLOR UR: YELLOW
CREAT SERPL-MCNC: 1 MG/DL (ref 0.8–1.5)
CRYSTALS URNS QL MICRO: 0 /LPF
DIFFERENTIAL METHOD BLD: ABNORMAL
EOSINOPHIL # BLD: 0.2 K/UL (ref 0–0.8)
EOSINOPHIL NFR BLD: 3 % (ref 0.5–7.8)
EPI CELLS #/AREA URNS HPF: 0 /HPF
ERYTHROCYTE [DISTWIDTH] IN BLOOD BY AUTOMATED COUNT: 15.8 % (ref 11.9–14.6)
GLOBULIN SER CALC-MCNC: 3.8 G/DL (ref 2.3–3.5)
GLUCOSE SERPL-MCNC: 109 MG/DL (ref 65–100)
GLUCOSE UR STRIP.AUTO-MCNC: NEGATIVE MG/DL
HCT VFR BLD AUTO: 31.1 %
HGB BLD-MCNC: 10.3 G/DL (ref 13.6–17.2)
HGB UR QL STRIP: ABNORMAL
IMM GRANULOCYTES # BLD AUTO: 0 K/UL (ref 0–0.5)
IMM GRANULOCYTES NFR BLD AUTO: 0 % (ref 0–5)
KETONES UR QL STRIP.AUTO: NEGATIVE MG/DL
LEUKOCYTE ESTERASE UR QL STRIP.AUTO: ABNORMAL
LYMPHOCYTES # BLD: 1.4 K/UL (ref 0.5–4.6)
LYMPHOCYTES NFR BLD: 28 % (ref 13–44)
MCH RBC QN AUTO: 33.4 PG (ref 26.1–32.9)
MCHC RBC AUTO-ENTMCNC: 33.1 G/DL (ref 31.4–35)
MCV RBC AUTO: 101 FL (ref 79.6–97.8)
MONOCYTES # BLD: 0.6 K/UL (ref 0.1–1.3)
MONOCYTES NFR BLD: 12 % (ref 4–12)
MUCOUS THREADS URNS QL MICRO: ABNORMAL /LPF
NEUTS SEG # BLD: 2.9 K/UL (ref 1.7–8.2)
NEUTS SEG NFR BLD: 57 % (ref 43–78)
NITRITE UR QL STRIP.AUTO: NEGATIVE
NRBC # BLD: 0 K/UL (ref 0–0.2)
PH UR STRIP: 5 [PH] (ref 5–9)
PLATELET # BLD AUTO: 119 K/UL (ref 150–450)
PMV BLD AUTO: 8.7 FL (ref 9.4–12.3)
POTASSIUM SERPL-SCNC: 3.8 MMOL/L (ref 3.5–5.1)
PROT SERPL-MCNC: 7.6 G/DL (ref 6.3–8.2)
PROT UR STRIP-MCNC: NEGATIVE MG/DL
PSA SERPL-MCNC: 8.4 NG/ML
RBC # BLD AUTO: 3.08 M/UL (ref 4.23–5.6)
RBC #/AREA URNS HPF: ABNORMAL /HPF
SODIUM SERPL-SCNC: 137 MMOL/L (ref 136–145)
SP GR UR REFRACTOMETRY: 1.01 (ref 1–1.02)
UA: UC IF INDICATED,UAUC: ABNORMAL
UROBILINOGEN UR QL STRIP.AUTO: 0.2 EU/DL (ref 0.2–1)
WBC # BLD AUTO: 5.1 K/UL (ref 4.3–11.1)
WBC URNS QL MICRO: ABNORMAL /HPF

## 2022-04-25 PROCEDURE — 85025 COMPLETE CBC W/AUTO DIFF WBC: CPT

## 2022-04-25 PROCEDURE — 80053 COMPREHEN METABOLIC PANEL: CPT

## 2022-04-25 PROCEDURE — 36415 COLL VENOUS BLD VENIPUNCTURE: CPT

## 2022-04-25 PROCEDURE — 84153 ASSAY OF PSA TOTAL: CPT

## 2022-04-25 PROCEDURE — 82378 CARCINOEMBRYONIC ANTIGEN: CPT

## 2022-04-25 PROCEDURE — 81001 URINALYSIS AUTO W/SCOPE: CPT

## 2022-04-25 PROCEDURE — 87086 URINE CULTURE/COLONY COUNT: CPT

## 2022-04-28 LAB
BACTERIA SPEC CULT: NORMAL
SERVICE CMNT-IMP: NORMAL

## 2022-05-12 ENCOUNTER — HOSPITAL ENCOUNTER (OUTPATIENT)
Dept: MRI IMAGING | Age: 67
Discharge: HOME | End: 2022-05-12
Attending: INTERNAL MEDICINE

## 2022-05-12 DIAGNOSIS — C61 PRIMARY PROSTATE CANCER WITH METASTASIS FROM PROSTATE TO OTHER SITE (HCC): ICD-10-CM

## 2022-05-12 DIAGNOSIS — C7A.1 NEUROENDOCRINE CARCINOMA, HIGH GRADE (HCC): ICD-10-CM

## 2022-05-12 RX ORDER — SODIUM CHLORIDE 0.9 % (FLUSH) 0.9 %
10 SYRINGE (ML) INJECTION
Status: DISCONTINUED | OUTPATIENT
Start: 2022-05-12 | End: 2022-05-13 | Stop reason: HOSPADM

## 2022-05-12 NOTE — PROGRESS NOTES
Patient is extremely claustrophobic and did not even attempt MRI after seeing machine and coil he would have to be imaged in. Advised patient to speak to ordering physician about how to proceed.

## 2022-05-13 ENCOUNTER — HOSPITAL ENCOUNTER (OUTPATIENT)
Dept: LAB | Age: 67
Discharge: HOME | End: 2022-05-13
Payer: MEDICARE

## 2022-05-13 DIAGNOSIS — C61 PROSTATE CANCER (HCC): ICD-10-CM

## 2022-05-13 DIAGNOSIS — C7A.1 NEUROENDOCRINE CARCINOMA, HIGH GRADE (HCC): ICD-10-CM

## 2022-05-13 LAB
ALBUMIN SERPL-MCNC: 3.7 G/DL (ref 3.2–4.6)
ALBUMIN/GLOB SERPL: 1.1 {RATIO} (ref 1.2–3.5)
ALP SERPL-CCNC: 86 U/L (ref 50–136)
ALT SERPL-CCNC: 59 U/L (ref 12–65)
ANION GAP SERPL CALC-SCNC: 7 MMOL/L (ref 7–16)
AST SERPL-CCNC: 65 U/L (ref 15–37)
BASOPHILS # BLD: 0 K/UL (ref 0–0.2)
BASOPHILS NFR BLD: 0 % (ref 0–2)
BILIRUB SERPL-MCNC: 0.4 MG/DL (ref 0.2–1.1)
BUN SERPL-MCNC: 24 MG/DL (ref 8–23)
CALCIUM SERPL-MCNC: 9.6 MG/DL (ref 8.3–10.4)
CEA SERPL-MCNC: 2860.9 NG/ML (ref 0–3)
CHLORIDE SERPL-SCNC: 105 MMOL/L (ref 98–107)
CO2 SERPL-SCNC: 25 MMOL/L (ref 21–32)
CREAT SERPL-MCNC: 1.2 MG/DL (ref 0.8–1.5)
DIFFERENTIAL METHOD BLD: ABNORMAL
EOSINOPHIL # BLD: 0.1 K/UL (ref 0–0.8)
EOSINOPHIL NFR BLD: 2 % (ref 0.5–7.8)
ERYTHROCYTE [DISTWIDTH] IN BLOOD BY AUTOMATED COUNT: 15.5 % (ref 11.9–14.6)
GLOBULIN SER CALC-MCNC: 3.5 G/DL (ref 2.3–3.5)
GLUCOSE SERPL-MCNC: 118 MG/DL (ref 65–100)
HCT VFR BLD AUTO: 31.1 %
HGB BLD-MCNC: 10.4 G/DL (ref 13.6–17.2)
IMM GRANULOCYTES # BLD AUTO: 0.1 K/UL (ref 0–0.5)
IMM GRANULOCYTES NFR BLD AUTO: 1 % (ref 0–5)
LYMPHOCYTES # BLD: 1.8 K/UL (ref 0.5–4.6)
LYMPHOCYTES NFR BLD: 24 % (ref 13–44)
MCH RBC QN AUTO: 33.3 PG (ref 26.1–32.9)
MCHC RBC AUTO-ENTMCNC: 33.4 G/DL (ref 31.4–35)
MCV RBC AUTO: 99.7 FL (ref 79.6–97.8)
MONOCYTES # BLD: 0.9 K/UL (ref 0.1–1.3)
MONOCYTES NFR BLD: 12 % (ref 4–12)
NEUTS SEG # BLD: 4.7 K/UL (ref 1.7–8.2)
NEUTS SEG NFR BLD: 61 % (ref 43–78)
NRBC # BLD: 0 K/UL (ref 0–0.2)
PLATELET # BLD AUTO: 187 K/UL (ref 150–450)
PMV BLD AUTO: 8.9 FL (ref 9.4–12.3)
POTASSIUM SERPL-SCNC: 3.6 MMOL/L (ref 3.5–5.1)
PROT SERPL-MCNC: 7.2 G/DL (ref 6.3–8.2)
PSA SERPL-MCNC: 8.3 NG/ML
RBC # BLD AUTO: 3.12 M/UL (ref 4.23–5.6)
SODIUM SERPL-SCNC: 137 MMOL/L (ref 136–145)
WBC # BLD AUTO: 7.6 K/UL (ref 4.3–11.1)

## 2022-05-13 PROCEDURE — 36415 COLL VENOUS BLD VENIPUNCTURE: CPT

## 2022-05-13 PROCEDURE — 82378 CARCINOEMBRYONIC ANTIGEN: CPT

## 2022-05-13 PROCEDURE — 84153 ASSAY OF PSA TOTAL: CPT

## 2022-05-13 PROCEDURE — 85025 COMPLETE CBC W/AUTO DIFF WBC: CPT

## 2022-05-13 PROCEDURE — 80053 COMPREHEN METABOLIC PANEL: CPT

## 2022-05-17 NOTE — ADDENDUM NOTE
Encounter addended by: Orlando Lucianokeeper, [facility] on: 5/17/2022 11:21 AM   Actions taken: i-Vent created or edited

## 2022-05-18 ENCOUNTER — HOSPITAL ENCOUNTER (OUTPATIENT)
Dept: INFUSION THERAPY | Age: 67
Discharge: HOME | End: 2022-05-18
Payer: MEDICARE

## 2022-05-18 DIAGNOSIS — C61 PRIMARY PROSTATE CANCER WITH METASTASIS FROM PROSTATE TO OTHER SITE (HCC): ICD-10-CM

## 2022-05-18 DIAGNOSIS — C77.2 METASTASIS TO RETROPERITONEAL LYMPH NODE (HCC): ICD-10-CM

## 2022-05-18 DIAGNOSIS — C7A.1 NEUROENDOCRINE CARCINOMA, HIGH GRADE (HCC): ICD-10-CM

## 2022-05-18 DIAGNOSIS — C7A.1 NEUROENDOCRINE CARCINOMA, HIGH GRADE (HCC): Primary | ICD-10-CM

## 2022-05-18 LAB
ALBUMIN SERPL-MCNC: 3.6 G/DL (ref 3.2–4.6)
ALBUMIN/GLOB SERPL: 0.9 {RATIO} (ref 1.2–3.5)
ALP SERPL-CCNC: 98 U/L (ref 50–136)
ALT SERPL-CCNC: 76 U/L (ref 12–65)
ANION GAP SERPL CALC-SCNC: 7 MMOL/L (ref 7–16)
AST SERPL-CCNC: 82 U/L (ref 15–37)
BASOPHILS # BLD: 0 K/UL (ref 0–0.2)
BASOPHILS NFR BLD: 0 % (ref 0–2)
BILIRUB SERPL-MCNC: 0.4 MG/DL (ref 0.2–1.1)
BUN SERPL-MCNC: 24 MG/DL (ref 8–23)
CALCIUM SERPL-MCNC: 9.8 MG/DL (ref 8.3–10.4)
CEA SERPL-MCNC: 3720.2 NG/ML (ref 0–3)
CHLORIDE SERPL-SCNC: 102 MMOL/L (ref 98–107)
CO2 SERPL-SCNC: 27 MMOL/L (ref 21–32)
CREAT SERPL-MCNC: 1.3 MG/DL (ref 0.8–1.5)
DIFFERENTIAL METHOD BLD: ABNORMAL
EOSINOPHIL # BLD: 0.1 K/UL (ref 0–0.8)
EOSINOPHIL NFR BLD: 1 % (ref 0.5–7.8)
ERYTHROCYTE [DISTWIDTH] IN BLOOD BY AUTOMATED COUNT: 15.1 % (ref 11.9–14.6)
GLOBULIN SER CALC-MCNC: 3.8 G/DL (ref 2.3–3.5)
GLUCOSE SERPL-MCNC: 111 MG/DL (ref 65–100)
HCT VFR BLD AUTO: 31.9 %
HGB BLD-MCNC: 10.6 G/DL (ref 13.6–17.2)
IMM GRANULOCYTES # BLD AUTO: 0.1 K/UL (ref 0–0.5)
IMM GRANULOCYTES NFR BLD AUTO: 1 % (ref 0–5)
LYMPHOCYTES # BLD: 1.7 K/UL (ref 0.5–4.6)
LYMPHOCYTES NFR BLD: 18 % (ref 13–44)
MCH RBC QN AUTO: 33.2 PG (ref 26.1–32.9)
MCHC RBC AUTO-ENTMCNC: 33.2 G/DL (ref 31.4–35)
MCV RBC AUTO: 100 FL (ref 79.6–97.8)
MONOCYTES # BLD: 1.1 K/UL (ref 0.1–1.3)
MONOCYTES NFR BLD: 11 % (ref 4–12)
NEUTS SEG # BLD: 6.4 K/UL (ref 1.7–8.2)
NEUTS SEG NFR BLD: 69 % (ref 43–78)
NRBC # BLD: 0 K/UL (ref 0–0.2)
PLATELET # BLD AUTO: 181 K/UL (ref 150–450)
PMV BLD AUTO: 8.8 FL (ref 9.4–12.3)
POTASSIUM SERPL-SCNC: 3.8 MMOL/L (ref 3.5–5.1)
PROT SERPL-MCNC: 7.4 G/DL (ref 6.3–8.2)
PSA SERPL-MCNC: 8.8 NG/ML
RBC # BLD AUTO: 3.19 M/UL (ref 4.23–5.6)
SODIUM SERPL-SCNC: 136 MMOL/L (ref 136–145)
WBC # BLD AUTO: 9.3 K/UL (ref 4.3–11.1)

## 2022-05-18 PROCEDURE — 74011000250 HC RX REV CODE- 250: Performed by: INTERNAL MEDICINE

## 2022-05-18 PROCEDURE — 85025 COMPLETE CBC W/AUTO DIFF WBC: CPT

## 2022-05-18 PROCEDURE — 96375 TX/PRO/DX INJ NEW DRUG ADDON: CPT

## 2022-05-18 PROCEDURE — 74011000258 HC RX REV CODE- 258: Performed by: INTERNAL MEDICINE

## 2022-05-18 PROCEDURE — 82378 CARCINOEMBRYONIC ANTIGEN: CPT

## 2022-05-18 PROCEDURE — 80053 COMPREHEN METABOLIC PANEL: CPT

## 2022-05-18 PROCEDURE — 36591 DRAW BLOOD OFF VENOUS DEVICE: CPT

## 2022-05-18 PROCEDURE — 96417 CHEMO IV INFUS EACH ADDL SEQ: CPT

## 2022-05-18 PROCEDURE — 96413 CHEMO IV INFUSION 1 HR: CPT

## 2022-05-18 PROCEDURE — 74011250636 HC RX REV CODE- 250/636: Performed by: INTERNAL MEDICINE

## 2022-05-18 PROCEDURE — 96367 TX/PROPH/DG ADDL SEQ IV INF: CPT

## 2022-05-18 PROCEDURE — 84153 ASSAY OF PSA TOTAL: CPT

## 2022-05-18 RX ORDER — SODIUM CHLORIDE 9 MG/ML
25 INJECTION, SOLUTION INTRAVENOUS CONTINUOUS
Status: ACTIVE | OUTPATIENT
Start: 2022-05-18 | End: 2022-05-18

## 2022-05-18 RX ORDER — DEXAMETHASONE SODIUM PHOSPHATE 100 MG/10ML
10 INJECTION INTRAMUSCULAR; INTRAVENOUS ONCE
Status: COMPLETED | OUTPATIENT
Start: 2022-05-18 | End: 2022-05-18

## 2022-05-18 RX ORDER — SODIUM CHLORIDE 0.9 % (FLUSH) 0.9 %
10 SYRINGE (ML) INJECTION AS NEEDED
Status: ACTIVE | OUTPATIENT
Start: 2022-05-18 | End: 2022-05-18

## 2022-05-18 RX ORDER — ONDANSETRON 2 MG/ML
8 INJECTION INTRAMUSCULAR; INTRAVENOUS ONCE
Status: COMPLETED | OUTPATIENT
Start: 2022-05-18 | End: 2022-05-18

## 2022-05-18 RX ORDER — SODIUM CHLORIDE 0.9 % (FLUSH) 0.9 %
10 SYRINGE (ML) INJECTION AS NEEDED
Status: DISCONTINUED | OUTPATIENT
Start: 2022-05-18 | End: 2022-05-20 | Stop reason: HOSPADM

## 2022-05-18 RX ADMIN — ONDANSETRON 8 MG: 2 INJECTION INTRAMUSCULAR; INTRAVENOUS at 10:02

## 2022-05-18 RX ADMIN — DOCETAXEL ANHYDROUS 161 MG: 10 INJECTION, SOLUTION INTRAVENOUS at 10:45

## 2022-05-18 RX ADMIN — FOSAPREPITANT 150 MG: 150 INJECTION, POWDER, LYOPHILIZED, FOR SOLUTION INTRAVENOUS at 10:06

## 2022-05-18 RX ADMIN — SODIUM CHLORIDE 25 ML/HR: 900 INJECTION, SOLUTION INTRAVENOUS at 09:58

## 2022-05-18 RX ADMIN — DEXAMETHASONE SODIUM PHOSPHATE 10 MG: 10 INJECTION INTRAMUSCULAR; INTRAVENOUS at 10:05

## 2022-05-18 RX ADMIN — SODIUM CHLORIDE, PRESERVATIVE FREE 10 ML: 5 INJECTION INTRAVENOUS at 08:11

## 2022-05-18 RX ADMIN — CARBOPLATIN 587 MG: 10 INJECTION, SOLUTION INTRAVENOUS at 11:50

## 2022-05-18 RX ADMIN — SODIUM CHLORIDE, PRESERVATIVE FREE 10 ML: 5 INJECTION INTRAVENOUS at 09:56

## 2022-05-18 NOTE — PROGRESS NOTES
Patient arrived ambulatory to infusion area. C1D1 Taxotere/carboplatin completed. Consent obtained. Patient tolerated treatment well. Discharged ambulatory. Patient aware of next infusion appt on 5/19.

## 2022-05-18 NOTE — ADDENDUM NOTE
Encounter addended by: ASIA Boateng [facility] on: 5/18/2022 9:59 AM   Actions taken: i-Vent created or edited

## 2022-05-20 ENCOUNTER — HOSPITAL ENCOUNTER (OUTPATIENT)
Dept: INFUSION THERAPY | Age: 67
Discharge: HOME | End: 2022-05-20
Payer: MEDICARE

## 2022-05-20 VITALS
HEART RATE: 95 BPM | RESPIRATION RATE: 16 BRPM | SYSTOLIC BLOOD PRESSURE: 123 MMHG | DIASTOLIC BLOOD PRESSURE: 75 MMHG | OXYGEN SATURATION: 95 % | TEMPERATURE: 98.7 F

## 2022-05-20 DIAGNOSIS — C7A.1 NEUROENDOCRINE CARCINOMA, HIGH GRADE (HCC): Primary | ICD-10-CM

## 2022-05-20 DIAGNOSIS — C61 PRIMARY PROSTATE CANCER WITH METASTASIS FROM PROSTATE TO OTHER SITE (HCC): ICD-10-CM

## 2022-05-20 DIAGNOSIS — C77.2 METASTASIS TO RETROPERITONEAL LYMPH NODE (HCC): ICD-10-CM

## 2022-05-20 PROCEDURE — 96372 THER/PROPH/DIAG INJ SC/IM: CPT

## 2022-05-20 PROCEDURE — 74011250636 HC RX REV CODE- 250/636: Performed by: INTERNAL MEDICINE

## 2022-05-20 RX ADMIN — PEGFILGRASTIM 6 MG: 6 INJECTION SUBCUTANEOUS at 10:38

## 2022-05-26 NOTE — TELEPHONE ENCOUNTER
Msg sent to NP Pool. Next appt 6/8/22 NP /chemo. Msg from HARRY Hurt and she would get a MRI L spine if pain continues 2 more days. Call to the patient and he just wants a support brace. I reported to HARRY Hurt states she would not know what to have him get. Check the website or call the ortho office or PCP.

## 2022-05-26 NOTE — TELEPHONE ENCOUNTER
PT calling to report middle upper back weakness/stated last 2 days specifically has been pronounced/PT is requesting prescription for a back brace to assist with support

## 2022-05-31 NOTE — TELEPHONE ENCOUNTER
Reviewed with Dr. Gene Chase. OK to send new prescription for ativan. Patient should continue his prednisone 10 mg daily. I have reviewed the patients controlled substance prescription history, as maintained in the [place] prescription monitoring program, so that the prescription(s) for a  controlled substance can be given. Reviewed 5/31/22.  Ativan last filled 7/2021

## 2022-05-31 NOTE — TELEPHONE ENCOUNTER
PT calling regarding refills on the following prescriptions.               predmisone 10mg      Lorazepam 1mg    Pharmacy:  [address]

## 2022-06-08 NOTE — PROGRESS NOTES
PT arrived to port lab ambulatory. Port accessed and labs drawn per protocol. Pt remains accessed. Pt discharged from port lab ambulatory.

## 2022-06-08 NOTE — PROGRESS NOTES
[facility] Taxotere & Carboplatin completed. Patient tolerated without difficulty. Any issues or concerns during appointment: none. Patient aware of next infusion appointment on June 9 (date) at 36 (time). Patient instructed to call provider with temperature of 100.4 or greater or nausea/vomiting/ diarrhea or pain not controlled by medications  Discharged ambulatory.

## 2022-06-08 NOTE — PROGRESS NOTES
Progress Notes by Gurjit Sherman MD at 05/18/22 6671              Author: Gurjit Sherman MD  Service: --  Author Type: Physician      Filed: 05/18/22 0910  Encounter Date: 5/18/2022  Status: Signed         : Gurjit Sherman MD (Physician)               [facility]: Office Visit Established Patient      Reason for Consult:      Chief Complaint       Patient presents with          Follow-up           History of Present Illness:   Mr. Klever Fernández  is a 78 y.o. male who presents  today in follow-up regarding metastatic castrate sensitive prostate cancer. He originally presented to the ED on 1/30/17 reporting urinary frequency and dysuria since June 2016. A PSA was measured and found to be markedly elevated at 36 in December of 2016. He was referred to PGU and CLARKE was also abnormal, prompting TRUS and biopsy which showed 12 of 12 cores positive for PCa, Tin 9 in half of cores. He was started on Casodex and underwent staging studies which showed no bone metastasis, but  showed multiple lymph nodes throughout the pelvis which were enlarged and consistent with metastatic disease. He underwent bilateral orchiectomy for surgical castration, and PSA on 5/4/17 was down to 4 consistent with castrate sensitivity. We saw him  in clinic and recommended addition of Taxotere for 6 cycles as per the CHAARTED study. Completed 6 cycles of Taxotere in September 2017 and tolerated therapy very well, PSA dropped to 1.5 and he was continued on Casodex alone. In April 2019 his PSA  was noted to increase from 1.6 to 2.6, so we elected to stop Casodex and change to [place]. We saw him virtually in August 2020 and although he was dealing with some back pain, we thought it was likely musculoskeletal, as his PSA was stable and he had  not had any history of osseous metastases. However, since that time, his pain got worse, not responding to PT or chiropractic care.   His PCP ordered a CT chest, which showed multiple bony lytic lesions with no lung or liver parenchymal masses. We recommended  bone imaging with technetium bone scan, with a possible plan for IR guided biopsy with kyphoplasty if there was significant thoracolumbar vertebral disease. He completed kyphoplasty of 2 lumbar spine vertebrae with excellent pain relief, then began to  have recurrent pain in his lower thoracic spine, recommended for repeat kyphoplasty of T9 and T12. Path reviewed and confirms this is not a prostate adenocarcinoma, but is consistent with a high grade neuroendocrine tumor. His CT chest was negative  for a primary lesion, I cannot completely exclude a new primary, but clinically this fits better with selection and growth of a neuroendocrine clone from his prostate cancer. I note his CEA and CA 19-9 are also increased, which may be helpful for monitoring  response to therapy. We recommended a change to palliative chemotherapy with carboplatin and etoposide. His pain dramatically improved and CEA responded almost immediately, and PET/CT after 3 cycles showed excellent radiographic partial response, so  we recommended continuing therapy to 6 cycles if tolerated. He was off the carbo/etoposide for about 3 months, clinically was doing well but his CEA spiked up once again and his PET/CT showed multifocal osseous progression. Typically for small cell  carcinomas, if there is progression within 6 months of therapy, they are considered refractory and we move to 2nd line therapy. However, he had such a profound clinical, serologic, and radiographic response to carbo/etoposide, I would recommend another  4 cycles of treatment (if tolerated) then start a trial of immunotherapy maintenance. PET/CT August 2021 after 4 cycles showed partial response once again, with CEA down to 32 (from 166), so we elected to proceed with atezo maintenance.   He completed  3 cycles of Tecentriq but experienced clinical, radiographic and serologic progression. Ultimately, I feel the Starr Eric is doing nothing to control his disease, we will get PET to establish a new baseline but I would consider changing back to cytotoxic  therapy. I do not think he can tolerate platinum doublet for a 3rd time, so I would consider either lurbinectedin or topotecan. Pros and cons of each were reviewed and he would like to try the lurbinectedin. He did initially have a response to therapy  based on clinical status and CEA measurement, but then CEA began to rise, and PET/CT in March 2022 showed progression with increased uptake in the prostate and in the bones. He needs to change systemic therapy, I would recommend topotecan. He prefers  oral administration. He also was referred to Urology for LUTS, likely related to prostate progression of disease. We will also await histology from the prostate chips - if this is adenocarcinoma, we will at least need to consider a chemotherapy that  is not as narrow as the neuroendocrine-directed therapies we are using at this point. His TURP did show neuroendocrine tumor without change from most recent biopsy, so we are moving forward with oral topotecan. He completed 1 cycle but did not start  cycle 2 topotecan because it made him feel so poorly. He also saw Dr. Jewel Severino at [place], he agreed with the plan of care but recommended consideration of carboplatin and docetaxel as next line of therapy. Mr. Yane Dumont could not tolerate MRI due to claustrophobia,  but he feels his cognition and head pressure are much improved. His CEA has continued to markedly increase, but he has not had any therapy in several weeks. At this point, he is unwilling to resume the topotecan because he felt so poorly with therapy,  we did not have enough time to see benefit (although his CEA did not improve with the first cycle). He would like to proceed with Dr. Ni Reza recommendations of carboplatin and docetaxel, we will arrange this.   I would also add G-CSF because of the  high risk of neutropenia. He declines a repeat attempt at MRI right now as his neuropsychiatric symptoms are improved off topotecan. Here for cycle 2 Taxotere/carbo. He is doing OK. He continues with some soreness in his central chest/sternum which was worse after GCSF administration, OTC medication is helpful. He wonders about a brace. No infectious symptoms. Review of Systems:   Constitutional: Positive for fatigue. HENT: Negative. Eyes: Negative. Respiratory: Negative    Cardiovascular: Negative. Gastrointestinal: Negative. Genitourinary: Negative. Musculoskeletal: Positive for chest wall and pelvic pain. Skin: Negative. Neurological: Negative. Endo/Heme/Allergies: Negative. Psychiatric/Behavioral: Negative. All other systems reviewed and are negative. Allergies        Allergen  Reactions           Turmeric  Nausea and Vomiting         Past Medical History:        Diagnosis  Date           Claustrophobia         Ear problems         Elevated PSA         Former light cigarette smoker (1-9 per day)            smoked for 35 years.   quit            History of multiple allergies         Nicotine vapor product user         Personal history of prostate cancer         Prostate cancer ([facility] [address]            prostate, neuroendocrine, mets to bone         Past Surgical History:         Procedure  Laterality  Date            BIOPSY PROSTATE          HX COLONOSCOPY    08/2020      HX HEENT             teeth removed             HX ORCHIECTOMY  Bilateral  02/2017      IR KYPHOPLASTY LUMBAR    10/14/2020           IR KYPHOPLASTY THORACIC    10/29/2020        Family History         Problem  Relation  Age of Onset            Prostate Cancer  Father        Hypertension  Father        Cancer  Father               prostate cancer         Social History         Socioeconomic History           Marital status:  SINGLE Spouse name:  Not on file          Number of children:  Not on file       Years of education:  Not on file       Highest education level:  Not on file       Occupational History          Not on file       Tobacco Use           Smoking status:  Former Smoker              Packs/day:  0.50        Years:  35.00        Pack years:  17.50        Quit date:  2016        Years since quittin.4          Smokeless tobacco:  Never Used       Vaping Use           Vaping Use:  Former       Substance and Sexual Activity           Alcohol use: Yes             Comment: once every 3 weeks           Drug use:  No       Sexual activity:  Never        Other Topics  Concern          Not on file       Social History Narrative          Not on file         Social Determinants of Health         Financial Resource Strain:           Difficulty of Paying Living Expenses: Not on file       Food Insecurity:           Worried About Running Out of Food in the Last Year: Not on file       Yamileth of Food in the Last Year: Not on file       Transportation Needs:           Lack of Transportation (Medical): Not on file       Lack of Transportation (Non-Medical):  Not on file       Physical Activity:           Days of Exercise per Week: Not on file       Minutes of Exercise per Session: Not on file       Stress:           Feeling of Stress : Not on file       Social Connections:           Frequency of Communication with Friends and Family: Not on file       Frequency of Social Gatherings with Friends and Family: Not on file       Attends Sabianist Services: Not on file       Active Member of Clubs or Organizations: Not on file       Attends Club or Organization Meetings: Not on file       Marital Status: Not on file       Intimate Partner Violence:           Fear of Current or Ex-Partner: Not on file       Emotionally Abused: Not on file       Physically Abused: Not on file       Sexually Abused: Not on file Housing Stability:           Unable to Pay for Housing in the Last Year: Not on file       Number of Places Lived in the Last Year: Not on file          Unstable Housing in the Last Year: Not on file         Current Outpatient Medications          Medication  Sig  Dispense  Refill            cyclobenzaprine (FLEXERIL) 10 mg tablet  Take 1 Tablet by mouth three (3) times daily as needed for Muscle Spasm(s). 90 Tablet  1      predniSONE (DELTASONE) 10 mg tablet  Take 10 mg by mouth daily (with breakfast) for 30 days. 30 Tablet  0      naproxen (NAPROSYN) 250 mg tablet  Take 250 mg by mouth two (2) times daily (with meals).   lidocaine-prilocaine (EMLA) topical cream  Apply a dab over the port site 30-45 minutes prior to lab/infusion appts. Cover with saran wrap or a sandwich bag. 30 g  3        Facility-Administered Medications Ordered in Other Visits             Medication  Dose  Route  Frequency  Provider  Last Rate  Last Admin               saline peripheral flush soln 10 mL   10 mL  [facility]  PRN  Kapil Jordan MD     10 mL at 05/18/22 0811          OBJECTIVE:   Visit Vitals  Vitals:    06/08/22 0837 06/08/22 0846   BP: 133/75 134/87   Pulse: 72 80   Resp: 16    Temp: 98.5 °F (36.9 °C)    TempSrc: Oral    SpO2: 98%    Weight: 209 lb 11.2 oz (95.1 kg)    Height: 5' 9.5\" (1.765 m)            Physical Exam:     Constitutional:  Well developed, well nourished male in no acute  distress, sitting comfortably in the exam room chair. HEENT:  Normocephalic and atraumatic. Sclerae anicteric. Neck supple without JVD. No thyromegaly present. Lymph node     Deferred     Skin  Warm and dry. No bruising and no rash noted. No erythema. No pallor. Respiratory  Lungs are clear to auscultation bilaterally without wheezes, rales or rhonchi, normal air exchange without accessory muscle use. CVS  Normal rate, regular rhythm and normal S1 and S2.   No murmurs, gallops, or rubs.     Abdomen  Soft, nontender and nondistended, normoactive bowel sounds. Neuro  Grossly nonfocal with no obvious sensory or motor deficits. MSK  Normal range of motion in general.  No edema and no tenderness. Psych  Appropriate mood and affect. Labs:  Hospital Outpatient Visit on 06/08/2022   Component Date Value Ref Range Status    WBC 06/08/2022 6.9  4.3 - 11.1 K/uL Final    RBC 06/08/2022 2.70* 4.23 - 5.6 M/uL Final    Hemoglobin 06/08/2022 9.1* 13.6 - 17.2 g/dL Final    Hematocrit 06/08/2022 27.8  % Final    MCV 06/08/2022 103.0* 79.6 - 97.8 FL Final    MCH 06/08/2022 33.7* 26.1 - 32.9 PG Final    MCHC 06/08/2022 32.7  31.4 - 35.0 g/dL Final    RDW 06/08/2022 16.7* 11.9 - 14.6 % Final    Platelets 38/21/5063 104* 150 - 450 K/uL Final    MPV 06/08/2022 8.8* 9.4 - 12.3 FL Final    nRBC 06/08/2022 0.00  0.0 - 0.2 K/uL Final    **Note: Absolute NRBC parameter is now reported with Hemogram**    Seg Neutrophils 06/08/2022 60  43 - 78 % Final    Lymphocytes 06/08/2022 21  13 - 44 % Final    Monocytes 06/08/2022 16* 4.0 - 12.0 % Final    Eosinophils % 06/08/2022 1  0.5 - 7.8 % Final    Basophils 06/08/2022 1  0.0 - 2.0 % Final    Immature Granulocytes 06/08/2022 1  0.0 - 5.0 % Final    Segs Absolute 06/08/2022 4.2  1.7 - 8.2 K/UL Final    Absolute Lymph # 06/08/2022 1.5  0.5 - 4.6 K/UL Final    Absolute Mono # 06/08/2022 1.1  0.1 - 1.3 K/UL Final    Absolute Eos # 06/08/2022 0.1  0.0 - 0.8 K/UL Final    Basophils Absolute 06/08/2022 0.0  0.0 - 0.2 K/UL Final    Absolute Immature Granulocyte 06/08/2022 0.1  0.0 - 0.5 K/UL Final    Differential Type 06/08/2022 AUTOMATED    Final    CEA 06/08/2022 5,370.5* 0.0 - 3.0 ng/mL Final    Comment: Nonsmoker:  <3.0 ng/mL  Smoker:     <5.0 ng/mL  [employer]. Patient's results of tumor marker testing may not be comparable to labs using different manufacturers/methods.       Sodium 06/08/2022 139 136 - 145 mmol/L Final    Potassium 06/08/2022 3.8  3.5 - 5.1 mmol/L Final    Chloride 06/08/2022 105  98 - 107 mmol/L Final    CO2 06/08/2022 25  21 - 32 mmol/L Final    Anion Gap 06/08/2022 9  7 - 16 mmol/L Final    Glucose 06/08/2022 110* 65 - 100 mg/dL Final    BUN 06/08/2022 25* 8 - 23 MG/DL Final    CREATININE 06/08/2022 1.10  0.8 - 1.5 MG/DL Final    GFR  06/08/2022 >60  >60 ml/min/1.73m2 Final    GFR Non- 06/08/2022 >60  >60 ml/min/1.73m2 Final    Comment:      Estimated GFR is calculated using the Modification of Diet in Renal Disease (MDRD) Study equation, reported for both  Americans (GFRAA) and non- Americans (GFRNA), and normalized to 1.73m2 body surface area. The physician must decide which value applies to the patient. The MDRD study equation should only be used in individuals age 25 or older. It has not been validated for the following: pregnant women, patients with serious comorbid conditions,or on certain medications, or persons with extremes of body size, muscle mass, or nutritional status.  Calcium 06/08/2022 9.4  8.3 - 10.4 MG/DL Final    Total Bilirubin 06/08/2022 0.4  0.2 - 1.1 MG/DL Final    ALT 06/08/2022 58  12 - 65 U/L Final    AST 06/08/2022 88* 15 - 37 U/L Final    Alk Phosphatase 06/08/2022 129  50 - 136 U/L Final    Total Protein 06/08/2022 7.1  6.3 - 8.2 g/dL Final    Albumin 06/08/2022 3.8  3.2 - 4.6 g/dL Final    Globulin 06/08/2022 3.3  2.3 - 3.5 g/dL Final    Albumin/Globulin Ratio 06/08/2022 1.2  1.2 - 3.5   Final    PSA 06/08/2022 8.0* <4.0 ng/mL Final    Comment: [employer].   New method in use, please reestablish patient baseline               Imaging:   PET/CT         Indication: Prostate cancer restaging, high-grade neuroendocrine cancer.       Radiopharmaceutical: 13.0 mCi F18-FDG, intravenously.       Technique: Imaging was performed from the skull through the proximal thighs   using routine PET/CT acquisition protocol. Imaging was performed approximately   60 minutes post injection. Oral contrast was administered. Radiation dose   reduction techniques were used for this study:  Our CT scanners use one or all   of the following: Automated exposure control, adjustment of the mA and/or kVp   according to patient's size, iterative reconstruction.           Serum glucose: 137 mg/dL prior to injection.       Comparison studies: PET/CT 5/11/2021, 1/7/2000       Findings:       Head and Neck: No enlarged or hypermetabolic cervical chain nodes.       Chest: No mediastinal mass or lymphadenopathy. No discrete nodule. FDG disease   in the chest.       Abdomen/Pelvis: No enlarged lymph nodes. Worrisome focal FDG uptake within the   abdominal viscera.       Persistently elevated focal tracer uptake in the right aspect of the prostate   gland, though perhaps slightly improved compared to the prior study.       Bones and soft tissues: Interval resolution of uptake anteriorly in the right   iliac bone. Improved tracer uptake within the right distal clavicle. No no new   FDG avid lesion or osseous destruction. Stable findings in the thoracic spine.       IMPRESSION   1. Interval resolution of tracer uptake anteriorly in the right iliac bone. Stable findings in the thoracic spine. No new FDG avid sites of disease. 2.  Persistently elevated focal tracer uptake in the right aspect of the   Prostate. PET/CT: 5/11/2021       INDICATION: Metastatic prostate cancer and high-grade neuroendocrine cancer.       TECHNIQUE: After oral administration of gastroview and intravenous   administration of 10.28 mCi of F18 FDG, noncontrast CT images were obtained for   attenuation correction and for fusion with emission PET images. A series of   overlapping emission PET images were then obtained beginning 60 minutes after   injection of FDG. The area imaged spanned the region from the skull base to the   mid thighs. All CT scans performed at this facility use one or all of the   following: Automated exposure control, adjustment of the mA and/or kVp according   to patient's size, iterative reconstruction.        COMPARISON: PET scan 1/7/2021       FINDINGS:    NECK/CHEST:   New focal activity is seen in the spinous process of C3 which also demonstrates   new lucent changes concerning for an evolving osseous metastasis. Additional   osseous lesions are seen which are not significantly changed and do not   demonstrate evolving activity. No worrisome activity is otherwise seen in the   neck soft tissues. No evolving adenopathy is suggested.       Hypermetabolic osseous lesions are seen in the posterior T6 vertebral body seen   on PET/CT image 83, the right posterior lateral seventh rib seen on PET/CT image   90, and the left anterior T10 vertebral body on axial image 122 concerning for   new osseous metastases. In addition, there is evolving focal appearing activity   within a prior osseous lesion seen at T7 suggesting evolving active disease at   this level. Stable activity is seen in the distal right clavicle and PET/CT   image 41. This appears to be related to a fracture at the level of the lucency   likely representing a pathologic fracture. This appears more chronic being   present on the prior study. No worrisome activity is otherwise seen. Linear   nonfocal activity is seen in the distal esophagus which is not clearly   worrisome. No evolving suspicious pulmonary lesion is seen. Only parenchymal   calcifications are seen likely related to chronic granulomatous infection. Stable moderate to severe gynecomastia is seen.       ABDOMEN/PELVIS:   No worrisome activity is seen in the abdomen. No adenopathy or evolving   aggressive osseous lesion is clearly demonstrated.  Some persistent activity is   seen within the L1 vertebral body which is not clearly changed from the prior   study.           Regular and focal appearing activity is seen in the prostate gland particularly   at the mid gland and apex which is concerning for evolving active tumor at this   level. In addition, there is new activity within a right obturator lymph node   seen on CT image 226 measuring 1.3 cm in size concerning for an evolving josefina   metastasis. Lastly, there is a new hypermetabolic lesion in the right iliac wing   seen on PET/CT image 208 consistent with a new bony metastasis.       IMPRESSION   1. Evolving activity within the prostate gland concerning for evolving local   tumor.       2. New hypermetabolic right obturator lymph node measuring 1.3 cm in size   concerning for an evolving josefina metastasis. In addition, multiple new   hypermetabolic osseous lesions are seen concerning for evolving osseous   metastases.       3. No evolving activity associated with the remaining osseous metastases. However, there does appear to be activity associated with a distal right   clavicle fracture which appears to represent a pathologic fracture which is more   chronic being present on prior studies. EXAM: CT CHEST WITH CONTRAST       INDICATION: History of prostate cancer, abnormal findings in the lung on.       COMPARISON: CT abdomen and pelvis dated 5/23/2017       TECHNIQUE:  CT imaging was performed of the chest after intravenous injection of   100 mL Isovue 370. Intravenous contrast was used for better evaluation of solid   organs and vascular structures. Coronal reformatted imaging provided. Radiation   dose reduction techniques were used for this study. Our CT scanners use one or   all of the following: Automated exposure control, adjustment of the mA and/or kV   according to patient size, iterative reconstruction.       FINDINGS:       Mediastinum and visualized thyroid: Normal.       Heart: Normal.       Large Vessels: Normal.       Pleura: No significant pleural thickening.  No calcification.       Lungs: No lung mass or discrete nodule.     small cysts.       The visualized loops of small bowel and colon are normal in caliber. The   appendix is seen on image 55 and is unremarkable. Mild to moderate   diverticulosis is seen of the colon. No free fluid, free air, or focal   inflammatory changes are seen in the abdomen. An enlarged retroperitoneal lymph   node is seen adjacent to the distal abdominal aorta on image 49 measuring 14 mm   short axis concerning for a metastatic lymph node. No adenopathy is otherwise   seen. The abdominal aorta demonstrates mild to moderate atherosclerotic   calcification. No sclerotic osseous lesion is seen.       CT PELVIS:   No abnormal pelvic fluid collections or inflammatory changes are present. Bilateral pelvic lymphadenopathy is seen. The largest lymph node is a right   internal iliac lymph node seen on image 68 measuring 3.2 cm short axis. Additional enlarged lymph nodes are seen along the left common femoral, and   bilateral internal iliac chains. Many of these demonstrate heterogeneous areas   of decreased attenuation which may represent necrosis. The urinary bladder is   unremarkable. No sclerotic osseous lesion is seen. Assessment of local disease   is limited by CT and much better performed by MRI. It can only be said that   there does appear to be asymmetric enhancement in the anterior prostate gland   which questionably involves the bladder base, and left seminal vesicle. These   changes would be much better assessed with pelvic MRI.       IMPRESSION   IMPRESSION:    1. Retroperitoneal and bilateral pelvic adenopathy concerning for metastatic   lesions. Many of these demonstrate heterogeneous areas of decreased attenuation   suggesting necrosis.        2. Limited assessment of local disease. Enhancement is seen in the anterior   prostate gland extending into the left seminal vesicle and appearing to involve   the urinary bladder base.  These changes would be best further assessed with a   contrasted MRI of the pelvis. History: Prostate cancer. Tin score 4+5 =9       EXAM: Nuclear medicine bone scan       TECHNIQUE: 25.9 mCi technetium 99m HDP is measured. Whole body and spot planar   images are available for review.       COMPARISON: No similar studies are available for comparison. Comparison is made   to a CT abdomen and pelvis dated 2/27/2017.       FINDINGS: There is an apparent focus of increased radiotracer activity seen   within the right posterior 11th rib. However, this is felt to represent   radiotracer within the  system. Elsewhere, there is normal radiotracer   activity noted within the  system. No focus of abnormal radiotracer activity   seen within the axial or appendicular skeleton. No additional abnormality   demonstrated.       IMPRESSION   IMPRESSION: No scintigraphic evidence of osseous metastases.  activity as   Described. ASSESSMENT:    ICD-10-CM    1. Primary prostate cancer with metastasis from prostate to other site [facility])  C61 CBC With Auto Differential     Comprehensive metabolic panel     [address]     CBC with Auto Differential     CEA     Comprehensive Metabolic Panel     Magnesium     PSA, Diagnostic   2. Neuroendocrine carcinoma, high grade (HCC)  C7A.1 CBC With Auto Differential     Comprehensive metabolic panel     Shila Gold Dr     CBC with Auto Differential     CEA     Comprehensive Metabolic Panel     Magnesium     PSA, Diagnostic   3. 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)  C77.2 CBC With Auto Differential     Comprehensive metabolic panel     CBC with Auto Differential     CEA     Comprehensive Metabolic Panel     Magnesium     PSA, Diagnostic   4. Other abnormal tumor markers   R97.8 CEA      PLAN:   Lab studies were personally reviewed.       Prostate cancer: metastatic to retroperitoneal and pelvic lymph nodes, Tin 9 with PSA of 36 at presentation. Stage ROSA. S/p Casodex then orchiectomy for androgen ablation. Unfortunately, he is not a candidate for curative surgical or radiation therapy. He has started ADT and responded well, with recent PSA down to 4.4 (from 36 at diagnosis). We discussed the traditional management of metastatic castrate sensitive disease,  and the recent CHAARTED and STAMPEDE data that demonstrated benefit for the addition of 6 cycles of docetaxel. We also discussed that the more substantial benefit was observed in patients with high volume metastatic visceral or osseous disease, and that  it is less clear if patients with lymph node-confined disease see as much benefit. With those caveats, he is still interested in any intervention which can potentially prolong his life. Therefore, I recommend proceeding with 6 cycles of docetaxel as  per SON. This was the consensus of the  [facility] as well. Completed 6 cycles of Taxotere in September 2017 and tolerated therapy very well, PSA dropped to 1.5 and he was continued on Casodex alone. In April 2019 his PSA was noted to increase from 1.6 to 2.6, so we elected to stop Casodex and change to [facility], with good PSA response. We saw him virtually in August 2020 and although he was dealing with some back pain, we thought it was likely musculoskeletal,  as his PSA was stable and he had not had any history of osseous metastases. However, since that time, his pain got worse, not responding to PT or chiropractic care. His PCP ordered a CT chest, which showed multiple bony lytic lesions with no lung or  liver parenchymal masses. We recommended bone imaging with technetium bone scan, with a possible plan for IR guided biopsy with kyphoplasty if there was significant thoracolumbar vertebral disease.   He completed kyphoplasty of 2 lumbar spine vertebrae  with excellent pain relief, then began to have recurrent pain in his lower thoracic spine, recommended for repeat kyphoplasty of T9 and T12. Path reviewed and confirms this is not a prostate adenocarcinoma, but is consistent with a high grade neuroendocrine  tumor. His CT chest was negative for a primary lesion, I cannot completely exclude a new primary, but clinically this fits better with selection and growth of a neuroendocrine clone from his prostate cancer. I note his CEA and CA 19-9 are also increased,  which may be helpful for monitoring response to therapy. We recommended a change to palliative chemotherapy with carboplatin and etoposide. His pain dramatically improved and CEA responded almost immediately, and PET/CT after 3 cycles showed excellent  radiographic partial response, so we recommended continuing therapy to 6 cycles if tolerated. He was off the carbo/etoposide for about 3 months, clinically was doing well but his CEA spiked up once again and his PET/CT showed multifocal osseous progression. Typically for small cell carcinomas, if there is progression within 6 months of therapy, they are considered refractory and we move to 2nd line therapy. However, he had such a profound clinical, serologic, and radiographic response to carbo/etoposide,  I would recommend another 4 cycles of treatment (if tolerated) then start a trial of immunotherapy maintenance. PET/CT August 2021 after 4 cycles showed partial response once again, with CEA down to 32 (from 166), so we elected to proceed with atezo  maintenance. He completed 3 cycles of Tecentriq but experienced clinical, radiographic and serologic progression. Ultimately, I feel the Kait Niece is doing nothing to control his disease, we will get PET to establish a new baseline but I would consider  changing back to cytotoxic therapy. I do not think he can tolerate platinum doublet for a 3rd time, so I would consider either lurbinectedin or topotecan. Pros and cons of each were reviewed and he would like to try the lurbinectedin.  He did initially  have a response to therapy based on clinical status and CEA measurement, but then CEA began to rise, and PET/CT in March 2022 showed progression with increased uptake in the prostate and in the bones. He needs to change systemic therapy, I would recommend  topotecan. He prefers oral administration. He also was referred to Urology for LUTS, likely related to prostate progression of disease. We will also await histology from the prostate chips - if this is adenocarcinoma, we will at least need to consider  a chemotherapy that is not as narrow as the neuroendocrine-directed therapies we are using at this point. His TURP did show neuroendocrine tumor without change from most recent biopsy, so we are moving forward with oral topotecan. He completed 1 cycle  but did not start cycle 2 topotecan because it made him feel so poorly. He also saw Dr. Darryle Fulling at [place], he agreed with the plan of care but recommended consideration of carboplatin and docetaxel as next line of therapy. Mr. Vic Jade could not tolerate  MRI due to claustrophobia, but he feels his cognition and head pressure are much improved. His CEA has continued to markedly increase, but he has not had any therapy in several weeks. At this point, he is unwilling to resume the topotecan because he  felt so poorly with therapy, we did not have enough time to see benefit (although his CEA did not improve with the first cycle). He would like to proceed with Dr. Bailey Coles recommendations of carboplatin and docetaxel, we will arrange this. I would  also add G-CSF because of the high risk of neutropenia. He declines a repeat attempt at MRI right now as his neuropsychiatric symptoms are improved off topotecan. Here for cycle 2 Taxotere/carbo. He is doing OK. He continues with some soreness in his central chest/sternum which was worse after GCSF administration, OTC medication is helpful. He wonders about a brace. No infectious symptoms. Labs reviewed and adequate to proceed. Refer to orthopedics to see if they can offer assistance for a brace per his request. Encouraged Claritin for GCSF related pain. He will call with any new complaints. All questions were asked and answered to the best of my ability. F/u 3 weeks for carbo/Taxotere cycle 3.                    Erin Estrada [facility], [address] [facility] Hematology and Oncology/Radiation Oncology   [address] [place] [address]  Office : [phone]  Fax : [phone]

## 2022-06-08 NOTE — PATIENT INSTRUCTIONS
Patient Instructions from Today's Visit    Reason for Visit:  Follow up visit for prostate cancer      Plan:  -    Follow Up:  -    Recent Lab Results:  Hospital Outpatient Visit on 06/08/2022   Component Date Value Ref Range Status    WBC 06/08/2022 6.9  4.3 - 11.1 K/uL Final    RBC 06/08/2022 2.70* 4.23 - 5.6 M/uL Final    Hemoglobin 06/08/2022 9.1* 13.6 - 17.2 g/dL Final    Hematocrit 06/08/2022 27.8  % Final    MCV 06/08/2022 103.0* 79.6 - 97.8 FL Final    MCH 06/08/2022 33.7* 26.1 - 32.9 PG Final    MCHC 06/08/2022 32.7  31.4 - 35.0 g/dL Final    RDW 06/08/2022 16.7* 11.9 - 14.6 % Final    Platelets 75/67/7166 104* 150 - 450 K/uL Final    MPV 06/08/2022 8.8* 9.4 - 12.3 FL Final    nRBC 06/08/2022 0.00  0.0 - 0.2 K/uL Final    **Note: Absolute NRBC parameter is now reported with Hemogram**    Seg Neutrophils 06/08/2022 60  43 - 78 % Final    Lymphocytes 06/08/2022 21  13 - 44 % Final    Monocytes 06/08/2022 16* 4.0 - 12.0 % Final    Eosinophils % 06/08/2022 1  0.5 - 7.8 % Final    Basophils 06/08/2022 1  0.0 - 2.0 % Final    Immature Granulocytes 06/08/2022 1  0.0 - 5.0 % Final    Segs Absolute 06/08/2022 4.2  1.7 - 8.2 K/UL Final    Absolute Lymph # 06/08/2022 1.5  0.5 - 4.6 K/UL Final    Absolute Mono # 06/08/2022 1.1  0.1 - 1.3 K/UL Final    Absolute Eos # 06/08/2022 0.1  0.0 - 0.8 K/UL Final    Basophils Absolute 06/08/2022 0.0  0.0 - 0.2 K/UL Final    Absolute Immature Granulocyte 06/08/2022 0.1  0.0 - 0.5 K/UL Final    Differential Type 06/08/2022 AUTOMATED    Final       Treatment Summary has been discussed and given to patient: n/a        -------------------------------------------------------------------------------------------------------------------  Please call our office at [phone] if you have any  of the following symptoms:   · Fever of 100.5 or greater  · Chills  · Shortness of breath  · Swelling or pain in one leg    After office hours an answering service is

## 2022-06-09 NOTE — PROGRESS NOTES
Arrived to the [facility]. Neulasta completed. Provided education on Neulasta    Patient instructed to report any side affects to ordering provider. Patient tolerated well. Any issues or concerns during appointment: none. Patient aware of next infusion appointment on 6/29/22 (date) at 0800 (time). Discharged ambulatory.

## 2022-06-14 NOTE — TELEPHONE ENCOUNTER
I reported to HARRY Burkett and she states to repeat the MRI Lumbar and thoracic. Orders placed and msg to [address] [place] [address] to schedule and call the patient.

## 2022-06-14 NOTE — TELEPHONE ENCOUNTER
PT calling in regards to referral to [address] [place] [address] for his bone pain/states they are needing updated MRI/PT requesting us to put in order to get one before his ortho appt

## 2022-06-27 NOTE — PROGRESS NOTES
Name: Lynn Ashby  YOB: 1955  Gender: male  MRN: [id number]    CC: Back Pain (mid back pain and sturnum pain)       HPI: This is a 78y.o. year old male who has history of stage IV prostate cancer with bone metastasis. Had kyphoplasties of T9, T12, L1 and L4 by interventional radiology in 2020. He had a new PET/CT in March 2022 showing progression with increased uptake in the prostate and bones. He is under the care of of Dr. Romeo Nails with oncology. He is currently undergoing chemotherapy. He is complained of increased pain in his back but more the pain is around his ribs and towards the sternum. New MRI scans . Of thoracic and lumbar spine were ordered. The patient was interested in a lumbar or thoracic brace if it would be helpful and he was referred to us to prescribe him a brace if we felt it indicated. Patient denies weakness of his upper extremities. There is no weakness of the lower extremities. He has had no bladder or bowel incontinence. Denies numbness or tingling. History was obtained by patient and review of his chart. I read oncology note from 6/8/2022. AMB PAIN ASSESSMENT 6/27/2022   Location of Pain Back   Location Modifiers Left;Right   Quality of Pain Aching   Duration of Pain Persistent   Frequency of Pain Intermittent   Date Pain First Started 6/16/2022   Limiting Behavior Yes   Result of Injury No   Work-Related Injury No   Are there other pain locations you wish to document? No              ROS/Meds/PSH/PMH/FH/SH: I personally reviewed the patient's collected intake data. Below are the pertinents:    Allergies   Allergen Reactions    Turmeric Nausea And Vomiting         Current Outpatient Medications:     lidocaine-prilocaine (EMLA) 2.5-2.5 % cream, Apply a dab over the port site 30-45 minutes prior to lab/infusion appts.  Cover with saran wrap or a sandwich bag., Disp: 30 g, Rfl: 1    predniSONE (DELTASONE) 10 MG tablet, Take 1 tablet by mouth daily, Disp: 30 tablet, Rfl: 1    LORazepam (ATIVAN) 1 MG tablet, Take 1 tablet by mouth every 8 hours as needed for Anxiety for up to 30 days. , Disp: 30 tablet, Rfl: 1    naproxen (NAPROSYN) 250 MG tablet, Take 250 mg by mouth 2 times daily (with meals), Disp: , Rfl:     Past Surgical History:   Procedure Laterality Date    BIOPSY PROSTATE      COLONOSCOPY  08/2020    HEENT      teeth removed     IR KYPHOPLASTY LUMBAR FIRST LEVEL  10/14/2020    IR KYPHOPLASTY LUMBAR FIRST LEVEL  10/14/2020    IR KYPHOPLASTY LUMBAR FIRST LEVEL 10/14/2020 [facility]    IR KYPHOPLASTY THORACIC FIRST LEVEL  10/29/2020    IR KYPHOPLASTY THORACIC FIRST LEVEL  10/29/2020    IR KYPHOPLASTY THORACIC FIRST LEVEL 10/29/2020 [facility]    TESTICLE REMOVAL Bilateral 02/2017       Patient Active Problem List   Diagnosis    Elevated PSA    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)    Hypokalemia    Gynecomastia, male    Neuroendocrine carcinoma, high grade (HCC)    Anemia due to chemotherapy    Primary prostate cancer with metastasis from prostate to other site [facility])    Uncontrolled pain    Pelvic pain    Prostatic nodule    Family history of prostate cancer in father    Acute prostatitis    S/P TURP    Prostate cancer ([facility] [address]         Tobacco:  reports that he quit smoking about 5 years ago. He smoked 0.50 packs per day. He has never used smokeless tobacco.  Alcohol:   Social History     Substance and Sexual Activity   Alcohol Use Yes        Physical Exam:   BMI: Body mass index is 29.95 kg/m². GENERAL:  Adult in no acute distress, well developed, well nourished Patient is appropriately conversant  MSK:  Examination of the lumbar spine reveals no sagittal or coronal imbalance   There is mild tenderness to palpation along the spinous processes and paraspinal musculature. The patient ambulates with a normal gait. ROM of bilateral hip(s) reveals no irritability.    NEURO:  Cranial nerves grossly intact. No motor deficits. Straight leg testing is negative bilateral  Sensory testing reveals intact sensation to light touch and in the distribution of the L3-S1 dermatomes bilaterally  Ankle jerk is negative for clonus      Strength testing in the lower extremity reveals the following based on the 5 point grading scale:     HF (L2) H Ab (L5) KE (L3/4) ADF (L4) EHL (L5) A Ev (S1) APF (S1)   Right 5 5 5 5 5 5 5   Left 5 5 5 5 5 5 5     PSYCH:  Alert and oriented X 3. Appropriate affect. Intact judgment and insight. Radiographic Studies:     Narrative   MRI of the thoracic spine with and without contrast.       CLINICAL INDICATION: Metastatic prostate cancer, back pain       PROCEDURE: Multiplanar multisequence MR imaging performed through the thoracic   spine prior to and following administration of 19 cc of ProHance intravenous   gadolinium contrast.       COMPARISON: MRI of the thoracic spine dated 10/5/2020       FINDINGS: There is significant interval progression of disease with metastases   at every level. The tumor burden is largest at the T8 level. There is extension   into the anterior epidural space without deonte cord compression although there   is flattening to the thecal sac. Spinal cord is normal in morphology, signal and   enhancement throughout. Post kyphoplasty changes are present at T9, T12 and L1.       Axial images:       Axial images through the T8 level show extension of the tumor through the   posterior wall of the vertebral body into the left pedicle and into the anterior   and left lateral epidural space. There is moderate central spinal stenosis with   effacement of the thecal sac without deonte cord compression. Otherwise, there is   no central stenosis noted in the thoracic spine.       Paraspinal soft tissues shows multiple nodules within the limited visualization   of the liver concerning for metastatic disease.           Impression   1.  Marked progression of metastatic disease to the thoracic spine. Tumor   involves nearly every level most extensive at T8 with extension of tumor beyond   the bony margins of the T8 vertebral body into the anterior and left lateral   epidural space resulting in moderate central spinal stenosis without deonte cord   compression. 2. Metastatic disease also suspected in the liver. Correlate with   cross-sectional imaging. Narrative   MRI of the lumbar spine with and without contrast.       CLINICAL INDICATION: Lower back pain, metastatic prostate cancer       PROCEDURE: Multiplanar and multisequence MR imaging performed through the lumbar   spine prior to and following the administration of 19 cc of ProHance intravenous   gadolinium contrast.       COMPARISON: MRI the lumbar spine dated 10/5/2020       FINDINGS: There is marked interval progression of metastatic disease that   involves every level of the lumbar spine and imaged portion the sacrum. Post   kyphoplasty changes are present at L1 and L4. No acute compression fracture   evident. The disc spaces are maintained. The conus medullaris is normal in   morphology, signal, and enhancement. There is no cord compression.       Axial images:       T12-L1: No central stenosis or foramina narrowing.       L1-L2: No central stenosis or foramina narrowing.       L2-L3: There is no central stenosis or foramina narrowing.       L3-L4: There is no central stenosis or foramina narrowing.       L4-L5: There is no central stenosis or foramina narrowing.       L5-S1: There is a slight circumferential disc bulge. No central stenosis is   present. Moderate bilateral foramina narrowing is present.       Limited evaluation of the paraspinal soft tissues is unremarkable.           Impression   1. Marked progression of metastatic disease throughout the imaged lumbar spine   and sacrum. There are no acute pathologic fractures. There is no cord   compression.    2. Degenerative disc and facet changes at L5-S1 resulting in moderate bilateral   foramina narrowing.         I independently reviewed the MRI of the thoracic and lumbar spine. In the lumbar spine there has been progression of his metastatic disease but there are no acute pathologic fractures there is no cord compression. In the thoracic spine there has been significant interval progression of disease with metastasis at every level in the tumor burden is largest at T8. The tumor does extend from the posterior wall of the vertebral body into the left pedicle and into the anterior and left lateral epidural space. There is moderate central stenosis but there is no deonte cord compression. There is no evidence of acute fracture. I did compare this to the prior thoracic MRI scan and certainly metastatic disease of the T8 tumor has progressed. Assessment/Plan:       Diagnosis Orders   1. Metastatic cancer to bone ([facility] [address]     2. Closed wedge compression fracture of T3 vertebra, initial encounter ([facility] [address]     3. 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 Leslie Leroy. There is not an acute fracture but he has extensive metastasis. There is tumor in the T8 vertebral body. No fracture at this time there is moderate stenosis no significant stenosis yet but this could progress. Concern would be future fracture and also progression of the tumor. We do not recommend surgical intervention for this tumor at this time. Surrounding metastasis would make any instrumentation highly likely to fail. I would recommend referral to radiation oncology to see if this is amendable to radiation therapy. The patient will be seeing Dr. Lynn Walker again this week. Other options are supportive care with a brace which would help prevent fracture and the patient will be prescribed a cast or a [place] brace which ever is most comfortable when helping with hyperextension of the thoracic spine.     5 This is an illness/condition that threatens bodily function    No orders of the defined types were placed in this encounter. No orders of the defined types were placed in this encounter. Return if symptoms worsen or fail to improve. Benjamin Bennett PA-C  06/27/22      Elements of this note were created using speech recognition software. As such, errors of speech recognition may be present.

## 2022-06-27 NOTE — LETTER
Han JUARES  1955  MRN [id number]                                              ROOM NUMBER______      Radiographic Studies:    Cervical MRI      Thoracic MRI         Lumbar MRI          Pelvis MRI        CONTRAST    CT Myelogram: _______________   NCS/EMG ________________ ( UE  /  LE )     MRI of ___________________          Other: ____________________      Injections:    KNEE    HIP  Depomedrol _____ mg Euflexxa _____    _______________ TFESI/SNRB  _______________ SI Joint  _______________ DAVIDA    _______________ Facet  _______________Piriformis/ Sciatica      Medications:    Oral Steroids _______________  NSAIDS _______________    Muscle Relaxers _______________  Neurontin/Lyrica _______________    Pain Medicine _______________  Other _______________                       Physical Therapy:    Lumbar     Thoracic      Cervical     Hip       Knee       Shoulder               Traction          Ultrasound          Dry Needling      Referral:    Pain referral:  [facility]   [facility]   Other: ______________________________    Follow-up/ Refer__________________________________________________    Authorization to hold blood thinners:___________________________________

## 2022-06-27 NOTE — LETTER
[address] [facility] [address]  Phone: [phone]  Fax: [phone]           Amy Han PA-C      June 27, 2022     Patient: Piedad Hendricks   MR Number: [id number]   YOB: 1955   Date of Visit: 6/27/2022       Dear Dr. José Bolton:    Thank you for referring Nichole Lane to me for evaluation/treatment. Below are the relevant portions of my assessment and plan of care. If you have questions, please do not hesitate to call me. I look forward to following Martell Garcia along with you.     Sincerely,        Amy Han PA-C    CC providers:  HELEN Clark NP  [address] [facility]  [address] [facility] [address]  [address] [place]

## 2022-06-28 NOTE — PATIENT INSTRUCTIONS
Patient Instructions from Today's Visit    Reason for Visit:  Follow up- Prostate    Diagnosis Information:  [email or web address]      Plan:  - You can not have chemotherapy today, your platelets are low and this is also the cause of the urinary bleeding you are experiencing.   - Referral to radiation oncology to discuss possible radiation treatment to T8.  - Discussed possible hospice involvement in the future  - Use your pain medication as needed, avoid the naproxen and the ibuprofen due to the bleeding.  - If the bleeding continues or you have difficulty urinating you may need to return to urology      Follow Up:  - 2 weeks    Recent Lab Results:  Hospital Outpatient Visit on 06/29/2022   Component Date Value Ref Range Status    WBC 06/29/2022 6.7  4.3 - 11.1 K/uL Final    RBC 06/29/2022 2.51* 4.23 - 5.6 M/uL Final    Hemoglobin 06/29/2022 8.7* 13.6 - 17.2 g/dL Final    Hematocrit 06/29/2022 26.6  % Final    MCV 06/29/2022 106.0* 79.6 - 97.8 FL Final    MCH 06/29/2022 34.7* 26.1 - 32.9 PG Final    MCHC 06/29/2022 32.7  31.4 - 35.0 g/dL Final    RDW 06/29/2022 17.8* 11.9 - 14.6 % Final    Platelets 81/59/8762 17* 150 - 450 K/uL Final    Comment: RESULTS CHECKED X 2  RESULTS VERIFIED, PHONED TO AND READ BACK BY  YU PINEDO RN AT 0853 ON 6/29/22 BY NATALIIA ORTIZ 06/29/2022 12.5* 9.4 - 12.3 FL Final    nRBC 06/29/2022 0.00  0.0 - 0.2 K/uL Final    **Note: Absolute NRBC parameter is now reported with Hemogram**    Differential Type 06/29/2022 AUTOMATED    Final    Seg Neutrophils 06/29/2022 67  43 - 78 % Final    Lymphocytes 06/29/2022 18  13 - 44 % Final    Monocytes 06/29/2022 14* 4.0 - 12.0 % Final    Eosinophils % 06/29/2022 0* 0.5 - 7.8 % Final    Basophils 06/29/2022 0  0.0 - 2.0 % Final    Immature Granulocytes 06/29/2022 1  0.0 - 5.0 % Final    Segs Absolute 06/29/2022 4.5  1.7 - 8.2 K/UL Final    Absolute Lymph # 06/29/2022 1.2  0.5 - 4.6 K/UL Final    Absolute Mono # 06/29/2022 0.9  0.1 - 1.3 K/UL Final    Absolute Eos # 06/29/2022 0.0  0.0 - 0.8 K/UL Final    Basophils Absolute 06/29/2022 0.0  0.0 - 0.2 K/UL Final    Absolute Immature Granulocyte 06/29/2022 0.1  0.0 - 0.5 K/UL Final         Treatment Summary has been discussed and given to patient: n/a        -------------------------------------------------------------------------------------------------------------------  Please call our office at [phone] if you have any  of the following symptoms:   · Fever of 100.5 or greater  · Chills  · Shortness of breath  · Swelling or pain in one leg    After office hours an answering service is available and will contact a provider for emergencies or if you are experiencing any of the above symptoms.  Patient has My Chart. My Chart log in information explained on the after visit summary printout at the . [facility] desk.     Reji Louise RN

## 2022-06-29 NOTE — PROGRESS NOTES
[facility]: Office Visit Established Patient    Chief Complaint:    Chief Complaint   Patient presents with    Follow-up         History of Present Illness:  Mr. Kayla Pierre is a 78 y.o. male who returns today for management of metastatic castrate sensitive prostate cancer. He originally presented to the ED on 1/30/17 reporting urinary frequency and dysuria since June 2016. A PSA was measured and found to be markedly elevated at 36 in December of 2016. He was referred to PGU and CLARKE was also abnormal, prompting TRUS and biopsy which showed 12 of 12 cores positive for PCa, Bergholz 9 in half of cores. He was started on Casodex and underwent staging studies which showed no bone metastasis, but  showed multiple lymph nodes throughout the pelvis which were enlarged and consistent with metastatic disease. He underwent bilateral orchiectomy for surgical castration, and PSA on 5/4/17 was down to 4 consistent with castrate sensitivity. We saw him  in clinic and recommended addition of Taxotere for 6 cycles as per the CHAARTED study. Completed 6 cycles of Taxotere in September 2017 and tolerated therapy very well, PSA dropped to 1.5 and he was continued on Casodex alone. In April 2019 his PSA  was noted to increase from 1.6 to 2.6, so we elected to stop Casodex and change to [place]. We saw him virtually in August 2020 and although he was dealing with some back pain, we thought it was likely musculoskeletal, as his PSA was stable and he had  not had any history of osseous metastases. However, since that time, his pain got worse, not responding to PT or chiropractic care. His PCP ordered a CT chest, which showed multiple bony lytic lesions with no lung or liver parenchymal masses. We recommended  bone imaging with technetium bone scan, with a possible plan for IR guided biopsy with kyphoplasty if there was significant thoracolumbar vertebral disease.   He completed kyphoplasty of 2 lumbar spine vertebrae with excellent pain relief, then began to  have recurrent pain in his lower thoracic spine, recommended for repeat kyphoplasty of T9 and T12. Path reviewed and confirms this is not a prostate adenocarcinoma, but is consistent with a high grade neuroendocrine tumor. His CT chest was negative  for a primary lesion, I cannot completely exclude a new primary, but clinically this fits better with selection and growth of a neuroendocrine clone from his prostate cancer. I note his CEA and CA 19-9 are also increased, which may be helpful for monitoring  response to therapy. We recommended a change to palliative chemotherapy with carboplatin and etoposide. His pain dramatically improved and CEA responded almost immediately, and PET/CT after 3 cycles showed excellent radiographic partial response, so  we recommended continuing therapy to 6 cycles if tolerated. He was off the carbo/etoposide for about 3 months, clinically was doing well but his CEA spiked up once again and his PET/CT showed multifocal osseous progression. Typically for small cell  carcinomas, if there is progression within 6 months of therapy, they are considered refractory and we move to 2nd line therapy. However, he had such a profound clinical, serologic, and radiographic response to carbo/etoposide, I would recommend another  4 cycles of treatment (if tolerated) then start a trial of immunotherapy maintenance. PET/CT August 2021 after 4 cycles showed partial response once again, with CEA down to 32 (from 166), so we elected to proceed with atezo maintenance. He completed  3 cycles of Tecentriq but experienced clinical, radiographic and serologic progression. Ultimately, I feel the Maame Javier is doing nothing to control his disease, we will get PET to establish a new baseline but I would consider changing back to cytotoxic  therapy.   I do not think he can tolerate platinum doublet for a 3rd time, so I would consider either lurbinectedin or topotecan. Pros and cons of each were reviewed and he would like to try the lurbinectedin. He did initially have a response to therapy  based on clinical status and CEA measurement, but then CEA began to rise, and PET/CT in March 2022 showed progression with increased uptake in the prostate and in the bones. He needs to change systemic therapy, I would recommend topotecan. He prefers  oral administration. He also was referred to Urology for LUTS, likely related to prostate progression of disease. We will also await histology from the prostate chips - if this is adenocarcinoma, we will at least need to consider a chemotherapy that  is not as narrow as the neuroendocrine-directed therapies we are using at this point. His TURP did show neuroendocrine tumor without change from most recent biopsy, so we are moving forward with oral topotecan. He completed 1 cycle but did not start  cycle 2 topotecan because it made him feel so poorly. He also saw Dr. Ihsan He at [facility], he agreed with the plan of care but recommended consideration of carboplatin and docetaxel as next line of therapy. Mr. Kayla Pierre could not tolerate MRI due to claustrophobia,  but he feels his cognition and head pressure are much improved. His CEA has continued to markedly increase, but he has not had any therapy in several weeks. At this point, he is unwilling to resume the topotecan because he felt so poorly with therapy,  we did not have enough time to see benefit (although his CEA did not improve with the first cycle). He would like to proceed with Dr. Breana Arora recommendations of carboplatin and docetaxel, we will arrange this. I would also add G-CSF because of the  high risk of neutropenia. He declines a repeat attempt at MRI right now as his neuropsychiatric symptoms are improved off topotecan. Here for cycle 3 Taxotere/carbo. He is not doing well.   The chest pain in the sternum has improved after his MRI, but he has back and rib pain, and overall notes that he has an increasing general malaise and a sense of the cancer worsening. Taking naproxen for pain but no opioids right now. Constipation is worse despite the lack of opioid therapy. He notes increased hematuria over the last few days, some bits of tissue and small clots but no large clots and no hesitancy. Review of Systems:  Constitutional: Positive for malaise/fatigue. HENT: Negative. Eyes: Negative. Respiratory: Negative. Cardiovascular: Negative. Gastrointestinal: Negative. Genitourinary: Positive for hematuria. Musculoskeletal: Positive for back pain. Skin: Negative. Neurological: Negative. Endo/Heme/Allergies: Negative. Psychiatric/Behavioral: Negative. All other systems reviewed and are negative. Allergies   Allergen Reactions    Turmeric Nausea And Vomiting     Past Medical History:   Diagnosis Date    Claustrophobia     Ear problems     Elevated PSA     Former light cigarette smoker (1-9 per day)     smoked for 35 years.   quit     History of multiple allergies     Nicotine vapor product user     Personal history of prostate cancer     Prostate cancer ([facility] [address]     prostate, neuroendocrine, mets to bone     Past Surgical History:   Procedure Laterality Date    BIOPSY PROSTATE      COLONOSCOPY  08/2020    HEENT      teeth removed     IR KYPHOPLASTY LUMBAR FIRST LEVEL  10/14/2020    IR KYPHOPLASTY LUMBAR FIRST LEVEL  10/14/2020    IR KYPHOPLASTY LUMBAR FIRST LEVEL 10/14/2020 [facility]    IR KYPHOPLASTY THORACIC FIRST LEVEL  10/29/2020    IR KYPHOPLASTY THORACIC FIRST LEVEL  10/29/2020    IR KYPHOPLASTY THORACIC FIRST LEVEL 10/29/2020 [facility]    TESTICLE REMOVAL Bilateral 02/2017     Family History   Problem Relation Age of Onset    Hypertension Father     Prostate Cancer Father     Cancer Father         prostate cancer     Social History     Socioeconomic History    Marital status: Single     Spouse name: Not on file    Number of children: Not on file    Years of education: Not on file    Highest education level: Not on file   Occupational History    Not on file   Tobacco Use    Smoking status: Former Smoker     Packs/day: 0.50     Quit date: 2016     Years since quittin.5    Smokeless tobacco: Never Used   Substance and Sexual Activity    Alcohol use: Yes    Drug use: No    Sexual activity: Not on file   Other Topics Concern    Not on file   Social History Narrative    Not on file     Social Determinants of Health     Financial Resource Strain:     Difficulty of Paying Living Expenses: Not on file   Food Insecurity:     Worried About Running Out of Food in the Last Year: Not on file    [place] of Food in the Last Year: Not on file   Transportation Needs:     Lack of Transportation (Medical): Not on file    Lack of Transportation (Non-Medical):  Not on file   Physical Activity:     Days of Exercise per Week: Not on file    Minutes of Exercise per Session: Not on file   Stress:     Feeling of Stress : Not on file   Social Connections:     Frequency of Communication with Friends and Family: Not on file    Frequency of Social Gatherings with Friends and Family: Not on file    Attends Mandaeism Services: Not on file    Active Member of [address] [place] [address] [employer] or Organizations: Not on file    Attends Club or Organization Meetings: Not on file    Marital Status: Not on file   Intimate Partner Violence:     Fear of Current or Ex-Partner: Not on file    Emotionally Abused: Not on file    Physically Abused: Not on file    Sexually Abused: Not on file   Housing Stability:     Unable to Pay for Housing in the Last Year: Not on file    Number of [place] in the Last Year: Not on file    Unstable Housing in the Last Year: Not on file     Current Outpatient Medications   Medication Sig Dispense Refill    lidocaine-prilocaine (EMLA) 2.5-2.5 % cream Apply a dab over the port site 30-45 minutes prior to lab/infusion appts. Cover with saran wrap or a sandwich bag. 30 g 1    predniSONE (DELTASONE) 10 MG tablet Take 1 tablet by mouth daily 30 tablet 1    LORazepam (ATIVAN) 1 MG tablet Take 1 tablet by mouth every 8 hours as needed for Anxiety for up to 30 days. 30 tablet 1    naproxen (NAPROSYN) 250 MG tablet Take 250 mg by mouth 2 times daily (with meals)       No current facility-administered medications for this visit. OBJECTIVE:  BP (!) 147/91   Pulse (!) 101   Temp 99.3 °F (37.4 °C)   Resp 18   Ht 5' 9.5\" (1.765 m)   Wt 203 lb 4.8 oz (92.2 kg)   SpO2 98%   BMI 29.59 kg/m²     Physical Exam:  Constitutional: Well developed, well nourished male in no acute distress, sitting comfortably in the exam room chair. HEENT: Normocephalic and atraumatic. Sclerae anicteric. Neck supple without JVD. No thyromegaly present. Lymph node   No palpable submandibular, cervical, supraclavicular lymph nodes. Skin Warm and dry. No bruising and no rash noted. No erythema. No pallor. Respiratory Lungs are clear to auscultation bilaterally without wheezes, rales or rhonchi, normal air exchange without accessory muscle use. CVS Normal rate, regular rhythm and normal S1 and S2. No murmurs, gallops, or rubs. Abdomen Soft, nontender and nondistended, normoactive bowel sounds. No palpable mass. No hepatosplenomegaly. Neuro Grossly nonfocal with no obvious sensory or motor deficits. MSK Normal range of motion in general.  No edema and no tenderness. Psych Appropriate mood and affect. Intermittently tearful when discussing disease and prognosis.      Labs:  Recent Results (from the past 96 hour(s))   CBC with Auto Differential    Collection Time: 06/29/22  8:04 AM   Result Value Ref Range    WBC 6.7 4.3 - 11.1 K/uL    RBC 2.51 (L) 4.23 - 5.6 M/uL    Hemoglobin 8.7 (L) 13.6 - 17.2 g/dL    Hematocrit 26.6 %    .0 (H) 79.6 - 97.8 FL    MCH 34.7 (H) 26.1 - 32.9 PG    MCHC 32.7 31.4 - 35.0 g/dL    RDW 17.8 (H) 11.9 - 14.6 %    Platelets 17 (LL) 855 - 450 K/uL    MPV 12.5 (H) 9.4 - 12.3 FL    nRBC 0.00 0.0 - 0.2 K/uL    Differential Type AUTOMATED      Seg Neutrophils 67 43 - 78 %    Lymphocytes 18 13 - 44 %    Monocytes 14 (H) 4.0 - 12.0 %    Eosinophils % 0 (L) 0.5 - 7.8 %    Basophils 0 0.0 - 2.0 %    Immature Granulocytes 1 0.0 - 5.0 %    Segs Absolute 4.5 1.7 - 8.2 K/UL    Absolute Lymph # 1.2 0.5 - 4.6 K/UL    Absolute Mono # 0.9 0.1 - 1.3 K/UL    Absolute Eos # 0.0 0.0 - 0.8 K/UL    Basophils Absolute 0.0 0.0 - 0.2 K/UL    Absolute Immature Granulocyte 0.1 0.0 - 0.5 K/UL   CEA    Collection Time: 06/29/22  8:04 AM   Result Value Ref Range    CEA 8,290.9 (H) 0.0 - 3.0 ng/mL   Comprehensive Metabolic Panel    Collection Time: 06/29/22  8:04 AM   Result Value Ref Range    Sodium 138 136 - 145 mmol/L    Potassium 3.6 3.5 - 5.1 mmol/L    Chloride 102 98 - 107 mmol/L    CO2 24 21 - 32 mmol/L    Anion Gap 12 7 - 16 mmol/L    Glucose 119 (H) 65 - 100 mg/dL    BUN 26 (H) 8 - 23 MG/DL    CREATININE 1.40 0.8 - 1.5 MG/DL    GFR African American >60 >60 ml/min/1.73m2    GFR Non- 54 (L) >60 ml/min/1.73m2    Calcium 11.3 (H) 8.3 - 10.4 MG/DL    Total Bilirubin 0.6 0.2 - 1.1 MG/DL    ALT 89 (H) 12 - 65 U/L     (H) 15 - 37 U/L    Alk Phosphatase 167 (H) 50 - 136 U/L    Total Protein 7.0 6.3 - 8.2 g/dL    Albumin 4.1 3.2 - 4.6 g/dL    Globulin 2.9 2.3 - 3.5 g/dL    Albumin/Globulin Ratio 1.4 1.2 - 3.5     Magnesium    Collection Time: 06/29/22  8:04 AM   Result Value Ref Range    Magnesium 1.2 (L) 1.8 - 2.4 mg/dL   PSA, Diagnostic    Collection Time: 06/29/22  8:04 AM   Result Value Ref Range    PSA 7.4 (H) <4.0 ng/mL       Imaging:  MRI THORACIC SPINE W WO CONTRAST    Result Date: 6/23/2022  1. Marked progression of metastatic disease to the thoracic spine.  Tumor involves nearly every level most extensive at T8 with extension of tumor beyond the bony margins of the T8 vertebral body into the anterior and left lateral epidural space resulting in moderate central spinal stenosis without deonte cord compression. 2. Metastatic disease also suspected in the liver. Correlate with cross-sectional imaging. MRI LUMBAR SPINE W WO CONTRAST    Result Date: 6/23/2022  1. Marked progression of metastatic disease throughout the imaged lumbar spine and sacrum. There are no acute pathologic fractures. There is no cord compression. 2. Degenerative disc and facet changes at L5-S1 resulting in moderate bilateral foramina narrowing. ASSESSMENT:   Diagnosis Orders   1. Prostate cancer [facility])  [place] [address] Oncology    [address] [facility]    CBC with Auto Differential    Comprehensive Metabolic Panel    Magnesium    CEA    PSA, Diagnostic   2. Neuroendocrine carcinoma, high grade (HCC)  CEA   3. Other abnormal tumor markers  CEA   4. Metastasis to bone [facility])           PLAN:  Lab studies were personally reviewed. Prostate cancer: metastatic to retroperitoneal and pelvic lymph nodes, [place] 9 with PSA of 36 at presentation. Stage ROSA. S/p Casodex then orchiectomy for androgen ablation. Unfortunately, he is not a candidate for curative surgical or radiation therapy. He has started ADT and responded well, with recent PSA down to 4.4 (from 36 at diagnosis). We discussed the traditional management of metastatic castrate sensitive disease,  and the recent CHAARTED and STAMPZEINAE data that demonstrated benefit for the addition of 6 cycles of docetaxel. We also discussed that the more substantial benefit was observed in patients with high volume metastatic visceral or osseous disease, and that  it is less clear if patients with lymph node-confined disease see as much benefit. With those caveats, he is still interested in any intervention which can potentially prolong his life. Therefore, I recommend proceeding with 6 cycles of docetaxel as  per SON.   This was the consensus of the  [facility] [address] as well. Completed 6 cycles of Taxotere in September 2017 and tolerated therapy very well, PSA dropped to 1.5 and he was continued on Casodex alone. In April 2019 his PSA was noted to increase from 1.6 to 2.6, so we elected to stop Casodex and change to [facility], with good PSA response. We saw him virtually in August 2020 and although he was dealing with some back pain, we thought it was likely musculoskeletal,  as his PSA was stable and he had not had any history of osseous metastases. However, since that time, his pain got worse, not responding to PT or chiropractic care. His PCP ordered a CT chest, which showed multiple bony lytic lesions with no lung or  liver parenchymal masses. We recommended bone imaging with technetium bone scan, with a possible plan for IR guided biopsy with kyphoplasty if there was significant thoracolumbar vertebral disease. He completed kyphoplasty of 2 lumbar spine vertebrae  with excellent pain relief, then began to have recurrent pain in his lower thoracic spine, recommended for repeat kyphoplasty of T9 and T12. Path reviewed and confirms this is not a prostate adenocarcinoma, but is consistent with a high grade neuroendocrine  tumor. His CT chest was negative for a primary lesion, I cannot completely exclude a new primary, but clinically this fits better with selection and growth of a neuroendocrine clone from his prostate cancer. I note his CEA and CA 19-9 are also increased,  which may be helpful for monitoring response to therapy. We recommended a change to palliative chemotherapy with carboplatin and etoposide. His pain dramatically improved and CEA responded almost immediately, and PET/CT after 3 cycles showed excellent  radiographic partial response, so we recommended continuing therapy to 6 cycles if tolerated.    He was off the carbo/etoposide for about 3 months, clinically was doing well but his CEA spiked up once again and his PET/CT showed multifocal osseous progression. Typically for small cell carcinomas, if there is progression within 6 months of therapy, they are considered refractory and we move to 2nd line therapy. However, he had such a profound clinical, serologic, and radiographic response to carbo/etoposide,  I would recommend another 4 cycles of treatment (if tolerated) then start a trial of immunotherapy maintenance. PET/CT August 2021 after 4 cycles showed partial response once again, with CEA down to 32 (from 166), so we elected to proceed with atezo  maintenance. He completed 3 cycles of Tecentriq but experienced clinical, radiographic and serologic progression. Ultimately, I feel the Emilie Juarez is doing nothing to control his disease, we will get PET to establish a new baseline but I would consider  changing back to cytotoxic therapy. I do not think he can tolerate platinum doublet for a 3rd time, so I would consider either lurbinectedin or topotecan. Pros and cons of each were reviewed and he would like to try the lurbinectedin. He did initially  have a response to therapy based on clinical status and CEA measurement, but then CEA began to rise, and PET/CT in March 2022 showed progression with increased uptake in the prostate and in the bones. He needs to change systemic therapy, I would recommend  topotecan. He prefers oral administration. He also was referred to Urology for LUTS, likely related to prostate progression of disease. We will also await histology from the prostate chips - if this is adenocarcinoma, we will at least need to consider  a chemotherapy that is not as narrow as the neuroendocrine-directed therapies we are using at this point. His TURP did show neuroendocrine tumor without change from most recent biopsy, so we are moving forward with oral topotecan. He completed 1 cycle  but did not start cycle 2 topotecan because it made him feel so poorly.   He also saw Dr. Maria Esther Aparicio at [facility], he agreed with the plan of care but recommended consideration of carboplatin and docetaxel as next line of therapy. Mr. Ancel Runner could not tolerate  MRI due to claustrophobia, but he feels his cognition and head pressure are much improved. His CEA has continued to markedly increase, but he has not had any therapy in several weeks. At this point, he is unwilling to resume the topotecan because he felt so poorly with therapy, we did not have enough time to see benefit (although his CEA did not improve with the first cycle). He would like to proceed with Dr. Godwin Caldera recommendations of carboplatin and docetaxel, we will arrange this. I would  also add G-CSF because of the high risk of neutropenia. He declines a repeat attempt at MRI right now as his neuropsychiatric symptoms are improved off topotecan. Here for cycle 3 Taxotere/carbo. He is not doing well. The chest pain in the sternum has improved after his MRI, but he has back and rib pain, and overall notes that he has an increasing general malaise and a sense of the cancer worsening. Taking naproxen for pain but no opioids right now. Constipation is worse despite the lack of opioid therapy. He notes increased hematuria over the last few days, some bits of tissue and small clots but no large clots and no hesitancy. Labs reviewed, platelets have bottomed out at 17k. This is likely the reason for his hematuria. We discussed repeat urology opinion and he declines. I suspect the consistent NSAIDs for pain are also worsening this. He will stop naproxen and change to oxycodone which he already has from previous, he will also increase his laxative therapy to account for his constipation which will likely worsen - discussed Miralax up to TID and Mg citrate as needed (continue colace and dulcolax PRN). MRI T/L spine done for back pain radiating to side/chest shows evidence of multilevel metastatic disease progressed compared to 2020.   He does have a lesion at T8 with some epidural extension but no cord compression. He is interested in radiation for this, discussed with Dr. Ben Armando, they will work him in for discussion of radiation. CEA reviewed and continues to skyrocket at 8290. Unfortunately it seems that the current therapy is not having the desired effect, our further options are quite limited for systemic therapy. We discussed after radiation a transition to best supportive care, he is interested in hearing from [facility] regarding their support services, we will facilitate a meeting. Obviously his chemotherapy is on hold anyway while his thrombocytopenia continues, and while radiation is completed. However, I would certainly support a decision to transition to [facility] if he is ready. All questions were asked and answered to the best of my ability. F/u in a week or two for discussion.              Tammie Crum MD, MD  [facility]  [address] [place] [address]  Office : [phone]  Fax : [phone]

## 2022-06-30 NOTE — CONSULTS
user     Personal history of prostate cancer     Prostate cancer [facility])     prostate, neuroendocrine, mets to bone       The patient denies history of collagen vascular diseases, pacemaker insertion,. See HPI for details of prior chemotherapy and radiation. PAST SURGICAL HISTORY:   Past Surgical History:   Procedure Laterality Date    BIOPSY PROSTATE      COLONOSCOPY  2020    HEENT      teeth removed     IR KYPHOPLASTY LUMBAR FIRST LEVEL  10/14/2020    IR KYPHOPLASTY LUMBAR FIRST LEVEL  10/14/2020    IR KYPHOPLASTY LUMBAR FIRST LEVEL 10/14/2020 [facility]    IR KYPHOPLASTY THORACIC FIRST LEVEL  10/29/2020    IR KYPHOPLASTY THORACIC FIRST LEVEL  10/29/2020    IR KYPHOPLASTY THORACIC FIRST LEVEL 10/29/2020 [facility]    TESTICLE REMOVAL Bilateral 2017       MEDICATIONS:     Current Outpatient Medications:     lidocaine-prilocaine (EMLA) 2.5-2.5 % cream, Apply a dab over the port site 30-45 minutes prior to lab/infusion appts. Cover with saran wrap or a sandwich bag., Disp: 30 g, Rfl: 1    predniSONE (DELTASONE) 10 MG tablet, Take 1 tablet by mouth daily, Disp: 30 tablet, Rfl: 1    LORazepam (ATIVAN) 1 MG tablet, Take 1 tablet by mouth every 8 hours as needed for Anxiety for up to 30 days. , Disp: 30 tablet, Rfl: 1    naproxen (NAPROSYN) 250 MG tablet, Take 250 mg by mouth 2 times daily (with meals), Disp: , Rfl:     ALLERGIES:   Allergies   Allergen Reactions    Turmeric Nausea And Vomiting       SOCIAL HISTORY:   Social History     Socioeconomic History    Marital status: Single     Spouse name: Not on file    Number of children: Not on file    Years of education: Not on file    Highest education level: Not on file   Occupational History    Not on file   Tobacco Use    Smoking status: Former Smoker     Packs/day: 0.50     Quit date: 2016     Years since quittin.5    Smokeless tobacco: Never Used   Substance and Sexual Activity    Alcohol use:  Yes    Drug use: No    Sexual activity: Not on file   Other Topics Concern    Not on file   Social History Narrative    Not on file     Social Determinants of Health     Financial Resource Strain:     Difficulty of Paying Living Expenses: Not on file   Food Insecurity:     Worried About Running Out of Food in the Last Year: Not on file    [place] of Food in the Last Year: Not on file   Transportation Needs:     Lack of Transportation (Medical): Not on file    Lack of Transportation (Non-Medical): Not on file   Physical Activity:     Days of Exercise per Week: Not on file    Minutes of Exercise per Session: Not on file   Stress:     Feeling of Stress : Not on file   Social Connections:     Frequency of Communication with Friends and Family: Not on file    Frequency of Social Gatherings with Friends and Family: Not on file    Attends [employer] Services: Not on file    Active Member of [address] [place] [address] [employer] or Organizations: Not on file    Attends Club or Organization Meetings: Not on file    Marital Status: Not on file   Intimate Partner Violence:     Fear of Current or Ex-Partner: Not on file    Emotionally Abused: Not on file    Physically Abused: Not on file    Sexually Abused: Not on file   Housing Stability:     Unable to Pay for Housing in the Last Year: Not on file    Number of [place] in the Last Year: Not on file    Unstable Housing in the Last Year: Not on file       FAMILY HISTORY:   Family History   Problem Relation Age of Onset    Hypertension Father     Prostate Cancer Father     Cancer Father         prostate cancer       REVIEW OF SYSTEMS:   A full 12-point review of systems was completed and was negative unless noted in the history of present illness.     PHYSICAL EXAMINATION:   ECOG Performance status 1  VITAL SIGNS:   Vitals:    07/01/22 1315   BP: (!) 155/88   Pulse: 90   Temp: 97.6 °F (36.4 °C)        General: well developed/nourished adult Male in no acute distress; appears stated and determine the exact location and dose of treatment to these areas. Clare Tineo had the opportunity to ask questions which appeared to be answered to his satisfaction and has elected to proceed with palliative radiation. PLAN:    1) Consented patient for treatment with external beam radiation after discussing risk, benefits, and side effects from treatment. 2) CT simulation today for T8 and L ribs. 3) Treatment to start ASAP.     Bassem Dela Cruz MD   July 1, 2022

## 2022-07-01 NOTE — PROGRESS NOTES
Pt is here today for a consult/sign RT consents/CT/Sim with . Pt stated that his pain has moved from his back and is now focused at his right ribcage with a pain level of 4-6/10. Per Dr. Kai Heredia, pt will receive 5 RT treatments, likely to start next week, and then will go to hospice.   Pt has received RT in the past.

## 2022-07-05 NOTE — TELEPHONE ENCOUNTER
Pt called stating he is in agony. C/o bilateral rib cage pain. He states walking and breathing is hard due to pain. He has been taking Prednisone 10mg x a few months. He states Tramadol is not helping pain. He took Norco 7.5mg about an hour ago with no relief. RT being planned to rib and spine x one fraction this week. I discussed above with Dr. Lucy Mart. Pt was instructed to stop Prednisone 10mg. He will start Decadron 4mg bid along with otc pepcid. (He  will ask pharmacist what to take). He will stop Tramadol and Norco.  He will start Oxycodone 10mg every 4 hours along with Tylenol. Pt was instructed not to take more than maximum tylenol dose per day. He will call us if pain is not helped by above changes. I informed him he will be called when RT plan is ready. Felton Sanders.  Em Cuello

## 2022-07-07 NOTE — PROGRESS NOTES
Patient: Madeline Powell MRN: [id number]  SSN: [id number]    YOB: 1955  Age: 78 y.o. Sex: male      DIAGNOSIS:  Metastatic prostate cancer    TREATMENT SITE:  T spine, bilateral ribs    DOSE and FRACTIONATION:  Ribs: 800 of 800cGy; 1 of 1 fraction, T spine 400 of 2000cGy, 1 of 5 fractions    INTERVAL HISTORY:  Madeline Powell is a 78 y.o. male being treated for metastatic prostate cancer. Week 1:  Symptoms uncontrolled. He is taking oxycodone 10mg every 4 hours. Did not tolerate dexamethasone due to reflux. Has not had a bowel movement in 4 days and states that neither suppository or oral laxative agents will work. Bilateral LE swelling. OBJECTIVE:  NAD  There were no vitals filed for this visit. Lab Results   Component Value Date/Time     06/29/2022 08:04 AM    K 3.6 06/29/2022 08:04 AM     06/29/2022 08:04 AM    CO2 24 06/29/2022 08:04 AM    BUN 26 06/29/2022 08:04 AM    GFRAA >60 06/29/2022 08:04 AM    MG 1.2 06/29/2022 08:04 AM    GLOB 2.9 06/29/2022 08:04 AM    ALT 89 06/29/2022 08:04 AM     Lab Results   Component Value Date/Time    WBC 6.7 06/29/2022 08:04 AM    HGB 8.7 06/29/2022 08:04 AM    HCT 26.6 06/29/2022 08:04 AM    PLT 17 06/29/2022 08:04 AM       ASSESSMENT and PLAN:  Madeline Powell has uncontrolled pain, reflux, and constipation. I recommended he present to the ER given the extent of his symptoms and lack of response to outpatient management and he refuses. I will prescribe omeprazole for reflux, he will discontinue dexamethasone, he will try oral and per rectum laxatives tonight. We will contact palliative care for additional assistance with pain and symptom management and I again reiterated that he would benefit from evaluation in the ER especially if he continues to not have a bowel movement.     Halima Werner MD   July 7, 2022

## 2022-07-08 PROBLEM — D69.6 THROMBOCYTOPENIA (HCC): Status: ACTIVE | Noted: 2022-01-01

## 2022-07-08 PROBLEM — R52 UNCONTROLLED PAIN: Status: ACTIVE | Noted: 2020-10-13

## 2022-07-08 PROBLEM — C61 PRIMARY PROSTATE CANCER WITH METASTASIS FROM PROSTATE TO OTHER SITE (HCC): Status: ACTIVE | Noted: 2017-05-03

## 2022-07-08 PROBLEM — T45.1X5A ANEMIA DUE TO CHEMOTHERAPY: Status: ACTIVE | Noted: 2021-01-05

## 2022-07-08 PROBLEM — C79.51 PROSTATE CANCER METASTATIC TO BONE (HCC): Status: ACTIVE | Noted: 2022-01-01

## 2022-07-08 PROBLEM — C61 PROSTATE CANCER METASTATIC TO BONE (HCC): Status: ACTIVE | Noted: 2022-01-01

## 2022-07-08 PROBLEM — C7A.1 NEUROENDOCRINE CARCINOMA, HIGH GRADE (HCC): Status: ACTIVE | Noted: 2021-08-02

## 2022-07-08 PROBLEM — D64.81 ANEMIA DUE TO CHEMOTHERAPY: Status: ACTIVE | Noted: 2021-01-05

## 2022-07-08 NOTE — ED TRIAGE NOTES
Patient arrived by Ambulance. Transferred to ED bed with some assistance. Patient just started radiation 2 days ago. Endorses generalized weakness. [facility] at home last night. No injuries dur to fall. Hemodynamically stable. Generalized pain.

## 2022-07-08 NOTE — PROGRESS NOTES
Palliative care was scheduled to see patient in XRT this morning due to uncontrolled pain. Notified by Raad Kern RN that he has fallen during the night and EMS is on route to his house. Patient is currently undergoing palliative XRT to his spine. He completed XRT to his ribs earlier this week. Of note, he met with [facility] on 7/2 with plans for patient to reach out to them once XRT was complete. He has received multiple lines of therapy for his prostate cancer, and per discussion with Dr. Lc Calhoun and Dr. Jose R Abraham, hospice is appropriate.

## 2022-07-08 NOTE — ED NOTES
Patient has Right chest wall implanted power port. This R. N. will access port for blood work to be drawn and possibly medication to be administered.      Marin Watt RN  07/08/22 5361

## 2022-07-08 NOTE — TELEPHONE ENCOUNTER
Pt called and left vm that he had fallen at home and   would only be able to keep apts here this morning with EMS transport. I called the pt. He was awake, alert and oriented. He states he fell twice during the night and managed to get himself to couch. His front door is open. He denied hitting his head. He is complaining of pain with movement  to legs, hips, shoulders. He states he lives alone. I instructed pt I was calling 911. He was agreeable. I instructed him and EMS that we can arrange transport from hospital for RT treatment if he goes to [facility]. Notified Palliative Care, MD  and therapists. Will monitor for status of admission. Pt started RT 7-6-22 to ribs and t spine with uncontrolled pain. Juan Olivarez.  Marco Bolanos

## 2022-07-08 NOTE — ED NOTES
First contact with pt. Pt awaiting a admission bed at [facility].  Pt up in chair no distress noted     Allie Frazier RN  07/08/22 4326

## 2022-07-08 NOTE — CARE COORDINATION
Chart screened by CM for d/c planning. Pt is currently in his room yelling very loudly and cursing stating he is in pain. CM unable to have a conversation with pt at this time. CM searched pt's chart for information. Pt reportedly resides alone. He has a son: Nelly Hussein [phone]. CM does not know where [employer] resides nor how involved he is in pt's care. Pt has insurance with pharmacy benefits, a PCP, and is followed by Dr. Estefani Macdonald, and palliative care at the [facility]. Pt has Dx of Prostate CA with mets to bone. Please see excerpt from Palliative Care note date 7/8: \"Patient is currently undergoing palliative XRT to his spine. He completed XRT to his ribs earlier this week. Of note, he met with [facility] on 7/2 with plans for patient to reach out to them once XRT was complete. He has received multiple lines of therapy for his prostate cancer, and per discussion with Dr. Estefani Macdonald and Dr. Suha Shields, hospice is appropriate. \"  D/C plan is undetermined at the present time as CM is unable to converse with pt. CM will continue to follow and remain available if any needs arise. 07/08/22 5086   Service Assessment   Patient Orientation Alert and Oriented;Person;Place;Self;Other (see comment)  (Pt does not wish to speak with CM due to reported unctrolled pain)   Cognition Alert   History Provided By Medical Record   Primary [place] [address] is: Legal Next of Kin   PCP Verified by CM Yes  (Yeyo Arboleda. MD Andrey)   Last Visit to PCP Within last year   Prior Functional Level Independent in ADLs/IADLs   Current Functional Level Independent in ADLs/IADLs   Can patient return to prior living arrangement Unknown at present   Ability to make needs known: Good   Family able to assist with home care needs: Other (comment)  (Pt resides alone.   CM does not know where pt's daughter resides.)   Would you like for me to discuss the discharge plan with any other family members/significant others, and if so, who? No   Financial Resources Medicaid; Medicare   Social/Functional History   Lives With Alone   Type of [address] Two level   ADL Assistance Independent   Homemaking Assistance Independent   Ambulation Assistance Independent   Transfer Assistance Independent   Active  Yes   Mode of Transportation Car   Occupation Retired   Discharge Planning   Type of [address] [facility] Prior To Admission   (Currently unknown)   Potential Assistance Needed   (Currently unknown)   Potential Assistance Purchasing Medications No   Patient expects to be discharged to: Unknown   One/Two Story Residence Two story   History of falls? 1   Services At/After Discharge   Transition of Care Consult (CM Consult) Other  (Currently unknown)   [address] Provided? No   Condition of Participation: Discharge Planning   The Plan for Transition of Care is related to the following treatment goals: D/C plan currently undetermined. CM unable to speak with pt due to uncontrolled pain. The Patient and/or Patient Representative was provided with a Choice of Provider? Patient   Freedom of Choice list was provided with basic dialogue that supports the patient's individualized plan of care/goals, treatment preferences, and shares the quality data associated with the providers?   Yes

## 2022-07-08 NOTE — ED PROVIDER NOTES
[facility] Emergency Department Provider Note                   PCP: Ramona Dupont MD               Age: 78 y.o. Sex: male       ICD-10-CM    1. Anemia, unspecified type  D64.9    2. Generalized weakness  R53.1    3. Cancer associated pain  G89.3        DISPOSITION Decision To Transfer 07/08/2022 12:20:33 PM        MDM  Number of Diagnoses or Management Options  Anemia, unspecified type  Cancer associated pain  Generalized weakness  Diagnosis management comments: 58-year-old male history of pain from prostate cancer with metastasis. Reports a fall but his fall he describes as him falling back onto the couch and did not have any injuries from this. A broad-based work-up was ordered. Initially x-rays of ankle, pelvis and chest were ordered patient states he was unable to move to get these imaging. Patient was given Dilaudid and Zofran, slightly hypoxic after this but improved after nasal cannula administration of oxygen. Patient still states his pain is too much and he cannot obtain these imaging. While at bedside he is freely moving bilateral lower extremities thus now I feel no need to obtain this imaging since he initially reported unable to move them because of pain. Will get an x-ray again and basic labs. Patient will be given 500cc bolus IV fluid. Does have dry mucosal membranes. White count 9.9, hemoglobin 6.3, platelets 21, normal electrolytes, BUN 45, creatinine is 1.58, worsening from baseline, elevated LFTs, Chest ray without any emergent findings. Given patient's anemia, worsening pain, and generalized weakness, patient would benefit from admission. Oncology consulted who advised for hospitalist admission. Hospitalist agreed to admit this patient for continued evaluation and treatment.        Amount and/or Complexity of Data Reviewed  Clinical lab tests: ordered and reviewed  Tests in the radiology section of CPT®: ordered and reviewed  Discussion of test results with the performing providers: yes  Decide to obtain previous medical records or to obtain history from someone other than the patient: yes  Review and summarize past medical records: yes  Discuss the patient with other providers: yes    Risk of Complications, Morbidity, and/or Mortality  Presenting problems: moderate  Diagnostic procedures: moderate  Management options: moderate         Orders Placed This Encounter   Procedures    XR CHEST PORTABLE    CBC with Auto Differential    CMP        Jonah Guillermo is a 78 y.o. male who presents to the Emergency Department with chief complaint of    Chief Complaint   Patient presents with    Flank Pain      Patient is a 48-year-old male presents to the emergency department with pain all over. States he recently started palliative radiation for prostate cancer that has metastasized to the spine as well as ribs. States his pain is not being controlled with oxycodone 10 at home. Was supposed to see palliative care today but states he was in pain all over and this caused him to miss his appointment. Came to the ER instead. Patient reports falls although when asking about the falls he only reports one episode that occurred when he was standing up states he tried to stretch and then fell back down to the couch. Denies hitting his head nor loss of consciousness. Denies any significant injuries. Reports he cannot move his hips from pain although he freely moves them while distracting. Normal sensation throughout extremities. Reports retrosternal pain this point tender that has been progressing for last few days. Denies shortness of breath. Review of Systems   Constitutional: Negative for chills and fever. HENT: Negative for congestion and sinus pain. Eyes: Negative for photophobia. Respiratory: Negative for shortness of breath. Cardiovascular: Negative for chest pain. Gastrointestinal: Negative for diarrhea, nausea and vomiting.    Genitourinary: Negative for difficulty urinating. Musculoskeletal: Positive for arthralgias. Negative for back pain and neck pain. Skin: Negative for rash. Neurological: Negative for syncope and headaches. Psychiatric/Behavioral: Negative for confusion. All other systems reviewed and are negative. Past Medical History:   Diagnosis Date    Claustrophobia     Ear problems     Elevated PSA     Former light cigarette smoker (1-9 per day)     smoked for 35 years. quit     History of multiple allergies     Nicotine vapor product user     Personal history of prostate cancer     Prostate cancer ([facility] [address]     prostate, neuroendocrine, mets to bone        Past Surgical History:   Procedure Laterality Date    BIOPSY PROSTATE      COLONOSCOPY  08/2020    HEENT      teeth removed     IR KYPHOPLASTY LUMBAR FIRST LEVEL  10/14/2020    IR KYPHOPLASTY LUMBAR FIRST LEVEL  10/14/2020    IR KYPHOPLASTY LUMBAR FIRST LEVEL 10/14/2020 [facility]    IR KYPHOPLASTY THORACIC FIRST LEVEL  10/29/2020    IR KYPHOPLASTY THORACIC FIRST LEVEL  10/29/2020    IR KYPHOPLASTY THORACIC FIRST LEVEL 10/29/2020 [facility]    TESTICLE REMOVAL Bilateral 02/2017        Family History   Problem Relation Age of Onset    Hypertension Father     Prostate Cancer Father     Cancer Father         prostate cancer           Social Connections:     Frequency of Communication with Friends and Family: Not on file    Frequency of Social Gatherings with Friends and Family: Not on file    Attends Faith Services: Not on file    Active Member of Clubs or Organizations: Not on file    Attends Club or Organization Meetings: Not on file    Marital Status: Not on file        Allergies   Allergen Reactions    Turmeric Nausea And Vomiting        Vitals signs and nursing note reviewed.    Patient Vitals for the past 4 hrs:   Temp Pulse Resp BP SpO2   07/08/22 1012 98.1 °F (36.7 °C) 92 16 120/75 98 %          Physical Exam  Vitals and nursing note reviewed. Constitutional:       General: He is not in acute distress. Appearance: Normal appearance. He is ill-appearing. Comments: Chronically ill-appearing   HENT:      Head: Normocephalic. Nose: Nose normal.      Mouth/Throat:      Mouth: Mucous membranes are moist.   Eyes:      Extraocular Movements: Extraocular movements intact. Cardiovascular:      Rate and Rhythm: Normal rate and regular rhythm. Pulses: Normal pulses. Heart sounds: Normal heart sounds. Pulmonary:      Effort: Pulmonary effort is normal. No respiratory distress. Breath sounds: Normal breath sounds. Abdominal:      General: Abdomen is flat. Palpations: Abdomen is soft. Tenderness: There is no abdominal tenderness. Musculoskeletal:         General: No swelling. Normal range of motion. Cervical back: Normal range of motion. No rigidity. Comments: Patient has full range of motion of knees, ankles and hips without difficulty, he does state he cannot move them secondary to pain and then will freely move them. Neurovascular intact distally bilateral lower extremities. Skin:     General: Skin is warm. Capillary Refill: Capillary refill takes less than 2 seconds. Findings: No rash. Neurological:      General: No focal deficit present. Mental Status: He is alert and oriented to person, place, and time. Cranial Nerves: No cranial nerve deficit. Sensory: No sensory deficit. Motor: No weakness.    Psychiatric:         Mood and Affect: Mood normal.          Procedures      Labs Reviewed   CBC WITH AUTO DIFFERENTIAL - Abnormal; Notable for the following components:       Result Value    RBC 1.78 (*)     Hemoglobin 6.3 (*)     Hematocrit 19.5 (*)     .6 (*)     MCH 35.4 (*)     RDW 18.4 (*)     Platelets 21 (*)     nRBC 0.22 (*)     Seg Neutrophils 82 (*)     Lymphocytes 6 (*)     Eosinophils % 0 (*)     All other components within normal limits

## 2022-07-08 NOTE — ED NOTES
TRANSFER - OUT REPORT:    Verbal report given to Nurse Nirav on Cathlene Oar  being transferred to [address] center for Admission. Report consisted of patient's Situation, Background, Assessment and   Recommendations(SBAR). Information from the following report(s) Nurse Handoff Report was reviewed with the receiving nurse Manjit    Lines:   Single Lumen Implantable Port Right Chest (Active)        Opportunity for questions and clarification was provided.       Patient transported with:  Monitor       Ramya Molina RN  07/08/22 6712

## 2022-07-08 NOTE — PROGRESS NOTES
Pt came from [facility] A&O X's 4 VSS pt is in obvious extreme pain, yelling and screaming in pain pt voiding in commode urine is red in color  Pt states he has been urinating red for several weeks now no BM in 4 days pt is in bed call light is in reach tried to review pt PTA meds with pt states I'm in too much pain to think straight    MD made aware of the above

## 2022-07-08 NOTE — H&P
Hospitalist History and Physical   Admit Date:  2022  3:53 PM   Name:  Eliana Reese   Age:  78 y.o. Sex:  male  :  1955   MRN:  [id number]   Room:  [facility]/    Presenting Complaint: No chief complaint on file. Reason(s) for Admission: anemia     History of Present Illness:   Eliana Reese is a 78 y.o. male with medical history of metastatic prostate cancer diagnosed in 2017 s/p casodex and  bilateral orchiectomy for surgical castration, 6 cycles of docetaxel, pathologic vertebral fx's with path positive for high grade neuroendocrine cancer, s/p carbo/etoposide and most recently Taxoterence/carbo which is on hold for thrombocytopenia. Also with T8  Epidural extension w/o cord compression currently undergoing RTX to t spine and ribs. [facility]    Patient had fall overnight and reporting increasing pain, generalized weakness. Patient reports pain worse in the hips bilaterally L>R. No numbness/tingling or weakness of LE's. Recent hematuria now diminished, no melena, hematochezia. Admits to problematic constipation - no relief with use of mag citrate or lactulose at home. Last stool >5 days ago. Intermittent nausea, no vomiting. ER workup notable for Cr 1.58 (up from 1.4 on ), ALT 11,  (up from 167/89 on ), Hgb of 6.3 (down from 8.7 on ), plts 21K  CXR unremarkable    Oncology called and needs Hospitalist to admit for pain mgmt, anemia. Review of Systems:  10 systems reviewed and negative except as noted in HPI. Assessment & Plan:     # Symptomatic anemia  - transfuse 1 unit PRBC  - serial H&H  - prn transfusion for hgb <7    # Metastatic prostate CA  - oncology consult in AM to assume care  - prn px mgmt with prn dilaudid/oxycodone  - pt made contact with [facility] in early July but did not sign paperwork in hopes radiation would improve pain. Did not seem committed to speak with hospice while IP.  Will defer further Packs/day: 0.50     Quit date: 2016     Years since quittin.6    Smokeless tobacco: Never Used   Substance Use Topics    Alcohol use: Yes      Social History     Substance and Sexual Activity   Drug Use No     Family History   Problem Relation Age of Onset    Hypertension Father     Prostate Cancer Father     Cancer Father         prostate cancer      Family history reviewed and negative except as noted above. Immunization History   Administered Date(s) Administered    COVID-19, MODERNA BLUE border, Primary or Immunocompromised, (age 12y+), IM, 100 mcg/0.5mL 2021, 2021    Pneumococcal Conjugate 13-valent (Kdawkmt24) 2020    Tdap (Boostrix, Adacel) 2017     Allergies   Allergen Reactions    Turmeric Nausea And Vomiting     Prior to Admit Medications:  Current Outpatient Medications   Medication Instructions    dexamethasone (DECADRON) 4 mg, Oral, 2 TIMES DAILY WITH MEALS    lidocaine-prilocaine (EMLA) 2.5-2.5 % cream Apply a dab over the port site 30-45 minutes prior to lab/infusion appts. Cover with saran wrap or a sandwich bag.  LORazepam (ATIVAN) 1 mg, Oral, 3 TIMES DAILY PRN    naproxen (NAPROSYN) 250 mg, Oral, 2 TIMES DAILY WITH MEALS    omeprazole (PRILOSEC) 40 mg, Oral, DAILY    oxyCODONE HCl (OXY-IR) 10 mg, Oral, EVERY 4 HOURS PRN         Objective:     Patient Vitals for the past 24 hrs:   Temp Pulse Resp BP SpO2   22 1557 98.3 °F (36.8 °C) 95 18 (!) 121/59 97 %       Oxygen Therapy  SpO2: 97 %  Pulse Oximetry Type: Intermittent    Estimated body mass index is 27.8 kg/m² as calculated from the following:    Height as of an earlier encounter on 22: 6' (1.829 m). Weight as of an earlier encounter on 22: 205 lb (93 kg). No intake or output data in the 24 hours ending 22 1716      Physical Exam:    Blood pressure (!) 121/59, pulse 95, temperature 98.3 °F (36.8 °C), temperature source Oral, resp. rate 18, SpO2 97 %.   General:    Pale and weak appearing  Head:  Normocephalic, atraumatic  Eyes:  Sclerae appear normal.  Pupils equally round. ENT:  Nares appear normal, no drainage. Moist oral mucosa  Neck:  No restricted ROM. Trachea midline   CV:   RRR. No m/r/g. No jugular venous distension. Lungs:   CTAB. No wheezing, rhonchi, or rales. Symmetric expansion. Abdomen:   Soft but distended, BS hypoactive, TTP LLQ. Extremities: No cyanosis or clubbing. No edema  Skin:     No rashes and normal coloration. Warm and dry. Neuro:  CN II-XII grossly intact. Sensation intact. A&Ox3  Psych:  Normal mood and affect.       I have reviewed ordered lab tests and independently visualized imaging below:    Last 24hr Labs:  Recent Results (from the past 24 hour(s))   CBC with Auto Differential    Collection Time: 07/08/22 10:38 AM   Result Value Ref Range    WBC 9.9 4.3 - 11.1 K/uL    RBC 1.78 (L) 4.23 - 5.60 M/uL    Hemoglobin 6.3 (LL) 13.6 - 17.2 g/dL    Hematocrit 19.5 (L) 41.1 - 50.3 %    .6 (H) 79.6 - 97.8 FL    MCH 35.4 (H) 26.1 - 32.9 PG    MCHC 32.3 31.4 - 35.0 g/dL    RDW 18.4 (H) 11.9 - 14.6 %    Platelets 21 (LL) 524 - 450 K/uL    MPV 11.0 9.4 - 12.3 FL    nRBC 0.22 (H) 0.0 - 0.2 K/uL    Differential Type AUTOMATED      Seg Neutrophils 82 (H) 43 - 78 %    Lymphocytes 6 (L) 13 - 44 %    Monocytes 11 4.0 - 12.0 %    Eosinophils % 0 (L) 0.5 - 7.8 %    Basophils 0 0.0 - 2.0 %    Immature Granulocytes 2 0.0 - 5.0 %    Segs Absolute 8.1 1.7 - 8.2 K/UL    Absolute Lymph # 0.6 0.5 - 4.6 K/UL    Absolute Mono # 1.1 0.1 - 1.3 K/UL    Absolute Eos # 0.0 0.0 - 0.8 K/UL    Basophils Absolute 0.0 0.0 - 0.2 K/UL    Absolute Immature Granulocyte 0.2 0.0 - 0.5 K/UL   CMP    Collection Time: 07/08/22 10:38 AM   Result Value Ref Range    Sodium 138 136 - 145 mmol/L    Potassium 3.6 3.5 - 5.1 mmol/L    Chloride 98 98 - 107 mmol/L    CO2 23 21 - 32 mmol/L    Anion Gap 17 (H) 7.0 - 16.0 mmol/L    Glucose 110 (H) 65 - 100 mg/dL    BUN 45 (H) 7.0 - 18.0 MG/DL    CREATININE 1.58 (H) 0.8 - 1.5 MG/DL    GFR  57 (L) >60 ml/min/1.73m2    GFR Non- 47 (L) >60 ml/min/1.73m2    Calcium 11.3 (H) 8.3 - 10.4 MG/DL    Total Bilirubin 1.0 0.2 - 1.1 MG/DL     (H) 13.0 - 61.0 U/L     (H) 15 - 37 U/L    Alk Phosphatase 248 (H) 45.0 - 117.0 U/L    Total Protein 6.4 6.4 - 8.2 g/dL    Albumin 4.0 3.2 - 4.6 g/dL    Globulin 2.4 2.3 - 3.5 g/dL    Albumin/Globulin Ratio 1.7         Other Studies:  XR CHEST PORTABLE    Result Date: 7/8/2022  Portable chest x-ray CLINICAL INDICATION: Sternal pain, metastatic cancer FINDINGS: Single AP view of the chest compared to a similar exam dated 9/1/2020 show the lungs to be expanded and clear. The cardiac silhouette and mediastinum are unremarkable. A right-sided chest port is in place. There is no pleural effusion or pneumothorax. No definite bony abnormality. Note the sternum is not able to be evaluated on this single portable frontal projection of the chest.     No acute cardiopulmonary abnormality. Echocardiogram:  No results found for this or any previous visit. Meds previously ordered:  Orders Placed This Encounter   Medications    HYDROmorphone HCl PF (DILAUDID) injection 0.25 mg         Signed:  Ramiro Sanders DO    Part of this note may have been written by using a voice dictation software. The note has been proof read but may still contain some grammatical/other typographical errors.

## 2022-07-09 PROBLEM — R52 UNCONTROLLED PAIN: Status: RESOLVED | Noted: 2020-10-13 | Resolved: 2022-01-01

## 2022-07-09 PROBLEM — C79.51 PAIN DUE TO MALIGNANT NEOPLASM METASTATIC TO BONE (HCC): Chronic | Status: ACTIVE | Noted: 2022-01-01

## 2022-07-09 PROBLEM — G89.3 PAIN DUE TO MALIGNANT NEOPLASM METASTATIC TO BONE (HCC): Chronic | Status: ACTIVE | Noted: 2022-01-01

## 2022-07-09 PROBLEM — E83.42 HYPOMAGNESEMIA: Status: ACTIVE | Noted: 2022-01-01

## 2022-07-09 PROBLEM — N17.9 AKI (ACUTE KIDNEY INJURY) (HCC): Status: ACTIVE | Noted: 2022-01-01

## 2022-07-09 PROBLEM — R97.20 ELEVATED PSA: Status: RESOLVED | Noted: 2017-01-31 | Resolved: 2022-01-01

## 2022-07-09 PROBLEM — R10.2 PELVIC PAIN: Status: RESOLVED | Noted: 2017-01-31 | Resolved: 2022-01-01

## 2022-07-09 PROBLEM — E87.6 HYPOKALEMIA: Status: ACTIVE | Noted: 2020-11-25

## 2022-07-09 PROBLEM — E83.52 HYPERCALCEMIA OF MALIGNANCY: Status: ACTIVE | Noted: 2022-01-01

## 2022-07-09 NOTE — H&P
[employer] [facility] On[facility]        Inpatient Hematology / Oncology History and Physical    Reason for Admission:  Prostate cancer metastatic to bone [facility]) [C61, C79.51]    History of Present Illness:  Mr. Chu Box is a 78 y.o. male admitted on 7/8/2022 with a primary diagnosis of There were no encounter diagnoses. .      Mr. Chu Box has a PMH of metastatic prostate cancer dx 2017 s/p casodex and bilateral orchiectomy for surgical castration, 6 cycles of docetaxel, pathologic vertebral fx's with path positive for high grade neuroendocrine cancer, s/p carbo/etoposide and most recently carbo/taxotere which was held d/t TCP. Last was C2 on 6/8/22. 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 He is a known patient of Dr. Sera Moctezuma s/p multiple lines of treatment as above. At last office visit on 6/29, CEA had notably continued to rise to 8290. He has had discussion with [facility], [facility], however wished to hold off on making a decision until he completed radiation therapy. He presented to the ER with a fall at home, increasing pain in the hips and generalized weakness. Also with constipation. Also found to be anemic and transfused PRBC. We were asked for recommendations for our known patient. Review of Systems:  Constitutional +fatigue, weakness, previous fall   HEENT Denies trauma, blurry vision, hearing loss, ear pain, nosebleeds, sore throat, neck pain and ear discharge. Skin Denies lesions or rashes. Lungs Denies dyspnea, cough, sputum production or hemoptysis. Cardiovascular Denies chest pain, palpitations, or lower extremity edema. Gastrointestinal Denies nausea, vomiting, changes in bowel habits, bloody or black stools, abdominal pain.  Denies dysuria, frequency or hesitancy of urination. Neuro Denies headaches, visual changes or ataxia.  Denies dizziness, tingling, tremors, sensory change, speech change, focal weakness or headaches. Hematology Denies easy bruising or bleeding, denies gingival bleeding or epistaxis. Endo Denies heat/cold intolerance, denies diabetes or thyroid abnormalities. MSK +fall at home, pain in hips     Psychiatric/Behavioral Denies depression and substance abuse. The patient is not nervous/anxious. Allergies   Allergen Reactions    Turmeric Nausea And Vomiting     Past Medical History:   Diagnosis Date    Claustrophobia     Ear problems     Elevated PSA     Former light cigarette smoker (1-9 per day)     smoked for 35 years. quit     History of multiple allergies     Nicotine vapor product user     Personal history of prostate cancer     Prostate cancer ([facility] [address]     prostate, neuroendocrine, mets to bone     Past Surgical History:   Procedure Laterality Date    BIOPSY PROSTATE      COLONOSCOPY  2020    HEENT      teeth removed     IR KYPHOPLASTY LUMBAR FIRST LEVEL  10/14/2020    IR KYPHOPLASTY LUMBAR FIRST LEVEL  10/14/2020    IR KYPHOPLASTY LUMBAR FIRST LEVEL 10/14/2020 [facility]    IR KYPHOPLASTY THORACIC FIRST LEVEL  10/29/2020    IR KYPHOPLASTY THORACIC FIRST LEVEL  10/29/2020    IR KYPHOPLASTY THORACIC FIRST LEVEL 10/29/2020 [facility]    TESTICLE REMOVAL Bilateral 2017     Family History   Problem Relation Age of Onset    Hypertension Father     Prostate Cancer Father     Cancer Father         prostate cancer     Social History     Socioeconomic History    Marital status: Single     Spouse name: Not on file    Number of children: Not on file    Years of education: Not on file    Highest education level: Not on file   Occupational History    Not on file   Tobacco Use    Smoking status: Former Smoker     Packs/day: 0.50     Quit date: 2016     Years since quittin.6    Smokeless tobacco: Never Used   Substance and Sexual Activity    Alcohol use:  Yes    Drug use: No    Sexual activity: Not on file Other Topics Concern    Not on file   Social History Narrative    Not on file     Social Determinants of Health     Financial Resource Strain:     Difficulty of Paying Living Expenses: Not on file   Food Insecurity:     Worried About Running Out of Food in the Last Year: Not on file    [place] of Food in the Last Year: Not on file   Transportation Needs:     Lack of Transportation (Medical): Not on file    Lack of Transportation (Non-Medical):  Not on file   Physical Activity:     Days of Exercise per Week: Not on file    Minutes of Exercise per Session: Not on file   Stress:     Feeling of Stress : Not on file   Social Connections:     Frequency of Communication with Friends and Family: Not on file    Frequency of Social Gatherings with Friends and Family: Not on file    Attends Zoroastrianism Services: Not on file    Active Member of [address] [place] [address] or Organizations: Not on file    Attends Club or Organization Meetings: Not on file    Marital Status: Not on file   Intimate Partner Violence:     Fear of Current or Ex-Partner: Not on file    Emotionally Abused: Not on file    Physically Abused: Not on file    Sexually Abused: Not on file   Housing Stability:     Unable to Pay for Housing in the Last Year: Not on file    Number of [place] in the Last Year: Not on file    Unstable Housing in the Last Year: Not on file     Current Facility-Administered Medications   Medication Dose Route Frequency Provider Last Rate Last Admin    0.9 % sodium chloride infusion   IntraVENous PRN Lukasz Colindres MD        potassium chloride (KLOR-CON M) extended release tablet 40 mEq  40 mEq Oral Q6H Mj Oppenheim, DO   40 mEq at 07/09/22 1331    0.9 % sodium chloride infusion   IntraVENous Continuous [place] Oppen[place],  mL/hr at 07/09/22 0945 New Bag at 07/09/22 0945    HYDROmorphone HCl PF (DILAUDID) injection 0.5 mg  0.5 mg IntraVENous Q4H PRN Mj Oppenheim, DO        LORazepam (ATIVAN) tablet 1 mg  1 mg Oral TID PRN Jacqualine Fuchs, DO   1 mg at 22 1946    pantoprazole (PROTONIX) tablet 40 mg  40 mg Oral QAM AC Kwame PriceSaint John's Hospitalson, DO   40 mg at 22 8575    oxyCODONE (ROXICODONE) immediate release tablet 10 mg  10 mg Oral Q4H PRN Jacqualine Fuchs, DO   10 mg at 22 1129    sodium chloride flush 0.9 % injection 5-40 mL  5-40 mL IntraVENous 2 times per day Jacqualine Fuchs, DO   10 mL at 22 1699    sodium chloride flush 0.9 % injection 5-40 mL  5-40 mL IntraVENous PRN Jacqualine Fuchs, DO        0.9 % sodium chloride infusion   IntraVENous PRN Jacqualine Fuchs, DO        ondansetron (ZOFRAN-ODT) disintegrating tablet 4 mg  4 mg Oral Q8H PRN Jacqualine Fuchs, DO        Or    ondansetron [facility]) injection 4 mg  4 mg IntraVENous Q6H PRN Jacqualine Fuchs, DO        acetaminophen (TYLENOL) tablet 650 mg  650 mg Oral Q6H PRN Jacqualine Fuchs, DO        Or    acetaminophen (TYLENOL) suppository 650 mg  650 mg Rectal Q6H PRN Jacqualine Fuchs, DO        docusate sodium (COLACE) capsule 100 mg  100 mg Oral BID PRN Jacqualine Fuchs, DO        polyethylene glycol (GLYCOLAX) packet 17 g  17 g Oral Daily Jacqualine Fuchs, DO   17 g at 22 1763    lactulose (CHRONULAC) 10 GM/15ML solution 20 g  20 g Oral Once Jacqualine Fuchs, DO           OBJECTIVE:  Patient Vitals for the past 8 hrs:   BP Temp Temp src Pulse Resp SpO2   22 1335 (!) 143/82 98.8 °F (37.1 °C) Oral 79 20 94 %   22 1151 (!) 140/70 100.1 °F (37.8 °C) Oral 83 16 93 %   22 1126 112/64 98.8 °F (37.1 °C) Oral 80 16 94 %   22 1059 111/65 98.9 °F (37.2 °C) Oral 77 18 (!) 88 %   22 0957 -- -- -- -- 19 --   2213 109/66 98.4 °F (36.9 °C) Oral 80 18 91 %     Temp (24hrs), Av.8 °F (37.1 °C), Min:98.3 °F (36.8 °C), Max:100.1 °F (37.8 °C)    701 -  1900  In: 360 [P.O.:360]  Out: -     Physical Exam:  Constitutional: Ill appearing male in no acute distress, sitting comfortably in the hospital bed. HEENT: Normocephalic and atraumatic. Oropharynx is clear, mucous membranes are moist.  Neck supple. Lymph node   Deferred. Skin Warm and dry. No bruising and no rash noted. No erythema. No pallor. Respiratory Lungs are clear to auscultation bilaterally without wheezes, rales or rhonchi, normal air exchange without accessory muscle use. CVS Normal rate, regular rhythm and normal S1 and S2. No murmurs, gallops, or rubs. Abdomen Soft, nontender and nondistended, normoactive bowel sounds. No palpable mass. No hepatosplenomegaly. Neuro Grossly nonfocal with no obvious sensory or motor deficits. MSK Normal range of motion in general.  No edema and no tenderness. Psych Calm.          Labs:    Recent Results (from the past 24 hour(s))   TYPE AND SCREEN    Collection Time: 07/08/22  6:22 PM   Result Value Ref Range    Crossmatch expiration date 07/11/2022,2359     ABO/Rh A POSITIVE     Antibody Screen NEG     Blood Bank Comment       CMV NEG AND IRRADIATED ADDED IN 7/9 PREPARE RED CELL ORDER    Unit Number [id number]     Product Code Blood Bank  LR     Unit Divison 00     Dispense Status Blood Bank TRANSFUSED     Crossmatch Result Compatible     Unit Number [id number]     Product Code Blood Bank  LR     Unit Divison 00     Dispense Status Blood Bank REL FROM [facility]     Crossmatch Result Compatible     Unit Number [id number]     [facility]     Unit Divison 00     Dispense Status Blood Bank ISSUED     Crossmatch Result Compatible    PREPARE RBC (CROSSMATCH), 1 Units    Collection Time: 07/08/22  9:15 PM   Result Value Ref Range    History Check Historical check performed    Comprehensive Metabolic Panel w/ Reflex to MG    Collection Time: 07/09/22  3:21 AM   Result Value Ref Range    Sodium 136 (L) 138 - 145 mmol/L    Potassium 3.1 (L) 3.5 - 5.1 mmol/L    Chloride 103 98 - 107 mmol/L    CO2 26 21 - 32 mmol/L    Anion Gap 7 7 - 16 mmol/L    Glucose 104 (H) 65 - 100 mg/dL    BUN 46 (H) 8 - 23 MG/DL    CREATININE 1.50 0.8 - 1.5 MG/DL    GFR African American >60 >60 ml/min/1.73m2    GFR Non- 50 (L) >60 ml/min/1.73m2    Calcium 10.7 (H) 8.3 - 10.4 MG/DL    Total Bilirubin 1.4 (H) 0.2 - 1.1 MG/DL    ALT 93 (H) 12 - 65 U/L     (H) 15 - 37 U/L    Alk Phosphatase 209 (H) 50 - 136 U/L    Total Protein 6.0 (L) 6.3 - 8.2 g/dL    Albumin 3.1 (L) 3.2 - 4.6 g/dL    Globulin 2.9 2.3 - 3.5 g/dL    Albumin/Globulin Ratio 1.1 (L) 1.2 - 3.5     CBC with Auto Differential    Collection Time: 07/09/22  3:21 AM   Result Value Ref Range    WBC 7.6 4.3 - 11.1 K/uL    RBC 1.86 (L) 4.23 - 5.6 M/uL    Hemoglobin 6.4 (LL) 13.6 - 17.2 g/dL    Hematocrit 19.6 (L) 41.1 - 50.3 %    .4 (H) 79.6 - 97.8 FL    MCH 34.4 (H) 26.1 - 32.9 PG    MCHC 32.7 31.4 - 35.0 g/dL    RDW 19.1 (H) 11.9 - 14.6 %    Platelets 21 (LL) 046 - 450 K/uL    MPV 10.7 9.4 - 12.3 FL    nRBC 0.09 0.0 - 0.2 K/uL    Seg Neutrophils 80 (H) 43 - 78 %    Lymphocytes 8 (L) 13 - 44 %    Monocytes 11 4.0 - 12.0 %    Eosinophils % 0 (L) 0.5 - 7.8 %    Basophils 0 0.0 - 2.0 %    Immature Granulocytes 1 0.0 - 5.0 %    Segs Absolute 6.1 1.7 - 8.2 K/UL    Absolute Lymph # 0.6 0.5 - 4.6 K/UL    Absolute Mono # 0.8 0.1 - 1.3 K/UL    Absolute Eos # 0.0 0.0 - 0.8 K/UL    Basophils Absolute 0.0 0.0 - 0.2 K/UL    Absolute Immature Granulocyte 0.1 0.0 - 0.5 K/UL    Differential Type AUTOMATED     Magnesium    Collection Time: 07/09/22  3:21 AM   Result Value Ref Range    Magnesium 1.2 (L) 1.8 - 2.4 mg/dL   Uric Acid    Collection Time: 07/09/22  3:21 AM   Result Value Ref Range    Uric Acid 12.9 (H) 2.6 - 6.0 MG/DL   Lactate Dehydrogenase    Collection Time: 07/09/22  3:21 AM   Result Value Ref Range    LD 2,276 (H) 110 - 210 U/L   PREPARE RBC (CROSSMATCH), 1 Units    Collection Time: 07/09/22  6:15 AM   Result Value Ref Range    History Check Historical check performed        Imaging:  N/A    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)    Hypokalemia    Gynecomastia, male    Neuroendocrine carcinoma, high grade (HCC)    Anemia due to chemotherapy    Primary prostate cancer with metastasis from prostate to other site [facility])    Prostatic nodule    Family history of prostate cancer in father    Acute prostatitis    S/P TURP    Prostate cancer ([facility] [address]    Prostate cancer metastatic to bone ([facility] [address]    Thrombocytopenia (HCC)    Hypomagnesemia    Hypercalcemia of malignancy    Pain due to malignant neoplasm metastatic to bone ([facility] [address]    EDGARD (acute kidney injury) ([facility] [address]       PLAN:  Metastatic Prostate Cancer/Neuroendocrine  - S/p multiple lines of therapy, most recently carbo/taxotere C2 on 6/8. Held on 6/29 d/t TCP. Undergoing XRT to rib and T-spine with Dr. Be Martin.  - Has spoken to [facility], however wished to hold off on making a decision until he saw if XRT improved his pain  - Can resume XRT on Monday    Cancer Related Pain  - Currently with Dilaudid/oxy prn  - Consult palliative care to assist with pain management (known from office) and also to assist with goals of care discussions     Anemia/Thrombocytopenia  - Transfuse for Hgb < 7 or Plts < 10 (with no active bleeding)    Elevated Tranaminases  - Alk phos/ALT/AST trended down today, continue to follow     EDGARD  - Gentle hydration    Hypomag  - Replete as needed     Constipation  - Lactulose prn     Lab studies were personally reviewed. Pertinent old records were reviewed. Thank you for allowing us to participate in the care of Mr. Yoshi Manzo. We will take over care of our known patient. HELEN Guidry - NP   [facility]ematology [facility] On[facility]  [address] [address]  Office : [phone]  Fax : [phone]       Attending Addendum:  I have personally performed a face to face diagnostic evaluation on this patient.  I have reviewed and agree with the care plan as documented by Ghazala Burks N.P. My findings are as follows:  He has metastatic prostate cancer and pain, appears weak, heart rate regular without murmurs, abdomen is non-tender, bowel sounds are positive, we will transfer him to our service and and arrange for him to receive RT.               Isabella Chappell MD    [facility]  [id number] [place] [address]  Office : [phone]  Fax : [phone]

## 2022-07-09 NOTE — PROGRESS NOTES
Body Wt: 192 lb 14.4 oz (87.5 kg), Weight source: Standing Scale  BMI: 27.7     Ideal Body Weight (Kg) (Calculated): 75 kg (166 lbs), 116.2 %  Usual Body Wt: 205 lb (93 kg), Percent weight change: -5.9       Edema:    BMI Category Overweight (BMI 25.0-29. 9)  Estimated Daily Nutrient Needs:  Energy (kcal/day): 4111-4754 (Kcal/kg Weight used: 87.5 kg Current  Protein (g/day):  Weight Used: (Current)    Fluid (ml/day):   (1 ml/kcal)    Nutrition Diagnosis:   · Unintended weight loss related to catabolic illness,inadequate protein-energy intake as evidenced by weight loss greater than or equal to 5% in 1 month    Nutrition Interventions:   Food and/or Nutrient Delivery: Continue Current Diet,Continue Oral Nutrition Supplement    Goals: Active Goal: PO intake 75% or greater,by next RD assessment     Nutrition Monitoring and Evaluation:   PO intake, and body weight. Discharge Planning:     Too soon to determine    Lisa Castillo MS RD LD, [facility]

## 2022-07-09 NOTE — PROGRESS NOTES
EOS note:    Uncompliant & uncooperative;refused KUB;laxatives ordered. Does not call for assist;unsteady/drowsy after pain meds;prn Ativan. Still w/ hematuria. 1 unit PRBC transfused;hgb 6.3. Tolerated well. Son at [facility]. Jennifer Gill has been drowsy but easily arousable after pain meds;did   help w/ pain  per pt. This AM hgb 6.4;plt ct 21;remains w/ hematuria; hospitalist notified; for 1 unit PRBC.     Oncology consult this AM.

## 2022-07-09 NOTE — PROGRESS NOTES
RN messaged patient would like to talk about comfort care tomorrow after talking more with family tonight, I called and clarified that he is not ready at this instant to change to comfort care but would like to have these conversations tomorrow once continues to discuss with family  Mini Curry MD

## 2022-07-09 NOTE — PROGRESS NOTES
Hospitalist Progress Note   Admit Date:  2022  3:53 PM   Name:  Verenice Tomlinson   Age:  78 y.o. Sex:  male  :  1955   MRN:  [id number]   Room:  [facility]/    Presenting Complaint: No chief complaint on file. Reason(s) for Admission: Prostate cancer metastatic to bone [facility]) [place], [id number]]     Hospital Course & Interval History:   Verenice Tomlinson is a 78 y.o. male with medical history of metastatic prostate cancer diagnosed in 2017 s/p casodex and  bilateral orchiectomy for surgical castration, 6 cycles of docetaxel, pathologic vertebral fx's with path positive for high grade neuroendocrine cancer, s/p carbo/etoposide and most recently Taxoterence/carbo which is on hold for thrombocytopenia. Also with T8  Epidural extension w/o cord compression currently undergoing RTX to t spine and ribs. [facility]     Patient had fall overnight and reporting increasing pain, generalized weakness. Patient reports pain worse in the hips bilaterally L>R. No numbness/tingling or weakness of LE's. Recent hematuria now diminished, no melena, hematochezia.      Admits to problematic constipation - no relief with use of mag citrate or lactulose at home. Last stool >5 days ago. Intermittent nausea, no vomiting.      ER workup notable for Cr 1.58 (up from 1.4 on ), ALT 11,  (up from 167/89 on ), Hgb of 6.3 (down from 8.7 on ), plts 21K  CXR unremarkable     Oncology called and needs Hospitalist to admit for pain mgmt, anemia. Subjective/24hr Events (22):   Pain much this AM. Says he was irritable yesterday due to severe pain but now its controlled. Feels better with transfusion. Assessment & Plan:       Prostate cancer metastatic to bone   Chemo on hold d/t thrombocytopenia and while radiation is completed. Oncology consulted. Follow up recommendations     Anemia - s/p 1 U prbc yesterday. Additional unit ordered ON.  Add LDH, haptoglobin and uric acid to AM labs. Rule out hemolysis or tumor lysis. Thrombocytopenia - has been as low as 17K. PLT 21 K on 7/9, no overt bleeding. Plan: monitor cbc       Neuroendocrine carcinoma, high grade (HCC)  Plan: adds to complexity       Anemia due to chemotherapy  Plan: hgb 6.4 s/p 1 U pRBC,        Uncontrolled cancer pain  Plan: optimize regimen. Improving. Hypercalcemia - start NS IVF. Hypokalemia // hypomagnsemia - K 3.1, mag 1.2 ; replete and monitor     Elevated LFTs - some improved. Tbili up from 1 to 1.4. EDGARD - likely pra/atn. Improving with supportive care       Discharge Planning:      Not stable     Diet:  ADULT DIET; Regular  ADULT ORAL NUTRITION SUPPLEMENT; Breakfast, Lunch, Dinner; Standard High Calorie/High Protein Oral Supplement  DVT PPx: SCDs  Code status: Full Code    Hospital Problems:  Principal Problem:    Prostate cancer metastatic to bone [facility])  Active Problems:     Thrombocytopenia (HCC)    Hypomagnesemia    Hypercalcemia of malignancy    Pain due to malignant neoplasm metastatic to bone (HCC)    EDGARD (acute kidney injury) ([facility] [address]    Hypokalemia    Neuroendocrine carcinoma, high grade (HCC)    Anemia due to chemotherapy    Primary prostate cancer with metastasis from prostate to other site [facility])  Resolved Problems:    Uncontrolled pain      Objective:     Patient Vitals for the past 24 hrs:   Temp Pulse Resp BP SpO2   07/09/22 1151 100.1 °F (37.8 °C) 83 16 (!) 140/70 93 %   07/09/22 1126 98.8 °F (37.1 °C) 80 16 112/64 94 %   07/09/22 1059 98.9 °F (37.2 °C) 77 18 111/65 (!) 88 %   07/09/22 0957 -- -- 19 -- --   07/09/22 0713 98.4 °F (36.9 °C) 80 18 109/66 91 %   07/09/22 0339 98.8 °F (37.1 °C) 89 21 (!) 146/72 96 %   07/09/22 0140 98.3 °F (36.8 °C) 83 20 (!) 145/73 93 %   07/09/22 0041 99.2 °F (37.3 °C) 83 18 117/62 92 %   07/08/22 2356 98.4 °F (36.9 °C) 81 20 126/65 92 %   07/08/22 2321 98.9 °F (37.2 °C) 84 16 121/66 90 %   07/08/22 2208 -- -- 18 -- --   07/08/22 1957 98.4 °F (36.9 °C) 86 19 127/67 93 %   07/08/22 1557 98.3 °F (36.8 °C) 95 18 (!) 121/59 97 %       Oxygen Therapy  SpO2: 93 %  Pulse Oximetry Type: Intermittent  O2 Device: None (Room air)    Estimated body mass index is 27.66 kg/m² as calculated from the following:    Height as of this encounter: 5' 10\" (1.778 m). Weight as of this encounter: 192 lb 12.8 oz (87.5 kg). Intake/Output Summary (Last 24 hours) at 7/9/2022 1314  Last data filed at 7/9/2022 1110  Gross per 24 hour   Intake 720 ml   Output 650 ml   Net 70 ml         Physical Exam:     Blood pressure (!) 140/70, pulse 83, temperature 100.1 °F (37.8 °C), temperature source Oral, resp. rate 16, height 5' 10\" (1.778 m), weight 192 lb 12.8 oz (87.5 kg), SpO2 93 %. General:    Chronically ill appearing. Pale. nad   Head:  Normocephalic, atraumatic  Eyes:  Sclerae appear normal.  Pupils equally round. ENT:  Nares appear normal, no drainage. Moist oral mucosa  Neck:  Trachea midline   CV:   RRR. No m/r/g. No jugular venous distension. Lungs:   CTAB. No wheezing, rhonchi, or rales. Symmetric expansion. Abdomen: Bowel sounds present. Soft, nontender, nondistended. Extremities: No cyanosis or clubbing. No edema  Skin:     Diffusely pale. Warm and dry. Neuro:  CN II-XII grossly intact. Sensation intact. No focal deficits   Psych:  Normal mood and affect.       I have reviewed ordered lab tests and independently visualized imaging below:    Recent Labs:  Recent Results (from the past 48 hour(s))   CBC with Auto Differential    Collection Time: 07/08/22 10:38 AM   Result Value Ref Range    WBC 9.9 4.3 - 11.1 K/uL    RBC 1.78 (L) 4.23 - 5.60 M/uL    Hemoglobin 6.3 (LL) 13.6 - 17.2 g/dL    Hematocrit 19.5 (L) 41.1 - 50.3 %    .6 (H) 79.6 - 97.8 FL    MCH 35.4 (H) 26.1 - 32.9 PG    MCHC 32.3 31.4 - 35.0 g/dL    RDW 18.4 (H) 11.9 - 14.6 %    Platelets 21 (LL) 304 - 450 K/uL    MPV 11.0 9.4 - 12.3 FL    nRBC 0.22 (H) 0.0 - 0.2 K/uL    Differential RBC (CROSSMATCH), 1 Units    Collection Time: 07/08/22  9:15 PM   Result Value Ref Range    History Check Historical check performed    Comprehensive Metabolic Panel w/ Reflex to MG    Collection Time: 07/09/22  3:21 AM   Result Value Ref Range    Sodium 136 (L) 138 - 145 mmol/L    Potassium 3.1 (L) 3.5 - 5.1 mmol/L    Chloride 103 98 - 107 mmol/L    CO2 26 21 - 32 mmol/L    Anion Gap 7 7 - 16 mmol/L    Glucose 104 (H) 65 - 100 mg/dL    BUN 46 (H) 8 - 23 MG/DL    CREATININE 1.50 0.8 - 1.5 MG/DL    GFR African American >60 >60 ml/min/1.73m2    GFR Non- 50 (L) >60 ml/min/1.73m2    Calcium 10.7 (H) 8.3 - 10.4 MG/DL    Total Bilirubin 1.4 (H) 0.2 - 1.1 MG/DL    ALT 93 (H) 12 - 65 U/L     (H) 15 - 37 U/L    Alk Phosphatase 209 (H) 50 - 136 U/L    Total Protein 6.0 (L) 6.3 - 8.2 g/dL    Albumin 3.1 (L) 3.2 - 4.6 g/dL    Globulin 2.9 2.3 - 3.5 g/dL    Albumin/Globulin Ratio 1.1 (L) 1.2 - 3.5     CBC with Auto Differential    Collection Time: 07/09/22  3:21 AM   Result Value Ref Range    WBC 7.6 4.3 - 11.1 K/uL    RBC 1.86 (L) 4.23 - 5.6 M/uL    Hemoglobin 6.4 (LL) 13.6 - 17.2 g/dL    Hematocrit 19.6 (L) 41.1 - 50.3 %    .4 (H) 79.6 - 97.8 FL    MCH 34.4 (H) 26.1 - 32.9 PG    MCHC 32.7 31.4 - 35.0 g/dL    RDW 19.1 (H) 11.9 - 14.6 %    Platelets 21 (LL) 922 - 450 K/uL    MPV 10.7 9.4 - 12.3 FL    nRBC 0.09 0.0 - 0.2 K/uL    Seg Neutrophils 80 (H) 43 - 78 %    Lymphocytes 8 (L) 13 - 44 %    Monocytes 11 4.0 - 12.0 %    Eosinophils % 0 (L) 0.5 - 7.8 %    Basophils 0 0.0 - 2.0 %    Immature Granulocytes 1 0.0 - 5.0 %    Segs Absolute 6.1 1.7 - 8.2 K/UL    Absolute Lymph # 0.6 0.5 - 4.6 K/UL    Absolute Mono # 0.8 0.1 - 1.3 K/UL    Absolute Eos # 0.0 0.0 - 0.8 K/UL    Basophils Absolute 0.0 0.0 - 0.2 K/UL    Absolute Immature Granulocyte 0.1 0.0 - 0.5 K/UL    Differential Type AUTOMATED     Magnesium    Collection Time: 07/09/22  3:21 AM   Result Value Ref Range    Magnesium 1.2 (L) 1.8 - 2.4 mg/dL Uric Acid    Collection Time: 07/09/22  3:21 AM   Result Value Ref Range    Uric Acid 12.9 (H) 2.6 - 6.0 MG/DL   Lactate Dehydrogenase    Collection Time: 07/09/22  3:21 AM   Result Value Ref Range    LD 2,276 (H) 110 - 210 U/L   PREPARE RBC (CROSSMATCH), 1 Units    Collection Time: 07/09/22  6:15 AM   Result Value Ref Range    History Check Historical check performed        Other Studies:  XR ABDOMEN (KUB) (SINGLE AP VIEW)    (Results Pending)       Current Meds:  Current Facility-Administered Medications   Medication Dose Route Frequency    0.9 % sodium chloride infusion   IntraVENous PRN    magnesium sulfate 4000 mg in 100 mL IVPB premix  4,000 mg IntraVENous Once    potassium chloride (KLOR-CON M) extended release tablet 40 mEq  40 mEq Oral Q6H    0.9 % sodium chloride infusion   IntraVENous Continuous    HYDROmorphone HCl PF (DILAUDID) injection 0.5 mg  0.5 mg IntraVENous Q4H PRN    LORazepam (ATIVAN) tablet 1 mg  1 mg Oral TID PRN    pantoprazole (PROTONIX) tablet 40 mg  40 mg Oral QAM AC    oxyCODONE (ROXICODONE) immediate release tablet 10 mg  10 mg Oral Q4H PRN    sodium chloride flush 0.9 % injection 5-40 mL  5-40 mL IntraVENous 2 times per day    sodium chloride flush 0.9 % injection 5-40 mL  5-40 mL IntraVENous PRN    0.9 % sodium chloride infusion   IntraVENous PRN    ondansetron (ZOFRAN-ODT) disintegrating tablet 4 mg  4 mg Oral Q8H PRN    Or    ondansetron (ZOFRAN) injection 4 mg  4 mg IntraVENous Q6H PRN    acetaminophen (TYLENOL) tablet 650 mg  650 mg Oral Q6H PRN    Or    acetaminophen (TYLENOL) suppository 650 mg  650 mg Rectal Q6H PRN    docusate sodium (COLACE) capsule 100 mg  100 mg Oral BID PRN    polyethylene glycol (GLYCOLAX) packet 17 g  17 g Oral Daily    lactulose (CHRONULAC) 10 GM/15ML solution 20 g  20 g Oral Once       Signed:  Paco Saunders DO, DO    Part of this note may have been written by using a voice dictation software.   The note has been proof read but may still contain some grammatical/other typographical errors.

## 2022-07-10 NOTE — CONSULTS
Please see H&P dated 7/9/22.               NEELIMA Vuong-MICHELE  3 [facility] Hematology and Oncology  [address] [place] [address], [address] [place] [address]  Office : [phone]  Fax : [phone]

## 2022-07-10 NOTE — PROGRESS NOTES
Call to pt and son per request to talk about their wishes moving forward. Pt does not wish to have any further radiation treatments as he does not feel it will help his pain. He wishes to discuss [address] further with palliative care tomorrow (consult previously placed). I did discuss whether he would desire any heroic life saving measures in the event of cardiac/respiratory arrest.  The pt expressed strongly that he would not want to have CPR/compressions or be intubated/placed on ventilator. I have updated his code status as DNR. He is in agreement to further discuss his wishes with [facility] tomorrow as above. All questions answered to the best of my ability.           JERMAN Grimaldo  [facility] Hematology and Oncology  [address] [place] [address]  Office : [phone]  Fax : [phone]

## 2022-07-10 NOTE — PROGRESS NOTES
Oncology took over care of patient yesterday. Hopsitalist will sign off at this time. Please call us with any questions.

## 2022-07-10 NOTE — PROGRESS NOTES
Patient spiked temp of 100.8 and sats declined into 80s. Patient placed back on 2 L NC and on-call MD notified. Monitoring 02. Patient also complained of itching throughout the night and was ordered prn benadryl for itching. Patient still receiving dilaudid and oxycodone for pain management. Patient is still refusing continuous IV fluids.

## 2022-07-10 NOTE — PROGRESS NOTES
education: Not on file    Highest education level: Not on file   Occupational History    Not on file   Tobacco Use    Smoking status: Former Smoker     Packs/day: 0.50     Quit date: 2016     Years since quittin.6    Smokeless tobacco: Never Used   Substance and Sexual Activity    Alcohol use: Yes    Drug use: No    Sexual activity: Not on file   Other Topics Concern    Not on file   Social History Narrative    Not on file     Social Determinants of Health     Financial Resource Strain:     Difficulty of Paying Living Expenses: Not on file   Food Insecurity:     Worried About Running Out of Food in the Last Year: Not on file    [place] of Food in the Last Year: Not on file   Transportation Needs:     Lack of Transportation (Medical): Not on file    Lack of Transportation (Non-Medical):  Not on file   Physical Activity:     Days of Exercise per Week: Not on file    Minutes of Exercise per Session: Not on file   Stress:     Feeling of Stress : Not on file   Social Connections:     Frequency of Communication with Friends and Family: Not on file    Frequency of Social Gatherings with Friends and Family: Not on file    Attends Bahai Services: Not on file    Active Member of [address] [place] [address] [employer] or Organizations: Not on file    Attends Club or Organization Meetings: Not on file    Marital Status: Not on file   Intimate Partner Violence:     Fear of Current or Ex-Partner: Not on file    Emotionally Abused: Not on file    Physically Abused: Not on file    Sexually Abused: Not on file   Housing Stability:     Unable to Pay for Housing in the Last Year: Not on file    Number of [place] in the Last Year: Not on file    Unstable Housing in the Last Year: Not on file     Current Facility-Administered Medications   Medication Dose Route Frequency Provider Last Rate Last Admin    diphenhydrAMINE (BENADRYL) capsule 25 mg  25 mg Oral Q6H PRN Mady Coleman MD   25 mg at 07/10/22 0215    HYDROmorphone HCl PF (DILAUDID) injection 1 mg  1 mg IntraVENous Q4H PRN HELEN Wilburn NP        0.9 % sodium chloride infusion   IntraVENous PRN Carmelo Bates MD        0.9 % sodium chloride infusion   IntraVENous Continuous Berthafeliz Bermudez,  mL/hr at 22 0945 New Bag at 22 0945    LORazepam (ATIVAN) tablet 1 mg  1 mg Oral TID PRN Taryn Miu, DO   1 mg at 22 1946    pantoprazole (PROTONIX) tablet 40 mg  40 mg Oral QAM AC Esme LeighOhioHealth Grove City Methodist Hospitalson, DO   40 mg at 07/10/22 0753    oxyCODONE (ROXICODONE) immediate release tablet 10 mg  10 mg Oral Q4H PRN Taryn Miu, DO   10 mg at 07/10/22 3666    sodium chloride flush 0.9 % injection 5-40 mL  5-40 mL IntraVENous 2 times per day Taryn Miu, DO   10 mL at 07/10/22 7457    sodium chloride flush 0.9 % injection 5-40 mL  5-40 mL IntraVENous PRN Taryn Miu, DO        0.9 % sodium chloride infusion   IntraVENous PRN Taryn Miu, DO        ondansetron (ZOFRAN-ODT) disintegrating tablet 4 mg  4 mg Oral Q8H PRN Taryn Miu, DO        Or    ondansetron [facility]) injection 4 mg  4 mg IntraVENous Q6H PRN Taryn Miu, DO        acetaminophen (TYLENOL) tablet 650 mg  650 mg Oral Q6H PRN Taryn Miu, DO   650 mg at 22 2239    Or    acetaminophen (TYLENOL) suppository 650 mg  650 mg Rectal Q6H PRN Taryn Miu, DO        docusate sodium (COLACE) capsule 100 mg  100 mg Oral BID PRN Taryn Miu, DO        polyethylene glycol (GLYCOLAX) packet 17 g  17 g Oral Daily Taryn Miu, DO   17 g at 07/10/22 0753    lactulose (CHRONULAC) 10 GM/15ML solution 20 g  20 g Oral Once Taryn Miu, DO           OBJECTIVE:  Patient Vitals for the past 8 hrs:   BP Temp Temp src Pulse Resp SpO2   07/10/22 0738 132/74 98.8 °F (37.1 °C) Oral 77 18 92 %   07/10/22 0325 129/73 98.9 °F (37.2 °C) Oral 65 12 94 %   07/10/22 0130 -- 98.5 °F (36.9 °C) Oral -- -- 96 %     Temp (24hrs), Av.3 °F (37.4 °C), Min:98.5 °F (36.9 °C), Max:100.8 °F (38.2 °C)    No intake/output data recorded. Physical Exam:  Constitutional: Ill appearing male in no acute distress, sitting comfortably in the hospital bed. HEENT: Normocephalic and atraumatic. Oropharynx is clear, mucous membranes are moist.  Neck supple. Lymph node   Deferred. Skin Warm and dry. No bruising and no rash noted. No erythema. No pallor. Respiratory Lungs are clear to auscultation bilaterally without wheezes, rales or rhonchi, normal air exchange without accessory muscle use. CVS Normal rate, regular rhythm and normal S1 and S2. No murmurs, gallops, or rubs. Abdomen Soft, nontender and nondistended, normoactive bowel sounds. No palpable mass. No hepatosplenomegaly. Neuro Grossly nonfocal with no obvious sensory or motor deficits. MSK Normal range of motion in general.  No edema and no tenderness. Psych Appropriate mood and affect.         Labs:    Recent Results (from the past 24 hour(s))   Hemoglobin and Hematocrit    Collection Time: 07/09/22  3:39 PM   Result Value Ref Range    Hemoglobin 7.9 (L) 13.6 - 17.2 g/dL    Hematocrit 23.4 (L) 41.1 - 50.3 %   CBC with Auto Differential    Collection Time: 07/10/22  2:14 AM   Result Value Ref Range    WBC 8.2 4.3 - 11.1 K/uL    RBC 2.15 (L) 4.23 - 5.6 M/uL    Hemoglobin 7.2 (L) 13.6 - 17.2 g/dL    Hematocrit 21.9 (L) 41.1 - 50.3 %    .9 (H) 79.6 - 97.8 FL    MCH 33.5 (H) 26.1 - 32.9 PG    MCHC 32.9 31.4 - 35.0 g/dL    RDW 21.2 (H) 11.9 - 14.6 %    Platelets 28 (LL) 579 - 450 K/uL    MPV 11.1 9.4 - 12.3 FL    nRBC 0.07 0.0 - 0.2 K/uL    Differential Type AUTOMATED      Seg Neutrophils 76 43 - 78 %    Lymphocytes 10 (L) 13 - 44 %    Monocytes 11 4.0 - 12.0 %    Eosinophils % 1 0.5 - 7.8 %    Basophils 0 0.0 - 2.0 %    Immature Granulocytes 1 0.0 - 5.0 %    Segs Absolute 6.3 1.7 - 8.2 K/UL    Absolute Lymph # 0.8 0.5 - 4.6 K/UL    Absolute Mono # 0.9 0.1 - 1.3 K/UL    Absolute Eos # 0.1 0.0 - 0.8 K/UL    Basophils Absolute 0.0 0.0 - 0.2 K/UL    Absolute Immature Granulocyte 0.1 0.0 - 0.5 K/UL   Magnesium    Collection Time: 07/10/22  2:14 AM   Result Value Ref Range    Magnesium 2.1 1.8 - 2.4 mg/dL   Comprehensive Metabolic Panel    Collection Time: 07/10/22  2:14 AM   Result Value Ref Range    Sodium 134 (L) 136 - 145 mmol/L    Potassium 4.2 3.5 - 5.1 mmol/L    Chloride 108 (H) 98 - 107 mmol/L    CO2 24 21 - 32 mmol/L    Anion Gap 2 (L) 7 - 16 mmol/L    Glucose 112 (H) 65 - 100 mg/dL    BUN 38 (H) 8 - 23 MG/DL    CREATININE 1.40 0.8 - 1.5 MG/DL    GFR African American >60 >60 ml/min/1.73m2    GFR Non- 54 (L) >60 ml/min/1.73m2    Calcium 10.3 8.3 - 10.4 MG/DL    Total Bilirubin 1.2 (H) 0.2 - 1.1 MG/DL    ALT 84 (H) 12 - 65 U/L     (H) 15 - 37 U/L    Alk Phosphatase 225 (H) 50 - 136 U/L    Total Protein 5.9 (L) 6.3 - 8.2 g/dL    Albumin 2.8 (L) 3.2 - 4.6 g/dL    Globulin 3.1 2.3 - 3.5 g/dL    Albumin/Globulin Ratio 0.9 (L) 1.2 - 3.5     Phosphorus    Collection Time: 07/10/22  2:14 AM   Result Value Ref Range    Phosphorus 2.6 2.3 - 3.7 MG/DL       Imaging:  N/A    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)    Hypokalemia    Gynecomastia, male    Neuroendocrine carcinoma, high grade (HCC)    Anemia due to chemotherapy    Primary prostate cancer with metastasis from prostate to other site [facility])    Prostatic nodule    Family history of prostate cancer in father    Acute prostatitis    S/P TURP    [facility]state cancer ([facility] [address]    Prostate cancer metastatic to bone (HCC)    Thrombocytopenia (HCC)    Hypomagnesemia    Hypercalcemia of malignancy    Pain due to malignant neoplasm metastatic to bone ([facility] [address]    EDGARD (acute kidney injury) [facility])     Mr. Herberth Kasper is a 78 y.o. male admitted on 7/8/2022 with a primary diagnosis of There were no encounter diagnoses. .       Mr. Herberth Kasper has a PMH of metastatic prostate cancer dx 2017 s/p casodex and bilateral orchiectomy for surgical castration, 6 cycles of docetaxel, pathologic vertebral fx's with path positive for high grade neuroendocrine cancer, s/p carbo/etoposide and most recently carbo/taxotere which was held d/t TCP. Last was C2 on 6/8/22. 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 He is a known patient of Dr. Elzbieta Chandler s/p multiple lines of treatment as above. At last office visit on 6/29, CEA had notably continued to rise to 8290. He has had discussion with [facility], [facility], however wished to hold off on making a decision until he completed radiation therapy. He presented to the ER with a fall at home, increasing pain in the hips and generalized weakness. Also with constipation. Also found to be anemic and transfused PRBC. We were asked for recommendations for our known patient. PLAN:  Metastatic Prostate Cancer/Neuroendocrine  - S/p multiple lines of therapy, most recently carbo/taxotere C2 on 6/8. Held on 6/29 d/t TCP. Undergoing XRT to rib and T-spine with Dr. Gem Fung.  - Has spoken to [facility], however wished to hold off on making a decision until he saw if XRT improved his pain  - Can resume XRT on Monday     Cancer Related Pain  - Currently with Dilaudid/oxy prn  - Consult palliative care to assist with pain management (known from office) and also to assist with goals of care discussions   7/10 Increased Dilaudid to 1 mg every 3 hours for severe pain. Pall care consult pending.       Anemia/Thrombocytopenia  - Transfuse for Hgb < 7 or Plts < 10 (with no active bleeding)     Elevated Tranaminases  - Alk phos/ALT/AST trended down today, continue to follow   7/10 AST/ALT continue to improve      EDGARD  - Gentle hydration     Hypomag  - Replete as needed      Constipation  - Lactulose prn     Discharge Plan:  Pending clinical course and discussions of Suma Breaux.        Goals and plan of care reviewed with the patient. All questions answered to the best of our ability. HELEN Reyes NP   [facility] Hematology & Oncology  [address] [place] [address]  Office : [phone]  Fax : [phone]         Attending Addendum:  I have personally performed a face to face diagnostic evaluation on this patient. I have reviewed and agree with the care plan as documented by PHILIPP Reyes.[email or web address]. My findings are as follows:  He has prostate cancer and pain, appears weak, heart rate regular without murmurs, abdomen is non-tender, bowel sounds are positive, we will adjust his pain meds, he wants to be evaluated by Palliative Medicine.               Earnstine Severs, MD    [facility] Hematology/Oncology  [address] [place] [address]  Office : [phone]  Fax : [phone]

## 2022-07-11 NOTE — CONSULTS
Palliative Care    Patient: Carmen Branch MRN: [id number]  SSN: [id number]    YOB: 1955  Age: 78 y.o. Sex: male       Date of Request: 7/10/2022  Date of Consult:  7/11/2022  Reason for Consult:  pain and symptom management and goals of care  Requesting Physician: Priscilla Huerta, NP     Assessment/Plan:     Principal Diagnosis:     Pain, bone  M89.9    Additional Diagnoses:   · Constipation, Unspecified  K59.00  · Debility, Unspecified  R53.81  · Fatigue, Lethargy  R53.83  · Counseling, Encounter for Medical Advice  Z71.9  · Encounter for Palliative Care  Z51.5    Palliative Performance Scale (PPS):       Medical Decision Making:   Reviewed and summarized notes from admission to present   Discussed case with appropriate providers- Alma, RAUL  Reviewed laboratory and x-ray data from admission to present     Pt resting in bed, appears comfortable. Son, Natty Perez, at bedside. Introduced role of PC and reviewed events. Pt reports severe pain in his ribs, back, left shoulder, left hip, and left leg. The pain is worse with movement, and improves when he is still. Pt reports the current dosing of Dilaudid 1 mg IV q 3 hours works well for about 30 minutes, and then he starts hurting again. Pt describes the pain relief as a roller coaster, up and down. He has used approximately 230 mg OME in the last 20 hours, in the form of Dilaudid IV and Oxycodone 10 mg PO. Discussed addition of a Fentanyl patch, and pt is agreeable. Will start Fentanyl 50 mcg q 72 hours. Continue Oxycodone 10 mg q 4 hours PRN and Dilaudid 1 mg IV PRN. Instructed pt to continue asking for PRN pain medications as needed, as the patch will not be fully effective for 12 to 18 hours. Pt also has c/o constipation. Will start Pericolace 2 tabs daily. Lastly, pt's son states pt will no longer be able to live in his home, and they will be pursuing placement in a facility, as well as enroll with hospice.   Discussed with RAUL Marquez- she will assist.  Will continue to follow. Will discuss findings with members of the interdisciplinary team.      Thank you for this referral.          .    Subjective:     History obtained from:  Patient, Family, Care Provider and Chart    Chief Complaint: Bone pain, metastatic prostate cancer   History of Present Illness:  Mr Manuel Clements is a 79 yo male with PMH of metastatic prostate CA, who presented to the ER from home on 7/8/2022 with c/o generalized pain and weakness for several days. Pt recently started radiation to his spine. Pt denied LOC or injury with fall. Work up revealed elevated Cr, elevated AST, and Hgb of 6.3, and pt was admitted for further management. He has had ongoing severe bone pain and has decided to stop radiation treatments. Advance Directive: No       Code Status:  DNR            Health Care Power of : No - Patient does not have a [address]. Past Medical History:   Diagnosis Date    Claustrophobia     Ear problems     Elevated PSA     Former light cigarette smoker (1-9 per day)     smoked for 35 years.   quit     History of multiple allergies     Nicotine vapor product user     Personal history of prostate cancer     Prostate cancer ([facility] [address]     prostate, neuroendocrine, mets to bone      Past Surgical History:   Procedure Laterality Date    BIOPSY PROSTATE      COLONOSCOPY  08/2020    HEENT      teeth removed     IR KYPHOPLASTY LUMBAR FIRST LEVEL  10/14/2020    IR KYPHOPLASTY LUMBAR FIRST LEVEL  10/14/2020    IR KYPHOPLASTY LUMBAR FIRST LEVEL 10/14/2020 [facility]    IR KYPHOPLASTY THORACIC FIRST LEVEL  10/29/2020    IR KYPHOPLASTY THORACIC FIRST LEVEL  10/29/2020    IR KYPHOPLASTY THORACIC FIRST LEVEL 10/29/2020 [facility]    TESTICLE REMOVAL Bilateral 02/2017     Family History   Problem Relation Age of Onset    Hypertension Father     Prostate Cancer Father     Cancer Father         prostate cancer Social History     Tobacco Use    Smoking status: Former Smoker     Packs/day: 0.50     Quit date: 2016     Years since quittin.6    Smokeless tobacco: Never Used   Substance Use Topics    Alcohol use: Yes     Prior to Admission medications    Medication Sig Start Date End Date Taking? Authorizing Provider   cyclobenzaprine (FLEXERIL) 10 MG tablet Take 10 mg by mouth 3 times daily as needed 22  Yes Historical Provider, MD   omeprazole (PRILOSEC) 40 MG delayed release capsule Take 1 capsule by mouth daily 22   Halima Werner MD   oxyCODONE HCl (OXY-IR) 10 MG immediate release tablet Take 1 tablet by mouth every 4 hours as needed for Pain for up to 14 days. 22  Halima Werner MD   dexamethasone (DECADRON) 4 MG tablet Take 1 tablet by mouth 2 times daily (with meals)  Patient not taking: Reported on 2022  Halima Werner MD   LORazepam (ATIVAN) 1 MG tablet Take 1 tablet by mouth 3 times daily as needed for Anxiety for up to 30 days. 22  Halima Werner MD   lidocaine-prilocaine (EMLA) 2.5-2.5 % cream Apply a dab over the port site 30-45 minutes prior to lab/infusion appts. Cover with saran wrap or a sandwich bag. Patient not taking: Reported on 2022   Shayna De La Cruz MD   naproxen (NAPROSYN) 250 MG tablet Take 250 mg by mouth 2 times daily (with meals)    Ar Automatic Reconciliation       Allergies   Allergen Reactions    Turmeric Nausea And Vomiting        Review of Systems:  A comprehensive review of systems was negative except for:   Constitutional: Positive for fatigue. Gastrointestinal: Positive for constipation  Musculoskeletal: Positive for diffuse bony pain (ribs, left shoulder, left hip, left leg). Objective:     Visit Vitals  BP (!) 149/73   Pulse 89   Temp 98.5 °F (36.9 °C) (Oral)   Resp 18   Ht 5' 10\" (1.778 m)   Wt 196 lb 1.6 oz (89 kg)   SpO2 93%   BMI 28.14 kg/m²        Physical Exam:    General:  Cooperative. Debilitated. No acute distress. Eyes:  Conjunctivae/corneas clear    Nose: Nares normal. Septum midline.    Neck: Supple, symmetrical, trachea midline   Lungs:   Clear to auscultation bilaterally, unlabored   Heart:  Regular rate and rhythm   Abdomen:   Soft, non-tender, non-distended   Extremities: Normal, atraumatic, no cyanosis or edema   Skin: Skin color, texture, turgor normal.   Neurologic: Nonfocal   Psych: Alert and oriented      Assessment:     [unfilled]    Signed By: HELEN Wu - MICHELLE     July 11, 2022

## 2022-07-11 NOTE — CARE COORDINATION
Lalo Chu NP with palliative care notified CM that pt's son is at the bedside and wishes to speak with CM about d/c options. CM met with pt and son at the bedside to discuss d/c planning. Pt wants hospice care but it is not feasible for pt to d/c home as there is no one to care for him 24/7 and he cannot afford to pay private caregivers. Pt has [facility] Medicare and [place] Medicaid. Per pt and son's request CM has placed a referral to [facility] to evaluate pt to see if he meets criteria for the [facility]. If he does not then pt and son want pt placed in a facility, in the [facility], with hospice services. RAUL spoke with Anita with [employer] to inquire how quickly pt's current Medicaid could be converted to SNF Medicaid. Per Anita's request RAUL sent her pt's information for the business office to review and provide CM with an answer. CM is awaiting review from Nain Martinez and the [address] office. CM will continue to follow.

## 2022-07-11 NOTE — PLAN OF CARE
Problem: Pain  Goal: Verbalizes/displays adequate comfort level or baseline comfort level  Outcome: Not Progressing  Flowsheets (Taken 7/11/2022 0920)  Verbalizes/displays adequate comfort level or baseline comfort level:   Encourage patient to monitor pain and request assistance   Assess pain using appropriate pain scale   Administer analgesics based on type and severity of pain and evaluate response   Implement non-pharmacological measures as appropriate and evaluate response     Problem: Skin/Tissue Integrity  Goal: Absence of new skin breakdown  Description: 1. Monitor for areas of redness and/or skin breakdown  2. Assess vascular access sites hourly  3. Every 4-6 hours minimum:  Change oxygen saturation probe site  4. Every 4-6 hours:  If on nasal continuous positive airway pressure, respiratory therapy assess nares and determine need for appliance change or resting period.   Outcome: Progressing     Problem: Safety - Adult  Goal: Free from fall injury  Outcome: Progressing  Flowsheets (Taken 7/11/2022 0803)  Free From Fall Injury: Instruct family/caregiver on patient safety     Problem: ABCDS Injury Assessment  Goal: Absence of physical injury  Outcome: Progressing  Flowsheets (Taken 7/11/2022 0803)  Absence of Physical Injury: Implement safety measures based on patient assessment

## 2022-07-11 NOTE — PROGRESS NOTES
[facility] Hematology & Oncology        Inpatient Hematology / Oncology Progress Note    Reason for Admission:  Prostate cancer metastatic to bone ([facility] [address] [C61, C79.51]    24 Hour Events:  Afebrile, VSS  Palliative care meeting with pt today for pain mgmt as well as GOC  C/o constipation    Transfusions: None  Replacements: Mg+    ROS:  Constitutional: Positive for fatigue, previous fall, weakness; negative for fever, chills. CV: Negative for chest pain, palpitations, edema. Respiratory: Negative for dyspnea, cough, wheezing. GI: Positive for constipation; negative for nausea, abdominal pain, diarrhea. MSK:  +Back/hip pain    10 point review of systems is otherwise negative with the exception of the elements mentioned above in the HPI. Allergies   Allergen Reactions    Turmeric Nausea And Vomiting     Past Medical History:   Diagnosis Date    Claustrophobia     Ear problems     Elevated PSA     Former light cigarette smoker (1-9 per day)     smoked for 35 years.   quit     History of multiple allergies     Nicotine vapor product user     Personal history of prostate cancer     Prostate cancer ([facility] [address]     prostate, neuroendocrine, mets to bone     Past Surgical History:   Procedure Laterality Date    BIOPSY PROSTATE      COLONOSCOPY  08/2020    HEENT      teeth removed     IR KYPHOPLASTY LUMBAR FIRST LEVEL  10/14/2020    IR KYPHOPLASTY LUMBAR FIRST LEVEL  10/14/2020    IR KYPHOPLASTY LUMBAR FIRST LEVEL 10/14/2020 [facility]    IR KYPHOPLASTY THORACIC FIRST LEVEL  10/29/2020    IR KYPHOPLASTY THORACIC FIRST LEVEL  10/29/2020    IR KYPHOPLASTY THORACIC FIRST LEVEL 10/29/2020 [facility]    TESTICLE REMOVAL Bilateral 02/2017     Family History   Problem Relation Age of Onset    Hypertension Father     Prostate Cancer Father     Cancer Father         prostate cancer     Social History     Socioeconomic History    Marital status: Single     Spouse name: Not on Rebeca Tanner MD   400 mg at 07/11/22 8295    fentaNYL (DURAGESIC) 50 MCG/HR 1 patch  1 patch TransDERmal Q72H HELEN Marcus CNP        naloxone (NARCAN) injection 0.04 mg  0.04 mg IntraVENous PRN HELEN Marcus CNP        sennosides-docusate sodium (SENOKOT-S) 8.6-50 MG tablet 2 tablet  2 tablet Oral Daily HELEN Marcus CNP        diphenhydrAMINE (BENADRYL) capsule 25 mg  25 mg Oral Q6H PRN Geovani David MD   25 mg at 07/11/22 0300    HYDROmorphone HCl PF (DILAUDID) injection 1 mg  1 mg IntraVENous Q3H PRN HELEN Michel NP   1 mg at 07/11/22 0908    aluminum & magnesium hydroxide-simethicone (MAALOX) 163-205-94 MG/5ML suspension 20 mL  20 mL Oral Q6H PRN Paulette Ritchie MD   20 mL at 07/10/22 2110    0.9 % sodium chloride infusion   IntraVENous PRN Geovani David MD        LORazepam (ATIVAN) tablet 1 mg  1 mg Oral TID PRN Tutu Buford, DO   1 mg at 07/08/22 1946    pantoprazole (PROTONIX) tablet 40 mg  40 mg Oral QAM AC Veronica Eric Colemanson, DO   40 mg at 07/11/22 2568    oxyCODONE (ROXICODONE) immediate release tablet 10 mg  10 mg Oral Q4H PRN Tutu Buford, DO   10 mg at 07/11/22 0810    sodium chloride flush 0.9 % injection 5-40 mL  5-40 mL IntraVENous 2 times per day Tutu Buford, DO   10 mL at 07/11/22 0811    sodium chloride flush 0.9 % injection 5-40 mL  5-40 mL IntraVENous PRN Tutu Buford, DO        0.9 % sodium chloride infusion   IntraVENous PRN Tutu Buford, DO        ondansetron (ZOFRAN-ODT) disintegrating tablet 4 mg  4 mg Oral Q8H PRN Tutu Buford, DO        Or    ondansetron [facility]) injection 4 mg  4 mg IntraVENous Q6H PRN Tutu Buford, DO   4 mg at 07/10/22 1717    acetaminophen (TYLENOL) tablet 650 mg  650 mg Oral Q6H PRN Tutu Buford, DO   650 mg at 07/09/22 2239    Or    acetaminophen (TYLENOL) suppository 650 mg  650 mg Rectal Q6H PRN Tutu Buford, DO        docusate sodium (COLACE) capsule 100 mg  100 mg Oral BID PRN Hiram Zambrano, DO        polyethylene glycol [facility]) packet 17 g  17 g Oral Daily Tommyl Juan Manuel, DO   17 g at 22 0810    lactulose (CHRONULAC) 10 GM/15ML solution 20 g  20 g Oral Once Herscristianl Juan Manuel DO           OBJECTIVE:  Patient Vitals for the past 8 hrs:   BP Temp Temp src Pulse Resp SpO2   22 0908 -- -- -- -- 18 --   22 0810 -- -- -- -- 18 --   22 0740 (!) 149/73 98.5 °F (36.9 °C) Oral 89 18 93 %   22 0640 108/70 98.9 °F (37.2 °C) Oral 72 16 96 %     Temp (24hrs), Av.2 °F (37.3 °C), Min:98.5 °F (36.9 °C), Max:99.7 °F (37.6 °C)     07 -  1900  In: -   Out: 200 [Urine:200]    Physical Exam:  Constitutional: Ill appearing male in no acute distress, sitting comfortably in the hospital bed. HEENT: Normocephalic and atraumatic. Oropharynx is clear, mucous membranes are moist.  Neck supple. Skin Warm and dry. Bruising noted on BLE and no rash noted. No erythema. No pallor. Respiratory Lungs are clear to auscultation bilaterally without wheezes, rales or rhonchi, normal air exchange without accessory muscle use. CVS Normal rate, regular rhythm and normal S1 and S2. No murmurs, gallops, or rubs. Abdomen Soft, nontender and nondistended, normoactive bowel sounds. No palpable mass. No hepatosplenomegaly. Neuro Grossly nonfocal with no obvious sensory or motor deficits. MSK Normal range of motion in general.  No edema and no tenderness. Psych Appropriate mood and affect.         Labs:    Recent Results (from the past 24 hour(s))   Hemoglobin and Hematocrit    Collection Time: 07/10/22  2:05 PM   Result Value Ref Range    Hemoglobin 7.6 (L) 13.6 - 17.2 g/dL    Hematocrit 23.2 (L) 41.1 - 50.3 %   CBC with Auto Differential    Collection Time: 22  2:19 AM   Result Value Ref Range    WBC 7.3 4.3 - 11.1 K/uL    RBC 2.16 (L) 4.23 - 5.6 M/uL    Hemoglobin 7.2 (L) 13.6 - 17.2 g/dL    Hematocrit 22.2 (L) 41.1 - 50.3 %    .8 (H) 79.6 - 97.8 FL    MCH 33.3 (H) 26.1 - 32.9 PG    MCHC 32.4 31.4 - 35.0 g/dL    RDW 20.0 (H) 11.9 - 14.6 %    Platelets 35 (L) 434 - 450 K/uL    MPV 11.4 9.4 - 12.3 FL    nRBC 0.07 0.0 - 0.2 K/uL    Differential Type AUTOMATED      Seg Neutrophils 78 43 - 78 %    Lymphocytes 9 (L) 13 - 44 %    Monocytes 11 4.0 - 12.0 %    Eosinophils % 1 0.5 - 7.8 %    Basophils 0 0.0 - 2.0 %    Immature Granulocytes 1 0.0 - 5.0 %    Segs Absolute 5.6 1.7 - 8.2 K/UL    Absolute Lymph # 0.6 0.5 - 4.6 K/UL    Absolute Mono # 0.8 0.1 - 1.3 K/UL    Absolute Eos # 0.1 0.0 - 0.8 K/UL    Basophils Absolute 0.0 0.0 - 0.2 K/UL    Absolute Immature Granulocyte 0.1 0.0 - 0.5 K/UL   Magnesium    Collection Time: 07/11/22  2:19 AM   Result Value Ref Range    Magnesium 1.5 (L) 1.8 - 2.4 mg/dL   Comprehensive Metabolic Panel    Collection Time: 07/11/22  2:19 AM   Result Value Ref Range    Sodium 137 136 - 145 mmol/L    Potassium 3.7 3.5 - 5.1 mmol/L    Chloride 104 98 - 107 mmol/L    CO2 25 21 - 32 mmol/L    Anion Gap 8 7 - 16 mmol/L    Glucose 110 (H) 65 - 100 mg/dL    BUN 34 (H) 8 - 23 MG/DL    CREATININE 1.40 0.8 - 1.5 MG/DL    GFR African American >60 >60 ml/min/1.73m2    GFR Non- 54 (L) >60 ml/min/1.73m2    Calcium 10.1 8.3 - 10.4 MG/DL    Total Bilirubin 1.1 0.2 - 1.1 MG/DL    ALT 77 (H) 12 - 65 U/L     (H) 15 - 37 U/L    Alk Phosphatase 237 (H) 50 - 136 U/L    Total Protein 6.0 (L) 6.3 - 8.2 g/dL    Albumin 2.7 (L) 3.2 - 4.6 g/dL    Globulin 3.3 2.3 - 3.5 g/dL    Albumin/Globulin Ratio 0.8 (L) 1.2 - 3.5         Imaging:  N/A    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)    Hypokalemia    Gynecomastia, male    Neuroendocrine carcinoma, high grade (HCC)    Anemia due to chemotherapy    Primary prostate cancer with metastasis from prostate to other site [facility])    Prostatic nodule    Family history of prostate cancer in father    Acute prostatitis    S/P TURP    Prostate cancer ([facility] [address]    Prostate cancer metastatic to bone (HCC)    Thrombocytopenia (HCC)    Hypomagnesemia    Hypercalcemia of malignancy    Pain due to malignant neoplasm metastatic to bone ([address]    EDGARD (acute kidney injury) ([address]    Debility    Drug-induced constipation    Encounter for palliative care     Mr. Cheko Garcia is a 78 y.o. male admitted on 7/8/2022 with a primary diagnosis of There were no encounter diagnoses. .       Mr. Cheko Garcia has a PMH of metastatic prostate cancer dx 2017 s/p casodex and bilateral orchiectomy for surgical castration, 6 cycles of docetaxel, pathologic vertebral fx's with path positive for high grade neuroendocrine cancer, s/p carbo/etoposide and most recently carbo/taxotere which was held d/t TCP. Last was C2 on 6/8/22. 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 He is a known patient of Dr. Lc Calhoun s/p multiple lines of treatment as above. At last office visit on 6/29, CEA had notably continued to rise to 8290. He has had discussion with [facility], however wished to hold off on making a decision until he completed radiation therapy. He presented to the ER with a fall at home, increasing pain in the hips and generalized weakness. Also with constipation. Also found to be anemic and transfused PRBC. We were asked for recommendations for our known patient. PLAN:  Metastatic Prostate Cancer/Neuroendocrine  - S/p multiple lines of therapy, most recently carbo/taxotere C2 on 6/8. Held on 6/29 d/t TCP. Undergoing XRT to rib and T-spine with Dr. Jose R Abraham.  - Has spoken to [facility], however wished to hold off on making a decision until he saw if XRT improved his pain  - Can resume XRT on Monday 7/11 Pt does not wish to pursue XRT.   Meeting with PC today for pain mgmt and to discuss [address] - wishes to pursue facility placement and enroll in Hospice.     Cancer Related Pain  - Currently with Dilaudid/oxy prn  - Consult palliative care to assist with pain management (known from office) and also to assist with goals of care discussions   7/10 Increased Dilaudid to 1 mg every 3 hours for severe pain. Pall care consult pending. 7/11 PC assisting with mgmt. Resume Dex.    Anemia/Thrombocytopenia  - Transfuse for Hgb < 7 or Plts < 10 (with no active bleeding)     Elevated Tranaminases  - Alk phos/ALT/AST trended down today, continue to follow   7/11 AST/ALT continue to improve      EDGARD  - Gentle hydration     Constipation  - Lactulose prn   7/11 Pt refusing Lactulose. PC started pericolace. Con't Miralax. Consider KUB if no improvement. Continue home meds  Denae SOPs  AC contraindicated d/t thrombocytopenia    Goals and plan of care reviewed with the patient. All questions answered to the best of our ability. Disposition:  Awaiting placement.               HELEN Simms [facility] Hematology & Oncology  [address] [address]  Office : [phone]  Fax : [phone]

## 2022-07-11 NOTE — PROGRESS NOTES
Met with Mr Edy Mccray and his son Omar Hylton at bedside for hospice evaluation and presentation. Mr Edy Mccray does qualify for GIP care at [facility], however, this would be under the notion that he would be transitioned to SNF with hospice services once pain and other symptoms are managed. It is in this patient's best interest to only be transferred once with his ongoing pain. Discussed with RAUL Marquez, who states patient has inquired about [facility] with hospice care. She is awaiting [facility] to communicate if they would accept patient. [facility] will continue to follow patient while hospitalized and accept patient under our care should he continue to be appropriate for general inpatient services. Patient and son are in agreement with this plan as it is his preference to only be transferred one time. Liaison will follow up tomorrow.      Thank you for this referral,   Dain Armijo   [phone]

## 2022-07-12 NOTE — CARE COORDINATION
CM has attempted several times to make contact with the liaison for [address] but have been unsuccessful. CM left a voicemail for Kasey with [facility] inquiring about the possibility/time frame of d/c pt to [facility]. CM will await a return call from [facility]. 1515 - Update: CM received communication back from the SNF liaison. She stated, per the business office, that pt will need to complete a SNF Medicaid application and undergo a 5-year look back (which is standard). She inquired if pt has anyone who can assist him with this. CM has contacted [employer] to ask for assistance with the Medicaid application. CM will notify pt's son that his assistance will be needed with gathering the information needed for Medicaid and the SNF. 1630 - Update: CM received a call from [facility] - pt would only be GIP for approximately 2 nights and would then convert to routine care which would incur a daily room and board fee which pt's son said it not feasible. CM met with pt and son Venice Bustamante at the bedside to discuss d/c planning. CM explained the need for the SNF Medicaid uri, the 5 year look back, etc.  Ramakrishna with [facility] will be contacting pt to get the application started. CM provided Venice Bustamante with her contact information. Venice Bustamante requested that CM expand the SNF search to the [place] area as well. CM will contact the SNFs in that area to inquire if there are any LTC beds available. CM explained that it is quite difficult to find one at all and he voiced understanding. Pt made it abundantly clear that he does Not want to go to any facility in [facility]. CM will continue to follow.

## 2022-07-12 NOTE — PROGRESS NOTES
Palliative Care Progress Note    Patient: Maria Alejandra Hankins MRN: [id number]  SSN: [id number]    YOB: 1955  Age: 78 y.o. Sex: male       Assessment/Plan:     Chief Complaint/Interval History: pain improved today       Principal Diagnosis:    · Pain, bone  M89.9    Additional Diagnoses:   · Debility, Unspecified  R53.81  · Nausea/Vomiting  R11.2  · Counseling, Encounter for Medical Advice  Z71.9  · Encounter for Palliative Care  Z51.5    Palliative Performance Scale (PPS)       Medical Decision Making:   Reviewed and summarized notes over last 24 hours   Discussed case with appropriate providers- Dr Gianfranco Colon   Reviewed laboratory and x-ray data over last 24 hours     Pt resting in bed, appears comfortable. No family at bedside. Pt reports his pain has improved significantly with the addition of the Fentanyl 50 mcg patch. He is able to ambulate to the bathroom today, whereas he could not yesterday due to pain. Discussed the importance of utilizing oral medications for breakthrough pain. Pt would prefer to use oral Dilaudid instead of Oxycodone. Will discontinue Oxycodone, and start Dilaudid 2 mg or 4 mg q 4 hours PRN. Advised he could ask for IV Dilaudid if his pain is not relieved with oral first.  He voiced understanding. CM assisting with discharge plan. Discussed with Dr Gianfranco Colon. We will continue to follow. Will discuss findings with members of the interdisciplinary team.         More than 50% of this 35 minute visit was spent counseling and coordination of care as outlined above. Subjective:     Review of Systems:  A comprehensive review of systems was negative except for:   Constitutional: Positive for fatigue. Musculoskeletal: Positive for improving bone pain     Objective:     Visit Vitals  /65   Pulse 76   Temp 98.6 °F (37 °C) (Oral)   Resp 18   Ht 5' 10\" (1.778 m)   Wt 196 lb 1.6 oz (89 kg)   SpO2 94%   BMI 28.14 kg/m²       Physical Exam:    General:  Cooperative.  No acute distress. Eyes:  Conjunctivae/corneas clear    Nose: Nares normal. Septum midline. Neck: Supple, symmetrical, trachea midline, no JVD   Lungs:   Clear to auscultation bilaterally, unlabored   Heart:  Regular rate and rhythm, no murmur    Abdomen:   Soft, non-tender, non-distended   Extremities: Normal, atraumatic, no cyanosis or edema   Skin: Skin color, texture, turgor normal. No rash or lesions.    Neurologic: Nonfocal   Psych: Alert and oriented     Signed By: HELEN Gutierrez - MICHELLE     July 12, 2022

## 2022-07-12 NOTE — PLAN OF CARE
Problem: Safety - Adult  Goal: Free from fall injury  7/12/2022 1606 by Gem Hugo RN  Outcome: Progressing  7/12/2022 1547 by Gem Hugo RN  Outcome: Progressing  Flowsheets (Taken 7/12/2022 0720)  Free From Fall Injury: Instruct family/caregiver on patient safety

## 2022-07-12 NOTE — PLAN OF CARE
Problem: Safety - Adult  Goal: Free from fall injury  Outcome: Progressing  Flowsheets (Taken 7/12/2022 5237)  Free From Fall Injury: Instruct family/caregiver on patient safety

## 2022-07-12 NOTE — PROGRESS NOTES
END OF SHIFT SUMMARY:      Significant labs this shift:  1.4    Additional events this shift:   Mag replaced the shift       I/Os:  +/- this shift:   07/12 0701 - 07/12 1900  In: 840 [P.O.:840]  Out: 50 [Urine:50]  Occurrences this Shift:  Urine 3  Pt reports multiple BMs and urine occurences, nurse did not visualize.   Terry Olmos RN   Shift report given to oncoming nurse Jovanna Hines RN

## 2022-07-12 NOTE — PROGRESS NOTES
Number of children: Not on file    Years of education: Not on file    Highest education level: Not on file   Occupational History    Not on file   Tobacco Use    Smoking status: Former Smoker     Packs/day: 0.50     Quit date: 2016     Years since quittin.6    Smokeless tobacco: Never Used   Substance and Sexual Activity    Alcohol use: Yes    Drug use: No    Sexual activity: Not on file   Other Topics Concern    Not on file   Social History Narrative    Not on file     Social Determinants of Health     Financial Resource Strain:     Difficulty of Paying Living Expenses: Not on file   Food Insecurity:     Worried About Running Out of Food in the Last Year: Not on file    [place] of Food in the Last Year: Not on file   Transportation Needs:     Lack of Transportation (Medical): Not on file    Lack of Transportation (Non-Medical):  Not on file   Physical Activity:     Days of Exercise per Week: Not on file    Minutes of Exercise per Session: Not on file   Stress:     Feeling of Stress : Not on file   Social Connections:     Frequency of Communication with Friends and Family: Not on file    Frequency of Social Gatherings with Friends and Family: Not on file    Attends Hindu Services: Not on file    Active Member of [address] [place] [address] [employer] or Organizations: Not on file    Attends Club or Organization Meetings: Not on file    Marital Status: Not on file   Intimate Partner Violence:     Fear of Current or Ex-Partner: Not on file    Emotionally Abused: Not on file    Physically Abused: Not on file    Sexually Abused: Not on file   Housing Stability:     Unable to Pay for Housing in the Last Year: Not on file    Number of [place] in the Last Year: Not on file    Unstable Housing in the Last Year: Not on file     Current Facility-Administered Medications   Medication Dose Route Frequency Provider Last Rate Last Admin    magnesium sulfate 2000 mg in water 50 mL IVPB  2,000 mg IntraVENous Once Gerry Osler, APRN - CNP 25 mL/hr at 07/12/22 0857 2,000 mg at 07/12/22 0857    Followed by   Enriquez magnesium sulfate 1000 mg in dextrose 5% 100 mL IVPB  1,000 mg IntraVENous Once Gerry Osler, APRN - CNP        zoledronic acid (ZOMETA) 4 mg in sodium chloride 0.9 % 100 mL IVPB  4 mg IntraVENous Once Gerry Osler, APRN - CNP        magnesium oxide (MAG-OX) tablet 400 mg  400 mg Oral TID Jaime Bernard MD   400 mg at 07/12/22 0857    fentaNYL (DURAGESIC) 50 MCG/HR 1 patch  1 patch TransDERmal Q72H HELEN Marcus CNP   1 patch at 07/11/22 1213    naloxone (NARCAN) injection 0.04 mg  0.04 mg IntraVENous PRN HELEN Marcus CNP        sennosides-docusate sodium (SENOKOT-S) 8.6-50 MG tablet 2 tablet  2 tablet Oral Daily HELEN Gonzalez CNP   2 tablet at 07/12/22 0857    dexamethasone (DECADRON) tablet 4 mg  4 mg Oral 2 times per day Gerry Osler, APRN - CNP   4 mg at 07/12/22 0857    diphenhydrAMINE (BENADRYL) capsule 25 mg  25 mg Oral Q6H PRN Geovani David MD   25 mg at 07/11/22 0300    HYDROmorphone HCl PF (DILAUDID) injection 1 mg  1 mg IntraVENous Q3H PRN Zoanne Hodgkin, APRN - NP   1 mg at 07/12/22 0637    aluminum & magnesium hydroxide-simethicone (MAALOX) 695-713-56 MG/5ML suspension 20 mL  20 mL Oral Q6H PRN Jaime Bernard MD   20 mL at 07/12/22 0210    0.9 % sodium chloride infusion   IntraVENous PRN Geovani David MD        LORazepam (ATIVAN) tablet 1 mg  1 mg Oral TID PRN Estefani Pak DO   1 mg at 07/08/22 1946    pantoprazole (PROTONIX) tablet 40 mg  40 mg Oral QAM AC Jen Martinez DO   40 mg at 07/12/22 0857    oxyCODONE (ROXICODONE) immediate release tablet 10 mg  10 mg Oral Q4H PRN Clementeen Shaper, DO   10 mg at 07/11/22 0810    sodium chloride flush 0.9 % injection 5-40 mL  5-40 mL IntraVENous 2 times per day Clementeen Shaper, DO   10 mL at 07/12/22 0858    sodium chloride flush 0.9 % injection 5-40 mL  5-40 mL IntraVENous PRN Clementeen Shaper, DO        0.9 % sodium chloride infusion   IntraVENous PRN Rosary Broad, DO        ondansetron (ZOFRAN-ODT) disintegrating tablet 4 mg  4 mg Oral Q8H PRN Rosary Broad, DO        Or    ondansetron [facility]) injection 4 mg  4 mg IntraVENous Q6H PRN Rosary Broad, DO   4 mg at 07/10/22 1717    acetaminophen (TYLENOL) tablet 650 mg  650 mg Oral Q6H PRN Rosary Broad, DO   650 mg at 22 2239    Or    acetaminophen (TYLENOL) suppository 650 mg  650 mg Rectal Q6H PRN Rosary Broad, DO        docusate sodium (COLACE) capsule 100 mg  100 mg Oral BID PRN Rosary Broad, DO        polyethylene glycol (GLYCOLAX) packet 17 g  17 g Oral Daily Rosary Broad, DO   17 g at 22 0858    lactulose (CHRONULAC) 10 GM/15ML solution 20 g  20 g Oral Once Rosary Broad, DO           OBJECTIVE:  Patient Vitals for the past 8 hrs:   BP Temp Temp src Pulse Resp SpO2   22 0801 114/65 98.6 °F (37 °C) Oral 76 18 94 %   22 0342 134/76 98.4 °F (36.9 °C) Oral 71 16 93 %     Temp (24hrs), Av.8 °F (37.1 °C), Min:98.4 °F (36.9 °C), Max:99.2 °F (37.3 °C)    No intake/output data recorded. Physical Exam:  Constitutional: Well developed male in no acute distress, sitting comfortably in the hospital bed. HEENT: Normocephalic and atraumatic. Oropharynx is clear, mucous membranes are moist.  Neck supple. Skin Warm and dry. Bruising noted on BLE and no rash noted. No erythema. No pallor. Respiratory Lungs are clear to auscultation bilaterally without wheezes, rales or rhonchi, normal air exchange without accessory muscle use. CVS Normal rate, regular rhythm and normal S1 and S2. No murmurs, gallops, or rubs. Abdomen Soft, nontender and nondistended, normoactive bowel sounds. No palpable mass. No hepatosplenomegaly. Neuro Grossly nonfocal with no obvious sensory or motor deficits. MSK Normal range of motion in general.  No edema and no tenderness. Psych Appropriate mood and affect. Labs:    Recent Results (from the past 24 hour(s))   CBC with Auto Differential    Collection Time: 07/12/22  3:27 AM   Result Value Ref Range    WBC 6.4 4.3 - 11.1 K/uL    RBC 2.26 (L) 4.23 - 5.6 M/uL    Hemoglobin 7.6 (L) 13.6 - 17.2 g/dL    Hematocrit 22.9 (L) 41.1 - 50.3 %    .3 (H) 79.6 - 97.8 FL    MCH 33.6 (H) 26.1 - 32.9 PG    MCHC 33.2 31.4 - 35.0 g/dL    RDW 18.7 (H) 11.9 - 14.6 %    Platelets 53 (L) 936 - 450 K/uL    MPV 10.7 9.4 - 12.3 FL    nRBC 0.04 0.0 - 0.2 K/uL    Differential Type AUTOMATED      Seg Neutrophils 84 (H) 43 - 78 %    Lymphocytes 7 (L) 13 - 44 %    Monocytes 9 4.0 - 12.0 %    Eosinophils % 0 (L) 0.5 - 7.8 %    Basophils 0 0.0 - 2.0 %    Immature Granulocytes 1 0.0 - 5.0 %    Segs Absolute 5.4 1.7 - 8.2 K/UL    Absolute Lymph # 0.4 (L) 0.5 - 4.6 K/UL    Absolute Mono # 0.6 0.1 - 1.3 K/UL    Absolute Eos # 0.0 0.0 - 0.8 K/UL    Basophils Absolute 0.0 0.0 - 0.2 K/UL    Absolute Immature Granulocyte 0.1 0.0 - 0.5 K/UL   Magnesium    Collection Time: 07/12/22  3:27 AM   Result Value Ref Range    Magnesium 1.4 (L) 1.8 - 2.4 mg/dL   Comprehensive Metabolic Panel    Collection Time: 07/12/22  3:27 AM   Result Value Ref Range    Sodium 136 (L) 138 - 145 mmol/L    Potassium 4.3 3.5 - 5.1 mmol/L    Chloride 102 98 - 107 mmol/L    CO2 28 21 - 32 mmol/L    Anion Gap 6 (L) 7 - 16 mmol/L    Glucose 136 (H) 65 - 100 mg/dL    BUN 38 (H) 8 - 23 MG/DL    CREATININE 1.30 0.8 - 1.5 MG/DL    GFR African American >60 >60 ml/min/1.73m2    GFR Non- 59 (L) >60 ml/min/1.73m2    Calcium 10.8 (H) 8.3 - 10.4 MG/DL    Total Bilirubin 0.9 0.2 - 1.1 MG/DL    ALT 76 (H) 12 - 65 U/L     (H) 15 - 37 U/L    Alk Phosphatase 236 (H) 50 - 136 U/L    Total Protein 6.5 6.3 - 8.2 g/dL    Albumin 2.9 (L) 3.2 - 4.6 g/dL    Globulin 3.6 (H) 2.3 - 3.5 g/dL    Albumin/Globulin Ratio 0.8 (L) 1.2 - 3.5         Imaging:  Xray Result (most recent):  XR CHEST PORTABLE 07/08/2022    Narrative  Portable chest x-ray    CLINICAL INDICATION: Sternal pain, metastatic cancer    FINDINGS: Single AP view of the chest compared to a similar exam dated 9/1/2020  show the lungs to be expanded and clear. The cardiac silhouette and mediastinum  are unremarkable. A right-sided chest port is in place. There is no pleural  effusion or pneumothorax. No definite bony abnormality. Note the sternum is not  able to be evaluated on this single portable frontal projection of the chest.    Impression  No acute cardiopulmonary abnormality. 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)    Hypokalemia    Gynecomastia, male    Neuroendocrine carcinoma, high grade (HCC)    Anemia due to chemotherapy    Primary prostate cancer with metastasis from prostate to other site [facility])    Prostatic nodule    Family history of prostate cancer in father    Acute prostatitis    S/P TURP    Prostate cancer ([address]    Prostate cancer metastatic to bone ([address]    Thrombocytopenia (HCC)    Hypomagnesemia    Hypercalcemia of malignancy    Pain due to malignant neoplasm metastatic to bone ([address]    EDGARD (acute kidney injury) ([address]    Debility    Drug-induced constipation    Encounter for palliative care     Mr. Chu Box is a 78 y.o. male admitted on 7/8/2022 with a primary diagnosis of There were no encounter diagnoses. .       Mr. Chu Box has a PMH of metastatic prostate cancer dx 2017 s/p casodex and bilateral orchiectomy for surgical castration, 6 cycles of docetaxel, pathologic vertebral fx's with path positive for high grade neuroendocrine cancer, s/p carbo/etoposide and most recently carbo/taxotere which was held d/t TCP. Last was C2 on 6/8/22. 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 He is a known patient of Dr. Sera Moctezuma s/p multiple lines of treatment as above.   At last office visit on 6/29, CEA had notably continued to rise to 8290. [facility] however wished to hold off on making a decision until he completed radiation therapy. He presented to the ER with a fall at home, increasing pain in the hips and generalized weakness. Also with constipation. Also found to be anemic and transfused PRBC. We were asked for recommendations for our known patient. PLAN:  Metastatic Prostate Cancer/Neuroendocrine  - S/p multiple lines of therapy, most recently carbo/taxotere C2 on 6/8. Held on 6/29 d/t TCP. Undergoing XRT to rib and T-spine with Dr. Chan Given.  - Has spoken to [facility], however wished to hold off on making a decision until he saw if XRT improved his pain  - Can resume XRT on Monday 7/11 Pt does not wish to pursue XRT. Meeting with PC today for pain mgmt and to discuss [address] - wishes to pursue facility placement and enroll in Hospice.     Cancer Related Pain  - Currently with Dilaudid/oxy prn  - Consult palliative care to assist with pain management (known from office) and also to assist with goals of care discussions   7/10 Increased Dilaudid to 1 mg every 3 hours for severe pain. Pall care consult pending. 7/11 PC assisting with mgmt. Resume Dex. 7/12 PC started Fentanyl 50mcg patch yesterday. Pt reports pain controlled today - even able to walk around room now.     Anemia/Thrombocytopenia  - Transfuse prn per Denae SOPs     Elevated Tranaminases  - Alk phos/ALT/AST trended down today, continue to follow   7/11 AST/ALT continue to improve   7/12 LFTs improving     EDGARD  - Gentle hydration  7/12 Cr down to 1.3     Constipation  - Lactulose prn   7/11 Pt refusing Lactulose. PC started pericolace. Con't Miralax. Consider KUB if no improvement. 7/12 No BM documented. Con't Miralax/Pericolace. Pt more active now, up around in room since pain controlled. Hypercalcemia  7/12 CCa++ up to 11. 7. Zometa ordered.     Continue home meds  Denae SOPs  AC contraindicated d/t thrombocytopenia    Goals and plan of care reviewed with the patient. All questions answered to the best of our ability. Disposition:  Awaiting placement.               HELEN Shahigen  Hematology & Oncology  [address] [place] [address]  Office : [phone]  Fax : [phone]

## 2022-07-13 NOTE — PROGRESS NOTES
Palliative Care Progress Note    Patient: Bryanna Alexis MRN: [id number]  SSN: [id number]    YOB: 1955  Age: 78 y.o. Sex: male       Assessment/Plan:     Chief Complaint/Interval History: pain improved stable, but he is requiring frequent PRN breakthrough medication        Principal Diagnosis:    · Pain, bone  M89.9    Additional Diagnoses:   · Debility, Unspecified  R53.81  · Counseling, Encounter for Medical Advice  Z71.9  · Encounter for Palliative Care  Z51.5    Palliative Performance Scale (PPS)       Medical Decision Making:   Reviewed and summarized notes over last 24 hours   Discussed case with appropriate providers- Dr Francoise Russell   Reviewed laboratory and x-ray data over last 24 hours     Pt resting in bed, appears comfortable. No family at bedside. Pt reports he had a pretty good night. He has required Dilaudid 4 mg PO almost every 4 hours since yesterday, and feels his pain could be a little better controlled. Given his PRN usage, will increase Fentanyl patch to 75 mcg q 72 hours. Continue Dilaudid 2 or 4 mg PO q 4 hours PRN. Pt reports BM yesterday- continue bowel regimen. Pt also states a friend of 48 years is coming to town, and potentially help take care of him. He is interested in possibly discharging home with hospice, instead of a SNF. PT/OT has been ordered. Updated Cat, CM. Will continue to follow. Will discuss findings with members of the interdisciplinary team.         More than 50% of this 35 minute visit was spent counseling and coordination of care as outlined above. Subjective:     Review of Systems:  A comprehensive review of systems was negative except for:   Constitutional: Positive for fatigue.   Musculoskeletal: Positive for improving bone pain     Objective:     Visit Vitals  /73   Pulse 70   Temp 98.4 °F (36.9 °C) (Oral)   Resp 18   Ht 5' 10\" (1.778 m)   Wt 201 lb 4.8 oz (91.3 kg)   SpO2 96%   BMI 28.88 kg/m²       Physical Exam:    General: Cooperative. No acute distress. Eyes:  Conjunctivae/corneas clear    Nose: Nares normal. Septum midline.    Neck: Supple, symmetrical, trachea midline, no JVD   Lungs:   Clear to auscultation bilaterally, unlabored   Heart:  Regular rate and rhythm, no murmur    Abdomen:   Soft, non-tender, non-distended   Extremities: Normal, atraumatic, no cyanosis or edema   Skin: Skin color, texture, turgor normal.   Neurologic: Nonfocal   Psych: Alert and oriented     Signed By: HELEN Espinosa - MICHELLE     July 13, 2022

## 2022-07-13 NOTE — PROGRESS NOTES
Gave dilaudid 4mg three times through the shift last night. Feels pain is a little more than day before and potentially not as controlled. Pt wanted to wait q4 for PO form though. Was made aware of option of 1mg IV, but chose to wait for pill form.       will need to receive blood today as Hg is 7    Is in good spirits

## 2022-07-13 NOTE — CARE COORDINATION
Pt's son has requested a facility that is no more than 30 minutes from the [facility] area, thus greatly reducing CM's range to search for facilities. CM has contacted the following SNFs to inquire if they have a LTC male bed available:  1. [facility]  2. [facility]  3. [facility]  4. [facility]  5. [facility]  6. [address]  7. [place] [address] a return call  8. [place] [address]   9. [facility] a return call  10. [facility]  11. [id number] [place] [address] a return call    As of 0850 on 7/13 the following SNFs DO have a LTC male bed available:  1. [facility]  2.  [facility]

## 2022-07-13 NOTE — PLAN OF CARE
Problem: Pain  Goal: Verbalizes/displays adequate comfort level or baseline comfort level  Outcome: Progressing     Problem: Safety - Adult  Goal: Free from fall injury  Outcome: Progressing  Flowsheets (Taken 7/13/2022 0701)  Free From Fall Injury: Instruct family/caregiver on patient safety

## 2022-07-13 NOTE — CARE COORDINATION
Chart screened by CM for d/c planning. Matt Storey, NP with palliative care notified CM via [employer] that pt informed her he has a friend coming to town who may be able to stay and take care of him with home hospice services. CM met with pt at the bedside to discuss d/c planning. He stated his friend is coming into town and he is going to ask another friend who cares for a secondary home if he can stay at that house. Pt is hopeful that this will 'all work out\" and CM agreed voicing the time and work that will go into LTC placement and SNF Medicaid. CM will continue to follow and remain available if any needs arise.

## 2022-07-13 NOTE — PROGRESS NOTES
OCCUPATIONAL THERAPY Initial Assessment, Daily Note and Discharge       OT Visit Days: 1  Acknowledge Orders  Time  OT Charge Capture  Rehab Caseload Tracker      Tino Mcgovern is a 78 y.o. male   PRIMARY DIAGNOSIS: Prostate cancer metastatic to bone [facility])  Prostate cancer metastatic to bone ([facility] [address] [C61, C79.51]       Reason for Referral: Generalized Muscle Weakness (M62.81)  Inpatient: Payor: [employer] / Plan: [employer] / Product Type: *No Product type* /     ASSESSMENT:     REHAB RECOMMENDATIONS:   Recommendation to date pending progress:  Setting:   No further skilled therapy after discharge from hospital    Equipment:     3 in 1 Bedside Commode     ASSESSMENT:  Mr. Hakan Hawkins was admitted w/ metastatic prostate cancer, pathological vertebral fractures. Pt educated on spinal precautions and on adaptive techniques to maintain during ADLs. Pt demonstrated ADLs independently, mobility for ADLs SBA w/o an AD. Pt reports feeling much better with pain well controlled today. Pt did not demonstrate ADL deficits and does not require further skilled OT services at this time. [address] [facility] [address] [address] AM-PAC 6 Clicks Daily Activity Inpatient Short Form:    AM-PAC Daily Activity Inpatient   How much help for putting on and taking off regular lower body clothing?: None  How much help for Bathing?: None  How much help for Toileting?: None  How much help for putting on and taking off regular upper body clothing?: None  How much help for taking care of personal grooming?: None  How much help for eating meals?: None  AM-[facility] Inpatient Daily Activity Raw Score: 24  AM-PAC Inpatient ADL T-Scale Score : 57.54  ADL Inpatient CMS 0-100% Score: 0  ADL Inpatient CMS G-Code Modifier : CH           SUBJECTIVE:     Mr. Hakan Hawkins states, \"They finally got the right combination of medications. \"     Social/Functional Lives With: Alone  Type of Home: House  Home Layout: Two level  Receives Help From: Family  ADL Assistance: Independent  Homemaking Assistance: Independent  Ambulation Assistance: Independent  Transfer Assistance: Independent  Active : Yes  Mode of Transportation: Car  Occupation: Retired   Bedroom/ bathroom on 2nd floor, tub shower. Pt does not plan to d/c to current residence.      OBJECTIVE:     Yonny Modest / Deuce Slack / AIRWAY: None    RESTRICTIONS/PRECAUTIONS:       PAIN: VITALS / O2:   Pre Treatment: 0         Post Treatment: 0       Vitals          Oxygen            GROSS EVALUATION: INTACT IMPAIRED   (See Comments)   UE AROM [x] []   UE PROM [] []   Strength [x]       Posture / Balance [x]     Sensation [x]     Coordination [x]       Tone [x]       Edema []    Activity Tolerance []   slightly decreased   Hand Dominance R [] L []      COGNITION/  PERCEPTION: INTACT IMPAIRED   (See Comments)   Orientation [x]     Vision []  glasses   Hearing [x]     Cognition  [x]     Perception [x]       MOBILITY: I Mod I S SBA CGA Min Mod Max Total  NT x2 Comments:   Bed Mobility    Rolling [] [] [] [] [] [] [] [] [] [] []    Supine to Sit [x] [] [] [] [] [] [] [] [] [] []    Scooting [x] [] [] [] [] [] [] [] [] [] []    Sit to Supine [x] [] [] [] [] [] [] [] [] [] []    Transfers    Sit to Stand [x] [] [] [] [] [] [] [] [] [] []    Bed to Chair [] [] [] [] [] [] [] [] [] [] []    Stand to Sit [x] [] [] [] [] [] [] [] [] [] []    Tub/Shower [] [] [] [] [] [] [] [] [] [] []     Toilet [x] [] [] [] [] [] [] [] [] [] []      [] [] [] [] [] [] [] [] [] [] []    I=Independent, Mod I=Modified Independent, S=Supervision/Setup, SBA=Standby Assistance, CGA=Contact Guard Assistance, Min=Minimal Assistance, Mod=Moderate Assistance, Max=Maximal Assistance, Total=Total Assistance, NT=Not Tested    ACTIVITIES OF DAILY LIVING: I Mod I S SBA CGA Min Mod Max Total NT Comments   BASIC ADLs:              Upper Body Bathing  [] [] [] [] [] [] [] [] [] []    Lower Body Bathing [] [] [] [] [] [] [] [] [] []    Toileting [] [] [] [] [] [] [] [] [] []    Upper Body Dressing [] [] [] [] [] [] [] [] [] []    Lower Body Dressing [x] [] [] [] [] [] [] [] [] []    Feeding [] [] [] [] [] [] [] [] [] []    Grooming [x] [] [] [] [] [] [] [] [] []    Personal Device Care [] [] [] [] [] [] [] [] [] []    Functional Mobility [x] [] [] [] [] [] [] [] [] []    I=Independent, Mod I=Modified Independent, S=Supervision/Setup, SBA=Standby Assistance, CGA=Contact Guard Assistance, Min=Minimal Assistance, Mod=Moderate Assistance, Max=Maximal Assistance, Total=Total Assistance, NT=Not Tested    PLAN:     FREQUENCY/DURATION     for duration of hospital stay or until stated goals are met, whichever comes first.    ACUTE OCCUPATIONAL THERAPY GOALS:   (Developed with and agreed upon by patient and/or caregiver.)  1. Pt will complete ADLs at sink independently  2. Pt will complete LB dressing independently  3. Pt will demonstrate functional mobility for ADLs with SBA    Goals met      TREATMENT:     EVALUATION: LOW COMPLEXITY: (Untimed Charge)    TREATMENT:   Self Care (8 minutes): Patient participated in lower body dressing and grooming ADLs in standing with no verbal cueing to increase independence. Patient also participated in functional mobility and energy conservation training to increase independence. TREATMENT GRID:  N/A    AFTER TREATMENT PRECAUTIONS: Bed, Needs within reach and RN notified    INTERDISCIPLINARY COLLABORATION:  RN/ PCT    EDUCATION:  Education Given To: Patient  Education Provided: Role of Therapy;Plan of Care;Precautions; ADL Adaptive Strategies; Energy Conservation    TOTAL TREATMENT DURATION AND TIME:  Time In: 1425  Time Out: 1200 Jeni Mario  Minutes: [address] [facility] [place] Raad Gomes OT

## 2022-07-13 NOTE — PROGRESS NOTES
[facility] Hematology & Oncology        Inpatient Hematology / Oncology Progress Note    Reason for Admission:  Prostate cancer metastatic to bone [facility]) [C61, C79.51]    24 Hour Events:  Afebrile, VSS  Pain controlled  CCa++ down to 10.6  Had BM x 4 yesterday, constipation resolved  Awaiting placement, however pt states he may want to go home now since he will have support (friend moving to this state). PT/OT eval pending. Transfusions: 1 unit PRBCs  Replacements: None    ROS:  Constitutional: Positive for fatigue, previous fall, weakness; negative for fever, chills. CV: Negative for chest pain, palpitations, edema. Respiratory: Negative for dyspnea, cough, wheezing. GI: Negative for nausea, abdominal pain, diarrhea. MSK:  +Back/hip pain (pain controlled)    10 point review of systems is otherwise negative with the exception of the elements mentioned above in the HPI. Allergies   Allergen Reactions    Turmeric Nausea And Vomiting     Past Medical History:   Diagnosis Date    Claustrophobia     Ear problems     Elevated PSA     Former light cigarette smoker (1-9 per day)     smoked for 35 years.   quit     History of multiple allergies     Nicotine vapor product user     Personal history of prostate cancer     Prostate cancer ([address]     prostate, neuroendocrine, mets to bone     Past Surgical History:   Procedure Laterality Date    BIOPSY PROSTATE      COLONOSCOPY  08/2020    HEENT      teeth removed     IR KYPHOPLASTY LUMBAR FIRST LEVEL  10/14/2020    IR KYPHOPLASTY LUMBAR FIRST LEVEL  10/14/2020    IR KYPHOPLASTY LUMBAR FIRST LEVEL 10/14/2020 [facility]    IR KYPHOPLASTY THORACIC FIRST LEVEL  10/29/2020    IR KYPHOPLASTY THORACIC FIRST LEVEL  10/29/2020    IR KYPHOPLASTY THORACIC FIRST LEVEL 10/29/2020 [facility]    TESTICLE REMOVAL Bilateral 02/2017     Family History   Problem Relation Age of Onset    Hypertension Father     Prostate Cancer Father     Cancer Father         prostate cancer     Social History     Socioeconomic History    Marital status: Single     Spouse name: Not on file    Number of children: Not on file    Years of education: Not on file    Highest education level: Not on file   Occupational History    Not on file   Tobacco Use    Smoking status: Former Smoker     Packs/day: 0.50     Quit date: 2016     Years since quittin.6    Smokeless tobacco: Never Used   Substance and Sexual Activity    Alcohol use: Yes    Drug use: No    Sexual activity: Not on file   Other Topics Concern    Not on file   Social History Narrative    Not on file     Social Determinants of Health     Financial Resource Strain:     Difficulty of Paying Living Expenses: Not on file   Food Insecurity:     Worried About Running Out of Food in the Last Year: Not on file    [place] of Food in the Last Year: Not on file   Transportation Needs:     Lack of Transportation (Medical): Not on file    Lack of Transportation (Non-Medical):  Not on file   Physical Activity:     Days of Exercise per Week: Not on file    Minutes of Exercise per Session: Not on file   Stress:     Feeling of Stress : Not on file   Social Connections:     Frequency of Communication with Friends and Family: Not on file    Frequency of Social Gatherings with Friends and Family: Not on file    Attends Protestant Services: Not on file    Active Member of [address] [place] [address] [employer] or Organizations: Not on file    Attends Club or Organization Meetings: Not on file    Marital Status: Not on file   Intimate Partner Violence:     Fear of Current or Ex-Partner: Not on file    Emotionally Abused: Not on file    Physically Abused: Not on file    Sexually Abused: Not on file   Housing Stability:     Unable to Pay for Housing in the Last Year: Not on file    Number of [place] in the Last Year: Not on file    Unstable Housing in the Last Year: Not on file     Current Facility-Administered Medications   Medication Dose Route Frequency Provider Last Rate Last Admin    0.9 % sodium chloride infusion   IntraVENous PRN HELEN Rodriguez CNP        enoxaparin (LOVENOX) injection 40 mg  40 mg SubCUTAneous Daily HELEN Rodriguez CNP        fentaNYL (DURAGESIC) 75 MCG/HR 1 patch  1 patch TransDERmal Q72H HELEN Marcus CNP        HYDROmorphone (DILAUDID) tablet 2 mg  2 mg Oral Q4H PRN HELEN Taylor CNP        Or    HYDROmorphone (DILAUDID) tablet 4 mg  4 mg Oral Q4H PRN Gregg Plush, APRN - CNP   4 mg at 07/13/22 1174    magnesium oxide (MAG-OX) tablet 400 mg  400 mg Oral TID Sid Terry MD   400 mg at 07/13/22 0807    naloxone (NARCAN) injection 0.04 mg  0.04 mg IntraVENous PRN HELEN Marcus CNP        sennosides-docusate sodium (SENOKOT-S) 8.6-50 MG tablet 2 tablet  2 tablet Oral Daily HELEN Taylor CNP   2 tablet at 07/13/22 0808    dexamethasone (DECADRON) tablet 4 mg  4 mg Oral 2 times per day HELEN Rodriguez CNP   4 mg at 07/13/22 0807    diphenhydrAMINE (BENADRYL) capsule 25 mg  25 mg Oral Q6H PRN Geovani David MD   25 mg at 07/11/22 0300    HYDROmorphone HCl PF (DILAUDID) injection 1 mg  1 mg IntraVENous Q3H PRN HELEN Hernandes NP   1 mg at 07/12/22 0637    aluminum & magnesium hydroxide-simethicone (MAALOX) 056-046-59 MG/5ML suspension 20 mL  20 mL Oral Q6H PRN Sid Terry MD   20 mL at 07/13/22 0809    LORazepam (ATIVAN) tablet 1 mg  1 mg Oral TID PRN Gerardo Hatfield DO   1 mg at 07/08/22 1946    pantoprazole (PROTONIX) tablet 40 mg  40 mg Oral QAM AC Jen Martinez DO   40 mg at 07/13/22 5474    sodium chloride flush 0.9 % injection 5-40 mL  5-40 mL IntraVENous 2 times per day Gerardo Hatfield, DO   10 mL at 07/13/22 0844    sodium chloride flush 0.9 % injection 5-40 mL  5-40 mL IntraVENous PRN Gerardo Hatfield,         0.9 % sodium chloride infusion   IntraVENous PRN Gerardo Hatfield, DO        ondansetron (ZOFRAN-ODT) disintegrating tablet 4 mg  4 mg Oral Q8H PRN Hershell Juan Manuel, DO        Or    [facility]ron [facility] injection 4 mg  4 mg IntraVENous Q6H PRN Hershell Juan Manuel, DO   4 mg at 07/10/22 1717    acetaminophen (TYLENOL) tablet 650 mg  650 mg Oral Q6H PRN Hershell Juan Manuel, DO   650 mg at 22 2239    Or    acetaminophen (TYLENOL) suppository 650 mg  650 mg Rectal Q6H PRN Hershell Juan Manuel, DO        docusate sodium (COLACE) capsule 100 mg  100 mg Oral BID PRN Hershell Juan Manuel, DO        polyethylene glycol (GLYCOLAX) packet 17 g  17 g Oral Daily Hershell Juan Manuel, DO   17 g at 22 0808    lactulose (CHRONULAC) 10 GM/15ML solution 20 g  20 g Oral Once Hershell Juan Manuel, DO           OBJECTIVE:  Patient Vitals for the past 8 hrs:   BP Temp Temp src Pulse Resp SpO2   22 0736 123/73 98.4 °F (36.9 °C) Oral 70 18 96 %   22 0241 116/72 97.9 °F (36.6 °C) Oral 65 18 93 %     Temp (24hrs), Av °F (36.7 °C), Min:97.5 °F (36.4 °C), Max:98.5 °F (36.9 °C)    No intake/output data recorded. Physical Exam:  Constitutional: Well developed male in no acute distress, sitting comfortably in the hospital bed. HEENT: Normocephalic and atraumatic. Oropharynx is clear, mucous membranes are moist.  Neck supple. Skin Warm and dry. Bruising noted on BLE and no rash noted. No erythema. No pallor. Respiratory Lungs are clear to auscultation bilaterally without wheezes, rales or rhonchi, normal air exchange without accessory muscle use. CVS Normal rate, regular rhythm and normal S1 and S2. No murmurs, gallops, or rubs. Abdomen Soft, nontender and nondistended, normoactive bowel sounds. No palpable mass. No hepatosplenomegaly. Neuro Grossly nonfocal with no obvious sensory or motor deficits. MSK Normal range of motion in general.  No edema and no tenderness. Psych Appropriate mood and affect.         Labs:    Recent Results (from the past 24 hour(s))   CBC with Auto Differential    Collection Time: 07/13/22  3:40 AM   Result Value Ref Range    WBC 7.6 4.3 - 11.1 K/uL    RBC 2.09 (L) 4.23 - 5.6 M/uL    Hemoglobin 7.0 (L) 13.6 - 17.2 g/dL    Hematocrit 21.3 (L) 41.1 - 50.3 %    .9 (H) 79.6 - 97.8 FL    MCH 33.5 (H) 26.1 - 32.9 PG    MCHC 32.9 31.4 - 35.0 g/dL    RDW 18.2 (H) 11.9 - 14.6 %    Platelets 60 (L) 856 - 450 K/uL    MPV 10.8 9.4 - 12.3 FL    nRBC 0.02 0.0 - 0.2 K/uL    Differential Type AUTOMATED      Seg Neutrophils 83 (H) 43 - 78 %    Lymphocytes 5 (L) 13 - 44 %    Monocytes 10 4.0 - 12.0 %    Eosinophils % 0 (L) 0.5 - 7.8 %    Basophils 0 0.0 - 2.0 %    Immature Granulocytes 1 0.0 - 5.0 %    Segs Absolute 6.3 1.7 - 8.2 K/UL    Absolute Lymph # 0.4 (L) 0.5 - 4.6 K/UL    Absolute Mono # 0.8 0.1 - 1.3 K/UL    Absolute Eos # 0.0 0.0 - 0.8 K/UL    Basophils Absolute 0.0 0.0 - 0.2 K/UL    Absolute Immature Granulocyte 0.1 0.0 - 0.5 K/UL   Comprehensive Metabolic Panel    Collection Time: 07/13/22  3:40 AM   Result Value Ref Range    Sodium 135 (L) 138 - 145 mmol/L    Potassium 3.9 3.5 - 5.1 mmol/L    Chloride 103 98 - 107 mmol/L    CO2 28 21 - 32 mmol/L    Anion Gap 4 (L) 7 - 16 mmol/L    Glucose 111 (H) 65 - 100 mg/dL    BUN 35 (H) 8 - 23 MG/DL    CREATININE 1.20 0.8 - 1.5 MG/DL    GFR African American >60 >60 ml/min/1.73m2    GFR Non- >60 >60 ml/min/1.73m2    Calcium 9.5 8.3 - 10.4 MG/DL    Total Bilirubin 0.5 0.2 - 1.1 MG/DL    ALT 77 (H) 12 - 65 U/L     (H) 15 - 37 U/L    Alk Phosphatase 201 (H) 50 - 136 U/L    Total Protein 5.8 (L) 6.3 - 8.2 g/dL    Albumin 2.6 (L) 3.2 - 4.6 g/dL    Globulin 3.2 2.3 - 3.5 g/dL    Albumin/Globulin Ratio 0.8 (L) 1.2 - 3.5     Magnesium    Collection Time: 07/13/22  3:40 AM   Result Value Ref Range    Magnesium 1.6 (L) 1.8 - 2.4 mg/dL       Imaging:  Xray Result (most recent):  XR CHEST PORTABLE 07/08/2022    [facility]  Portable chest x-ray    CLINICAL INDICATION: Sternal pain, metastatic cancer    FINDINGS: Single AP view of the chest compared to a similar exam dated 9/1/2020  show the lungs to be expanded and clear. The cardiac silhouette and mediastinum  are unremarkable. A right-sided chest port is in place. There is no pleural  effusion or pneumothorax. No definite bony abnormality. Note the sternum is not  able to be evaluated on this single portable frontal projection of the chest.    Impression  No acute cardiopulmonary abnormality. 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)    Hypokalemia    Gynecomastia, male    Neuroendocrine carcinoma, high grade (HCC)    Anemia due to chemotherapy    Primary prostate cancer with metastasis from prostate to other site [facility])    Prostatic nodule    Family history of prostate cancer in father    Acute prostatitis    S/P TURP    Prostate cancer ([address]    Prostate cancer metastatic to bone ([address]    Thrombocytopenia (HCC)    Hypomagnesemia    Hypercalcemia of malignancy    Pain due to malignant neoplasm metastatic to bone ([address]    EDGARD (acute kidney injury) ([address]    Debility    Drug-induced constipation    Encounter for palliative care     Mr. Manuel Clements is a 78 y.o. male admitted on 7/8/2022 with a primary diagnosis of There were no encounter diagnoses. .       Mr. Manuel Clements has a PMH of metastatic prostate cancer dx 2017 s/p casodex and bilateral orchiectomy for surgical castration, 6 cycles of docetaxel, pathologic vertebral fx's with path positive for high grade neuroendocrine cancer, s/p carbo/etoposide and most recently carbo/taxotere which was held d/t TCP. Last was C2 on 6/8/22. 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 He is a known patient of Dr. Nirmala Scott s/p multiple lines of treatment as above. At last office visit on 6/29, CEA had notably continued to rise to 8290.   He has had [facility] with [facility], now, up around in room since pain controlled. 7/13 Had BM x 4 yesterday    Hypercalcemia  7/12 CCa++ up to 11. 7. Zometa ordered. 7/13 CCa++ down to 10.6    Continue home meds  Denae SOPs  AC contraindicated d/t thrombocytopenia    Goals and plan of care reviewed with the patient. All questions answered to the best of our ability. Disposition:  Awaiting placement, however pt states he may be interested in going home as he has a friend moving here from out of state that can provide support. Consult PT/OT.               HELEN Vasquez Hematology & Oncology  [address] [address]  Office : [phone]  Fax : [phone]

## 2022-07-13 NOTE — PROGRESS NOTES
PHYSICAL THERAPY Initial Assessment and Daily Note  (Link to Caseload Tracking: PT Visit Days : 1  Acknowledge Orders  Time In/Out  PT Charge Capture  Rehab Caseload Tracker    Lavern Cantu is a 78 y.o. male   PRIMARY DIAGNOSIS: Prostate cancer metastatic to bone [facility])  Prostate cancer metastatic to bone ([facility] [address] [C61, C79.51]       Reason for Referral: Generalized Muscle Weakness (M62.81)  History of falling (Z91.81)  Inpatient: Payor: Moise Mondragon / Plan: Rupal Face / Product Type: *No Product type* /     ASSESSMENT:     REHAB RECOMMENDATIONS:   Recommendation to date pending progress:  [address] [place] [address] Therapy    Equipment:     Rolling Walker     ASSESSMENT:  Mr. Maria L Pickett presents with generalized pain and weakness and a recent fall at home. His PMH is significant for metastatic prostate cancer with pathologic vertebral fractures. Today his pain is well controlled and he is able to perform bed mobility with use of the side rails and SBA. Pt ambulated 250' with a wide NAN and some instability noted to improve with increased distance. He has been significantly limited by his pain throughout this visit and would benefit from having a RW at home to use with flare ups. Pt is demonstrating generalized weakness, instability and overall decreased activity tolerance and would continue to benefit from skilled acute care therapy to promote return to independent baseline. HHPT recommended upon DC at this time.       [facility] [address] [address] AM-PAC 6 Clicks Basic Mobility Inpatient Short Form  AM-PAC Mobility Inpatient   How much difficulty turning over in bed?: None  How much difficulty sitting down on / standing up from a chair with arms?: None  How much difficulty moving from lying on back to sitting on side of bed?: None  How much help from another person moving to and from a bed to a chair?: None  How much help from another person needed to walk in hospital room?: None  How much help from another person for climbing 3-5 steps with a railing?: A Little  AM-PAC Inpatient Mobility Raw Score : 23  AM-PAC Inpatient T-Scale Score : 56.93  Mobility Inpatient CMS 0-100% Score: 11.2  Mobility Inpatient CMS G-Code Modifier : CI    SUBJECTIVE:   Mr. Marina Victor states, \"I cannot return home, I am trying to figure out something else\"     Social/Functional Lives With: Alone  Type of Home: House  Home Layout: Two level  Receives Help From: Family  ADL Assistance: Independent  Homemaking Assistance: Independent  Ambulation Assistance: Independent  Transfer Assistance: Independent  Active : Yes  Mode of Transportation: Car  Occupation: Retired    OBJECTIVE:     PAIN: Teretha Backers / O2: Tc Castro / Gordon Melgar / Pearl Hallmark:   Pre Treatment:   Pain Assessment: None - Denies Pain      Post Treatment: 0/10 Vitals        Oxygen      None    RESTRICTIONS/PRECAUTIONS:                    GROSS EVALUATION: Intact Impaired (Comments):   AROM [x]     PROM []    Strength [] Not formally tested due to mets to bone, some generalized weakness noted with ambulation bilaterally    Balance [] Posture: Fair  Sitting - Static: Good  Sitting - Dynamic: Good  Standing - Static: Fair,+  Standing - Dynamic: Fair,+   Posture [] Forward Head  Rounded Shoulders   Sensation [x]     Coordination []      Tone []     Edema []    Activity Tolerance [] Patient limited by fatigue   Typically limited by pain    []      COGNITION/  PERCEPTION: Intact Impaired (Comments):   Orientation [x]     Vision [] Wears glasses   Hearing [x]     Cognition  [x]       MOBILITY: I Mod I S SBA CGA Min Mod Max Total  NT x2 Comments:   Bed Mobility    Rolling [] [] [] [x] [] [] [] [] [] [] []    Supine to Sit [] [] [] [x] [] [] [] [] [] [] []    Scooting [] [] [] [x] [] [] [] [] [] [] []    Sit to Supine [] [] [] [] [] [] [] [] [] [x] []    Transfers    Sit to Stand [] [] [] [x] [] [] [] [] [] [] []    Bed to Chair [] [] [] [] [] [] [] [] [] [x] []    Stand to Sit [] [] [] [x] [] [] [] [] [] [] []     [] [] [] [] [] [] [] [] [] [] []    I=Independent, Mod I=Modified Independent, S=Supervision, SBA=Standby Assistance, CGA=Contact Guard Assistance,   Min=Minimal Assistance, Mod=Moderate Assistance, Max=Maximal Assistance, Total=Total Assistance, NT=Not Tested    GAIT: I Mod I S SBA CGA Min Mod Max Total  NT x2 Comments:   Level of Assistance [] [] [] [x] [x] [] [] [] [] [] []    Distance 250 feet    DME None    Gait Quality Trunk sway increased    Weightbearing Status      Stairs      I=Independent, Mod I=Modified Independent, S=Supervision, SBA=Standby Assistance, CGA=Contact Guard Assistance,   Min=Minimal Assistance, Mod=Moderate Assistance, Max=Maximal Assistance, Total=Total Assistance, NT=Not Tested    PLAN:   ACUTE PHYSICAL THERAPY GOALS:   (Developed with and agreed upon by patient and/or caregiver. )  LTG:  (1.)Mr. Carmen Mcmillan will move from supine to sit and sit to supine , scoot up and down and roll side to side in bed with INDEPENDENCE within 5 treatment day(s). (2.)Mr. Carmen Mcmillan will transfer from bed to chair and chair to bed with INDEPENDENCE using the least restrictive device within 5 treatment day(s). (3.)Mr. Carmen Mcmillan will ambulate with INDEPENDENCE for a minimum of 500 feet with the least restrictive device within 7 treatment day(s). (4.)Mr. Carmen Mcmillan will tolerate 25+ minutes of therapeutic exercise with rest breaks as needed in order to demonstrate improved activity tolerance within 7 treatment days.    ________________________________________________________________________________________________    FREQUENCY AND DURATION: 3 times/week for duration of hospital stay or until stated goals are met, whichever comes first.    THERAPY PROGNOSIS: Fair    PROBLEM LIST:   (Skilled intervention is medically necessary to address:)  Decreased Activity Tolerance  Decreased Balance  Decreased Gait Ability  Decreased Strength  Decreased Transfer Abilities  Increased Pain INTERVENTIONS PLANNED:   (Benefits and precautions of physical therapy have been discussed with the patient.)  Therapeutic Activity  Therapeutic Exercise/HEP  Neuromuscular Re-education  Gait Training  Education       TREATMENT:   EVALUATION: MODERATE COMPLEXITY: (Untimed Charge)    TREATMENT:   Therapeutic Activity (15 Minutes): Therapeutic activity included Rolling, Supine to Sit, Scooting, Transfer Training, Ambulation on level ground, Sitting balance  and Standing balance to improve functional Activity tolerance, Balance and Mobility.     TREATMENT GRID:  N/A    AFTER TREATMENT PRECAUTIONS: Bed/Chair Locked, Call light within reach, Chair and Needs within reach    INTERDISCIPLINARY COLLABORATION:  RN/ PCT and PT/ PTA    EDUCATION: Education Given To: Patient  Education Provided: Role of Therapy;Plan of Care  Education Method: Verbal  Barriers to Learning: None  Education Outcome: Verbalized understanding;Continued education needed    TIME IN/OUT:  Time In: 0951  Time Out: [address]  Minutes: [id number] [place], [place]

## 2022-07-14 NOTE — PROGRESS NOTES
Palliative Care Progress Note    Patient: Lavern Cantu MRN: [id number]  SSN: [id number]    YOB: 1955  Age: 78 y.o. Sex: male       Assessment/Plan:     Chief Complaint/Interval History: pain dramatically improved       Principal Diagnosis:    · Pain, bone  M89.9    Additional Diagnoses:   · Debility, Unspecified  R53.81  · Counseling, Encounter for Medical Advice  Z71.9  · Encounter for Palliative Care  Z51.5    Palliative Performance Scale (PPS)       Medical Decision Making:   Reviewed and summarized notes over last 24 hours   Discussed case with appropriate providers  Reviewed laboratory and x-ray data over last 24 hours     Pt sitting in recliner, appears comfortable. No visitors at bedside. Pt reports his pain is dramatically better. He did not require breakthrough pain medication through the night. He also reports he can walk with minimal pain, and take a deep breath with no pain. Discharge planning is underway. Pt has decided that he does not want to enroll with hospice just yet. Will arrange follow up with PC at [facility] for ongoing pain and symptom management. He will need to be discharged home with prescriptions for Fentanyl 75 mcg q 72 hours and Dilaudid 2 mg tablets, 1 or 2 tablets every 4 hours PRN. Will continue to follow loosely. Will discuss findings with members of the interdisciplinary team.         More than 50% of this 35 minute visit was spent counseling and coordination of care as outlined above. Subjective:     Review of Systems:  A comprehensive review of systems was negative except for:   Constitutional: Positive for fatigue. Musculoskeletal: Positive for improving bone pain     Objective:     Visit Vitals  /74   Pulse 68   Temp 98.3 °F (36.8 °C) (Oral)   Resp 17   Ht 5' 10\" (1.778 m)   Wt 203 lb 3.2 oz (92.2 kg)   SpO2 98%   BMI 29.16 kg/m²       Physical Exam:    General:  Cooperative. No acute distress.    Eyes:  Conjunctivae/corneas clear    Nose: Nares normal. Septum midline.    Neck: Supple, symmetrical, trachea midline, no JVD   Lungs:   Clear to auscultation bilaterally, unlabored   Heart:  Regular rate and rhythm, no murmur    Abdomen:   Soft, non-tender, non-distended   Extremities: Normal, atraumatic, no cyanosis or edema   Skin: Skin color, texture, turgor normal.   Neurologic: Nonfocal   Psych: Alert and oriented     Signed By: HELEN Thompson - MICHELLE     July 14, 2022

## 2022-07-14 NOTE — PROGRESS NOTES
[facility] Hematology & Oncology        Inpatient Hematology / Oncology Progress Note    Reason for Admission:  Prostate cancer metastatic to bone ([facility] [address] [C61, C79.51]    24 Hour Events:  Afebrile, VSS  Pain controlled  CCa++ down to 10.0  PT recommends HHT  Pt reports he has a possible home to go to, but is waiting for confirmation; declines Hospice at this time    Transfusions: None  Replacements: None    ROS:  Constitutional: Positive for fatigue; negative for fever, chills. CV: Negative for chest pain, palpitations, edema. Respiratory: Negative for dyspnea, cough, wheezing. GI: Negative for nausea, abdominal pain, diarrhea. MSK:  +Back/hip pain (pain controlled)    10 point review of systems is otherwise negative with the exception of the elements mentioned above in the HPI. Allergies   Allergen Reactions    Turmeric Nausea And Vomiting     Past Medical History:   Diagnosis Date    Claustrophobia     Ear problems     Elevated PSA     Former light cigarette smoker (1-9 per day)     smoked for 35 years.   quit     History of multiple allergies     Nicotine vapor product user     Personal history of prostate cancer     Prostate cancer ([facility] [address]     prostate, neuroendocrine, mets to bone     Past Surgical History:   Procedure Laterality Date    BIOPSY PROSTATE      COLONOSCOPY  08/2020    HEENT      teeth removed     IR KYPHOPLASTY LUMBAR FIRST LEVEL  10/14/2020    IR KYPHOPLASTY LUMBAR FIRST LEVEL  10/14/2020    IR KYPHOPLASTY LUMBAR FIRST LEVEL 10/14/2020 [facility]    IR KYPHOPLASTY THORACIC FIRST LEVEL  10/29/2020    IR KYPHOPLASTY THORACIC FIRST LEVEL  10/29/2020    IR KYPHOPLASTY THORACIC FIRST LEVEL 10/29/2020 [facility]    TESTICLE REMOVAL Bilateral 02/2017     Family History   Problem Relation Age of Onset    Hypertension Father     Prostate Cancer Father     Cancer Father         prostate cancer     Social History     Socioeconomic History    Marital status: Single     Spouse name: Not on file    Number of children: Not on file    Years of education: Not on file    Highest education level: Not on file   Occupational History    Not on file   Tobacco Use    Smoking status: Former Smoker     Packs/day: 0.50     Quit date: 2016     Years since quittin.6    Smokeless tobacco: Never Used   Substance and Sexual Activity    Alcohol use: Yes    Drug use: No    Sexual activity: Not on file   Other Topics Concern    Not on file   Social History Narrative    Not on file     Social Determinants of Health     Financial Resource Strain:     Difficulty of Paying Living Expenses: Not on file   Food Insecurity:     Worried About Running Out of Food in the Last Year: Not on file    [place] of Food in the Last Year: Not on file   Transportation Needs:     Lack of Transportation (Medical): Not on file    Lack of Transportation (Non-Medical):  Not on file   Physical Activity:     Days of Exercise per Week: Not on file    Minutes of Exercise per Session: Not on file   Stress:     Feeling of Stress : Not on file   Social Connections:     Frequency of Communication with Friends and Family: Not on file    Frequency of Social Gatherings with Friends and Family: Not on file    Attends [employer] Services: Not on file    Active Member of [employer] or Organizations: Not on file    Attends Club or Organization Meetings: Not on file    Marital Status: Not on file   Intimate Partner Violence:     Fear of Current or Ex-Partner: Not on file    Emotionally Abused: Not on file    Physically Abused: Not on file    Sexually Abused: Not on file   Housing Stability:     Unable to Pay for Housing in the Last Year: Not on file    Number of [place] in the Last Year: Not on file    Unstable Housing in the Last Year: Not on file     Current Facility-Administered Medications   Medication Dose Route Frequency Provider Last Rate Last Admin    0.9 % sodium chloride infusion   IntraVENous PRN HELEN Castillo CNP        enoxaparin (LOVENOX) injection 40 mg  40 mg SubCUTAneous Daily HELEN Castillo CNP   40 mg at 07/13/22 1028    fentaNYL (DURAGESIC) 75 MCG/HR 1 patch  1 patch TransDERmal Q72H HELEN Marcus CNP   1 patch at 07/13/22 1108    diphenhydrAMINE (BENADRYL) capsule 25 mg  25 mg Oral Q6H PRN HELEN Castillo CNP   25 mg at 07/13/22 1515    acetaminophen (TYLENOL) tablet 650 mg  650 mg Oral Q6H PRN HELEN Castillo CNP   650 mg at 07/13/22 1514    Or    acetaminophen (TYLENOL) suppository 650 mg  650 mg Rectal Q6H PRN HELEN Castillo CNP        HYDROmorphone (DILAUDID) tablet 2 mg  2 mg Oral Q4H PRN HELEN Kemp - CNP   2 mg at 07/14/22 0532    Or    HYDROmorphone (DILAUDID) tablet 4 mg  4 mg Oral Q4H PRN HELEN Kemp CNP   4 mg at 07/13/22 2126    magnesium oxide (MAG-OX) tablet 400 mg  400 mg Oral TID Karuna Rueda MD   400 mg at 07/13/22 2127    naloxone (NARCAN) injection 0.04 mg  0.04 mg IntraVENous PRN HELEN Kemp CNP        sennosides-docusate sodium (SENOKOT-S) 8.6-50 MG tablet 2 tablet  2 tablet Oral Daily HELEN Kemp CNP   2 tablet at 07/13/22 0808    dexamethasone (DECADRON) tablet 4 mg  4 mg Oral 2 times per day HELEN Castillo CNP   4 mg at 07/13/22 2126    HYDROmorphone HCl PF (DILAUDID) injection 1 mg  1 mg IntraVENous Q3H PRN HELEN Robins NP   1 mg at 07/12/22 8621    aluminum & magnesium hydroxide-simethicone (MAALOX) 818-583-27 MG/5ML suspension 20 mL  20 mL Oral Q6H PRN Karuna Rueda MD   20 mL at 07/13/22 0809    LORazepam (ATIVAN) tablet 1 mg  1 mg Oral TID PRN Ryan Stephen DO   1 mg at 07/08/22 1946    pantoprazole (PROTONIX) tablet 40 mg  40 mg Oral QAM  Jen Martinez DO   40 mg at 07/13/22 2967    sodium chloride flush 0.9 % injection 5-40 mL  5-40 mL IntraVENous 2 times per day Ryan Stephen DO   10 mL at 07/13/22 8316  sodium chloride flush 0.9 % injection 5-40 mL  5-40 mL IntraVENous PRN Taryn Miu, DO        0.9 % sodium chloride infusion   IntraVENous PRN Taryn Miu, DO        ondansetron (ZOFRAN-ODT) disintegrating tablet 4 mg  4 mg Oral Q8H PRN Taryn Miu, DO        Or    ondansetron [facility]) injection 4 mg  4 mg IntraVENous Q6H PRN Taryn Miu, DO   4 mg at 07/10/22 1717    docusate sodium (COLACE) capsule 100 mg  100 mg Oral BID PRN Taryn Miu, DO        polyethylene glycol (GLYCOLAX) packet 17 g  17 g Oral Daily Taryn Miu, DO   17 g at 22 0808    lactulose (CHRONULAC) 10 GM/15ML solution 20 g  20 g Oral Once Taryn Miu, DO           OBJECTIVE:  Patient Vitals for the past 8 hrs:   BP Temp Temp src Pulse Resp SpO2   22 0752 132/74 98.3 °F (36.8 °C) Oral 68 17 98 %     Temp (24hrs), Av.4 °F (36.9 °C), Min:98 °F (36.7 °C), Max:98.8 °F (37.1 °C)    No intake/output data recorded. Physical Exam:  Constitutional: Well developed male in no acute distress, sitting comfortably in the hospital bed. HEENT: Normocephalic and atraumatic. Oropharynx is clear, mucous membranes are moist.  Neck supple. Skin Warm and dry. Bruising noted on BLE and no rash noted. No erythema. No pallor. Respiratory Lungs are clear to auscultation bilaterally without wheezes, rales or rhonchi, normal air exchange without accessory muscle use. CVS Normal rate, regular rhythm and normal S1 and S2. No murmurs, gallops, or rubs. Abdomen Soft, nontender and nondistended, normoactive bowel sounds. No palpable mass. No hepatosplenomegaly. Neuro Grossly nonfocal with no obvious sensory or motor deficits. MSK Normal range of motion in general.  No edema and no tenderness. Psych Appropriate mood and affect.         Labs:    Recent Results (from the past 24 hour(s))   TYPE AND SCREEN    Collection Time: 22 10:52 AM   Result Value Ref Range    Crossmatch expiration date 07/16/2022,1085     ABO/Rh A POSITIVE     Antibody Screen NEG     Unit Number [id number]     Product Code Blood Bank [facility] LRIR     Unit Divison 00     Dispense Status Blood Bank TRANSFUSED     Crossmatch Result Compatible    Hemoglobin and Hematocrit    Collection Time: 07/13/22  7:47 PM   Result Value Ref Range    Hemoglobin 8.7 (L) 13.6 - 17.2 g/dL    Hematocrit 26.7 (L) 41.1 - 50.3 %   CBC with Auto Differential    Collection Time: 07/14/22  5:26 AM   Result Value Ref Range    WBC 6.7 4.3 - 11.1 K/uL    RBC 2.73 (L) 4.23 - 5.6 M/uL    Hemoglobin 8.5 (L) 13.6 - 17.2 g/dL    Hematocrit 26.2 (L) 41.1 - 50.3 %    MCV 96.0 79.6 - 97.8 FL    MCH 31.1 26.1 - 32.9 PG    MCHC 32.4 31.4 - 35.0 g/dL    RDW 22.3 (H) 11.9 - 14.6 %    Platelets 65 (L) 539 - 450 K/uL    MPV 10.6 9.4 - 12.3 FL    nRBC 0.02 0.0 - 0.2 K/uL    Differential Type AUTOMATED      Seg Neutrophils 86 (H) 43 - 78 %    Lymphocytes 4 (L) 13 - 44 %    Monocytes 9 4.0 - 12.0 %    Eosinophils % 0 (L) 0.5 - 7.8 %    Basophils 0 0.0 - 2.0 %    Immature Granulocytes 1 0.0 - 5.0 %    Segs Absolute 5.7 1.7 - 8.2 K/UL    Absolute Lymph # 0.3 (L) 0.5 - 4.6 K/UL    Absolute Mono # 0.6 0.1 - 1.3 K/UL    Absolute Eos # 0.0 0.0 - 0.8 K/UL    Basophils Absolute 0.0 0.0 - 0.2 K/UL    Absolute Immature Granulocyte 0.1 0.0 - 0.5 K/UL   Comprehensive Metabolic Panel    Collection Time: 07/14/22  5:26 AM   Result Value Ref Range    Sodium 135 (L) 138 - 145 mmol/L    Potassium 4.1 3.5 - 5.1 mmol/L    Chloride 103 98 - 107 mmol/L    CO2 27 21 - 32 mmol/L    Anion Gap 5 (L) 7 - 16 mmol/L    Glucose 95 65 - 100 mg/dL    BUN 31 (H) 8 - 23 MG/DL    CREATININE 1.20 0.8 - 1.5 MG/DL    GFR African American >60 >60 ml/min/1.73m2    GFR Non- >60 >60 ml/min/1.73m2    Calcium 9.0 8.3 - 10.4 MG/DL    Total Bilirubin 0.6 0.2 - 1.1 MG/DL     (H) 12 - 65 U/L     (H) 15 - 37 U/L    Alk Phosphatase 232 (H) 50 - 136 U/L    Total Protein 6.4 6.3 - 8.2 g/dL Albumin 2.8 (L) 3.2 - 4.6 g/dL    Globulin 3.6 (H) 2.3 - 3.5 g/dL    Albumin/Globulin Ratio 0.8 (L) 1.2 - 3.5     Magnesium    Collection Time: 07/14/22  5:26 AM   Result Value Ref Range    Magnesium 1.9 1.8 - 2.4 mg/dL       Imaging:  Xray Result (most recent):  XR CHEST PORTABLE 07/08/2022    Narrative  Portable chest x-ray    CLINICAL INDICATION: Sternal pain, metastatic cancer    FINDINGS: Single AP view of the chest compared to a similar exam dated 9/1/2020  show the lungs to be expanded and clear. The cardiac silhouette and mediastinum  are unremarkable. A right-sided chest port is in place. There is no pleural  effusion or pneumothorax. No definite bony abnormality. Note the sternum is not  able to be evaluated on this single portable frontal projection of the chest.    Impression  No acute cardiopulmonary abnormality. 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)    Hypokalemia    Gynecomastia, male    Neuroendocrine carcinoma, high grade (HCC)    Anemia due to chemotherapy    Primary prostate cancer with metastasis from prostate to other site [facility])    Prostatic nodule    Family history of prostate cancer in father    Acute prostatitis    S/P TURP    Prostate cancer ([address]    Prostate cancer metastatic to bone ([address]    Thrombocytopenia (HCC)    Hypomagnesemia    Hypercalcemia of malignancy    Pain due to malignant neoplasm metastatic to bone ([address]    EDGARD (acute kidney injury) ([address]    Debility    Drug-induced constipation    Encounter for palliative care     Mr. Stephen Mcrae is a 78 y.o. male admitted on 7/8/2022 with a primary diagnosis of There were no encounter diagnoses. .       Mr. Stephen Mcrae has a PMH of metastatic prostate cancer dx 2017 s/p casodex and bilateral orchiectomy for surgical castration, 6 cycles of docetaxel, pathologic vertebral fx's with path positive for high grade neuroendocrine cancer, s/p carbo/etoposide and most recently carbo/taxotere which was held d/t TCP. Last was C2 on 6/8/22. 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 He is a known patient of Dr. Jose Bowman s/p multiple lines of treatment as above. At last office visit on 6/29, CEA had notably continued to rise to 8290. He has had discussion with [facility] ([place], [place]), [facility], however wished to hold off on making a decision until he completed radiation therapy. He presented to the ER with a fall at home, increasing pain in the hips and generalized weakness. Also with constipation. Also found to be anemic and transfused PRBC. We were asked for recommendations for our known patient. PLAN:  Metastatic Prostate Cancer/Neuroendocrine  - S/p multiple lines of therapy, most recently carbo/taxotere C2 on 6/8. Held on 6/29 d/t TCP. Undergoing XRT to rib and T-spine with Dr. Virgilio Metcalf.  - Has spoken to [facility], however wished to hold off on making a decision until he saw if XRT improved his pain  - Can resume XRT on Monday 7/11 Pt does not wish to pursue XRT. Meeting with PC today for pain mgmt and to discuss [address] - wishes to pursue facility placement and enroll in Hospice. 7/13 Pt now states he may be interested in going home since he will have support from friend moving here from out of state. Consult PT/OT. 7/14  PT recommends HHT     Cancer Related Pain  - Currently with Dilaudid/oxy prn  - Consult palliative care to assist with pain management (known from office) and also to assist with goals of care discussions   7/10 Increased Dilaudid to 1 mg every 3 hours for severe pain. Pall care consult pending. 7/11 PC assisting with mgmt. Resume Dex. 7/12 PC started Fentanyl 50mcg patch yesterday. Pt reports pain controlled today - even able to walk around room now. 7/13 Pain controlled. PC changed oxycodone to po dilaudid yesterday.   7/14 Pain controlled     Anemia/Thrombocytopenia  - Transfuse prn per [facility] SOPs     Elevated Tranaminases  - Alk phos/ALT/AST trended down today, continue to follow   7/11 AST/ALT continue to improve   7/13 LFTs improving     EDGARD  - Gentle hydration  7/13 Cr down to 1.2     Constipation  - Lactulose prn   7/11 Pt refusing Lactulose. PC started pericolace. Con't Miralax. Consider KUB if no improvement. 7/12 No BM documented. Con't Miralax/Pericolace. Pt more active now, up around in room since pain controlled. 7/13 Had BM x 4 yesterday    Hypercalcemia  7/12 CCa++ up to 11. 7. Zometa ordered. 7/14 CCa++ down to 10.0    Continue home meds  [facility] SOPs  AC contraindicated d/t thrombocytopenia    Goals and plan of care reviewed with the patient. All questions answered to the best of our ability. Disposition:  Pt reports his friend is not moving here until 8/1. He reports she has a one-story home that he can move into on 8/1 or possibly even sooner. Pt was able to walk hallways yesterday. Unsure if he would qualify for facility placement while future living arrangements are made. CM states pt would have to go through extensive insurance process to even cover facility placement if he qualified. CM assisting with discharge planning. ? Could pt stay with son who lives locally until his friend's house is ready to move into? Pt also declines Hospice services at this time. He states he would prefer HHT.             Gerry Osler, APRN - [address] Hematology & Oncology  [address] [address]  Office : [phone]  Fax : [phone]

## 2022-07-14 NOTE — CARE COORDINATION
Pt's d/c plan discussed with JOHANN Calhoun and Kiarra Melton NP with palliative care - 2 separate meetings. CM was informed that pt no longer wishes to pursue hospice services at this time. His pain is controlled and he is therefore mobile again. PT/OT have evaluated pt and recommend a rolling walker and a 3:1 at d/c.  PT recommends HH and OT does not recommend any further skilled therapy. Pt is now medically stable for d/c.  CM has had multiple meetings at the bedside with pt to discuss d/c planning. Pt is insistent that he cannot return to his house due to the stairs inside. Pt states he has a friend who will be coming to [place] on 8/1 to help care for him. Pt states he is trying to ask a friend who has a 1-story secondary home in [facility] to allow pt to stay there instead of returning to his 2-story house. Pt's son has mentioned to CM that he does have a second bedroom in his apartment and since this would be for 2 weeks and not for pt to remain there until he expires his son may be willing to assist.  CM has discussed in depth with Rosa Epstein. CM continues to receive push back from pt regarding returning to his own house. CM will continue to follow.

## 2022-07-15 NOTE — PROGRESS NOTES
END OF SHIFT SUMMARY:      Additional events this shift:   Pt resting in bed quietly.     Clive López RN    Oncoming shift report given to JULITA ALMARAZ

## 2022-07-15 NOTE — PROGRESS NOTES
[employer] [facility]        Inpatient Hematology / Oncology Progress Note    Reason for Admission:  Prostate cancer metastatic to bone [facility]) [C61, C79.51]    24 Hour Events:  Afebrile, VSS  Medically stable for discharge  Pt working on living arrangements  C/o dysuria, UA c/w UTI    Transfusions: None  Replacements: None    ROS:  Constitutional: Positive for fatigue; negative for fever, chills. CV: Negative for chest pain, palpitations, edema. Respiratory: Negative for dyspnea, cough, wheezing. GI: Negative for nausea, abdominal pain, diarrhea. MSK:  +Back/hip pain  : +dysuria    10 point review of systems is otherwise negative with the exception of the elements mentioned above in the HPI. Allergies   Allergen Reactions    Turmeric Nausea And Vomiting     Past Medical History:   Diagnosis Date    Claustrophobia     Ear problems     Elevated PSA     Former light cigarette smoker (1-9 per day)     smoked for 35 years.   quit     History of multiple allergies     Nicotine vapor product user     Personal history of prostate cancer     Prostate cancer ([facility] [address]     prostate, neuroendocrine, mets to bone     Past Surgical History:   Procedure Laterality Date    BIOPSY PROSTATE      COLONOSCOPY  08/2020    HEENT      teeth removed     IR KYPHOPLASTY LUMBAR FIRST LEVEL  10/14/2020    IR KYPHOPLASTY LUMBAR FIRST LEVEL  10/14/2020    IR KYPHOPLASTY LUMBAR FIRST LEVEL 10/14/2020 [facility]    IR KYPHOPLASTY THORACIC FIRST LEVEL  10/29/2020    IR KYPHOPLASTY THORACIC FIRST LEVEL  10/29/2020    IR KYPHOPLASTY THORACIC FIRST LEVEL 10/29/2020 [facility]    TESTICLE REMOVAL Bilateral 02/2017     Family History   Problem Relation Age of Onset    Hypertension Father     Prostate Cancer Father     Cancer Father         prostate cancer     Social History     Socioeconomic History    Marital status: Single     Spouse name: Not on file    Number of children: Not on file    Years of education: Not on file    Highest education level: Not on file   Occupational History    Not on file   Tobacco Use    Smoking status: Former     Packs/day: 0.50     Types: Cigarettes     Quit date: 2016     Years since quittin.6    Smokeless tobacco: Never   Substance and Sexual Activity    Alcohol use: Yes    Drug use: No    Sexual activity: Not on file   Other Topics Concern    Not on file   Social History Narrative    Not on file     Social Determinants of Health     Financial Resource Strain: Not on file   Food Insecurity: Not on file   Transportation Needs: Not on file   Physical Activity: Not on file   Stress: Not on file   Social Connections: Not on file   Intimate Partner Violence: Not on file   Housing Stability: Not on file     Current Facility-Administered Medications   Medication Dose Route Frequency Provider Last Rate Last Admin    lidocaine-prilocaine (EMLA) cream   Topical Once Brigid Nuñze MD        0.9 % sodium chloride infusion   IntraVENous PRN Ingrid Flower, APRN - CNP        enoxaparin (LOVENOX) injection 40 mg  40 mg SubCUTAneous Daily Ingrid Flower, APRN - CNP   40 mg at 22 0834    fentaNYL (DURAGESIC) 75 MCG/HR 1 patch  1 patch TransDERmal Q72H Katina Brand, APRN - CNP   1 patch at 22 1108    diphenhydrAMINE (BENADRYL) capsule 25 mg  25 mg Oral Q6H PRN Ingrid Flower, APRN - CNP   25 mg at 22 1515    acetaminophen (TYLENOL) tablet 650 mg  650 mg Oral Q6H PRN Ingrid Flower, APRN - CNP   650 mg at 22 1514    Or    acetaminophen (TYLENOL) suppository 650 mg  650 mg Rectal Q6H PRN Ingrid Flower, APRN - CNP        HYDROmorphone (DILAUDID) tablet 2 mg  2 mg Oral Q4H PRN Roxane Pereira, APRN - CNP   2 mg at 22 0532    Or    HYDROmorphone (DILAUDID) tablet 4 mg  4 mg Oral Q4H PRN Roxane Pereira, APRN - CNP   4 mg at 07/15/22 0815    magnesium oxide (MAG-OX) tablet 400 mg  400 mg Oral TID Cecilia Gray MD   400 mg at 22 2234    naloxone [facility]) injection 0.04 mg  0.04 mg IntraVENous PRN Dayron Flood, APRN - CNP        sennosides-docusate sodium (SENOKOT-S) 8.6-50 MG tablet 2 tablet  2 tablet Oral Daily Dayron Flood, APRN - CNP   2 tablet at 22 0833    dexamethasone (DECADRON) tablet 4 mg  4 mg Oral 2 times per day Jose Gallo, APRN - CNP   4 mg at 07/15/22 0815    HYDROmorphone HCl PF (DILAUDID) injection 1 mg  1 mg IntraVENous Q3H PRN Nayeli Mccormick, APRN - NP   1 mg at 07/15/22 0456    aluminum & magnesium hydroxide-simethicone (MAALOX) 200-200-20 MG/5ML suspension 20 mL  20 mL Oral Q6H PRN Mil Holguin MD   20 mL at 22 0809    LORazepam (ATIVAN) tablet 1 mg  1 mg Oral TID PRN Taryn Miu, DO   0.5 mg at 22 1907    pantoprazole (PROTONIX) tablet 40 mg  40 mg Oral QAM AC Esme Fenrandez Martinez, DO   40 mg at 07/15/22 0815    sodium chloride flush 0.9 % injection 5-40 mL  5-40 mL IntraVENous 2 times per day Taryn Miu, DO   10 mL at 07/15/22 0825    sodium chloride flush 0.9 % injection 5-40 mL  5-40 mL IntraVENous PRN Taryn Miu, DO        0.9 % sodium chloride infusion   IntraVENous PRN Taryn Miu, DO        ondansetron (ZOFRAN-ODT) disintegrating tablet 4 mg  4 mg Oral Q8H PRN Taryn Miu, DO        Or    ondansetron [facility]) injection 4 mg  4 mg IntraVENous Q6H PRN Taryn Miu, DO   4 mg at 07/10/22 1717    docusate sodium (COLACE) capsule 100 mg  100 mg Oral BID PRN Taryn Miu, DO        polyethylene glycol (GLYCOLAX) packet 17 g  17 g Oral Daily Taryn Miu, DO   17 g at 22 0834    lactulose (CHRONULAC) 10 GM/15ML solution 20 g  20 g Oral Once Taryn Miu, DO           OBJECTIVE:  Patient Vitals for the past 8 hrs:   BP Temp Temp src Pulse Resp SpO2   07/15/22 07 112/72 97.5 °F (36.4 °C) Oral 75 20 96 %   07/15/22 0720 -- -- -- -- 18 --   07/15/22 0526 -- -- -- -- 18 --   07/15/22 0456 -- -- -- -- 18 --   07/15/22 0417 -- -- -- -- 18 --     Temp (24hrs), Av.3 °F (36.8 °C), Min:97.5 °F (36.4 °C), Max:99 °F (37.2 °C)    07/15 0701 - 07/15 1900  In: 500 [P.O.:500]  Out: -     Physical Exam:  Constitutional: Well developed male in no acute distress, sitting comfortably in the bedside chair. HEENT: Normocephalic and atraumatic. Oropharynx is clear, mucous membranes are moist.  Neck supple. Skin Warm and dry. Bruising noted on BLE and no rash noted. No erythema. No pallor. Respiratory Lungs are clear to auscultation bilaterally without wheezes, rales or rhonchi, normal air exchange without accessory muscle use. CVS Normal rate, regular rhythm and normal S1 and S2. No murmurs, gallops, or rubs. Abdomen Soft, nontender and nondistended, normoactive bowel sounds. No palpable mass. No hepatosplenomegaly. Neuro Grossly nonfocal with no obvious sensory or motor deficits. MSK Normal range of motion in general.  No edema and no tenderness. Psych Appropriate mood and affect.         Labs:    Recent Results (from the past 24 hour(s))   Urinalysis w rflx microscopic    Collection Time: 07/15/22  5:55 AM   Result Value Ref Range    Color, UA YELLOW/STRAW      Appearance CLOUDY      Specific Hart[place], UA 1.015 1.001 - 1.023      pH, Urine 6.5 5.0 - 9.0      Protein, UA 30 (A) NEG mg/dL    Glucose, UA Negative mg/dL    Ketones, Urine Negative NEG mg/dL    Bilirubin Urine Negative NEG      Blood, Urine LARGE (A) NEG      Urobilinogen, Urine 0.2 0.2 - 1.0 EU/dL    Nitrite, Urine Negative NEG      Leukocyte Esterase, Urine MODERATE (A) NEG      WBC, UA 10-20 0 /hpf    RBC, UA  0 /hpf    Epithelial Cells UA 0-3 0 /hpf    BACTERIA, URINE 1+ (H) 0 /hpf    Casts HYALINE 0 /lpf    Yeast, UA OCCASIONAL      OTHER OBSERVATIONS RESULTS VERIFIED MANUALLY     CBC with Auto Differential    Collection Time: 07/15/22  6:21 AM   Result Value Ref Range    WBC PENDING K/uL    RBC PENDING M/uL    Hemoglobin PENDING g/dL    Hematocrit PENDING %    MCV PENDING FL    MCH PENDING PG    MCHC PENDING g/dL RDW PENDING %    Platelets PENDING K/uL    MPV PENDING FL    nRBC PENDING K/uL    Differential Type PENDING    Comprehensive Metabolic Panel    Collection Time: 07/15/22  6:21 AM   Result Value Ref Range    Sodium 134 (L) 138 - 145 mmol/L    Potassium 4.4 3.5 - 5.1 mmol/L    Chloride 104 98 - 107 mmol/L    CO2 27 21 - 32 mmol/L    Anion Gap 3 (L) 7 - 16 mmol/L    Glucose 104 (H) 65 - 100 mg/dL    BUN 35 (H) 8 - 23 MG/DL    CREATININE 1.20 0.8 - 1.5 MG/DL    GFR African American >60 >60 ml/min/1.73m2    GFR Non- >60 >60 ml/min/1.73m2    Calcium 8.6 8.3 - 10.4 MG/DL    Total Bilirubin 0.5 0.2 - 1.1 MG/DL     (H) 12 - 65 U/L     (H) 15 - 37 U/L    Alk Phosphatase 258 (H) 50 - 136 U/L    Total Protein 7.1 6.3 - 8.2 g/dL    Albumin 3.3 3.2 - 4.6 g/dL    Globulin 3.8 (H) 2.3 - 3.5 g/dL    Albumin/Globulin Ratio 0.9 (L) 1.2 - 3.5     Magnesium    Collection Time: 07/15/22  6:21 AM   Result Value Ref Range    Magnesium 1.9 1.8 - 2.4 mg/dL       Imaging:  Xray Result (most recent):  XR CHEST PORTABLE 07/08/2022    Narrative  Portable chest x-ray    CLINICAL INDICATION: Sternal pain, metastatic cancer    FINDINGS: Single AP view of the chest compared to a similar exam dated 9/1/2020  show the lungs to be expanded and clear. The cardiac silhouette and mediastinum  are unremarkable. A right-sided chest port is in place. There is no pleural  effusion or pneumothorax. No definite bony abnormality. Note the sternum is not  able to be evaluated on this single portable frontal projection of the chest.    Impression  No acute cardiopulmonary abnormality.         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)    Prostatic nodule    Family history of prostate cancer in father    Acute prostatitis    S/P TURP    Prostate cancer [facility])    Prostate cancer metastatic to bone (HCC)    Thrombocytopenia (HCC)    Hypomagnesemia    Hypercalcemia of malignancy    Pain due to malignant neoplasm metastatic to bone (HCC)    EDGARD (acute kidney injury) ([facility] [address]    Debility    Drug-induced constipation    Encounter for palliative care     Mr. Mayela Gonzalez is a 78 y.o. male admitted on 7/8/2022 with a primary diagnosis of There were no encounter diagnoses. .       Mr. Mayela Gonzalez has a PMH of metastatic prostate cancer dx 2017 s/p casodex and bilateral orchiectomy for surgical castration, 6 cycles of docetaxel, pathologic vertebral fx's with path positive for high grade neuroendocrine cancer, s/p carbo/etoposide and most recently carbo/taxotere which was held d/t TCP. Last was C2 on 6/8/22. 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 He is a known patient of Dr. Jose Daniel Preston s/p multiple lines of treatment as above. At last office visit on 6/29, CEA had notably continued to rise to [id number]. He has had discussion with [facility], however wished to hold off on making a decision until he completed radiation therapy. He presented to the ER with a fall at home, increasing pain in the hips and generalized weakness. Also with constipation. Also found to be anemic and transfused PRBC. We were asked for recommendations for our known patient. PLAN:  Metastatic Prostate Cancer/Neuroendocrine  - S/p multiple lines of therapy, most recently carbo/taxotere C2 on 6/8. Held on 6/29 d/t TCP. Undergoing XRT to rib and T-spine with Dr. Adrien Su.  - Has spoken to [facility], however wished to hold off on making a decision until he saw if XRT improved his pain  - Can resume XRT on Monday 7/11 Pt does not wish to pursue XRT. Meeting with PC today for pain mgmt and to discuss [address] - wishes to pursue facility placement and enroll in Hospice.   7/13 Pt now cover facility placement if he qualified. CM assisting with discharge planning. ? Could pt stay with son who lives locally until his friend's house is ready to move into? Pt also declines Hospice services at this time. He states he would prefer [facility]. 7/15:  Pt is medically stable for discharge. Pt working on living arrangements. Hopeful for discharge home soon.             [facility]  [address] [address]  Office : [phone]  Fax : [phone]

## 2022-07-15 NOTE — PROGRESS NOTES
Anthropometric Measures:  Height: 5' 10\" (177.8 cm)  Current Body Wt: 203 lb 4.2 oz (92.2 kg) (7/13), Weight source: Standing Scale  BMI: 29.2     Ideal Body Weight (Kg) (Calculated): 75 kg (166 lbs), 116.2 %  Usual Body Wt: 205 lb (93 kg), Percent weight change: -5.9       Edema:Peripheral Vascular  Peripheral Vascular (WDL): Exceptions to WDL  Edema: Generalized  Edema Generalized: Trace   BMI Category Overweight (BMI 25.0-29. 9)  Estimated Daily Nutrient Needs:  Energy (kcal/day): 2242-0139 (Kcal/kg (20-25) Weight used: 87.5 kg Current (7/8)  Protein (g/day):  (1-1.2 g/kg) Weight Used: (Current)    Fluid (ml/day):   (1 ml/kcal)    Nutrition Diagnosis:   No nutrition diagnosis at this time   Nutrition Interventions:   Food and/or Nutrient Delivery: Continue Current Diet, Discontinue Oral Nutrition Supplement     Coordination of Nutrition Care: Continue to monitor while inpatient       Goals:   Previous Goal Met: Goal(s) Achieved  Active Goal: PO intake 75% or greater, by next RD assessment       Nutrition Monitoring and Evaluation:      Food/Nutrient Intake Outcomes: Food and Nutrient Intake, Supplement Intake  Physical Signs/Symptoms Outcomes: Meal Time Behavior, Weight    Discharge Planning:    No discharge needs at this time    GAVIOTA BLUM, DALE

## 2022-07-16 NOTE — PROGRESS NOTES
CM received a call back from son Kofi Moreira) to discuss d/c plan for patient. Dragan Moreira) states that patient was living with a roommate and roommate has informed him that patient can't return. Son states that patient can't live with him because his place is small. Son states that it's unsafe to d/c patient at this time because he has no where to go and patient is in too much pain and has cancer all over his body. Son states that patient has on a fentanyl patch. Son states he has been working with the previous CM and they have discussed getting patient placed in SNF. CM informed son Kofi Moreira) that patient has informed staff that he need to stay here until Aug 1 that when his friend will be in town to care for him. CM / Director Jean Paul Garay) met with patient to discuss d/c plan. CM explained to patient that he was medically stable to be d/c home. CM provided options for placement such as (Shelter, staying in hotel at his expensive) patient decline both options and states that he's in too much pain to leave the hospital and needed to go SNF. Patient got up without any assistance and walk to the bathroom with no problem and doesn't need SNF. CM tried to explain to patient son Kofi Leonard that patient wasn't able to stay here until Aug 1,. CM provided son with the same options and son states that he needed to speak with the pervious CM because the pervious CM has informed him that patient insurance can be changes and he will be able to go to SNF facility. CM tried to explain to son that patient doesn't qualify for SNF and son no longer wanted to speak with the present CM. CM made Director Femi Wagoner) aware of the conversation.

## 2022-07-16 NOTE — PROGRESS NOTES
[facility] Hematology & Oncology        Inpatient Hematology / Oncology Progress Note    Reason for Admission:  Prostate cancer metastatic to bone [facility]) [C61, C79.51]    24 Hour Events:  Afebrile, VSS  Medically stable for discharge  Pt working on living arrangements  Having urinary retention-menchaca ordered    Transfusions: None  Replacements: None    ROS:  Constitutional: Positive for fatigue; negative for fever, chills. CV: Negative for chest pain, palpitations, edema. Respiratory: Negative for dyspnea, cough, wheezing. GI: Negative for nausea, diarrhea. +abdominal pain,   MSK:  +Back/hip pain  : +dysuria    10 point review of systems is otherwise negative with the exception of the elements mentioned above in the HPI. Allergies   Allergen Reactions    Turmeric Nausea And Vomiting     Past Medical History:   Diagnosis Date    Claustrophobia     Ear problems     Elevated PSA     Former light cigarette smoker (1-9 per day)     smoked for 35 years.   quit     History of multiple allergies     Nicotine vapor product user     Personal history of prostate cancer     Prostate cancer ([facility] [address]     prostate, neuroendocrine, mets to bone     Past Surgical History:   Procedure Laterality Date    BIOPSY PROSTATE      COLONOSCOPY  08/2020    HEENT      teeth removed     IR KYPHOPLASTY LUMBAR FIRST LEVEL  10/14/2020    IR KYPHOPLASTY LUMBAR FIRST LEVEL  10/14/2020    IR KYPHOPLASTY LUMBAR FIRST LEVEL 10/14/2020 [facility]    IR KYPHOPLASTY THORACIC FIRST LEVEL  10/29/2020    IR KYPHOPLASTY THORACIC FIRST LEVEL  10/29/2020    IR KYPHOPLASTY THORACIC FIRST LEVEL 10/29/2020 [facility]    TESTICLE REMOVAL Bilateral 02/2017     Family History   Problem Relation Age of Onset    Hypertension Father     Prostate Cancer Father     Cancer Father         prostate cancer     Social History     Socioeconomic History    Marital status: Single     Spouse name: Not on file    Number of children: Not on file    Years of education: Not on file    Highest education level: Not on file   Occupational History    Not on file   Tobacco Use    Smoking status: Former     Packs/day: 0.50     Types: Cigarettes     Quit date: 2016     Years since quittin.6    Smokeless tobacco: Never   Substance and Sexual Activity    Alcohol use: Yes    Drug use: No    Sexual activity: Not on file   Other Topics Concern    Not on file   Social History Narrative    Not on file     Social Determinants of Health     Financial Resource Strain: Not on file   Food Insecurity: Not on file   Transportation Needs: Not on file   Physical Activity: Not on file   Stress: Not on file   Social Connections: Not on file   Intimate Partner Violence: Not on file   Housing Stability: Not on file     Current Facility-Administered Medications   Medication Dose Route Frequency Provider Last Rate Last Admin    zinc oxide 40 % paste   Topical 4x Daily PRN Mis Centeno MD        lidocaine-prilocaine (EMLA) cream   Topical Once Mis Centeno MD        cefTRIAXone (ROCEPHIN) 1000 mg IVPB in NS 50ml minibag  1,000 mg IntraVENous Q24H Rosevelt Dach, APRN -  mL/hr at 22 0949 1,000 mg at 22 0949    phenazopyridine (PYRIDIUM) tablet 95 mg  95 mg Oral TID PRN Rosevelt Dach, APRN - CNP   95 mg at 07/15/22 2048    cyclobenzaprine (FLEXERIL) tablet 10 mg  10 mg Oral TID PRN Oliver Cuello APRN - NP   10 mg at 07/15/22 2058    0.9 % sodium chloride infusion   IntraVENous PRN Rosevelt Dach, APRN - CNP        enoxaparin (LOVENOX) injection 40 mg  40 mg SubCUTAneous Daily Rosevelt Dach, APRN - CNP   40 mg at 22 0834    fentaNYL (DURAGESIC) 75 MCG/HR 1 patch  1 patch TransDERmal Q72H Katina Brand APRN - CNP   1 patch at 22 1108    diphenhydrAMINE (BENADRYL) capsule 25 mg  25 mg Oral Q6H PRN Rosevelt Dach, APRN - CNP   25 mg at 22 0110    acetaminophen (TYLENOL) tablet 650 mg  650 mg Oral Q6H PRN Rosevelt Dach, APRN - CNP   650 mg at 22 Daily Palu Sarks, DO   17 g at 22 0920    lactulose (CHRONULAC) 10 GM/15ML solution 20 g  20 g Oral Once Paul Sarks, DO           OBJECTIVE:  Patient Vitals for the past 8 hrs:   BP Temp Temp src Pulse Resp SpO2   22 0921 -- -- -- -- 20 --   22 0703 (!) 152/67 98.2 °F (36.8 °C) Oral 96 22 93 %   22 0558 -- -- -- -- 22 --   22 0441 -- -- -- -- 20 --     Temp (24hrs), Av °F (36.7 °C), Min:97.7 °F (36.5 °C), Max:98.3 °F (36.8 °C)    No intake/output data recorded. Physical Exam:  Constitutional: Well developed male sitting on side of bed with abdominal pain   HEENT: Normocephalic and atraumatic. Oropharynx is clear, mucous membranes are moist.  Neck supple. Skin Warm and dry. Bruising noted on BLE and no rash noted. No erythema. No pallor. Respiratory Lungs are clear to auscultation bilaterally without wheezes, rales or rhonchi, normal air exchange without accessory muscle use. CVS Normal rate, regular rhythm and normal S1 and S2. No murmurs, gallops, or rubs. Abdomen Soft, nontender and nondistended, normoactive bowel sounds. No palpable mass. No hepatosplenomegaly. Neuro Grossly nonfocal with no obvious sensory or motor deficits. MSK Normal range of motion in general.  No edema and no tenderness. Psych Appropriate mood and affect.         Labs:    Recent Results (from the past 24 hour(s))   CBC with Auto Differential    Collection Time: 22  3:26 AM   Result Value Ref Range    WBC 9.4 4.3 - 11.1 K/uL    RBC 2.82 (L) 4.23 - 5.6 M/uL    Hemoglobin 9.1 (L) 13.6 - 17.2 g/dL    Hematocrit 28.1 (L) 41.1 - 50.3 %    MCV 99.6 (H) 79.6 - 97.8 FL    MCH 32.3 26.1 - 32.9 PG    MCHC 32.4 31.4 - 35.0 g/dL    RDW 21.6 (H) 11.9 - 14.6 %    Platelets 83 (L) 623 - 450 K/uL    MPV 10.2 9.4 - 12.3 FL    nRBC 0.00 0.0 - 0.2 K/uL    Differential Type AUTOMATED      Seg Neutrophils 85 (H) 43 - 78 %    Lymphocytes 5 (L) 13 - 44 %    Monocytes 9 4.0 - 12.0 % Eosinophils % 0 (L) 0.5 - 7.8 %    Basophils 0 0.0 - 2.0 %    Immature Granulocytes 1 0.0 - 5.0 %    Segs Absolute 8.0 1.7 - 8.2 K/UL    Absolute Lymph # 0.5 0.5 - 4.6 K/UL    Absolute Mono # 0.9 0.1 - 1.3 K/UL    Absolute Eos # 0.0 0.0 - 0.8 K/UL    Basophils Absolute 0.0 0.0 - 0.2 K/UL    Absolute Immature Granulocyte 0.1 0.0 - 0.5 K/UL   Comprehensive Metabolic Panel    Collection Time: 07/16/22  3:26 AM   Result Value Ref Range    Sodium 136 136 - 145 mmol/L    Potassium 4.1 3.5 - 5.1 mmol/L    Chloride 104 98 - 107 mmol/L    CO2 23 21 - 32 mmol/L    Anion Gap 9 7 - 16 mmol/L    Glucose 111 (H) 65 - 100 mg/dL    BUN 38 (H) 8 - 23 MG/DL    CREATININE 1.60 (H) 0.8 - 1.5 MG/DL    GFR  56 (L) >60 ml/min/1.73m2    GFR Non- 46 (L) >60 ml/min/1.73m2    Calcium 8.0 (L) 8.3 - 10.4 MG/DL    Total Bilirubin 0.6 0.2 - 1.1 MG/DL     (H) 12 - 65 U/L     (H) 15 - 37 U/L    Alk Phosphatase 225 (H) 50 - 136 U/L    Total Protein 6.5 6.3 - 8.2 g/dL    Albumin 3.2 3.2 - 4.6 g/dL    Globulin 3.3 2.3 - 3.5 g/dL    Albumin/Globulin Ratio 1.0 (L) 1.2 - 3.5     Magnesium    Collection Time: 07/16/22  3:26 AM   Result Value Ref Range    Magnesium 2.1 1.8 - 2.4 mg/dL       Imaging:  Xray Result (most recent):  XR CHEST PORTABLE 07/08/2022    Narrative  Portable chest x-ray    CLINICAL INDICATION: Sternal pain, metastatic cancer    FINDINGS: Single AP view of the chest compared to a similar exam dated 9/1/2020  show the lungs to be expanded and clear. The cardiac silhouette and mediastinum  are unremarkable. A right-sided chest port is in place. There is no pleural  effusion or pneumothorax. No definite bony abnormality. Note the sternum is not  able to be evaluated on this single portable frontal projection of the chest.    Impression  No acute cardiopulmonary abnormality.         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 ([address] Hypokalemia    Gynecomastia, male    Neuroendocrine carcinoma, high grade (HCC)    Anemia due to chemotherapy    Primary prostate cancer with metastasis from prostate to other site [facility])    Prostatic nodule    Family history of prostate cancer in father    Acute prostatitis    S/P TURP    Prostate cancer [facility])    Prostate cancer metastatic to bone (HCC)    Thrombocytopenia ([facility] [address]    Hypomagnesemia    Hypercalcemia of malignancy    Pain due to malignant neoplasm metastatic to bone (HCC)    EDGARD (acute kidney injury) ([facility] [address]    Debility    Drug-induced constipation    Encounter for palliative care     Mr. Herberth Kasper is a 78 y.o. male admitted on 7/8/2022 with a primary diagnosis of There were no encounter diagnoses. .       Mr. Herberth Kasper has a PMH of metastatic prostate cancer dx 2017 s/p casodex and bilateral orchiectomy for surgical castration, 6 cycles of docetaxel, pathologic vertebral fx's with path positive for high grade neuroendocrine cancer, s/p carbo/etoposide and most recently carbo/taxotere which was held d/t TCP. Last was C2 on 6/8/22. 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 He is a known patient of Dr. Gareth Perez s/p multiple lines of treatment as above. At last office visit on 6/29, CEA had notably continued to rise to [id number]. He has had discussion with [facility], however wished to hold off on making a decision until he completed radiation therapy. He presented to the ER with a fall at home, increasing pain in the hips and generalized weakness. Also with constipation. Also found to be anemic and transfused PRBC. We were asked for recommendations for our known patient. PLAN:  Metastatic Prostate Cancer/Neuroendocrine  - S/p multiple lines of therapy, most recently carbo/taxotere C2 on 6/8. Held on 6/29 d/t TCP.   Undergoing XRT to rib and T-spine with Dr. Cody Burkitt.  - Has spoken to [facility], however wished to hold off on making has a one-story home that he can move into on 8/1 or possibly even sooner. Pt was able to walk hallways yesterday. Unsure if he would qualify for facility placement while future living arrangements are made. CM states pt would have to go through extensive insurance process to even cover facility placement if he qualified. CM assisting with discharge planning. ? Could pt stay with son who lives locally until his friend's house is ready to move into? Pt also declines Hospice services at this time. He states he would prefer [facility]. 7/15:  Pt is medically stable for discharge. Pt working on living arrangements. Hopeful for discharge home soon.             HELEN Jarrett NP   [facility] Hematology & Oncology  [address] [address]  Office : [phone]  Fax : [phone]

## 2022-07-16 NOTE — PROGRESS NOTES
END OF SHIFT SUMMARY:    Significant vitals this shift:    Significant labs this shift:    Tests performed this shift:    Orders to be followed up on:    Blood products given this shift:    Additional events this shift:     Since menchaca placement, patient states he has pain relief except with movement. Received PO dilaudid 2x this shift. VSS, no c/o N/V/D. Bed in low position, call light in reach, no needs voiced at this time. BSR given to JULITA SHERIFF    I/Os:  +/- this shift:   07/16 0701 - 07/16 1900  In: 222.4 [P.O.:120;  I.V.:102.4]  Out: Diane Jauregui [[id number]]      Lydia Moffett RN

## 2022-07-17 NOTE — PROGRESS NOTES
[facility]        Inpatient Hematology / Oncology Progress Note    Reason for Admission:  Prostate cancer metastatic to bone ([facility] [address] [C61, C79.51]    24 Hour Events:  Afebrile, VSS  Feeling better after menchaca placed  UC neg continues Rocephin    Transfusions: None  Replacements: None    ROS:  Constitutional: Positive for fatigue; negative for fever, chills. CV: Negative for chest pain, palpitations, edema. Respiratory: Negative for dyspnea, cough, wheezing. GI: Negative for nausea, diarrhea. +abdominal pain,   MSK:  +Back/hip pain    10 point review of systems is otherwise negative with the exception of the elements mentioned above in the HPI. Allergies   Allergen Reactions    Turmeric Nausea And Vomiting     Past Medical History:   Diagnosis Date    Claustrophobia     Ear problems     Elevated PSA     Former light cigarette smoker (1-9 per day)     smoked for 35 years.   quit     History of multiple allergies     Nicotine vapor product user     Personal history of prostate cancer     Prostate cancer ([facility] [address]     prostate, neuroendocrine, mets to bone     Past Surgical History:   Procedure Laterality Date    BIOPSY PROSTATE      COLONOSCOPY  08/2020    HEENT      teeth removed     IR KYPHOPLASTY LUMBAR FIRST LEVEL  10/14/2020    IR KYPHOPLASTY LUMBAR FIRST LEVEL  10/14/2020    IR KYPHOPLASTY LUMBAR FIRST LEVEL 10/14/2020 [facility]    IR KYPHOPLASTY THORACIC FIRST LEVEL  10/29/2020    IR KYPHOPLASTY THORACIC FIRST LEVEL  10/29/2020    IR KYPHOPLASTY THORACIC FIRST LEVEL 10/29/2020 [facility]    TESTICLE REMOVAL Bilateral 02/2017     Family History   Problem Relation Age of Onset    Hypertension Father     Prostate Cancer Father     Cancer Father         prostate cancer     Social History     Socioeconomic History    Marital status: Single     Spouse name: Not on file    Number of children: Not on file    Years of education: Not on file    Highest education level: Not on file   Occupational History    Not on file   Tobacco Use    Smoking status: Former     Packs/day: 0.50     Types: Cigarettes     Quit date: 2016     Years since quittin.6    Smokeless tobacco: Never   Substance and Sexual Activity    Alcohol use: Yes    Drug use: No    Sexual activity: Not on file   Other Topics Concern    Not on file   Social History Narrative    Not on file     Social Determinants of Health     Financial Resource Strain: Not on file   Food Insecurity: Not on file   Transportation Needs: Not on file   Physical Activity: Not on file   Stress: Not on file   Social Connections: Not on file   Intimate Partner Violence: Not on file   Housing Stability: Not on file     Current Facility-Administered Medications   Medication Dose Route Frequency Provider Last Rate Last Admin    tamsulosin (FLOMAX) capsule 0.4 mg  0.4 mg Oral Daily Aliza Lair, APRN - NP   0.4 mg at 22 0824    finasteride (PROSCAR) tablet 5 mg  5 mg Oral Daily Aliza Lair, APRN - NP        zinc oxide 40 % paste   Topical 4x Daily PRN Wilton Pardo MD        lidocaine-prilocaine (EMLA) cream   Topical Once Wilton Pardo MD        cefTRIAXone (ROCEPHIN) 1000 mg IVPB in NS 50ml minibag  1,000 mg IntraVENous Q24H Toy Jest, APRN -  mL/hr at 22 1000 1,000 mg at 22 1000    phenazopyridine (PYRIDIUM) tablet 95 mg  95 mg Oral TID PRN Toy Jest, APRN - CNP   95 mg at 07/15/22 2048    cyclobenzaprine (FLEXERIL) tablet 10 mg  10 mg Oral TID PRN Aliza Lair, APRN - NP   10 mg at 07/15/22 2058    0.9 % sodium chloride infusion   IntraVENous PRN Toy Jest, APRN - CNP        enoxaparin (LOVENOX) injection 40 mg  40 mg SubCUTAneous Daily Toy Jest, APRN - CNP   40 mg at 22 0824    fentaNYL (DURAGESIC) 75 MCG/HR 1 patch  1 patch TransDERmal Q72H HELEN Marcus CNP   1 patch at 22 1057    diphenhydrAMINE (BENADRYL) capsule 25 mg  25 mg Oral Q6H PRN Toy Jest, APRN - CNP   25 mg at 07/16/22 0110    acetaminophen (TYLENOL) tablet 650 mg  650 mg Oral Q6H PRN Daniel Tejeda, APRN - CNP   650 mg at 07/13/22 1514    Or    acetaminophen (TYLENOL) suppository 650 mg  650 mg Rectal Q6H PRN Daniel Tejeda, APRN - CNP        HYDROmorphone (DILAUDID) tablet 2 mg  2 mg Oral Q4H PRN Cassidy Bers, APRN - CNP   2 mg at 07/16/22 1732    Or    HYDROmorphone (DILAUDID) tablet 4 mg  4 mg Oral Q4H PRN Cassidy Bers, APRN - CNP   4 mg at 07/17/22 0229    magnesium oxide (MAG-OX) tablet 400 mg  400 mg Oral TID Sen Finley MD   400 mg at 07/17/22 0824    naloxone (NARCAN) injection 0.04 mg  0.04 mg IntraVENous PRN Cassidy Bers, APRN - CNP        sennosides-docusate sodium (SENOKOT-S) 8.6-50 MG tablet 2 tablet  2 tablet Oral Daily Cassidy Cuello, APRN - CNP   2 tablet at 07/17/22 0824    dexamethasone (DECADRON) tablet 4 mg  4 mg Oral 2 times per day Daniel Tejeda, APRN - CNP   4 mg at 07/17/22 0824    HYDROmorphone HCl PF (DILAUDID) injection 1 mg  1 mg IntraVENous Q3H PRN HELEN Rodriguez - NP   1 mg at 07/16/22 2047    aluminum & magnesium hydroxide-simethicone (MAALOX) 200-200-20 MG/5ML suspension 20 mL  20 mL Oral Q6H PRN Sen Finley MD   20 mL at 07/13/22 0809    LORazepam (ATIVAN) tablet 1 mg  1 mg Oral TID PRN Regina Lesch, DO   1 mg at 07/17/22 0601    pantoprazole (PROTONIX) tablet 40 mg  40 mg Oral QAM AC Tim Martinez, DO   40 mg at 07/17/22 6445    sodium chloride flush 0.9 % injection 5-40 mL  5-40 mL IntraVENous 2 times per day Reginaa Lesch, DO   10 mL at 07/17/22 6238    sodium chloride flush 0.9 % injection 5-40 mL  5-40 mL IntraVENous PRN Regina Lesch, DO        0.9 % sodium chloride infusion   IntraVENous PRN Regina Lesch, DO        ondansetron (ZOFRAN-ODT) disintegrating tablet 4 mg  4 mg Oral Q8H PRN Regina Lesch, DO        Or    ondansetron [facility]) injection 4 mg  4 mg IntraVENous Q6H PRN Regina Lesch, DO   4 mg at 07/10/22 [id number] docusate sodium (COLACE) capsule 100 mg  100 mg Oral BID PRN Gerardo Hatfield DO        polyethylene glycol (GLYCOLAX) packet 17 g  17 g Oral Daily Gerardo Hatfield DO   17 g at 22 0825    lactulose (CHRONULAC) 10 GM/15ML solution 20 g  20 g Oral Once Gerardo Hatfield DO           OBJECTIVE:  Patient Vitals for the past 8 hrs:   BP Temp Temp src Pulse Resp SpO2   22 0720 120/73 98.3 °F (36.8 °C) Oral 72 18 97 %   22 0256 121/67 98 °F (36.7 °C) Oral 74 18 94 %     Temp (24hrs), Av.2 °F (36.8 °C), Min:97.8 °F (36.6 °C), Max:99.1 °F (37.3 °C)     0701 -  1900  In: -   Out: 150 [Urine:150]    Physical Exam:  Constitutional: Well developed male sitting on side of bed eating breakfast   HEENT: Normocephalic and atraumatic. Oropharynx is clear, mucous membranes are moist.  Neck supple. Skin Warm and dry. Bruising noted on BLE and no rash noted. No erythema. No pallor. Respiratory Lungs are clear to auscultation bilaterally without wheezes, rales or rhonchi, normal air exchange without accessory muscle use. CVS Normal rate, regular rhythm and normal S1 and S2. No murmurs, gallops, or rubs. Abdomen Soft, nontender and nondistended, normoactive bowel sounds. No palpable mass. No hepatosplenomegaly. Neuro Grossly nonfocal with no obvious sensory or motor deficits. MSK Normal range of motion in general.  No edema and no tenderness. Psych Appropriate mood and affect.         Labs:    Recent Results (from the past 24 hour(s))   CBC with Auto Differential    Collection Time: 22  2:05 AM   Result Value Ref Range    WBC 8.1 4.3 - 11.1 K/uL    RBC 2.53 (L) 4.23 - 5.6 M/uL    Hemoglobin 8.2 (L) 13.6 - 17.2 g/dL    Hematocrit 25.7 (L) 41.1 - 50.3 %    .6 (H) 79.6 - 97.8 FL    MCH 32.4 26.1 - 32.9 PG    MCHC 31.9 31.4 - 35.0 g/dL    RDW 21.5 (H) 11.9 - 14.6 %    Platelets 75 (L) 210 - 450 K/uL    MPV 9.8 9.4 - 12.3 FL    nRBC 0.00 0.0 - 0.2 K/uL    Differential Type AUTOMATED      Seg Neutrophils 86 (H) 43 - 78 %    Lymphocytes 6 (L) 13 - 44 %    Monocytes 7 4.0 - 12.0 %    Eosinophils % 0 (L) 0.5 - 7.8 %    Basophils 0 0.0 - 2.0 %    Immature Granulocytes 1 0.0 - 5.0 %    Segs Absolute 7.0 1.7 - 8.2 K/UL    Absolute Lymph # 0.5 0.5 - 4.6 K/UL    Absolute Mono # 0.6 0.1 - 1.3 K/UL    Absolute Eos # 0.0 0.0 - 0.8 K/UL    Basophils Absolute 0.0 0.0 - 0.2 K/UL    Absolute Immature Granulocyte 0.1 0.0 - 0.5 K/UL   Comprehensive Metabolic Panel    Collection Time: 07/17/22  2:05 AM   Result Value Ref Range    Sodium 136 136 - 145 mmol/L    Potassium 4.1 3.5 - 5.1 mmol/L    Chloride 110 (H) 98 - 107 mmol/L    CO2 25 21 - 32 mmol/L    Anion Gap 1 (L) 7 - 16 mmol/L    Glucose 154 (H) 65 - 100 mg/dL    BUN 34 (H) 8 - 23 MG/DL    CREATININE 1.60 (H) 0.8 - 1.5 MG/DL    GFR  56 (L) >60 ml/min/1.73m2    GFR Non- 46 (L) >60 ml/min/1.73m2    Calcium 7.4 (L) 8.3 - 10.4 MG/DL    Total Bilirubin 0.3 0.2 - 1.1 MG/DL     (H) 12 - 65 U/L     (H) 15 - 37 U/L    Alk Phosphatase 200 (H) 50 - 136 U/L    Total Protein 5.8 (L) 6.3 - 8.2 g/dL    Albumin 2.7 (L) 3.2 - 4.6 g/dL    Globulin 3.1 2.3 - 3.5 g/dL    Albumin/Globulin Ratio 0.9 (L) 1.2 - 3.5     Magnesium    Collection Time: 07/17/22  2:05 AM   Result Value Ref Range    Magnesium 2.1 1.8 - 2.4 mg/dL       Imaging:  Xray Result (most recent):  XR CHEST PORTABLE 07/08/2022    Narrative  Portable chest x-ray    CLINICAL INDICATION: Sternal pain, metastatic cancer    FINDINGS: Single AP view of the chest compared to a similar exam dated 9/1/2020  show the lungs to be expanded and clear. The cardiac silhouette and mediastinum  are unremarkable. A right-sided chest port is in place. There is no pleural  effusion or pneumothorax. No definite bony abnormality.  Note the sternum is not  able to be evaluated on this single portable frontal projection of the chest.    Impression  No acute cardiopulmonary abnormality. 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)    Hypokalemia    Gynecomastia, male    Neuroendocrine carcinoma, high grade (HCC)    Anemia due to chemotherapy    Primary prostate cancer with metastasis from prostate to other site [facility])    Prostatic nodule    Family history of prostate cancer in father    Acute prostatitis    S/P TURP    Prostate cancer [facility])    Prostate cancer metastatic to bone ([facility] [address]    Thrombocytopenia ([facility] [address]    Hypomagnesemia    Hypercalcemia of malignancy    Pain due to malignant neoplasm metastatic to bone (HCC)    EDGARD (acute kidney injury) ([facility] [address]    Debility    Drug-induced constipation    Encounter for palliative care     Mr. Bradford Torres is a 78 y.o. male admitted on 7/8/2022 with a primary diagnosis of There were no encounter diagnoses. .       Mr. Bradford Torres has a PMH of metastatic prostate cancer dx 2017 s/p casodex and bilateral orchiectomy for surgical castration, 6 cycles of docetaxel, pathologic vertebral fx's with path positive for high grade neuroendocrine cancer, s/p carbo/etoposide and most recently carbo/taxotere which was held d/t TCP. Last was C2 on 6/8/22. 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 He is a known patient of Dr. Fairbanks  s/p multiple lines of treatment as above. At last office visit on 6/29, CEA had notably continued to rise to 8290. He has had discussion with [facility], [facility], however wished to hold off on making a decision until he completed radiation therapy. He presented to the ER with a fall at home, increasing pain in the hips and generalized weakness. Also with constipation. Also found to be anemic and transfused PRBC. We were asked for recommendations for our known patient. PLAN:  Metastatic Prostate Cancer/Neuroendocrine  - S/p multiple lines of therapy, most recently carbo/taxotere C2 on 6/8.   Held on 6/29 d/t TCP. Undergoing XRT to rib and T-spine with Dr. Carol Snyder.  - Has spoken to [facility], however wished to hold off on making a decision until he saw if XRT improved his pain  - Can resume XRT on Monday 7/11 Pt does not wish to pursue XRT. Meeting with PC today for pain mgmt and to discuss [address] - wishes to pursue facility placement and enroll in Hospice. 7/13 Pt now states he may be interested in going home since he will have support from friend moving here from [place] of state. Consult PT/OT. 7/14  PT recommends HHT     Cancer Related Pain  - Currently with Dilaudid/oxy prn  - Consult palliative care to assist with pain management (known from office) and also to assist with goals of care discussions   7/10 Increased Dilaudid to 1 mg every 3 hours for severe pain. Pall care consult pending. 7/11 PC assisting with mgmt. Resume Dex. 7/12 PC started Fentanyl 50mcg patch yesterday. Pt reports pain controlled today - even able to walk around room now. 7/13 Pain controlled. PC changed oxycodone to po dilaudid yesterday. 7/14 Pain controlled  7/15 C/o pain today  7/17 pain improved after menchaca     Anemia/Thrombocytopenia  - Transfuse prn per [facility]ae SOPs     Elevated Tranaminases  - Alk phos/ALT/AST trended down today, continue to follow   7/11 AST/ALT continue to improve   7/13 LFTs improving     EDGARD  - Gentle hydration  7/13 Cr down to 1.2     Constipation  - Lactulose prn   7/11 Pt refusing Lactulose. PC started pericolace. Con't Miralax. Consider KUB if no improvement. 7/12 No BM documented. Con't Miralax/Pericolace. Pt more active now, up around in room since pain controlled. 7/13 Had BM x 4 yesterday    Hypercalcemia  7/12 CCa++ up to 11. 7. Zometa ordered. 7/15 CCa++ down to 9.2    Dysuria  7/15 c/o dysuria. UA c/w UTI. CTX ordered. Check Ucx.  7/17 UC NTD on Rocephin    Urinary retention  7/16 menchaca ordered/ flomax ordered  7/17 UOP 4000 ml.  Adding proscar    Continue home meds  [facility]

## 2022-07-17 NOTE — PROGRESS NOTES
Uneventful shift  Pt complained of new headache x's a couple of days on R side of head  Oncology NP notified  PRN dilaudid 1mg IV given per other RN for pt report of pain  PRN dilaudid 4mg po given x's 1 for pt report of pain  PRN ativan 1mg po given x's 1 for pt report of anxiety  Pt to start flowmax per orders today    Shift report given to emil Medley RN    Vitals:    07/16/22 1923 07/16/22 2212 07/17/22 0256 07/17/22 0720   BP: 116/72 125/76 121/67 120/73   Pulse: 84 76 74 72   Resp: 18 18 18 18   Temp: 98.2 °F (36.8 °C) 97.9 °F (36.6 °C) 98 °F (36.7 °C) 98.3 °F (36.8 °C)   TempSrc: Oral Oral Oral Oral   SpO2: 95% 94% 94% 97%   Weight:  197 lb 9.6 oz (89.6 kg)     Height:

## 2022-07-17 NOTE — PROGRESS NOTES
END OF SHIFT SUMMARY:    Dilaudid 2mg PO x1  Dilaudid 1mg IV x1        I/Os:    07/17 0701 - 07/17 1900  In: 843.2 [P.O.:720;  I.V.:123.2]  Out: 1225 [Urine:1225]  Occurrences this Shift:  Urine , BM 0 , Emesis     Report given to JULITA SHERIFF RN

## 2022-07-17 NOTE — PROGRESS NOTES
CM met with patient son Noble Cha) on yesterday. Son states that he has no d/c plan in place for patient. Son states that he wanted to make sure that CM know that patient will need to stay here until the pervious CM return and he speak with her according to the plan they were working on. CM informed the son that patient was medical stable and we need to discuss a safe d/c plan. Son states that patient isn't medically stable. States that patient is in pain and need 24 hrs care. Son states that they has declined hospice because patient has improved. CM again provide options for the son (shelter, hotel room upon patient expressive, friends / family that patient has possible stay with until Aug 1, 25) son states no. Son states that when Aug 1, come that patient still won't have anywhere to stay. Son states that patient pain is too much for him to leave the hospital.  Son states that if the fentanyl patch is removed from the patient he will qualify for SNF because patient will not be able to move. CM encouraged the that he really needed to brain storm on where the patient can be d/c too. Son states that patient can be d/c to SNF that's what him and the pervious CM discussed. Patient has declined PT stating that his \"legs are strong\". Patient has no needs identified and when medically stable can be d/c. CM will continue to monitor and remain available for any needs or concerns that may occur.

## 2022-07-17 NOTE — PLAN OF CARE
Problem: Skin/Tissue Integrity  Goal: Absence of new skin breakdown  Description: 1. Monitor for areas of redness and/or skin breakdown  2. Assess vascular access sites hourly  3. Every 4-6 hours minimum:  Change oxygen saturation probe site  4. Every 4-6 hours:  If on nasal continuous positive airway pressure, respiratory therapy assess nares and determine need for appliance change or resting period.   Outcome: Progressing     Problem: Pain  Goal: Verbalizes/displays adequate comfort level or baseline comfort level  Outcome: Progressing     Problem: Safety - Adult  Goal: Free from fall injury  Outcome: Progressing  Flowsheets (Taken 7/17/2022 0820)  Free From Fall Injury: Instruct family/caregiver on patient safety     Problem: ABCDS Injury Assessment  Goal: Absence of physical injury  Outcome: Progressing  Flowsheets (Taken 7/17/2022 0820)  Absence of Physical Injury: Implement safety measures based on patient assessment

## 2022-07-18 NOTE — PROGRESS NOTES
END OF SHIFT SUMMARY:     Dilaudid 4mg x3  Flexeril given x1  Ativan given x1    I/Os:  07/18 0701 - 07/18 1900  In: 1640 [P.O.:1640]  Out: 2100 [Urine:2100]    Oncoming report given to JULITA SHERIFF RN

## 2022-07-18 NOTE — PROGRESS NOTES
Uneventful shift  PRN dilaudid 4mg po given x's 2 for pt report of pain  Ativan 1mg po given x's 1 for pt report of anxiety  PRN flexeril 10mg po given x's 1 for muscle spasms  Fentanyl patch remained in place throughout shift  No replacements needed per rhina sops    Shift report given to emil Elkins RN    Vitals:    07/17/22 1541 07/17/22 1941 07/17/22 2313 07/18/22 0337   BP: 128/74 129/83 (!) 140/76 135/81   Pulse: 73 79 75 74   Resp: 18 18 18 18   Temp: 98.7 °F (37.1 °C) 98.5 °F (36.9 °C) 98.9 °F (37.2 °C) 98.3 °F (36.8 °C)   TempSrc: Oral Oral Oral Oral   SpO2: 94% 95% 93% 95%   Weight:   196 lb 12.8 oz (89.3 kg)    Height:

## 2022-07-18 NOTE — PROGRESS NOTES
[facility] Hematology & Oncology        Inpatient Hematology / Oncology Progress Note    Reason for Admission:  Prostate cancer metastatic to bone [facility]) [C61, C79.51]    24 Hour Events:  Afebrile, VSS  Stable for discharge  Issues with living arrangements after discharge  Friend at bedside    Transfusions: None  Replacements: None    ROS:  Constitutional: Positive for fatigue; negative for fever, chills. CV: Negative for chest pain, palpitations, edema. Respiratory: Negative for dyspnea, cough, wheezing. GI: Negative for nausea, diarrhea. +abdominal pain,   MSK:  +Back/hip pain    10 point review of systems is otherwise negative with the exception of the elements mentioned above in the HPI. Allergies   Allergen Reactions    Turmeric Nausea And Vomiting     Past Medical History:   Diagnosis Date    Claustrophobia     Ear problems     Elevated PSA     Former light cigarette smoker (1-9 per day)     smoked for 35 years.   quit     History of multiple allergies     Nicotine vapor product user     Personal history of prostate cancer     Prostate cancer ([address]     prostate, neuroendocrine, mets to bone     Past Surgical History:   Procedure Laterality Date    BIOPSY PROSTATE      COLONOSCOPY  08/2020    HEENT      teeth removed     IR KYPHOPLASTY LUMBAR FIRST LEVEL  10/14/2020    IR KYPHOPLASTY LUMBAR FIRST LEVEL  10/14/2020    IR KYPHOPLASTY LUMBAR FIRST LEVEL 10/14/2020 [facility]IOLOG[facility] SPECI[facility]    IR KYPHOPLASTY THORACIC FIRST LEVEL  10/29/2020    IR KYPHOPLASTY THORACIC FIRST LEVEL  10/29/2020    IR KYPHOPLASTY THORACIC FIRST LEVEL 10/29/2020 [facility] RADIOLOGY SPECI[facility]    TESTICLE REMOVAL Bilateral 02/2017     Family History   Problem Relation Age of Onset    Hypertension Father     Prostate Cancer Father     Cancer Father         prostate cancer     Social History     Socioeconomic History    Marital status: Single     Spouse name: Not on file    Number of children: Not on file    Years of education: mg at 07/13/22 1514    Or    acetaminophen (TYLENOL) suppository 650 mg  650 mg Rectal Q6H PRN Brandon Huang, APRN - CNP        HYDROmorphone (DILAUDID) tablet 2 mg  2 mg Oral Q4H PRN [facility], APRN - CNP   2 mg at 07/17/22 1006    Or    HYDROmorphone (DILAUDID) tablet 4 mg  4 mg Oral Q4H PRN [facility], APRN - CNP   4 mg at 07/18/22 1410    magnesium oxide (MAG-OX) tablet 400 mg  400 mg Oral TID Leena Juarez MD   400 mg at 07/18/22 4262    naloxone [facility]) injection 0.04 mg  0.04 mg IntraVENous PRN [facility], APRN - CNP        sennosides-docusate sodium (SENOKOT-S) 8.6-50 MG tablet 2 tablet  2 tablet Oral Daily Trea Gildardo, APRN - CNP   2 tablet at 07/18/22 5177    dexamethasone (DECADRON) tablet 4 mg  4 mg Oral 2 times per day Brandon Huang, APRN - CNP   4 mg at 07/18/22 8567    HYDROmorphone HCl PF (DILAUDID) injection 1 mg  1 mg IntraVENous Q3H PRN HELEN Vuong - NP   1 mg at 07/17/22 1648    aluminum & magnesium hydroxide-simethicone (MAALOX) 220-734-96 MG/5ML suspension 20 mL  20 mL Oral Q6H PRN Leena Juarez MD   20 mL at 07/13/22 0809    LORazepam (ATIVAN) tablet 1 mg  1 mg Oral TID PRN Dairl Maria L, DO   1 mg at 07/17/22 2034    pantoprazole (PROTONIX) tablet 40 mg  40 mg Oral QAM AC Thermon Samuel Martinez, DO   40 mg at 07/18/22 7013    sodium chloride flush 0.9 % injection 5-40 mL  5-40 mL IntraVENous 2 times per day Dairl Maria L, DO   10 mL at 07/17/22 2034    sodium chloride flush 0.9 % injection 5-40 mL  5-40 mL IntraVENous PRN Dairl Maria L, DO        0.9 % sodium chloride infusion   IntraVENous PRN Dairl Maria L, DO        ondansetron (ZOFRAN-ODT) disintegrating tablet 4 mg  4 mg Oral Q8H PRN Dairl Maria L, DO        Or    ondansetron [facility]) injection 4 mg  4 mg IntraVENous Q6H PRN Dairl Maria L, DO   4 mg at 07/10/22 1717    docusate sodium (COLACE) capsule 100 mg  100 mg Oral BID PRN Dairl Maria L, DO        polyethylene glycol [facility]) packet 17 g  17 g Oral Daily Lafayette Boeck, DO   17 g at 22 7818    lactulose (CHRONULAC) 10 GM/15ML solution 20 g  20 g Oral Once Lafayette Boeck, DO           OBJECTIVE:  Patient Vitals for the past 8 hrs:   BP Temp Temp src Pulse Resp SpO2   22 0828 (!) 153/87 98 °F (36.7 °C) Oral 82 17 93 %   22 0337 135/81 98.3 °F (36.8 °C) Oral 74 18 95 %     Temp (24hrs), Av.5 °F (36.9 °C), Min:98 °F (36.7 °C), Max:98.9 °F (37.2 °C)     0701 -  1900  In: 480 [P.O.:480]  Out: 400 [Urine:400]    Physical Exam:  Constitutional: Well developed male sitting on side of hospital bed. HEENT: Normocephalic and atraumatic. Oropharynx is clear, mucous membranes are moist.  Neck supple. Skin Warm and dry. Bruising noted on BLE and no rash noted. No erythema. No pallor. Respiratory Lungs are clear to auscultation bilaterally without wheezes, rales or rhonchi, normal air exchange without accessory muscle use. CVS Normal rate, regular rhythm and normal S1 and S2. No murmurs, gallops, or rubs. Abdomen Soft, nontender and nondistended, normoactive bowel sounds. No palpable mass. No hepatosplenomegaly. Neuro Grossly nonfocal with no obvious sensory or motor deficits. MSK Normal range of motion in general.  No edema and no tenderness. Psych Appropriate mood and affect.         Labs:    Recent Results (from the past 24 hour(s))   CBC with Auto Differential    Collection Time: 22  3:07 AM   Result Value Ref Range    WBC 9.2 4.3 - 11.1 K/uL    RBC 2.76 (L) 4.23 - 5.6 M/uL    Hemoglobin 8.9 (L) 13.6 - 17.2 g/dL    Hematocrit 28.3 (L) 41.1 - 50.3 %    .5 (H) 79.6 - 97.8 FL    MCH 32.2 26.1 - 32.9 PG    MCHC 31.4 31.4 - 35.0 g/dL    RDW 20.9 (H) 11.9 - 14.6 %    Platelets 91 (L) 833 - 450 K/uL    MPV 10.0 9.4 - 12.3 FL    nRBC 0.00 0.0 - 0.2 K/uL    Differential Type AUTOMATED      Seg Neutrophils 82 (H) 43 - 78 %    Lymphocytes 9 (L) 13 - 44 %    Monocytes 7 4.0 - 12.0 % Eosinophils % 0 (L) 0.5 - 7.8 %    Basophils 0 0.0 - 2.0 %    Immature Granulocytes 1 0.0 - 5.0 %    Segs Absolute 7.6 1.7 - 8.2 K/UL    Absolute Lymph # 0.8 0.5 - 4.6 K/UL    Absolute Mono # 0.7 0.1 - 1.3 K/UL    Absolute Eos # 0.0 0.0 - 0.8 K/UL    Basophils Absolute 0.0 0.0 - 0.2 K/UL    Absolute Immature Granulocyte 0.1 0.0 - 0.5 K/UL   Comprehensive Metabolic Panel    Collection Time: 07/18/22  3:07 AM   Result Value Ref Range    Sodium 138 136 - 145 mmol/L    Potassium 4.7 3.5 - 5.1 mmol/L    Chloride 109 (H) 98 - 107 mmol/L    CO2 23 21 - 32 mmol/L    Anion Gap 6 (L) 7 - 16 mmol/L    Glucose 109 (H) 65 - 100 mg/dL    BUN 29 (H) 8 - 23 MG/DL    CREATININE 1.40 0.8 - 1.5 MG/DL    GFR African American >60 >60 ml/min/1.73m2    GFR Non- 54 (L) >60 ml/min/1.73m2    Calcium 7.8 (L) 8.3 - 10.4 MG/DL    Total Bilirubin 0.4 0.2 - 1.1 MG/DL     (H) 12 - 65 U/L     (H) 15 - 37 U/L    Alk Phosphatase 223 (H) 50 - 136 U/L    Total Protein 6.3 6.3 - 8.2 g/dL    Albumin 2.8 (L) 3.2 - 4.6 g/dL    Globulin 3.5 2.3 - 3.5 g/dL    Albumin/Globulin Ratio 0.8 (L) 1.2 - 3.5     Magnesium    Collection Time: 07/18/22  3:07 AM   Result Value Ref Range    Magnesium 2.0 1.8 - 2.4 mg/dL       Imaging:  Xray Result (most recent):  XR CHEST PORTABLE 07/08/2022    Narrative  Portable chest x-ray    CLINICAL INDICATION: Sternal pain, metastatic cancer    FINDINGS: Single AP view of the chest compared to a similar exam dated 9/1/2020  show the lungs to be expanded and clear. The cardiac silhouette and mediastinum  are unremarkable. A right-sided chest port is in place. There is no pleural  effusion or pneumothorax. No definite bony abnormality. Note the sternum is not  able to be evaluated on this single portable frontal projection of the chest.    Impression  No acute cardiopulmonary abnormality.         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 ([address] Hypokalemia    Gynecomastia, male    Neuroendocrine carcinoma, high grade (HCC)    Anemia due to chemotherapy    Primary prostate cancer with metastasis from prostate to other site [facility])    Prostatic nodule    Family history of prostate cancer in father    Acute prostatitis    S/P TURP    Prostate cancer [facility])    Prostate cancer metastatic to bone (HCC)    Thrombocytopenia [address] [address]    Hypomagnesemia    Hypercalcemia of malignancy    Pain due to malignant neoplasm metastatic to bone (HCC)    EDGARD (acute kidney injury) ([facility] [address] [facility]    Debility    Drug-induced constipation    Encounter for palliative care     Mr. Lori Masterson is a 78 y.o. male admitted on 7/8/2022 with a primary diagnosis of There were no encounter diagnoses. .       Mr. Lori Masterson has a PMH of metastatic prostate cancer dx 2017 s/p casodex and bilateral orchiectomy for surgical castration, 6 cycles of docetaxel, pathologic vertebral fx's with path positive for high grade neuroendocrine cancer, s/p carbo/etoposide and most recently carbo/taxotere which was held d/t TCP. Last was C2 on 6/8/22. 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 He is a known patient of Dr. Alejandra Newman s/p multiple lines of treatment as above. At last office visit on 6/29, CEA had notably continued to rise to 8290. He has had discussion with [facility], however wished to hold off on making a decision until he completed radiation therapy. He presented to the ER with a fall at home, increasing pain in the hips and generalized weakness. Also with constipation. Also found to be anemic and transfused PRBC. We were asked for recommendations for our known patient. PLAN:  Metastatic Prostate Cancer/Neuroendocrine  - S/p multiple lines of therapy, most recently carbo/taxotere C2 on 6/8. Held on 6/29 d/t TCP.   Undergoing XRT to rib and T-spine with Dr. Ana Franklin.  - Has spoken to [facility], however wished to hold off on making a decision until he saw if XRT improved his pain  - Can resume XRT on Monday 7/11 Pt does not wish to pursue XRT. Meeting with PC today for pain mgmt and to discuss [address] - wishes to pursue facility placement and enroll in Hospice. 7/13 Pt now states he may be interested in going home since he will have support from friend moving here from [place] of state. Consult PT/OT. 7/14  PT recommends HHT     Cancer Related Pain  - Currently with Dilaudid/oxy prn  - Consult palliative care to assist with pain management (known from office) and also to assist with goals of care discussions   7/10 Increased Dilaudid to 1 mg every 3 hours for severe pain. Pall care consult pending. 7/11 PC assisting with mgmt. Resume Dex. 7/12 PC started Fentanyl 50mcg patch yesterday. Pt reports pain controlled today - even able to walk around room now. 7/13 Pain controlled. PC changed oxycodone to po dilaudid yesterday. 7/14 Pain controlled  7/15 C/o pain today  7/17 pain improved after menchaca     Anemia/Thrombocytopenia  - Transfuse prn per [facility] SOPs     Elevated Tranaminases  - Alk phos/ALT/AST trended down today, continue to follow   7/11 AST/ALT continue to improve   7/13 LFTs improving  7/18 stable     EDGARD  - Gentle hydration  7/18 Cr 1.4     Constipation  - Lactulose prn   7/11 Pt refusing Lactulose. PC started pericolace. Con't Miralax. Consider KUB if no improvement. 7/12 No BM documented. Con't Miralax/Pericolace. Pt more active now, up around in room since pain controlled. 7/13 Had BM x 4 yesterday    Hypercalcemia  7/12 CCa++ up to 11. 7. Zometa ordered. 7/15 CCa++ down to 9.2  RESOLVED    Dysuria  7/15 c/o dysuria. UA c/w UTI. CTX ordered. Check Ucx.  7/17 UC NTD on Rocephin  7/18 Pain improved s/p menchaca placement. Ucx with [facility]. Completed CTX x 3 days. Urinary retention  7/16 menchaca ordered/ flomax ordered  7/17 UOP 4000 ml. Adding proscar  7/18 s/p menchaca placement. Con't Flomax/Proscar.   Plan to DC menchaca tomorrow. Continue home meds  [facility] SOPs  AC contraindicated d/t thrombocytopenia    Goals and plan of care reviewed with the patient. All questions answered to the best of our ability. Disposition:  7/14: Pt reports his friend is not moving here until 8/1. He reports she has a one-story home that he can move into on 8/1 or possibly even sooner. Pt was able to walk hallways yesterday. Unsure if he would qualify for facility placement while future living arrangements are made. CM states pt would have to go through extensive insurance process to even cover facility placement if he qualified. CM assisting with discharge planning. ? Could pt stay with son who lives locally until his friend's house is ready to move into? Pt also declines Hospice services at this time. He states he would prefer HHT. 7/15:  Pt is medically stable for discharge. Pt working on living arrangements. Hopeful for discharge home soon. 7/17 Spoke with patient's son yesterday. Patient does not a home to return to so now even if he does have a friend coming there is no home. Difficult placement. CM following.     7/18: Pt reportedly does not have a home to return to upon discharge. Son states pt is unable to live with him. Unlikely that pt would qualify for facility placement. CM following.             Iesha SquHELEN [address] Hematology & Oncology  [address] [address]  Office : [phone]  Fax : [phone]

## 2022-07-18 NOTE — PLAN OF CARE
Problem: Skin/Tissue Integrity  Goal: Absence of new skin breakdown  Description: 1. Monitor for areas of redness and/or skin breakdown  2. Assess vascular access sites hourly  3. Every 4-6 hours minimum:  Change oxygen saturation probe site  4. Every 4-6 hours:  If on nasal continuous positive airway pressure, respiratory therapy assess nares and determine need for appliance change or resting period.   Outcome: Progressing Towards Goal     Problem: Pain  Goal: Verbalizes/displays adequate comfort level or baseline comfort level  Outcome: Progressing Towards Goal     Problem: Safety - Adult  Goal: Free from fall injury  Outcome: Progressing Towards Goal  Flowsheets (Taken 7/18/2022 0820)  Free From Fall Injury:   Instruct family/caregiver on patient safety   Based on caregiver fall risk screen, instruct family/caregiver to ask for assistance with transferring infant if caregiver noted to have fall risk factors     Problem: ABCDS Injury Assessment  Goal: Absence of physical injury  Outcome: Progressing Towards Goal

## 2022-07-19 NOTE — PROGRESS NOTES
Physical Therapy Note:    Attempted to see patient this PM for physical therapy treatment  session. Patient states that he just received pain medicine and did not want to work with therapy at this time. He states he has been up and down to the restroom multiple times today. Will follow and re-attempt as schedule permits/patient available.  Thank you,    Steph Vazquez, [facility]     Rehab Caseload Tracker

## 2022-07-19 NOTE — PROGRESS NOTES
3 [facility] Hematology & Oncology        Inpatient Hematology / Oncology Progress Note    Reason for Admission:  Prostate cancer metastatic to bone [facility]) [F38, J64.51]    24 Hour Events:  Afebrile, VSS  Stable for discharge  Issues with living arrangements after discharge  Removing menchaca today  C/o constipation    Transfusions: None  Replacements: Ca++    ROS:  Constitutional: Positive for fatigue; negative for fever, chills. CV: Negative for chest pain, palpitations, edema. Respiratory: Negative for dyspnea, cough, wheezing. GI: +constipation. Negative for nausea, diarrhea, abd pain. MSK:  +Back/hip pain    10 point review of systems is otherwise negative with the exception of the elements mentioned above in the HPI. Allergies   Allergen Reactions    Turmeric Nausea And Vomiting     Past Medical History:   Diagnosis Date    Claustrophobia     Ear problems     Elevated PSA     Former light cigarette smoker (1-9 per day)     smoked for 35 years.   quit     History of multiple allergies     Nicotine vapor product user     Personal history of prostate cancer     Prostate cancer ([facility] [address]     prostate, neuroendocrine, mets to bone     Past Surgical History:   Procedure Laterality Date    BIOPSY PROSTATE      COLONOSCOPY  08/2020    HEENT      teeth removed     IR KYPHOPLASTY LUMBAR FIRST LEVEL  10/14/2020    IR KYPHOPLASTY LUMBAR FIRST LEVEL  10/14/2020    IR KYPHOPLASTY LUMBAR FIRST LEVEL 10/14/2020 [facility]    IR KYPHOPLASTY THORACIC FIRST LEVEL  10/29/2020    IR KYPHOPLASTY THORACIC FIRST LEVEL  10/29/2020    IR KYPHOPLASTY THORACIC FIRST LEVEL 10/29/2020 [facility]    TESTICLE REMOVAL Bilateral 02/2017     Family History   Problem Relation Age of Onset    Hypertension Father     Prostate Cancer Father     Cancer Father         prostate cancer     Social History     Socioeconomic History    Marital status: Single     Spouse name: Not on file    Number of children: Not on file    Years of education: Not on file    Highest education level: Not on file   Occupational History    Not on file   Tobacco Use    Smoking status: Former     Packs/day: 0.50     Types: Cigarettes     Quit date: 2016     Years since quittin.6    Smokeless tobacco: Never   Substance and Sexual Activity    Alcohol use: Yes    Drug use: No    Sexual activity: Not on file   Other Topics Concern    Not on file   Social History Narrative    Not on file     Social Determinants of Health     Financial Resource Strain: Not on file   Food Insecurity: Not on file   Transportation Needs: Not on file   Physical Activity: Not on file   Stress: Not on file   Social Connections: Not on file   Intimate Partner Violence: Not on file   Housing Stability: Not on file     Current Facility-Administered Medications   Medication Dose Route Frequency Provider Last Rate Last Admin    magnesium oxide (MAG-OX) tablet 400 mg  400 mg Oral 4x Daily James Morejon MD        calcium carbonate (TUMS) chewable tablet 500 mg  500 mg Oral TID HELEN Watts CNP        tamsulosin (FLOMAX) capsule 0.4 mg  0.4 mg Oral Daily HELEN Sadler NP   0.4 mg at 22 7471    finasteride (PROSCAR) tablet 5 mg  5 mg Oral Daily HELEN Sadler NP   5 mg at 22 1162    zinc oxide 40 % paste   Topical 4x Daily PRN James Morejon MD        lidocaine-prilocaine (EMLA) cream   Topical Once James Morejon MD        phenazopyridine (PYRIDIUM) tablet 95 mg  95 mg Oral TID PRN HELEN Watts CNP   95 mg at 07/15/22 2048    cyclobenzaprine (FLEXERIL) tablet 10 mg  10 mg Oral TID PRN HELEN Sadler NP   10 mg at 22 1814    0.9 % sodium chloride infusion   IntraVENous PRN HELEN Watts CNP        enoxaparin (LOVENOX) injection 40 mg  40 mg SubCUTAneous Daily HELEN Watts CNP   40 mg at 22 0822    fentaNYL (DURAGESIC) 75 MCG/HR 1 patch  1 patch TransDERmal Q72H HELEN Marcus CNP   1 patch at 07/16/22 1057    diphenhydrAMINE (BENADRYL) capsule 25 mg  25 mg Oral Q6H PRN Salud Chawla, APRN - CNP   25 mg at 07/16/22 0110    acetaminophen (TYLENOL) tablet 650 mg  650 mg Oral Q6H PRN Salud Chawla, APRN - CNP   650 mg at 07/13/22 1514    Or    acetaminophen (TYLENOL) suppository 650 mg  650 mg Rectal Q6H PRN Salud Chawla, APRN - CNP        HYDROmorphone (DILAUDID) tablet 2 mg  2 mg Oral Q4H PRN Neda Mabank, APRN - CNP   2 mg at 07/17/22 1006    Or    HYDROmorphone (DILAUDID) tablet 4 mg  4 mg Oral Q4H PRN Neda Mabank, APRN - CNP   4 mg at 07/19/22 2807    naloxone (NARCAN) injection 0.04 mg  0.04 mg IntraVENous PRN Neda Rojasin, APRN - CNP        sennosides-docusate sodium (SENOKOT-S) 8.6-50 MG tablet 2 tablet  2 tablet Oral Daily Neda Marrero, APRN - CNP   2 tablet at 07/18/22 3412    dexamethasone (DECADRON) tablet 4 mg  4 mg Oral 2 times per day Salud Chawla, APRN - CNP   4 mg at 07/18/22 2113    HYDROmorphone HCl PF (DILAUDID) injection 1 mg  1 mg IntraVENous Q3H PRN Sheliaally Ritchie APRN - NP   1 mg at 07/17/22 1648    aluminum & magnesium hydroxide-simethicone (MAALOX) 200-200-20 MG/5ML suspension 20 mL  20 mL Oral Q6H PRN Dali Wynne MD   20 mL at 07/13/22 0809    LORazepam (ATIVAN) tablet 1 mg  1 mg Oral TID PRN Asia Christine, DO   1 mg at 07/18/22 1814    pantoprazole (PROTONIX) tablet 40 mg  40 mg Oral QAM AC Jeyson Martinez, DO   40 mg at 07/19/22 2073    sodium chloride flush 0.9 % injection 5-40 mL  5-40 mL IntraVENous 2 times per day Asia Christine, DO   10 mL at 07/18/22 2114    sodium chloride flush 0.9 % injection 5-40 mL  5-40 mL IntraVENous PRN Zygmunt Netter, DO        0.9 % sodium chloride infusion   IntraVENous PRN Zygmunt Netter, DO        ondansetron (ZOFRAN-ODT) disintegrating tablet 4 mg  4 mg Oral Q8H PRN Zygmunt Netter, DO        Or    ondansetron [facility]) injection 4 mg  4 mg IntraVENous Q6H PRN Zygmunt Netter, DO   4 mg at 07/10/22 1717    docusate sodium (COLACE) capsule 100 mg  100 mg Oral BID PRN Brandon Chirinos, DO        polyethylene glycol (GLYCOLAX) packet 17 g  17 g Oral Daily Brandon Chirinos, DO   17 g at 22 7825    lactulose (CHRONULAC) 10 GM/15ML solution 20 g  20 g Oral Once Brandon Chirinos, DO           OBJECTIVE:  Patient Vitals for the past 8 hrs:   BP Temp Temp src Pulse Resp SpO2   22 0841 135/80 98.8 °F (37.1 °C) Oral 74 20 96 %   22 0320 136/70 98.3 °F (36.8 °C) Oral 70 18 95 %     Temp (24hrs), Av.4 °F (36.9 °C), Min:97.9 °F (36.6 °C), Max:98.8 °F (37.1 °C)    No intake/output data recorded. Physical Exam:  Constitutional: Well developed male sitting comfortably on the hospital bed. HEENT: Normocephalic and atraumatic. Oropharynx is clear, mucous membranes are moist.  Neck supple. Skin Warm and dry. Bruising noted on BLE and no rash noted. No erythema. No pallor. Respiratory Lungs are clear to auscultation bilaterally without wheezes, rales or rhonchi, normal air exchange without accessory muscle use. CVS Normal rate, regular rhythm and normal S1 and S2. No murmurs, gallops, or rubs. Abdomen Soft, nontender and nondistended, normoactive bowel sounds. No palpable mass. No hepatosplenomegaly. Neuro Grossly nonfocal with no obvious sensory or motor deficits. MSK Normal range of motion in general.  No edema and no tenderness. Psych Appropriate mood and affect.         Labs:    Recent Results (from the past 24 hour(s))   CBC with Auto Differential    Collection Time: 22  3:50 AM   Result Value Ref Range    WBC 7.6 4.3 - 11.1 K/uL    RBC 2.51 (L) 4.23 - 5.6 M/uL    Hemoglobin 8.0 (L) 13.6 - 17.2 g/dL    Hematocrit 25.6 (L) 41.1 - 50.3 %    .0 (H) 79.6 - 97.8 FL    MCH 31.9 26.1 - 32.9 PG    MCHC 31.3 (L) 31.4 - 35.0 g/dL    RDW 20.5 (H) 11.9 - 14.6 %    Platelets 79 (L) 000 - 450 K/uL    MPV 10.6 9.4 - 12.3 FL    nRBC 0.00 0.0 - 0.2 K/uL    Differential Type AUTOMATED      Seg Neutrophils 82 (H) 43 - 78 %    Lymphocytes 7 (L) 13 - 44 %    Monocytes 9 4.0 - 12.0 %    Eosinophils % 0 (L) 0.5 - 7.8 %    Basophils 0 0.0 - 2.0 %    Immature Granulocytes 2 0.0 - 5.0 %    Segs Absolute 6.3 1.7 - 8.2 K/UL    Absolute Lymph # 0.5 0.5 - 4.6 K/UL    Absolute Mono # 0.7 0.1 - 1.3 K/UL    Absolute Eos # 0.0 0.0 - 0.8 K/UL    Basophils Absolute 0.0 0.0 - 0.2 K/UL    Absolute Immature Granulocyte 0.1 0.0 - 0.5 K/UL   Comprehensive Metabolic Panel    Collection Time: 07/19/22  3:50 AM   Result Value Ref Range    Sodium 137 (L) 138 - 145 mmol/L    Potassium 4.5 3.5 - 5.1 mmol/L    Chloride 106 98 - 107 mmol/L    CO2 25 21 - 32 mmol/L    Anion Gap 6 (L) 7 - 16 mmol/L    Glucose 115 (H) 65 - 100 mg/dL    BUN 27 (H) 8 - 23 MG/DL    CREATININE 1.20 0.8 - 1.5 MG/DL    GFR African American >60 >60 ml/min/1.73m2    GFR Non- >60 >60 ml/min/1.73m2    Calcium 7.3 (L) 8.3 - 10.4 MG/DL    Total Bilirubin 0.4 0.2 - 1.1 MG/DL     (H) 12 - 65 U/L     (H) 15 - 37 U/L    Alk Phosphatase 247 (H) 50 - 136 U/L    Total Protein 6.0 (L) 6.3 - 8.2 g/dL    Albumin 2.6 (L) 3.2 - 4.6 g/dL    Globulin 3.4 2.3 - 3.5 g/dL    Albumin/Globulin Ratio 0.8 (L) 1.2 - 3.5     Magnesium    Collection Time: 07/19/22  3:50 AM   Result Value Ref Range    Magnesium 1.6 (L) 1.8 - 2.4 mg/dL       Imaging:  Xray Result (most recent):  XR CHEST PORTABLE 07/08/2022    Narrative  Portable chest x-ray    CLINICAL INDICATION: Sternal pain, metastatic cancer    FINDINGS: Single AP view of the chest compared to a similar exam dated 9/1/2020  show the lungs to be expanded and clear. The cardiac silhouette and mediastinum  are unremarkable. A right-sided chest port is in place. There is no pleural  effusion or pneumothorax. No definite bony abnormality.  Note the sternum is not  able to be evaluated on this single portable frontal projection of the chest.    Impression  No acute cardiopulmonary abnormality. 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)    Hypokalemia    Gynecomastia, male    Neuroendocrine carcinoma, high grade (HCC)    Anemia due to chemotherapy    Primary prostate cancer with metastasis from prostate to other site [facility])    Prostatic nodule    Family history of prostate cancer in father    Acute prostatitis    S/P TURP    Prostate cancer [facility])    Prostate cancer metastatic to bone ([facility] [address]    Thrombocytopenia ([facility] [address]    Hypomagnesemia    Hypercalcemia of malignancy    Pain due to malignant neoplasm metastatic to bone (HCC)    EDGARD (acute kidney injury) ([facility] [address]    Debility    Drug-induced constipation    Encounter for palliative care     Mr. Jackson Meade is a 78 y.o. male admitted on 7/8/2022 with a primary diagnosis of There were no encounter diagnoses. .       Mr. Jackson Meade has a PMH of metastatic prostate cancer dx 2017 s/p casodex and bilateral orchiectomy for surgical castration, 6 cycles of docetaxel, pathologic vertebral fx's with path positive for high grade neuroendocrine cancer, s/p carbo/etoposide and most recently carbo/taxotere which was held d/t TCP. Last was C2 on 6/8/22. 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 He is a known patient of Dr. Lesli Garcia s/p multiple lines of treatment as above. At last office visit on 6/29, CEA had notably continued to rise to 8290. He has had discussion with [facility], [facility], however wished to hold off on making a decision until he completed radiation therapy. He presented to the ER with a fall at home, increasing pain in the hips and generalized weakness. Also with constipation. Also found to be anemic and transfused PRBC. We were asked for recommendations for our known patient. PLAN:  Metastatic Prostate Cancer/Neuroendocrine  - S/p multiple lines of therapy, most recently carbo/taxotere C2 on 6/8.   Held on 6/29 d/t TCP. Undergoing XRT to rib and T-spine with Dr. Suha Shields.  - Has spoken to [facility], however wished to hold off on making a decision until he saw if XRT improved his pain  - Can resume XRT on Monday 7/11 Pt does not wish to pursue XRT. Meeting with PC today for pain mgmt and to discuss [address] - wishes to pursue facility placement and enroll in Hospice. 7/13 Pt now states he may be interested in going home since he will have support from friend moving here from out of state. Consult PT/OT. 7/14  PT recommends HHT     Cancer Related Pain  - Currently with Dilaudid/oxy prn  - Consult palliative care to assist with pain management (known from office) and also to assist with goals of care discussions   7/10 Increased Dilaudid to 1 mg every 3 hours for severe pain. Pall care consult pending. 7/11 PC assisting with mgmt. Resume Dex. 7/12 PC started Fentanyl 50mcg patch yesterday. Pt reports pain controlled today - even able to walk around room now. 7/13 Pain controlled. PC changed oxycodone to po dilaudid yesterday. 7/14 Pain controlled  7/15 C/o pain today  7/17 pain improved after menchaca     Anemia/Thrombocytopenia  - Transfuse prn per [facility] SOPs     Elevated Tranaminases  - Alk phos/ALT/AST trended down today, continue to follow   7/11 AST/ALT continue to improve   7/13 LFTs improving  7/18 stable     EDGARD  - Gentle hydration  7/19 Cr 1.2     Constipation  - Lactulose prn   7/11 Pt refusing Lactulose. PC started pericolace. Con't Miralax. Consider KUB if no improvement. 7/12 No BM documented. Con't Miralax/Pericolace. Pt more active now, up around in room since pain controlled. 7/13 Had BM x 4 yesterday  7/19 c/o constipation. Increase pericolace to BID. Con't Miralax. Hypercalcemia / Hypocalcemia  7/12 CCa++ up to 11. 7. Zometa ordered. 7/15 CCa++ down to 9.2  7/19 Ca++ down to 8.4, replete    Dysuria  7/15 c/o dysuria. UA c/w UTI. CTX ordered.   Check Ucx.  7/17 UC NTD on Rocephin  7/18 Pain improved s/p menchaca placement. Ucx with [facility]. Completed CTX x 3 days. Urinary retention  7/16 menchaca ordered/ flomax ordered  7/17 UOP 4000 ml. Adding proscar  7/18 s/p menchaca placement. Con't Flomax/Proscar. Plan to DC menchaca tomorrow. 7/19 Remove menchaca for voiding trial.  If unable to void, will consult urology. Continue home meds  [facility] SOPs  AC contraindicated d/t thrombocytopenia    Goals and plan of care reviewed with the patient. All questions answered to the best of our ability. Disposition:  7/14: Pt reports his friend is not moving here until 8/1. He reports she has a one-story home that he can move into on 8/1 or possibly even sooner. Pt was able to walk hallways yesterday. Unsure if he would qualify for facility placement while future living arrangements are made. CM states pt would have to go through extensive insurance process to even cover facility placement if he qualified. CM assisting with discharge planning. ? Could pt stay with son who lives locally until his friend's house is ready to move into? Pt also declines Hospice services at this time. He states he would prefer HHT. 7/15:  Pt is medically stable for discharge. Pt working on living arrangements. Hopeful for discharge home soon. 7/17 Spoke with patient's son yesterday. Patient does not a home to return to so now even if he does have a friend coming there is no home. Difficult placement. CM following. 7/19: Pt reportedly does not have a home to return to upon discharge. Son states pt is unable to live with him. Unlikely that pt would qualify for facility placement. CM following. [facility] On[facility]  [address] [address]  Office : [phone]  Fax : [phone]     Attending Addendum:  I have personally performed a face to face diagnostic evaluation on this patient.  I have reviewed and agree with the care plan as documented above by Brandon Huang N.P.  My findings are as follows: Patient appears stable, heart rate regular without murmurs, abdomen is non-tender, bowel sounds are positive. Elderly patient with metastatic prostate cancer with more subsequent neuroendocrine pathology. Hypercalcemia improved. Pain under reasonable control. Voiding trial today. Awaiting placement. Supportive care as above.           Carson Arce MD  [facility] Hematology/Oncology  [address] [place] [address]  Office : [phone]  Fax : [phone]

## 2022-07-19 NOTE — PROGRESS NOTES
Uneventful shift  PRN dilaudid 4mg po given x's 2 for pt report of pain  Pain appeared to be well controlled throughout night    Shift report given to emil Charles RN    Vitals:    07/18/22 1524 07/18/22 1920 07/18/22 2335 07/19/22 0320   BP: (!) 140/81 94/73 129/87 136/70   Pulse: 70 77 71 70   Resp: 17 16 20 18   Temp: 98.2 °F (36.8 °C) 98.5 °F (36.9 °C) 98.5 °F (36.9 °C) 98.3 °F (36.8 °C)   TempSrc: Oral Oral Oral Oral   SpO2: 97% 94% 95% 95%   Weight:       Height:

## 2022-07-19 NOTE — CARE COORDINATION
Pt discussed during IDR and CM met with pt at bedside to discuss d/c planning. Per pt all attempted d/c plans \"fell through\" and he \"has nowhere to go. \"  CM inquired about pt/son's \"Plan B\" and pt replied \"I don't know. We are waiting on you. You are the one who knows the connections. \"  CM replied that CM \"cannot make a house appear. \"  CM repeatedly turned the conversation back to pt and his son  explaining that they are the ones who need to plan for pt's d/c. Each time CM brought up their responsibility pt would appear to suddenly have increased pain in a certain body part yet this stopped when he directed the conversation back to CM. Pt stated \"I am in no shape to leave here. I need the care I am getting here. \"  CM replied that while it is certainly very unfortunate that pt has pain this will likely never cease and there are outside resources to help manage this and other health needs - being home health, home infusion if necessary, and transportation arrangements through the [facility]. CM suggested looking into the few [facility] that accept Medicaid and pt quickly replied \"Well that isn't a good situation. \"  CM explained that pt does not have any d/c plans in place and that completely depending on CM to locate housing is not feasible. CM called ALFs that accept Medicaid in [place] and [facility], however the facility in [place] that does have availability did note that they do not have any medical staff, including CNAs.

## 2022-07-20 NOTE — PROGRESS NOTES
[facility] Hematology & Oncology        Inpatient Hematology / Oncology Progress Note    Reason for Admission:  Prostate cancer metastatic to bone ([facility] [address] [C61, C79.51]    24 Hour Events:  Afebrile, VSS  Stable for discharge  Issues with living arrangements after discharge  Removed menchaca yesterday, had to replace d/t urinary retention - urology consulted  C/o constipation    Transfusions: None  Replacements: Ca++, Mg+    ROS:  Constitutional: Positive for fatigue; negative for fever, chills. CV: Negative for chest pain, palpitations, edema. Respiratory: Negative for dyspnea, cough, wheezing. GI: +constipation. Negative for nausea, diarrhea, abd pain. MSK:  +Back/hip pain (pain controlled)    10 point review of systems is otherwise negative with the exception of the elements mentioned above in the HPI. Allergies   Allergen Reactions    Turmeric Nausea And Vomiting     Past Medical History:   Diagnosis Date    Claustrophobia     Ear problems     Elevated PSA     Former light cigarette smoker (1-9 per day)     smoked for 35 years.   quit     History of multiple allergies     Nicotine vapor product user     Personal history of prostate cancer     Prostate cancer ([facility] [address]     prostate, neuroendocrine, mets to bone     Past Surgical History:   Procedure Laterality Date    BIOPSY PROSTATE      COLONOSCOPY  08/2020    HEENT      teeth removed     IR KYPHOPLASTY LUMBAR FIRST LEVEL  10/14/2020    IR KYPHOPLASTY LUMBAR FIRST LEVEL  10/14/2020    IR KYPHOPLASTY LUMBAR FIRST LEVEL 10/14/2020 [facility]    IR KYPHOPLASTY THORACIC FIRST LEVEL  10/29/2020    IR KYPHOPLASTY THORACIC FIRST LEVEL  10/29/2020    IR KYPHOPLASTY THORACIC FIRST LEVEL 10/29/2020 [facility]    TESTICLE REMOVAL Bilateral 02/2017     Family History   Problem Relation Age of Onset    Hypertension Father     Prostate Cancer Father     Cancer Father         prostate cancer     Social History     Socioeconomic History Marital status: Single     Spouse name: Not on file    Number of children: Not on file    Years of education: Not on file    Highest education level: Not on file   Occupational History    Not on file   Tobacco Use    Smoking status: Former     Packs/day: 0.50     Types: Cigarettes     Quit date: 2016     Years since quittin.6    Smokeless tobacco: Never   Substance and Sexual Activity    Alcohol use: Yes    Drug use: No    Sexual activity: Not on file   Other Topics Concern    Not on file   Social History Narrative    Not on file     Social Determinants of Health     Financial Resource Strain: Not on file   Food Insecurity: Not on file   Transportation Needs: Not on file   Physical Activity: Not on file   Stress: Not on file   Social Connections: Not on file   Intimate Partner Violence: Not on file   Housing Stability: Not on file     Current Facility-Administered Medications   Medication Dose Route Frequency Provider Last Rate Last Admin    magnesium sulfate 2000 mg in 50 mL IVPB premix  2,000 mg IntraVENous Once Benito Barclay APRN - CNP        magnesium oxide (MAG-OX) tablet 400 mg  400 mg Oral 4x Daily Gio Oliva MD   400 mg at 22 0850    calcium carbonate (TUMS) chewable tablet 500 mg  500 mg Oral TID Benito Barclay APRN - CNP   500 mg at 22 0850    sennosides-docusate sodium (SENOKOT-S) 8.6-50 MG tablet 2 tablet  2 tablet Oral BID Benito Barclay, APRN - CNP   2 tablet at 22 0850    tamsulosin (FLOMAX) capsule 0.4 mg  0.4 mg Oral Daily Derral Acres, APRN - NP   0.4 mg at 22 0850    finasteride (PROSCAR) tablet 5 mg  5 mg Oral Daily Derral Acres, APRN - NP   5 mg at 22 0850    zinc oxide 40 % paste   Topical 4x Daily PRN Gio Oliva MD        lidocaine-prilocaine (EMLA) cream   Topical Once Gio Oliva MD        phenazopyridine (PYRIDIUM) tablet 95 mg  95 mg Oral TID PRN Benito Barclay APRN - CNP   95 mg at 07/15/22 2048    cyclobenzaprine (FLEXERIL) tablet 10 mg  10 mg Oral TID PRN Linnette Navas, HELEN - NP   10 mg at 07/20/22 0132    0.9 % sodium chloride infusion   IntraVENous PRN HELEN Vasquez - CNP        enoxaparin (LOVENOX) injection 40 mg  40 mg SubCUTAneous Daily HELEN Vasquez - CNP   40 mg at 07/20/22 0849    fentaNYL (DURAGESIC) 75 MCG/HR 1 patch  1 patch TransDERmal Q72H HELEN Zazueta - CNP   1 patch at 07/19/22 1131    diphenhydrAMINE (BENADRYL) capsule 25 mg  25 mg Oral Q6H PRN Marcy Brandt APRN - CNP   25 mg at 07/16/22 0110    acetaminophen (TYLENOL) tablet 650 mg  650 mg Oral Q6H PRN HELEN Vasquez - CNP   650 mg at 07/13/22 1514    Or    acetaminophen (TYLENOL) suppository 650 mg  650 mg Rectal Q6H PRN HELEN Vasquez - MICHELLE        HYDROmorphone (DILAUDID) tablet 2 mg  2 mg Oral Q4H PRN HELNE Zazueta - CNP   2 mg at 07/17/22 1006    Or    HYDROmorphone (DILAUDID) tablet 4 mg  4 mg Oral Q4H PRN HELEN Zazueta - CNP   4 mg at 07/20/22 0044    naloxone (NARCAN) injection 0.04 mg  0.04 mg IntraVENous PRN HELEN Marcus - CNP        dexamethasone (DECADRON) tablet 4 mg  4 mg Oral 2 times per day HELEN Vasquez - CNP   4 mg at 07/20/22 0850    HYDROmorphone HCl PF (DILAUDID) injection 1 mg  1 mg IntraVENous Q3H PRN HELEN Guidry - NP   1 mg at 07/17/22 1648    aluminum & magnesium hydroxide-simethicone (MAALOX) 000-034-18 MG/5ML suspension 20 mL  20 mL Oral Q6H PRN Tere Rivera MD   20 mL at 07/13/22 0809    LORazepam (ATIVAN) tablet 1 mg  1 mg Oral TID PRN Hiram Zambrano DO   1 mg at 07/20/22 0132    pantoprazole (PROTONIX) tablet 40 mg  40 mg Oral QAM AC Juwan Martinez, DO   40 mg at 07/20/22 0850    sodium chloride flush 0.9 % injection 5-40 mL  5-40 mL IntraVENous 2 times per day Hiram Zambrano, DO   10 mL at 07/20/22 0855    sodium chloride flush 0.9 % injection 5-40 mL  5-40 mL IntraVENous PRN Hiram Zambrano, DO        0.9 % sodium chloride infusion   IntraVENous PRN Hiram Zambrano, DO ondansetron (ZOFRAN-ODT) disintegrating tablet 4 mg  4 mg Oral Q8H PRN Mikayla Denier, DO        Or    ondansetron [facility]) injection 4 mg  4 mg IntraVENous Q6H PRN Mikayla Denier, DO   4 mg at 07/10/22 1717    docusate sodium (COLACE) capsule 100 mg  100 mg Oral BID PRN Mikayla Denier, DO        polyethylene glycol (GLYCOLAX) packet 17 g  17 g Oral Daily Mikayla Denier, DO   17 g at 22 0849       OBJECTIVE:  Patient Vitals for the past 8 hrs:   BP Temp Temp src Pulse Resp SpO2   22 0700 117/81 98.8 °F (37.1 °C) Oral 77 16 95 %   22 0315 112/70 97.6 °F (36.4 °C) Oral 73 16 92 %     Temp (24hrs), Av.3 °F (36.8 °C), Min:97.6 °F (36.4 °C), Max:98.8 °F (37.1 °C)    No intake/output data recorded. Physical Exam:  Constitutional: Well developed male sitting comfortably on the hospital bed. HEENT: Normocephalic and atraumatic. Oropharynx is clear, mucous membranes are moist.  Neck supple. Skin Warm and dry. Bruising noted on BLE and no rash noted. No erythema. No pallor. Respiratory Lungs are clear to auscultation bilaterally without wheezes, rales or rhonchi, normal air exchange without accessory muscle use. CVS Normal rate, regular rhythm and normal S1 and S2. No murmurs, gallops, or rubs. Abdomen Soft, nontender and nondistended, normoactive bowel sounds. No palpable mass. No hepatosplenomegaly. Neuro Grossly nonfocal with no obvious sensory or motor deficits. MSK Normal range of motion in general.  No edema and no tenderness. Psych Appropriate mood and affect.         Labs:    Recent Results (from the past 24 hour(s))   CBC with Auto Differential    Collection Time: 22  3:07 AM   Result Value Ref Range    WBC 8.2 4.3 - 11.1 K/uL    RBC 2.60 (L) 4.23 - 5.6 M/uL    Hemoglobin 8.2 (L) 13.6 - 17.2 g/dL    Hematocrit 27.0 (L) 41.1 - 50.3 %    .8 (H) 79.6 - 97.8 FL    MCH 31.5 26.1 - 32.9 PG    MCHC 30.4 (L) 31.4 - 35.0 g/dL    RDW 20.8 (H) 11.9 - 14.6 % Platelets 84 (L) 262 - 450 K/uL    MPV 10.0 9.4 - 12.3 FL    nRBC 0.00 0.0 - 0.2 K/uL    Differential Type AUTOMATED      Seg Neutrophils 85 (H) 43 - 78 %    Lymphocytes 7 (L) 13 - 44 %    Monocytes 7 4.0 - 12.0 %    Eosinophils % 0 (L) 0.5 - 7.8 %    Basophils 0 0.0 - 2.0 %    Immature Granulocytes 2 0.0 - 5.0 %    Segs Absolute 7.0 1.7 - 8.2 K/UL    Absolute Lymph # 0.5 0.5 - 4.6 K/UL    Absolute Mono # 0.6 0.1 - 1.3 K/UL    Absolute Eos # 0.0 0.0 - 0.8 K/UL    Basophils Absolute 0.0 0.0 - 0.2 K/UL    Absolute Immature Granulocyte 0.1 0.0 - 0.5 K/UL   Comprehensive Metabolic Panel    Collection Time: 07/20/22  3:07 AM   Result Value Ref Range    Sodium 136 (L) 138 - 145 mmol/L    Potassium 4.7 3.5 - 5.1 mmol/L    Chloride 105 98 - 107 mmol/L    CO2 24 21 - 32 mmol/L    Anion Gap 7 7 - 16 mmol/L    Glucose 113 (H) 65 - 100 mg/dL    BUN 26 (H) 8 - 23 MG/DL    CREATININE 1.10 0.8 - 1.5 MG/DL    GFR African American >60 >60 ml/min/1.73m2    GFR Non- >60 >60 ml/min/1.73m2    Calcium 7.5 (L) 8.3 - 10.4 MG/DL    Total Bilirubin 0.4 0.2 - 1.1 MG/DL     (H) 12 - 65 U/L     (H) 15 - 37 U/L    Alk Phosphatase 297 (H) 50 - 136 U/L    Total Protein 6.2 (L) 6.3 - 8.2 g/dL    Albumin 2.7 (L) 3.2 - 4.6 g/dL    Globulin 3.5 2.3 - 3.5 g/dL    Albumin/Globulin Ratio 0.8 (L) 1.2 - 3.5     Magnesium    Collection Time: 07/20/22  3:07 AM   Result Value Ref Range    Magnesium 1.7 (L) 1.8 - 2.4 mg/dL       Imaging:  Xray Result (most recent):  XR CHEST PORTABLE 07/08/2022    Narrative  Portable chest x-ray    CLINICAL INDICATION: Sternal pain, metastatic cancer    FINDINGS: Single AP view of the chest compared to a similar exam dated 9/1/2020  show the lungs to be expanded and clear. The cardiac silhouette and mediastinum  are unremarkable. A right-sided chest port is in place. There is no pleural  effusion or pneumothorax. No definite bony abnormality.  Note the sternum is not  able to be evaluated on Cancer/Neuroendocrine  - S/p multiple lines of therapy, most recently carbo/taxotere C2 on 6/8. Held on 6/29 d/t TCP. Undergoing XRT to rib and T-spine with Dr. Randa Browne.  - Has spoken to [facility], however wished to hold off on making a decision until he saw if XRT improved his pain  - Can resume XRT on Monday 7/11 Pt does not wish to pursue XRT. Meeting with PC today for pain mgmt and to discuss [address] - wishes to pursue facility placement and enroll in Hospice. 7/13 Pt now states he may be interested in going home since he will have support from friend moving here from out of state. Consult PT/OT. 7/14  PT recommends HHT     Cancer Related Pain  - Currently with Dilaudid/oxy prn  - Consult palliative care to assist with pain management (known from office) and also to assist with goals of care discussions   7/10 Increased Dilaudid to 1 mg every 3 hours for severe pain. Pall care consult pending. 7/11 PC assisting with mgmt. Resume Dex. 7/12 PC started Fentanyl 50mcg patch yesterday. Pt reports pain controlled today - even able to walk around room now. 7/13 Pain controlled. PC changed oxycodone to po dilaudid yesterday. 7/14 Pain controlled  7/15 C/o pain today  7/17 pain improved after menchaca  7/20 Pain controlled on current regimen     Anemia/Thrombocytopenia  - Transfuse prn per Denae SOPs     Elevated Tranaminases  - Alk phos/ALT/AST trended down today, continue to follow   7/11 AST/ALT continue to improve   7/13 LFTs improving  7/18 stable     EDGARD  - Gentle hydration  RESOLVED     Constipation  - Lactulose prn   7/11 Pt refusing Lactulose. PC started pericolace. Con't Miralax. Consider KUB if no improvement. 7/12 No BM documented. Con't Miralax/Pericolace. Pt more active now, up around in room since pain controlled. 7/13 Had BM x 4 yesterday  7/19 c/o constipation. Increase pericolace to BID. Con't Miralax. Check KUB. If no obstruction, will order Movantik.     Hypercalcemia / Hypocalcemia  7/12 CCa++ up to 11. 7. Zometa ordered. 7/15 CCa++ down to 9.2  7/19 CCa++ down to 8.4, replete  7/20 Cca++ 8.5    Dysuria  7/15 c/o dysuria. UA c/w UTI. CTX ordered. Check Ucx.  7/17 UC NTD on Rocephin  7/18 Pain improved s/p menchaca placement. Ucx with [facility]F. Completed CTX x 3 days. Urinary retention  7/16 menchaca ordered/ flomax ordered  7/17 UOP 4000 ml. Adding proscar  7/18 s/p menchaca placement. Con't Flomax/Proscar. Plan to DC menchaca tomorrow. 7/19 Remove menchaca for voiding trial.  If unable to void, will consult urology. 7/20 Menchaca removed yesterday and had to be replaced d/t urinary retention. Consult urology. Continue home meds  [facility]ae SOPs  AC contraindicated d/t thrombocytopenia    Goals and plan of care reviewed with the patient. All questions answered to the best of our ability. Disposition:  7/14: Pt reports his friend is not moving here until 8/1. He reports she has a one-story home that he can move into on 8/1 or possibly even sooner. Pt was able to walk hallways yesterday. Unsure if he would qualify for facility placement while future living arrangements are made. CM states pt would have to go through extensive insurance process to even cover facility placement if he qualified. CM assisting with discharge planning. ? Could pt stay with son who lives locally until his friend's house is ready to move into? Pt also declines Hospice services at this time. He states he would prefer HHT. 7/15:  Pt is medically stable for discharge. Pt working on living arrangements. Hopeful for discharge home soon. 7/17 Spoke with patient's son yesterday. Patient does not a home to return to so now even if he does have a friend coming there is no home. Difficult placement. CM following. 7/19: Pt reportedly does not have a home to return to upon discharge. Son states pt is unable to live with him. Unlikely that pt would qualify for facility placement.   CM following.    7/20: Pt still stable for discharge. CM following - pt has no place to live.             HELEN [facility] Hematology & Oncology  [address] [address]  Office : [phone]  Fax : [phone]

## 2022-07-20 NOTE — CARE COORDINATION
RAUL spoke at length yesterday afternoon with pt's son Chris Hernandez regarding d/c planning. He stated that pt does not own a home but was renting and the landlord has now rented the home to someone else, therefore pt does not have a home to which he can return. Son states his apartment is \"too small\" for pt to reside there. Son states he does not have any concrete d/c plans for pt. RAUL offered to check into a boarding home for pt. RAUL spoke to the manager of the boarding home and she does have availability. Rent is $650 (all inclusive with power, cable, water, and furnished) as well as a $300 light deposit. Pt must be independent with all ADLs. The home manager stated she can apply to get an aide for pt through [facility] if needed. RAUL called pt's son today to discuss the boarding home. Son was at work and unable to answer the phone. RAUL left a detailed voicemail to include pricing and requested a return call to discuss.

## 2022-07-20 NOTE — PROGRESS NOTES
Physical Therapy Note:    Attempted to see patient this AM and PM for physical therapy treatment  session. . AM: pt politely declined stating that he was just waking up and asked therapy to return in the afternoon. PM:  pt just coming back to room from xray. He was able to transfer off stretcher with SBA and ambulate over to bed (about 12 feet) with CGA. Will follow and re-attempt as schedule permits/patient available.  Thank you,    Dalia Amezcua, PTA     Rehab Caseload Tracker

## 2022-07-21 NOTE — CARE COORDINATION
Pt's son Ambika Martinez returned CM's call from yesterday. He is interested in speaking with the manager of the boarding home. RAUL contacted her and provided her with Rui's name and phone number. He stated he must work until 2300 this evening but will be able to talk tomorrow. Ambika Martinez requested RAUL's email address stating he obtained a list of [facility] that accept Medicaid from the [facility] on Aging and would like for RAUL to review. RAUL will contact Ambika Martinez tomorrow regarding the status of d/c pt to the [facility]. Pt may require menchaca to remain at d/c which can be managed by [place]. CM will continue to follow and remain available if any needs arise. LOS = 13 days.

## 2022-07-21 NOTE — PROGRESS NOTES
Physical Therapy Note:    Attempted to see patient this PM for physical therapy treatment  session. Patient politely declined treatment. Will follow and re-attempt as schedule permits/patient available.  Thank you,    ARIAN SANCHEZ, [facility]     Rehab Caseload Tracker

## 2022-07-21 NOTE — PROGRESS NOTES
Not on file    Number of children: Not on file    Years of education: Not on file    Highest education level: Not on file   Occupational History    Not on file   Tobacco Use    Smoking status: Former     Packs/day: 0.50     Types: Cigarettes     Quit date: 2016     Years since quittin.6    Smokeless tobacco: Never   Substance and Sexual Activity    Alcohol use: Yes    Drug use: No    Sexual activity: Not on file   Other Topics Concern    Not on file   Social History Narrative    Not on file     Social Determinants of Health     Financial Resource Strain: Not on file   Food Insecurity: Not on file   Transportation Needs: Not on file   Physical Activity: Not on file   Stress: Not on file   Social Connections: Not on file   Intimate Partner Violence: Not on file   Housing Stability: Not on file     Current Facility-Administered Medications   Medication Dose Route Frequency Provider Last Rate Last Admin    naloxegol (MOVANTIK) tablet 25 mg  25 mg Oral QAM AC Tara Camacho, APRN - CNP   25 mg at 22 1038    magnesium oxide (MAG-OX) tablet 400 mg  400 mg Oral 4x Daily Beacher Dancer, MD   400 mg at 22 0847    calcium carbonate (TUMS) chewable tablet 500 mg  500 mg Oral TID Tara Camacho, APRN - CNP   500 mg at 22 0847    sennosides-docusate sodium (SENOKOT-S) 8.6-50 MG tablet 2 tablet  2 tablet Oral BID Tara Camacho APRN - CNP   2 tablet at 22 0846    tamsulosin (FLOMAX) capsule 0.4 mg  0.4 mg Oral Daily Harinder Ainsley, APRN - NP   0.4 mg at 22 0847    finasteride (PROSCAR) tablet 5 mg  5 mg Oral Daily Harinder Ainsley, APRN - NP   5 mg at 22 0847    zinc oxide 40 % paste   Topical 4x Daily PRN Beacher Dancer, MD        lidocaine-prilocaine (EMLA) cream   Topical Once Beacher Dancer, MD        phenazopyridine (PYRIDIUM) tablet 95 mg  95 mg Oral TID PRN Tara Camacho APRN - CNP   95 mg at 07/15/22 2048    cyclobenzaprine (FLEXERIL) tablet 10 mg  10 mg Oral TID PRN Harinder Schmitz APRN - NP   10 mg at 07/20/22 0132    0.9 % sodium chloride infusion   IntraVENous PRN Gerry Osler, APRN - CNP        enoxaparin (LOVENOX) injection 40 mg  40 mg SubCUTAneous Daily Gerry Osler, APRN - CNP   40 mg at 07/21/22 0847    fentaNYL (DURAGESIC) 75 MCG/HR 1 patch  1 patch TransDERmal Q72H HELEN Gonzalez CNP   1 patch at 07/19/22 1131    diphenhydrAMINE (BENADRYL) capsule 25 mg  25 mg Oral Q6H PRN Gerry Osler, APRN - CNP   25 mg at 07/16/22 0110    acetaminophen (TYLENOL) tablet 650 mg  650 mg Oral Q6H PRN Gerry Osler, APRN - CNP   650 mg at 07/13/22 1514    Or    acetaminophen (TYLENOL) suppository 650 mg  650 mg Rectal Q6H PRN Gerry Osler, APRN - MICHELLE        HYDROmorphone (DILAUDID) tablet 2 mg  2 mg Oral Q4H PRN HELEN Gonzalez CNP   2 mg at 07/20/22 1241    Or    HYDROmorphone (DILAUDID) tablet 4 mg  4 mg Oral Q4H PRN HELEN Gonzalez CNP   4 mg at 07/21/22 0318    naloxone (NARCAN) injection 0.04 mg  0.04 mg IntraVENous PRN HELEN Marcus CNP        dexamethasone (DECADRON) tablet 4 mg  4 mg Oral 2 times per day Gerry Osler, APRN - CNP   4 mg at 07/21/22 0847    HYDROmorphone HCl PF (DILAUDID) injection 1 mg  1 mg IntraVENous Q3H PRN Zoanne Hodgkin, APRN - NP   1 mg at 07/21/22 0653    aluminum & magnesium hydroxide-simethicone (MAALOX) 517-407-23 MG/5ML suspension 20 mL  20 mL Oral Q6H PRN Jaime Bernard MD   20 mL at 07/13/22 0809    LORazepam (ATIVAN) tablet 1 mg  1 mg Oral TID PRN Estefani Pak DO   1 mg at 07/20/22 1324    pantoprazole (PROTONIX) tablet 40 mg  40 mg Oral QAM  Romie Martinez, DO   40 mg at 07/21/22 0847    sodium chloride flush 0.9 % injection 5-40 mL  5-40 mL IntraVENous 2 times per day Clementeen Shaper, DO   10 mL at 07/21/22 0847    sodium chloride flush 0.9 % injection 5-40 mL  5-40 mL IntraVENous PRN Clementeen Shaper, DO        0.9 % sodium chloride infusion   IntraVENous PRN Clementeen Shaper, DO        ondansetron (ZOFRAN-ODT) disintegrating tablet 4 mg Differential Type AUTOMATED      Seg Neutrophils 76 43 - 78 %    Lymphocytes 12 (L) 13 - 44 %    Monocytes 10 4.0 - 12.0 %    Eosinophils % 1 0.5 - 7.8 %    Basophils 0 0.0 - 2.0 %    Immature Granulocytes 2 0.0 - 5.0 %    Segs Absolute 5.6 1.7 - 8.2 K/UL    Absolute Lymph # 0.8 0.5 - 4.6 K/UL    Absolute Mono # 0.7 0.1 - 1.3 K/UL    Absolute Eos # 0.0 0.0 - 0.8 K/UL    Basophils Absolute 0.0 0.0 - 0.2 K/UL    Absolute Immature Granulocyte 0.1 0.0 - 0.5 K/UL   Comprehensive Metabolic Panel    Collection Time: 07/21/22  2:32 AM   Result Value Ref Range    Sodium 138 136 - 145 mmol/L    Potassium 4.3 3.5 - 5.1 mmol/L    Chloride 108 (H) 98 - 107 mmol/L    CO2 24 21 - 32 mmol/L    Anion Gap 6 (L) 7 - 16 mmol/L    Glucose 85 65 - 100 mg/dL    BUN 26 (H) 8 - 23 MG/DL    Creatinine 1.10 0.8 - 1.5 MG/DL    GFR African American >60 >60 ml/min/1.73m2    GFR Non- >60 >60 ml/min/1.73m2    Calcium 7.4 (L) 8.3 - 10.4 MG/DL    Total Bilirubin 0.4 0.2 - 1.1 MG/DL     (H) 12 - 65 U/L     (H) 15 - 37 U/L    Alk Phosphatase 306 (H) 50 - 136 U/L    Total Protein 5.9 (L) 6.3 - 8.2 g/dL    Albumin 2.5 (L) 3.2 - 4.6 g/dL    Globulin 3.4 2.3 - 3.5 g/dL    Albumin/Globulin Ratio 0.7 (L) 1.2 - 3.5     Magnesium    Collection Time: 07/21/22  2:32 AM   Result Value Ref Range    Magnesium 2.4 1.8 - 2.4 mg/dL       Imaging:  Xray Result (most recent):  XR ABDOMEN (KUB) (SINGLE AP VIEW) 07/20/2022    Narrative  EXAM: ABDOMINAL RADIOGRAPH, 1 view    INDICATION: Constipation    COMPARISON: 9/1/2020    TECHNIQUE: Frontal abdominal radiography was performed. FINDINGS: Limited evaluation due to the patient's body habitus. No clear  obstruction bowel gas pattern. No intraperitoneal free air appreciated. Moderate  retained colonic fecal burden. Prior vertebroplasty at multiple levels. Impression  1. No acute abnormality within the limitation imposed by patient body habitus.   2. Constipation. 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)    Hypokalemia    Gynecomastia, male    Neuroendocrine carcinoma, high grade (HCC)    Anemia due to chemotherapy    Primary prostate cancer with metastasis from prostate to other site [facility])    Prostatic nodule    Family history of prostate cancer in father    Acute prostatitis    S/P TURP    Prostate cancer [facility])    Prostate cancer metastatic to bone ([facility] [address]    Thrombocytopenia ([facility] [address]    Hypomagnesemia    Hypercalcemia of malignancy    Pain due to malignant neoplasm metastatic to bone (HCC)    EDGARD (acute kidney injury) ([facility] [address]    Debility    Drug-induced constipation    Encounter for palliative care     Mr. Debbie Donaldson is a 78 y.o. male admitted on 7/8/2022 with a primary diagnosis of There were no encounter diagnoses. .       Mr. Debbie Donaldson has a PMH of metastatic prostate cancer dx 2017 s/p casodex and bilateral orchiectomy for surgical castration, 6 cycles of docetaxel, pathologic vertebral fx's with path positive for high grade neuroendocrine cancer, s/p carbo/etoposide and most recently carbo/taxotere which was held d/t TCP. Last was C2 on 6/8/22. 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 He is a known patient of Dr. Jose Bowman s/p multiple lines of treatment as above. At last office visit on 6/29, CEA had notably continued to rise to 8290. He has had discussion with [facility], [facility], however wished to hold off on making a decision until he completed radiation therapy. He presented to the ER with a fall at home, increasing pain in the hips and generalized weakness. Also with constipation. Also found to be anemic and transfused PRBC. We were asked for recommendations for our known patient. PLAN:  Metastatic Prostate Cancer/Neuroendocrine  - S/p multiple lines of therapy, most recently carbo/taxotere C2 on 6/8.   Held to 9.2  7/19 CCa++ down to 8.4, replete  7/21 Cca++ 8.6  RESOLVED    Dysuria  7/15 c/o dysuria. UA c/w UTI. CTX ordered. Check Ucx.  7/17 UC NTD on Rocephin  7/18 Pain improved s/p menchaca placement. Ucx with [facility]. Completed CTX x 3 days. Urinary retention  7/16 menchaca ordered/ flomax ordered  7/17 UOP 4000 ml. Adding proscar  7/18 s/p menchaca placement. Con't Flomax/Proscar. Plan to DC menchaca tomorrow. 7/19 Remove menchaca for voiding trial.  If unable to void, will consult urology. 7/20 Menchaca removed yesterday and had to be replaced d/t urinary retention. Consult urology. 7/21 Awaiting urology consult    Continue home meds  Denae SOPs  AC contraindicated d/t thrombocytopenia    Goals and plan of care reviewed with the patient. All questions answered to the best of our ability. Disposition:  7/14: Pt reports his friend is not moving here until 8/1. He reports she has a one-story home that he can move into on 8/1 or possibly even sooner. Pt was able to walk hallways yesterday. Unsure if he would qualify for facility placement while future living arrangements are made. CM states pt would have to go through extensive insurance process to even cover facility placement if he qualified. CM assisting with discharge planning. ? Could pt stay with son who lives locally until his friend's house is ready to move into? Pt also declines Hospice services at this time. He states he would prefer [facility]. 7/15:  Pt is medically stable for discharge. Pt working on living arrangements. Hopeful for discharge home soon. 7/17 Spoke with patient's son yesterday. Patient does not a home to return to so now even if he does have a friend coming there is no home. Difficult placement. CM following. 7/19: Pt reportedly does not have a home to return to upon discharge. Son states pt is unable to live with him. Unlikely that pt would qualify for facility placement. CM following.    7/20: Pt still stable for discharge. CM following - pt has no place to live.    7/21:  CM found available place at boarding house for patient. Pt states he was not told about this, but cannot afford $650/mo. He says he was paying $500/mo at previous place which is no longer available to him. CM following.             [facility] Negrete - [address] [facility]  [address] [place] [address]  Office : [phone]  Fax : [phone]

## 2022-07-22 NOTE — CONSULTS
Urology Consult        Patient: Mary Bustamante MRN: [id number]  SSN: [id number]    YOB: 1955  Age: 78 y.o. Sex: male      Subjective:      Mary Bustamante is a 78 y.o. male with \"PMH of metastatic prostate cancer (known to Dr Sana Higgins) dx 2017 s/p casodex and bilateral orchiectomy for surgical castration, 6 cycles of docetaxel, pathologic vertebral fx's with path positive for high grade neuroendocrine cancer, s/p carbo/etoposide and most recently carbo/taxotere which was held d/t TCP. Last was C2 on 6/8/22. 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 He is a known patient of Dr. Nirmala Scott s/p multiple lines of treatment as above. At last office visit on 6/29, CEA had notably continued to rise to 8290. He has had discussion with [facility], [facility], however wished to hold off on making a decision until he completed radiation therapy. He presented to the ER with a fall at home, increasing pain in the hips and generalized weakness. Also with constipation. Also found to be anemic and transfused PRBC\". Has had urinary retention with menchaca replaced, on flomax/proscar and we are consulted. UA with large RBC, no bacteria. Cr 1.00  He has noted constipation and abd pain. Ct pending. Just restarted flomax and has not taken in quite a while per pt. Past Medical History:   Diagnosis Date    Claustrophobia     Ear problems     Elevated PSA     Former light cigarette smoker (1-9 per day)     smoked for 35 years.   quit     History of multiple allergies     Nicotine vapor product user     Personal history of prostate cancer     Prostate cancer [facility])     prostate, neuroendocrine, mets to bone     Past Surgical History:   Procedure Laterality Date    BIOPSY PROSTATE      COLONOSCOPY  08/2020    HEENT      teeth removed     IR KYPHOPLASTY LUMBAR FIRST LEVEL  10/14/2020    IR KYPHOPLASTY LUMBAR FIRST LEVEL 10/14/2020    IR KYPHOPLASTY LUMBAR FIRST LEVEL 10/14/2020 [facility]    IR KYPHOPLASTY THORACIC FIRST LEVEL  10/29/2020    IR KYPHOPLASTY THORACIC FIRST LEVEL  10/29/2020    IR KYPHOPLASTY THORACIC FIRST LEVEL 10/29/2020 [facility]    TESTICLE REMOVAL Bilateral 2017      Family History   Problem Relation Age of Onset    Hypertension Father     Prostate Cancer Father     Cancer Father         prostate cancer     Social History     Tobacco Use    Smoking status: Former     Packs/day: 0.50     Types: Cigarettes     Quit date: 2016     Years since quittin.6    Smokeless tobacco: Never   Substance Use Topics    Alcohol use: Yes      Prior to Admission medications    Medication Sig Start Date End Date Taking? Authorizing Provider   cyclobenzaprine (FLEXERIL) 10 MG tablet Take 10 mg by mouth 3 times daily as needed 22  Yes Historical Provider, MD   omeprazole (PRILOSEC) 40 MG delayed release capsule Take 1 capsule by mouth daily 22   Vivienne Solo MD   dexamethasone (DECADRON) 4 MG tablet Take 1 tablet by mouth 2 times daily (with meals)  Patient not taking: Reported on 2022  Vivienne Solo MD   LORazepam (ATIVAN) 1 MG tablet Take 1 tablet by mouth 3 times daily as needed for Anxiety for up to 30 days. 22  Vivienne Solo MD   lidocaine-prilocaine (EMLA) 2.5-2.5 % cream Apply a dab over the port site 30-45 minutes prior to lab/infusion appts. Cover with saran wrap or a sandwich bag.   Patient not taking: Reported on 2022   Cynthia Lind MD        Allergies   Allergen Reactions    Turmeric Nausea And Vomiting       Review of Systems:  As stated in H&P    Objective:     Vitals:    22 1102 22 1116 22 1124 22 1258   BP: (!) 156/95      Pulse: 79      Resp: 16 18 18 18   Temp: 98.7 °F (37.1 °C)      TempSrc: Oral      SpO2: 95%      Weight:       Height:            Physical Exam:  GENERAL ASSESSMENT: alert, frail, oriented to person, place and time, no acute distress Chest: clear to auscultation  CVS exam: normal rate, regular rhythm, normal S1, S2  ABDOMEN: soft non tender  Neurological exam reveals alert, oriented, normal speech    Assessment:     Urinary retention    Plan:     Treat constipation. Add dulcolax suppository PRN. Continue menchaca at this time, cont flomax/proscar. Once constipation resolved would try another trial of void. Thank you for the opportunity to assist in the care of this patient.        Signed By: HELEN Eli - CNP     July 22, 2022

## 2022-07-22 NOTE — CONSULTS
Consults noted for urinary retention with failed trials of void. Also constipation noted. Risk of urinary retention is higher with constipation. Please ensure constipation treated. Cont flomax/proscar. Continue menchaca catheter. Full consult will follow. Nothing else to add to plan of care at this time.

## 2022-07-22 NOTE — PROGRESS NOTES
[facility] Hematology & Oncology        Inpatient Hematology / Oncology Progress Note    Reason for Admission:  Prostate cancer metastatic to bone [facility]) [C60, M40.51]    24 Hour Events:  Afebrile, VSS  Issues with living arrangements after discharge - son to speak with manager at available boarding home today  Urology consult pending  KUB with constipation  Started Movantik yesterday  C/o worsening abd pain today. Checking CT AP. Transfusions: None  Replacements: None    ROS:  Constitutional: Positive for fatigue; negative for fever, chills. CV: Negative for chest pain, palpitations, edema. Respiratory: Negative for dyspnea, cough, wheezing. GI: +constipation, abd pain. Negative for nausea, diarrhea. MSK:  +Back/hip pain (pain controlled)    10 point review of systems is otherwise negative with the exception of the elements mentioned above in the HPI. Allergies   Allergen Reactions    Turmeric Nausea And Vomiting     Past Medical History:   Diagnosis Date    Claustrophobia     Ear problems     Elevated PSA     Former light cigarette smoker (1-9 per day)     smoked for 35 years.   quit     History of multiple allergies     Nicotine vapor product user     Personal history of prostate cancer     Prostate cancer ([facility] [address]     prostate, neuroendocrine, mets to bone     Past Surgical History:   Procedure Laterality Date    BIOPSY PROSTATE      COLONOSCOPY  08/2020    HEENT      teeth removed     IR KYPHOPLASTY LUMBAR FIRST LEVEL  10/14/2020    IR KYPHOPLASTY LUMBAR FIRST LEVEL  10/14/2020    IR KYPHOPLASTY LUMBAR FIRST LEVEL 10/14/2020 [facility]    IR KYPHOPLASTY THORACIC FIRST LEVEL  10/29/2020    IR KYPHOPLASTY THORACIC FIRST LEVEL  10/29/2020    IR KYPHOPLASTY THORACIC FIRST LEVEL 10/29/2020 [facility]    TESTICLE REMOVAL Bilateral 02/2017     Family History   Problem Relation Age of Onset    Hypertension Father     Prostate Cancer Father     Cancer Father prostate cancer     Social History     Socioeconomic History    Marital status: Single     Spouse name: Not on file    Number of children: Not on file    Years of education: Not on file    Highest education level: Not on file   Occupational History    Not on file   Tobacco Use    Smoking status: Former     Packs/day: 0.50     Types: Cigarettes     Quit date: 2016     Years since quittin.6    Smokeless tobacco: Never   Substance and Sexual Activity    Alcohol use: Yes    Drug use: No    Sexual activity: Not on file   Other Topics Concern    Not on file   Social History Narrative    Not on file     Social Determinants of Health     Financial Resource Strain: Not on file   Food Insecurity: Not on file   Transportation Needs: Not on file   Physical Activity: Not on file   Stress: Not on file   Social Connections: Not on file   Intimate Partner Violence: Not on file   Housing Stability: Not on file     Current Facility-Administered Medications   Medication Dose Route Frequency Provider Last Rate Last Admin    HYDROmorphone HCl PF (DILAUDID) injection 1 mg  1 mg IntraVENous Q4H PRN Jolena Sinning, APRN - CNP        0.9 % sodium chloride bolus  100 mL IntraVENous ONCE PRN Vanesaa Kehindening, APRN - CNP        sodium chloride flush 0.9 % injection 10 mL  10 mL IntraVENous ONCE PRN Nancylena Sinning, APRN - CNP        diatrizoate meglumine-sodium (GASTROGRAFIN) 66-10 % solution 15 mL  15 mL Oral ONCE PRN Nancylena Sinning, APRN - CNP        iopamidol (ISOVUE-370) 76 % injection 100 mL  100 mL IntraVENous ONCE PRN Jolena Sinning, APRN - CNP        naloxegol (MOVANTIK) tablet 25 mg  25 mg Oral QAM AC Daniel Tejeda, APRN - CNP   25 mg at 22 0803    magnesium oxide (MAG-OX) tablet 400 mg  400 mg Oral 4x Daily Alejandra Reza MD   400 mg at 22 1038    calcium carbonate (TUMS) chewable tablet 500 mg  500 mg Oral TID Vanesaa Nikhil, APRN - CNP   500 mg at 22 0824    sennosides-docusate sodium (SENOKOT-S) 8.6-50 MG tablet 2 tablet  2 tablet Oral BID HELEN Watts CNP   2 tablet at 07/22/22 0824    tamsulosin (FLOMAX) capsule 0.4 mg  0.4 mg Oral Daily HELEN Sadler - NP   0.4 mg at 07/22/22 0824    finasteride (PROSCAR) tablet 5 mg  5 mg Oral Daily HELEN Sadler - NP   5 mg at 07/22/22 7600    zinc oxide 40 % paste   Topical 4x Daily PRN James Morejon MD        lidocaine-prilocaine (EMLA) cream   Topical Once James Morejon MD        phenazopyridine (PYRIDIUM) tablet 95 mg  95 mg Oral TID PRN HELEN Watts CNP   95 mg at 07/15/22 2048    cyclobenzaprine (FLEXERIL) tablet 10 mg  10 mg Oral TID PRN HELEN Sadler NP   10 mg at 07/22/22 0936    0.9 % sodium chloride infusion   IntraVENous PRN HELEN Watts CNP        enoxaparin (LOVENOX) injection 40 mg  40 mg SubCUTAneous Daily HELEN Watts CNP   40 mg at 07/22/22 0823    fentaNYL (DURAGESIC) 75 MCG/HR 1 patch  1 patch TransDERmal Q72H HELEN Marcus CNP   1 patch at 07/22/22 1124    diphenhydrAMINE (BENADRYL) capsule 25 mg  25 mg Oral Q6H PRN HELEN Watts CNP   25 mg at 07/16/22 0110    acetaminophen (TYLENOL) tablet 650 mg  650 mg Oral Q6H PRN HELEN Watts CNP   650 mg at 07/13/22 1514    Or    acetaminophen (TYLENOL) suppository 650 mg  650 mg Rectal Q6H PRN HELEN Watts CNP        HYDROmorphone (DILAUDID) tablet 2 mg  2 mg Oral Q4H PRN HELEN Carreon CNP   2 mg at 07/21/22 1438    Or    HYDROmorphone (DILAUDID) tablet 4 mg  4 mg Oral Q4H PRN HELEN Carreon CNP   4 mg at 07/22/22 0803    naloxone [facility]) injection 0.04 mg  0.04 mg IntraVENous PRN HELEN Marcus - MICHELLE        dexamethasone (DECADRON) tablet 4 mg  4 mg Oral 2 times per day HELEN Watts - CNP   4 mg at 07/22/22 0824    aluminum & magnesium hydroxide-simethicone (MAALOX) 686-803-62 MG/5ML suspension 20 mL  20 mL Oral Q6H PRN Martha Alvarenga MD   20 mL at 07/13/22 0809    LORazepam (ATIVAN) tablet 1 mg  1 mg Oral TID PRN Padmaja Shea DO 1 mg at 22 0936    pantoprazole (PROTONIX) tablet 40 mg  40 mg Oral QAM AC Rashid Belmont Martinez, DO   40 mg at 22 6940    sodium chloride flush 0.9 % injection 5-40 mL  5-40 mL IntraVENous 2 times per day Jackson Borne, DO   10 mL at 22 3066    sodium chloride flush 0.9 % injection 5-40 mL  5-40 mL IntraVENous PRN Jackson Borne, DO        0.9 % sodium chloride infusion   IntraVENous PRN Jackson Borne, DO        ondansetron (ZOFRAN-ODT) disintegrating tablet 4 mg  4 mg Oral Q8H PRN Jackson Borne, DO        Or    ondansetron [facility]) injection 4 mg  4 mg IntraVENous Q6H PRN Jackson Borne, DO   4 mg at 07/10/22 1717    docusate sodium (COLACE) capsule 100 mg  100 mg Oral BID PRN Jackson Borne, DO        polyethylene glycol (GLYCOLAX) packet 17 g  17 g Oral Daily Jackson Borne, DO   17 g at 22 6093       OBJECTIVE:  Patient Vitals for the past 8 hrs:   BP Temp Temp src Pulse Resp SpO2   22 1258 -- -- -- -- 18 --   22 1124 -- -- -- -- 18 --   22 1116 -- -- -- -- 18 --   22 1102 (!) 156/95 98.7 °F (37.1 °C) Oral 79 16 95 %   22 0749 127/75 98.1 °F (36.7 °C) Oral 65 18 96 %     Temp (24hrs), Av.3 °F (36.8 °C), Min:98.1 °F (36.7 °C), Max:98.7 °F (37.1 °C)     07 -  1900  In: 480 [P.O.:480]  Out: 825 [Urine:825]    Physical Exam:  Constitutional: Well developed male sitting comfortably on the hospital bed. HEENT: Normocephalic and atraumatic. Oropharynx is clear, mucous membranes are moist.  Neck supple. Skin Warm and dry. Bruising noted on BLE and no rash noted. No erythema. No pallor. Respiratory Lungs are clear to auscultation bilaterally without wheezes, rales or rhonchi, normal air exchange without accessory muscle use. CVS Normal rate, regular rhythm and normal S1 and S2. No murmurs, gallops, or rubs. Abdomen Soft, nontender and nondistended, normoactive bowel sounds. No palpable mass. No hepatosplenomegaly. Neuro Grossly nonfocal with no obvious sensory or motor deficits. MSK Normal range of motion in general.  No edema and no tenderness. Psych Appropriate mood and affect.         Labs:    Recent Results (from the past 24 hour(s))   Urinalysis w rflx microscopic    Collection Time: 07/21/22  4:26 PM   Result Value Ref Range    Color, UA YELLOW/STRAW      Appearance CLEAR      Specific Gravity, UA 1.013 1.001 - 1.023      pH, Urine 8.5 5.0 - 9.0      Protein, UA 30 (A) NEG mg/dL    Glucose, UA Negative mg/dL    Ketones, Urine Negative NEG mg/dL    Bilirubin Urine Negative NEG      Blood, Urine LARGE (A) NEG      Urobilinogen, Urine 0.2 0.2 - 1.0 EU/dL    Nitrite, Urine Negative NEG      Leukocyte Esterase, Urine TRACE (A) NEG      WBC, UA 0-4 (A) NORM /hpf    RBC, UA >100 (A) NORM /hpf    Epithelial Cells UA 0-5 (A) NORM /hpf    BACTERIA, URINE Negative (A) NORM /hpf    Casts 0-2 (A) NORM /lpf   CBC with Auto Differential    Collection Time: 07/22/22  2:54 AM   Result Value Ref Range    WBC 8.3 4.3 - 11.1 K/uL    RBC 2.53 (L) 4.23 - 5.6 M/uL    Hemoglobin 8.2 (L) 13.6 - 17.2 g/dL    Hematocrit 26.3 (L) 41.1 - 50.3 %    .0 (H) 79.6 - 97.8 FL    MCH 32.4 26.1 - 32.9 PG    MCHC 31.2 (L) 31.4 - 35.0 g/dL    RDW 20.6 (H) 11.9 - 14.6 %    Platelets 65 (L) 202 - 450 K/uL    MPV 10.4 9.4 - 12.3 FL    nRBC 0.00 0.0 - 0.2 K/uL    Differential Type AUTOMATED      Seg Neutrophils 83 (H) 43 - 78 %    Lymphocytes 8 (L) 13 - 44 %    Monocytes 7 4.0 - 12.0 %    Eosinophils % 0 (L) 0.5 - 7.8 %    Basophils 0 0.0 - 2.0 %    Immature Granulocytes 2 0.0 - 5.0 %    Segs Absolute 6.8 1.7 - 8.2 K/UL    Absolute Lymph # 0.7 0.5 - 4.6 K/UL    Absolute Mono # 0.6 0.1 - 1.3 K/UL    Absolute Eos # 0.0 0.0 - 0.8 K/UL    Basophils Absolute 0.0 0.0 - 0.2 K/UL    Absolute Immature Granulocyte 0.1 0.0 - 0.5 K/UL   Comprehensive Metabolic Panel    Collection Time: 07/22/22  2:54 AM   Result Value Ref Range    Sodium 133 (L) 136 - 145 mmol/L EDGARD (acute kidney injury) ([facility] [address]    Debility    Drug-induced constipation    Encounter for palliative care     Mr. Maria L Pickett is a 78 y.o. male admitted on 7/8/2022 with a primary diagnosis of There were no encounter diagnoses. .       Mr. Maria L Pickett has a PMH of metastatic prostate cancer dx 2017 s/p casodex and bilateral orchiectomy for surgical castration, 6 cycles of docetaxel, pathologic vertebral fx's with path positive for high grade neuroendocrine cancer, s/p carbo/etoposide and most recently carbo/taxotere which was held d/t TCP. Last was C2 on 6/8/22. 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 He is a known patient of Dr. Tucker Star s/p multiple lines of treatment as above. At last office visit on 6/29, CEA had notably continued to rise to 8290. He has had discussion with [facility], however wished to hold off on making a decision until he completed radiation therapy. He presented to the ER with a fall at home, increasing pain in the hips and generalized weakness. Also with constipation. Also found to be anemic and transfused PRBC. We were asked for recommendations for our known patient. PLAN:  Metastatic Prostate Cancer/Neuroendocrine  - S/p multiple lines of therapy, most recently carbo/taxotere C2 on 6/8. Held on 6/29 d/t TCP. Undergoing XRT to rib and T-spine with Dr. Venetta Severs.  - Has spoken to [facility], however wished to hold off on making a decision until he saw if XRT improved his pain  - Can resume XRT on Monday 7/11 Pt does not wish to pursue XRT. Meeting with PC today for pain mgmt and to discuss [address] - wishes to pursue facility placement and enroll in Hospice. 7/13 Pt now states he may be interested in going home since he will have support from friend moving here from [place] state. Consult PT/OT.   7/14  PT recommends HHT     Cancer Related Pain  - Currently with Dilaudid/oxy prn  - Consult palliative care to assist with pain management (known from office) and also to assist with goals of care discussions   7/10 Increased Dilaudid to 1 mg every 3 hours for severe pain. Pall care consult pending. 7/11 PC assisting with mgmt. Resume Dex. 7/12 PC started Fentanyl 50mcg patch yesterday. Pt reports pain controlled today - even able to walk around room now. 7/13 Pain controlled. PC changed oxycodone to po dilaudid yesterday. 7/14 Pain controlled  7/15 C/o pain today  7/17 pain improved after menchaca  7/20 Pain controlled on current regimen  7/22 c/o worsening abd pain. Bladder scan with minimal urine. Check CT AP. Anemia/Thrombocytopenia  - Transfuse prn per Denae SOPs     Elevated Tranaminases  - Alk phos/ALT/AST trended down today, continue to follow   7/11 AST/ALT continue to improve   7/13 LFTs improving  7/18 stable  7/22 LFTs increased, monitor     EDGARD  - Gentle hydration  RESOLVED     Constipation  - Lactulose prn   7/11 Pt refusing Lactulose. PC started pericolace. Con't Miralax. Consider KUB if no improvement. 7/12 No BM documented. Con't Miralax/Pericolace. Pt more active now, up around in room since pain controlled. 7/13 Had BM x 4 yesterday  7/20 c/o constipation. Increase pericolace to BID. Con't Miralax. Check KUB. If no obstruction, will order Movantik. 7/21 KUB with constipation. Movantik ordered. 7/22 No BM. Con't Movantik. Checking CT AP. Hypercalcemia / Hypocalcemia  7/12 CCa++ up to 11. 7. Zometa ordered. 7/15 CCa++ down to 9.2  7/19 CCa++ down to 8.4, replete  7/21 Cca++ 8.6  RESOLVED    Dysuria  7/15 c/o dysuria. UA c/w UTI. CTX ordered. Check Ucx.  7/17 UC NTD on Rocephin  7/18 Pain improved s/p menchaca placement. Ucx with MSF. Completed CTX x 3 days. Urinary retention  7/16 menchaca ordered/ flomax ordered  7/17 UOP 4000 ml. Adding proscar  7/18 s/p menchaca placement. Con't Flomax/Proscar. Plan to DC menchaca tomorrow.   7/19 Remove menchaca for voiding trial.  If unable to void, [address] [place] [address] Hematology & Oncology  [address] [place] [address]  Office : [phone]  Fax : [phone]

## 2022-07-22 NOTE — PROGRESS NOTES
END OF SHIFT SUMMARY:    Significant vitals this shift:    Significant labs this shift:    Tests performed this shift:  CT abdomen/pelvis  Orders to be followed up on:    Blood products given this shift:    Additional events this shift:   Pain meds increased in amount, Dulcolax suppository given for c/o constipation.      I/Os:  +/- this shift:   07/22 0701 - 07/22 1900  In: 720 [P.O.:720]  Out: 825 [Urine:825]  Occurrences this Shift:  Urine menchaca, BM 1, Emesis 0    Lauren Brody RN

## 2022-07-22 NOTE — PROGRESS NOTES
Comprehensive Nutrition Assessment    Type and Reason for Visit: Reassess  Malnutrition Screening Tool: Malnutrition Screen  Have you recently lost weight without trying?: Unsure of amount of weight loss (2 points)  Have you been eating poorly because of a decreased appetite?: Yes (1 point)  Malnutrition Screening Tool Score: 3    Nutrition Recommendations/Plan:   Meals and Snacks:  Diet: Continue current order  Nutrition Supplement Therapy:  Medical food supplement therapy:  Discontinue Ensure Enlive three times per day (this provides 350 kcal and 20 grams protein per bottle)     Malnutrition Assessment:  Malnutrition Status: No malnutrition    Nutrition Assessment:  Nutrition History: Patient states that he eats 2 meals per day and several snacks. He states that he typically does not eat breakfast just drinks coffee. He denies any changes to PO prior to admission. No deonte wasting. Do You Have Any Cultural, Evangelical, or Ethnic Food Preferences?: Yes (Comment)   Nutrition Background:   Patient with PMH significant for metastatic prostate cancer s/p several lines of therapy and surgery and most recently XRT to T spine and ribs. He presented with pain s/p fall at home and constipation. Nutrition Interval:  Patient seen reclined in bed. When RD inquiring about appetite and intake, he states he is eating too much. He reports all or most of each meal plus snacks at night. He had no needs or questions. Remains inpatient pending housing. Current Nutrition Therapies:  ADULT DIET; Regular    Current Intake:   Average Meal Intake: % Average Supplements Intake: Refusing to take      Anthropometric Measures:  Height: 5' 10\" (177.8 cm)  Current Body Wt: 201 lb 8 oz (91.4 kg) (7/20), Weight source: Not Specified  BMI: 28.9     Ideal Body Weight (Kg) (Calculated): 75 kg (166 lbs), 116.2 %  Usual Body Wt: 205 lb (93 kg), Percent weight change: -5.9       Edema:    BMI Category Overweight (BMI 25.0-29. 9)  Estimated Daily Nutrient Needs:  Energy (kcal/day): 3010-5309 (Kcal/kg (20-25) Weight used: 87.5 kg Current (7/8)  Protein (g/day):  (1-1.2 g/kg) Weight Used: (Current)    Fluid (ml/day):   (1 ml/kcal)    Nutrition Diagnosis:   No nutrition diagnosis at this time   Nutrition Interventions:   Food and/or Nutrient Delivery: Continue Current Diet     Coordination of Nutrition Care: Continue to monitor while inpatient       Goals:   Previous Goal Met: Goal(s) Achieved  Active Goal: PO intake 75% or greater, by next RD assessment       Nutrition Monitoring and Evaluation:      Food/Nutrient Intake Outcomes: Food and Nutrient Intake, Supplement Intake  Physical Signs/Symptoms Outcomes: Meal Time Behavior, Weight    Discharge Planning:    No discharge needs at this time    Wero Duong RD

## 2022-07-22 NOTE — PROGRESS NOTES
Physical Therapy Note:    Attempted to see patient this AM for physical therapy treatment  session. Patient adamantly declined PT this morning stating that he was having trouble urinating. Encouraged pt to walk but he stated that he was not even able to walk to restroom without being in agony. Will follow and re-attempt as schedule permits/patient available.  Thank you,    Fernand Alpers, [facility]     Rehab Caseload Tracker

## 2022-07-23 PROBLEM — R93.5 ABNORMAL CT OF THE ABDOMEN: Status: ACTIVE | Noted: 2022-01-01

## 2022-07-23 PROBLEM — N13.30 HYDRONEPHROSIS: Status: ACTIVE | Noted: 2022-01-01

## 2022-07-23 PROBLEM — N13.4 HYDROURETER: Status: ACTIVE | Noted: 2022-01-01

## 2022-07-23 NOTE — PROGRESS NOTES
END OF SHIFT SUMMARY:    Significant vitals this shift:  ---  Significant labs this shift:    Tests performed this shift:    Orders to be followed up on:    Blood products given this shift:    Additional events this shift:   Pt had 2 bowel movement according to pt      I/Os:  +/- this shift:   07/22 1901 - 07/23 0700  In: -   Out: 925 [Urine:925]  Occurrences this Shift:  Urine , BM 2, Emesis     Vivienne Cedillo, RN

## 2022-07-23 NOTE — PROGRESS NOTES
[facility] Hematology & Oncology        Inpatient Hematology / Oncology Progress Note    Reason for Admission:  Prostate cancer metastatic to bone [facility]) [C61, C79.51]    24 Hour Events:  Afebrile, VSS  Issues with living arrangements after discharge - son was supposed to speak with manager at available boarding home yesterday  Urology consulted, felt retention r/t constipation however CT shows b/l hydroureteronephrosis  Having BMs  CT AP with POD    Transfusions: None  Replacements: None    ROS:  Constitutional: Positive for fatigue; negative for fever, chills. CV: Negative for chest pain, palpitations, edema. Respiratory: Negative for dyspnea, cough, wheezing. GI: +abd pain. Negative for nausea, diarrhea. MSK:  +Back/hip pain (pain controlled)    10 point review of systems is otherwise negative with the exception of the elements mentioned above in the HPI. Allergies   Allergen Reactions    Turmeric Nausea And Vomiting     Past Medical History:   Diagnosis Date    Claustrophobia     Ear problems     Elevated PSA     Former light cigarette smoker (1-9 per day)     smoked for 35 years.   quit     History of multiple allergies     Nicotine vapor product user     Personal history of prostate cancer     Prostate cancer ([facility] [address]     prostate, neuroendocrine, mets to bone     Past Surgical History:   Procedure Laterality Date    BIOPSY PROSTATE      COLONOSCOPY  08/2020    HEENT      teeth removed     IR KYPHOPLASTY LUMBAR FIRST LEVEL  10/14/2020    IR KYPHOPLASTY LUMBAR FIRST LEVEL  10/14/2020    IR KYPHOPLASTY LUMBAR FIRST LEVEL 10/14/2020 [facility]    IR KYPHOPLASTY THORACIC FIRST LEVEL  10/29/2020    IR KYPHOPLASTY THORACIC FIRST LEVEL  10/29/2020    IR KYPHOPLASTY THORACIC FIRST LEVEL 10/29/2020 [facility]    TESTICLE REMOVAL Bilateral 02/2017     Family History   Problem Relation Age of Onset    Hypertension Father     Prostate Cancer Father     Cancer Father prostate cancer     Social History     Socioeconomic History    Marital status: Single     Spouse name: Not on file    Number of children: Not on file    Years of education: Not on file    Highest education level: Not on file   Occupational History    Not on file   Tobacco Use    Smoking status: Former     Packs/day: 0.50     Types: Cigarettes     Quit date: 2016     Years since quittin.6    Smokeless tobacco: Never   Substance and Sexual Activity    Alcohol use: Yes    Drug use: No    Sexual activity: Not on file   Other Topics Concern    Not on file   Social History Narrative    Not on file     Social Determinants of Health     Financial Resource Strain: Not on file   Food Insecurity: Not on file   Transportation Needs: Not on file   Physical Activity: Not on file   Stress: Not on file   Social Connections: Not on file   Intimate Partner Violence: Not on file   Housing Stability: Not on file     Current Facility-Administered Medications   Medication Dose Route Frequency Provider Last Rate Last Admin    diatrizoate meglumine-sodium (GASTROGRAFIN) 66-10 % solution 15 mL  15 mL Oral ONCE PRN Alyson Spicer APRN - CNP   15 mL at 22 1444    HYDROmorphone HCl PF (DILAUDID) injection 1.5 mg  1.5 mg IntraVENous Q3H PRN Alyson Spicer APRN - CNP   1.5 mg at 22 0257    bisacodyl (DULCOLAX) suppository 10 mg  10 mg Rectal Daily PRN HELEN Wiley - CNP   10 mg at 22 1758    naloxegol (MOVANTIK) tablet 25 mg  25 mg Oral QAM AC Alyson Spicer APRN - CNP   25 mg at 22 0630    magnesium oxide (MAG-OX) tablet 400 mg  400 mg Oral 4x Daily Marcus Kern MD   400 mg at 22 0857    calcium carbonate (TUMS) chewable tablet 500 mg  500 mg Oral TID HELEN Morel - CNP   500 mg at 22 0856    sennosides-docusate sodium (SENOKOT-S) 8.6-50 MG tablet 2 tablet  2 tablet Oral BID HELEN Morel - CNP   2 tablet at 22 0856    tamsulosin (FLOMAX) capsule 0.4 mg  0.4 mg Oral Daily Angie Yoel Barreto, APRN - NP   0.4 mg at 07/23/22 0857    finasteride (PROSCAR) tablet 5 mg  5 mg Oral Daily Derral Acres, APRN - NP   5 mg at 07/23/22 0856    zinc oxide 40 % paste   Topical 4x Daily PRN Gio Oliva MD        lidocaine-prilocaine (EMLA) cream   Topical Once Gio Oliva MD        phenazopyridine (PYRIDIUM) tablet 95 mg  95 mg Oral TID PRN Anamond Clonts, APRN - CNP   95 mg at 07/15/22 2048    cyclobenzaprine (FLEXERIL) tablet 10 mg  10 mg Oral TID PRN Derral Acres, APRN - NP   10 mg at 07/23/22 0136    0.9 % sodium chloride infusion   IntraVENous PRN Benito Clonts, APRN - CNP        enoxaparin (LOVENOX) injection 40 mg  40 mg SubCUTAneous Daily Benito Clonts, APRN - CNP   40 mg at 07/23/22 0857    fentaNYL (DURAGESIC) 75 MCG/HR 1 patch  1 patch TransDERmal Q72H Katina Brand APRN - CNP   1 patch at 07/22/22 1124    diphenhydrAMINE (BENADRYL) capsule 25 mg  25 mg Oral Q6H PRN Anamond Clonts, APRN - CNP   25 mg at 07/16/22 0110    acetaminophen (TYLENOL) tablet 650 mg  650 mg Oral Q6H PRN Anamond Clonts, APRN - CNP   650 mg at 07/13/22 1514    Or    acetaminophen (TYLENOL) suppository 650 mg  650 mg Rectal Q6H PRN Benito Clonts, APRN - CNP        HYDROmorphone (DILAUDID) tablet 2 mg  2 mg Oral Q4H PRN Izetta Douse, APRN - CNP   2 mg at 07/21/22 1438    Or    HYDROmorphone (DILAUDID) tablet 4 mg  4 mg Oral Q4H PRN Izetta Douse, APRN - CNP   4 mg at 07/23/22 0857    naloxone (NARCAN) injection 0.04 mg  0.04 mg IntraVENous PRN HELEN Marcus CNP        dexamethasone (DECADRON) tablet 4 mg  4 mg Oral 2 times per day Anamond Clonts, APRN - CNP   4 mg at 07/23/22 0857    aluminum & magnesium hydroxide-simethicone (MAALOX) 120-603-87 MG/5ML suspension 20 mL  20 mL Oral Q6H PRN Yuli Sanchez MD   20 mL at 07/13/22 0809    LORazepam (ATIVAN) tablet 1 mg  1 mg Oral TID PRN Paul Hernandez DO   1 mg at 07/22/22 2113    pantoprazole (PROTONIX) tablet 40 mg  40 mg Oral QAM AC Nadia Martinez DO   40 mg at 07/23/22 0630    sodium chloride flush 0.9 % injection 5-40 mL  5-40 mL IntraVENous 2 times per day Mikayla Denier, DO   5 mL at 22 0858    sodium chloride flush 0.9 % injection 5-40 mL  5-40 mL IntraVENous PRN Mikayla Denier, DO        0.9 % sodium chloride infusion   IntraVENous PRN Mikayla Denier, DO        ondansetron (ZOFRAN-ODT) disintegrating tablet 4 mg  4 mg Oral Q8H PRN Mikayla Denier, DO        Or    ondansetron [facility]) injection 4 mg  4 mg IntraVENous Q6H PRN Mikayla Denier, DO   4 mg at 07/10/22 1717    docusate sodium (COLACE) capsule 100 mg  100 mg Oral BID PRN Mikayla Denier, DO        polyethylene glycol (GLYCOLAX) packet 17 g  17 g Oral Daily Mikayla Denier, DO   17 g at 22 0856       OBJECTIVE:  Patient Vitals for the past 8 hrs:   BP Temp Temp src Pulse Resp SpO2   22 0857 -- -- -- -- 18 --   22 0708 125/83 98.4 °F (36.9 °C) Oral 87 14 94 %   22 0327 -- -- -- -- 18 --   22 0323 116/84 98.6 °F (37 °C) Oral 91 16 92 %   22 0257 -- -- -- -- 16 --     Temp (24hrs), Av.3 °F (36.8 °C), Min:97.9 °F (36.6 °C), Max:98.7 °F (37.1 °C)     07 -  1900  In: 240 [P.O.:240]  Out: 575 [Urine:575]    Physical Exam:  Constitutional: Well developed male sitting comfortably on the hospital bed. HEENT: Normocephalic and atraumatic. Oropharynx is clear, mucous membranes are moist.  Neck supple. Skin Warm and dry. Bruising noted on L foot and no rash noted. No erythema. No pallor. Respiratory Lungs are clear to auscultation bilaterally without wheezes, rales or rhonchi, normal air exchange without accessory muscle use. CVS Normal rate, regular rhythm and normal S1 and S2. No murmurs, gallops, or rubs. Abdomen Soft, nontender and nondistended, normoactive bowel sounds. No palpable mass. No hepatosplenomegaly. Neuro Grossly nonfocal with no obvious sensory or motor deficits.    MSK Normal range of motion in general.  No edema and no tenderness. Psych Appropriate mood and affect.         Labs:    Recent Results (from the past 24 hour(s))   CBC with Auto Differential    Collection Time: 07/23/22  3:30 AM   Result Value Ref Range    WBC 12.0 (H) 4.3 - 11.1 K/uL    RBC 2.68 (L) 4.23 - 5.6 M/uL    Hemoglobin 8.7 (L) 13.6 - 17.2 g/dL    Hematocrit 27.5 (L) 41.1 - 50.3 %    .6 (H) 79.6 - 97.8 FL    MCH 32.5 26.1 - 32.9 PG    MCHC 31.6 31.4 - 35.0 g/dL    RDW 20.5 (H) 11.9 - 14.6 %    Platelets 55 (L) 446 - 450 K/uL    MPV 10.4 9.4 - 12.3 FL    nRBC 0.04 0.0 - 0.2 K/uL    Differential Type AUTOMATED      Seg Neutrophils 86 (H) 43 - 78 %    Lymphocytes 5 (L) 13 - 44 %    Monocytes 8 4.0 - 12.0 %    Eosinophils % 0 (L) 0.5 - 7.8 %    Basophils 0 0.0 - 2.0 %    Immature Granulocytes 1 0.0 - 5.0 %    Segs Absolute 10.3 (H) 1.7 - 8.2 K/UL    Absolute Lymph # 0.6 0.5 - 4.6 K/UL    Absolute Mono # 1.0 0.1 - 1.3 K/UL    Absolute Eos # 0.0 0.0 - 0.8 K/UL    Basophils Absolute 0.0 0.0 - 0.2 K/UL    Absolute Immature Granulocyte 0.2 0.0 - 0.5 K/UL   Comprehensive Metabolic Panel    Collection Time: 07/23/22  3:30 AM   Result Value Ref Range    Sodium 134 (L) 136 - 145 mmol/L    Potassium 4.4 3.5 - 5.1 mmol/L    Chloride 104 98 - 107 mmol/L    CO2 24 21 - 32 mmol/L    Anion Gap 6 (L) 7 - 16 mmol/L    Glucose 107 (H) 65 - 100 mg/dL    BUN 26 (H) 8 - 23 MG/DL    Creatinine 1.20 0.8 - 1.5 MG/DL    GFR African American >60 >60 ml/min/1.73m2    GFR Non- >60 >60 ml/min/1.73m2    Calcium 8.5 8.3 - 10.4 MG/DL    Total Bilirubin 0.8 0.2 - 1.1 MG/DL     (H) 12 - 65 U/L     (H) 15 - 37 U/L    Alk Phosphatase 390 (H) 50 - 136 U/L    Total Protein 6.4 6.3 - 8.2 g/dL    Albumin 2.7 (L) 3.2 - 4.6 g/dL    Globulin 3.7 (H) 2.3 - 3.5 g/dL    Albumin/Globulin Ratio 0.7 (L) 1.2 - 3.5     Magnesium    Collection Time: 07/23/22  3:30 AM   Result Value Ref Range    Magnesium 2.0 1.8 - 2.4 mg/dL       Imaging:  Xray Result (most recent):  XR ABDOMEN (KUB) (SINGLE AP VIEW) 07/20/2022    Narrative  EXAM: ABDOMINAL RADIOGRAPH, 1 view    INDICATION: Constipation    COMPARISON: 9/1/2020    TECHNIQUE: Frontal abdominal radiography was performed. FINDINGS: Limited evaluation due to the patient's body habitus. No clear  obstruction bowel gas pattern. No intraperitoneal free air appreciated. Moderate  retained colonic fecal burden. Prior vertebroplasty at multiple levels. Impression  1. No acute abnormality within the limitation imposed by patient body habitus. 2.  Constipation. 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)    Prostatic nodule    Family history of prostate cancer in father    Acute prostatitis    S/P TURP    Prostate cancer [facility])    Prostate cancer metastatic to bone ([facility] [address]    Thrombocytopenia ([facility] [address]    Hypomagnesemia    Hypercalcemia of malignancy    Pain due to malignant neoplasm metastatic to bone (HCC)    EDGARD (acute kidney injury) ([facility] [address]    Debility    Drug-induced constipation    Encounter for palliative care     Mr. Ariel Mcgowan is a 78 y.o. male admitted on 7/8/2022 with a primary diagnosis of There were no encounter diagnoses. .       Mr. Ariel Mcgowan has a PMH of metastatic prostate cancer dx 2017 s/p casodex and bilateral orchiectomy for surgical castration, 6 cycles of docetaxel, pathologic vertebral fx's with path positive for high grade neuroendocrine cancer, s/p carbo/etoposide and most recently carbo/taxotere which was held d/t TCP. Last was C2 on 6/8/22. 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 He is a known patient of Dr. Chad Roche s/p multiple lines of treatment as above.   At last office visit on 6/29, CEA had notably continued to rise to 8290. [facility], however wished to hold off on making a decision until he completed radiation therapy. He presented to the ER with a fall at home, increasing pain in the hips and generalized weakness. Also with constipation. Also found to be anemic and transfused PRBC. We were asked for recommendations for our known patient. PLAN:  Metastatic Prostate Cancer/Neuroendocrine  - S/p multiple lines of therapy, most recently carbo/taxotere C2 on 6/8. Held on 6/29 d/t TCP. Undergoing XRT to rib and T-spine with Dr. Ana Franklin.  - Has spoken to [facility], however wished to hold off on making a decision until he saw if XRT improved his pain  - Can resume XRT on Monday 7/11 Pt does not wish to pursue XRT. Meeting with PC today for pain mgmt and to discuss [address] - wishes to pursue facility placement and enroll in Hospice. 7/13 Pt now states he may be interested in going home since he will have support from friend moving here from out of state. Consult PT/OT. 7/14  PT recommends HHT  7/23 CT AP shows POD with TNTC liver lesions, increased prostate gland, and extensive bony mets     Cancer Related Pain  - Currently with Dilaudid/oxy prn  - Consult palliative care to assist with pain management (known from office) and also to assist with goals of care discussions   7/10 Increased Dilaudid to 1 mg every 3 hours for severe pain. Pall care consult pending. 7/11 PC assisting with mgmt. Resume Dex. 7/12 PC started Fentanyl 50mcg patch yesterday. Pt reports pain controlled today - even able to walk around room now. 7/13 Pain controlled. PC changed oxycodone to po dilaudid yesterday. 7/14 Pain controlled  7/15 C/o pain today  7/17 pain improved after menchaca  7/20 Pain controlled on current regimen  7/22 c/o worsening abd pain. Bladder scan with minimal urine.   Check CT AP.  7/23 CT AP with POD     Anemia/Thrombocytopenia  - Transfuse prn per Denae SOPs Elevated Tranaminases  - Alk phos/ALT/AST trended down today, continue to follow   7/11 AST/ALT continue to improve   7/13 LFTs improving  7/18 stable  7/23 LFTs increased, monitor - likely r/t liver mets     EDGARD  - Gentle hydration  RESOLVED     Constipation  - Lactulose prn   7/11 Pt refusing Lactulose. PC started pericolace. Con't Miralax. Consider KUB if no improvement. 7/12 No BM documented. Con't Miralax/Pericolace. Pt more active now, up around in room since pain controlled. 7/13 Had BM x 4 yesterday  7/20 c/o constipation. Increase pericolace to BID. Con't Miralax. Check KUB. If no obstruction, will order Movantik. 7/21 KUB with constipation. Movantik ordered. 7/22 No BM. Con't Movantik. Checking CT AP.  7/23 Pt having Bms. Con't Movantik. Hypercalcemia / Hypocalcemia  7/12 CCa++ up to 11. 7. Zometa ordered. 7/15 CCa++ down to 9.2  7/19 CCa++ down to 8.4, replete  7/21 Cca++ 8.6  RESOLVED    Dysuria  7/15 c/o dysuria. UA c/w UTI. CTX ordered. Check Ucx.  7/17 UC NTD on Rocephin  7/18 Pain improved s/p menchaca placement. Ucx with [facility]. Completed CTX x 3 days. Urinary retention  7/16 menchaca ordered/ flomax ordered  7/17 UOP 4000 ml. Adding proscar  7/18 s/p menchaca placement. Con't Flomax/Proscar. Plan to DC menchaca tomorrow. 7/19 Remove menchaca for voiding trial.  If unable to void, will consult urology. 7/20 Menchaca removed yesterday and had to be replaced d/t urinary retention. Consult urology. 7/22 Awaiting urology consult  7/23 Urology felt r/t constipation, however CT shows b/l hydroureteronephrosis. Will defer mgmt to urology. Continue home meds  Denae SOPs  AC contraindicated d/t thrombocytopenia    Goals and plan of care reviewed with the patient. All questions answered to the best of our ability. Disposition:  7/14: Pt reports his friend is not moving here until 8/1. He reports she has a one-story home that he can move into on 8/1 or possibly even sooner.   Pt was able to walk hallways yesterday. Unsure if he would qualify for facility placement while future living arrangements are made. CM states pt would have to go through extensive insurance process to even cover facility placement if he qualified. CM assisting with discharge planning. ? Could pt stay with son who lives locally until his friend's house is ready to move into? Pt also declines Hospice services at this time. He states he would prefer HHT. 7/15:  Pt is medically stable for discharge. Pt working on living arrangements. Hopeful for discharge home soon. 7/17 Spoke with patient's son yesterday. Patient does not a home to return to so now even if he does have a friend coming there is no home. Difficult placement. CM following. 7/19: Pt reportedly does not have a home to return to upon discharge. Son states pt is unable to live with him. Unlikely that pt would qualify for facility placement. CM following.    7/20: Pt still stable for discharge. CM following - pt has no place to live.    7/21:  CM found available place at [facility] for patient. Pt states he was not told about this, but cannot afford $650/mo. He says he was paying $500/mo at previous place which is no longer available to him. CM following.    7/22:  Per CM, son calling to speak to the manager of the boarding home today. He also asked for list of [facility] that accept Medicaid. CM continues to follow.             [facility] [address] Hematology & Oncology  [address] [address]  Office : [phone]  Fax : [phone]

## 2022-07-23 NOTE — PLAN OF CARE
Problem: Skin/Tissue Integrity  Goal: Absence of new skin breakdown  Description: 1. Monitor for areas of redness and/or skin breakdown  2. Assess vascular access sites hourly  3. Every 4-6 hours minimum:  Change oxygen saturation probe site  4. Every 4-6 hours:  If on nasal continuous positive airway pressure, respiratory therapy assess nares and determine need for appliance change or resting period.   Outcome: Progressing     Problem: Pain  Goal: Verbalizes/displays adequate comfort level or baseline comfort level  Outcome: Progressing     Problem: Safety - Adult  Goal: Free from fall injury  Outcome: Progressing  Flowsheets (Taken 7/23/2022 2375)  Free From Fall Injury: Instruct family/caregiver on patient safety     Problem: ABCDS Injury Assessment  Goal: Absence of physical injury  Outcome: Progressing     Problem: Respiratory - Adult  Goal: Achieves optimal ventilation and oxygenation  Outcome: Progressing

## 2022-07-23 NOTE — PROGRESS NOTES
END OF SHIFT SUMMARY:    Significant vitals this shift:    Significant labs this shift:    Tests performed this shift:    Orders to be followed up on:    Blood products given this shift:  0  Additional events this shift:       I/Os:  +/- this shift:   07/23 0701 - 07/23 1900  In: 480 [P.O.:480]  Out: 950 [Urine:950]  Occurrences this Shift:  Urine menchaca, BM 2, Emesis 0    Jacquelyn Tran RN

## 2022-07-24 NOTE — PROGRESS NOTES
[facility]        Inpatient Hematology / Oncology Progress Note    Reason for Admission:  Prostate cancer metastatic to bone [facility]) [C61, C79.51]    24 Hour Events:  Afebrile, VSS  Issues with living arrangements after discharge - CM following  No response from urology yesterday in regards to message sent about b/l hydroureteronephrosis, placed another consult order  Having Bms  Bili/FLTs increasing  Plt down to 41k  Na+ down to 132    Transfusions: None  Replacements: None    ROS:  Constitutional: Positive for fatigue; negative for fever, chills. CV: Negative for chest pain, palpitations, edema. Respiratory: Negative for dyspnea, cough, wheezing. GI: +abd pain. Negative for nausea, diarrhea. MSK:  +Back/hip pain (pain controlled)    10 point review of systems is otherwise negative with the exception of the elements mentioned above in the HPI. Allergies   Allergen Reactions    Turmeric Nausea And Vomiting     Past Medical History:   Diagnosis Date    Claustrophobia     Ear problems     Elevated PSA     Former light cigarette smoker (1-9 per day)     smoked for 35 years.   quit     History of multiple allergies     Nicotine vapor product user     Personal history of prostate cancer     Prostate cancer ([facility] [address]     prostate, neuroendocrine, mets to bone     Past Surgical History:   Procedure Laterality Date    BIOPSY PROSTATE      COLONOSCOPY  08/2020    HEENT      teeth removed     IR KYPHOPLASTY LUMBAR FIRST LEVEL  10/14/2020    IR KYPHOPLASTY LUMBAR FIRST LEVEL  10/14/2020    IR KYPHOPLASTY LUMBAR FIRST LEVEL 10/14/2020 [facility]    IR KYPHOPLASTY THORACIC FIRST LEVEL  10/29/2020    IR KYPHOPLASTY THORACIC FIRST LEVEL  10/29/2020    IR KYPHOPLASTY THORACIC FIRST LEVEL 10/29/2020 [facility]    TESTICLE REMOVAL Bilateral 02/2017     Family History   Problem Relation Age of Onset    Hypertension Father     Prostate Cancer Father     Cancer Father Johny Rivera APRN - NP   0.4 mg at 07/24/22 0814    finasteride (PROSCAR) tablet 5 mg  5 mg Oral Daily Angeline Chisholm APRN - NP   5 mg at 07/24/22 5990    zinc oxide 40 % paste   Topical 4x Daily PRN Jeff Moon MD        lidocaine-prilocaine (EMLA) cream   Topical Once Jeff Moon MD        phenazopyridine (PYRIDIUM) tablet 95 mg  95 mg Oral TID PRN Manoj Never, APRN - CNP   95 mg at 07/15/22 2048    cyclobenzaprine (FLEXERIL) tablet 10 mg  10 mg Oral TID PRN HELEN Friedman - NP   10 mg at 07/23/22 0136    0.9 % sodium chloride infusion   IntraVENous PRN Manoj Never, HELEN Quinonez CNP        [Held by provider] enoxaparin (LOVENOX) injection 40 mg  40 mg SubCUTAneous Daily Manoj Never, HELEN - CNP   40 mg at 07/23/22 0857    fentaNYL (DURAGESIC) 75 MCG/HR 1 patch  1 patch TransDERmal Q72H HELEN Marcus CNP   1 patch at 07/22/22 1124    diphenhydrAMINE (BENADRYL) capsule 25 mg  25 mg Oral Q6H PRN Manoj Never, APRN - CNP   25 mg at 07/16/22 0110    acetaminophen (TYLENOL) tablet 650 mg  650 mg Oral Q6H PRN Manoj Never, APRN - CNP   650 mg at 07/13/22 1514    Or    acetaminophen (TYLENOL) suppository 650 mg  650 mg Rectal Q6H PRN Manoj Never, HELEN - MICHELLE        HYDROmorphone (DILAUDID) tablet 2 mg  2 mg Oral Q4H PRN Azalia Bland, APRN - CNP   2 mg at 07/21/22 1438    Or    HYDROmorphone (DILAUDID) tablet 4 mg  4 mg Oral Q4H PRN Azalia Bland, APRN - CNP   4 mg at 07/24/22 0644    naloxone (NARCAN) injection 0.04 mg  0.04 mg IntraVENous PRN HELEN Marcus CNP        dexamethasone (DECADRON) tablet 4 mg  4 mg Oral 2 times per day Manoj Never, HELEN - CNP   4 mg at 07/24/22 0814    aluminum & magnesium hydroxide-simethicone (MAALOX) 200-200-20 MG/5ML suspension 20 mL  20 mL Oral Q6H PRN Mikhail Murillo MD   20 mL at 07/13/22 0809    LORazepam (ATIVAN) tablet 1 mg  1 mg Oral TID PRN Zeferino Wang DO   1 mg at 07/23/22 2059    pantoprazole (PROTONIX) tablet 40 mg  40 mg Oral QAM AC Zeferino Wang DO 40 mg at 22 0610    sodium chloride flush 0.9 % injection 5-40 mL  5-40 mL IntraVENous 2 times per day Cirilo Credit, DO   5 mL at 22 1656    sodium chloride flush 0.9 % injection 5-40 mL  5-40 mL IntraVENous PRN Balm Credit, DO        0.9 % sodium chloride infusion   IntraVENous PRN Balm Credit, DO        ondansetron (ZOFRAN-ODT) disintegrating tablet 4 mg  4 mg Oral Q8H PRN Balm Credit, DO        Or    ond[facility]ron [facility]) injection 4 mg  4 mg IntraVENous Q6H PRN Balm Credit, DO   4 mg at 07/10/22 1717    docusate sodium (COLACE) capsule 100 mg  100 mg Oral BID PRN Balm Credit, DO        polyethylene glycol (GLYCOLAX) packet 17 g  17 g Oral Daily Balm Credit, DO   17 g at 22 0856       OBJECTIVE:  Patient Vitals for the past 8 hrs:   BP Temp Temp src Pulse Resp SpO2   22 0734 127/74 97.9 °F (36.6 °C) Oral 83 18 96 %   22 0644 -- -- -- -- 16 --     Temp (24hrs), Av.3 °F (36.8 °C), Min:97.9 °F (36.6 °C), Max:99 °F (37.2 °C)     07 -  1900  In: 5 [P.O.:720]  Out: 300 [Urine:300]    Physical Exam:  Constitutional: Well developed male sitting comfortably on the bedside chair. HEENT: Normocephalic and atraumatic. Oropharynx is clear, mucous membranes are moist.  Neck supple. Skin Warm and dry. Bruising noted on L foot and no rash noted. No erythema. No pallor. Respiratory Lungs are clear to auscultation bilaterally without wheezes, rales or rhonchi, normal air exchange without accessory muscle use. CVS Normal rate, regular rhythm and normal S1 and S2. No murmurs, gallops, or rubs. Abdomen Soft, nontender and nondistended, normoactive bowel sounds. No palpable mass. No hepatosplenomegaly. Neuro Grossly nonfocal with no obvious sensory or motor deficits. MSK Normal range of motion in general.  No edema and no tenderness. Psych Appropriate mood and affect.         Labs:    Recent Results (from the past 24 hour(s)) CBC with Auto Differential    Collection Time: 07/24/22  4:02 AM   Result Value Ref Range    WBC 10.5 4.3 - 11.1 K/uL    RBC 2.59 (L) 4.23 - 5.6 M/uL    Hemoglobin 8.3 (L) 13.6 - 17.2 g/dL    Hematocrit 26.4 (L) 41.1 - 50.3 %    .9 (H) 79.6 - 97.8 FL    MCH 32.0 26.1 - 32.9 PG    MCHC 31.4 31.4 - 35.0 g/dL    RDW 20.4 (H) 11.9 - 14.6 %    Platelets 41 (L) 173 - 450 K/uL    MPV 10.1 9.4 - 12.3 FL    nRBC 0.02 0.0 - 0.2 K/uL    Differential Type AUTOMATED      Seg Neutrophils 85 (H) 43 - 78 %    Lymphocytes 6 (L) 13 - 44 %    Monocytes 7 4.0 - 12.0 %    Eosinophils % 0 (L) 0.5 - 7.8 %    Basophils 0 0.0 - 2.0 %    Immature Granulocytes 1 0.0 - 5.0 %    Segs Absolute 8.9 (H) 1.7 - 8.2 K/UL    Absolute Lymph # 0.7 0.5 - 4.6 K/UL    Absolute Mono # 0.8 0.1 - 1.3 K/UL    Absolute Eos # 0.0 0.0 - 0.8 K/UL    Basophils Absolute 0.0 0.0 - 0.2 K/UL    Absolute Immature Granulocyte 0.1 0.0 - 0.5 K/UL   Comprehensive Metabolic Panel    Collection Time: 07/24/22  4:02 AM   Result Value Ref Range    Sodium 132 (L) 136 - 145 mmol/L    Potassium 4.2 3.5 - 5.1 mmol/L    Chloride 106 98 - 107 mmol/L    CO2 22 21 - 32 mmol/L    Anion Gap 4 (L) 7 - 16 mmol/L    Glucose 106 (H) 65 - 100 mg/dL    BUN 24 (H) 8 - 23 MG/DL    Creatinine 1.10 0.8 - 1.5 MG/DL    GFR African American >60 >60 ml/min/1.73m2    GFR Non- >60 >60 ml/min/1.73m2    Calcium 8.4 8.3 - 10.4 MG/DL    Total Bilirubin 1.2 (H) 0.2 - 1.1 MG/DL     (H) 12 - 65 U/L     (H) 15 - 37 U/L    Alk Phosphatase 405 (H) 50 - 136 U/L    Total Protein 6.3 6.3 - 8.2 g/dL    Albumin 2.6 (L) 3.2 - 4.6 g/dL    Globulin 3.7 (H) 2.3 - 3.5 g/dL    Albumin/Globulin Ratio 0.7 (L) 1.2 - 3.5     Magnesium    Collection Time: 07/24/22  4:02 AM   Result Value Ref Range    Magnesium 2.3 1.8 - 2.4 mg/dL       Imaging:  Xray Result (most recent):  XR ABDOMEN (KUB) (SINGLE AP VIEW) 07/20/2022    Narrative  EXAM: ABDOMINAL RADIOGRAPH, 1 view    INDICATION: Constipation    COMPARISON: 9/1/2020    TECHNIQUE: Frontal abdominal radiography was performed. FINDINGS: Limited evaluation due to the patient's body habitus. No clear  obstruction bowel gas pattern. No intraperitoneal free air appreciated. Moderate  retained colonic fecal burden. Prior vertebroplasty at multiple levels. Impression  1. No acute abnormality within the limitation imposed by patient body habitus. 2.  Constipation. 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)    Hypokalemia    Gynecomastia, male    Neuroendocrine carcinoma, high grade (HCC)    Anemia due to chemotherapy    Primary prostate cancer with metastasis from prostate to other site [facility])    Prostatic nodule    Family history of prostate cancer in father    Acute prostatitis    S/P TURP    Prostate cancer [facility])    Prostate cancer metastatic to bone ([facility] [address]    Thrombocytopenia ([facility] [address]    Hypomagnesemia    Hypercalcemia of malignancy    Pain due to malignant neoplasm metastatic to bone (HCC)    EDGARD (acute kidney injury) ([facility] [address]    Debility    Drug-induced constipation    Encounter for palliative care    Abnormal CT of the abdomen    Hydronephrosis    Hydroureter     Mr. Pepe Lozoya is a 78 y.o. male admitted on 7/8/2022 with a primary diagnosis of There were no encounter diagnoses. .       Mr. Pepe Lozoya has a PMH of metastatic prostate cancer dx 2017 s/p casodex and bilateral orchiectomy for surgical castration, 6 cycles of docetaxel, pathologic vertebral fx's with path positive for high grade neuroendocrine cancer, s/p carbo/etoposide and most recently carbo/taxotere which was held d/t TCP. Last was C2 on 6/8/22. 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 He is a known patient of Dr. Mira Sandra s/p multiple lines of treatment as above.   At last office visit on 6/29, CEA had notably continued to rise to 56.  [facility] however wished to hold off on making a decision until he completed radiation therapy. He presented to the ER with a fall at home, increasing pain in the hips and generalized weakness. Also with constipation. Also found to be anemic and transfused PRBC. We were asked for recommendations for our known patient. PLAN:  Metastatic Prostate Cancer/Neuroendocrine  - S/p multiple lines of therapy, most recently carbo/taxotere C2 on 6/8. Held on 6/29 d/t TCP. Undergoing XRT to rib and T-spine with Dr. Florentin De La Cruz.  - Has spoken to [facility], however wished to hold off on making a decision until he saw if XRT improved his pain  - Can resume XRT on Monday 7/11 Pt does not wish to pursue XRT. Meeting with PC today for pain mgmt and to discuss [address] - wishes to pursue facility placement and enroll in Hospice. 7/13 Pt now states he may be interested in going home since he will have support from friend moving here from out of state. Consult PT/OT. 7/14  PT recommends HHT  7/23 CT AP shows POD with TNTC liver lesions, increased prostate gland, and extensive bony mets     Cancer Related Pain  - Currently with Dilaudid/oxy prn  - Consult palliative care to assist with pain management (known from office) and also to assist with goals of care discussions   7/10 Increased Dilaudid to 1 mg every 3 hours for severe pain. Pall care consult pending. 7/11 PC assisting with mgmt. Resume Dex. 7/12 PC started Fentanyl 50mcg patch yesterday. Pt reports pain controlled today - even able to walk around room now. 7/13 Pain controlled. PC changed oxycodone to po dilaudid yesterday. 7/14 Pain controlled  7/15 C/o pain today  7/17 pain improved after menchaca  7/20 Pain controlled on current regimen  7/22 c/o worsening abd pain. Bladder scan with minimal urine. Check CT AP.  7/23 CT AP with POD     Anemia/Thrombocytopenia  - Transfuse prn per Denae SOPs  7/24 Plt down to 41k. DC Lovenox.   Check hemolysis labs, DIC labs, smear, HIT. Transaminitis / Hyperbilirubinemia  - Alk phos/ALT/AST trended down today, continue to follow   7/11 AST/ALT continue to improve   7/13 LFTs improving  7/18 stable  7/23 LFTs increased, monitor - likely r/t liver mets  7/24 Tbili up to 1.2, LFTs con't to increase. EDGARD  - Gentle hydration  RESOLVED     Constipation  - Lactulose prn   7/11 Pt refusing Lactulose. PC started pericolace. Con't Miralax. Consider KUB if no improvement. 7/12 No BM documented. Con't Miralax/Pericolace. Pt more active now, up around in room since pain controlled. 7/13 Had BM x 4 yesterday  7/20 c/o constipation. Increase pericolace to BID. Con't Miralax. Check KUB. If no obstruction, will order Movantik. 7/21 KUB with constipation. Movantik ordered. 7/22 No BM. Con't Movantik. Checking CT AP.  7/23 Pt having Bms. Con't Movantik. RESOLVED    Hypercalcemia / Hypocalcemia  7/12 CCa++ up to 11. 7. Zometa ordered. 7/15 CCa++ down to 9.2  7/19 CCa++ down to 8.4, replete  7/21 Cca++ 8.6  RESOLVED    Dysuria  7/15 c/o dysuria. UA c/w UTI. CTX ordered. Check Ucx.  7/17 UC NTD on Rocephin  7/18 Pain improved s/p menchaca placement. Ucx with MSF. Completed CTX x 3 days. Urinary retention / B/l hydroureteronephrosis  7/16 menchaca ordered/ flomax ordered  7/17 UOP 4000 ml. Adding proscar  7/18 s/p menchaca placement. Con't Flomax/Proscar. Plan to DC menchaca tomorrow. 7/19 Remove menchaca for voiding trial.  If unable to void, will consult urology. 7/20 Menchaca removed yesterday and had to be replaced d/t urinary retention. Consult urology. 7/22 Awaiting urology consult  7/23 Urology felt r/t constipation, however CT shows b/l hydroureteronephrosis. Will defer mgmt to urology. 7/24 No response from urology from message sent yesterday. Will re-consult. Continue home meds  Denae SOPs  AC contraindicated d/t thrombocytopenia    Goals and plan of care reviewed with the patient.   All questions answered to the best of our ability. Disposition:  7/14: Pt reports his friend is not moving here until 8/1. He reports she has a one-story home that he can move into on 8/1 or possibly even sooner. Pt was able to walk hallways yesterday. Unsure if he would qualify for facility placement while future living arrangements are made. CM states pt would have to go through extensive insurance process to even cover facility placement if he qualified. CM assisting with discharge planning. ? Could pt stay with son who lives locally until his friend's house is ready to move into? Pt also declines Hospice services at this time. He states he would prefer HHT. 7/15:  Pt is medically stable for discharge. Pt working on living arrangements. Hopeful for discharge home soon. 7/17 Spoke with patient's son yesterday. Patient does not a home to return to so now even if he does have a friend coming there is no home. Difficult placement. CM following. 7/19: Pt reportedly does not have a home to return to upon discharge. Son states pt is unable to live with him. Unlikely that pt would qualify for facility placement. CM following.    7/20: Pt still stable for discharge. CM following - pt has no place to live.    7/21:  CM found available place at [facility] for patient. Pt states he was not told about this, but cannot afford $650/mo. He says he was paying $500/mo at previous place which is no longer available to him. CM following.    7/22:  Per CM, son calling to speak to the manager of the boarding home today. He also asked for list of [facility] that accept Medicaid. CM continues to follow.             HELEN [address] Hematology & Oncology  [address] [facility] [place] [id number] [facility]  Office : [phone]  Fax : [phone]

## 2022-07-24 NOTE — PLAN OF CARE
Problem: Skin/Tissue Integrity  Goal: Absence of new skin breakdown  Description: 1. Monitor for areas of redness and/or skin breakdown  2. Assess vascular access sites hourly  3. Every 4-6 hours minimum:  Change oxygen saturation probe site  4. Every 4-6 hours:  If on nasal continuous positive airway pressure, respiratory therapy assess nares and determine need for appliance change or resting period.   Outcome: Progressing     Problem: Pain  Goal: Verbalizes/displays adequate comfort level or baseline comfort level  Outcome: Progressing     Problem: Safety - Adult  Goal: Free from fall injury  Outcome: Progressing  Flowsheets (Taken 7/24/2022 0816)  Free From Fall Injury: Instruct family/caregiver on patient safety     Problem: ABCDS Injury Assessment  Goal: Absence of physical injury  Outcome: Progressing     Problem: Respiratory - Adult  Goal: Achieves optimal ventilation and oxygenation  Outcome: Progressing

## 2022-07-25 NOTE — PROGRESS NOTES
Admit Date: 7/8/2022      Subjective:     Lorna Barclay is resting. Clark in place. Denies flank pain.       Objective:     Patient Vitals for the past 8 hrs:   BP Temp Temp src Pulse Resp SpO2   07/25/22 1135 (!) 140/84 98.8 °F (37.1 °C) Oral 90 20 95 %   07/25/22 0801 110/73 98.7 °F (37.1 °C) Oral 78 18 95 %     07/25 0701 - 07/25 1900  In: 360 [P.O.:360]  Out: -   07/23 1901 - 07/25 0700  In: 1500 [P.O.:1500]  Out: 2400 [Urine:2400]    Physical Exam:  GENERAL ASSESSMENT: alert, oriented to person, place and time, no acute distress   Chest: clear to auscultation  CVS exam: normal rate, regular rhythm, normal S1, S2  ABDOMEN: soft, non tender  Neurological exam reveals alert, oriented, normal speech      Data Review   Recent Results (from the past 24 hour(s))   CBC with Auto Differential    Collection Time: 07/25/22  3:58 AM   Result Value Ref Range    WBC 9.8 4.3 - 11.1 K/uL    RBC 2.44 (L) 4.23 - 5.6 M/uL    Hemoglobin 8.0 (L) 13.6 - 17.2 g/dL    Hematocrit 25.1 (L) 41.1 - 50.3 %    .9 (H) 79.6 - 97.8 FL    MCH 32.8 26.1 - 32.9 PG    MCHC 31.9 31.4 - 35.0 g/dL    RDW 20.4 (H) 11.9 - 14.6 %    Platelets 37 (L) 990 - 450 K/uL    MPV 10.3 9.4 - 12.3 FL    nRBC 0.02 0.0 - 0.2 K/uL    Differential Type AUTOMATED      Seg Neutrophils 85 (H) 43 - 78 %    Lymphocytes 6 (L) 13 - 44 %    Monocytes 8 4.0 - 12.0 %    Eosinophils % 0 (L) 0.5 - 7.8 %    Basophils 0 0.0 - 2.0 %    Immature Granulocytes 1 0.0 - 5.0 %    Segs Absolute 8.3 (H) 1.7 - 8.2 K/UL    Absolute Lymph # 0.6 0.5 - 4.6 K/UL    Absolute Mono # 0.8 0.1 - 1.3 K/UL    Absolute Eos # 0.0 0.0 - 0.8 K/UL    Basophils Absolute 0.0 0.0 - 0.2 K/UL    Absolute Immature Granulocyte 0.1 0.0 - 0.5 K/UL   Comprehensive Metabolic Panel    Collection Time: 07/25/22  3:58 AM   Result Value Ref Range    Sodium 136 136 - 145 mmol/L    Potassium 4.0 3.5 - 5.1 mmol/L    Chloride 103 98 - 107 mmol/L    CO2 23 21 - 32 mmol/L    Anion Gap 10 7 - 16 mmol/L    Glucose 114 (H) 65 - 100 mg/dL    BUN 26 (H) 8 - 23 MG/DL    Creatinine 1.10 0.8 - 1.5 MG/DL    GFR African American >60 >60 ml/min/1.73m2    GFR Non- >60 >60 ml/min/1.73m2    Calcium 8.3 8.3 - 10.4 MG/DL    Total Bilirubin 0.8 0.2 - 1.1 MG/DL     (H) 12 - 65 U/L     (H) 15 - 37 U/L    Alk Phosphatase 381 (H) 50 - 136 U/L    Total Protein 6.4 6.3 - 8.2 g/dL    Albumin 2.5 (L) 3.2 - 4.6 g/dL    Globulin 3.9 (H) 2.3 - 3.5 g/dL    Albumin/Globulin Ratio 0.6 (L) 1.2 - 3.5     Magnesium    Collection Time: 07/25/22  3:58 AM   Result Value Ref Range    Magnesium 2.3 1.8 - 2.4 mg/dL       Assessment:     Principal Problem:    Prostate cancer metastatic to bone [facility])  Active Problems: Thrombocytopenia (HCC)    Hypomagnesemia    Hypercalcemia of malignancy    Pain due to malignant neoplasm metastatic to bone (HCC)    EDGARD (acute kidney injury) ([facility] [address]    Debility    Drug-induced constipation    Encounter for palliative care    Abnormal CT of the abdomen    Hydronephrosis    Hydroureter    Hypokalemia    Neuroendocrine carcinoma, high grade (HCC)    Anemia due to chemotherapy    Primary prostate cancer with metastasis from prostate to other site [facility])  Resolved Problems:    Uncontrolled pain    77 yo male with metastatic prostate cancer dx 2017, known to Dr Min Bailey and now Dr Grecia Mendez. Admitted to oncology floor with increasing hip pain and weakness after a fall. Also with constipation and urinary retention. Menchaca placed. On proscar/flomax. Noted to have bilateral hydroureteronephrosis on CT scan s/p menchaca placement. Pt has now had bowel movements and feeling better. Cr is 1.10, pt has NO flank pain. Plan:     Would remove menchaca catheter tomorrow AM for voiding trial.  Would do reimaging later on to see if bilateral hydro has resolved (could be r/t overdistended bladder/urinary retention vs prostate cancer/obstruction).   If bilateral hydro has not improved over time, we would consider bilateral

## 2022-07-25 NOTE — PROGRESS NOTES
Physical Therapy Note:    Attempted to see patient this AM for physical therapy treatment  session. Patient declined PT this date stating that he did not feel well and that he has been up and down to the restroom. Pt has refused to work with PT the last 6 attempts. Will discharge from PT at this time. Please reconsult if pt is willing to participate.  Thank you,    Misty Gerber, PTA     Rehab Caseload Tracker

## 2022-07-25 NOTE — PROGRESS NOTES
[facility] Hematology & Oncology        Inpatient Hematology / Oncology Progress Note    Reason for Admission:  Prostate cancer metastatic to bone [facility]) [C61, C79.51]    24 Hour Events:  Afebrile, VSS  Issues with living arrangements after discharge - CM following  Urology did not see over weekend, still need input re: hydroureteronephrosis  Pt reports pain ongoing, now interested in pursuing XRT again  Plt down to 37k    Transfusions: None  Replacements: None    ROS:  Constitutional: Positive for fatigue; negative for fever, chills. CV: Negative for chest pain, palpitations, edema. Respiratory: Negative for dyspnea, cough, wheezing. GI: Negative for nausea, diarrhea, abd pain. MSK:  +Back/hip pain    10 point review of systems is otherwise negative with the exception of the elements mentioned above in the HPI. Allergies   Allergen Reactions    Turmeric Nausea And Vomiting     Past Medical History:   Diagnosis Date    Claustrophobia     Ear problems     Elevated PSA     Former light cigarette smoker (1-9 per day)     smoked for 35 years.   quit     History of multiple allergies     Nicotine vapor product user     Personal history of prostate cancer     Prostate cancer ([address]     prostate, neuroendocrine, mets to bone     Past Surgical History:   Procedure Laterality Date    BIOPSY PROSTATE      COLONOSCOPY  08/2020    HEENT      teeth removed     IR KYPHOPLASTY LUMBAR FIRST LEVEL  10/14/2020    IR KYPHOPLASTY LUMBAR FIRST LEVEL  10/14/2020    IR KYPHOPLASTY LUMBAR FIRST LEVEL 10/14/2020 [facility]OLOG[facility]CI[facility]    IR KYPHOPLASTY THORACIC FIRST LEVEL  10/29/2020    IR KYPHOPLASTY THORACIC FIRST LEVEL  10/29/2020    IR KYPHOPLASTY THORACIC FIRST LEVEL 10/29/2020 [facility]OLOGY SPECI[facility]    TESTICLE REMOVAL Bilateral 02/2017     Family History   Problem Relation Age of Onset    Hypertension Father     Prostate Cancer Father     Cancer Father         prostate cancer     Social History     Socioeconomic History    Marital status: Single     Spouse name: Not on file    Number of children: Not on file    Years of education: Not on file    Highest education level: Not on file   Occupational History    Not on file   Tobacco Use    Smoking status: Former     Packs/day: 0.50     Types: Cigarettes     Quit date: 2016     Years since quittin.6    Smokeless tobacco: Never   Substance and Sexual Activity    Alcohol use: Yes    Drug use: No    Sexual activity: Not on file   Other Topics Concern    Not on file   Social History Narrative    Not on file     Social Determinants of Health     Financial Resource Strain: Not on file   Food Insecurity: Not on file   Transportation Needs: Not on file   Physical Activity: Not on file   Stress: Not on file   Social Connections: Not on file   Intimate Partner Violence: Not on file   Housing Stability: Not on file     Current Facility-Administered Medications   Medication Dose Route Frequency Provider Last Rate Last Admin    diatrizoate meglumine-sodium (GASTROGRAFIN) 66-10 % solution 15 mL  15 mL Oral ONCE PRN Ingrid Liyah, APRN - CNP   15 mL at 22 1444    HYDROmorphone HCl PF (DILAUDID) injection 1.5 mg  1.5 mg IntraVENous Q3H PRN Ingrid Liyah, APRN - CNP   1.5 mg at 22 0257    bisacodyl (DULCOLAX) suppository 10 mg  10 mg Rectal Daily PRN Ubaldo Rodney, APRN - CNP   10 mg at 22 1758    naloxegol (MOVANTIK) tablet 25 mg  25 mg Oral QAM AC Ingrid Liyah, APRN - CNP   25 mg at 22 3114    magnesium oxide (MAG-OX) tablet 400 mg  400 mg Oral 4x Daily Brigid Nuñez MD   400 mg at 22 0831    calcium carbonate (TUMS) chewable tablet 500 mg  500 mg Oral TID Ingrid Liyah, APRN - CNP   500 mg at 22 0831    sennosides-docusate sodium (SENOKOT-S) 8.6-50 MG tablet 2 tablet  2 tablet Oral BID Ingrid Flower, APRN - CNP   2 tablet at 22 0831    tamsulosin (FLOMAX) capsule 0.4 mg  0.4 mg Oral Daily HELEN Perez - NP   0.4 mg at 22 0831    finasteride (PROSCAR) tablet 5 mg  5 mg Oral Daily Ekta Amezcua APRN - NP   5 mg at 07/25/22 0831    zinc oxide 40 % paste   Topical 4x Daily PRN Timothy Fung MD        lidocaine-prilocaine (EMLA) cream   Topical Once Timothy Fung MD        phenazopyridine (PYRIDIUM) tablet 95 mg  95 mg Oral TID PRN Scot Worley, APRN - CNP   95 mg at 07/15/22 2048    cyclobenzaprine (FLEXERIL) tablet 10 mg  10 mg Oral TID PRN Ekta Amezcua APRN - NP   10 mg at 07/25/22 0913    0.9 % sodium chloride infusion   IntraVENous PRN HELEN Myles CNP        [Held by provider] enoxaparin (LOVENOX) injection 40 mg  40 mg SubCUTAneous Daily HELEN Myles - CNP   40 mg at 07/23/22 0857    fentaNYL (DURAGESIC) 75 MCG/HR 1 patch  1 patch TransDERmal Q72H HELEN Marcus CNP   1 patch at 07/22/22 1124    diphenhydrAMINE (BENADRYL) capsule 25 mg  25 mg Oral Q6H PRN Scot Worley, APRN - CNP   25 mg at 07/16/22 0110    acetaminophen (TYLENOL) tablet 650 mg  650 mg Oral Q6H PRN Scot Worley, APRN - CNP   650 mg at 07/13/22 1514    Or    acetaminophen (TYLENOL) suppository 650 mg  650 mg Rectal Q6H PRN HELEN Myles - MICHELLE        HYDROmorphone (DILAUDID) tablet 2 mg  2 mg Oral Q4H PRN [place] Dolin, APRN - CNP   2 mg at 07/21/22 1438    Or    HYDROmorphone (DILAUDID) tablet 4 mg  4 mg Oral Q4H PRN [place] Dolin, APRN - CNP   4 mg at 07/25/22 0909    naloxone (NARCAN) injection 0.04 mg  0.04 mg IntraVENous PRN HELEN Marcus CNP        dexamethasone (DECADRON) tablet 4 mg  4 mg Oral 2 times per day HELEN Myles - CNP   4 mg at 07/25/22 0831    aluminum & magnesium hydroxide-simethicone (MAALOX) 200-200-20 MG/5ML suspension 20 mL  20 mL Oral Q6H PRN Ariel Alexander MD   20 mL at 07/13/22 0809    LORazepam (ATIVAN) tablet 1 mg  1 mg Oral TID PRN Leandro Garcia DO   1 mg at 07/23/22 2059    pantoprazole (PROTONIX) tablet 40 mg  40 mg Oral QAM AC Jen Martinez, DO   40 mg at 07/25/22 3443    sodium chloride flush 0.9 % injection 5-40 mL  5-40 mL IntraVENous 2 times per day Alphonsa Kalpana, DO   5 mL at 22 0831    sodium chloride flush 0.9 % injection 5-40 mL  5-40 mL IntraVENous PRN Alphonsa Kalpana, DO        0.9 % sodium chloride infusion   IntraVENous PRN Alphonsa Kalpana, DO        ondansetron (ZOFRAN-ODT) disintegrating tablet 4 mg  4 mg Oral Q8H PRN Alphonsa Kalpana, DO        Or    ondansetron [facility]) injection 4 mg  4 mg IntraVENous Q6H PRN Alphonsa Kalpana, DO   4 mg at 07/10/22 1717    docusate sodium (COLACE) capsule 100 mg  100 mg Oral BID PRN Alphonsa Kalpana, DO        polyethylene glycol (GLYCOLAX) packet 17 g  17 g Oral Daily Alphonsa Kalpana, DO   17 g at 22 0856       OBJECTIVE:  Patient Vitals for the past 8 hrs:   BP Temp Temp src Pulse Resp SpO2   22 0801 110/73 98.7 °F (37.1 °C) Oral 78 18 95 %   22 0319 139/80 98.2 °F (36.8 °C) Oral 89 18 97 %     Temp (24hrs), Av.4 °F (36.9 °C), Min:98 °F (36.7 °C), Max:98.7 °F (37.1 °C)    No intake/output data recorded. Physical Exam:  Constitutional: Well developed male sitting comfortably on the hospital bed. HEENT: Normocephalic and atraumatic. Oropharynx is clear, mucous membranes are moist.  Neck supple. Skin Warm and dry. Bruising noted on L foot and no rash noted. No erythema. No pallor. Respiratory Lungs are clear to auscultation bilaterally without wheezes, rales or rhonchi, normal air exchange without accessory muscle use. CVS Normal rate, regular rhythm and normal S1 and S2. No murmurs, gallops, or rubs. Abdomen Soft, nontender and nondistended, normoactive bowel sounds. No palpable mass. No hepatosplenomegaly. Neuro Grossly nonfocal with no obvious sensory or motor deficits. MSK Normal range of motion in general.  No edema and no tenderness. Psych Appropriate mood and affect.         Labs:    Recent Results (from the past 24 hour(s))   Lactate Dehydrogenase    Collection Time: 22 12:45 PM   Result Value Ref Range    LD 1,799 (H) 110 - 210 U/L   Reticulocytes    Collection Time: 07/24/22 12:45 PM   Result Value Ref Range    Reticulocyte Count,Automated 3.9 (H) 0.3 - 2.0 %    Absolute Retic # 0.0997 (H) 0.026 - 0.095 M/ul    Immature Retic Fraction 31.4 (H) 2.3 - 13.4 %    Retic Hemoglobin conc. 38 (H) 29 - 35 pg   Haptoglobin    Collection Time: 07/24/22 12:45 PM   Result Value Ref Range    Haptoglobin 163 30 - 200 mg/dL   Protime-INR    Collection Time: 07/24/22 12:45 PM   Result Value Ref Range    Protime 13.3 12.6 - 14.5 sec    INR 1.0     APTT    Collection Time: 07/24/22 12:45 PM   Result Value Ref Range    PTT 37.0 (H) 24.1 - 35.1 SEC   Fibrinogen    Collection Time: 07/24/22 12:45 PM   Result Value Ref Range    Fibrinogen 418 190 - 501 mg/dL   D-Dimer, Quantitative    Collection Time: 07/24/22 12:45 PM   Result Value Ref Range    D-Dimer, Quant >20.00 (H) <0.56 ug/ml(FEU)   Bilirubin, total and direct    Collection Time: 07/24/22 12:45 PM   Result Value Ref Range    Total Bilirubin 1.0 0.2 - 1.1 MG/DL    Bilirubin, Direct 0.7 (H) <0.4 MG/DL    Bilirubin, Indirect 0.3 0.0 - 1.1 MG/DL   CBC with Auto Differential    Collection Time: 07/25/22  3:58 AM   Result Value Ref Range    WBC 9.8 4.3 - 11.1 K/uL    RBC 2.44 (L) 4.23 - 5.6 M/uL    Hemoglobin 8.0 (L) 13.6 - 17.2 g/dL    Hematocrit 25.1 (L) 41.1 - 50.3 %    .9 (H) 79.6 - 97.8 FL    MCH 32.8 26.1 - 32.9 PG    MCHC 31.9 31.4 - 35.0 g/dL    RDW 20.4 (H) 11.9 - 14.6 %    Platelets 37 (L) 804 - 450 K/uL    MPV 10.3 9.4 - 12.3 FL    nRBC 0.02 0.0 - 0.2 K/uL    Differential Type AUTOMATED      Seg Neutrophils 85 (H) 43 - 78 %    Lymphocytes 6 (L) 13 - 44 %    Monocytes 8 4.0 - 12.0 %    Eosinophils % 0 (L) 0.5 - 7.8 %    Basophils 0 0.0 - 2.0 %    Immature Granulocytes 1 0.0 - 5.0 %    Segs Absolute 8.3 (H) 1.7 - 8.2 K/UL    Absolute Lymph # 0.6 0.5 - 4.6 K/UL    Absolute Mono # 0.8 0.1 - 1.3 K/UL    Absolute Eos # 0.0 0.0 - 0.8 K/UL    Basophils Absolute 0.0 0.0 - 0.2 K/UL    Absolute Immature Granulocyte 0.1 0.0 - 0.5 K/UL   Comprehensive Metabolic Panel    Collection Time: 07/25/22  3:58 AM   Result Value Ref Range    Sodium 136 136 - 145 mmol/L    Potassium 4.0 3.5 - 5.1 mmol/L    Chloride 103 98 - 107 mmol/L    CO2 23 21 - 32 mmol/L    Anion Gap 10 7 - 16 mmol/L    Glucose 114 (H) 65 - 100 mg/dL    BUN 26 (H) 8 - 23 MG/DL    Creatinine 1.10 0.8 - 1.5 MG/DL    GFR African American >60 >60 ml/min/1.73m2    GFR Non- >60 >60 ml/min/1.73m2    Calcium 8.3 8.3 - 10.4 MG/DL    Total Bilirubin 0.8 0.2 - 1.1 MG/DL     (H) 12 - 65 U/L     (H) 15 - 37 U/L    Alk Phosphatase 381 (H) 50 - 136 U/L    Total Protein 6.4 6.3 - 8.2 g/dL    Albumin 2.5 (L) 3.2 - 4.6 g/dL    Globulin 3.9 (H) 2.3 - 3.5 g/dL    Albumin/Globulin Ratio 0.6 (L) 1.2 - 3.5     Magnesium    Collection Time: 07/25/22  3:58 AM   Result Value Ref Range    Magnesium 2.3 1.8 - 2.4 mg/dL       Imaging:  Xray Result (most recent):  XR ABDOMEN (KUB) (SINGLE AP VIEW) 07/20/2022    Narrative  EXAM: ABDOMINAL RADIOGRAPH, 1 view    INDICATION: Constipation    COMPARISON: 9/1/2020    TECHNIQUE: Frontal abdominal radiography was performed. FINDINGS: Limited evaluation due to the patient's body habitus. No clear  obstruction bowel gas pattern. No intraperitoneal free air appreciated. Moderate  retained colonic fecal burden. Prior vertebroplasty at multiple levels. Impression  1. No acute abnormality within the limitation imposed by patient body habitus. 2.  Constipation.         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)    Hypokalemia    Gynecomastia, male    Neuroendocrine carcinoma, high grade ([address]    Anemia due to chemotherapy    Primary prostate cancer with metastasis from prostate to other site [facility])    Prostatic nodule    Family history of prostate cancer in father    Acute prostatitis    S/P TURP    Prostate cancer [facility])    Prostate cancer metastatic to bone (HCC)    Thrombocytopenia (HCC)    Hypomagnesemia    Hypercalcemia of malignancy    Pain due to malignant neoplasm metastatic to bone (HCC)    EDGARD (acute kidney injury) ([facility]    Debility    Drug-induced constipation    Encounter for palliative care    Abnormal CT of the abdomen    Hydronephrosis    Hydroureter     Mr. Amanda Jiménez is a 78 y.o. male admitted on 7/8/2022 with a primary diagnosis of There were no encounter diagnoses. .       Mr. Amanda Jiménez has a PMH of metastatic prostate cancer dx 2017 s/p casodex and bilateral orchiectomy for surgical castration, 6 cycles of docetaxel, pathologic vertebral fx's with path positive for high grade neuroendocrine cancer, s/p carbo/etoposide and most recently carbo/taxotere which was held d/t TCP. Last was C2 on 6/8/22. 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 He is a known patient of Dr. Tish Moeller s/p multiple lines of treatment as above. At last office visit on 6/29, CEA had notably continued to rise to 8290. He has had discussion with [facility], however wished to hold off on making a decision until he completed radiation therapy. He presented to the ER with a fall at home, increasing pain in the hips and generalized weakness. Also with constipation. Also found to be anemic and transfused PRBC. We were asked for recommendations for our known patient. PLAN:  Metastatic Prostate Cancer/Neuroendocrine  - S/p multiple lines of therapy, most recently carbo/taxotere C2 on 6/8. Held on 6/29 d/t TCP. Undergoing XRT to rib and T-spine with Dr. Noe Vazquez.  - Has spoken to [facility], however wished to hold off on making a decision until he saw if XRT improved his pain  - Can resume XRT on Monday 7/11 Pt does not wish to pursue XRT.   Meeting with PC today for pain mgmt and to discuss [address] - wishes to pursue facility placement and enroll in Hospice. 7/13 Pt now states he may be interested in going home since he will have support from friend moving here from out of state. Consult PT/OT. 7/14  PT recommends HHT  7/23 CT AP shows POD with [facility] liver lesions, increased prostate gland, and extensive bony mets  7/25 Pt states he is interested in pursuing XRT again, Rad Onc consulted. He states he may consider having chemo in the future as well. Cancer Related Pain  - Currently with Dilaudid/oxy prn  - Consult palliative care to assist with pain management (known from office) and also to assist with goals of care discussions   7/10 Increased Dilaudid to 1 mg every 3 hours for severe pain. Pall care consult pending. 7/11 PC assisting with mgmt. Resume Dex. 7/12 PC started Fentanyl 50mcg patch yesterday. Pt reports pain controlled today - even able to walk around room now. 7/13 Pain controlled. PC changed oxycodone to po dilaudid yesterday. 7/14 Pain controlled  7/15 C/o pain today  7/17 pain improved after menchaca  7/20 Pain controlled on current regimen  7/22 c/o worsening abd pain. Bladder scan with minimal urine. Check CT AP.  7/23 CT AP with POD     Anemia/Thrombocytopenia  - Transfuse prn per Denae SOPs  7/24 Plt down to 41k. DC Lovenox. Check hemolysis labs, DIC labs, smear, HIT.  7/25 Plt 37k. No DIC noted. Smear, Hapto, and HIT pending. Transaminitis / Hyperbilirubinemia  - Alk phos/ALT/AST trended down today, continue to follow   7/11 AST/ALT continue to improve   7/13 LFTs improving  7/18 stable  7/23 LFTs increased, monitor - likely r/t liver mets  7/24 Tbili up to 1.2, LFTs con't to increase. 7/25 Bili down to 0.8. LFTs improved today. EDGARD  - Gentle hydration  RESOLVED     Constipation  - Lactulose prn   7/11 Pt refusing Lactulose. PC started pericolace. Con't Miralax. Consider KUB if no improvement. 7/12 No BM documented. Con't Miralax/Pericolace.   Pt more active now, up around in room since pain controlled. 7/13 Had BM x 4 yesterday  7/20 c/o constipation. Increase pericolace to BID. Con't Miralax. Check KUB. If no obstruction, will order Movantik. 7/21 KUB with constipation. Movantik ordered. 7/22 No BM. Con't Movantik. Checking CT AP.  7/23 Pt having Bms. Con't Movantik. RESOLVED    Hypercalcemia / Hypocalcemia  7/12 CCa++ up to 11. 7. Zometa ordered. 7/15 CCa++ down to 9.2  7/19 CCa++ down to 8.4, replete  7/21 Cca++ 8.6  RESOLVED    Dysuria  7/15 c/o dysuria. UA c/w UTI. CTX ordered. Check Ucx.  7/17 UC NTD on Rocephin  7/18 Pain improved s/p menchaca placement. Ucx with MSF. Completed CTX x 3 days. Urinary retention / B/l hydroureteronephrosis  7/16 menchaca ordered/ flomax ordered  7/17 UOP 4000 ml. Adding proscar  7/18 s/p menchaca placement. Con't Flomax/Proscar. Plan to DC menchaca tomorrow. 7/19 Remove menchaca for voiding trial.  If unable to void, will consult urology. 7/20 Menchaca removed yesterday and had to be replaced d/t urinary retention. Consult urology. 7/22 Awaiting urology consult  7/23 Urology felt r/t constipation, however CT shows b/l hydroureteronephrosis. Will defer mgmt to urology. 7/24 No response from urology from message sent yesterday. Will re-consult. 7/25 Need urology input on hydroureteronephrosis    Continue home meds  Denae SOPs  AC contraindicated d/t thrombocytopenia    Goals and plan of care reviewed with the patient. All questions answered to the best of our ability. Disposition:  7/14: Pt reports his friend is not moving here until 8/1. He reports she has a one-story home that he can move into on 8/1 or possibly even sooner. Pt was able to walk hallways yesterday. Unsure if he would qualify for facility placement while future living arrangements are made. CM states pt would have to go through extensive insurance process to even cover facility placement if he qualified. CM assisting with discharge planning. ? Could pt stay with son who lives locally until his friend's house is ready to move into? Pt also declines Hospice services at this time. He states he would prefer HHT. 7/15:  Pt is medically stable for discharge. Pt working on living arrangements. Hopeful for discharge home soon. 7/17 Spoke with patient's son yesterday. Patient does not a home to return to so now even if he does have a friend coming there is no home. Difficult placement. CM following. 7/19: Pt reportedly does not have a home to return to upon discharge. Son states pt is unable to live with him. Unlikely that pt would qualify for facility placement. CM following.    7/20: Pt still stable for discharge. CM following - pt has no place to live.    7/21:  CM found available place at boarding house for patient. Pt states he was not told about this, but cannot afford $650/mo. He says he was paying $500/mo at previous place which is no longer available to him. CM following.    7/22:  Per CM, son calling to speak to the manager of the boarding home today. He also asked for list of ALFs that accept Medicaid. CM continues to follow. 7/25: Son went to see available place at boarding home - pt and son not pleased with arrangements as pt would be sharing bathroom with 3 other people. Son looking into ALFs. Pt voices concern that he would not be able to adequately care for himself if he had to live alone. CM following. HELEN Shah - [facility]  [address] [address]  Office : [phone]  Fax : [phone]       Attending Addendum:  I have personally performed a face to face diagnostic evaluation on this patient. I have reviewed and agree with the care plan as documented above by Kel Randolph N.P.  My findings are as follows: Patient appears stable, heart rate regular without murmurs, abdomen is non-tender, bowel sounds are positive.    Elderly patient with metastatic prostate cancer with more subsequent neuroendocrine pathology. Hypercalcemia improved. Pain under reasonable control. Now agreeable to restart back XRT. Awaiting placement. Supportive care as above.         Celinda Krabbe, MD  [facility] Hematology/Oncology  [address] [place] [address]  Office : [phone]  Fax : [phone]

## 2022-07-25 NOTE — PROGRESS NOTES
End of Shift Note: 7p ~ 7a    Continues with bloody urine in [place]. Draining without difficulty. Medicated x 2 for c/o pain with Dilaudid PO. Appears to be managing pain. No change in assessment. Continuing with current POC. Report to oncoming RN at [facility].

## 2022-07-25 NOTE — PROGRESS NOTES
END OF SHIFT SUMMARY:    Additional events this shift:   Radiation transport set up for 0745 on 7/27 for 0830 appointment and 21  treatment. PRN PO dilaudid given x 2 this shifts.   I/Os:  +/- this shift:   07/25 0701 - 07/25 1900  In: 600 [P.O.:600]  Out: 750 [Urine:750]  Occurrences this Shift:   BM 0, Emesis 0    Napoleon Granados RN   Oncoming shift report given to [employer]

## 2022-07-25 NOTE — CARE COORDINATION
CM spoke with [address] [place] [address] of boarding home in [place] - multiple times this weekend. Pt's son Jason Oliva made a visit to the house yesterday, spoke to Joselyn ramos, and reportedly took pictures to show his father. Jason Oliva told [place]amos that pt will need a hospital bed and she told CM this is not a problem. CM explained that [place] will be ordered: SN, PT, OT as well. CM called Jason Malikon this morning and he stated he is not happy with the boarding home due to having to share a bathroom and he \"does not feel secure with leaving pt there. \"  When CM asked about Plan B he stated he will be calling ALFs on his lunch break today. CM requested a return call or email today after he speaks with facilities - Jason Oliva is at work so secure email my be more adequate. RAUL has exhausted all resources available at this time. CM will continue to follow.

## 2022-07-25 NOTE — PROGRESS NOTES
's visit to convey care and concern. Mr. Nedra Cotton was in good spirits. He seems to be coping well during his hospital by keeping occupied. Patient has a son that supports him. No additional needs were voiced. Chaplains remain available for support.       Mahesh Echeverria 67  Board Certified

## 2022-07-26 NOTE — PROGRESS NOTES
END OF SHIFT SUMMARY:    Additional events this shift:   [place] removed this shift at 1030. Pt voided minimal 20ml in urinal and small amount in toilet. Pt abd not distended, and denies pain, BSC results yielded 514ml notified Annmarie Nguyen NP   Per NP if pt does not void 200-300 ml in one hr place 16fr coude. Pt voided 200ml of red urine. Pt denies pain or discomfort at this time. Will continue to monitor at this time.     I/Os:  +/- this shift:   07/26 0701 - 07/26 1900  In: 240 [P.O.:240]  Out: 400 [Urine:400]  Occurrences this Shift:  Urine 2, BM 1, Emesis 0    Carlos Victoria RN  Oncoming shift report given to Danice Simmonds

## 2022-07-26 NOTE — PROGRESS NOTES
[employer] [facility] On[facility]        Inpatient Hematology / Oncology Progress Note    Reason for Admission:  Prostate cancer metastatic to bone ([facility] [C61, C79.51]    24 Hour Events:  Afebrile, VSS  Issues with living arrangements after discharge - CM following  Clark Dc'd for voiding trial  Resuming XRT tomorrow  Plt down to 32k, TCP w/u neg    Transfusions: None  Replacements: None    ROS:  Constitutional: Positive for fatigue; negative for fever, chills. CV: Negative for chest pain, palpitations, edema. Respiratory: Negative for dyspnea, cough, wheezing. GI: Negative for nausea, diarrhea, abd pain. MSK:  +Back/hip pain    10 point review of systems is otherwise negative with the exception of the elements mentioned above in the HPI. Allergies   Allergen Reactions    Turmeric Nausea And Vomiting     Past Medical History:   Diagnosis Date    Claustrophobia     Ear problems     Elevated PSA     Former light cigarette smoker (1-9 per day)     smoked for 35 years.   quit     History of multiple allergies     Nicotine vapor product user     Personal history of prostate cancer     Prostate cancer ([facility]     prostate, neuroendocrine, mets to bone     Past Surgical History:   Procedure Laterality Date    BIOPSY PROSTATE      COLONOSCOPY  08/2020    HEENT      teeth removed     IR KYPHOPLASTY LUMBAR FIRST LEVEL  10/14/2020    IR KYPHOPLASTY LUMBAR FIRST LEVEL  10/14/2020    IR KYPHOPLASTY LUMBAR FIRST LEVEL 10/14/2020 [facility] RADIOLOGY SPECIALS    IR KYPHOPLASTY THORACIC FIRST LEVEL  10/29/2020    IR KYPHOPLASTY THORACIC FIRST LEVEL  10/29/2020    IR KYPHOPLASTY THORACIC FIRST LEVEL 10/29/2020 [facility] RADIOLOGY SPECI[facility]    TESTICLE REMOVAL Bilateral 02/2017     Family History   Problem Relation Age of Onset    Hypertension Father     Prostate Cancer Father     Cancer Father         prostate cancer     Social History     Socioeconomic History    Marital status: Single     Spouse name: Not on file    Number of children: Not on file    Years of education: Not on file    Highest education level: Not on file   Occupational History    Not on file   Tobacco Use    Smoking status: Former     Packs/day: 0.50     Types: Cigarettes     Quit date: 2016     Years since quittin.6    Smokeless tobacco: Never   Substance and Sexual Activity    Alcohol use: Yes    Drug use: No    Sexual activity: Not on file   Other Topics Concern    Not on file   Social History Narrative    Not on file     Social Determinants of Health     Financial Resource Strain: Not on file   Food Insecurity: Not on file   Transportation Needs: Not on file   Physical Activity: Not on file   Stress: Not on file   Social Connections: Not on file   Intimate Partner Violence: Not on file   Housing Stability: Not on file     Current Facility-Administered Medications   Medication Dose Route Frequency Provider Last Rate Last Admin    diatrizoate meglumine-sodium (GASTROGRAFIN) 66-10 % solution 15 mL  15 mL Oral ONCE PRN Easonyanni Mckeon, APRN - CNP   15 mL at 22 1444    HYDROmorphone HCl PF (DILAUDID) injection 1.5 mg  1.5 mg IntraVENous Q3H PRN [facility] Linda, APRN - CNP   1.5 mg at 22 0257    bisacodyl (DULCOLAX) suppository 10 mg  10 mg Rectal Daily PRN Carlos Mountain, APRN - CNP   10 mg at 22 1758    naloxegol (MOVANTIK) tablet 25 mg  25 mg Oral QAM AC Easonyanni Mckeon, APRN - CNP   25 mg at 22 1744    magnesium oxide (MAG-OX) tablet 400 mg  400 mg Oral 4x Daily Marisol Washington MD   400 mg at 22    calcium carbonate (TUMS) chewable tablet 500 mg  500 mg Oral TID [facility] Linda, APRN - CNP   500 mg at 22 1523    sennosides-docusate sodium (SENOKOT-S) 8.6-50 MG tablet 2 tablet  2 tablet Oral BID Easonyanni Mckeon, APRN - CNP   2 tablet at 22    tamsulosin (FLOMAX) capsule 0.4 mg  0.4 mg Oral Daily Nolan Richelle, APRN - NP   0.4 mg at 22 0831    finasteride (PROSCAR) tablet 5 mg  5 mg Oral Daily Nolan Richelle, APRN - NP   5 mg at 07/25/22 0831    zinc oxide 40 % paste   Topical 4x Daily PRN Violet Ralph MD        lidocaine-prilocaine (EMLA) cream   Topical Once Violet Ralph MD        phenazopyridine (PYRIDIUM) tablet 95 mg  95 mg Oral TID PRN HELEN Chapa CNP   95 mg at 07/15/22 2048    cyclobenzaprine (FLEXERIL) tablet 10 mg  10 mg Oral TID PRN HELEN Marti NP   10 mg at 07/25/22 2240    0.9 % sodium chloride infusion   IntraVENous PRN HELEN Chapa CNP        [Held by provider] enoxaparin (LOVENOX) injection 40 mg  40 mg SubCUTAneous Daily HELEN Chapa CNP   40 mg at 07/23/22 0857    fentaNYL (DURAGESIC) 75 MCG/HR 1 patch  1 patch TransDERmal Q72H HELEN Marcus CNP   1 patch at 07/25/22 1040    diphenhydrAMINE (BENADRYL) capsule 25 mg  25 mg Oral Q6H PRN HELEN Chapa CNP   25 mg at 07/16/22 0110    acetaminophen (TYLENOL) tablet 650 mg  650 mg Oral Q6H PRN HELEN Chapa CNP   650 mg at 07/13/22 1514    Or    acetaminophen (TYLENOL) suppository 650 mg  650 mg Rectal Q6H PRN HELEN Chapa CNP        HYDROmorphone (DILAUDID) tablet 2 mg  2 mg Oral Q4H PRN HELEN Cm CNP   2 mg at 07/21/22 1438    Or    HYDROmorphone (DILAUDID) tablet 4 mg  4 mg Oral Q4H PRN HELEN Cm CNP   4 mg at 07/26/22 0534    naloxone (NARCAN) injection 0.04 mg  0.04 mg IntraVENous PRN HELEN Marcus CNP        dexamethasone (DECADRON) tablet 4 mg  4 mg Oral 2 times per day HELEN Chapa CNP   4 mg at 07/25/22 2022    aluminum & magnesium hydroxide-simethicone (MAALOX) 200-200-20 MG/5ML suspension 20 mL  20 mL Oral Q6H PRN Nick Bach MD   20 mL at 07/13/22 0809    LORazepam (ATIVAN) tablet 1 mg  1 mg Oral TID PRN Theo Brown DO   1 mg at 07/23/22 2059    pantoprazole (PROTONIX) tablet 40 mg  40 mg Oral QAM  Jen Martinez DO   40 mg at 07/25/22 2154    sodium chloride flush 0.9 % injection 5-40 mL  5-40 mL IntraVENous 2 times per day Theo Brown DO 10 mL at 22    sodium chloride flush 0.9 % injection 5-40 mL  5-40 mL IntraVENous PRN Tutu Reeds, DO        0.9 % sodium chloride infusion   IntraVENous PRN Tutu Reeds, DO        ondansetron (ZOFRAN-ODT) disintegrating tablet 4 mg  4 mg Oral Q8H PRN Tutu Reeds, DO        Or    ondansetron [facility]) injection 4 mg  4 mg IntraVENous Q6H PRN Tutu Reeds, DO   4 mg at 07/10/22 171    docusate sodium (COLACE) capsule 100 mg  100 mg Oral BID PRN Tutu Reeds, DO        polyethylene glycol (GLYCOLAX) packet 17 g  17 g Oral Daily Tutu Reeds, DO   17 g at 22 0856       OBJECTIVE:  Patient Vitals for the past 8 hrs:   BP Temp Temp src Pulse Resp SpO2   22 0746 122/70 97.9 °F (36.6 °C) Oral 80 18 92 %   22 0534 -- -- -- -- 20 --   22 0257 120/71 97.6 °F (36.4 °C) Oral 74 14 92 %     Temp (24hrs), Av.1 °F (36.7 °C), Min:97.6 °F (36.4 °C), Max:98.8 °F (37.1 °C)    No intake/output data recorded. Physical Exam:  Constitutional: Well developed male sitting comfortably on the hospital bed. HEENT: Normocephalic and atraumatic. Oropharynx is clear, mucous membranes are moist.  Neck supple. Skin Warm and dry. Bruising noted on L foot and no rash noted. No erythema. No pallor. Respiratory Lungs are clear to auscultation bilaterally without wheezes, rales or rhonchi, normal air exchange without accessory muscle use. CVS Normal rate, regular rhythm and normal S1 and S2. No murmurs, gallops, or rubs. Abdomen Soft, nontender and nondistended, normoactive bowel sounds. No palpable mass. No hepatosplenomegaly. Neuro Grossly nonfocal with no obvious sensory or motor deficits. MSK Normal range of motion in general.  No edema and no tenderness. Psych Appropriate mood and affect.         Labs:    Recent Results (from the past 24 hour(s))   CBC with Auto Differential    Collection Time: 22  3:01 AM   Result Value Ref Range    WBC 9.0 4.3 - 11.1 K/uL    RBC 2.40 (L) 4.23 - 5.6 M/uL    Hemoglobin 7.6 (L) 13.6 - 17.2 g/dL    Hematocrit 24.5 (L) 41.1 - 50.3 %    .1 (H) 79.6 - 97.8 FL    MCH 31.7 26.1 - 32.9 PG    MCHC 31.0 (L) 31.4 - 35.0 g/dL    RDW 20.2 (H) 11.9 - 14.6 %    Platelets 32 (L) 911 - 450 K/uL    MPV 10.9 9.4 - 12.3 FL    nRBC 0.02 0.0 - 0.2 K/uL    Differential Type AUTOMATED      Seg Neutrophils 88 (H) 43 - 78 %    Lymphocytes 5 (L) 13 - 44 %    Monocytes 5 4.0 - 12.0 %    Eosinophils % 0 (L) 0.5 - 7.8 %    Basophils 0 0.0 - 2.0 %    Immature Granulocytes 1 0.0 - 5.0 %    Segs Absolute 7.9 1.7 - 8.2 K/UL    Absolute Lymph # 0.5 0.5 - 4.6 K/UL    Absolute Mono # 0.5 0.1 - 1.3 K/UL    Absolute Eos # 0.0 0.0 - 0.8 K/UL    Basophils Absolute 0.0 0.0 - 0.2 K/UL    Absolute Immature Granulocyte 0.1 0.0 - 0.5 K/UL   Comprehensive Metabolic Panel    Collection Time: 07/26/22  3:01 AM   Result Value Ref Range    Sodium 137 136 - 145 mmol/L    Potassium 4.2 3.5 - 5.1 mmol/L    Chloride 105 98 - 107 mmol/L    CO2 22 21 - 32 mmol/L    Anion Gap 10 7 - 16 mmol/L    Glucose 124 (H) 65 - 100 mg/dL    BUN 29 (H) 8 - 23 MG/DL    Creatinine 1.10 0.8 - 1.5 MG/DL    GFR African American >60 >60 ml/min/1.73m2    GFR Non- >60 >60 ml/min/1.73m2    Calcium 8.1 (L) 8.3 - 10.4 MG/DL    Total Bilirubin 0.7 0.2 - 1.1 MG/DL     (H) 12 - 65 U/L     (H) 15 - 37 U/L    Alk Phosphatase 400 (H) 50 - 136 U/L    Total Protein 6.3 6.3 - 8.2 g/dL    Albumin 2.5 (L) 3.2 - 4.6 g/dL    Globulin 3.8 (H) 2.3 - 3.5 g/dL    Albumin/Globulin Ratio 0.7 (L) 1.2 - 3.5     Magnesium    Collection Time: 07/26/22  3:01 AM   Result Value Ref Range    Magnesium 2.2 1.8 - 2.4 mg/dL       Imaging:  Xray Result (most recent):  XR ABDOMEN (KUB) (SINGLE AP VIEW) 07/20/2022    Narrative  EXAM: ABDOMINAL RADIOGRAPH, 1 view    INDICATION: Constipation    COMPARISON: 9/1/2020    TECHNIQUE: Frontal abdominal radiography was performed.     FINDINGS: Limited evaluation due to the patient's body habitus. No clear  obstruction bowel gas pattern. No intraperitoneal free air appreciated. Moderate  retained colonic fecal burden. Prior vertebroplasty at multiple levels. Impression  1. No acute abnormality within the limitation imposed by patient body habitus. 2.  Constipation. 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)    Hypokalemia    Gynecomastia, male    Neuroendocrine carcinoma, high grade (HCC)    Anemia due to chemotherapy    Primary prostate cancer with metastasis from prostate to other site [facility])    Prostatic nodule    Family history of prostate cancer in father    Acute prostatitis    S/P TURP    Prostate cancer [facility])    Prostate cancer metastatic to bone ([facility]    Thrombocytopenia ([facility]    Hypomagnesemia    Hypercalcemia of malignancy    Pain due to malignant neoplasm metastatic to bone (HCC)    EDGARD (acute kidney injury) ([facility]    Debility    Drug-induced constipation    Encounter for palliative care    Abnormal CT of the abdomen    Hydronephrosis    Hydroureter     Mr. Errol Mccarty is a 78 y.o. male admitted on 7/8/2022 with a primary diagnosis of There were no encounter diagnoses. .       Mr. Errol Mccarty has a PMH of metastatic prostate cancer dx 2017 s/p casodex and bilateral orchiectomy for surgical castration, 6 cycles of docetaxel, pathologic vertebral fx's with path positive for high grade neuroendocrine cancer, s/p carbo/etoposide and most recently carbo/taxotere which was held d/t TCP. Last was C2 on 6/8/22. 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 He is a known patient of Dr. Keith Cruz s/p multiple lines of treatment as above. At last office visit on 6/29, CEA had notably continued to rise to 8290.   He has had discussion with [facility], [facility], however wished to hold off on making a decision until he completed radiation therapy. He presented to the ER with a fall at home, increasing pain in the hips and generalized weakness. Also with constipation. Also found to be anemic and transfused PRBC. We were asked for recommendations for our known patient. PLAN:  Metastatic Prostate Cancer/Neuroendocrine  - S/p multiple lines of therapy, most recently carbo/taxotere C2 on 6/8. Held on 6/29 d/t TCP. Undergoing XRT to rib and T-spine with Dr. Niko Espana.  - Has spoken to [facility], however wished to hold off on making a decision until he saw if XRT improved his pain  - Can resume XRT on Monday 7/11 Pt does not wish to pursue XRT. Meeting with PC today for pain mgmt and to discuss [address] - wishes to pursue facility placement and enroll in Hospice. 7/13 Pt now states he may be interested in going home since he will have support from friend moving here from out of state. Consult PT/OT. 7/14  PT recommends HHT  7/23 CT AP shows POD with [facility] liver lesions, increased prostate gland, and extensive bony mets  7/25 Pt states he is interested in pursuing XRT again, Rad Onc consulted. He states he may consider having chemo in the future as well. 7/26 Resuming XRT tomorrow (previously completed 1/1 fx to ribs and 1/5 fx to T-spine)     Cancer Related Pain  - Currently with Dilaudid/oxy prn  - Consult palliative care to assist with pain management (known from office) and also to assist with goals of care discussions   7/10 Increased Dilaudid to 1 mg every 3 hours for severe pain. Pall care consult pending. 7/11 PC assisting with mgmt. Resume Dex. 7/12 PC started Fentanyl 50mcg patch yesterday. Pt reports pain controlled today - even able to walk around room now. 7/13 Pain controlled. PC changed oxycodone to po dilaudid yesterday. 7/14 Pain controlled  7/15 C/o pain today  7/17 pain improved after menchaca  7/20 Pain controlled on current regimen  7/22 c/o worsening abd pain. Bladder scan with minimal urine.   Check CT AP.  7/23 CT AP with POD Anemia/Thrombocytopenia  - Transfuse prn per Denae SOPs  7/24 Plt down to 41k. DC Lovenox. Check hemolysis labs, DIC labs, smear, HIT.  7/25 Plt 37k. No DIC noted. Smear, Hapto, and HIT pending. 7/26 Plt 32k. No hemolysis. HIT neg. Smear pending. Transaminitis / Hyperbilirubinemia  - Alk phos/ALT/AST trended down today, continue to follow   7/11 AST/ALT continue to improve   7/13 LFTs improving  7/18 stable  7/23 LFTs increased, monitor - likely r/t liver mets  7/24 Tbili up to 1.2, LFTs con't to increase. 7/25 Bili down to 0.8. LFTs improved today. EDGARD  - Gentle hydration  RESOLVED     Constipation  - Lactulose prn   7/11 Pt refusing Lactulose. PC started pericolace. Con't Miralax. Consider KUB if no improvement. 7/12 No BM documented. Con't Miralax/Pericolace. Pt more active now, up around in room since pain controlled. 7/13 Had BM x 4 yesterday  7/20 c/o constipation. Increase pericolace to BID. Con't Miralax. Check KUB. If no obstruction, will order Movantik. 7/21 KUB with constipation. Movantik ordered. 7/22 No BM. Con't Movantik. Checking CT AP.  7/23 Pt having Bms. Con't Movantik. RESOLVED    Hypercalcemia / Hypocalcemia  7/12 CCa++ up to 11. 7. Zometa ordered. 7/15 CCa++ down to 9.2  7/19 CCa++ down to 8.4, replete  7/21 Cca++ 8.6  RESOLVED    Dysuria  7/15 c/o dysuria. UA c/w UTI. CTX ordered. Check Ucx.  7/17 UC NTD on Rocephin  7/18 Pain improved s/p menchaca placement. Ucx with [facility]. Completed CTX x 3 days. Urinary retention / B/l hydroureteronephrosis  7/16 menchaca ordered/ flomax ordered  7/17 UOP 4000 ml. Adding proscar  7/18 s/p menchaca placement. Con't Flomax/Proscar. Plan to DC menchaca tomorrow. 7/19 Remove menchaca for voiding trial.  If unable to void, will consult urology. 7/20 Menchaca removed yesterday and had to be replaced d/t urinary retention. Consult urology.   7/22 Awaiting urology consult  7/23 Urology felt r/t constipation, however CT shows b/l hydroureteronephrosis. Will defer mgmt to urology. 7/24 No response from urology from message sent yesterday. Will re-consult. 7/26 Urology recommends to remove menchaca this AM for voiding trial.  Also recommends reimaging later on to see if bilateral hydro has resolved (could be r/t overdistended bladder/urinary retention vs prostate cancer/obstruction). If bilateral hydro has not improved over time, would consider bilateral stents vs nephrostomy tubes and this would be more favorable if/when kidney function declines or pt develops significant flank pain. Continue home meds  [place] SOPs  AC contraindicated d/t thrombocytopenia    Goals and plan of care reviewed with the patient. All questions answered to the best of our ability. Disposition:  7/14: Pt reports his friend is not moving here until 8/1. He reports she has a one-story home that he can move into on 8/1 or possibly even sooner. Pt was able to walk hallways yesterday. Unsure if he would qualify for facility placement while future living arrangements are made. CM states pt would have to go through extensive insurance process to even cover facility placement if he qualified. CM assisting with discharge planning. ? Could pt stay with son who lives locally until his friend's house is ready to move into? Pt also declines Hospice services at this time. He states he would prefer HHT. 7/15:  Pt is medically stable for discharge. Pt working on living arrangements. Hopeful for discharge home soon. 7/17 Spoke with patient's son yesterday. Patient does not a home to return to so now even if he does have a friend coming there is no home. Difficult placement. CM following. 7/19: Pt reportedly does not have a home to return to upon discharge. Son states pt is unable to live with him. Unlikely that pt would qualify for facility placement. CM following.    7/20: Pt still stable for discharge.   CM following - pt has no place to live.    7/21: CM found available place at boarding house for patient. Pt states he was not told about this, but cannot afford $650/mo. He says he was paying $500/mo at previous place which is no longer available to him. CM following.    7/22:  Per CM, son calling to speak to the manager of the boarding home today. He also asked for list of ALFs that accept Medicaid. CM continues to follow. 7/25: Son went to see available place at boarding home - pt and son not pleased with arrangements as pt would be sharing bathroom with 3 other people. Son looking into ALFs. Pt voices concern that he would not be able to adequately care for himself if he had to live alone. CM following. HELEN Rivas  Hematology & Oncology  [address] [place] [address]  Office : [phone]  Fax : [phone]       Attending Addendum:  I have personally performed a face to face diagnostic evaluation on this patient. I have reviewed and agree with the care plan as documented above by Cat George N.P.  My findings are as follows: Patient appears stable, heart rate regular without murmurs, abdomen is non-tender, bowel sounds are positive. Elderly patient with metastatic prostate cancer with more subsequent neuroendocrine pathology. Hypercalcemia improved. Pain under reasonable control. Now agreeable to restart back XRT. Awaiting placement. Clark Dced for voiding trial.  Supportive care as above.           Janay Dimas MD  [facility] Hematology/Oncology  [address] [place] [address]  Office : [phone]  Fax : [phone]

## 2022-07-27 NOTE — CARE COORDINATION
Pt discussed during IDR and chart screened by CM for d/c planning. Clark cath was d/c yesterday and pt is voiding. Per oncology note dated 7/26 pt stated interest in pursing XRT again. Pt currently reeving XRT with EOT 8/2. CM has not received any communication from pt's son Brennon Keating since CM called him on Monday regarding the boarding home. CM called Brennon Keating to inquire about the status of locating housing for pt. Brennon Keating stated [place] has called five ALFs and left messages but has not heard back. He also stated, quite curtly, he hopes CM has done the same and that he only has about 15 minutes in his day to call people during business hours. \"  CM has called ALFs and as previously stated has exhausted all known resources. CM discussed this case with the unit supervisor and one of the oncology NPs yesterday. Pt is making no effort to locate d/c housing and pt and son are still expecting CM to find housing. Update; 1400: CM called the following ALFs to inquire about availability:  1) [facility] (CM told to call back in the morning to speak with the manager)  2) [facility] in [facility] (do not accept OSS)  3) [facility] in [facility] (voicemail full)  4) [facility] - left a voicemail   5) [address] - pt must climb a flight of stairs. 6) [facility] - no longer accept OSS  7) [facility] told to call back tomorrow  8) [facility] - No availability  9) [facility] - do have availability. CM spoke with the manager Alida who requested pt's info to be emailed for review. CM sent by encrypted email. Will await a response from [facility].

## 2022-07-27 NOTE — PROGRESS NOTES
[facility] Hematology & Oncology        Inpatient Hematology / Oncology Progress Note    Reason for Admission:  Prostate cancer metastatic to bone [facility]) [C60, F95.51]    24 Hour Events:  Afebrile, VSS  Issues with living arrangements after discharge - CM following  Tolerating voiding trial  Resuming XRT today  Plt stable at 34k, TCP w/u neg    Transfusions: None  Replacements: None    ROS:  Constitutional: Positive for fatigue; negative for fever, chills. CV: Negative for chest pain, palpitations, edema. Respiratory: Negative for dyspnea, cough, wheezing. GI: Negative for nausea, diarrhea, abd pain. MSK:  +Back/hip pain    10 point review of systems is otherwise negative with the exception of the elements mentioned above in the HPI. Allergies   Allergen Reactions    Turmeric Nausea And Vomiting     Past Medical History:   Diagnosis Date    Claustrophobia     Ear problems     Elevated PSA     Former light cigarette smoker (1-9 per day)     smoked for 35 years.   quit     History of multiple allergies     Nicotine vapor product user     Personal history of prostate cancer     Prostate cancer ([facility] [address]     prostate, neuroendocrine, mets to bone     Past Surgical History:   Procedure Laterality Date    BIOPSY PROSTATE      COLONOSCOPY  08/2020    HEENT      teeth removed     IR KYPHOPLASTY LUMBAR FIRST LEVEL  10/14/2020    IR KYPHOPLASTY LUMBAR FIRST LEVEL  10/14/2020    IR KYPHOPLASTY LUMBAR FIRST LEVEL 10/14/2020 [facility]    IR KYPHOPLASTY THORACIC FIRST LEVEL  10/29/2020    IR KYPHOPLASTY THORACIC FIRST LEVEL  10/29/2020    IR KYPHOPLASTY THORACIC FIRST LEVEL 10/29/2020 [facility]OLOG[facility]    TESTICLE REMOVAL Bilateral 02/2017     Family History   Problem Relation Age of Onset    Hypertension Father     Prostate Cancer Father     Cancer Father         prostate cancer     Social History     Socioeconomic History    Marital status: Single     Spouse name: Not on file    Number of children: Not on file    Years of education: Not on file    Highest education level: Not on file   Occupational History    Not on file   Tobacco Use    Smoking status: Former     Packs/day: 0.50     Types: Cigarettes     Quit date: 2016     Years since quittin.6    Smokeless tobacco: Never   Substance and Sexual Activity    Alcohol use: Yes    Drug use: No    Sexual activity: Not on file   Other Topics Concern    Not on file   Social History Narrative    Not on file     Social Determinants of Health     Financial Resource Strain: Not on file   Food Insecurity: Not on file   Transportation Needs: Not on file   Physical Activity: Not on file   Stress: Not on file   Social Connections: Not on file   Intimate Partner Violence: Not on file   Housing Stability: Not on file     Current Facility-Administered Medications   Medication Dose Route Frequency Provider Last Rate Last Admin    diatrizoate meglumine-sodium (GASTROGRAFIN) 66-10 % solution 15 mL  15 mL Oral ONCE PRN Jennyfer Corona APRN - CNP   15 mL at 22 1444    HYDROmorphone HCl PF (DILAUDID) injection 1.5 mg  1.5 mg IntraVENous Q3H PRN Jennyfer Corona APRN - CNP   1.5 mg at 22 0257    bisacodyl (DULCOLAX) suppository 10 mg  10 mg Rectal Daily PRN Red Reeves APRN - CNP   10 mg at 22 1758    naloxegol (MOVANTIK) tablet 25 mg  25 mg Oral QAM AC Jennyfer Corona APRN - CNP   25 mg at 22 0831    magnesium oxide (MAG-OX) tablet 400 mg  400 mg Oral 4x Daily Inderjit Noyola MD   400 mg at 22 5541    calcium carbonate (TUMS) chewable tablet 500 mg  500 mg Oral TID Jennyfer Corona APRN - CNP   500 mg at 22 3032    sennosides-docusate sodium (SENOKOT-S) 8.6-50 MG tablet 2 tablet  2 tablet Oral BID HELEN Bello - CNP   2 tablet at 22 0825    tamsulosin (FLOMAX) capsule 0.4 mg  0.4 mg Oral Daily Tonya Dubin, APRN - NP   0.4 mg at 22 0826    finasteride (PROSCAR) tablet 5 mg  5 mg Oral Daily Tonya Dubin, APRN - NP   5 mg at 07/27/22 0826    zinc oxide 40 % paste   Topical 4x Daily PRN Avery Garza MD        lidocaine-prilocaine (EMLA) cream   Topical Once Avery Garza MD        phenazopyridine (PYRIDIUM) tablet 95 mg  95 mg Oral TID PRN HELEN Calhoun CNP   95 mg at 07/15/22 2048    cyclobenzaprine (FLEXERIL) tablet 10 mg  10 mg Oral TID PRN HELEN Martínez NP   10 mg at 07/26/22 2108    0.9 % sodium chloride infusion   IntraVENous PRN HELEN Calhoun CNP        [Held by provider] enoxaparin (LOVENOX) injection 40 mg  40 mg SubCUTAneous Daily HELEN Calhoun CNP   40 mg at 07/23/22 0857    fentaNYL (DURAGESIC) 75 MCG/HR 1 patch  1 patch TransDERmal Q72H HELEN Marcus CNP   1 patch at 07/25/22 1040    diphenhydrAMINE (BENADRYL) capsule 25 mg  25 mg Oral Q6H PRN HELEN Calhoun CNP   25 mg at 07/16/22 0110    acetaminophen (TYLENOL) tablet 650 mg  650 mg Oral Q6H PRN HELEN Calhoun CNP   650 mg at 07/13/22 1514    Or    acetaminophen (TYLENOL) suppository 650 mg  650 mg Rectal Q6H PRN HELEN Calhoun CNP        HYDROmorphone (DILAUDID) tablet 2 mg  2 mg Oral Q4H PRN HELEN Justin CNP   2 mg at 07/21/22 1438    Or    HYDROmorphone (DILAUDID) tablet 4 mg  4 mg Oral Q4H PRN HELEN Justin CNP   4 mg at 07/27/22 0825    naloxone (NARCAN) injection 0.04 mg  0.04 mg IntraVENous PRN HELEN Marcus CNP        dexamethasone (DECADRON) tablet 4 mg  4 mg Oral 2 times per day HELEN Calhoun CNP   4 mg at 07/27/22 0825    aluminum & magnesium hydroxide-simethicone (MAALOX) 200-200-20 MG/5ML suspension 20 mL  20 mL Oral Q6H PRN Jeanmarie Moreno MD   20 mL at 07/13/22 0809    LORazepam (ATIVAN) tablet 1 mg  1 mg Oral TID PRN Yumiko White DO   1 mg at 07/27/22 0826    pantoprazole (PROTONIX) tablet 40 mg  40 mg Oral QAM AC Jen Martinez DO   40 mg at 07/27/22 1239    sodium chloride flush 0.9 % injection 5-40 mL  5-40 mL IntraVENous 2 times per day Yumiko White DO 5 mL at 22 8819    sodium chloride flush 0.9 % injection 5-40 mL  5-40 mL IntraVENous PRN Gerardo Verona, DO        0.9 % sodium chloride infusion   IntraVENous PRN Gerardo Verona, DO        ondansetron (ZOFRAN-ODT) disintegrating tablet 4 mg  4 mg Oral Q8H PRN Gerardo Verona, DO        Or    ondansetron [facility]) injection 4 mg  4 mg IntraVENous Q6H PRN Gerardo Verona, DO   4 mg at 07/10/22 1717    docusate sodium (COLACE) capsule 100 mg  100 mg Oral BID PRN Gerardo Verona, DO        polyethylene glycol (GLYCOLAX) packet 17 g  17 g Oral Daily Gerardo Verona, DO   17 g at 22 0825       OBJECTIVE:  Patient Vitals for the past 8 hrs:   BP Temp Temp src Pulse Resp SpO2   22 0809 (!) 144/84 97.9 °F (36.6 °C) Oral 80 20 96 %   22 0305 (!) 141/77 97.9 °F (36.6 °C) Oral 80 18 94 %     Temp (24hrs), Av.1 °F (36.7 °C), Min:97.9 °F (36.6 °C), Max:98.6 °F (37 °C)    No intake/output data recorded. Physical Exam:  Constitutional: Well developed male sitting comfortably on the hospital bed. HEENT: Normocephalic and atraumatic. Oropharynx is clear, mucous membranes are moist.  Neck supple. Skin Warm and dry. Bruising noted on L foot and no rash noted. No erythema. No pallor. Respiratory Lungs are clear to auscultation bilaterally without wheezes, rales or rhonchi, normal air exchange without accessory muscle use. CVS Normal rate, regular rhythm and normal S1 and S2. No murmurs, gallops, or rubs. Abdomen Soft, nontender and nondistended, normoactive bowel sounds. No palpable mass. No hepatosplenomegaly. Neuro Grossly nonfocal with no obvious sensory or motor deficits. MSK Normal range of motion in general.  No edema and no tenderness. Psych Appropriate mood and affect.         Labs:    Recent Results (from the past 24 hour(s))   CBC with Auto Differential    Collection Time: 22  3:48 AM   Result Value Ref Range    WBC 8.9 4.3 - 11.1 K/uL    RBC 2.37 (L) 4.23 - 5.6 M/uL    Hemoglobin 7.6 (L) 13.6 - 17.2 g/dL    Hematocrit 24.5 (L) 41.1 - 50.3 %    .4 (H) 79.6 - 97.8 FL    MCH 32.1 26.1 - 32.9 PG    MCHC 31.0 (L) 31.4 - 35.0 g/dL    RDW 20.4 (H) 11.9 - 14.6 %    Platelets 34 (L) 533 - 450 K/uL    MPV 10.1 9.4 - 12.3 FL    nRBC 0.07 0.0 - 0.2 K/uL    Differential Type AUTOMATED      Seg Neutrophils 83 (H) 43 - 78 %    Lymphocytes 7 (L) 13 - 44 %    Monocytes 8 4.0 - 12.0 %    Eosinophils % 0 (L) 0.5 - 7.8 %    Basophils 0 0.0 - 2.0 %    Immature Granulocytes 2 0.0 - 5.0 %    Segs Absolute 7.4 1.7 - 8.2 K/UL    Absolute Lymph # 0.6 0.5 - 4.6 K/UL    Absolute Mono # 0.7 0.1 - 1.3 K/UL    Absolute Eos # 0.0 0.0 - 0.8 K/UL    Basophils Absolute 0.0 0.0 - 0.2 K/UL    Absolute Immature Granulocyte 0.2 0.0 - 0.5 K/UL   Comprehensive Metabolic Panel    Collection Time: 07/27/22  3:48 AM   Result Value Ref Range    Sodium 134 (L) 138 - 145 mmol/L    Potassium 4.2 3.5 - 5.1 mmol/L    Chloride 103 98 - 107 mmol/L    CO2 24 21 - 32 mmol/L    Anion Gap 7 7 - 16 mmol/L    Glucose 110 (H) 65 - 100 mg/dL    BUN 30 (H) 8 - 23 MG/DL    Creatinine 1.20 0.8 - 1.5 MG/DL    GFR African American >60 >60 ml/min/1.73m2    GFR Non- >60 >60 ml/min/1.73m2    Calcium 8.4 8.3 - 10.4 MG/DL    Total Bilirubin 0.7 0.2 - 1.1 MG/DL     (H) 12 - 65 U/L     (H) 15 - 37 U/L    Alk Phosphatase 452 (H) 50 - 136 U/L    Total Protein 6.4 6.3 - 8.2 g/dL    Albumin 2.5 (L) 3.2 - 4.6 g/dL    Globulin 3.9 (H) 2.3 - 3.5 g/dL    Albumin/Globulin Ratio 0.6 (L) 1.2 - 3.5     Magnesium    Collection Time: 07/27/22  3:48 AM   Result Value Ref Range    Magnesium 2.2 1.8 - 2.4 mg/dL       Imaging:  Xray Result (most recent):  XR ABDOMEN (KUB) (SINGLE AP VIEW) 07/20/2022    Narrative  EXAM: ABDOMINAL RADIOGRAPH, 1 view    INDICATION: Constipation    COMPARISON: 9/1/2020    TECHNIQUE: Frontal abdominal radiography was performed.     FINDINGS: Limited evaluation due to the patient's body habitus. No clear  obstruction bowel gas pattern. No intraperitoneal free air appreciated. Moderate  retained colonic fecal burden. Prior vertebroplasty at multiple levels. Impression  1. No acute abnormality within the limitation imposed by patient body habitus. 2.  Constipation. 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)    Hypokalemia    Gynecomastia, male    Neuroendocrine carcinoma, high grade (HCC)    Anemia due to chemotherapy    Primary prostate cancer with metastasis from prostate to other site [facility])    Prostatic nodule    Family history of prostate cancer in father    Acute prostatitis    S/P TURP    Prostate cancer [facility])    Prostate cancer metastatic to bone ([facility] [address]    Thrombocytopenia ([facility] [address]    Hypomagnesemia    Hypercalcemia of malignancy    Pain due to malignant neoplasm metastatic to bone (HCC)    EDGARD (acute kidney injury) ([facility] [address]    Debility    Drug-induced constipation    Encounter for palliative care    Abnormal CT of the abdomen    Hydronephrosis    Hydroureter     Mr. Mayela Gonzalez is a 78 y.o. male admitted on 7/8/2022 with a primary diagnosis of There were no encounter diagnoses. .       Mr. Mayela Gonzalez has a PMH of metastatic prostate cancer dx 2017 s/p casodex and bilateral orchiectomy for surgical castration, 6 cycles of docetaxel, pathologic vertebral fx's with path positive for high grade neuroendocrine cancer, s/p carbo/etoposide and most recently carbo/taxotere which was held d/t TCP. Last was C2 on 6/8/22. 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 He is a known patient of Dr. Jose Daniel Preston s/p multiple lines of treatment as above. At last office visit on 6/29, CEA had notably continued to rise to 8290. He has had discussion with [facility], [facility], however wished to hold off on making a decision until he completed radiation therapy.   He presented to the ER with a fall at home, increasing pain in the hips and generalized weakness. Also with constipation. Also found to be anemic and transfused PRBC. We were asked for recommendations for our known patient. PLAN:  Metastatic Prostate Cancer/Neuroendocrine  - S/p multiple lines of therapy, most recently carbo/taxotere C2 on 6/8. Held on 6/29 d/t TCP. Undergoing XRT to rib and T-spine with Dr. Liam Castaneda.  - Has spoken to [facility], however wished to hold off on making a decision until he saw if XRT improved his pain  - Can resume XRT on Monday 7/11 Pt does not wish to pursue XRT. Meeting with PC today for pain mgmt and to discuss [address] - wishes to pursue facility placement and enroll in Hospice. 7/13 Pt now states he may be interested in going home since he will have support from friend moving here from out of state. Consult PT/OT. 7/14  PT recommends HHT  7/23 CT AP shows POD with [facility] liver lesions, increased prostate gland, and extensive bony mets  7/25 Pt states he is interested in pursuing XRT again, Rad Onc consulted. He states he may consider having chemo in the future as well. 7/26 Resuming XRT tomorrow (previously completed 1/1 fx to ribs and 1/5 fx to T-spine)  7/27 Resume XRT today - scheduled through 8/2 per rad onc notes. Cancer Related Pain  - Currently with Dilaudid/oxy prn  - Consult palliative care to assist with pain management (known from office) and also to assist with goals of care discussions   7/10 Increased Dilaudid to 1 mg every 3 hours for severe pain. Pall care consult pending. 7/11 PC assisting with mgmt. Resume Dex. 7/12 PC started Fentanyl 50mcg patch yesterday. Pt reports pain controlled today - even able to walk around room now. 7/13 Pain controlled. PC changed oxycodone to po dilaudid yesterday. 7/14 Pain controlled  7/15 C/o pain today  7/17 pain improved after menchaca  7/20 Pain controlled on current regimen  7/22 c/o worsening abd pain. Bladder scan with minimal urine. Check CT AP.  7/23 CT AP with POD     Anemia/Thrombocytopenia  - Transfuse prn per Denae SOPs  7/24 Plt down to 41k. DC Lovenox. Check hemolysis labs, DIC labs, smear, HIT.  7/25 Plt 37k. No DIC noted. Smear, Hapto, and HIT pending. 7/26 Plt 32k. No hemolysis. HIT neg. Smear pending. 7/27 Plts stable at 34k. Hgb relatively stable - 7.6. Smear without schistocytes. Monitor. Transaminitis / Hyperbilirubinemia  - Alk phos/ALT/AST trended down today, continue to follow   7/11 AST/ALT continue to improve   7/13 LFTs improving  7/18 stable  7/23 LFTs increased, monitor - likely r/t liver mets  7/24 Tbili up to 1.2, LFTs con't to increase. 7/25 Bili down to 0.8. LFTs improved today. 7/27 LFTs stable - known liver mets. Tbili back to normal. Monitoring. RESOLVED     EDGARD  - Gentle hydration  RESOLVED     Constipation  - Lactulose prn   7/11 Pt refusing Lactulose. PC started pericolace. Con't Miralax. Consider KUB if no improvement. 7/12 No BM documented. Con't Miralax/Pericolace. Pt more active now, up around in room since pain controlled. 7/13 Had BM x 4 yesterday  7/20 c/o constipation. Increase pericolace to BID. Con't Miralax. Check KUB. If no obstruction, will order Movantik. 7/21 KUB with constipation. Movantik ordered. 7/22 No BM. Con't Movantik. Checking CT AP.  7/23 Pt having Bms. Con't Movantik. RESOLVED    Hypercalcemia / Hypocalcemia  7/12 CCa++ up to 11. 7. Zometa ordered. 7/15 CCa++ down to 9.2  7/19 CCa++ down to 8.4, replete  7/21 Cca++ 8.6  RESOLVED    Dysuria  7/15 c/o dysuria. UA c/w UTI. CTX ordered. Check Ucx.  7/17 UC NTD on Rocephin  7/18 Pain improved s/p menchaca placement. Ucx with [facility]. Completed CTX x 3 days. Urinary retention / B/l hydroureteronephrosis  7/16 menchaca ordered/ flomax ordered  7/17 UOP 4000 ml. Adding proscar  7/18 s/p menchaca placement. Con't Flomax/Proscar. Plan to DC menchaca tomorrow.   7/19 Remove menchaca for voiding trial.  If unable to void, Difficult placement. CM following. 7/19: Pt reportedly does not have a home to return to upon discharge. Son states pt is unable to live with him. Unlikely that pt would qualify for facility placement. CM following.    7/20: Pt still stable for discharge. CM following - pt has no place to live.    7/21:  CM found available place at boarding house for patient. Pt states he was not told about this, but cannot afford $650/mo. He says he was paying $500/mo at previous place which is no longer available to him. CM following.    7/22:  Per CM, son calling to speak to the manager of the boarding home today. He also asked for list of ALFs that accept Medicaid. CM continues to follow. 7/25: Son went to see available place at boarding home - pt and son not pleased with arrangements as pt would be sharing bathroom with 3 other people. Son looking into ALFs. Pt voices concern that he would not be able to adequately care for himself if he had to live alone. CM following. HELEN Paredes  Hematology & Oncology  [address] [place] [address]  Office : [phone]  Fax : [phone]      Attending Addendum:  I have personally performed a face to face diagnostic evaluation on this patient. I have reviewed and agree with the care plan as documented above by Jose Chávez N.P.  My findings are as follows: Patient appears stable, heart rate regular without murmurs, abdomen is non-tender, bowel sounds are positive. Elderly patient with metastatic prostate cancer with more subsequent neuroendocrine pathology. Hypercalcemia improved. Pain under reasonable control. Now agreeable to restart back XRT, restarts today. Awaiting placement. Clark D/juan for voiding trial.  Supportive care as above.           Janay Dimas MD  [facility] Hematology/Oncology  [address] [address]  Office : [phone]  Fax : [phone]

## 2022-07-27 NOTE — PROGRESS NOTES
Pt voids bloody urine without difficulty. Given dilaudid 4 mg x 2 2 for c/o pain. No voiced needs at this time.   Report to oncoming RN at bedside

## 2022-07-28 NOTE — PROGRESS NOTES
[facility] Hematology & Oncology        Inpatient Hematology / Oncology Progress Note    Reason for Admission:  Prostate cancer metastatic to bone [facility]) [C61, G38.51]    24 Hour Events:  Afebrile, VSS  Issues with living arrangements after discharge - CM following  XRT today (EOT 8/2)  Plt stable at 32k, TCP w/u neg    Transfusions: 1 unit platelets (d/t hematuria)  Replacements: None    ROS:  Constitutional: Positive for fatigue; negative for fever, chills. CV: Negative for chest pain, palpitations, edema. Respiratory: Negative for dyspnea, cough, wheezing. GI: Negative for nausea, diarrhea, abd pain. MSK:  +Back/hip pain  : +hematuria    10 point review of systems is otherwise negative with the exception of the elements mentioned above in the HPI. Allergies   Allergen Reactions    Turmeric Nausea And Vomiting     Past Medical History:   Diagnosis Date    Claustrophobia     Ear problems     Elevated PSA     Former light cigarette smoker (1-9 per day)     smoked for 35 years.   quit     History of multiple allergies     Nicotine vapor product user     Personal history of prostate cancer     Prostate cancer ([facility] [address]     prostate, neuroendocrine, mets to bone     Past Surgical History:   Procedure Laterality Date    BIOPSY PROSTATE      COLONOSCOPY  08/2020    HEENT      teeth removed     IR KYPHOPLASTY LUMBAR FIRST LEVEL  10/14/2020    IR KYPHOPLASTY LUMBAR FIRST LEVEL  10/14/2020    IR KYPHOPLASTY LUMBAR FIRST LEVEL 10/14/2020 [facility]    IR KYPHOPLASTY THORACIC FIRST LEVEL  10/29/2020    IR KYPHOPLASTY THORACIC FIRST LEVEL  10/29/2020    IR KYPHOPLASTY THORACIC FIRST LEVEL 10/29/2020 [facility]OLOG[facility] SPECI[facility]    TESTICLE REMOVAL Bilateral 02/2017     Family History   Problem Relation Age of Onset    Hypertension Father     Prostate Cancer Father     Cancer Father         prostate cancer     Social History     Socioeconomic History    Marital status: Single     Spouse name: Not on file    Number of children: Not on file    Years of education: Not on file    Highest education level: Not on file   Occupational History    Not on file   Tobacco Use    Smoking status: Former     Packs/day: 0.50     Types: Cigarettes     Quit date: 2016     Years since quittin.6    Smokeless tobacco: Never   Substance and Sexual Activity    Alcohol use: Yes    Drug use: No    Sexual activity: Not on file   Other Topics Concern    Not on file   Social History Narrative    Not on file     Social Determinants of Health     Financial Resource Strain: Not on file   Food Insecurity: Not on file   Transportation Needs: Not on file   Physical Activity: Not on file   Stress: Not on file   Social Connections: Not on file   Intimate Partner Violence: Not on file   Housing Stability: Not on file     Current Facility-Administered Medications   Medication Dose Route Frequency Provider Last Rate Last Admin    0.9 % sodium chloride infusion   IntraVENous PRN Darrall Amour, APRN - CNP        diatrizoate meglumine-sodium (GASTROGRAFIN) 66-10 % solution 15 mL  15 mL Oral ONCE PRN Darrall Amour, APRN - CNP   15 mL at 22 1444    HYDROmorphone HCl PF (DILAUDID) injection 1.5 mg  1.5 mg IntraVENous Q3H PRN Darrall Amour, APRN - CNP   1.5 mg at 22 0204    bisacodyl (DULCOLAX) suppository 10 mg  10 mg Rectal Daily PRN Cherre Quiet, APRN - CNP   10 mg at 22 1758    naloxegol (MOVANTIK) tablet 25 mg  25 mg Oral QAM AC Darrall Amour, APRN - CNP   25 mg at 22 3019    magnesium oxide (MAG-OX) tablet 400 mg  400 mg Oral 4x Daily Eric Busby MD   400 mg at 22 1152    calcium carbonate (TUMS) chewable tablet 500 mg  500 mg Oral TID Darrall Amour, APRN - CNP   500 mg at 22 0813    sennosides-docusate sodium (SENOKOT-S) 8.6-50 MG tablet 2 tablet  2 tablet Oral BID Darrall Amour, APRN - CNP   2 tablet at 22 0173    tamsulosin (FLOMAX) capsule 0.4 mg  0.4 mg Oral Daily Meliza Schuster APRPHILIPP - NP   0.4 mg at 07/28/22 0812    finasteride (PROSCAR) tablet 5 mg  5 mg Oral Daily Milon Shikha, APRN - NP   5 mg at 07/28/22 2500    zinc oxide 40 % paste   Topical 4x Daily PRN Shelia Carpenter MD        lidocaine-prilocaine (EMLA) cream   Topical Once Shelia Carpenter MD        phenazopyridine (PYRIDIUM) tablet 95 mg  95 mg Oral TID PRN Xi Siad, APRN - CNP   95 mg at 07/15/22 2048    cyclobenzaprine (FLEXERIL) tablet 10 mg  10 mg Oral TID PRN Milon Shikha, APRN - NP   10 mg at 07/28/22 0719    0.9 % sodium chloride infusion   IntraVENous PRN Xi Siad, APRN - CNP        [Held by provider] enoxaparin (LOVENOX) injection 40 mg  40 mg SubCUTAneous Daily Xi Siad, APRN - CNP   40 mg at 07/23/22 0857    fentaNYL (DURAGESIC) 75 MCG/HR 1 patch  1 patch TransDERmal Q72H HELEN Marcus CNP   1 patch at 07/28/22 1022    diphenhydrAMINE (BENADRYL) capsule 25 mg  25 mg Oral Q6H PRN Xi Siad, APRN - CNP   25 mg at 07/16/22 0110    acetaminophen (TYLENOL) tablet 650 mg  650 mg Oral Q6H PRN Xi Siad, APRN - CNP   650 mg at 07/13/22 1514    Or    acetaminophen (TYLENOL) suppository 650 mg  650 mg Rectal Q6H PRN Xi Siad, APRN - CNP        HYDROmorphone (DILAUDID) tablet 2 mg  2 mg Oral Q4H PRN Cassy Sebastian, APRN - CNP   2 mg at 07/21/22 1438    Or    HYDROmorphone (DILAUDID) tablet 4 mg  4 mg Oral Q4H PRN Cassy Sebastian, APRN - CNP   4 mg at 07/28/22 0719    naloxone (NARCAN) injection 0.04 mg  0.04 mg IntraVENous PRN HELEN Marcus CNP        dexamethasone (DECADRON) tablet 4 mg  4 mg Oral 2 times per day Xi Siad, APRN - CNP   4 mg at 07/28/22 0813    aluminum & magnesium hydroxide-simethicone (MAALOX) 200-200-20 MG/5ML suspension 20 mL  20 mL Oral Q6H PRN Andres Araiza MD   20 mL at 07/13/22 0809    LORazepam (ATIVAN) tablet 1 mg  1 mg Oral TID PRN Cirilo Garrett DO   1 mg at 07/28/22 0813    pantoprazole (PROTONIX) tablet 40 mg  40 mg Oral QAM  Christin Martinez DO   40 mg at 07/28/22 8321 sodium chloride flush 0.9 % injection 5-40 mL  5-40 mL IntraVENous 2 times per day Normajean Lard, DO   10 mL at 22 0819    sodium chloride flush 0.9 % injection 5-40 mL  5-40 mL IntraVENous PRN Normajean Lard, DO        0.9 % sodium chloride infusion   IntraVENous PRN Normajean Lard, DO        ondansetron (ZOFRAN-ODT) disintegrating tablet 4 mg  4 mg Oral Q8H PRN Normajean Lard, DO        Or    ondansetron [facility]) injection 4 mg  4 mg IntraVENous Q6H PRN Normajean Lard, DO   4 mg at 07/10/22 1717    docusate sodium (COLACE) capsule 100 mg  100 mg Oral BID PRN Normajean Lard, DO        polyethylene glycol (GLYCOLAX) packet 17 g  17 g Oral Daily Normajean Lard, DO   17 g at 22 0813       OBJECTIVE:  Patient Vitals for the past 8 hrs:   BP Temp Temp src Pulse Resp SpO2   22 0756 119/81 97.9 °F (36.6 °C) Oral 86 17 93 %   22 0257 117/76 98 °F (36.7 °C) Oral 85 17 92 %     Temp (24hrs), Av °F (36.7 °C), Min:97.7 °F (36.5 °C), Max:98.2 °F (36.8 °C)     0701 -  1900  In: 200 [P.O.:472]  Out: -     Physical Exam:  Constitutional: Well developed male sitting comfortably on the hospital bed. HEENT: Normocephalic and atraumatic. Oropharynx is clear, mucous membranes are moist.  Neck supple. Skin Warm and dry. Bruising noted on L foot and no rash noted. No erythema. No pallor. Respiratory Lungs are clear to auscultation bilaterally without wheezes, rales or rhonchi, normal air exchange without accessory muscle use. CVS Normal rate, regular rhythm and normal S1 and S2. No murmurs, gallops, or rubs. Abdomen Soft, nontender and nondistended, normoactive bowel sounds. No palpable mass. No hepatosplenomegaly. Neuro Grossly nonfocal with no obvious sensory or motor deficits. MSK Normal range of motion in general.  No edema and no tenderness. Psych Appropriate mood and affect.         Labs:    Recent Results (from the past 24 hour(s))   CBC with Auto Differential    Collection Time: 07/28/22  5:42 AM   Result Value Ref Range    WBC 9.8 4.3 - 11.1 K/uL    RBC 2.41 (L) 4.23 - 5.6 M/uL    Hemoglobin 7.7 (L) 13.6 - 17.2 g/dL    Hematocrit 25.0 (L) 41.1 - 50.3 %    .7 (H) 79.6 - 97.8 FL    MCH 32.0 26.1 - 32.9 PG    MCHC 30.8 (L) 31.4 - 35.0 g/dL    RDW 20.5 (H) 11.9 - 14.6 %    Platelets 32 (L) 340 - 450 K/uL    MPV 10.9 9.4 - 12.3 FL    nRBC 0.07 0.0 - 0.2 K/uL    Differential Type AUTOMATED      Seg Neutrophils 84 (H) 43 - 78 %    Lymphocytes 6 (L) 13 - 44 %    Monocytes 8 4.0 - 12.0 %    Eosinophils % 0 (L) 0.5 - 7.8 %    Basophils 0 0.0 - 2.0 %    Immature Granulocytes 1 0.0 - 5.0 %    Segs Absolute 8.2 1.7 - 8.2 K/UL    Absolute Lymph # 0.6 0.5 - 4.6 K/UL    Absolute Mono # 0.8 0.1 - 1.3 K/UL    Absolute Eos # 0.0 0.0 - 0.8 K/UL    Basophils Absolute 0.0 0.0 - 0.2 K/UL    Absolute Immature Granulocyte 0.1 0.0 - 0.5 K/UL   Comprehensive Metabolic Panel    Collection Time: 07/28/22  5:42 AM   Result Value Ref Range    Sodium 135 (L) 138 - 145 mmol/L    Potassium 4.0 3.5 - 5.1 mmol/L    Chloride 104 98 - 107 mmol/L    CO2 24 21 - 32 mmol/L    Anion Gap 7 7 - 16 mmol/L    Glucose 117 (H) 65 - 100 mg/dL    BUN 31 (H) 8 - 23 MG/DL    Creatinine 1.20 0.8 - 1.5 MG/DL    GFR African American >60 >60 ml/min/1.73m2    GFR Non- >60 >60 ml/min/1.73m2    Calcium 8.6 8.3 - 10.4 MG/DL    Total Bilirubin 0.7 0.2 - 1.1 MG/DL     (H) 12 - 65 U/L     (H) 15 - 37 U/L    Alk Phosphatase 475 (H) 50 - 136 U/L    Total Protein 6.5 6.3 - 8.2 g/dL    Albumin 2.5 (L) 3.2 - 4.6 g/dL    Globulin 4.0 (H) 2.3 - 3.5 g/dL    Albumin/Globulin Ratio 0.6 (L) 1.2 - 3.5     Magnesium    Collection Time: 07/28/22  5:42 AM   Result Value Ref Range    Magnesium 2.2 1.8 - 2.4 mg/dL       Imaging:  Xray Result (most recent):  XR ABDOMEN (KUB) (SINGLE AP VIEW) 07/20/2022    Narrative  EXAM: ABDOMINAL RADIOGRAPH, 1 view    INDICATION: Constipation    COMPARISON: 9/1/2020    TECHNIQUE: Frontal abdominal radiography was performed. FINDINGS: Limited evaluation due to the patient's body habitus. No clear  obstruction bowel gas pattern. No intraperitoneal free air appreciated. Moderate  retained colonic fecal burden. Prior vertebroplasty at multiple levels. Impression  1. No acute abnormality within the limitation imposed by patient body habitus. 2.  Constipation. 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)    Hypokalemia    Gynecomastia, male    Neuroendocrine carcinoma, high grade (HCC)    Anemia due to chemotherapy    Primary prostate cancer with metastasis from prostate to other site [facility])    Prostatic nodule    Family history of prostate cancer in father    Acute prostatitis    S/P TURP    Prostate cancer [facility])    Prostate cancer metastatic to bone ([facility] [address]    Thrombocytopenia ([facility] [address]    Hypomagnesemia    Hypercalcemia of malignancy    Pain due to malignant neoplasm metastatic to bone (HCC)    EDGARD (acute kidney injury) ([facility] [address]    Debility    Drug-induced constipation    Encounter for palliative care    Abnormal CT of the abdomen    Hydronephrosis    Hydroureter     Mr. Maria L Pickett is a 78 y.o. male admitted on 7/8/2022 with a primary diagnosis of There were no encounter diagnoses. .       Mr. Maria L Pickett has a PMH of metastatic prostate cancer dx 2017 s/p casodex and bilateral orchiectomy for surgical castration, 6 cycles of docetaxel, pathologic vertebral fx's with path positive for high grade neuroendocrine cancer, s/p carbo/etoposide and most recently carbo/taxotere which was held d/t TCP. Last was C2 on 6/8/22. 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 He is a known patient of Dr. Caitlin Middleton s/p multiple lines of treatment as above. At last office visit on 6/29, CEA had notably continued to rise to 8290.   He has had discussion with [place], however wished to hold off on making a decision until he completed radiation therapy. He presented to the ER with a fall at home, increasing pain in the hips and generalized weakness. Also with constipation. Also found to be anemic and transfused PRBC. We were asked for recommendations for our known patient. PLAN:  Metastatic Prostate Cancer/Neuroendocrine  - S/p multiple lines of therapy, most recently carbo/taxotere C2 on 6/8. Held on 6/29 d/t TCP. Undergoing XRT to rib and T-spine with Dr. Orlando Roa.  - Has spoken to [facility], however wished to hold off on making a decision until he saw if XRT improved his pain  - Can resume XRT on Monday 7/11 Pt does not wish to pursue XRT. Meeting with PC today for pain mgmt and to discuss [address] - wishes to pursue facility placement and enroll in Hospice. 7/13 Pt now states he may be interested in going home since he will have support from friend moving here from [place] of state. Consult PT/OT. 7/14  PT recommends HHT  7/23 CT AP shows POD with [facility] liver lesions, increased prostate gland, and extensive bony mets  7/25 Pt states he is interested in pursuing XRT again, Rad Onc consulted. He states he may consider having chemo in the future as well. 7/26 Resuming XRT tomorrow (previously completed 1/1 fx to ribs and 1/5 fx to T-spine)  7/27 Resume XRT today - scheduled through 8/2 per rad onc notes. 7/28 XRT today (EOT 8/2)     Cancer Related Pain  - Currently with Dilaudid/oxy prn  - Consult palliative care to assist with pain management (known from office) and also to assist with goals of care discussions   7/10 Increased Dilaudid to 1 mg every 3 hours for severe pain. Pall care consult pending. 7/11 PC assisting with mgmt. Resume Dex. 7/12 PC started Fentanyl 50mcg patch yesterday. Pt reports pain controlled today - even able to walk around room now. 7/13 Pain controlled. PC changed oxycodone to po dilaudid yesterday.   7/14 Pain controlled  7/15 C/o pain today  7/17 pain improved after menchaca  7/20 Pain controlled on current regimen  7/22 c/o worsening abd pain. Bladder scan with minimal urine. Check CT AP.  7/23 CT AP with POD     Anemia/Thrombocytopenia  - Transfuse prn per [facility] SOPs  7/24 Plt down to 41k. DC Lovenox. Check hemolysis labs, DIC labs, smear, HIT.  7/25 Plt 37k. No DIC noted. Smear, Hapto, and HIT pending. 7/26 Plt 32k. No hemolysis. HIT neg. Smear pending. 7/27 Plts stable at 34k. Hgb relatively stable - 7.6. Smear without schistocytes. Monitor. 7/28 Plt stable at 32k. Pt with hematuria, transfuse to keep plt >50k.  1 unit platelets ordered. Transaminitis / Hyperbilirubinemia  - Alk phos/ALT/AST trended down today, continue to follow   7/11 AST/ALT continue to improve   7/13 LFTs improving  7/18 stable  7/23 LFTs increased, monitor - likely r/t liver mets  7/24 Tbili up to 1.2, LFTs con't to increase. 7/25 Bili down to 0.8. LFTs improved today. 7/27 LFTs stable - known liver mets. Tbili back to normal. Monitoring. RESOLVED     EDGARD  - Gentle hydration  RESOLVED     Constipation  - Lactulose prn   7/11 Pt refusing Lactulose. PC started pericolace. Con't Miralax. Consider KUB if no improvement. 7/12 No BM documented. Con't Miralax/Pericolace. Pt more active now, up around in room since pain controlled. 7/13 Had BM x 4 yesterday  7/20 c/o constipation. Increase pericolace to BID. Con't Miralax. Check KUB. If no obstruction, will order Movantik. 7/21 KUB with constipation. Movantik ordered. 7/22 No BM. Con't Movantik. Checking CT AP.  7/23 Pt having Bms. Con't Movantik. RESOLVED    Hypercalcemia / Hypocalcemia  7/12 CCa++ up to 11. 7. Zometa ordered. 7/15 CCa++ down to 9.2  7/19 CCa++ down to 8.4, replete  7/21 Cca++ 8.6  RESOLVED    Dysuria  7/15 c/o dysuria. UA c/w UTI. CTX ordered. Check Ucx.  7/17 UC NTD on Rocephin  7/18 Pain improved s/p menchaca placement. Ucx with [facility].   Completed CTX x 3 days.  RESOLVED    Urinary retention / B/l hydroureteronephrosis  7/16 menchaca ordered/ flomax ordered  7/17 UOP 4000 ml. Adding proscar  7/18 s/p menchaca placement. Con't Flomax/Proscar. Plan to DC menchaca tomorrow. 7/19 Remove menchaca for voiding trial.  If unable to void, will consult urology. 7/20 Menchaca removed yesterday and had to be replaced d/t urinary retention. Consult urology. 7/22 Awaiting urology consult  7/23 Urology felt r/t constipation, however CT shows b/l hydroureteronephrosis. Will defer mgmt to urology. 7/24 No response from urology from message sent yesterday. Will re-consult. 7/26 Urology recommends to remove menchaca this AM for voiding trial.  Also recommends reimaging later on to see if bilateral hydro has resolved (could be r/t overdistended bladder/urinary retention vs prostate cancer/obstruction). If bilateral hydro has not improved over time, would consider bilateral stents vs nephrostomy tubes and this would be more favorable if/when kidney function declines or pt develops significant flank pain. 7/27 Menchaca removed yesterday, had PVR of 500cc but then able to void 200cc. Menchaca remains out. Cr 1.2.  7/28 Cr stable, 1.2    Hematuria / UTI  7/28 Hematuria noted. Transfuse platelets. Check UA - UA c/w UTI. Ucx ordered. Start CTX. Continue home meds  [facility] SOPs  AC contraindicated d/t thrombocytopenia    Goals and plan of care reviewed with the patient. All questions answered to the best of our ability. Disposition:  7/14: Pt reports his friend is not moving here until 8/1. He reports she has a one-story home that he can move into on 8/1 or possibly even sooner. Pt was able to walk hallways yesterday. Unsure if he would qualify for facility placement while future living arrangements are made. CM states pt would have to go through extensive insurance process to even cover facility placement if he qualified. CM assisting with discharge planning. ? Could pt stay with son who lives locally until his friend's house is ready to move into? Pt also declines Hospice services at this time. He states he would prefer HHT. 7/15:  Pt is medically stable for discharge. Pt working on living arrangements. Hopeful for discharge home soon. 7/17 Spoke with patient's son yesterday. Patient does not a home to return to so now even if he does have a friend coming there is no home. Difficult placement. CM following. 7/19: Pt reportedly does not have a home to return to upon discharge. Son states pt is unable to live with him. Unlikely that pt would qualify for facility placement. CM following.    7/20: Pt still stable for discharge. CM following - pt has no place to live.    7/21:  CM found available place at boarding house for patient. Pt states he was not told about this, but cannot afford $650/mo. He says he was paying $500/mo at previous place which is no longer available to him. CM following.    7/22:  Per CM, son calling to speak to the manager of the boarding home today. He also asked for list of ALFs that accept Medicaid. CM continues to follow. 7/25: Son went to see available place at boarding home - pt and son not pleased with arrangements as pt would be sharing bathroom with 3 other people. Son looking into ALFs. Pt voices concern that he would not be able to adequately care for himself if he had to live alone. CM following.    7/28: Per CM, pt is making no effort to locate living arrangements. CM assisting to help locate senior care. Sending pt's info to be reviewed at [facility] (which has availability). Pt is stable for discharge once placement arranged. HELEN Rivas [address] Hematology & Oncology  [address] [address]  Office : [phone]  Fax : [phone]         Attending Addendum:  I have personally performed a face to face diagnostic evaluation on this patient.  I have reviewed and agree

## 2022-07-29 NOTE — PLAN OF CARE
Problem: Skin/Tissue Integrity  Goal: Absence of new skin breakdown  Description: 1. Monitor for areas of redness and/or skin breakdown  2. Assess vascular access sites hourly  3. Every 4-6 hours minimum:  Change oxygen saturation probe site  4. Every 4-6 hours:  If on nasal continuous positive airway pressure, respiratory therapy assess nares and determine need for appliance change or resting period.   Outcome: Progressing     Problem: Pain  Goal: Verbalizes/displays adequate comfort level or baseline comfort level  Outcome: Progressing     Problem: Safety - Adult  Goal: Free from fall injury  Outcome: Progressing     Problem: ABCDS Injury Assessment  Goal: Absence of physical injury  Outcome: Progressing     Problem: Respiratory - Adult  Goal: Achieves optimal ventilation and oxygenation  Outcome: Progressing

## 2022-07-29 NOTE — PROGRESS NOTES
[facility] Hematology & Oncology        Inpatient Hematology / Oncology Progress Note    Reason for Admission:  Prostate cancer metastatic to bone [facility]) [C61, C79.51]    24 Hour Events:  Afebrile, VSS  Issues with living arrangements after discharge - CM following  XRT today (EOT 8/2)  Plt 62k after transfusion  Hematuria resolved, no reports of bleeding  Pain acceptable    Transfusions: None  Replacements: None    ROS:  Constitutional: Positive for fatigue; negative for fever, chills. CV: Negative for chest pain, palpitations, edema. Respiratory: Negative for dyspnea, cough, wheezing. GI: Negative for nausea, diarrhea, abd pain. MSK:  +Back/hip pain  : +hematuria (resolved)    10 point review of systems is otherwise negative with the exception of the elements mentioned above in the HPI. Allergies   Allergen Reactions    Turmeric Nausea And Vomiting     Past Medical History:   Diagnosis Date    Claustrophobia     Ear problems     Elevated PSA     Former light cigarette smoker (1-9 per day)     smoked for 35 years.   quit     History of multiple allergies     Nicotine vapor product user     Personal history of prostate cancer     Prostate cancer ([facility] [address]     prostate, neuroendocrine, mets to bone     Past Surgical History:   Procedure Laterality Date    BIOPSY PROSTATE      COLONOSCOPY  08/2020    HEENT      teeth removed     IR KYPHOPLASTY LUMBAR FIRST LEVEL  10/14/2020    IR KYPHOPLASTY LUMBAR FIRST LEVEL  10/14/2020    IR KYPHOPLASTY LUMBAR FIRST LEVEL 10/14/2020 [facility]    IR KYPHOPLASTY THORACIC FIRST LEVEL  10/29/2020    IR KYPHOPLASTY THORACIC FIRST LEVEL  10/29/2020    IR KYPHOPLASTY THORACIC FIRST LEVEL 10/29/2020 [facility]OLOG[facility]CI[facility]    TESTICLE REMOVAL Bilateral 02/2017     Family History   Problem Relation Age of Onset    Hypertension Father     Prostate Cancer Father     Cancer Father         prostate cancer     Social History     Socioeconomic History    Marital status: Single     Spouse name: Not on file    Number of children: Not on file    Years of education: Not on file    Highest education level: Not on file   Occupational History    Not on file   Tobacco Use    Smoking status: Former     Packs/day: 0.50     Types: Cigarettes     Quit date: 2016     Years since quittin.6    Smokeless tobacco: Never   Substance and Sexual Activity    Alcohol use: Yes    Drug use: No    Sexual activity: Not on file   Other Topics Concern    Not on file   Social History Narrative    Not on file     Social Determinants of Health     Financial Resource Strain: Not on file   Food Insecurity: Not on file   Transportation Needs: Not on file   Physical Activity: Not on file   Stress: Not on file   Social Connections: Not on file   Intimate Partner Violence: Not on file   Housing Stability: Not on file     Current Facility-Administered Medications   Medication Dose Route Frequency Provider Last Rate Last Admin    0.9 % sodium chloride infusion   IntraVENous PRN HELEN Damon CNP        cefTRIAXone (ROCEPHIN) 1,000 mg in sodium chloride 0.9 % 50 mL IVPB mini-bag  1,000 mg IntraVENous Q24H HELEN Damon CNP   Stopped at 22 1602    diatrizoate meglumine-sodium (GASTROGRAFIN) 66-10 % solution 15 mL  15 mL Oral ONCE PRN HELEN Damon CNP   15 mL at 22 1444    HYDROmorphone HCl PF (DILAUDID) injection 1.5 mg  1.5 mg IntraVENous Q3H PRN HELEN Damon CNP   1.5 mg at 22 0640    bisacodyl (DULCOLAX) suppository 10 mg  10 mg Rectal Daily PRN HELEN Ramon CNP   10 mg at 22 1758    naloxegol (MOVANTIK) tablet 25 mg  25 mg Oral QAM AC HELEN Damon CNP   25 mg at 22 0840    magnesium oxide (MAG-OX) tablet 400 mg  400 mg Oral 4x Daily Obi Espinosa MD   400 mg at 22 0834    calcium carbonate (TUMS) chewable tablet 500 mg  500 mg Oral TID HELEN Damon CNP   500 mg at 22 0833    sennosides-docusate sodium (SENOKOT-S) 8.6-50 MG tablet 2 tablet  2 tablet Oral BID HELEN Castillo CNP   2 tablet at 07/29/22 0009    tamsulosin (FLOMAX) capsule 0.4 mg  0.4 mg Oral Daily HELEN Arevalo - NP   0.4 mg at 07/29/22 0263    finasteride (PROSCAR) tablet 5 mg  5 mg Oral Daily HELEN Arevalo - NP   5 mg at 07/29/22 0834    zinc oxide 40 % paste   Topical 4x Daily PRN Sally Pierce MD        lidocaine-prilocaine (EMLA) cream   Topical Once Sally Pierce MD        phenazopyridine (PYRIDIUM) tablet 95 mg  95 mg Oral TID PRN HELEN Castillo CNP   95 mg at 07/15/22 2048    cyclobenzaprine (FLEXERIL) tablet 10 mg  10 mg Oral TID PRN HELEN Arevalo NP   10 mg at 07/28/22 2255    0.9 % sodium chloride infusion   IntraVENous PRN HELEN Castillo CNP        [Held by provider] enoxaparin (LOVENOX) injection 40 mg  40 mg SubCUTAneous Daily HELEN Castillo CNP   40 mg at 07/23/22 0857    fentaNYL (DURAGESIC) 75 MCG/HR 1 patch  1 patch TransDERmal Q72H HELEN Marcus CNP   1 patch at 07/28/22 1022    diphenhydrAMINE (BENADRYL) capsule 25 mg  25 mg Oral Q6H PRN HELEN Castillo CNP   25 mg at 07/28/22 1242    acetaminophen (TYLENOL) tablet 650 mg  650 mg Oral Q6H PRN HELEN Castillo CNP   650 mg at 07/28/22 1242    Or    acetaminophen (TYLENOL) suppository 650 mg  650 mg Rectal Q6H PRN HELEN Castillo CNP        HYDROmorphone (DILAUDID) tablet 2 mg  2 mg Oral Q4H PRN HELEN Kemp - CNP   2 mg at 07/21/22 1438    Or    HYDROmorphone (DILAUDID) tablet 4 mg  4 mg Oral Q4H PRN HELEN Kemp CNP   4 mg at 07/29/22 0832    naloxone (NARCAN) injection 0.04 mg  0.04 mg IntraVENous PRN HELEN Marcus - MICHELLE        dexamethasone (DECADRON) tablet 4 mg  4 mg Oral 2 times per day HELEN Castillo - CNP   4 mg at 07/29/22 0834    aluminum & magnesium hydroxide-simethicone (MAALOX) 508-537-68 MG/5ML suspension 20 mL  20 mL Oral Q6H PRN Karuna Rueda MD   20 mL at 07/13/22 0809    LORazepam (ATIVAN) tablet 1 mg  1 mg Oral TID PRN Hershell Juan Manuel, DO   1 mg at 22 0834    pantoprazole (PROTONIX) tablet 40 mg  40 mg Oral QAM AC Echeverria Lay Martinez, DO   40 mg at 22 7671    sodium chloride flush 0.9 % injection 5-40 mL  5-40 mL IntraVENous 2 times per day Hershell Juan Manuel, DO   10 mL at 22 2256    sodium chloride flush 0.9 % injection 5-40 mL  5-40 mL IntraVENous PRN Hershell Juan Manuel, DO        0.9 % sodium chloride infusion   IntraVENous PRN Hershell Juan Manuel, DO        ondansetron (ZOFRAN-ODT) disintegrating tablet 4 mg  4 mg Oral Q8H PRN Hershell Juan Manuel, DO        Or    ondansetron [facility]) injection 4 mg  4 mg IntraVENous Q6H PRN [facility], [facility]   4 mg at 07/10/22 1717    docusate sodium (COLACE) capsule 100 mg  100 mg Oral BID PRN [facility] Juan Manuel, DO        polyethylene glycol (GLYCOLAX) packet 17 g  17 g Oral Daily Hershell Juan Manuel, DO   17 g at 22 0831       OBJECTIVE:  Patient Vitals for the past 8 hrs:   BP Temp Temp src Pulse Resp SpO2   22 0832 -- -- -- -- 19 --   22 0728 127/84 97.6 °F (36.4 °C) Oral 87 20 92 %   22 0343 115/72 97.9 °F (36.6 °C) Oral 96 18 96 %     Temp (24hrs), Av °F (36.7 °C), Min:97.6 °F (36.4 °C), Max:98.3 °F (36.8 °C)     07 -  1900  In: 480 [P.O.:480]  Out: -     Physical Exam:  Constitutional: Well developed male sitting comfortably on the hospital bed. HEENT: Normocephalic and atraumatic. Oropharynx is clear, mucous membranes are moist.  Neck supple. Skin Warm and dry. Bruising noted on L foot and no rash noted. No erythema. No pallor. Respiratory Lungs are clear to auscultation bilaterally without wheezes, rales or rhonchi, normal air exchange without accessory muscle use. CVS Normal rate, regular rhythm and normal S1 and S2. No murmurs, gallops, or rubs. Abdomen Soft, nontender and nondistended, normoactive bowel sounds. No palpable mass. No hepatosplenomegaly.    Neuro Grossly nonfocal with no obvious sensory or motor deficits. MSK Normal range of motion in general.  No edema and no tenderness. Psych Appropriate mood and affect.         Labs:    Recent Results (from the past 24 hour(s))   Urinalysis w rflx microscopic    Collection Time: 07/28/22 12:45 PM   Result Value Ref Range    Color, UA YELLOW/STRAW      Appearance CLOUDY      Specific [place], UA 1.016 1.001 - 1.023      pH, Urine 5.5 5.0 - 9.0      Protein,  (A) NEG mg/dL    Glucose, UA Negative mg/dL    Ketones, Urine Negative NEG mg/dL    Bilirubin Urine Negative NEG      Blood, Urine LARGE (A) NEG      Urobilinogen, Urine 0.2 0.2 - 1.0 EU/dL    Nitrite, Urine Negative NEG      Leukocyte Esterase, Urine SMALL (A) NEG      WBC, UA 5-10 0 /hpf    RBC, UA >100 0 /hpf    Epithelial Cells UA 0-3 0 /hpf    BACTERIA, URINE 4+ (H) 0 /hpf    OTHER OBSERVATIONS RESULTS VERIFIED MANUALLY     Platelet Count    Collection Time: 07/28/22  5:02 PM   Result Value Ref Range    Platelets 68 (L) 189 - 450 K/uL   CBC with Auto Differential    Collection Time: 07/29/22  4:07 AM   Result Value Ref Range    WBC 8.9 4.3 - 11.1 K/uL    RBC 2.24 (L) 4.23 - 5.6 M/uL    Hemoglobin 7.3 (L) 13.6 - 17.2 g/dL    Hematocrit 23.6 (L) 41.1 - 50.3 %    .4 (H) 79.6 - 97.8 FL    MCH 32.6 26.1 - 32.9 PG    MCHC 30.9 (L) 31.4 - 35.0 g/dL    RDW 21.0 (H) 11.9 - 14.6 %    Platelets 62 (L) 347 - 450 K/uL    MPV 10.9 9.4 - 12.3 FL    nRBC 0.07 0.0 - 0.2 K/uL    Differential Type AUTOMATED      Seg Neutrophils 86 (H) 43 - 78 %    Lymphocytes 5 (L) 13 - 44 %    Monocytes 8 4.0 - 12.0 %    Eosinophils % 1 0.5 - 7.8 %    Basophils 0 0.0 - 2.0 %    Immature Granulocytes 1 0.0 - 5.0 %    Segs Absolute 7.6 1.7 - 8.2 K/UL    Absolute Lymph # 0.5 0.5 - 4.6 K/UL    Absolute Mono # 0.7 0.1 - 1.3 K/UL    Absolute Eos # 0.1 0.0 - 0.8 K/UL    Basophils Absolute 0.0 0.0 - 0.2 K/UL    Absolute Immature Granulocyte 0.1 0.0 - 0.5 K/UL   Comprehensive Metabolic Panel    Collection Time: 07/29/22  4:07 AM   Result Value Ref Range    Sodium 138 136 - 145 mmol/L    Potassium 3.9 3.5 - 5.1 mmol/L    Chloride 105 98 - 107 mmol/L    CO2 25 21 - 32 mmol/L    Anion Gap 8 7 - 16 mmol/L    Glucose 102 (H) 65 - 100 mg/dL    BUN 30 (H) 8 - 23 MG/DL    Creatinine 1.10 0.8 - 1.5 MG/DL    GFR African American >60 >60 ml/min/1.73m2    GFR Non- >60 >60 ml/min/1.73m2    Calcium 8.6 8.3 - 10.4 MG/DL    Total Bilirubin 0.7 0.2 - 1.1 MG/DL     (H) 12 - 65 U/L     (H) 15 - 37 U/L    Alk Phosphatase 446 (H) 50 - 136 U/L    Total Protein 6.1 (L) 6.3 - 8.2 g/dL    Albumin 2.5 (L) 3.2 - 4.6 g/dL    Globulin 3.6 (H) 2.3 - 3.5 g/dL    Albumin/Globulin Ratio 0.7 (L) 1.2 - 3.5     Magnesium    Collection Time: 07/29/22  4:07 AM   Result Value Ref Range    Magnesium 2.3 1.8 - 2.4 mg/dL       Imaging:  Xray Result (most recent):  XR ABDOMEN (KUB) (SINGLE AP VIEW) 07/20/2022    Narrative  EXAM: ABDOMINAL RADIOGRAPH, 1 view    INDICATION: Constipation    COMPARISON: 9/1/2020    TECHNIQUE: Frontal abdominal radiography was performed. FINDINGS: Limited evaluation due to the patient's body habitus. No clear  obstruction bowel gas pattern. No intraperitoneal free air appreciated. Moderate  retained colonic fecal burden. Prior vertebroplasty at multiple levels. Impression  1. No acute abnormality within the limitation imposed by patient body habitus. 2.  Constipation.         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)    EDGARD (acute kidney injury) ([facility] [address]    Debility    Drug-induced constipation    Encounter for palliative care    Abnormal CT of the abdomen    Hydronephrosis    Hydroureter     Mr. Maria L Pickett is a 78 y.o. male admitted on 7/8/2022 with a primary diagnosis of There were no encounter diagnoses. .       Mr. Maria L Pickett has a PMH of metastatic prostate cancer dx 2017 s/p casodex and bilateral orchiectomy for surgical castration, 6 cycles of docetaxel, pathologic vertebral fx's with path positive for high grade neuroendocrine cancer, s/p carbo/etoposide and most recently carbo/taxotere which was held d/t TCP. Last was C2 on 6/8/22. 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 He is a known patient of Dr. Caitlin Middleton s/p multiple lines of treatment as above. At last office visit on 6/29, CEA had notably continued to rise to 8290. He has had discussion with [facility], however wished to hold off on making a decision until he completed radiation therapy. He presented to the ER with a fall at home, increasing pain in the hips and generalized weakness. Also with constipation. Also found to be anemic and transfused PRBC. We were asked for recommendations for our known patient. PLAN:  Metastatic Prostate Cancer/Neuroendocrine  - S/p multiple lines of therapy, most recently carbo/taxotere C2 on 6/8. Held on 6/29 d/t TCP. Undergoing XRT to rib and T-spine with Dr. Venetta Severs.  - Has spoken to Kathleen Samano, however wished to hold off on making a decision until he saw if XRT improved his pain  - Can resume XRT on Monday 7/11 Pt does not wish to pursue XRT. Meeting with PC today for pain mgmt and to discuss [address] - wishes to pursue facility placement and enroll in Hospice. 7/13 Pt now states he may be interested in going home since he will have support from friend moving here from out of state. Consult PT/OT. 7/14  PT recommends HHT  7/23 CT AP shows POD with [facility] liver lesions, increased prostate gland, and extensive bony mets  7/25 Pt states he is interested in pursuing XRT again, Rad Onc consulted. He states he may consider having chemo in the future as well. 7/26 Resuming XRT tomorrow (previously completed 1/1 fx to ribs and 1/5 fx to T-spine)  7/27 Resume XRT today - scheduled through 8/2 per rad onc notes. 7/28 XRT today (EOT 8/2)     Cancer Related Pain  - Currently with Dilaudid/oxy prn  - Consult palliative care to assist with pain management (known from office) and also to assist with goals of care discussions   7/10 Increased Dilaudid to 1 mg every 3 hours for severe pain. Pall care consult pending. 7/11 PC assisting with mgmt. Resume Dex. 7/12 PC started Fentanyl 50mcg patch yesterday. Pt reports pain controlled today - even able to walk around room now. 7/13 Pain controlled. PC changed oxycodone to po dilaudid yesterday. 7/14 Pain controlled  7/15 C/o pain today  7/17 pain improved after menchaca  7/20 Pain controlled on current regimen  7/22 c/o worsening abd pain. Bladder scan with minimal urine. Check CT AP.  7/23 CT AP with POD  7/29 Pain controlled on current regimen     Anemia/Thrombocytopenia  - Transfuse prn per Denae SOPs  7/24 Plt down to 41k. DC Lovenox. Check hemolysis labs, DIC labs, smear, HIT.  7/25 Plt 37k. No DIC noted. Smear, Hapto, and HIT pending. 7/26 Plt 32k. No hemolysis. HIT neg. Smear pending. 7/27 Plts stable at 34k. Hgb relatively stable - 7.6. Smear without schistocytes. Monitor. 7/28 Plt stable at 32k. Pt with hematuria, transfuse to keep plt >50k.  1 unit platelets ordered. 7/29 Plts up to 62k s/p transfusion for hematuria. No bleeding.      Transaminitis / Hyperbilirubinemia  - Alk phos/ALT/AST trended down today, continue to follow   7/11 AST/ALT continue to improve   7/13 LFTs improving  7/18 stable  7/23 LFTs increased, monitor - likely r/t liver mets  7/24 Tbili up to 1.2, LFTs con't to increase. 7/25 Bili down to 0.8. LFTs improved today. 7/27 LFTs stable - known liver mets. Tbili back to normal. Monitoring. RESOLVED     EDGARD  - Gentle hydration  RESOLVED     Constipation  - Lactulose prn   7/11 Pt refusing Lactulose. PC started pericolace. Con't Miralax. Consider KUB if no improvement. 7/12 No BM documented. Con't Miralax/Pericolace. Pt more active now, up around in room since pain controlled. 7/13 Had BM x 4 yesterday  7/20 c/o constipation. Increase pericolace to BID. Con't Miralax. Check KUB. If no obstruction, will order Movantik. 7/21 KUB with constipation. Movantik ordered. 7/22 No BM. Con't Movantik. Checking CT AP.  7/23 Pt having Bms. Con't Movantik. RESOLVED    Hypercalcemia / Hypocalcemia  7/12 CCa++ up to 11. 7. Zometa ordered. 7/15 CCa++ down to 9.2  7/19 CCa++ down to 8.4, replete  7/21 Cca++ 8.6  RESOLVED    Dysuria  7/15 c/o dysuria. UA c/w UTI. CTX ordered. Check Ucx.  7/17 UC NTD on Rocephin  7/18 Pain improved s/p menchaca placement. Ucx with MSF. Completed CTX x 3 days. RESOLVED    Urinary retention / B/l hydroureteronephrosis  7/16 menchaca ordered/ flomax ordered  7/17 UOP 4000 ml. Adding proscar  7/18 s/p menchaca placement. Con't Flomax/Proscar. Plan to DC menchaca tomorrow. 7/19 Remove menchaca for voiding trial.  If unable to void, will consult urology. 7/20 Menchaca removed yesterday and had to be replaced d/t urinary retention. Consult urology. 7/22 Awaiting urology consult  7/23 Urology felt r/t constipation, however CT shows b/l hydroureteronephrosis. Will defer mgmt to urology. 7/24 No response from urology from message sent yesterday. Will re-consult. 7/26 Urology recommends to remove menchaca this AM for voiding trial.  Also recommends reimaging later on to see if bilateral hydro has resolved (could be r/t overdistended bladder/urinary retention vs prostate cancer/obstruction).   If bilateral hydro has not improved over time, would consider bilateral stents vs nephrostomy tubes and this would be more favorable if/when kidney function declines or pt develops significant flank pain. 7/27 Clark removed yesterday, had PVR of 500cc but then able to void 200cc. Clark remains out. Cr 1.2.  7/29 Cr stable, 1.1. Voiding to urinal without issue. Hematuria / UTI  7/28 Hematuria noted. Transfuse platelets. Check UA - UA c/w UTI. Ucx ordered. Start CTX.  7/29 hematuria resolved after plt transfusion. UC pending. CTX day 2 today. Continue home meds  Denae SOPs  AC contraindicated d/t thrombocytopenia    Goals and plan of care reviewed with the patient. All questions answered to the best of our ability. Disposition:  7/14: Pt reports his friend is not moving here until 8/1. He reports she has a one-story home that he can move into on 8/1 or possibly even sooner. Pt was able to walk hallways yesterday. Unsure if he would qualify for facility placement while future living arrangements are made. CM states pt would have to go through extensive insurance process to even cover facility placement if he qualified. CM assisting with discharge planning. ? Could pt stay with son who lives locally until his friend's house is ready to move into? Pt also declines Hospice services at this time. He states he would prefer HHT. 7/15:  Pt is medically stable for discharge. Pt working on living arrangements. Hopeful for discharge home soon. 7/17 Spoke with patient's son yesterday. Patient does not a home to return to so now even if he does have a friend coming there is no home. Difficult placement. CM following. 7/19: Pt reportedly does not have a home to return to upon discharge. Son states pt is unable to live with him. Unlikely that pt would qualify for facility placement. CM following.    7/20: Pt still stable for discharge.   CM following - pt has no place to live.    7/21:  CM found available place at boarding house for patient. Pt states he was not told about this, but cannot afford $650/mo. He says he was paying $500/mo at previous place which is no longer available to him. CM following.    7/22:  Per CM, son calling to speak to the manager of the boarding home today. He also asked for list of ALFs that accept Medicaid. CM continues to follow. 7/25: Son went to see available place at boarding home - pt and son not pleased with arrangements as pt would be sharing bathroom with 3 other people. Son looking into ALFs. Pt voices concern that he would not be able to adequately care for himself if he had to live alone. CM following.    7/28: Per CM, pt is making no effort to locate living arrangements. CM assisting to help locate shelter. Sending pt's info to be reviewed at [facility] (which has availability). Pt is stable for discharge once placement arranged. HELEN Botello  Hematology & Oncology  [id number] [place] [address]  Office : [phone]  Fax : [phone]       Attending Addendum:  I have personally performed a face to face diagnostic evaluation on this patient. I have reviewed and agree with the care plan as documented above by Antonella Gonzalez N.P.  My findings are as follows: Patient appears stable, heart rate regular without murmurs, abdomen is non-tender, bowel sounds are positive. Elderly patient with metastatic prostate cancer with more subsequent neuroendocrine pathology. Hypercalcemia improved. Pain under reasonable control. Now has restarted back  T8 spine XRT. (Completes 8/2/22). Awaiting placement. Clark D/juan. Supportive care as above.           Curry Zavala MD  [facility] Hematology/Oncology  [address] [facility]  [address] [facility]  Office : [phone]  Fax : [phone]

## 2022-07-30 NOTE — PROGRESS NOTES
[facility] Hematology & Oncology        Inpatient Hematology / Oncology Progress Note    Reason for Admission:  Prostate cancer metastatic to bone [facility]) [C61, C79.51]    24 Hour Events:  Afebrile, VSS  Issues with living arrangements after discharge - CM following  XRT ongoing (EOT 8/2)  Plt 53k - last transfusion 7/28  Hematuria resolved, no reports of bleeding  Pain worse again - feels like fentanyl wearing off now ~ 48 hours    Transfusions: None  Replacements: None    ROS:  Constitutional: Positive for fatigue; negative for fever, chills. CV: Negative for chest pain, palpitations, edema. Respiratory: Negative for dyspnea, cough, wheezing. GI: Negative for nausea, diarrhea, abd pain. MSK:  +Back/hip pain. 10 point review of systems is otherwise negative with the exception of the elements mentioned above in the HPI. Allergies   Allergen Reactions    Turmeric Nausea And Vomiting     Past Medical History:   Diagnosis Date    Claustrophobia     Ear problems     Elevated PSA     Former light cigarette smoker (1-9 per day)     smoked for 35 years.   quit     History of multiple allergies     Nicotine vapor product user     Personal history of prostate cancer     Prostate cancer ([address]     prostate, neuroendocrine, mets to bone     Past Surgical History:   Procedure Laterality Date    BIOPSY PROSTATE      COLONOSCOPY  08/2020    HEENT      teeth removed     IR KYPHOPLASTY LUMBAR FIRST LEVEL  10/14/2020    IR KYPHOPLASTY LUMBAR FIRST LEVEL  10/14/2020    IR KYPHOPLASTY LUMBAR FIRST LEVEL 10/14/2020 [facility]    IR KYPHOPLASTY THORACIC FIRST LEVEL  10/29/2020    IR KYPHOPLASTY THORACIC FIRST LEVEL  10/29/2020    IR KYPHOPLASTY THORACIC FIRST LEVEL 10/29/2020 [facility]    TESTICLE REMOVAL Bilateral 02/2017     Family History   Problem Relation Age of Onset    Hypertension Father     Prostate Cancer Father     Cancer Father         prostate cancer     Social History Socioeconomic History    Marital status: Single     Spouse name: Not on file    Number of children: Not on file    Years of education: Not on file    Highest education level: Not on file   Occupational History    Not on file   Tobacco Use    Smoking status: Former     Packs/day: 0.50     Types: Cigarettes     Quit date: 2016     Years since quittin.6    Smokeless tobacco: Never   Substance and Sexual Activity    Alcohol use: Yes    Drug use: No    Sexual activity: Not on file   Other Topics Concern    Not on file   Social History Narrative    Not on file     Social Determinants of Health     Financial Resource Strain: Not on file   Food Insecurity: Not on file   Transportation Needs: Not on file   Physical Activity: Not on file   Stress: Not on file   Social Connections: Not on file   Intimate Partner Violence: Not on file   Housing Stability: Not on file     Current Facility-Administered Medications   Medication Dose Route Frequency Provider Last Rate Last Admin    0.9 % sodium chloride infusion   IntraVENous PRN Gerry Osler, APRN - MICHELLE        cefTRIAXone (ROCEPHIN) 1,000 mg in sodium chloride 0.9 % 50 mL IVPB mini-bag  1,000 mg IntraVENous Q24H Gerry Osler, APRN - CNP   Stopped at 22 1600    diatrizoate meglumine-sodium (GASTROGRAFIN) 66-10 % solution 15 mL  15 mL Oral ONCE PRN Gerry Osler, APRN - CNP   15 mL at 22 1444    HYDROmorphone HCl PF (DILAUDID) injection 1.5 mg  1.5 mg IntraVENous Q3H PRN Gerry Osler, APRN - CNP   1.5 mg at 22 0640    bisacodyl (DULCOLAX) suppository 10 mg  10 mg Rectal Daily PRN HELEN Ragland CNP   10 mg at 22 1758    naloxegol (MOVANTIK) tablet 25 mg  25 mg Oral QAM AC Gerry Osler, APRN - CNP   25 mg at 22 8098    magnesium oxide (MAG-OX) tablet 400 mg  400 mg Oral 4x Daily Rogelio Washington MD   400 mg at 22 0739    calcium carbonate (TUMS) chewable tablet 500 mg  500 mg Oral TID Gerry Osler, APRN - CNP   500 mg at 22 1745 sennosides-docusate sodium (SENOKOT-S) 8.6-50 MG tablet 2 tablet  2 tablet Oral BID HELEN Love CNP   2 tablet at 07/30/22 0739    tamsulosin (FLOMAX) capsule 0.4 mg  0.4 mg Oral Daily Moises Godoy APRN - NP   0.4 mg at 07/30/22 0740    finasteride (PROSCAR) tablet 5 mg  5 mg Oral Daily Moises Godoy APRN - NP   5 mg at 07/30/22 0740    zinc oxide 40 % paste   Topical 4x Daily PRN Pola Keene MD        lidocaine-prilocaine (EMLA) cream   Topical Once Pola Keene MD        phenazopyridine (PYRIDIUM) tablet 95 mg  95 mg Oral TID PRN HELEN Love CNP   95 mg at 07/15/22 2048    cyclobenzaprine (FLEXERIL) tablet 10 mg  10 mg Oral TID PRN HELEN Shook NP   10 mg at 07/29/22 2210    0.9 % sodium chloride infusion   IntraVENous PRN HELEN Love CNP        [Held by provider] enoxaparin (LOVENOX) injection 40 mg  40 mg SubCUTAneous Daily HELEN Love CNP   40 mg at 07/23/22 0857    fentaNYL (DURAGESIC) 75 MCG/HR 1 patch  1 patch TransDERmal Q72H HELEN Marcus CNP   1 patch at 07/28/22 1022    diphenhydrAMINE (BENADRYL) capsule 25 mg  25 mg Oral Q6H PRN HELEN Love - CNP   25 mg at 07/28/22 1242    acetaminophen (TYLENOL) tablet 650 mg  650 mg Oral Q6H PRN HELEN Love - CNP   650 mg at 07/28/22 1242    Or    acetaminophen (TYLENOL) suppository 650 mg  650 mg Rectal Q6H PRN HELEN Love CNP        HYDROmorphone (DILAUDID) tablet 2 mg  2 mg Oral Q4H PRN HELEN Mattson - CNP   2 mg at 07/21/22 1438    Or    HYDROmorphone (DILAUDID) tablet 4 mg  4 mg Oral Q4H PRN HELEN Mattson - CNP   4 mg at 07/30/22 0530    naloxone (NARCAN) injection 0.04 mg  0.04 mg IntraVENous PRN HELEN Marcus - CNP        dexamethasone (DECADRON) tablet 4 mg  4 mg Oral 2 times per day HELEN Love - CNP   4 mg at 07/30/22 0740    aluminum & magnesium hydroxide-simethicone (MAALOX) 200-200-20 MG/5ML suspension 20 mL  20 mL Oral Q6H PRN Aden Dawn MD   20 mL at 22 0809    LORazepam (ATIVAN) tablet 1 mg  1 mg Oral TID PRN Blanchie Badder, DO   1 mg at 22 2210    pantoprazole (PROTONIX) tablet 40 mg  40 mg Oral QAM AC Chloe Mao Martinez, DO   40 mg at 22 9765    sodium chloride flush 0.9 % injection 5-40 mL  5-40 mL IntraVENous 2 times per day Blanchie Badder, DO   10 mL at 22 0741    sodium chloride flush 0.9 % injection 5-40 mL  5-40 mL IntraVENous PRN Blanchie Badder, DO        0.9 % sodium chloride infusion   IntraVENous PRN Blanchie Badder, DO        ondansetron (ZOFRAN-ODT) disintegrating tablet 4 mg  4 mg Oral Q8H PRN Blanchie Badder, DO        Or    ondansetron [facility]) injection 4 mg  4 mg IntraVENous Q6H PRN Blanchie Badder, DO   4 mg at 07/10/22 1717    docusate sodium (COLACE) capsule 100 mg  100 mg Oral BID PRN Blanchie Badder, DO        polyethylene glycol (GLYCOLAX) packet 17 g  17 g Oral Daily Blanchie Badder, DO   17 g at 22 9027       OBJECTIVE:  Patient Vitals for the past 8 hrs:   BP Temp Temp src Pulse Resp SpO2   22 0705 122/83 98.5 °F (36.9 °C) Oral 83 20 90 %   22 0600 -- -- -- -- 14 --   22 0530 -- -- -- -- 17 --   22 0303 116/69 98.8 °F (37.1 °C) Oral 81 17 91 %     Temp (24hrs), Av.3 °F (36.8 °C), Min:97.8 °F (36.6 °C), Max:98.8 °F (37.1 °C)     0701 -  1900  In: 240 [P.O.:240]  Out: -     Physical Exam:  Constitutional: Well developed male sitting comfortably on the hospital bed. HEENT: Normocephalic and atraumatic. Oropharynx is clear, mucous membranes are moist.  Neck supple. Skin Warm and dry. Bruising noted on L foot and no rash noted. No erythema. No pallor. Respiratory Lungs are clear to auscultation bilaterally without wheezes, rales or rhonchi, normal air exchange without accessory muscle use. CVS Normal rate, regular rhythm and normal S1 and S2. No murmurs, gallops, or rubs. Abdomen Soft, nontender and nondistended, normoactive bowel sounds.   No palpable mass. No hepatosplenomegaly. Neuro Grossly nonfocal with no obvious sensory or motor deficits. MSK Normal range of motion in general.  No edema and no tenderness. Psych Appropriate mood and affect.         Labs:    Recent Results (from the past 24 hour(s))   CBC with Auto Differential    Collection Time: 07/30/22  3:21 AM   Result Value Ref Range    WBC 9.1 4.3 - 11.1 K/uL    RBC 2.28 (L) 4.23 - 5.6 M/uL    Hemoglobin 7.5 (L) 13.6 - 17.2 g/dL    Hematocrit 23.9 (L) 41.1 - 50.3 %    .8 (H) 79.6 - 97.8 FL    MCH 32.9 26.1 - 32.9 PG    MCHC 31.4 31.4 - 35.0 g/dL    RDW 21.4 (H) 11.9 - 14.6 %    Platelets 53 (L) 825 - 450 K/uL    MPV 10.6 9.4 - 12.3 FL    nRBC 0.06 0.0 - 0.2 K/uL    Differential Type AUTOMATED      Seg Neutrophils 84 (H) 43 - 78 %    Lymphocytes 8 (L) 13 - 44 %    Monocytes 6 4.0 - 12.0 %    Eosinophils % 1 0.5 - 7.8 %    Basophils 0 0.0 - 2.0 %    Immature Granulocytes 1 0.0 - 5.0 %    Segs Absolute 7.7 1.7 - 8.2 K/UL    Absolute Lymph # 0.7 0.5 - 4.6 K/UL    Absolute Mono # 0.6 0.1 - 1.3 K/UL    Absolute Eos # 0.1 0.0 - 0.8 K/UL    Basophils Absolute 0.0 0.0 - 0.2 K/UL    Absolute Immature Granulocyte 0.1 0.0 - 0.5 K/UL   Comprehensive Metabolic Panel    Collection Time: 07/30/22  3:21 AM   Result Value Ref Range    Sodium 135 (L) 138 - 145 mmol/L    Potassium 4.2 3.5 - 5.1 mmol/L    Chloride 103 98 - 107 mmol/L    CO2 27 21 - 32 mmol/L    Anion Gap 5 (L) 7 - 16 mmol/L    Glucose 102 (H) 65 - 100 mg/dL    BUN 28 (H) 8 - 23 MG/DL    Creatinine 1.30 0.8 - 1.5 MG/DL    GFR African American >60 >60 ml/min/1.73m2    GFR Non- 59 (L) >60 ml/min/1.73m2    Calcium 9.0 8.3 - 10.4 MG/DL    Total Bilirubin 0.7 0.2 - 1.1 MG/DL     (H) 12 - 65 U/L     (H) 15 - 37 U/L    Alk Phosphatase 481 (H) 50 - 136 U/L    Total Protein 6.5 6.3 - 8.2 g/dL    Albumin 2.6 (L) 3.2 - 4.6 g/dL    Globulin 3.9 (H) 2.3 - 3.5 g/dL    Albumin/Globulin Ratio 0.7 (L) 1.2 - 3.5 Magnesium    Collection Time: 07/30/22  3:21 AM   Result Value Ref Range    Magnesium 2.2 1.8 - 2.4 mg/dL       Imaging:  Xray Result (most recent):  XR ABDOMEN (KUB) (SINGLE AP VIEW) 07/20/2022    Narrative  EXAM: ABDOMINAL RADIOGRAPH, 1 view    INDICATION: Constipation    COMPARISON: 9/1/2020    TECHNIQUE: Frontal abdominal radiography was performed. FINDINGS: Limited evaluation due to the patient's body habitus. No clear  obstruction bowel gas pattern. No intraperitoneal free air appreciated. Moderate  retained colonic fecal burden. Prior vertebroplasty at multiple levels. Impression  1. No acute abnormality within the limitation imposed by patient body habitus. 2.  Constipation. 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)    Hypokalemia    Gynecomastia, male    Neuroendocrine carcinoma, high grade (HCC)    Anemia due to chemotherapy    Primary prostate cancer with metastasis from prostate to other site [facility])    Prostatic nodule    Family history of prostate cancer in father    Acute prostatitis    S/P TURP    Prostate cancer [facility])    Prostate cancer metastatic to bone ([address]    Thrombocytopenia ([address]    Hypomagnesemia    Hypercalcemia of malignancy    Pain due to malignant neoplasm metastatic to bone (HCC)    EDGARD (acute kidney injury) ([address]    Debility    Drug-induced constipation    Encounter for palliative care    Abnormal CT of the abdomen    Hydronephrosis    Hydroureter     Mr. Isaías Alejandre is a 78 y.o. male admitted on 7/8/2022 with a primary diagnosis of There were no encounter diagnoses. .       Mr. Isaías Alejandre has a PMH of metastatic prostate cancer dx 2017 s/p casodex and bilateral orchiectomy for surgical castration, 6 cycles of docetaxel, pathologic vertebral fx's with path positive for high grade neuroendocrine cancer, s/p carbo/etoposide and most recently carbo/taxotere which was held d/t TCP. Last was C2 on 6/8/22.   Also with T8 Pall care consult pending. 7/11 PC assisting with mgmt. Resume Dex. 7/12 PC started Fentanyl 50mcg patch yesterday. Pt reports pain controlled today - even able to walk around room now. 7/13 Pain controlled. PC changed oxycodone to po dilaudid yesterday. 7/14 Pain controlled  7/15 C/o pain today  7/17 pain improved after menchaca  7/20 Pain controlled on current regimen  7/22 c/o worsening abd pain. Bladder scan with minimal urine. Check CT AP.  7/23 CT AP with POD  7/30 Feels pain increasing - will increase Fentanyl patch to 75 mcg every 48 hours and continue PO Dilaudid for breakthrough. Anemia/Thrombocytopenia  - Transfuse prn per Denae SOPs  7/24 Plt down to 41k. DC Lovenox. Check hemolysis labs, DIC labs, smear, HIT.  7/25 Plt 37k. No DIC noted. Smear, Hapto, and HIT pending. 7/26 Plt 32k. No hemolysis. HIT neg. Smear pending. 7/27 Plts stable at 34k. Hgb relatively stable - 7.6. Smear without schistocytes. Monitor. 7/28 Plt stable at 32k. Pt with hematuria, transfuse to keep plt >50k.  1 unit platelets ordered. 7/29 Plts up to 62k s/p transfusion for hematuria. No bleeding. 7/30 Platelets 97D, Hgb 7.5 - no transfusion needed. Transaminitis / Hyperbilirubinemia  - Alk phos/ALT/AST trended down today, continue to follow   7/11 AST/ALT continue to improve   7/13 LFTs improving  7/18 stable  7/23 LFTs increased, monitor - likely r/t liver mets  7/24 Tbili up to 1.2, LFTs con't to increase. 7/25 Bili down to 0.8. LFTs improved today. 7/27 LFTs stable - known liver mets. Tbili back to normal. Monitoring. RESOLVED     EDGARD  - Gentle hydration  RESOLVED     Constipation  - Lactulose prn   7/11 Pt refusing Lactulose. PC started pericolace. Con't Miralax. Consider KUB if no improvement. 7/12 No BM documented. Con't Miralax/Pericolace. Pt more active now, up around in room since pain controlled. 7/13 Had BM x 4 yesterday  7/20 c/o constipation. Increase pericolace to BID.   Con't Miralax. Check KUB. If no obstruction, will order Movantik. 7/21 KUB with constipation. Movantik ordered. 7/22 No BM. Con't Movantik. Checking CT AP.  7/23 Pt having Bms. Con't Movantik. RESOLVED    Hypercalcemia / Hypocalcemia  7/12 CCa++ up to 11. 7. Zometa ordered. 7/15 CCa++ down to 9.2  7/19 CCa++ down to 8.4, replete  7/21 Cca++ 8.6  RESOLVED    Dysuria  7/15 c/o dysuria. UA c/w UTI. CTX ordered. Check Ucx.  7/17 UC NTD on Rocephin  7/18 Pain improved s/p menchaca placement. Ucx with MSF. Completed CTX x 3 days. RESOLVED    Urinary retention / B/l hydroureteronephrosis  7/16 menchaca ordered/ flomax ordered  7/17 UOP 4000 ml. Adding proscar  7/18 s/p menchaca placement. Con't Flomax/Proscar. Plan to DC menchaca tomorrow. 7/19 Remove menchaca for voiding trial.  If unable to void, will consult urology. 7/20 Menchaca removed yesterday and had to be replaced d/t urinary retention. Consult urology. 7/22 Awaiting urology consult  7/23 Urology felt r/t constipation, however CT shows b/l hydroureteronephrosis. Will defer mgmt to urology. 7/24 No response from urology from message sent yesterday. Will re-consult. 7/26 Urology recommends to remove menchaca this AM for voiding trial.  Also recommends reimaging later on to see if bilateral hydro has resolved (could be r/t overdistended bladder/urinary retention vs prostate cancer/obstruction). If bilateral hydro has not improved over time, would consider bilateral stents vs nephrostomy tubes and this would be more favorable if/when kidney function declines or pt develops significant flank pain. 7/27 Menchaca removed yesterday, had PVR of 500cc but then able to void 200cc. Menchaca remains out. Cr 1.2.  7/29 Cr stable, 1.1. Voiding to urinal without issue. Hematuria / UTI  7/28 Hematuria noted. Transfuse platelets. Check UA - UA c/w UTI. Ucx ordered. Start CTX.  7/30 hematuria resolved after plt transfusion. UC NGTD. CTX day 3 today.     Continue home meds  Denae SOPs  AC contraindicated d/t thrombocytopenia    Goals and plan of care reviewed with the patient. All questions answered to the best of our ability. Disposition:  7/14: Pt reports his friend is not moving here until 8/1. He reports she has a one-story home that he can move into on 8/1 or possibly even sooner. Pt was able to walk hallways yesterday. Unsure if he would qualify for facility placement while future living arrangements are made. CM states pt would have to go through extensive insurance process to even cover facility placement if he qualified. CM assisting with discharge planning. ? Could pt stay with son who lives locally until his friend's house is ready to move into? Pt also declines Hospice services at this time. He states he would prefer [facility]. 7/15:  Pt is medically stable for discharge. Pt working on living arrangements. Hopeful for discharge home soon. 7/17 Spoke with patient's son yesterday. Patient does not a home to return to so now even if he does have a friend coming there is no home. Difficult placement. CM following. 7/19: Pt reportedly does not have a home to return to upon discharge. Son states pt is unable to live with him. Unlikely that pt would qualify for facility placement. CM following.    7/20: Pt still stable for discharge. CM following - pt has no place to live.    7/21:  CM found available place at boarding house for patient. Pt states he was not told about this, but cannot afford $650/mo. He says he was paying $500/mo at previous place which is no longer available to him. CM following.    7/22:  Per CM, son calling to speak to the manager of the boarding home today. He also asked for list of ALFs that accept Medicaid. CM continues to follow. 7/25: Son went to see available place at boarding home - pt and son not pleased with arrangements as pt would be sharing bathroom with 3 other people. Son looking into ALFs.   Pt voices concern that he would not be able to adequately care for himself if he had to live alone. CM following.    7/30: Per CM, pt is making no effort to locate living arrangements. CM assisting to help locate [facility]. Sending pt's info to be reviewed at [facility] (which has availability). Pt is stable for discharge once placement arranged. No new updates. Ashley Lombardo, HARRY-C   [address] [place] [address] Hematology & Oncology  [address] [place] [address]  Office : [phone]  Fax : [phone]       Attending Addendum:  I have personally performed a face to face diagnostic evaluation on this patient. I have reviewed and agree with the care plan as documented above by Jonah Vazquez N.P.  My findings are as follows: Patient appears stable, heart rate regular without murmurs, abdomen is non-tender, bowel sounds are positive. Elderly patient with metastatic prostate cancer with more subsequent neuroendocrine pathology. Hypercalcemia improved. Pain under reasonable control, replace fentanyl patch today. Now has restarted back T8 spine XRT (Completes 8/2/22). Awaiting placement. Clark D/juan. Supportive care as above.           Nimesh Raygoza MD  [address] [place] [address] Hematology/Oncology  [address] [place] [address]  Office : [phone]  Fax : [phone]

## 2022-07-31 NOTE — PROGRESS NOTES
[facility] Hematology & Oncology        Inpatient Hematology / Oncology Progress Note    Reason for Admission:  Prostate cancer metastatic to bone [facility]) [C61, C79.51]    24 Hour Events:  Afebrile, VSS  Issues with living arrangements after discharge - CM following  XRT ongoing (EOT 8/2)  Plt 49k - last transfusion 7/28  Hematuria resolved, no reports of bleeding  Pain was worse again - fentanyl patch changed to every 48 hours with better relief  Constipation relieved with Lactulose     Transfusions: None  Replacements: None    ROS:  Constitutional: Positive for fatigue; negative for fever, chills. CV: Negative for chest pain, palpitations, edema. Respiratory: Negative for dyspnea, cough, wheezing. GI: Negative for nausea, diarrhea, abd pain. MSK:  +Back/hip pain. 10 point review of systems is otherwise negative with the exception of the elements mentioned above in the HPI. Allergies   Allergen Reactions    Turmeric Nausea And Vomiting     Past Medical History:   Diagnosis Date    Claustrophobia     Ear problems     Elevated PSA     Former light cigarette smoker (1-9 per day)     smoked for 35 years.   quit     History of multiple allergies     Nicotine vapor product user     Personal history of prostate cancer     Prostate cancer ([facility] [address]     prostate, neuroendocrine, mets to bone     Past Surgical History:   Procedure Laterality Date    BIOPSY PROSTATE      COLONOSCOPY  08/2020    HEENT      teeth removed     IR KYPHOPLASTY LUMBAR FIRST LEVEL  10/14/2020    IR KYPHOPLASTY LUMBAR FIRST LEVEL  10/14/2020    IR KYPHOPLASTY LUMBAR FIRST LEVEL 10/14/2020 [facility] RADIOLOGY SPECI[facility]    IR KYPHOPLASTY THORACIC FIRST LEVEL  10/29/2020    IR KYPHOPLASTY THORACIC FIRST LEVEL  10/29/2020    IR KYPHOPLASTY THORACIC FIRST LEVEL 10/29/2020 [facility] RADIOLOGY SPECIALS    TESTICLE REMOVAL Bilateral 02/2017     Family History   Problem Relation Age of Onset    Hypertension Father     Prostate Cancer Father     Cancer Father         prostate cancer     Social History     Socioeconomic History    Marital status: Single     Spouse name: Not on file    Number of children: Not on file    Years of education: Not on file    Highest education level: Not on file   Occupational History    Not on file   Tobacco Use    Smoking status: Former     Packs/day: 0.50     Types: Cigarettes     Quit date: 2016     Years since quittin.6    Smokeless tobacco: Never   Substance and Sexual Activity    Alcohol use: Yes    Drug use: No    Sexual activity: Not on file   Other Topics Concern    Not on file   Social History Narrative    Not on file     Social Determinants of Health     Financial Resource Strain: Not on file   Food Insecurity: Not on file   Transportation Needs: Not on file   Physical Activity: Not on file   Stress: Not on file   Social Connections: Not on file   Intimate Partner Violence: Not on file   Housing Stability: Not on file     Current Facility-Administered Medications   Medication Dose Route Frequency Provider Last Rate Last Admin    fentaNYL (DURAGESIC) 75 MCG/HR 1 patch  1 patch TransDERmal Q48H Auther Flax, NP-C   1 patch at 22 1145    lactulose (CHRONULAC) 10 GM/15ML solution 20 g  20 g Oral BID PRN Auther Flax, NP-C   20 g at 22 0842    0.9 % sodium chloride infusion   IntraVENous PRN HELEN Chapman - CNP        diatrizoate meglumine-sodium (GASTROGRAFIN) 66-10 % solution 15 mL  15 mL Oral ONCE PRN HELEN Chapman CNP   15 mL at 22 1444    HYDROmorphone HCl PF (DILAUDID) injection 1.5 mg  1.5 mg IntraVENous Q3H PRN HELEN Chapman CNP   1.5 mg at 22 0640    bisacodyl (DULCOLAX) suppository 10 mg  10 mg Rectal Daily PRN HELEN Bautista CNP   10 mg at 22 1758    naloxegol (MOVANTIK) tablet 25 mg  25 mg Oral QAM AC HELEN Chapman CNP   25 mg at 22 0622    magnesium oxide (MAG-OX) tablet 400 mg  400 mg Oral 4x Daily Niall Mukherjee MD   400 mg at 07/31/22 0842    calcium carbonate (TUMS) chewable tablet 500 mg  500 mg Oral TID Raven Cr APRN - CNP   500 mg at 07/31/22 0841    sennosides-docusate sodium (SENOKOT-S) 8.6-50 MG tablet 2 tablet  2 tablet Oral BID Raven Cr APRN - CNP   2 tablet at 07/31/22 0841    tamsulosin (FLOMAX) capsule 0.4 mg  0.4 mg Oral Daily Yany Carmona APRN - NP   0.4 mg at 07/31/22 0842    finasteride (PROSCAR) tablet 5 mg  5 mg Oral Daily Yany Carmona APRN - NP   5 mg at 07/31/22 9800    zinc oxide 40 % paste   Topical 4x Daily PRN Josy Rothman MD        lidocaine-prilocaine (EMLA) cream   Topical Once Josy Rothman MD        phenazopyridine (PYRIDIUM) tablet 95 mg  95 mg Oral TID PRN Raven Cr APRN - CNP   95 mg at 07/15/22 2048    cyclobenzaprine (FLEXERIL) tablet 10 mg  10 mg Oral TID PRN HELEN Gaspar - NP   10 mg at 07/31/22 0535    0.9 % sodium chloride infusion   IntraVENous PRN Raven Cr APRN - CNP        [Held by provider] enoxaparin (LOVENOX) injection 40 mg  40 mg SubCUTAneous Daily Raven Cr APRN - CNP   40 mg at 07/23/22 0857    diphenhydrAMINE (BENADRYL) capsule 25 mg  25 mg Oral Q6H PRN Ana Laurana Tayo, APRN - CNP   25 mg at 07/28/22 1242    acetaminophen (TYLENOL) tablet 650 mg  650 mg Oral Q6H PRN Ana Laurana Tayo, APRN - CNP   650 mg at 07/28/22 1242    Or    acetaminophen (TYLENOL) suppository 650 mg  650 mg Rectal Q6H PRN Edwena Tayo, APRN - CNP        HYDROmorphone (DILAUDID) tablet 2 mg  2 mg Oral Q4H PRN Jasmina Peacock APRN - CNP   2 mg at 07/21/22 1438    Or    HYDROmorphone (DILAUDID) tablet 4 mg  4 mg Oral Q4H PRN HELEN Gross - CNP   4 mg at 07/31/22 0400    naloxone (NARCAN) injection 0.04 mg  0.04 mg IntraVENous PRN Katina Brand, APRN - CNP        dexamethasone (DECADRON) tablet 4 mg  4 mg Oral 2 times per day HELEN Galvan - CNP   4 mg at 07/31/22 0842    aluminum & magnesium hydroxide-simethicone (MAALOX) 200-200-20 MG/5ML suspension 20 mL  20 mL Oral Q6H PRN Joy Mejia Sandra Arriola MD   20 mL at 22 0809    LORazepam (ATIVAN) tablet 1 mg  1 mg Oral TID PRN Theocarlotta Lutz, DO   1 mg at 22 2116    pantoprazole (PROTONIX) tablet 40 mg  40 mg Oral QAM AC Tariq Abdi Martinez, DO   40 mg at 22 7932    sodium chloride flush 0.9 % injection 5-40 mL  5-40 mL IntraVENous 2 times per day Theo Pheasant, DO   10 mL at 22 0843    sodium chloride flush 0.9 % injection 5-40 mL  5-40 mL IntraVENous PRN Theo Pheasant, DO        0.9 % sodium chloride infusion   IntraVENous PRN Theo Pheasant, DO        ondansetron (ZOFRAN-ODT) disintegrating tablet 4 mg  4 mg Oral Q8H PRN Theo Pheasant, DO        Or    ondansetron [facility]) injection 4 mg  4 mg IntraVENous Q6H PRN Theo Pheasant, DO   4 mg at 07/10/22 1717    docusate sodium (COLACE) capsule 100 mg  100 mg Oral BID PRN Theo Pheasant, DO   100 mg at 22 1406    polyethylene glycol (GLYCOLAX) packet 17 g  17 g Oral Daily Theo Pheasant, DO   17 g at 22 0840       OBJECTIVE:  Patient Vitals for the past 8 hrs:   BP Temp Temp src Pulse Resp SpO2 Weight   22 0717 116/71 97.6 °F (36.4 °C) Oral 88 18 93 % --   22 0615 -- -- -- -- -- -- 198 lb 11.2 oz (90.1 kg)   22 0442 123/77 98.4 °F (36.9 °C) Oral 91 16 (!) 89 % --   22 0400 -- -- -- -- 16 -- --     Temp (24hrs), Av.1 °F (36.7 °C), Min:97.6 °F (36.4 °C), Max:98.4 °F (36.9 °C)     07 -  1900  In: 480 [P.O.:480]  Out: -     Physical Exam:  Constitutional: Well developed male sitting comfortably on the hospital bed. HEENT: Normocephalic and atraumatic. Oropharynx is clear, mucous membranes are moist.  Neck supple. Skin Warm and dry. Bruising noted on L foot and no rash noted. No erythema. No pallor. Respiratory Lungs are clear to auscultation bilaterally without wheezes, rales or rhonchi, normal air exchange without accessory muscle use. CVS Normal rate, regular rhythm and normal S1 and S2.   No murmurs, gallops, or rubs. Abdomen Soft, nontender and nondistended, normoactive bowel sounds. No palpable mass. No hepatosplenomegaly. Neuro Grossly nonfocal with no obvious sensory or motor deficits. MSK Normal range of motion in general.  No edema and no tenderness. Psych Appropriate mood and affect.         Labs:    Recent Results (from the past 24 hour(s))   CBC with Auto Differential    Collection Time: 07/31/22  3:47 AM   Result Value Ref Range    WBC 8.8 4.3 - 11.1 K/uL    RBC 2.42 (L) 4.23 - 5.6 M/uL    Hemoglobin 7.9 (L) 13.6 - 17.2 g/dL    Hematocrit 25.2 (L) 41.1 - 50.3 %    .1 (H) 79.6 - 97.8 FL    MCH 32.6 26.1 - 32.9 PG    MCHC 31.3 (L) 31.4 - 35.0 g/dL    RDW 21.1 (H) 11.9 - 14.6 %    Platelets 49 (L) 127 - 450 K/uL    MPV 10.2 9.4 - 12.3 FL    nRBC 0.07 0.0 - 0.2 K/uL    Differential Type AUTOMATED      Seg Neutrophils 83 (H) 43 - 78 %    Lymphocytes 8 (L) 13 - 44 %    Monocytes 7 4.0 - 12.0 %    Eosinophils % 1 0.5 - 7.8 %    Basophils 0 0.0 - 2.0 %    Immature Granulocytes 1 0.0 - 5.0 %    Segs Absolute 7.3 1.7 - 8.2 K/UL    Absolute Lymph # 0.7 0.5 - 4.6 K/UL    Absolute Mono # 0.6 0.1 - 1.3 K/UL    Absolute Eos # 0.1 0.0 - 0.8 K/UL    Basophils Absolute 0.0 0.0 - 0.2 K/UL    Absolute Immature Granulocyte 0.1 0.0 - 0.5 K/UL   Comprehensive Metabolic Panel    Collection Time: 07/31/22  3:47 AM   Result Value Ref Range    Sodium 135 (L) 138 - 145 mmol/L    Potassium 4.4 3.5 - 5.1 mmol/L    Chloride 101 98 - 107 mmol/L    CO2 27 21 - 32 mmol/L    Anion Gap 7 7 - 16 mmol/L    Glucose 104 (H) 65 - 100 mg/dL    BUN 30 (H) 8 - 23 MG/DL    Creatinine 1.30 0.8 - 1.5 MG/DL    GFR African American >60 >60 ml/min/1.73m2    GFR Non- 59 (L) >60 ml/min/1.73m2    Calcium 9.1 8.3 - 10.4 MG/DL    Total Bilirubin 0.8 0.2 - 1.1 MG/DL     (H) 12 - 65 U/L     (H) 15 - 37 U/L    Alk Phosphatase 499 (H) 50 - 136 U/L    Total Protein 6.6 6.3 - 8.2 g/dL    Albumin 2.6 (L) 3.2 - 4.6 g/dL    Globulin 4.0 (H) 2.3 - 3.5 g/dL    Albumin/Globulin Ratio 0.7 (L) 1.2 - 3.5     Magnesium    Collection Time: 07/31/22  3:47 AM   Result Value Ref Range    Magnesium 2.0 1.8 - 2.4 mg/dL       Imaging:  Xray Result (most recent):  XR ABDOMEN (KUB) (SINGLE AP VIEW) 07/20/2022    Narrative  EXAM: ABDOMINAL RADIOGRAPH, 1 view    INDICATION: Constipation    COMPARISON: 9/1/2020    TECHNIQUE: Frontal abdominal radiography was performed. FINDINGS: Limited evaluation due to the patient's body habitus. No clear  obstruction bowel gas pattern. No intraperitoneal free air appreciated. Moderate  retained colonic fecal burden. Prior vertebroplasty at multiple levels. Impression  1. No acute abnormality within the limitation imposed by patient body habitus. 2.  Constipation. 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)    Hypokalemia    Gynecomastia, male    Neuroendocrine carcinoma, high grade (HCC)    Anemia due to chemotherapy    Primary prostate cancer with metastasis from prostate to other site [facility])    Prostatic nodule    Family history of prostate cancer in father    Acute prostatitis    S/P TURP    Prostate cancer [facility])    Prostate cancer metastatic to bone ([facility] [address]    Thrombocytopenia ([facility] [address]    Hypomagnesemia    Hypercalcemia of malignancy    Pain due to malignant neoplasm metastatic to bone (HCC)    EDGARD (acute kidney injury) ([facility] [address]    Debility    Drug-induced constipation    Encounter for palliative care    Abnormal CT of the abdomen    Hydronephrosis    Hydroureter     Mr. Debbie Donaldson is a 78 y.o. male admitted on 7/8/2022 with a primary diagnosis of There were no encounter diagnoses. .       Mr. Debbie Donaldson has a PMH of metastatic prostate cancer dx 2017 s/p casodex and bilateral orchiectomy for surgical castration, 6 cycles of docetaxel, pathologic vertebral fx's with path positive for high grade neuroendocrine cancer, s/p carbo/etoposide and most recently carbo/taxotere which was held d/t TCP. Last was C2 on 6/8/22. 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 He is a known patient of Dr. Gina Brown s/p multiple lines of treatment as above. At last office visit on 6/29, CEA had notably continued to rise to 8290. He has had discussion with [facility], however wished to hold off on making a decision until he completed radiation therapy. He presented to the ER with a fall at home, increasing pain in the hips and generalized weakness. Also with constipation. Also found to be anemic and transfused PRBC. We were asked for recommendations for our known patient. PLAN:  Metastatic Prostate Cancer/Neuroendocrine  - S/p multiple lines of therapy, most recently carbo/taxotere C2 on 6/8. Held on 6/29 d/t TCP. Undergoing XRT to rib and T-spine with Dr. Johnny Carpio.  - Has spoken to [facility], however wished to hold off on making a decision until he saw if XRT improved his pain  - Can resume XRT on Monday 7/11 Pt does not wish to pursue XRT. Meeting with PC today for pain mgmt and to discuss [address] - wishes to pursue facility placement and enroll in Hospice. 7/13 Pt now states he may be interested in going home since he will have support from friend moving here from out of state. Consult PT/OT. 7/14  PT recommends HHT  7/23 CT AP shows POD with [facility] liver lesions, increased prostate gland, and extensive bony mets  7/25 Pt states he is interested in pursuing XRT again, Rad Onc consulted. He states he may consider having chemo in the future as well. 7/26 Resuming XRT tomorrow (previously completed 1/1 fx to ribs and 1/5 fx to T-spine)  7/27 Resume XRT today - scheduled through 8/2 per rad onc notes. 7/29 XRT today (EOT 8/2).      Cancer Related Pain  - Currently with Dilaudid/oxy prn  - Consult palliative care to assist with pain management (known from office) and also to assist with goals of care discussions   7/10 Increased Dilaudid to 1 mg every 3 hours for severe pain. Pall care consult pending. 7/11 PC assisting with mgmt. Resume Dex. 7/12 PC started Fentanyl 50mcg patch yesterday. Pt reports pain controlled today - even able to walk around room now. 7/13 Pain controlled. PC changed oxycodone to po dilaudid yesterday. 7/14 Pain controlled  7/15 C/o pain today  7/17 pain improved after menchaca  7/20 Pain controlled on current regimen  7/22 c/o worsening abd pain. Bladder scan with minimal urine. Check CT AP.  7/23 CT AP with POD  7/30 Feels pain increasing - will increase Fentanyl patch to 75 mcg every 48 hours and continue PO Dilaudid for breakthrough. 7/31 Better with adjustment      Anemia/Thrombocytopenia  - Transfuse prn per Denae SOPs  7/24 Plt down to 41k. DC Lovenox. Check hemolysis labs, DIC labs, smear, HIT.  7/25 Plt 37k. No DIC noted. Smear, Hapto, and HIT pending. 7/26 Plt 32k. No hemolysis. HIT neg. Smear pending. 7/27 Plts stable at 34k. Hgb relatively stable - 7.6. Smear without schistocytes. Monitor. 7/28 Plt stable at 32k. Pt with hematuria, transfuse to keep plt >50k.  1 unit platelets ordered. 7/29 Plts up to 62k s/p transfusion for hematuria. No bleeding. 7/31 Platelets 36J, Hgb 7.9 - no transfusion needed. Transaminitis / Hyperbilirubinemia  - Alk phos/ALT/AST trended down today, continue to follow   7/11 AST/ALT continue to improve   7/13 LFTs improving  7/18 stable  7/23 LFTs increased, monitor - likely r/t liver mets  7/24 Tbili up to 1.2, LFTs con't to increase. 7/25 Bili down to 0.8. LFTs improved today. 7/27 LFTs stable - known liver mets. Tbili back to normal. Monitoring. RESOLVED - STABLE     EDGARD  - Gentle hydration  RESOLVED     Constipation  - Lactulose prn   7/11 Pt refusing Lactulose. PC started pericolace. Con't Miralax. Consider KUB if no improvement. 7/12 No BM documented. Con't Miralax/Pericolace.   Pt more active now, up around in room since pain controlled. 7/13 Had BM x 4 yesterday  7/20 c/o constipation. Increase pericolace to BID. Con't Miralax. Check KUB. If no obstruction, will order Movantik. 7/21 KUB with constipation. Movantik ordered. 7/22 No BM. Con't Movantik. Checking CT AP.  7/23 Pt having Bms. Con't Movantik. 7/31 Added Lactulose with good relief. Hypercalcemia / Hypocalcemia  7/12 CCa++ up to 11. 7. Zometa ordered. 7/15 CCa++ down to 9.2  7/19 CCa++ down to 8.4, replete  7/21 Cca++ 8.6  RESOLVED    Dysuria  7/15 c/o dysuria. UA c/w UTI. CTX ordered. Check Ucx.  7/17 UC NTD on Rocephin  7/18 Pain improved s/p menchaca placement. Ucx with MSF. Completed CTX x 3 days. RESOLVED    Urinary retention / B/l hydroureteronephrosis  7/16 menchaca ordered/ flomax ordered  7/17 UOP 4000 ml. Adding proscar  7/18 s/p menchaca placement. Con't Flomax/Proscar. Plan to DC menchaca tomorrow. 7/19 Remove menchaca for voiding trial.  If unable to void, will consult urology. 7/20 Menchaca removed yesterday and had to be replaced d/t urinary retention. Consult urology. 7/22 Awaiting urology consult  7/23 Urology felt r/t constipation, however CT shows b/l hydroureteronephrosis. Will defer mgmt to urology. 7/24 No response from urology from message sent yesterday. Will re-consult. 7/26 Urology recommends to remove menchaca this AM for voiding trial.  Also recommends reimaging later on to see if bilateral hydro has resolved (could be r/t overdistended bladder/urinary retention vs prostate cancer/obstruction). If bilateral hydro has not improved over time, would consider bilateral stents vs nephrostomy tubes and this would be more favorable if/when kidney function declines or pt develops significant flank pain. 7/27 Menchaca removed yesterday, had PVR of 500cc but then able to void 200cc. Menchaca remains out. Cr 1.2.  7/29 Cr stable, 1.1. Voiding to urinal without issue. Hematuria / UTI  7/28 Hematuria noted.   Transfuse platelets. Check UA - UA c/w UTI. Ucx ordered. Start CTX.  7/30 hematuria resolved after plt transfusion. UC NGTD. CTX day 3 today. Continue home meds  Denae SOPs  AC contraindicated d/t thrombocytopenia    Goals and plan of care reviewed with the patient. All questions answered to the best of our ability. Disposition:  7/14: Pt reports his friend is not moving here until 8/1. He reports she has a one-story home that he can move into on 8/1 or possibly even sooner. Pt was able to walk hallways yesterday. Unsure if he would qualify for facility placement while future living arrangements are made. CM states pt would have to go through extensive insurance process to even cover facility placement if he qualified. CM assisting with discharge planning. ? Could pt stay with son who lives locally until his friend's house is ready to move into? Pt also declines Hospice services at this time. He states he would prefer HHT. 7/15:  Pt is medically stable for discharge. Pt working on living arrangements. Hopeful for discharge home soon. 7/17 Spoke with patient's son yesterday. Patient does not a home to return to so now even if he does have a friend coming there is no home. Difficult placement. CM following. 7/19: Pt reportedly does not have a home to return to upon discharge. Son states pt is unable to live with him. Unlikely that pt would qualify for facility placement. CM following.    7/20: Pt still stable for discharge. CM following - pt has no place to live.    7/21:  CM found available place at [facility] for patient. Pt states he was not told about this, but cannot afford $650/mo. He says he was paying $500/mo at previous place which is no longer available to him. CM following.    7/22:  Per CM, [facility] He also asked for list of [facility] that accept Medicaid. CM continues to follow. 7/25:   Son went to see available place at boarding home - pt and son not pleased with arrangements as pt would be sharing bathroom with 3 other people. Son looking into ALFs. Pt voices concern that he would not be able to adequately care for himself if he had to live alone. CM following.    7/31: Per CM, pt is making no effort to locate living arrangements. CM assisting to help locate [facility]. Sending pt's info to be reviewed at [facility] (which has availability). Pt is stable for discharge once placement arranged. No new updates. RADHA Fulton   [facility] Hematology & Oncology  [address] [place] [address]  Office : [phone]  Fax : [phone]         Attending Addendum:  I have personally performed a face to face diagnostic evaluation on this patient. I have reviewed and agree with the care plan as documented above by Pura De La Rosa N.P.  My findings are as follows: Patient appears stable, heart rate regular without murmurs, abdomen is non-tender, bowel sounds are positive. Elderly patient with metastatic prostate cancer with more subsequent neuroendocrine pathology. Hypercalcemia improved. Pain under reasonable control, fentanyl patch changed yesterday. Now has restarted back T8 spine XRT (Completes 8/2/22). Awaiting placement. Clark D/juan. Supportive care as above.           Meliton Newell MD  [facility] Hematology/Oncology  [address] [place] [address]  Office : [phone]  Fax : [phone]

## 2022-08-01 NOTE — CARE COORDINATION
Chart screened by CM for d/c planning. XRT EOT is 8/2. Pt has been medically stable for d/c for over 2 weeks per oncology NP notes. Pt has not made effort in his d/c planning. Pt's son is assisting some but still expects CM to locate placement for pt. Pt's son stated he \"made a few calls to some ALFs and left messages but hasn't heard back. \"    CM was able to reach [facility] at [facility]. She stated she needs to perform a face to face assessment on pt. This has been scheduled for 8/2 between [id number]. [facility] has CM's contact number and is to call when she arrives at the hospital.  RAUL met with the unit supervisor to provide update. CM called pt's son with update on JAM[facility] meeting. He was appreciative of the update and stated he would call pt later today to discuss this with him. CM will continue to follow.

## 2022-08-01 NOTE — PROGRESS NOTES
[facility] Hematology & Oncology        Inpatient Hematology / Oncology Progress Note    Reason for Admission:  Prostate cancer metastatic to bone [facility]) [C61, C79.51]    24 Hour Events:  Afebrile, VSS  Issues with living arrangements after discharge - CM following  XRT ongoing (EOT 8/2)    Transfusions: None  Replacements: None    ROS:  Constitutional: Positive for fatigue; negative for fever, chills. CV: Negative for chest pain, palpitations, edema. Respiratory: Negative for dyspnea, cough, wheezing. GI: Negative for nausea, diarrhea, abd pain. MSK:  +Back/hip pain. 10 point review of systems is otherwise negative with the exception of the elements mentioned above in the HPI. Allergies   Allergen Reactions    Turmeric Nausea And Vomiting     Past Medical History:   Diagnosis Date    Claustrophobia     Ear problems     Elevated PSA     Former light cigarette smoker (1-9 per day)     smoked for 35 years.   quit     History of multiple allergies     Nicotine vapor product user     Personal history of prostate cancer     Prostate cancer ([facility] [address]     prostate, neuroendocrine, mets to bone     Past Surgical History:   Procedure Laterality Date    BIOPSY PROSTATE      COLONOSCOPY  08/2020    HEENT      teeth removed     IR KYPHOPLASTY LUMBAR FIRST LEVEL  10/14/2020    IR KYPHOPLASTY LUMBAR FIRST LEVEL  10/14/2020    IR KYPHOPLASTY LUMBAR FIRST LEVEL 10/14/2020 [facility] RADIOLOGY SPECI[facility]    IR KYPHOPLASTY THORACIC FIRST LEVEL  10/29/2020    IR KYPHOPLASTY THORACIC FIRST LEVEL  10/29/2020    IR KYPHOPLASTY THORACIC FIRST LEVEL 10/29/2020 [facility] RADIOLOGY SPECIALS    TESTICLE REMOVAL Bilateral 02/2017     Family History   Problem Relation Age of Onset    Hypertension Father     Prostate Cancer Father     Cancer Father         prostate cancer     Social History     Socioeconomic History    Marital status: Single     Spouse name: Not on file    Number of children: Not on file    Years of education: Not on file    Highest education level: Not on file   Occupational History    Not on file   Tobacco Use    Smoking status: Former     Packs/day: 0.50     Types: Cigarettes     Quit date: 2016     Years since quittin.6    Smokeless tobacco: Never   Substance and Sexual Activity    Alcohol use: Yes    Drug use: No    Sexual activity: Not on file   Other Topics Concern    Not on file   Social History Narrative    Not on file     Social Determinants of Health     Financial Resource Strain: Not on file   Food Insecurity: Not on file   Transportation Needs: Not on file   Physical Activity: Not on file   Stress: Not on file   Social Connections: Not on file   Intimate Partner Violence: Not on file   Housing Stability: Not on file     Current Facility-Administered Medications   Medication Dose Route Frequency Provider Last Rate Last Admin    fentaNYL (DURAGESIC) 75 MCG/HR 1 patch  1 patch TransDERmal Q48H [place], NP-C   1 patch at 22 1213    lactulose (CHRONULAC) 10 GM/15ML solution 20 g  20 g Oral BID PRN Cam Baeza NP-C   20 g at 22 0842    0.9 % sodium chloride infusion   IntraVENous PRN Salud Chawla APRN - CNP        diatrizoate meglumine-sodium (GASTROGRAFIN) 66-10 % solution 15 mL  15 mL Oral ONCE PRN Salud Chawla APRN - CNP   15 mL at 22 1444    HYDROmorphone HCl PF (DILAUDID) injection 1.5 mg  1.5 mg IntraVENous Q3H PRN Salud Chawla APRN - CNP   1.5 mg at 22 0640    bisacodyl (DULCOLAX) suppository 10 mg  10 mg Rectal Daily PRN HELEN Coronado - CNP   10 mg at 22 1758    naloxegol (MOVANTIK) tablet 25 mg  25 mg Oral QAM AC Salud Chawla APRN - CNP   25 mg at 22 0622    magnesium oxide (MAG-OX) tablet 400 mg  400 mg Oral 4x Daily Nancy Han MD   400 mg at 22 0803    calcium carbonate (TUMS) chewable tablet 500 mg  500 mg Oral TID HELEN Bowers - CNP   500 mg at 22 0802    sennosides-docusate sodium (SENOKOT-S) 8.6-50 MG tablet 2 tablet  2 tablet Oral BID HELEN Thibodeaux CNP   2 tablet at 08/01/22 0803    tamsulosin (FLOMAX) capsule 0.4 mg  0.4 mg Oral Daily Jackie ANI PearsonN - NP   0.4 mg at 08/01/22 0803    finasteride (PROSCAR) tablet 5 mg  5 mg Oral Daily Jackie Lili, APRN - NP   5 mg at 08/01/22 1222    zinc oxide 40 % paste   Topical 4x Daily PRN Sony Baldwin MD        lidocaine-prilocaine (EMLA) cream   Topical Once Sony Baldwin MD        phenazopyridine (PYRIDIUM) tablet 95 mg  95 mg Oral TID PRN HELEN Thibodeaux CNP   95 mg at 07/15/22 2048    cyclobenzaprine (FLEXERIL) tablet 10 mg  10 mg Oral TID PRN HELEN Jarrett - NP   10 mg at 07/31/22 0535    0.9 % sodium chloride infusion   IntraVENous PRN HELEN Thibodeaux CNP        [Held by provider] enoxaparin (LOVENOX) injection 40 mg  40 mg SubCUTAneous Daily HELEN Thibodeaux - CNP   40 mg at 07/23/22 0857    diphenhydrAMINE (BENADRYL) capsule 25 mg  25 mg Oral Q6H PRN HELEN Thibodeaux CNP   25 mg at 07/28/22 1242    acetaminophen (TYLENOL) tablet 650 mg  650 mg Oral Q6H PRN HELEN Thibodeaux - CNP   650 mg at 07/28/22 1242    Or    acetaminophen (TYLENOL) suppository 650 mg  650 mg Rectal Q6H PRN HELEN Thibodeaux CNP        HYDROmorphone (DILAUDID) tablet 2 mg  2 mg Oral Q4H PRN HELEN Samaniego - CNP   2 mg at 07/21/22 1438    Or    HYDROmorphone (DILAUDID) tablet 4 mg  4 mg Oral Q4H PRN HELEN Samaniego - CNP   4 mg at 07/31/22 2125    naloxone (NARCAN) injection 0.04 mg  0.04 mg IntraVENous PRN HELEN Marcus CNP        dexamethasone (DECADRON) tablet 4 mg  4 mg Oral 2 times per day HELEN Thibodeaux CNP   4 mg at 08/01/22 0802    aluminum & magnesium hydroxide-simethicone (MAALOX) 200-200-20 MG/5ML suspension 20 mL  20 mL Oral Q6H PRN Macrina Ellison MD   20 mL at 07/13/22 0809    LORazepam (ATIVAN) tablet 1 mg  1 mg Oral TID PRN Roldan Acuna DO   1 mg at 07/31/22 1703    pantoprazole (PROTONIX) tablet 40 mg  40 mg Oral QAM AC Laruth She, DO   40 mg at 22 0751    sodium chloride flush 0.9 % injection 5-40 mL  5-40 mL IntraVENous 2 times per day Laruth She, DO   10 mL at 22 2895    sodium chloride flush 0.9 % injection 5-40 mL  5-40 mL IntraVENous PRN Laruth She, DO        0.9 % sodium chloride infusion   IntraVENous PRN Laruth She, DO        ondansetron (ZOFRAN-ODT) disintegrating tablet 4 mg  4 mg Oral Q8H PRN Laruth She, DO        Or    ondansetron [facility]) injection 4 mg  4 mg IntraVENous Q6H PRN Laruth She, DO   4 mg at 07/10/22 1717    docusate sodium (COLACE) capsule 100 mg  100 mg Oral BID PRN Laruth She, DO   100 mg at 22 1406    polyethylene glycol (GLYCOLAX) packet 17 g  17 g Oral Daily Laruth She, DO   17 g at 22 0840       OBJECTIVE:  Patient Vitals for the past 8 hrs:   BP Temp Temp src Pulse Resp SpO2   22 0758 (!) 142/90 97.5 °F (36.4 °C) Oral 91 20 96 %   22 0300 (!) 140/78 97.9 °F (36.6 °C) Oral 98 18 97 %     Temp (24hrs), Av °F (36.7 °C), Min:97.5 °F (36.4 °C), Max:98.2 °F (36.8 °C)    No intake/output data recorded. Physical Exam:  Constitutional: Well developed male sitting comfortably on the hospital bed. HEENT: Normocephalic and atraumatic. Oropharynx is clear, mucous membranes are moist.  Neck supple. Skin Warm and dry. Bruising noted on L foot and no rash noted. No erythema. No pallor. Respiratory Lungs are clear to auscultation bilaterally without wheezes, rales or rhonchi, normal air exchange without accessory muscle use. CVS Normal rate, regular rhythm and normal S1 and S2. No murmurs, gallops, or rubs. Abdomen Soft, nontender and nondistended, normoactive bowel sounds. No palpable mass. No hepatosplenomegaly. Neuro Grossly nonfocal with no obvious sensory or motor deficits. MSK Normal range of motion in general.  No edema and no tenderness. Psych Appropriate mood and affect.         Labs: Recent Results (from the past 24 hour(s))   CBC with Auto Differential    Collection Time: 08/01/22  4:48 AM   Result Value Ref Range    WBC 6.9 4.3 - 11.1 K/uL    RBC 2.19 (L) 4.23 - 5.6 M/uL    Hemoglobin 7.2 (L) 13.6 - 17.2 g/dL    Hematocrit 23.5 (L) 41.1 - 50.3 %    .3 (H) 79.6 - 97.8 FL    MCH 32.9 26.1 - 32.9 PG    MCHC 30.6 (L) 31.4 - 35.0 g/dL    RDW 21.2 (H) 11.9 - 14.6 %    Platelets 38 (L) 453 - 450 K/uL    MPV 11.0 9.4 - 12.3 FL    nRBC 0.06 0.0 - 0.2 K/uL    Differential Type AUTOMATED      Seg Neutrophils 82 (H) 43 - 78 %    Lymphocytes 8 (L) 13 - 44 %    Monocytes 8 4.0 - 12.0 %    Eosinophils % 1 0.5 - 7.8 %    Basophils 0 0.0 - 2.0 %    Immature Granulocytes 2 0.0 - 5.0 %    Segs Absolute 5.6 1.7 - 8.2 K/UL    Absolute Lymph # 0.6 0.5 - 4.6 K/UL    Absolute Mono # 0.6 0.1 - 1.3 K/UL    Absolute Eos # 0.0 0.0 - 0.8 K/UL    Basophils Absolute 0.0 0.0 - 0.2 K/UL    Absolute Immature Granulocyte 0.1 0.0 - 0.5 K/UL   Comprehensive Metabolic Panel    Collection Time: 08/01/22  4:48 AM   Result Value Ref Range    Sodium 138 136 - 145 mmol/L    Potassium 4.3 3.5 - 5.1 mmol/L    Chloride 104 98 - 107 mmol/L    CO2 25 21 - 32 mmol/L    Anion Gap 9 7 - 16 mmol/L    Glucose 118 (H) 65 - 100 mg/dL    BUN 28 (H) 8 - 23 MG/DL    Creatinine 1.30 0.8 - 1.5 MG/DL    GFR African American >60 >60 ml/min/1.73m2    GFR Non- 59 (L) >60 ml/min/1.73m2    Calcium 8.6 8.3 - 10.4 MG/DL    Total Bilirubin 0.7 0.2 - 1.1 MG/DL     (H) 12 - 65 U/L     (H) 15 - 37 U/L    Alk Phosphatase 487 (H) 50 - 136 U/L    Total Protein 6.3 6.3 - 8.2 g/dL    Albumin 2.5 (L) 3.2 - 4.6 g/dL    Globulin 3.8 (H) 2.3 - 3.5 g/dL    Albumin/Globulin Ratio 0.7 (L) 1.2 - 3.5     Magnesium    Collection Time: 08/01/22  4:48 AM   Result Value Ref Range    Magnesium 2.1 1.8 - 2.4 mg/dL       Imaging:  Xray Result (most recent):  XR ABDOMEN (KUB) (SINGLE AP VIEW) 07/20/2022    Narrative  EXAM: ABDOMINAL RADIOGRAPH, 1 view    INDICATION: Constipation    COMPARISON: 9/1/2020    TECHNIQUE: Frontal abdominal radiography was performed. FINDINGS: Limited evaluation due to the patient's body habitus. No clear  obstruction bowel gas pattern. No intraperitoneal free air appreciated. Moderate  retained colonic fecal burden. Prior vertebroplasty at multiple levels. Impression  1. No acute abnormality within the limitation imposed by patient body habitus. 2.  Constipation. 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)    Hypokalemia    Gynecomastia, male    Neuroendocrine carcinoma, high grade (HCC)    Anemia due to chemotherapy    Primary prostate cancer with metastasis from prostate to other site [facility])    Prostatic nodule    Family history of prostate cancer in father    Acute prostatitis    S/P TURP    Prostate cancer [facility])    Prostate cancer metastatic to bone ([facility] [address]    Thrombocytopenia ([facility] [address]    Hypomagnesemia    Hypercalcemia of malignancy    Pain due to malignant neoplasm metastatic to bone (HCC)    EDGARD (acute kidney injury) ([facility] [address]    Debility    Drug-induced constipation    Encounter for palliative care    Abnormal CT of the abdomen    Hydronephrosis    Hydroureter     Mr. Hakan Hawkins is a 78 y.o. male admitted on 7/8/2022 with a primary diagnosis of There were no encounter diagnoses. .       Mr. Hakan Hawkins has a PMH of metastatic prostate cancer dx 2017 s/p casodex and bilateral orchiectomy for surgical castration, 6 cycles of docetaxel, pathologic vertebral fx's with path positive for high grade neuroendocrine cancer, s/p carbo/etoposide and most recently carbo/taxotere which was held d/t TCP. Last was C2 on 6/8/22. 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 He is a known patient of Dr. Janette Hein s/p multiple lines of treatment as above.   At last office visit on 6/29, CEA had notably continued to rise to [id number]. He has had discussion with [facility], however wished to hold off on making a decision until he completed radiation therapy. He presented to the ER with a fall at home, increasing pain in the hips and generalized weakness. Also with constipation. Also found to be anemic and transfused PRBC. We were asked for recommendations for our known patient. PLAN:  Metastatic Prostate Cancer/Neuroendocrine  - S/p multiple lines of therapy, most recently carbo/taxotere C2 on 6/8. Held on 6/29 d/t TCP. Undergoing XRT to rib and T-spine with Dr. Laura Whitney.  - Has spoken to [facility], however wished to hold off on making a decision until he saw if XRT improved his pain  - Can resume XRT on Monday 7/11 Pt does not wish to pursue XRT. Meeting with PC today for pain mgmt and to discuss [address] - wishes to pursue facility placement and enroll in Hospice. 7/13 Pt now states he may be interested in going home since he will have support from friend moving here from out of state. Consult PT/OT. 7/14  PT recommends HHT  7/23 CT AP shows POD with [facility] liver lesions, increased prostate gland, and extensive bony mets  7/25 Pt states he is interested in pursuing XRT again, Rad Onc consulted. He states he may consider having chemo in the future as well. 7/26 Resuming XRT tomorrow (previously completed 1/1 fx to ribs and 1/5 fx to T-spine)  7/27 Resume XRT today - scheduled through 8/2 per rad onc notes. 8/1 XRT today (EOT 8/2). Cancer Related Pain  - Currently with Dilaudid/oxy prn  - Consult palliative care to assist with pain management (known from office) and also to assist with goals of care discussions   7/10 Increased Dilaudid to 1 mg every 3 hours for severe pain. Pall care consult pending. 7/11 PC assisting with mgmt. Resume Dex. 7/12 PC started Fentanyl 50mcg patch yesterday. Pt reports pain controlled today - even able to walk around room now. 7/13 Pain controlled.   PC changed oxycodone to po dilaudid yesterday. 7/14 Pain controlled  7/15 C/o pain today  7/17 pain improved after menchaca  7/20 Pain controlled on current regimen  7/22 c/o worsening abd pain. Bladder scan with minimal urine. Check CT AP.  7/23 CT AP with POD  7/30 Feels pain increasing - will increase Fentanyl patch to 75 mcg every 48 hours and continue PO Dilaudid for breakthrough. 7/31 Better with adjustment      Anemia/Thrombocytopenia  - Transfuse prn per [facility] SOPs  7/24 Plt down to 41k. DC Lovenox. Check hemolysis labs, DIC labs, smear, HIT.  7/25 Plt 37k. No DIC noted. Smear, Hapto, and HIT pending. 7/26 Plt 32k. No hemolysis. HIT neg. Smear pending. 7/27 Plts stable at 34k. Hgb relatively stable - 7.6. Smear without schistocytes. Monitor. 7/28 Plt stable at 32k. Pt with hematuria, transfuse to keep plt >50k.  1 unit platelets ordered. 7/29 Plts up to 62k s/p transfusion for hematuria. No bleeding. 7/31 Platelets 87U, Hgb 7.9 - no transfusion needed. Transaminitis / Hyperbilirubinemia  - Alk phos/ALT/AST trended down today, continue to follow   7/11 AST/ALT continue to improve   7/13 LFTs improving  7/18 stable  7/23 LFTs increased, monitor - likely r/t liver mets  7/24 Tbili up to 1.2, LFTs con't to increase. 7/25 Bili down to 0.8. LFTs improved today. 7/27 LFTs stable - known liver mets. Tbili back to normal. Monitoring. RESOLVED - STABLE     EDGARD  - Gentle hydration  RESOLVED     Constipation  - Lactulose prn   7/11 Pt refusing Lactulose. PC started pericolace. Con't Miralax. Consider KUB if no improvement. 7/12 No BM documented. Con't Miralax/Pericolace. Pt more active now, up around in room since pain controlled. 7/13 Had BM x 4 yesterday  7/20 c/o constipation. Increase pericolace to BID. Con't Miralax. Check KUB. If no obstruction, will order Movantik. 7/21 KUB with constipation. Movantik ordered. 7/22 No BM. Con't Movantik. Checking CT AP.  7/23 Pt having Bms. Con't Movantik.   7/31 Added Lactulose with good relief. Hypercalcemia / Hypocalcemia  7/12 CCa++ up to 11. 7. Zometa ordered. 7/15 CCa++ down to 9.2  7/19 CCa++ down to 8.4, replete  7/21 Cca++ 8.6  RESOLVED    Dysuria  7/15 c/o dysuria. UA c/w UTI. CTX ordered. Check Ucx.  7/17 UC NTD on Rocephin  7/18 Pain improved s/p menchaca placement. Ucx with MSF. Completed CTX x 3 days. RESOLVED    Urinary retention / B/l hydroureteronephrosis  7/16 menchaca ordered/ flomax ordered  7/17 UOP 4000 ml. Adding proscar  7/18 s/p menchaca placement. Con't Flomax/Proscar. Plan to DC menchaca tomorrow. 7/19 Remove menchaca for voiding trial.  If unable to void, will consult urology. 7/20 Menchaca removed yesterday and had to be replaced d/t urinary retention. Consult urology. 7/22 Awaiting urology consult  7/23 Urology felt r/t constipation, however CT shows b/l hydroureteronephrosis. Will defer mgmt to urology. 7/24 No response from urology from message sent yesterday. Will re-consult. 7/26 Urology recommends to remove menchaca this AM for voiding trial.  Also recommends reimaging later on to see if bilateral hydro has resolved (could be r/t overdistended bladder/urinary retention vs prostate cancer/obstruction). If bilateral hydro has not improved over time, would consider bilateral stents vs nephrostomy tubes and this would be more favorable if/when kidney function declines or pt develops significant flank pain. 7/27 Menchaca removed yesterday, had PVR of 500cc but then able to void 200cc. Menchaca remains out. Cr 1.2.  7/29 Cr stable, 1.1. Voiding to urinal without issue. Hematuria / UTI  7/28 Hematuria noted. Transfuse platelets. Check UA - UA c/w UTI. Ucx ordered. Start CTX.  7/30 hematuria resolved after plt transfusion. UC NGTD. CTX day 3 today. Continue home meds  [facility]ae SOPs  AC contraindicated d/t thrombocytopenia    Goals and plan of care reviewed with the patient. All questions answered to the best of our ability.     Disposition: 7/14: Pt reports his friend is not moving here until 8/1. He reports she has a one-story home that he can move into on 8/1 or possibly even sooner. Pt was able to walk hallways yesterday. Unsure if he would qualify for facility placement while future living arrangements are made. CM states pt would have to go through extensive insurance process to even cover facility placement if he qualified. CM assisting with discharge planning. ? Could pt stay with son who lives locally until his friend's house is ready to move into? Pt also declines Hospice services at this time. He states he would prefer HHT. 7/15:  Pt is medically stable for discharge. Pt working on living arrangements. Hopeful for discharge home soon. 7/17 Spoke with patient's son yesterday. Patient does not a home to return to so now even if he does have a friend coming there is no home. Difficult placement. CM following. 7/19: Pt reportedly does not have a home to return to upon discharge. Son states pt is unable to live with him. Unlikely that pt would qualify for facility placement. CM following.    7/20: Pt still stable for discharge. CM following - pt has no place to live.    7/21:  CM found available place at boarding house for patient. Pt states he was not told about this, but cannot afford $650/mo. He says he was paying $500/mo at previous place which is no longer available to him. CM following.    7/22:  Per CM, son calling to speak to the manager of the boarding home today. He also asked for list of ALFs that accept Medicaid. CM continues to follow. 7/25: Son went to see available place at boarding home - pt and son not pleased with arrangements as pt would be sharing bathroom with 3 other people. Son looking into ALFs. Pt voices concern that he would not be able to adequately care for himself if he had to live alone. CM following.    7/31: Per CM, pt is making no effort to locate living arrangements.   CM assisting

## 2022-08-01 NOTE — PLAN OF CARE
Problem: Skin/Tissue Integrity  Goal: Absence of new skin breakdown  Description: 1. Monitor for areas of redness and/or skin breakdown  2. Assess vascular access sites hourly  3. Every 4-6 hours minimum:  Change oxygen saturation probe site  4. Every 4-6 hours:  If on nasal continuous positive airway pressure, respiratory therapy assess nares and determine need for appliance change or resting period.   Outcome: Progressing     Problem: Pain  Goal: Verbalizes/displays adequate comfort level or baseline comfort level  Outcome: Progressing  Flowsheets (Taken 8/1/2022 0430 by Dhaval Da Silva RN)  Verbalizes/displays adequate comfort level or baseline comfort level: Encourage patient to monitor pain and request assistance     Problem: Safety - Adult  Goal: Free from fall injury  Outcome: Progressing  Flowsheets (Taken 8/1/2022 0430 by Dhaval Da Silva RN)  Free From Fall Injury: Instruct family/caregiver on patient safety     Problem: Pain  Goal: Verbalizes/displays adequate comfort level or baseline comfort level  Outcome: Progressing  Flowsheets (Taken 8/1/2022 0430 by Dhaval Da Silva RN)  Verbalizes/displays adequate comfort level or baseline comfort level: Encourage patient to monitor pain and request assistance     Problem: Safety - Adult  Goal: Free from fall injury  Outcome: Progressing  Flowsheets (Taken 8/1/2022 0430 by Dhaval Da Silva RN)  Free From Fall Injury: Instruct family/caregiver on patient safety

## 2022-08-02 NOTE — PROGRESS NOTES
END OF SHIFT SUMMARY:    Significant vitals this shift:  vss  Significant labs this shift:  urine  Tests performed this shift:  US  Orders to be followed up on:  none  Blood products given this shift:  none  Additional events this shift:   Vss pain medication X's 1 pt received radiation today back in room resting in bed call light in reach     I/Os:  +/- this shift: n/a  08/02 0701 - 08/02 1900  In: 240 [P.O.:240]  Out: 300 [Urine:300]  Occurrences this Shift:  Urine yes, BM 0, Emesis 0    Roosevelt Paget, RN

## 2022-08-02 NOTE — CARE COORDINATION
CM called [facility] inquiring if the supervisor Massiel Segovia was there. She stated that [facility] did not show up today so she has been cooking all day and completely forgot\" about the appointment to assess pt today. She was supposed to be here between [phone]. She stated she cannot come tomorrow because \"the doctor is in house. \"  She made another appointment for 8/4 at 1400 and stated she would contact CM if she is able to come in the morning on the same date instead. CM notified pt and his son. Pt requested a list of the ALFs that pt's son obtained on 7/21. CM informed pt that the list is old and some of the facilities no longer accept [facility]. Pt stated \"I read the review online of that place and it was somber to say the least.\"  CM replied that he and his son are welcome to work to find somewhere more to his liking as CM as exhausted resources. RAUL spoke with oncology NP today and pt has been stable for d/c for nearly 3 weeks now. CM will continue to follow.

## 2022-08-02 NOTE — PROGRESS NOTES
Highest education level: Not on file   Occupational History    Not on file   Tobacco Use    Smoking status: Former     Packs/day: 0.50     Types: Cigarettes     Quit date: 2016     Years since quittin.6    Smokeless tobacco: Never   Substance and Sexual Activity    Alcohol use: Yes    Drug use: No    Sexual activity: Not on file   Other Topics Concern    Not on file   Social History Narrative    Not on file     Social Determinants of Health     Financial Resource Strain: Not on file   Food Insecurity: Not on file   Transportation Needs: Not on file   Physical Activity: Not on file   Stress: Not on file   Social Connections: Not on file   Intimate Partner Violence: Not on file   Housing Stability: Not on file     Current Facility-Administered Medications   Medication Dose Route Frequency Provider Last Rate Last Admin    fentaNYL (DURAGESIC) 75 MCG/HR 1 patch  1 patch TransDERmal Q48H Elayne Dubin, APRN - NP   1 patch at 22 1352    lactulose ([address]) 10 GM/15ML solution 20 g  20 g Oral BID PRN Fabrizio Lopez NP-C   20 g at 22 0842    0.9 % sodium chloride infusion   IntraVENous PRN Jennyfer Corona APRN - CNP        diatrizoate meglumine-sodium (GASTROGRAFIN) 66-10 % solution 15 mL  15 mL Oral ONCE PRN Jennyfer Corona APRN - CNP   15 mL at 22 1444    HYDROmorphone HCl PF (DILAUDID) injection 1.5 mg  1.5 mg IntraVENous Q3H PRN Jennyfer Corona APRN - CNP   1.5 mg at 22 0640    bisacodyl (DULCOLAX) suppository 10 mg  10 mg Rectal Daily PRN HELEN Vaerla - CNP   10 mg at 22 1758    naloxegol (MOVANTIK) tablet 25 mg  25 mg Oral QAM AC Jennyfer Corona APRN - CNP   25 mg at 22 0926    magnesium oxide (MAG-OX) tablet 400 mg  400 mg Oral 4x Daily Inderjit Noyola MD   400 mg at 22 2128    calcium carbonate (TUMS) chewable tablet 500 mg  500 mg Oral TID Jennyfer Corona APRN - CNP   500 mg at 22 0807    sennosides-docusate sodium (SENOKOT-S) 8.6-50 MG tablet 2 tablet  2 tablet Oral BID Joesphine Bearded, APRN - CNP   2 tablet at 08/02/22 0806    tamsulosin (FLOMAX) capsule 0.4 mg  0.4 mg Oral Daily Alina Barnett, APRN - NP   0.4 mg at 08/02/22 0806    finasteride (PROSCAR) tablet 5 mg  5 mg Oral Daily Alina Barnett, APRN - NP   5 mg at 08/02/22 3734    zinc oxide 40 % paste   Topical 4x Daily PRN Fanny Bernard MD        lidocaine-prilocaine (EMLA) cream   Topical Once Fanny Bernard MD        phenazopyridine (PYRIDIUM) tablet 95 mg  95 mg Oral TID PRN Joesphine Bearded, APRN - CNP   95 mg at 07/15/22 2048    cyclobenzaprine (FLEXERIL) tablet 10 mg  10 mg Oral TID PRN Alina Barnett, APRN - NP   10 mg at 08/01/22 1815    0.9 % sodium chloride infusion   IntraVENous PRN Joesphine Bearrojas, APRN - CNP        [Held by provider] enoxaparin (LOVENOX) injection 40 mg  40 mg SubCUTAneous Daily Joesphine Bearrojas, APRN - CNP   40 mg at 07/23/22 0857    diphenhydrAMINE (BENADRYL) capsule 25 mg  25 mg Oral Q6H PRN Joesphine Bearded, APRN - CNP   25 mg at 07/28/22 1242    acetaminophen (TYLENOL) tablet 650 mg  650 mg Oral Q6H PRN Joesphine Bearded, APRN - CNP   650 mg at 07/28/22 1242    Or    acetaminophen (TYLENOL) suppository 650 mg  650 mg Rectal Q6H PRN Joesphine Bearded, APRN - CNP        HYDROmorphone (DILAUDID) tablet 2 mg  2 mg Oral Q4H PRN Lavern Shady, APRN - CNP   2 mg at 07/21/22 1438    Or    HYDROmorphone (DILAUDID) tablet 4 mg  4 mg Oral Q4H PRN Lavern Shady, APRN - CNP   4 mg at 08/02/22 0220    naloxone (NARCAN) injection 0.04 mg  0.04 mg IntraVENous PRN Katina Brand, APRN - CNP        dexamethasone (DECADRON) tablet 4 mg  4 mg Oral 2 times per day Joesphine Bearded, HELEN - CNP   4 mg at 08/01/22 2128    aluminum & magnesium hydroxide-simethicone (MAALOX) 200-200-20 MG/5ML suspension 20 mL  20 mL Oral Q6H PRN Zoe Sanchez MD   20 mL at 07/13/22 0809    LORazepam (ATIVAN) tablet 1 mg  1 mg Oral TID PRN Sukumar Fuentes DO   1 mg at 08/01/22 2341    pantoprazole (PROTONIX) tablet 40 mg  40 mg Oral QAM AC Sukumar Fuentes, DO   40 mg at 22 3859    sodium chloride flush 0.9 % injection 5-40 mL  5-40 mL IntraVENous 2 times per day Laruth She, DO   10 mL at 22 3417    sodium chloride flush 0.9 % injection 5-40 mL  5-40 mL IntraVENous PRN Laruth She, DO        0.9 % sodium chloride infusion   IntraVENous PRN Laruth She, DO        ondansetron (ZOFRAN-ODT) disintegrating tablet 4 mg  4 mg Oral Q8H PRN Laruth She, DO        Or    ondansetron [facility]) injection 4 mg  4 mg IntraVENous Q6H PRN Laruth She, DO   4 mg at 07/10/22 1717    docusate sodium (COLACE) capsule 100 mg  100 mg Oral BID PRN Laruth She, DO   100 mg at 22 1406    polyethylene glycol (GLYCOLAX) packet 17 g  17 g Oral Daily Laruth She, DO   17 g at 22 0840       OBJECTIVE:  Patient Vitals for the past 8 hrs:   BP Temp Temp src Pulse Resp SpO2   22 0800 133/72 98 °F (36.7 °C) Oral 91 16 94 %   22 0300 116/64 98.3 °F (36.8 °C) Oral 76 16 97 %     Temp (24hrs), Av.2 °F (36.8 °C), Min:98 °F (36.7 °C), Max:98.4 °F (36.9 °C)    No intake/output data recorded. Physical Exam:  Constitutional: Well developed male sitting comfortably on the hospital bed. HEENT: Normocephalic and atraumatic. Oropharynx is clear, mucous membranes are moist.  Neck supple. Skin Warm and dry. Bruising noted on L foot and no rash noted. No erythema. No pallor. Respiratory Lungs are clear to auscultation bilaterally without wheezes, rales or rhonchi, normal air exchange without accessory muscle use. CVS Normal rate, regular rhythm and normal S1 and S2. No murmurs, gallops, or rubs. Abdomen Soft, nontender and nondistended, normoactive bowel sounds. No palpable mass. No hepatosplenomegaly. Neuro Grossly nonfocal with no obvious sensory or motor deficits. MSK Normal range of motion in general.  No edema and no tenderness. Psych Appropriate mood and affect.         Labs:    Recent Results (from the past 24 Constipation    COMPARISON: 9/1/2020    TECHNIQUE: Frontal abdominal radiography was performed. FINDINGS: Limited evaluation due to the patient's body habitus. No clear  obstruction bowel gas pattern. No intraperitoneal free air appreciated. Moderate  retained colonic fecal burden. Prior vertebroplasty at multiple levels. Impression  1. No acute abnormality within the limitation imposed by patient body habitus. 2.  Constipation. 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)    Hypokalemia    Gynecomastia, male    Neuroendocrine carcinoma, high grade (HCC)    Anemia due to chemotherapy    Primary prostate cancer with metastasis from prostate to other site [facility])    Prostatic nodule    Family history of prostate cancer in father    Acute prostatitis    S/P TURP    Prostate cancer [facility])    Prostate cancer metastatic to bone ([facility] [address]    Thrombocytopenia ([facility] [address]    Hypomagnesemia    Hypercalcemia of malignancy    Pain due to malignant neoplasm metastatic to bone (HCC)    EDGARD (acute kidney injury) ([facility] [address]    Debility    Drug-induced constipation    Encounter for palliative care    Abnormal CT of the abdomen    Hydronephrosis    Hydroureter     Mr. Yoshi Manzo is a 78 y.o. male admitted on 7/8/2022 with a primary diagnosis of There were no encounter diagnoses. .       Mr. Yoshi Manzo has a PMH of metastatic prostate cancer dx 2017 s/p casodex and bilateral orchiectomy for surgical castration, 6 cycles of docetaxel, pathologic vertebral fx's with path positive for high grade neuroendocrine cancer, s/p carbo/etoposide and most recently carbo/taxotere which was held d/t TCP. Last was C2 on 6/8/22. 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 He is a known patient of Dr. Chi Sierra s/p multiple lines of treatment as above.   At last office visit on 6/29, CEA had notably continued to rise to 56.  He has had discussion with [facility], however wished to hold off on making a decision until he completed radiation therapy. He presented to the ER with a fall at home, increasing pain in the hips and generalized weakness. Also with constipation. Also found to be anemic and transfused PRBC. We were asked for recommendations for our known patient. PLAN:  Metastatic Prostate Cancer/Neuroendocrine  - S/p multiple lines of therapy, most recently carbo/taxotere C2 on 6/8. Held on 6/29 d/t TCP. Undergoing XRT to rib and T-spine with Dr. Orlando Roa.  - Has spoken to [address] [facility], however wished to hold off on making a decision until he saw if XRT improved his pain  - Can resume XRT on Monday 7/11 Pt does not wish to pursue XRT. Meeting with PC today for pain mgmt and to discuss [address] - wishes to pursue facility placement and enroll in Hospice. 7/13 Pt now states he may be interested in going home since he will have support from friend moving here from out of state. Consult PT/OT. 7/14  PT recommends HHT  7/23 CT AP shows POD with [facility] liver lesions, increased prostate gland, and extensive bony mets  7/25 Pt states he is interested in pursuing XRT again, Rad Onc consulted. He states he may consider having chemo in the future as well. 7/26 Resuming XRT tomorrow (previously completed 1/1 fx to ribs and 1/5 fx to T-spine)  7/27 Resume XRT today - scheduled through 8/2 per rad onc notes. 8/1 XRT today (EOT 8/2). 8/2 Completes XRT to T-spine today (7/7 fx)     Cancer Related Pain  - Currently with Dilaudid/oxy prn  - Consult palliative care to assist with pain management (known from office) and also to assist with goals of care discussions   7/10 Increased Dilaudid to 1 mg every 3 hours for severe pain. Pall care consult pending. 7/11 PC assisting with mgmt. Resume Dex. 7/12 PC started Fentanyl 50mcg patch yesterday. Pt reports pain controlled today - even able to walk around room now. 7/13 Pain controlled.   PC changed oxycodone to po dilaudid yesterday. 7/14 Pain controlled  7/15 C/o pain today  7/17 pain improved after menchaca  7/20 Pain controlled on current regimen  7/22 c/o worsening abd pain. Bladder scan with minimal urine. Check CT AP.  7/23 CT AP with POD  7/30 Feels pain increasing - will increase Fentanyl patch to 75 mcg every 48 hours and continue PO Dilaudid for breakthrough. 7/31 Better with adjustment   8/2 c/o b/l abd/flank pain. Check renal US, UA/UCx     Anemia/Thrombocytopenia  - Transfuse prn per [facility] SOPs  7/24 Plt down to 41k. DC Lovenox. Check hemolysis labs, DIC labs, smear, HIT.  7/25 Plt 37k. No DIC noted. Smear, Hapto, and HIT pending. 7/26 Plt 32k. No hemolysis. HIT neg. Smear pending. 7/27 Plts stable at 34k. Hgb relatively stable - 7.6. Smear without schistocytes. Monitor. 7/28 Plt stable at 32k. Pt with hematuria, transfuse to keep plt >50k.  1 unit platelets ordered. 7/29 Plts up to 62k s/p transfusion for hematuria. No bleeding. 7/31 Platelets 38C, Hgb 7.9 - no transfusion needed. Transaminitis / Hyperbilirubinemia  - Alk phos/ALT/AST trended down today, continue to follow   7/11 AST/ALT continue to improve   7/13 LFTs improving  7/18 stable  7/23 LFTs increased, monitor - likely r/t liver mets  7/24 Tbili up to 1.2, LFTs con't to increase. 7/25 Bili down to 0.8. LFTs improved today. 7/27 LFTs stable - known liver mets. Tbili back to normal. Monitoring. RESOLVED - STABLE     EDGARD  - Gentle hydration  RESOLVED     Constipation  - Lactulose prn   7/11 Pt refusing Lactulose. PC started pericolace. Con't Miralax. Consider KUB if no improvement. 7/12 No BM documented. Con't Miralax/Pericolace. Pt more active now, up around in room since pain controlled. 7/13 Had BM x 4 yesterday  7/20 c/o constipation. Increase pericolace to BID. Con't Miralax. Check KUB. If no obstruction, will order Movantik. 7/21 KUB with constipation. Movantik ordered. 7/22 No BM. Con't Movantik. Checking CT AP.  7/23 Pt having Bms. Con't Movantik. 7/31 Added Lactulose with good relief. Hypercalcemia / Hypocalcemia  7/12 CCa++ up to 11. 7. Zometa ordered. 7/15 CCa++ down to 9.2  7/19 CCa++ down to 8.4, replete  7/21 Cca++ 8.6  RESOLVED    Dysuria  7/15 c/o dysuria. UA c/w UTI. CTX ordered. Check Ucx.  7/17 UC NTD on Rocephin  7/18 Pain improved s/p menchaca placement. Ucx with MSF. Completed CTX x 3 days. RESOLVED    Urinary retention / B/l hydroureteronephrosis  7/16 menchaca ordered/ flomax ordered  7/17 UOP 4000 ml. Adding proscar  7/18 s/p menchaca placement. Con't Flomax/Proscar. Plan to DC menchaca tomorrow. 7/19 Remove menchaca for voiding trial.  If unable to void, will consult urology. 7/20 Menchaca removed yesterday and had to be replaced d/t urinary retention. Consult urology. 7/22 Awaiting urology consult  7/23 Urology felt r/t constipation, however CT shows b/l hydroureteronephrosis. Will defer mgmt to urology. 7/24 No response from urology from message sent yesterday. Will re-consult. 7/26 Urology recommends to remove menchaca this AM for voiding trial.  Also recommends reimaging later on to see if bilateral hydro has resolved (could be r/t overdistended bladder/urinary retention vs prostate cancer/obstruction). If bilateral hydro has not improved over time, would consider bilateral stents vs nephrostomy tubes and this would be more favorable if/when kidney function declines or pt develops significant flank pain. 7/27 Menchaca removed yesterday, had PVR of 500cc but then able to void 200cc. Menchaca remains out. Cr 1.2.  7/29 Cr stable, 1.1. Voiding to urinal without issue. 8/2 c/o b/l abd/flank pain. Check renal US. Hematuria / UTI  7/28 Hematuria noted. Transfuse platelets. Check UA - UA c/w UTI. Ucx ordered. Start CTX.  7/30 hematuria resolved after plt transfusion. UC NGTD. CTX day 3 today. 8/2 c/o b/l abd/flank pain.   Check UA/UCx    Continue home meds  [facility] SOPs  AC contraindicated d/t thrombocytopenia    Goals and plan of care reviewed with the patient. All questions answered to the best of our ability. Disposition:  7/14: Pt reports his friend is not moving here until 8/1. He reports she has a one-story home that he can move into on 8/1 or possibly even sooner. Pt was able to walk hallways yesterday. Unsure if he would qualify for facility placement while future living arrangements are made. CM states pt would have to go through extensive insurance process to even cover facility placement if he qualified. CM assisting with discharge planning. ? Could pt stay with son who lives locally until his friend's house is ready to move into? Pt also declines Hospice services at this time. He states he would prefer HHT. 7/15:  Pt is medically stable for discharge. Pt working on living arrangements. Hopeful for discharge home soon. 7/17 Spoke with patient's son yesterday. Patient does not a home to return to so now even if he does have a friend coming there is no home. Difficult placement. CM following. 7/19: Pt reportedly does not have a home to return to upon discharge. Son states pt is unable to live with him. Unlikely that pt would qualify for facility placement. CM following.    7/20: Pt still stable for discharge. CM following - pt has no place to live.    7/21:  CM found available place at boarding house for patient. Pt states he was not told about this, but cannot afford $650/mo. He says he was paying $500/mo at previous place which is no longer available to him. CM following.    7/22:  Per CM, son calling to speak to the manager of the boarding home today. He also asked for list of ALFs that accept Medicaid. CM continues to follow. 7/25: Son went to see available place at boarding home - pt and son not pleased with arrangements as pt would be sharing bathroom with 3 other people. Son looking into ALFs.   Pt voices concern that he would

## 2022-08-02 NOTE — PLAN OF CARE
Problem: Skin/Tissue Integrity  Goal: Absence of new skin breakdown  Description: 1. Monitor for areas of redness and/or skin breakdown  2. Assess vascular access sites hourly  3. Every 4-6 hours minimum:  Change oxygen saturation probe site  4. Every 4-6 hours:  If on nasal continuous positive airway pressure, respiratory therapy assess nares and determine need for appliance change or resting period.   Outcome: Progressing     Problem: Safety - Adult  Goal: Free from fall injury  Outcome: Progressing  Flowsheets (Taken 8/2/2022 6162 by Tracee Figueroa RN)  Free From Fall Injury: Instruct family/caregiver on patient safety

## 2022-08-03 NOTE — PLAN OF CARE
Problem: Skin/Tissue Integrity  Goal: Absence of new skin breakdown  Description: 1. Monitor for areas of redness and/or skin breakdown  2. Assess vascular access sites hourly  3. Every 4-6 hours minimum:  Change oxygen saturation probe site  4. Every 4-6 hours:  If on nasal continuous positive airway pressure, respiratory therapy assess nares and determine need for appliance change or resting period.   Outcome: Progressing     Problem: Pain  Goal: Verbalizes/displays adequate comfort level or baseline comfort level  Outcome: Progressing     Problem: Safety - Adult  Goal: Free from fall injury  Outcome: Progressing  Flowsheets (Taken 8/3/2022 0725)  Free From Fall Injury:   Instruct family/caregiver on patient safety   Based on caregiver fall risk screen, instruct family/caregiver to ask for assistance with transferring infant if caregiver noted to have fall risk factors     Problem: Respiratory - Adult  Goal: Achieves optimal ventilation and oxygenation  Outcome: Progressing  Flowsheets (Taken 8/3/2022 0725)  Achieves optimal ventilation and oxygenation:   Assess for changes in respiratory status   Assess for changes in mentation and behavior

## 2022-08-03 NOTE — PLAN OF CARE
Problem: Skin/Tissue Integrity  Goal: Absence of new skin breakdown  Description: 1. Monitor for areas of redness and/or skin breakdown  2. Assess vascular access sites hourly  3. Every 4-6 hours minimum:  Change oxygen saturation probe site  4. Every 4-6 hours:  If on nasal continuous positive airway pressure, respiratory therapy assess nares and determine need for appliance change or resting period.   8/3/2022 1552 by Yo Ivan RN  Outcome: Progressing  8/3/2022 1309 by Yakov Young RN  Outcome: Progressing     Problem: Pain  Goal: Verbalizes/displays adequate comfort level or baseline comfort level  8/3/2022 1552 by Yo Ivan RN  Outcome: Progressing  8/3/2022 1309 by Yakov Young RN  Outcome: Progressing     Problem: Safety - Adult  Goal: Free from fall injury  8/3/2022 1552 by Yo Ivan RN  Outcome: Progressing  8/3/2022 1309 by Yakov Young RN  Outcome: Progressing  Flowsheets (Taken 8/3/2022 0725)  Free From Fall Injury:   Instruct family/caregiver on patient safety   Based on caregiver fall risk screen, instruct family/caregiver to ask for assistance with transferring infant if caregiver noted to have fall risk factors     Problem: ABCDS Injury Assessment  Goal: Absence of physical injury  Outcome: Progressing  Flowsheets (Taken 8/3/2022 0725 by Yakov Young RN)  Absence of Physical Injury: Implement safety measures based on patient assessment     Problem: Respiratory - Adult  Goal: Achieves optimal ventilation and oxygenation  8/3/2022 1552 by Yo Ivan RN  Outcome: Progressing  8/3/2022 1309 by Yakov Young RN  Outcome: Progressing  Flowsheets (Taken 8/3/2022 0725)  Achieves optimal ventilation and oxygenation:   Assess for changes in respiratory status   Assess for changes in mentation and behavior

## 2022-08-03 NOTE — PROGRESS NOTES
Consult Note            Date:8/2/2022        Patient Geronimo Layne     YOB: 1955     Age:66 y.o. Consults    History of Present Illness     The patient has advanced prostate cancer and is currently receiving radiation therapy for palliation. During this admission he has had urinary retention but has had his Clark out since last week. Today he had a renal ultrasound that showed bilateral hydronephrosis which was mild to moderate with the left being somewhat worse than the right. The bladder was significantly distended. The patient feels pressure within the pelvis and says that its difficult to have a bowel movement because of fullness in the pelvis. Creatinine is 1.30. Urinalysis shows greater than 100 RBCs and 5-10 WBCs. The specimen is negative for bacteria. Urine culture is pending. Past Medical History     Past Medical History:   Diagnosis Date    Claustrophobia     Ear problems     Elevated PSA     Former light cigarette smoker (1-9 per day)     smoked for 35 years. quit     History of multiple allergies     Nicotine vapor product user     Personal history of prostate cancer     Prostate cancer ([facility] [address]     prostate, neuroendocrine, mets to bone        Past Surgical History     Past Surgical History:   Procedure Laterality Date    BIOPSY PROSTATE      COLONOSCOPY  08/2020    HEENT      teeth removed     IR KYPHOPLASTY LUMBAR FIRST LEVEL  10/14/2020    IR KYPHOPLASTY LUMBAR FIRST LEVEL  10/14/2020    IR KYPHOPLASTY LUMBAR FIRST LEVEL 10/14/2020 [facility]    IR KYPHOPLASTY THORACIC FIRST LEVEL  10/29/2020    IR KYPHOPLASTY THORACIC FIRST LEVEL  10/29/2020    IR KYPHOPLASTY THORACIC FIRST LEVEL 10/29/2020 [facility]    TESTICLE REMOVAL Bilateral 02/2017        Medications     Prior to Admission medications    Medication Sig Start Date End Date Taking?  Authorizing Provider   cyclobenzaprine (FLEXERIL) 10 MG tablet Take 10 mg by mouth 3 times daily as needed 5/13/22  Yes Historical Provider, MD   omeprazole (PRILOSEC) 40 MG delayed release capsule Take 1 capsule by mouth daily 7/7/22   Elaine Martin MD   dexamethasone (DECADRON) 4 MG tablet Take 1 tablet by mouth 2 times daily (with meals)  Patient not taking: Reported on 7/8/2022 7/5/22 8/4/22  Elaine Martin MD   lidocaine-prilocaine (EMLA) 2.5-2.5 % cream Apply a dab over the port site 30-45 minutes prior to lab/infusion appts. Cover with saran wrap or a sandwich bag.   Patient not taking: Reported on 7/8/2022 6/27/22   Belen Frank MD        fentaNYL (DURAGESIC) 75 MCG/HR 1 patch, Q48H  lactulose (CHRONULAC) 10 GM/15ML solution 20 g, BID PRN  0.9 % sodium chloride infusion, PRN  diatrizoate meglumine-sodium (GASTROGRAFIN) 66-10 % solution 15 mL, ONCE PRN  HYDROmorphone HCl PF (DILAUDID) injection 1.5 mg, Q3H PRN  bisacodyl (DULCOLAX) suppository 10 mg, Daily PRN  naloxegol (MOVANTIK) tablet 25 mg, QAM AC  magnesium oxide (MAG-OX) tablet 400 mg, 4x Daily  calcium carbonate (TUMS) chewable tablet 500 mg, TID  sennosides-docusate sodium (SENOKOT-S) 8.6-50 MG tablet 2 tablet, BID  tamsulosin (FLOMAX) capsule 0.4 mg, Daily  finasteride (PROSCAR) tablet 5 mg, Daily  zinc oxide 40 % paste, 4x Daily PRN  lidocaine-prilocaine (EMLA) cream, Once  phenazopyridine (PYRIDIUM) tablet 95 mg, TID PRN  cyclobenzaprine (FLEXERIL) tablet 10 mg, TID PRN  0.9 % sodium chloride infusion, PRN  [Held by provider] enoxaparin (LOVENOX) injection 40 mg, Daily  diphenhydrAMINE (BENADRYL) capsule 25 mg, Q6H PRN  acetaminophen (TYLENOL) tablet 650 mg, Q6H PRN   Or  acetaminophen (TYLENOL) suppository 650 mg, Q6H PRN  HYDROmorphone (DILAUDID) tablet 2 mg, Q4H PRN   Or  HYDROmorphone (DILAUDID) tablet 4 mg, Q4H PRN  naloxone (NARCAN) injection 0.04 mg, PRN  dexamethasone (DECADRON) tablet 4 mg, 2 times per day  aluminum & magnesium hydroxide-simethicone (MAALOX) 200-200-20 MG/5ML suspension 20 mL, Q6H PRN  LORazepam (ATIVAN) tablet 1 mg, TID PRN  pantoprazole (PROTONIX) tablet 40 mg, QAM AC  sodium chloride flush 0.9 % injection 5-40 mL, 2 times per day  sodium chloride flush 0.9 % injection 5-40 mL, PRN  0.9 % sodium chloride infusion, PRN  ondansetron (ZOFRAN-ODT) disintegrating tablet 4 mg, Q8H PRN   Or  ondansetron (ZOFRAN) injection 4 mg, Q6H PRN  docusate sodium (COLACE) capsule 100 mg, BID PRN  polyethylene glycol (GLYCOLAX) packet 17 g, Daily          Allergies   Turmeric    Social History     Social History     Socioeconomic History    Marital status: Single   Tobacco Use    Smoking status: Former     Packs/day: 0.50     Types: Cigarettes     Quit date: 2016     Years since quittin.6    Smokeless tobacco: Never   Substance and Sexual Activity    Alcohol use: Yes    Drug use: No        Family History     Family History   Problem Relation Age of Onset    Hypertension Father     Prostate Cancer Father     Cancer Father         prostate cancer       Review of Systems   Review of Systems     Physical Exam   /86   Pulse 94   Temp 98.4 °F (36.9 °C) (Oral)   Resp 18   Ht 5' 10\" (1.778 m)   Wt 198 lb 7 oz (90 kg)   SpO2 94%   BMI 28.47 kg/m²      Physical Exam the patient is in no distress. He is awake and alert and quite conversant. Abdomen:Soft, non-tender, active bowel sounds, I believe I can palpate his bladder. Genitalia are unremarkable. Labs    CBC:  Recent Labs     22  0448 22  0504   WBC 8.8 6.9 7.4   RBC 2.42* 2.19* 2.17*   HGB 7.9* 7.2* 7.2*   HCT 25.2* 23.5* 23.0*   .1* 107.3* 106.0*   RDW 21.1* 21.2* 21.4*   PLT 49* 38* 32*     CHEMISTRIES:  Recent Labs     22  0448 22  0504   * 138 135*   K 4.4 4.3 4.0    104 102   CO2 27 25 26   BUN 30* 28* 32*   CREATININE 1.30 1.30 1.30   GLUCOSE 104* 118* 101*   MG 2.0 2.1 2.0     PT/INR:No results for input(s): PROTIME, INR in the last 72 hours. APTT:No results for input(s):  APTT in the last 72 hours.  LIVER PROFILE:  Recent Labs     07/31/22  0347 08/01/22  0448 08/02/22  0504   * 283* 281*   * 212* 203*   BILITOT 0.8 0.7 1.1   ALKPHOS 499* 487* 499*       Imaging/Diagnostics   No results found. Assessment      Hospital Problems             Last Modified POA    * (Principal) Prostate cancer metastatic to bone ([facility] [address] 7/8/2022 Yes    Thrombocytopenia ([address] 7/8/2022 Yes    Hypomagnesemia 7/9/2022 Yes    Hypercalcemia of malignancy 7/9/2022 Yes    Pain due to malignant neoplasm metastatic to bone [facility]) (Chronic) 7/9/2022 Yes    EDGARD (acute kidney injury) ([facility] [address] 7/9/2022 Yes    Debility 7/11/2022 Yes    Drug-induced constipation 7/11/2022 Yes    Encounter for palliative care 7/11/2022 Yes    Abnormal CT of the abdomen 7/23/2022 Yes    Hydronephrosis 7/23/2022 Yes    Hydroureter 7/23/2022 Yes    Hypokalemia 7/9/2022 Yes    Neuroendocrine carcinoma, high grade ([facility] [address] 7/8/2022 Yes    Anemia due to chemotherapy 7/8/2022 Yes    Primary prostate cancer with metastasis from prostate to other site [facility]) 7/8/2022 Yes     Urinary retention with bilateral hydronephrosis. The hydronephrosis is probably due to outlet obstruction. Plan   1. We will place a Clark catheter and leave this to gravity. If the patient chooses to go on hospice will probably leave this in place. If not we can consider other options such as TURP or suprapubic tube.     Electronically signed by Perri Guillermo MD on 5/24/22 at 8:20 AM EDT

## 2022-08-03 NOTE — PROGRESS NOTES
[facility] Hematology & Oncology        Inpatient Hematology / Oncology Progress Note    Reason for Admission:  Prostate cancer metastatic to bone [facility]) [C61, C79.51]    24 Hour Events:  Afebrile, VSS  detention liaison did not show for appt yesterday, rescheduled for tomorrow, 8/4  Completed XRT yesterday  Renal US with b/l hydronephrosis, urology replaced menchaca    Transfusions: 1 unit PRBCs, 1 unit platelets  Replacements: None    ROS:  Constitutional: Positive for fatigue; negative for fever, chills. CV: Negative for chest pain, palpitations, edema. Respiratory: Negative for dyspnea, cough, wheezing. GI: Negative for nausea, diarrhea, abd pain. MSK:  +Back/hip pain (pain controlled)      10 point review of systems is otherwise negative with the exception of the elements mentioned above in the HPI. Allergies   Allergen Reactions    Turmeric Nausea And Vomiting     Past Medical History:   Diagnosis Date    Claustrophobia     Ear problems     Elevated PSA     Former light cigarette smoker (1-9 per day)     smoked for 35 years.   quit     History of multiple allergies     Nicotine vapor product user     Personal history of prostate cancer     Prostate cancer ([facility] [address]     prostate, neuroendocrine, mets to bone     Past Surgical History:   Procedure Laterality Date    BIOPSY PROSTATE      COLONOSCOPY  08/2020    HEENT      teeth removed     IR KYPHOPLASTY LUMBAR FIRST LEVEL  10/14/2020    IR KYPHOPLASTY LUMBAR FIRST LEVEL  10/14/2020    IR KYPHOPLASTY LUMBAR FIRST LEVEL 10/14/2020 [facility]    IR KYPHOPLASTY THORACIC FIRST LEVEL  10/29/2020    IR KYPHOPLASTY THORACIC FIRST LEVEL  10/29/2020    IR KYPHOPLASTY THORACIC FIRST LEVEL 10/29/2020 [facility]OLOG[facility]    TESTICLE REMOVAL Bilateral 02/2017     Family History   Problem Relation Age of Onset    Hypertension Father     Prostate Cancer Father     Cancer Father         prostate cancer     Social History     Socioeconomic History Marital status: Single     Spouse name: Not on file    Number of children: Not on file    Years of education: Not on file    Highest education level: Not on file   Occupational History    Not on file   Tobacco Use    Smoking status: Former     Packs/day: 0.50     Types: Cigarettes     Quit date: 2016     Years since quittin.6    Smokeless tobacco: Never   Substance and Sexual Activity    Alcohol use: Yes    Drug use: No    Sexual activity: Not on file   Other Topics Concern    Not on file   Social History Narrative    Not on file     Social Determinants of Health     Financial Resource Strain: Not on file   Food Insecurity: Not on file   Transportation Needs: Not on file   Physical Activity: Not on file   Stress: Not on file   Social Connections: Not on file   Intimate Partner Violence: Not on file   Housing Stability: Not on file     Current Facility-Administered Medications   Medication Dose Route Frequency Provider Last Rate Last Admin    0.9 % sodium chloride infusion   IntraVENous PRN Roxane Garcia MD        0.9 % sodium chloride infusion   IntraVENous PRN HELEN Chapman CNP        acetaminophen (TYLENOL) tablet 650 mg  650 mg Oral See Admin Instructions HELEN Chapman CNP        diphenhydrAMINE (BENADRYL) capsule 25 mg  25 mg Oral See Admin Instructions HELEN Chapman CNP        fentaNYL (DURAGESIC) 75 MCG/HR 1 patch  1 patch TransDERmal Q48H HELEN Guadarrama NP   1 patch at 22 1352    lactulose (CHRONULAC) 10 GM/15ML solution 20 g  20 g Oral BID PRN Neda Paul NP-C   20 g at 22 0842    0.9 % sodium chloride infusion   IntraVENous PRN HELEN Chapman CNP        diatrizoate meglumine-sodium (GASTROGRAFIN) 66-10 % solution 15 mL  15 mL Oral ONCE PRN HELEN Chapman CNP   15 mL at 22 1444    HYDROmorphone HCl PF (DILAUDID) injection 1.5 mg  1.5 mg IntraVENous Q3H PRN HELEN Chapman CNP   1.5 mg at 22 205    bisacodyl (DULCOLAX) suppository 10 mg  10 mg Rectal Daily PRN HELEN Ramon CNP   10 mg at 07/22/22 1758    naloxegol (MOVANTIK) tablet 25 mg  25 mg Oral QAM AC HELEN Damon CNP   25 mg at 08/03/22 0806    magnesium oxide (MAG-OX) tablet 400 mg  400 mg Oral 4x Daily Obi Espinosa MD   400 mg at 08/03/22 0802    calcium carbonate (TUMS) chewable tablet 500 mg  500 mg Oral TID HELEN Damon CNP   500 mg at 08/03/22 0802    sennosides-docusate sodium (SENOKOT-S) 8.6-50 MG tablet 2 tablet  2 tablet Oral BID HELEN Damon CNP   2 tablet at 08/03/22 0801    tamsulosin (FLOMAX) capsule 0.4 mg  0.4 mg Oral Daily Amanda Cover, APRN - NP   0.4 mg at 08/03/22 0801    finasteride (PROSCAR) tablet 5 mg  5 mg Oral Daily Amanda Cover, APRN - NP   5 mg at 08/03/22 0802    zinc oxide 40 % paste   Topical 4x Daily PRN Obi Espinosa MD        lidocaine-prilocaine (EMLA) cream   Topical Once Obi Espinosa MD        phenazopyridine (PYRIDIUM) tablet 95 mg  95 mg Oral TID PRN HELEN Damon CNP   95 mg at 07/15/22 2048    cyclobenzaprine (FLEXERIL) tablet 10 mg  10 mg Oral TID PRN HELEN Woodward - NP   10 mg at 08/03/22 0759    [Held by provider] enoxaparin (LOVENOX) injection 40 mg  40 mg SubCUTAneous Daily HELEN Damon CNP   40 mg at 07/23/22 0857    diphenhydrAMINE (BENADRYL) capsule 25 mg  25 mg Oral Q6H PRN HELEN Damon CNP   25 mg at 07/28/22 1242    acetaminophen (TYLENOL) tablet 650 mg  650 mg Oral Q6H PRN HELEN Damon CNP   650 mg at 07/28/22 1242    Or    acetaminophen (TYLENOL) suppository 650 mg  650 mg Rectal Q6H PRN HELEN Damon CNP        HYDROmorphone (DILAUDID) tablet 2 mg  2 mg Oral Q4H PRN Keyla Waters, APRN - CNP   2 mg at 08/03/22 0800    Or    HYDROmorphone (DILAUDID) tablet 4 mg  4 mg Oral Q4H PRN Keyla Waters, HELEN - CNP   4 mg at 08/03/22 0455    naloxone (NARCAN) injection 0.04 mg  0.04 mg IntraVENous PRN HELEN Pederson - CNP        dexamethasone (DECADRON) tablet 4 mg  4 mg Oral 2 times per day Cat George, APRN - CNP   4 mg at 22 0802    aluminum & magnesium hydroxide-simethicone (MAALOX) 200-200-20 MG/5ML suspension 20 mL  20 mL Oral Q6H PRN Simeon Bean MD   20 mL at 22 0809    LORazepam (ATIVAN) tablet 1 mg  1 mg Oral TID PRN Blanchie Badder, DO   1 mg at 22 0945    pantoprazole (PROTONIX) tablet 40 mg  40 mg Oral QAM AC Chloe Mao Martinez, DO   40 mg at 22 0802    sodium chloride flush 0.9 % injection 5-40 mL  5-40 mL IntraVENous 2 times per day Blanchie Badder, DO   5 mL at 22 1017    sodium chloride flush 0.9 % injection 5-40 mL  5-40 mL IntraVENous PRN Blanchie Badder, DO        0.9 % sodium chloride infusion   IntraVENous PRN Blanchie Badder, DO        ondansetron (ZOFRAN-ODT) disintegrating tablet 4 mg  4 mg Oral Q8H PRN Blanchie Badder, DO        Or    ondansetron [facility]) injection 4 mg  4 mg IntraVENous Q6H PRN Blanchie Badder, DO   4 mg at 07/10/22 1717    docusate sodium (COLACE) capsule 100 mg  100 mg Oral BID PRN Blanchie Badder, DO   100 mg at 22 1406    polyethylene glycol (GLYCOLAX) packet 17 g  17 g Oral Daily Blanchie Badder, DO   17 g at 22 0805       OBJECTIVE:  Patient Vitals for the past 8 hrs:   BP Temp Temp src Pulse Resp SpO2   22 0349 117/76 98.1 °F (36.7 °C) Oral 86 18 91 %     Temp (24hrs), Av.2 °F (36.8 °C), Min:98.1 °F (36.7 °C), Max:98.4 °F (36.9 °C)    No intake/output data recorded. Physical Exam:  Constitutional: Well developed male sitting comfortably on the hospital bed. HEENT: Normocephalic and atraumatic. Oropharynx is clear, mucous membranes are moist.  Neck supple. Skin Warm and dry. Bruising noted on L foot and no rash noted. No erythema. No pallor. Respiratory Lungs are clear to auscultation bilaterally without wheezes, rales or rhonchi, normal air exchange without accessory muscle use.     CVS Normal rate, regular rhythm and normal S1 and S2.  No murmurs, gallops, or rubs. Abdomen Soft, nontender and nondistended, normoactive bowel sounds. No palpable mass. No hepatosplenomegaly. Neuro Grossly nonfocal with no obvious sensory or motor deficits. MSK Normal range of motion in general.  No edema and no tenderness. Psych Appropriate mood and affect.         Labs:    Recent Results (from the past 24 hour(s))   Urinalysis w rflx microscopic    Collection Time: 08/02/22 11:39 AM   Result Value Ref Range    Color, UA BROWN      Appearance CLOUDY      Specific [place], UA 1.015 1.001 - 1.023      pH, Urine 6.0 5.0 - 9.0      Protein,  (A) NEG mg/dL    Glucose, UA Negative mg/dL    Ketones, Urine Negative NEG mg/dL    Bilirubin Urine Negative NEG      Blood, Urine LARGE (A) NEG      Urobilinogen, Urine 0.2 0.2 - 1.0 EU/dL    Nitrite, Urine Negative NEG      Leukocyte Esterase, Urine TRACE (A) NEG      WBC, UA 5-10 (A) NORM /hpf    RBC, UA >100 (A) NORM /hpf    Epithelial Cells UA 0-5 (A) NORM /hpf    BACTERIA, URINE Negative (A) NORM /hpf    Casts 0-2 (A) NORM /lpf   Culture, Urine    Collection Time: 08/02/22 11:39 AM    Specimen: URINE-CLEAN CATCH    URINE   Result Value Ref Range    Special Requests NO SPECIAL REQUESTS      Culture NO GROWTH 1 DAY     CBC with Auto Differential    Collection Time: 08/03/22  5:07 AM   Result Value Ref Range    WBC 6.1 4.3 - 11.1 K/uL    RBC 2.14 (L) 4.23 - 5.6 M/uL    Hemoglobin 6.9 (LL) 13.6 - 17.2 g/dL    Hematocrit 22.8 (L) 41.1 - 50.3 %    .5 (H) 79.6 - 97.8 FL    MCH 32.2 26.1 - 32.9 PG    MCHC 30.3 (L) 31.4 - 35.0 g/dL    RDW 21.0 (H) 11.9 - 14.6 %    Platelets 29 (LL) 600 - 450 K/uL    MPV 10.4 9.4 - 12.3 FL    nRBC 0.06 0.0 - 0.2 K/uL    Differential Type AUTOMATED      Seg Neutrophils 76 43 - 78 %    Lymphocytes 11 (L) 13 - 44 %    Monocytes 11 4.0 - 12.0 %    Eosinophils % 1 0.5 - 7.8 %    Basophils 0 0.0 - 2.0 %    Immature Granulocytes 2 0.0 - 5.0 %    Segs Absolute 4.6 1.7 - 8.2 K/UL Absolute Lymph # 0.7 0.5 - 4.6 K/UL    Absolute Mono # 0.6 0.1 - 1.3 K/UL    Absolute Eos # 0.1 0.0 - 0.8 K/UL    Basophils Absolute 0.0 0.0 - 0.2 K/UL    Absolute Immature Granulocyte 0.1 0.0 - 0.5 K/UL   Comprehensive Metabolic Panel    Collection Time: 08/03/22  5:07 AM   Result Value Ref Range    Sodium 136 136 - 145 mmol/L    Potassium 3.7 3.5 - 5.1 mmol/L    Chloride 105 98 - 107 mmol/L    CO2 24 21 - 32 mmol/L    Anion Gap 7 7 - 16 mmol/L    Glucose 122 (H) 65 - 100 mg/dL    BUN 34 (H) 8 - 23 MG/DL    Creatinine 1.20 0.8 - 1.5 MG/DL    GFR African American >60 >60 ml/min/1.73m2    GFR Non- >60 >60 ml/min/1.73m2    Calcium 8.6 8.3 - 10.4 MG/DL    Total Bilirubin 0.9 0.2 - 1.1 MG/DL     (H) 12 - 65 U/L     (H) 15 - 37 U/L    Alk Phosphatase 482 (H) 50 - 136 U/L    Total Protein 6.1 (L) 6.3 - 8.2 g/dL    Albumin 2.5 (L) 3.2 - 4.6 g/dL    Globulin 3.6 (H) 2.3 - 3.5 g/dL    Albumin/Globulin Ratio 0.7 (L) 1.2 - 3.5     Magnesium    Collection Time: 08/03/22  5:07 AM   Result Value Ref Range    Magnesium 2.0 1.8 - 2.4 mg/dL   TYPE AND SCREEN    Collection Time: 08/03/22  6:32 AM   Result Value Ref Range    Crossmatch expiration date 08/06/2022,2359     ABO/Rh A POSITIVE     Antibody Screen NEG        Imaging:  Xray Result (most recent):  XR ABDOMEN (KUB) (SINGLE AP VIEW) 07/20/2022    Narrative  EXAM: ABDOMINAL RADIOGRAPH, 1 view    INDICATION: Constipation    COMPARISON: 9/1/2020    TECHNIQUE: Frontal abdominal radiography was performed. FINDINGS: Limited evaluation due to the patient's body habitus. No clear  obstruction bowel gas pattern. No intraperitoneal free air appreciated. Moderate  retained colonic fecal burden. Prior vertebroplasty at multiple levels. Impression  1. No acute abnormality within the limitation imposed by patient body habitus. 2.  Constipation.         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 ([facility] [address] Hypokalemia    Gynecomastia, male    Neuroendocrine carcinoma, high grade (HCC)    Anemia due to chemotherapy    Primary prostate cancer with metastasis from prostate to other site [facility])    Prostatic nodule    Family history of prostate cancer in father    Acute prostatitis    S/P TURP    Prostate cancer [facility])    Prostate cancer metastatic to bone (HCC)    Thrombocytopenia (HCC)    Hypomagnesemia    Hypercalcemia of malignancy    Pain due to malignant neoplasm metastatic to bone (HCC)    EDGARD (acute kidney injury) ([address]    Debility    Drug-induced constipation    Encounter for palliative care    Abnormal CT of the abdomen    Hydronephrosis    Hydroureter     Mr. Maria L Pickett is a 78 y.o. male admitted on 7/8/2022 with a primary diagnosis of There were no encounter diagnoses. .       Mr. Maria L Pickett has a PMH of metastatic prostate cancer dx 2017 s/p casodex and bilateral orchiectomy for surgical castration, 6 cycles of docetaxel, pathologic vertebral fx's with path positive for high grade neuroendocrine cancer, s/p carbo/etoposide and most recently carbo/taxotere which was held d/t TCP. Last was C2 on 6/8/22. 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 He is a known patient of Dr. Caitlin Middleton s/p multiple lines of treatment as above. At last office visit on 6/29, CEA had notably continued to rise to 8290. He has had discussion with [facility], [facility], however wished to hold off on making a decision until he completed radiation therapy. He presented to the ER with a fall at home, increasing pain in the hips and generalized weakness. Also with constipation. Also found to be anemic and transfused PRBC. We were asked for recommendations for our known patient. PLAN:  Metastatic Prostate Cancer/Neuroendocrine  - S/p multiple lines of therapy, most recently carbo/taxotere C2 on 6/8. Held on 6/29 d/t TCP.   Undergoing XRT to rib and T-spine with  [facility].  - Has spoken to [facility], however wished to hold off on making a decision until he saw if XRT improved his pain  - Can resume XRT on Monday 7/11 Pt does not wish to pursue XRT. Meeting with PC today for pain mgmt and to discuss [address] - wishes to pursue facility placement and enroll in Hospice. 7/13 Pt now states he may be interested in going home since he will have support from friend moving here from out of state. Consult PT/OT. 7/14  PT recommends HHT  7/23 CT AP shows POD with [facility] liver lesions, increased prostate gland, and extensive bony mets  7/25 Pt states he is interested in pursuing XRT again, Rad Onc consulted. He states he may consider having chemo in the future as well. 7/26 Resuming XRT tomorrow (previously completed 1/1 fx to ribs and 1/5 fx to T-spine)  7/27 Resume XRT today - scheduled through 8/2 per rad onc notes. 8/1 XRT today (EOT 8/2). 8/2 Completes XRT to T-spine today (7/7 fx)     Cancer Related Pain  - Currently with Dilaudid/oxy prn  - Consult palliative care to assist with pain management (known from office) and also to assist with goals of care discussions   7/10 Increased Dilaudid to 1 mg every 3 hours for severe pain. Pall care consult pending. 7/11 PC assisting with mgmt. Resume Dex. 7/12 PC started Fentanyl 50mcg patch yesterday. Pt reports pain controlled today - even able to walk around room now. 7/13 Pain controlled. PC changed oxycodone to po dilaudid yesterday. 7/14 Pain controlled  7/15 C/o pain today  7/17 pain improved after menchaca  7/20 Pain controlled on current regimen  7/22 c/o worsening abd pain. Bladder scan with minimal urine. Check CT AP.  7/23 CT AP with POD  7/30 Feels pain increasing - will increase Fentanyl patch to 75 mcg every 48 hours and continue PO Dilaudid for breakthrough. 7/31 Better with adjustment   8/2 c/o b/l abd/flank pain.   Check renal US, UA/Ucx  8/3 Pain improved s/p menchaca placement     Anemia/Thrombocytopenia  - Transfuse prn per [facility] SOPs  7/24 Plt down to 41k. DC Lovenox. Check hemolysis labs, DIC labs, smear, HIT.  7/25 Plt 37k. No DIC noted. Smear, Hapto, and HIT pending. 7/26 Plt 32k. No hemolysis. HIT neg. Smear pending. 7/27 Plts stable at 34k. Hgb relatively stable - 7.6. Smear without schistocytes. Monitor. 7/28 Plt stable at 32k. Pt with hematuria, transfuse to keep plt >50k.  1 unit platelets ordered. 7/29 Plts up to 62k s/p transfusion for hematuria. No bleeding. 8/3 Transfuse 1 unit PRBCs and 1 unit platelets (d/t to hematuria s/p menchaca placement)     Transaminitis / Hyperbilirubinemia  - Alk phos/ALT/AST trended down today, continue to follow   7/11 AST/ALT continue to improve   7/13 LFTs improving  7/18 stable  7/23 LFTs increased, monitor - likely r/t liver mets  7/24 Tbili up to 1.2, LFTs con't to increase. 7/25 Bili down to 0.8. LFTs improved today. 7/27 LFTs stable - known liver mets. Tbili back to normal. Monitoring. RESOLVED - STABLE     EDGARD  - Gentle hydration  RESOLVED     Constipation  - Lactulose prn   7/11 Pt refusing Lactulose. PC started pericolace. Con't Miralax. Consider KUB if no improvement. 7/12 No BM documented. Con't Miralax/Pericolace. Pt more active now, up around in room since pain controlled. 7/13 Had BM x 4 yesterday  7/20 c/o constipation. Increase pericolace to BID. Con't Miralax. Check KUB. If no obstruction, will order Movantik. 7/21 KUB with constipation. Movantik ordered. 7/22 No BM. Con't Movantik. Checking CT AP.  7/23 Pt having Bms. Con't Movantik. 7/31 Added Lactulose with good relief. Hypercalcemia / Hypocalcemia  7/12 CCa++ up to 11. 7. Zometa ordered. 7/15 CCa++ down to 9.2  7/19 CCa++ down to 8.4, replete  7/21 Cca++ 8.6  RESOLVED    Dysuria  7/15 c/o dysuria. UA c/w UTI. CTX ordered. Check Ucx.  7/17 UC NTD on Rocephin  7/18 Pain improved s/p menchaca placement. Ucx with MS[facility]. Completed CTX x 3 days.   RESOLVED    Urinary retention / B/l a one-story home that he can move into on 8/1 or possibly even sooner. Pt was able to walk hallways yesterday. Unsure if he would qualify for facility placement while future living arrangements are made. CM states pt would have to go through extensive insurance process to even cover facility placement if he qualified. CM assisting with discharge planning. ? Could pt stay with son who lives locally until his friend's house is ready to move into? Pt also declines Hospice services at this time. He states he would prefer HHT. 7/15:  Pt is medically stable for discharge. Pt working on living arrangements. Hopeful for discharge home soon. 7/17 Spoke with patient's son yesterday. Patient does not a home to return to so now even if he does have a friend coming there is no home. Difficult placement. CM following. 7/19: Pt reportedly does not have a home to return to upon discharge. Son states pt is unable to live with him. Unlikely that pt would qualify for facility placement. CM following.    7/20: Pt still stable for discharge. CM following - pt has no place to live.    7/21:  CM found available place at boarding house for patient. Pt states he was not told about this, but cannot afford $650/mo. He says he was paying $500/mo at previous place which is no longer available to him. CM following.    7/22:  Per CM, son calling to speak to the manager of the boarding home today. He also asked for list of ALFs that accept Medicaid. CM continues to follow. 7/25: Son went to see available place at boarding home - pt and son not pleased with arrangements as pt would be sharing bathroom with 3 other people. Son looking into ALFs. Pt voices concern that he would not be able to adequately care for himself if he had to live alone. CM following.    7/31: Per CM, pt is making no effort to locate living arrangements. CM assisting to help locate long term.   S[facility] (which [facility]). Pt is stable for discharge once placement arranged. No new updates. 8/1  Still stable for discharge - still has no place/home to discharge. CM following. 8/2:  Pt meeting with [facility] liaison today. Hopeful for discharge soon, pt is medically stable for discharge. 8/3: halfway liaison did not show for appt yesterday. Rescheduled for 8/4 at 2p. Pt concerned about online ratings for facility. [facility] H[facility]ology [facility] Oncology  [address] [address]  Office : [phone]  Fax : [phone]   I personally saw, exammed and counselled the patient, and discussed with NP, agree with above history/assessment/plan. 78 y.o.male metastatic prostate cancer to liver and spine with T8 compression, progressive after cycle 2 carbo/Taxotere, admitted with pain and weakness/fall, came to the hospital to die, improved with supportive care, T8 radiation ongoing, cefepime for UTI, very difficult situation for placement and  working on it. Shaan Lowery M.D.   Geoffrey Bauer  [address] [place] [address], [address] [place] [address]  Office : [phone]  Fax : [phone]

## 2022-08-04 NOTE — PROGRESS NOTES
Comprehensive Nutrition Assessment    Type and Reason for Visit: Reassess  Malnutrition Screening Tool: Malnutrition Screen  Have you recently lost weight without trying?: Unsure of amount of weight loss (2 points)  Have you been eating poorly because of a decreased appetite?: Yes (1 point)  Malnutrition Screening Tool Score: 3    Nutrition Recommendations/Plan:   Meals and Snacks:  Diet: Continue current order     Malnutrition Assessment:  Malnutrition Status: No malnutrition    Nutrition Assessment:  Nutrition History: Patient states that he eats 2 meals per day and several snacks. He states that he typically does not eat breakfast just drinks coffee. He denies any changes to PO prior to admission. No deonte wasting. Do You Have Any Cultural, Religion, or Ethnic Food Preferences?: Yes (Comment)   Nutrition Background:   Patient with PMH significant for metastatic prostate cancer s/p several lines of therapy and surgery and most recently XRT to T spine and ribs. He presented with pain s/p fall at home and constipation. Nutrition Interval:  Patient seen reclined in bed. When RD asking about appetite and intake, he states \"my problem is this\" and rubs his belly. He states that he has been constipated for weeks and cannot stand the thought of putting anything in his stomach. Observed lunch tray with only juice consumed. RN states that patient wanted to hold onto tray until after scan for constipation. Patient states that he ate a little bit of breakfast but per nursing charting he ate 100%. Will monitor for GI status and PO intake however patient has declined ONS previously. Current Nutrition Therapies:  ADULT DIET;  Regular    Current Intake:   Average Meal Intake:  (variable but mostly %) Average Supplements Intake: Refusing to take      Anthropometric Measures:  Height: 5' 10\" (177.8 cm)  Current Body Wt: 196 lb 13.9 oz (89.3 kg) (8/3), Weight source: Bed Scale  BMI: 28.2     Ideal Body Weight (Kg) (Calculated): 75 kg (166 lbs), 116.2 %  Usual Body Wt: 205 lb (93 kg), Percent weight change: -5.9       Edema:Peripheral Vascular  Peripheral Vascular (WDL): Exceptions to WDL  RLE Edema: +1;Non-pitting  LLE Edema: +1;Non-pitting   BMI Category Overweight (BMI 25.0-29. 9)  Estimated Daily Nutrient Needs:  Energy (kcal/day): 2457-7394 (Kcal/kg (20-25) Weight used: 87.5 kg Current (7/8)  Protein (g/day):  (1-1.2 g/kg) Weight Used: (Current)    Fluid (ml/day):   (1 ml/kcal)    Nutrition Diagnosis:   No nutrition diagnosis at this time   Nutrition Interventions:   Food and/or Nutrient Delivery: Continue Current Diet     Coordination of Nutrition Care: Continue to monitor while inpatient       Goals:   Previous Goal Met: Goal(s) Achieved  Active Goal: PO intake 75% or greater, by next RD assessment       Nutrition Monitoring and Evaluation:      Food/Nutrient Intake Outcomes: Food and Nutrient Intake  Physical Signs/Symptoms Outcomes: Meal Time Behavior, Weight    Discharge Planning:    No discharge needs at this time    Wero Duong RD

## 2022-08-04 NOTE — PLAN OF CARE
Problem: Skin/Tissue Integrity  Goal: Absence of new skin breakdown  Description: 1. Monitor for areas of redness and/or skin breakdown  2. Assess vascular access sites hourly  3. Every 4-6 hours minimum:  Change oxygen saturation probe site  4. Every 4-6 hours:  If on nasal continuous positive airway pressure, respiratory therapy assess nares and determine need for appliance change or resting period.   Outcome: Progressing     Problem: Pain  Goal: Verbalizes/displays adequate comfort level or baseline comfort level  Outcome: Progressing     Problem: Safety - Adult  Goal: Free from fall injury  Outcome: Progressing  Flowsheets (Taken 8/4/2022 0810)  Free From Fall Injury: Instruct family/caregiver on patient safety     Problem: ABCDS Injury Assessment  Goal: Absence of physical injury  Outcome: Progressing  Flowsheets (Taken 8/4/2022 0810)  Absence of Physical Injury: Implement safety measures based on patient assessment     Problem: Respiratory - Adult  Goal: Achieves optimal ventilation and oxygenation  Outcome: Progressing  Flowsheets (Taken 8/4/2022 0810)  Achieves optimal ventilation and oxygenation: Assess for changes in respiratory status

## 2022-08-04 NOTE — PROGRESS NOTES
[facility] Hematology & Oncology        Inpatient Hematology / Oncology Progress Note    Reason for Admission:  Prostate cancer metastatic to bone ([facility] [address] [C61, C79.51]    24 Hour Events:  Afebrile, VSS  Meeting with [facility] liaison today  Stable for discharge    Transfusions: None  Replacements: None    ROS:  Constitutional: Positive for fatigue; negative for fever, chills. CV: Negative for chest pain, palpitations, edema. Respiratory: Negative for dyspnea, cough, wheezing. GI: Negative for nausea, diarrhea, abd pain. MSK:  +Back/hip pain (pain controlled)      10 point review of systems is otherwise negative with the exception of the elements mentioned above in the HPI. Allergies   Allergen Reactions    Turmeric Nausea And Vomiting     Past Medical History:   Diagnosis Date    Claustrophobia     Ear problems     Elevated PSA     Former light cigarette smoker (1-9 per day)     smoked for 35 years.   quit     History of multiple allergies     Nicotine vapor product user     Personal history of prostate cancer     Prostate cancer ([facility] [address]     prostate, neuroendocrine, mets to bone     Past Surgical History:   Procedure Laterality Date    BIOPSY PROSTATE      COLONOSCOPY  08/2020    HEENT      teeth removed     IR KYPHOPLASTY LUMBAR FIRST LEVEL  10/14/2020    IR KYPHOPLASTY LUMBAR FIRST LEVEL  10/14/2020    IR KYPHOPLASTY LUMBAR FIRST LEVEL 10/14/2020 [facility]    IR KYPHOPLASTY THORACIC FIRST LEVEL  10/29/2020    IR KYPHOPLASTY THORACIC FIRST LEVEL  10/29/2020    IR KYPHOPLASTY THORACIC FIRST LEVEL 10/29/2020 [facility]    TESTICLE REMOVAL Bilateral 02/2017     Family History   Problem Relation Age of Onset    Hypertension Father     Prostate Cancer Father     Cancer Father         prostate cancer     Social History     Socioeconomic History    Marital status: Single     Spouse name: Not on file    Number of children: Not on file    Years of education: Not on file    Highest HELEN Rivas - CNP   2 tablet at 08/04/22 0817    tamsulosin (FLOMAX) capsule 0.4 mg  0.4 mg Oral Daily HELEN Hunt - NP   0.4 mg at 08/04/22 0817    finasteride (PROSCAR) tablet 5 mg  5 mg Oral Daily HELEN Hunt - NP   5 mg at 08/04/22 0817    zinc oxide 40 % paste   Topical 4x Daily PRN Theodore Ureña MD        lidocaine-prilocaine (EMLA) cream   Topical Once Theodore Ureña MD        phenazopyridine (PYRIDIUM) tablet 95 mg  95 mg Oral TID PRN HELEN Rivas - CNP   95 mg at 07/15/22 2048    cyclobenzaprine (FLEXERIL) tablet 10 mg  10 mg Oral TID PRN HELEN Hunt - NP   10 mg at 08/03/22 0759    [Held by provider] enoxaparin (LOVENOX) injection 40 mg  40 mg SubCUTAneous Daily HELEN Rivas - CNP   40 mg at 07/23/22 0857    diphenhydrAMINE (BENADRYL) capsule 25 mg  25 mg Oral Q6H PRN HELEN Rivas - CNP   25 mg at 07/28/22 1242    acetaminophen (TYLENOL) tablet 650 mg  650 mg Oral Q6H PRN HELEN Rivas - CNP   650 mg at 07/28/22 1242    Or    acetaminophen (TYLENOL) suppository 650 mg  650 mg Rectal Q6H PRN HELEN Rivas CNP        HYDROmorphone (DILAUDID) tablet 2 mg  2 mg Oral Q4H PRN HELEN Almendarez - CNP   2 mg at 08/03/22 0800    Or    HYDROmorphone (DILAUDID) tablet 4 mg  4 mg Oral Q4H PRN HELEN Almendarez - CNP   4 mg at 08/03/22 2122    naloxone [facility]) injection 0.04 mg  0.04 mg IntraVENous PRN HELEN Marcus CNP        dexamethasone (DECADRON) tablet 4 mg  4 mg Oral 2 times per day HELEN Rivas - CNP   4 mg at 08/04/22 0817    aluminum & magnesium hydroxide-simethicone (MAALOX) 200-200-20 MG/5ML suspension 20 mL  20 mL Oral Q6H PRN Simeon Bean MD   20 mL at 07/13/22 0809    LORazepam (ATIVAN) tablet 1 mg  1 mg Oral TID PRN Liz Lim DO   1 mg at 08/04/22 0218    pantoprazole (PROTONIX) tablet 40 mg  40 mg Oral QAM AC Jen Martinez DO   40 mg at 08/04/22 0818    sodium chloride flush 0.9 % injection 5-40 mL  5-40 mL IntraVENous 2 times per day Orelia Falls, DO   10 mL at 22    sodium chloride flush 0.9 % injection 5-40 mL  5-40 mL IntraVENous PRN Orelia Falls, DO        0.9 % sodium chloride infusion   IntraVENous PRN Orelia Falls, DO        ondansetron (ZOFRAN-ODT) disintegrating tablet 4 mg  4 mg Oral Q8H PRN Orelia Falls, DO        Or    ondansetron [facility]) injection 4 mg  4 mg IntraVENous Q6H PRN Orelia Falls, DO   4 mg at 07/10/22 1717    docusate sodium (COLACE) capsule 100 mg  100 mg Oral BID PRN Orelia Falls, DO   100 mg at 22 1406    polyethylene glycol (GLYCOLAX) packet 17 g  17 g Oral Daily Orelia Falls, DO   17 g at 22 0818       OBJECTIVE:  Patient Vitals for the past 8 hrs:   BP Temp Temp src Pulse Resp SpO2   22 0738 120/77 98.3 °F (36.8 °C) Oral 93 17 92 %   22 0251 117/78 98.1 °F (36.7 °C) Oral 84 17 91 %   22 0215 120/71 98.2 °F (36.8 °C) Oral 86 18 --     Temp (24hrs), Av.4 °F (36.9 °C), Min:98 °F (36.7 °C), Max:98.8 °F (37.1 °C)     07 -  1900  In: 200 [P.O.:472]  Out: -     Physical Exam:  Constitutional: Well developed male sitting comfortably on the hospital bed. HEENT: Normocephalic and atraumatic. Oropharynx is clear, mucous membranes are moist.  Neck supple. Skin Warm and dry. Bruising noted on L foot and no rash noted. No erythema. No pallor. Respiratory Lungs are clear to auscultation bilaterally without wheezes, rales or rhonchi, normal air exchange without accessory muscle use. CVS Normal rate, regular rhythm and normal S1 and S2. No murmurs, gallops, or rubs. Abdomen Soft, nontender and nondistended, normoactive bowel sounds. No palpable mass. No hepatosplenomegaly. Neuro Grossly nonfocal with no obvious sensory or motor deficits. MSK Normal range of motion in general.  No edema and no tenderness. Psych Appropriate mood and affect.         Labs:    Recent Results (from the past 24 hour(s))   PREPARE PLATELETS, 1 Product    Collection Time: 08/03/22  9:30 AM   Result Value Ref Range    Unit Number [id number]     Product Code Blood Bank [facility].     Unit Divison 00     Dispense Status Blood Bank TRANSFUSED    PREPARE RBC (CROSSMATCH), 1 Units    Collection Time: 08/03/22 11:15 AM   Result Value Ref Range    History Check Historical check performed    CBC with Auto Differential    Collection Time: 08/03/22  9:30 PM   Result Value Ref Range    WBC 5.8 4.3 - 11.1 K/uL    RBC 2.25 (L) 4.23 - 5.6 M/uL    Hemoglobin 7.4 (L) 13.6 - 17.2 g/dL    Hematocrit 23.3 (L) 41.1 - 50.3 %    .6 (H) 79.6 - 97.8 FL    MCH 32.9 26.1 - 32.9 PG    MCHC 31.8 31.4 - 35.0 g/dL    RDW 21.4 (H) 11.9 - 14.6 %    Platelets 51 (L) 597 - 450 K/uL    MPV 10.9 9.4 - 12.3 FL    nRBC 0.04 0.0 - 0.2 K/uL    Differential Type AUTOMATED      Seg Neutrophils 77 43 - 78 %    Lymphocytes 10 (L) 13 - 44 %    Monocytes 11 4.0 - 12.0 %    Eosinophils % 1 0.5 - 7.8 %    Basophils 0 0.0 - 2.0 %    Immature Granulocytes 1 0.0 - 5.0 %    Segs Absolute 4.5 1.7 - 8.2 K/UL    Absolute Lymph # 0.6 0.5 - 4.6 K/UL    Absolute Mono # 0.6 0.1 - 1.3 K/UL    Absolute Eos # 0.0 0.0 - 0.8 K/UL    Basophils Absolute 0.0 0.0 - 0.2 K/UL    Absolute Immature Granulocyte 0.1 0.0 - 0.5 K/UL   PREPARE PLATELETS, 1 Product    Collection Time: 08/03/22 10:30 PM   Result Value Ref Range    Unit Number [id number]     Product Code Blood Bank [facility]     Unit Divison 00     Dispense Status Blood Bank ALLOCATED    CBC with Auto Differential    Collection Time: 08/04/22  2:02 AM   Result Value Ref Range    WBC 6.5 4.3 - 11.1 K/uL    RBC 2.35 (L) 4.23 - 5.6 M/uL    Hemoglobin 7.6 (L) 13.6 - 17.2 g/dL    Hematocrit 24.2 (L) 41.1 - 50.3 %    .0 (H) 79.6 - 97.8 FL    MCH 32.3 26.1 - 32.9 PG    MCHC 31.4 31.4 - 35.0 g/dL    RDW 22.0 (H) 11.9 - 14.6 %    Platelets 56 (L) 785 - 450 K/uL    MPV 10.6 9.4 - 12.3 FL    nRBC 0.04 0.0 - 0.2 K/uL Differential Type AUTOMATED      Seg Neutrophils 75 43 - 78 %    Lymphocytes 12 (L) 13 - 44 %    Monocytes 11 4.0 - 12.0 %    Eosinophils % 1 0.5 - 7.8 %    Basophils 0 0.0 - 2.0 %    Immature Granulocytes 2 0.0 - 5.0 %    Segs Absolute 4.8 1.7 - 8.2 K/UL    Absolute Lymph # 0.7 0.5 - 4.6 K/UL    Absolute Mono # 0.7 0.1 - 1.3 K/UL    Absolute Eos # 0.0 0.0 - 0.8 K/UL    Basophils Absolute 0.0 0.0 - 0.2 K/UL    Absolute Immature Granulocyte 0.1 0.0 - 0.5 K/UL   Comprehensive Metabolic Panel    Collection Time: 08/04/22  2:02 AM   Result Value Ref Range    Sodium 138 136 - 145 mmol/L    Potassium 4.1 3.5 - 5.1 mmol/L    Chloride 106 98 - 107 mmol/L    CO2 24 21 - 32 mmol/L    Anion Gap 8 7 - 16 mmol/L    Glucose 87 65 - 100 mg/dL    BUN 29 (H) 8 - 23 MG/DL    Creatinine 1.20 0.8 - 1.5 MG/DL    GFR African American >60 >60 ml/min/1.73m2    GFR Non- >60 >60 ml/min/1.73m2    Calcium 8.5 8.3 - 10.4 MG/DL    Total Bilirubin 1.2 (H) 0.2 - 1.1 MG/DL     (H) 12 - 65 U/L     (H) 15 - 37 U/L    Alk Phosphatase 475 (H) 50 - 136 U/L    Total Protein 6.2 (L) 6.3 - 8.2 g/dL    Albumin 2.5 (L) 3.2 - 4.6 g/dL    Globulin 3.7 (H) 2.3 - 3.5 g/dL    Albumin/Globulin Ratio 0.7 (L) 1.2 - 3.5     Magnesium    Collection Time: 08/04/22  2:02 AM   Result Value Ref Range    Magnesium 2.2 1.8 - 2.4 mg/dL       Imaging:  Xray Result (most recent):  XR ABDOMEN (KUB) (SINGLE AP VIEW) 07/20/2022    Narrative  EXAM: ABDOMINAL RADIOGRAPH, 1 view    INDICATION: Constipation    COMPARISON: 9/1/2020    TECHNIQUE: Frontal abdominal radiography was performed. FINDINGS: Limited evaluation due to the patient's body habitus. No clear  obstruction bowel gas pattern. No intraperitoneal free air appreciated. Moderate  retained colonic fecal burden. Prior vertebroplasty at multiple levels. Impression  1. No acute abnormality within the limitation imposed by patient body habitus. 2.  Constipation.         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)    Hypokalemia    Gynecomastia, male    Neuroendocrine carcinoma, high grade ([facility] [address]    Anemia due to chemotherapy    Primary prostate cancer with metastasis from prostate to other site [facility])    Prostatic nodule    Family history of prostate cancer in father    Acute prostatitis    S/P TURP    Prostate cancer [facility])    Prostate cancer metastatic to bone ([facility] [address]    Thrombocytopenia ([facility] [address]    Hypomagnesemia    Hypercalcemia of malignancy    Pain due to malignant neoplasm metastatic to bone (HCC)    EDGARD (acute kidney injury) ([facility] [address]    Debility    Drug-induced constipation    Encounter for palliative care    Abnormal CT of the abdomen    Hydronephrosis    Hydroureter     Mr. Christophe Taylor is a 78 y.o. male admitted on 7/8/2022 with a primary diagnosis of There were no encounter diagnoses. .       Mr. Christophe Taylor has a PMH of metastatic prostate cancer dx 2017 s/p casodex and bilateral orchiectomy for surgical castration, 6 cycles of docetaxel, pathologic vertebral fx's with path positive for high grade neuroendocrine cancer, s/p carbo/etoposide and most recently carbo/taxotere which was held d/t TCP. Last was C2 on 6/8/22. 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 He is a known patient of Dr. Charlie Lloyd s/p multiple lines of treatment as above. At last office visit on 6/29, CEA had notably continued to rise to 8290. He has had discussion with [facility], [facility], however wished to hold off on making a decision until he completed radiation therapy. He presented to the ER with a fall at home, increasing pain in the hips and generalized weakness. Also with constipation. Also found to be anemic and transfused PRBC. We were asked for recommendations for our known patient.     PLAN:  Metastatic Prostate Cancer/Neuroendocrine  - S/p multiple lines of therapy, most recently US, UA/Ucx  8/3 Pain improved s/p menchaca placement     Anemia/Thrombocytopenia  - Transfuse prn per Denae SOPs  7/24 Plt down to 41k. DC Lovenox. Check hemolysis labs, DIC labs, smear, HIT.  7/25 Plt 37k. No DIC noted. Smear, Hapto, and HIT pending. 7/26 Plt 32k. No hemolysis. HIT neg. Smear pending. 7/27 Plts stable at 34k. Hgb relatively stable - 7.6. Smear without schistocytes. Monitor. 7/28 Plt stable at 32k. Pt with hematuria, transfuse to keep plt >50k.  1 unit platelets ordered. 7/29 Plts up to 62k s/p transfusion for hematuria. No bleeding. 8/3 Transfuse 1 unit PRBCs and 1 unit platelets (d/t to hematuria s/p menchaca placement)     Transaminitis / Hyperbilirubinemia  - Alk phos/ALT/AST trended down today, continue to follow   7/11 AST/ALT continue to improve   7/13 LFTs improving  7/18 stable  7/23 LFTs increased, monitor - likely r/t liver mets  7/24 Tbili up to 1.2, LFTs con't to increase. 7/25 Bili down to 0.8. LFTs improved today. 7/27 LFTs stable - known liver mets. Tbili back to normal. Monitoring. RESOLVED - STABLE     EDGARD  - Gentle hydration  RESOLVED     Constipation  - Lactulose prn   7/11 Pt refusing Lactulose. PC started pericolace. Con't Miralax. Consider KUB if no improvement. 7/12 No BM documented. Con't Miralax/Pericolace. Pt more active now, up around in room since pain controlled. 7/13 Had BM x 4 yesterday  7/20 c/o constipation. Increase pericolace to BID. Con't Miralax. Check KUB. If no obstruction, will order Movantik. 7/21 KUB with constipation. Movantik ordered. 7/22 No BM. Con't Movantik. Checking CT AP.  7/23 Pt having Bms. Con't Movantik. 7/31 Added Lactulose with good relief. Hypercalcemia / Hypocalcemia  7/12 CCa++ up to 11. 7. Zometa ordered. 7/15 CCa++ down to 9.2  7/19 CCa++ down to 8.4, replete  7/21 Cca++ 8.6  RESOLVED    Dysuria  7/15 c/o dysuria. UA c/w UTI. CTX ordered.   Check Ucx.  7/17 UC NTD on Rocephin  7/18 Pain improved s/p menchaca placement. Ucx with MSF. Completed CTX x 3 days. RESOLVED    Urinary retention / B/l hydroureteronephrosis  7/16 menchaca ordered/ flomax ordered  7/17 UOP 4000 ml. Adding proscar  7/18 s/p menchaca placement. Con't Flomax/Proscar. Plan to DC menchaca tomorrow. 7/19 Remove menchaca for voiding trial.  If unable to void, will consult urology. 7/20 Menchaca removed yesterday and had to be replaced d/t urinary retention. Consult urology. 7/22 Awaiting urology consult  7/23 Urology felt r/t constipation, however CT shows b/l hydroureteronephrosis. Will defer mgmt to urology. 7/24 No response from urology from message sent yesterday. Will re-consult. 7/26 Urology recommends to remove menchaca this AM for voiding trial.  Also recommends reimaging later on to see if bilateral hydro has resolved (could be r/t overdistended bladder/urinary retention vs prostate cancer/obstruction). If bilateral hydro has not improved over time, would consider bilateral stents vs nephrostomy tubes and this would be more favorable if/when kidney function declines or pt develops significant flank pain. 7/27 Menchaca removed yesterday, had PVR of 500cc but then able to void 200cc. Menchaca remains out. Cr 1.2.  7/29 Cr stable, 1.1. Voiding to urinal without issue. 8/2 c/o b/l abd/flank pain. Check renal US.  8/3 Renal US with mod b/l hydronephrosis. Urology replaced menchaca. If pt chooses to pursue Hospice, then will leave this in place. Otherwise, will consider other options such as TURP or suprapubic tube. Hematuria / UTI  7/28 Hematuria noted. Transfuse platelets. Check UA - UA c/w UTI. Ucx ordered. Start CTX.  7/30 hematuria resolved after plt transfusion. UC NGTD. CTX day 3 today. 8/2 c/o b/l abd/flank pain. Check UA/Ucx  8/3 UA neg for infx. Ucx-NGTD. Continue home meds  Denae SOPs  AC contraindicated d/t thrombocytopenia    Goals and plan of care reviewed with the patient.   All questions answered to the best of our ability. Disposition:  7/14: Pt reports his friend is not moving here until 8/1. He reports she has a one-story home that he can move into on 8/1 or possibly even sooner. Pt was able to walk hallways yesterday. Unsure if he would qualify for facility placement while future living arrangements are made. CM states pt would have to go through extensive insurance process to even cover facility placement if he qualified. CM assisting with discharge planning. ? Could pt stay with son who lives locally until his friend's house is ready to move into? Pt also declines Hospice services at this time. He states he would prefer [facility]. 7/15:  Pt is medically stable for discharge. Pt working on living arrangements. Hopeful for discharge home soon. 7/17 Spoke with patient's son yesterday. Patient does not a home to return to so now even if he does have a friend coming there is no home. Difficult placement. CM following. 7/19: Pt reportedly does not have a home to return to upon discharge. Son states pt is unable to live with him. Unlikely that pt would qualify for facility placement. CM following.    7/20: Pt still stable for discharge. CM following - pt has no place to live.    7/21:  CM found available place at boarding house for patient. Pt states he was not told about this, but cannot afford $650/mo. He says he was paying $500/mo at previous place which is no longer available to him. CM following.    7/22:  Per CM, son calling to speak to the manager of the boarding home today. He also asked for list of ALFs that accept Medicaid. CM continues to follow. 7/25: Son went to see available place at boarding home - pt and son not pleased with arrangements as pt would be sharing bathroom with 3 other people. Son looking into ALFs. Pt voices concern that he would not be able to adequately care for himself if he had to live alone.   CM following.    7/31: Per CM, pt is making no effort to locate living arrangements. CM assisting to help locate care home. Sending pt's info to be reviewed at [facility] (which has availability). Pt is stable for discharge once placement arranged. No new updates. 8/1  Still stable for discharge - still has no place/home to discharge. CM following. 8/2:  Pt meeting with care home liaison today. Hopeful for discharge soon, pt is medically stable for discharge. 8/3: care home liaison did not show for appt yesterday. Rescheduled for 8/4 at 2p. Pt concerned about online ratings for facility. 8/4: Still stable for discharge. Pt meeting with care home liaison today. [facility] - [address] [facility]  [address] [address]  Office : [phone]  Fax : [phone]   I personally saw, exammed and counselled the patient, and discussed with NP, agree with above history/assessment/plan. 78 y.o.male metastatic prostate cancer to liver and spine with T8 compression, progressive after cycle 2 carbo/Taxotere, admitted with pain and weakness/fall, came to the hospital to die, improved with supportive care, T8 irradiated, cefepime for UTI, very difficult situation for placement and  working on it. Chapo Suarez M.D.   [address]  Office : [phone]  Fax : [phone]

## 2022-08-04 NOTE — PROGRESS NOTES
END OF SHIFT SUMMARY:    -pt complaining of lower abdominal pain  -pain medication given x1  -KUB was done and showed moderate amount of stool, no obstruction  -Lactulose given x1  -encouraged pt to walk to get bowels moving      I/Os:  08/04 0701 - 08/04 1900  In: 472 [P.O.:472]  Out: 880 [Urine:880]  Occurrences this Shift: BM 1 (per pt)     Report given to oncoming nurse Dakota Williamson RN

## 2022-08-05 NOTE — PROGRESS NOTES
[address] [facility] Hematology & Oncology        Inpatient Hematology / Oncology Progress Note    Reason for Admission:  Prostate cancer metastatic to bone ([facility] [C61, C79.51]    24 Hour Events:  Afebrile, VSS  [facility] liaison canceled meeting again yesterday  Bili up to 2.0  C/o abd distention yesterday, KUB with constipation, had BM x 2 s/p lactulose  RN note states pt had mild bloody Bms, no further bleeding noted    Transfusions: None  Replacements: None    ROS:  Constitutional: Positive for fatigue; negative for fever, chills. CV: Negative for chest pain, palpitations, edema. Respiratory: Negative for dyspnea, cough, wheezing. GI: Negative for nausea, diarrhea, abd pain. MSK:  +Back/hip pain (pain controlled)      10 point review of systems is otherwise negative with the exception of the elements mentioned above in the HPI. Allergies   Allergen Reactions    Turmeric Nausea And Vomiting     Past Medical History:   Diagnosis Date    Claustrophobia     Ear problems     Elevated PSA     Former light cigarette smoker (1-9 per day)     smoked for 35 years.   quit     History of multiple allergies     Nicotine vapor product user     Personal history of prostate cancer     Prostate cancer ([facility] [address] [facility]     prostate, neuroendocrine, mets to bone     Past Surgical History:   Procedure Laterality Date    BIOPSY PROSTATE      COLONOSCOPY  08/2020    HEENT      teeth removed     IR KYPHOPLASTY LUMBAR FIRST LEVEL  10/14/2020    IR KYPHOPLASTY LUMBAR FIRST LEVEL  10/14/2020    IR KYPHOPLASTY LUMBAR FIRST LEVEL 10/14/2020 [facility] RAD[facility]OLOG[facility]CI[facility]    IR KYPHOPLASTY THORACIC FIRST LEVEL  10/29/2020    IR KYPHOPLASTY THORACIC FIRST LEVEL  10/29/2020    IR KYPHOPLASTY THORACIC FIRST LEVEL 10/29/2020 [facility] RADIOLOGY SPECI[facility]    TESTICLE REMOVAL Bilateral 02/2017     Family History   Problem Relation Age of Onset    Hypertension Father     Prostate Cancer Father     Cancer Father         prostate cancer     Social History Socioeconomic History    Marital status: Single     Spouse name: Not on file    Number of children: Not on file    Years of education: Not on file    Highest education level: Not on file   Occupational History    Not on file   Tobacco Use    Smoking status: Former     Packs/day: 0.50     Types: Cigarettes     Quit date: 2016     Years since quittin.6    Smokeless tobacco: Never   Substance and Sexual Activity    Alcohol use: Yes    Drug use: No    Sexual activity: Not on file   Other Topics Concern    Not on file   Social History Narrative    Not on file     Social Determinants of Health     Financial Resource Strain: Not on file   Food Insecurity: Not on file   Transportation Needs: Not on file   Physical Activity: Not on file   Stress: Not on file   Social Connections: Not on file   Intimate Partner Violence: Not on file   Housing Stability: Not on file     Current Facility-Administered Medications   Medication Dose Route Frequency Provider Last Rate Last Admin    lactulose (CHRONULAC) 10 GM/15ML solution 20 g  20 g Oral BID PRN HELEN Love CNP   20 g at 22 1420    0.9 % sodium chloride infusion   IntraVENous PRN Aden Dawn MD        fentaNYL (DURAGESIC) 75 MCG/HR 1 patch  1 patch TransDERmal Q48H HELEN Shoko NP   1 patch at 22 1453    diatrizoate meglumine-sodium (GASTROGRAFIN) 66-10 % solution 15 mL  15 mL Oral ONCE PRN HELEN Love CNP   15 mL at 22 1444    HYDROmorphone HCl PF (DILAUDID) injection 1.5 mg  1.5 mg IntraVENous Q3H PRN HELEN Love CNP   1.5 mg at 22 2051    bisacodyl (DULCOLAX) suppository 10 mg  10 mg Rectal Daily PRN HELEN Jackson CNP   10 mg at 22 1758    naloxegol (MOVANTIK) tablet 25 mg  25 mg Oral QAM AC HELEN Love CNP   25 mg at 22 0818    magnesium oxide (MAG-OX) tablet 400 mg  400 mg Oral 4x Daily Pola Keene MD   400 mg at 22 0814    calcium carbonate (TUMS) chewable tablet 500 mg  500 mg Oral TID [place], APRN - CNP   500 mg at 08/05/22 1733    sennosides-docusate sodium (SENOKOT-S) 8.6-50 MG tablet 2 tablet  2 tablet Oral BID [place], APRN - CNP   2 tablet at 08/04/22 0817    tamsulosin (FLOMAX) capsule 0.4 mg  0.4 mg Oral Daily Harinder Ainsley, APRN - NP   0.4 mg at 08/05/22 0814    finasteride (PROSCAR) tablet 5 mg  5 mg Oral Daily Harinder Ainsley, APRN - NP   5 mg at 08/05/22 8753    zinc oxide 40 % paste   Topical 4x Daily PRN Beacher Dancer, MD        lidocaine-prilocaine (EMLA) cream   Topical Once Beacher Dancer, MD        phenazopyridine (PYRIDIUM) tablet 95 mg  95 mg Oral TID PRN Tara Camacho, APRN - CNP   95 mg at 07/15/22 2048    cyclobenzaprine (FLEXERIL) tablet 10 mg  10 mg Oral TID PRN Harinder Ainsley, APRN - NP   10 mg at 08/03/22 0759    [Held by provider] enoxaparin (LOVENOX) injection 40 mg  40 mg SubCUTAneous Daily Tara [place], APRN - CNP   40 mg at 07/23/22 0857    diphenhydrAMINE (BENADRYL) capsule 25 mg  25 mg Oral Q6H PRN [place], APRN - CNP   25 mg at 08/05/22 0356    acetaminophen (TYLENOL) tablet 650 mg  650 mg Oral Q6H PRN Tara [place], APRN - CNP   650 mg at 08/05/22 1699    Or    acetaminophen (TYLENOL) suppository 650 mg  650 mg Rectal Q6H PRN Tara Halls, APRN - CNP        HYDROmorphone (DILAUDID) tablet 2 mg  2 mg Oral Q4H PRN Jewelene Forward, APRN - CNP   2 mg at 08/03/22 0800    Or    HYDROmorphone (DILAUDID) tablet 4 mg  4 mg Oral Q4H PRN Jewelene Forward, APRN - CNP   4 mg at 08/05/22 0224    naloxone (NARCAN) injection 0.04 mg  0.04 mg IntraVENous PRN HELEN Marcus - MICHELLE        dexamethasone (DECADRON) tablet 4 mg  4 mg Oral 2 times per day HELEN Robbins CNP   4 mg at 08/05/22 0814    aluminum & magnesium hydroxide-simethicone (MAALOX) 200-200-20 MG/5ML suspension 20 mL  20 mL Oral Q6H PRN Viola Simmons MD   20 mL at 07/13/22 0809    LORazepam (ATIVAN) tablet 1 mg  1 mg Oral TID PRN Tino Velasquez DO   1 mg at 08/04/22 1112    pantoprazole (PROTONIX) tablet 40 mg  40 mg Oral QAM AC Ole Kianna Martinez, DO   40 mg at 22 6138    sodium chloride flush 0.9 % injection 5-40 mL  5-40 mL IntraVENous 2 times per day Padmaja Hippo, DO   10 mL at 22 4737    sodium chloride flush 0.9 % injection 5-40 mL  5-40 mL IntraVENous PRN Padmaja Hippo, DO        0.9 % sodium chloride infusion   IntraVENous PRN Padmaja Hippo, DO        ondansetron (ZOFRAN-ODT) disintegrating tablet 4 mg  4 mg Oral Q8H PRN Padmaja Hippo, DO        Or    ondansetron [facility]) injection 4 mg  4 mg IntraVENous Q6H PRN Padmaja Hippo, DO   4 mg at 07/10/22 1717    docusate sodium (COLACE) capsule 100 mg  100 mg Oral BID PRN Padmaja Hippo, DO   100 mg at 22 1406    polyethylene glycol (GLYCOLAX) packet 17 g  17 g Oral Daily Padmaja Hippo, DO   17 g at 22 0818       OBJECTIVE:  Patient Vitals for the past 8 hrs:   BP Temp Temp src Pulse Resp SpO2   22 0804 134/76 97.5 °F (36.4 °C) Oral 96 17 92 %   22 0653 127/65 97.7 °F (36.5 °C) Oral 89 17 90 %   22 0434 108/68 98.3 °F (36.8 °C) Oral 90 17 90 %   22 0417 107/68 98.3 °F (36.8 °C) -- 94 17 90 %   22 0230 106/75 97.6 °F (36.4 °C) Oral (!) 105 16 93 %     Temp (24hrs), Av.1 °F (36.7 °C), Min:97.5 °F (36.4 °C), Max:98.6 °F (37 °C)    701 - 1900  In: 240 [P.O.:240]  Out: -     Physical Exam:  Constitutional: Well developed male sitting comfortably on the hospital bed. HEENT: Normocephalic and atraumatic. Oropharynx is clear, mucous membranes are moist.  Neck supple. Skin Warm and dry. Bruising noted on L foot and no rash noted. No erythema. No pallor. Respiratory Lungs are clear to auscultation bilaterally without wheezes, rales or rhonchi, normal air exchange without accessory muscle use. CVS Normal rate, regular rhythm and normal S1 and S2. No murmurs, gallops, or rubs. Abdomen Soft, nontender and distended, normoactive bowel sounds.      Neuro Grossly nonfocal with no obvious sensory or motor deficits. MSK Normal range of motion in general.  No edema and no tenderness. Psych Appropriate mood and affect.         Labs:    Recent Results (from the past 24 hour(s))   CBC with Auto Differential    Collection Time: 08/05/22  2:18 AM   Result Value Ref Range    WBC 7.7 4.3 - 11.1 K/uL    RBC 2.55 (L) 4.23 - 5.6 M/uL    Hemoglobin 8.4 (L) 13.6 - 17.2 g/dL    Hematocrit 26.4 (L) 41.1 - 50.3 %    .5 (H) 79.6 - 97.8 FL    MCH 32.9 26.1 - 32.9 PG    MCHC 31.8 31.4 - 35.0 g/dL    RDW 21.9 (H) 11.9 - 14.6 %    Platelets 49 (L) 933 - 450 K/uL    MPV 12.0 9.4 - 12.3 FL    nRBC 0.09 0.0 - 0.2 K/uL    Differential Type AUTOMATED      Seg Neutrophils 78 43 - 78 %    Lymphocytes 9 (L) 13 - 44 %    Monocytes 11 4.0 - 12.0 %    Eosinophils % 0 (L) 0.5 - 7.8 %    Basophils 0 0.0 - 2.0 %    Immature Granulocytes 1 0.0 - 5.0 %    Segs Absolute 6.1 1.7 - 8.2 K/UL    Absolute Lymph # 0.7 0.5 - 4.6 K/UL    Absolute Mono # 0.8 0.1 - 1.3 K/UL    Absolute Eos # 0.0 0.0 - 0.8 K/UL    Basophils Absolute 0.0 0.0 - 0.2 K/UL    Absolute Immature Granulocyte 0.1 0.0 - 0.5 K/UL   Comprehensive Metabolic Panel    Collection Time: 08/05/22  2:18 AM   Result Value Ref Range    Sodium 136 136 - 145 mmol/L    Potassium 4.3 3.5 - 5.1 mmol/L    Chloride 105 98 - 107 mmol/L    CO2 22 21 - 32 mmol/L    Anion Gap 9 7 - 16 mmol/L    Glucose 101 (H) 65 - 100 mg/dL    BUN 28 (H) 8 - 23 MG/DL    Creatinine 1.40 0.8 - 1.5 MG/DL    GFR African American >60 >60 ml/min/1.73m2    GFR Non- 54 (L) >60 ml/min/1.73m2    Calcium 9.1 8.3 - 10.4 MG/DL    Total Bilirubin 2.0 (H) 0.2 - 1.1 MG/DL     (H) 12 - 65 U/L     (H) 15 - 37 U/L    Alk Phosphatase 538 (H) 50 - 136 U/L    Total Protein 6.7 6.3 - 8.2 g/dL    Albumin 2.7 (L) 3.2 - 4.6 g/dL    Globulin 4.0 (H) 2.3 - 3.5 g/dL    Albumin/Globulin Ratio 0.7 (L) 1.2 - 3.5     Magnesium    Collection Time: 08/05/22  2:18 AM   Result Value Ref Range    Magnesium 2.1 1.8 - 2.4 mg/dL       Imaging:  Xray Result (most recent):  XR ABDOMEN (KUB) (SINGLE AP VIEW) 08/04/2022    Narrative  KUB:    CLINICAL HISTORY:  Abdominal distention. COMPARISON:  CT of July 23, 2022. FINDINGS:  AP supine images demonstrate a moderate amount of stool scattered in  the colon with no dilated  small bowel loops. Marked hepatomegaly is again  noted. Multilevel kyphoplasty is again noted. Impression  STABLE MARKED HEPATOMEGALY AND A MODERATE AMOUNT OF STOOL IN THE  COLON, BUT NO EVIDENCE OF BOWEL OBSTRUCTION OR OTHER ACUTE ABDOMINAL  ABNORMALITY. 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)    Prostatic nodule    Family history of prostate cancer in father    Acute prostatitis    S/P TURP    Prostate cancer [facility])    Prostate cancer metastatic to bone ([address]    Thrombocytopenia ([address]    Hypomagnesemia    Hypercalcemia of malignancy    Pain due to malignant neoplasm metastatic to bone (HCC)    EDGARD (acute kidney injury) ([address]    Debility    Drug-induced constipation    Encounter for palliative care    Abnormal CT of the abdomen    Hydronephrosis    Hydroureter     Mr. Marina Victor is a 78 y.o. male admitted on 7/8/2022 with a primary diagnosis of There were no encounter diagnoses. .       Mr. Marina Victor has a PMH of metastatic prostate cancer dx 2017 s/p casodex and bilateral orchiectomy for surgical castration, 6 cycles of docetaxel, pathologic vertebral fx's with path positive for high grade neuroendocrine cancer, s/p carbo/etoposide and most recently carbo/taxotere which was held d/t TCP. Last was C2 on 6/8/22.   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 He is a known patient of Dr. Marlin Otto s/p multiple lines of treatment as above. At last office visit on 6/29, CEA had notably continued to rise to 8290. He has had discussion with [facility], however wished to hold off on making a decision until he completed radiation therapy. He presented to the ER with a fall at home, increasing pain in the hips and generalized weakness. Also with constipation. Also found to be anemic and transfused PRBC. We were asked for recommendations for our known patient. PLAN:  Metastatic Prostate Cancer/Neuroendocrine  - S/p multiple lines of therapy, most recently carbo/taxotere C2 on 6/8. Held on 6/29 d/t TCP. Undergoing XRT to rib and T-spine with Dr. Carol Snyder.  - Has spoken to Kathleen Ryanfeliz Eunice Schneider, however wished to hold off on making a decision until he saw if XRT improved his pain  - Can resume XRT on Monday 7/11 Pt does not wish to pursue XRT. Meeting with PC today for pain mgmt and to discuss [address] - wishes to pursue facility placement and enroll in Hospice. 7/13 Pt now states he may be interested in going home since he will have support from friend moving here from out of state. Consult PT/OT. 7/14  PT recommends HHT  7/23 CT AP shows POD with [facility] liver lesions, increased prostate gland, and extensive bony mets  7/25 Pt states he is interested in pursuing XRT again, Rad Onc consulted. He states he may consider having chemo in the future as well. 7/26 Resuming XRT tomorrow (previously completed 1/1 fx to ribs and 1/5 fx to T-spine)  7/27 Resume XRT today - scheduled through 8/2 per rad onc notes. 8/1 XRT today (EOT 8/2). 8/2 Completes XRT to T-spine today (7/7 fx)     Cancer Related Pain  - Currently with Dilaudid/oxy prn  - Consult palliative care to assist with pain management (known from office) and also to assist with goals of care discussions   7/10 Increased Dilaudid to 1 mg every 3 hours for severe pain. Pall care consult pending.     7/11 PC assisting with mgmt. Resume Dex. 7/12 PC started Fentanyl 50mcg patch yesterday. Pt reports pain controlled today - even able to walk around room now. 7/13 Pain controlled. PC changed oxycodone to po dilaudid yesterday. 7/14 Pain controlled  7/15 C/o pain today  7/17 pain improved after menchaca  7/20 Pain controlled on current regimen  7/22 c/o worsening abd pain. Bladder scan with minimal urine. Check CT AP.  7/23 CT AP with POD  7/30 Feels pain increasing - will increase Fentanyl patch to 75 mcg every 48 hours and continue PO Dilaudid for breakthrough. 7/31 Better with adjustment   8/2 c/o b/l abd/flank pain. Check renal US, UA/Ucx  8/3 Pain improved s/p menchaca placement     Anemia/Thrombocytopenia  - Transfuse prn per [facility] SOPs  7/24 Plt down to 41k. DC Lovenox. Check hemolysis labs, DIC labs, smear, HIT.  7/25 Plt 37k. No DIC noted. Smear, Hapto, and HIT pending. 7/26 Plt 32k. No hemolysis. HIT neg. Smear pending. 7/27 Plts stable at 34k. Hgb relatively stable - 7.6. Smear without schistocytes. Monitor. 7/28 Plt stable at 32k. Pt with hematuria, transfuse to keep plt >50k.  1 unit platelets ordered. 7/29 Plts up to 62k s/p transfusion for hematuria. No bleeding. 8/3 Transfuse 1 unit PRBCs and 1 unit platelets (d/t to hematuria s/p menchaca placement)     Transaminitis / Hyperbilirubinemia  - Alk phos/ALT/AST trended down today, continue to follow   7/11 AST/ALT continue to improve   7/13 LFTs improving  7/18 stable  7/23 LFTs increased, monitor - likely r/t liver mets  7/24 Tbili up to 1.2, LFTs con't to increase. 7/25 Bili down to 0.8. LFTs improved today. 7/27 LFTs stable - known liver mets. Tbili back to normal. Monitoring. 8/5 Tbili up to 2.0, check frac bili. LFTs con't to increase. EDGARD  - Gentle hydration  RESOLVED     Constipation  - Lactulose prn   7/11 Pt refusing Lactulose. PC started pericolace. Con't Miralax. Consider KUB if no improvement. 7/12 No BM documented.   Con't Miralax/Pericolace. Pt more active now, up around in room since pain controlled. 7/13 Had BM x 4 yesterday  7/20 c/o constipation. Increase pericolace to BID. Con't Miralax. Check KUB. If no obstruction, will order Movantik. 7/21 KUB with constipation. Movantik ordered. 7/22 No BM. Con't Movantik. Checking CT AP.  7/23 Pt having Bms. Con't Movantik. 7/31 Added Lactulose with good relief. 8/5 Pt c/o abd distention yesterday. KUB with mod constipation. Had 2 BM after lactulose given. RN note states pt had mild bloody BM, no further bleeding noted. Hypercalcemia / Hypocalcemia  7/12 CCa++ up to 11. 7. Zometa ordered. 7/15 CCa++ down to 9.2  7/19 CCa++ down to 8.4, replete  7/21 Cca++ 8.6  RESOLVED    Dysuria  7/15 c/o dysuria. UA c/w UTI. CTX ordered. Check Ucx.  7/17 UC NTD on Rocephin  7/18 Pain improved s/p menchaca placement. Ucx with MSF. Completed CTX x 3 days. RESOLVED    Urinary retention / B/l hydroureteronephrosis  7/16 menchaca ordered/ flomax ordered  7/17 UOP 4000 ml. Adding proscar  7/18 s/p menchaca placement. Con't Flomax/Proscar. Plan to DC menchaca tomorrow. 7/19 Remove menchaca for voiding trial.  If unable to void, will consult urology. 7/20 Menchaca removed yesterday and had to be replaced d/t urinary retention. Consult urology. 7/22 Awaiting urology consult  7/23 Urology felt r/t constipation, however CT shows b/l hydroureteronephrosis. Will defer mgmt to urology. 7/24 No response from urology from message sent yesterday. Will re-consult. 7/26 Urology recommends to remove menchaca this AM for voiding trial.  Also recommends reimaging later on to see if bilateral hydro has resolved (could be r/t overdistended bladder/urinary retention vs prostate cancer/obstruction). If bilateral hydro has not improved over time, would consider bilateral stents vs nephrostomy tubes and this would be more favorable if/when kidney function declines or pt develops significant flank pain.   7/27 Menchaca removed yesterday, had PVR of 500cc but then able to void 200cc. Menchaca remains out. Cr 1.2.  7/29 Cr stable, 1.1. Voiding to urinal without issue. 8/2 c/o b/l abd/flank pain. Check renal US.  8/3 Renal US with mod b/l hydronephrosis. Urology replaced menchaca. If pt chooses to pursue Hospice, then will leave this in place. Otherwise, will consider other options such as TURP or suprapubic tube. 8/5 Cr 1.4. Continues with menchaca    Hematuria / UTI  7/28 Hematuria noted. Transfuse platelets. Check UA - UA c/w UTI. Ucx ordered. Start CTX.  7/30 hematuria resolved after plt transfusion. UC NGTD. CTX day 3 today. 8/2 c/o b/l abd/flank pain. Check UA/Ucx  8/3 UA neg for infx. Ucx-NGTD. Continue home meds  Denae SOPs  AC contraindicated d/t thrombocytopenia    Goals and plan of care reviewed with the patient. All questions answered to the best of our ability. Disposition:  7/14: Pt reports his friend is not moving here until 8/1. He reports she has a one-story home that he can move into on 8/1 or possibly even sooner. Pt was able to walk hallways yesterday. Unsure if he would qualify for facility placement while future living arrangements are made. CM states pt would have to go through extensive insurance process to even cover facility placement if he qualified. CM assisting with discharge planning. ? Could pt stay with son who lives locally until his friend's house is ready to move into? Pt also declines Hospice services at this time. He states he would prefer HHT. 7/15:  Pt is medically stable for discharge. Pt working on living arrangements. Hopeful for discharge home soon. 7/17 Spoke with patient's son yesterday. Patient does not a home to return to so now even if he does have a friend coming there is no home. Difficult placement. CM following. 7/19: Pt reportedly does not have a home to return to upon discharge. Son states pt is unable to live with him.   Unlikely that pt would qualify for facility placement. CM following.    7/20: Pt still stable for discharge. CM following - pt has no place to live.    7/21:  CM found available place at boarding house for patient. Pt states he was not told about this, but cannot afford $650/mo. He says he was paying $500/mo at previous place which is no longer available to him. CM following.    7/22:  Per CM, son calling to speak to the manager of the boarding home today. He also asked for list of ALFs that accept Medicaid. CM continues to follow. 7/25: Son went to see available place at boarding home - pt and son not pleased with arrangements as pt would be sharing bathroom with 3 other people. Son looking into ALFs. Pt voices concern that he would not be able to adequately care for himself if he had to live alone. CM following.    7/31: Per CM, pt is making no effort to locate living arrangements. CM assisting to help locate [facility]. Sending pt's info to be reviewed at [facility] (which has availability). Pt is stable for discharge once placement arranged. No new updates. 8/1  Still stable for discharge - still has no place/home to discharge. CM following. 8/2:  Pt meeting with [facility] liaison today. Hopeful for discharge soon, pt is medically stable for discharge. 8/3: [facility] liaison did not show for appt yesterday. Rescheduled for 8/4 at 2p. Pt concerned about online ratings for facility. 8/4: Still stable for discharge. Pt meeting with [facility] liaison today. 8/5:  CM continues to follow. [facility] liaison canceled appt again yesterday. Pt/Son making no effort to help locate placement. Reconsulted PT/OT. HELEN Damon [address] Hematology & Oncology  [id number] [id number] [facility]  Office : [phone]  Fax : [phone]   I personally saw, exammed and counselled the patient, and discussed with NP, agree with above history/assessment/plan.  78 y.o.male metastatic prostate cancer to liver and spine with T8 compression, progressive after cycle 2 carbo/Taxotere, admitted with pain and weakness/fall, came to the hospital to die, improved with supportive care, T8 irradiated, cefepime for UTI, very difficult situation for placement and  working on it. Carolann Chacon M.D.   [address]  Office : [phone]  Fax : [phone]

## 2022-08-05 NOTE — PROGRESS NOTES
END OF SHIFT SUMMARY:    Significant labs this shift:  Hgb 8.4  Hematocrit 26.4    Additional events this shift:   - Pt refused all stool softeners   - Urine still red  - Pt reports feeling better and less pain, given dilaudid once for pain  - New patch applied to left side per orders    I/Os:    08/05 0701 - 08/05 1900  In: 240 [P.O.:240]  Out: [address] [Urine:1125]    Arcenio Bland RN

## 2022-08-05 NOTE — CARE COORDINATION
The manager from [facility]. 15, was scheduled to see pt today at 1400 for a face to face assessment, however she called CM stating she had to cancel this appointment due to staff shortage. She stated she will attempt to see pt by 1700 on 8/5 or in the morning of 8/6. She stated she forwarded the information that CM emailed to her  for review and will contact him/her to see if this has yet been reviewed. CM asked Alida to please do so as this pt needs to be d/c. CM will f/u with [facility] and if she continues to schedule face to face assessments, which per her, are required prior to any admission and/or if the  denies pt from record review then alternate d/c planning will be enacted. Neither pt nor his son Felipe Park have contacted CM regarding any efforts made to assist with d/c planning. CM will continue to follow.

## 2022-08-05 NOTE — PROGRESS NOTES
Mild bloody bowel movement x2. Pt refused stool softner. Staff unable to determine source of bleeding, pt will not staff to look for hemidrosis.

## 2022-08-06 NOTE — PROGRESS NOTES
END OF SHIFT SUMMARY:    Additional events this shift:   - PT port dressing and needle was changed  - PT had x2 bowel movements with some blood  - PT refused stool softener due to multiple bowel movements  - PT was using bathroom and felt pain from menchaca, menchaca assessed and no complications  - Given ativan once    I/Os:    08/06 0701 - 08/06 1900  In: 600 [P.O.:600]  Out: [address],

## 2022-08-06 NOTE — PROGRESS NOTES
New OT order received. Pt is known to OT from initial evaluation. Reviewed chart and discussed with pt who stated that he remains independent with ADLs and with mobility in room. Pt declines need for OT services at this time. Please refer to initial evaluation completed on 7/13.      Elisa Setting, OT

## 2022-08-06 NOTE — PROGRESS NOTES
Physical Therapy Note:    New PT evaluation order received and chart reviewed. PT initial evaluation performed on 7/13/22, pt has refused all therapy attempts since the initial evaluation, recently discharged from therapy on 7/25/22 due to lack of participation. Attempted Re-assessment today, pt again refused mobility at this time. Pt states \"I probably need to walk\", then when encouraged to do so with PT, pt continues to refuse and states \"not right now, I have been getting up to bathroom and doing what I want\". PT to discontinue therapy order at this time due to pt not wanting to participate.      Renae Kent, PT     Rehab Caseload Tracker upper

## 2022-08-06 NOTE — PROGRESS NOTES
[facility] Hematology & Oncology        Inpatient Hematology / Oncology Progress Note    Reason for Admission:  Prostate cancer metastatic to bone ([facility] [address] [C61, C79.51]    24 Hour Events:  Afebrile, VSS  Needs placement  Bili up to 2.0, frac bili pending    Transfusions: None  Replacements: None    ROS:  Constitutional: Positive for fatigue; negative for fever, chills. CV: Negative for chest pain, palpitations, edema. Respiratory: Negative for dyspnea, cough, wheezing. GI: Negative for nausea, diarrhea, abd pain. MSK:  +Back/hip pain (pain controlled)      10 point review of systems is otherwise negative with the exception of the elements mentioned above in the HPI. Allergies   Allergen Reactions    Turmeric Nausea And Vomiting     Past Medical History:   Diagnosis Date    Claustrophobia     Ear problems     Elevated PSA     Former light cigarette smoker (1-9 per day)     smoked for 35 years.   quit     History of multiple allergies     Nicotine vapor product user     Personal history of prostate cancer     Prostate cancer ([facility] [address]     prostate, neuroendocrine, mets to bone     Past Surgical History:   Procedure Laterality Date    BIOPSY PROSTATE      COLONOSCOPY  08/2020    HEENT      teeth removed     IR KYPHOPLASTY LUMBAR FIRST LEVEL  10/14/2020    IR KYPHOPLASTY LUMBAR FIRST LEVEL  10/14/2020    IR KYPHOPLASTY LUMBAR FIRST LEVEL 10/14/2020 [facility]    IR KYPHOPLASTY THORACIC FIRST LEVEL  10/29/2020    IR KYPHOPLASTY THORACIC FIRST LEVEL  10/29/2020    IR KYPHOPLASTY THORACIC FIRST LEVEL 10/29/2020 [facility]OLOG[facility]CI[facility]    TESTICLE REMOVAL Bilateral 02/2017     Family History   Problem Relation Age of Onset    Hypertension Father     Prostate Cancer Father     Cancer Father         prostate cancer     Social History     Socioeconomic History    Marital status: Single     Spouse name: Not on file    Number of children: Not on file    Years of education: Not on file    Highest education level: Not on file   Occupational History    Not on file   Tobacco Use    Smoking status: Former     Packs/day: 0.50     Types: Cigarettes     Quit date: 2016     Years since quittin.6    Smokeless tobacco: Never   Substance and Sexual Activity    Alcohol use: Yes    Drug use: No    Sexual activity: Not on file   Other Topics Concern    Not on file   Social History Narrative    Not on file     Social Determinants of Health     Financial Resource Strain: Not on file   Food Insecurity: Not on file   Transportation Needs: Not on file   Physical Activity: Not on file   Stress: Not on file   Social Connections: Not on file   Intimate Partner Violence: Not on file   Housing Stability: Not on file     Current Facility-Administered Medications   Medication Dose Route Frequency Provider Last Rate Last Admin    lactulose ([address]) 10 GM/15ML solution 20 g  20 g Oral BID PRN HELEN Chapa CNP   20 g at 22 1420    0.9 % sodium chloride infusion   IntraVENous PRN Nick Bach MD        fentaNYL (DURAGESIC) 75 MCG/HR 1 patch  1 patch TransDERmal Q48H HELEN Marti NP   1 patch at 22 1410    diatrizoate meglumine-sodium (GASTROGRAFIN) 66-10 % solution 15 mL  15 mL Oral ONCE PRN HELEN Chapa CNP   15 mL at 22 1444    HYDROmorphone HCl PF (DILAUDID) injection 1.5 mg  1.5 mg IntraVENous Q3H PRN HELEN Chapa CNP   1.5 mg at 22 2051    bisacodyl (DULCOLAX) suppository 10 mg  10 mg Rectal Daily PRN HELEN Gandara CNP   10 mg at 22 1758    naloxegol (MOVANTIK) tablet 25 mg  25 mg Oral QAM AC HELEN Chapa CNP   25 mg at 22 6247    magnesium oxide (MAG-OX) tablet 400 mg  400 mg Oral 4x Daily Violet Ralph MD   400 mg at 22 0850    calcium carbonate (TUMS) chewable tablet 500 mg  500 mg Oral TID HELEN Chapa CNP   500 mg at 22 1409    sennosides-docusate sodium (SENOKOT-S) 8.6-50 MG tablet 2 tablet  2 tablet Oral BID Hanane Medrano Shayan Recinos, APRN - CNP   2 tablet at 08/04/22 0817    tamsulosin (FLOMAX) capsule 0.4 mg  0.4 mg Oral Daily Anibal Cotter, APRN - NP   0.4 mg at 08/06/22 0850    finasteride (PROSCAR) tablet 5 mg  5 mg Oral Daily Anibal Cotter, APRN - NP   5 mg at 08/06/22 0850    zinc oxide 40 % paste   Topical 4x Daily PRN Greyson Wilson MD        lidocaine-prilocaine (EMLA) cream   Topical Once Greyson Wilson MD        phenazopyridine (PYRIDIUM) tablet 95 mg  95 mg Oral TID PRN Itmann Conquest, APRN - CNP   95 mg at 07/15/22 2048    cyclobenzaprine (FLEXERIL) tablet 10 mg  10 mg Oral TID PRN Anibal Cotter, APRN - NP   10 mg at 08/06/22 0122    [Held by provider] enoxaparin (LOVENOX) injection 40 mg  40 mg SubCUTAneous Daily Itmann Conquest, APRN - CNP   40 mg at 07/23/22 0857    diphenhydrAMINE (BENADRYL) capsule 25 mg  25 mg Oral Q6H PRN Itmann Conquest, APRN - CNP   25 mg at 08/05/22 0356    acetaminophen (TYLENOL) tablet 650 mg  650 mg Oral Q6H PRN Itmann Conquest, APRN - CNP   650 mg at 08/05/22 2958    Or    acetaminophen (TYLENOL) suppository 650 mg  650 mg Rectal Q6H PRN Itmann Conquest, APRN - CNP        HYDROmorphone (DILAUDID) tablet 2 mg  2 mg Oral Q4H PRN Robbin Holding, APRN - CNP   2 mg at 08/03/22 0800    Or    HYDROmorphone (DILAUDID) tablet 4 mg  4 mg Oral Q4H PRN [employer], APRN - CNP   4 mg at 08/06/22 0309    naloxone (NARCAN) injection 0.04 mg  0.04 mg IntraVENous PRN Katina Brand, APRN - CNP        dexamethasone (DECADRON) tablet 4 mg  4 mg Oral 2 times per day Itmann Conquest, APRN - CNP   4 mg at 08/06/22 0851    aluminum & magnesium hydroxide-simethicone (MAALOX) 163-432-33 MG/5ML suspension 20 mL  20 mL Oral Q6H PRN Prakash Rubi MD   20 mL at 07/13/22 0809    LORazepam (ATIVAN) tablet 1 mg  1 mg Oral TID PRN Landon Manzo DO   1 mg at 08/05/22 2207    pantoprazole (PROTONIX) tablet 40 mg  40 mg Oral QAM AC Jen Martinez, DO   40 mg at 08/06/22 9289    sodium chloride flush 0.9 % injection 5-40 mL  5-40 mL IntraVENous 2 times per day Hershell Juan Manuel, DO   10 mL at 22 0851    sodium chloride flush 0.9 % injection 5-40 mL  5-40 mL IntraVENous PRN Hershell Juan Manuel, DO        0.9 % sodium chloride infusion   IntraVENous PRN Hershell Juan Manuel, DO        ondansetron (ZOFRAN-ODT) disintegrating tablet 4 mg  4 mg Oral Q8H PRN Hershell Juan Manuel, DO        Or    ondansetron [facility]) injection 4 mg  4 mg IntraVENous Q6H PRN Hershell Juan Manuel, DO   4 mg at 07/10/22 1717    docusate sodium (COLACE) capsule 100 mg  100 mg Oral BID PRN Hershell Juan Manuel, DO   100 mg at 22 1406    polyethylene glycol (GLYCOLAX) packet 17 g  17 g Oral Daily Hershell Juan Manuel, DO   17 g at 22 0818       OBJECTIVE:  Patient Vitals for the past 8 hrs:   BP Temp Temp src Pulse Resp SpO2   22 0707 121/79 98.1 °F (36.7 °C) Oral 94 20 91 %   22 0339 -- -- -- -- 19 --   22 0327 120/75 98.6 °F (37 °C) Oral 96 18 92 %   22 0309 -- -- -- -- 16 --     Temp (24hrs), Av.2 °F (36.8 °C), Min:97.8 °F (36.6 °C), Max:98.6 °F (37 °C)     07 -  1900  In: 360 [P.O.:360]  Out: -     Physical Exam:  Constitutional: Well developed male sitting comfortably on the hospital bed. HEENT: Normocephalic and atraumatic. Oropharynx is clear, mucous membranes are moist.  Neck supple. Skin Warm and dry. Bruising noted on L foot and no rash noted. No erythema. No pallor. Respiratory Lungs are clear to auscultation bilaterally without wheezes, rales or rhonchi, normal air exchange without accessory muscle use. CVS Normal rate, regular rhythm and normal S1 and S2. No murmurs, gallops, or rubs. Abdomen Soft, nontender and distended, normoactive bowel sounds. Neuro Grossly nonfocal with no obvious sensory or motor deficits. MSK Normal range of motion in general.  No edema and no tenderness. Psych Appropriate mood and affect.         Labs:    Recent Results (from the past 24 hour(s))   CBC with Auto Differential    Collection Time: 08/06/22  2:46 AM   Result Value Ref Range    WBC 6.5 4.3 - 11.1 K/uL    RBC 2.27 (L) 4.23 - 5.6 M/uL    Hemoglobin 7.5 (L) 13.6 - 17.2 g/dL    Hematocrit 23.7 (L) 41.1 - 50.3 %    .4 (H) 79.6 - 97.8 FL    MCH 33.0 (H) 26.1 - 32.9 PG    MCHC 31.6 31.4 - 35.0 g/dL    RDW 21.4 (H) 11.9 - 14.6 %    Platelets 59 (L) 903 - 450 K/uL    MPV 11.7 9.4 - 12.3 FL    nRBC 0.08 0.0 - 0.2 K/uL    Differential Type AUTOMATED      Seg Neutrophils 82 (H) 43 - 78 %    Lymphocytes 7 (L) 13 - 44 %    Monocytes 9 4.0 - 12.0 %    Eosinophils % 0 (L) 0.5 - 7.8 %    Basophils 0 0.0 - 2.0 %    Immature Granulocytes 2 0.0 - 5.0 %    Segs Absolute 5.3 1.7 - 8.2 K/UL    Absolute Lymph # 0.5 0.5 - 4.6 K/UL    Absolute Mono # 0.6 0.1 - 1.3 K/UL    Absolute Eos # 0.0 0.0 - 0.8 K/UL    Basophils Absolute 0.0 0.0 - 0.2 K/UL    Absolute Immature Granulocyte 0.1 0.0 - 0.5 K/UL   Comprehensive Metabolic Panel    Collection Time: 08/06/22  2:46 AM   Result Value Ref Range    Sodium 135 (L) 138 - 145 mmol/L    Potassium 4.1 3.5 - 5.1 mmol/L    Chloride 102 98 - 107 mmol/L    CO2 25 21 - 32 mmol/L    Anion Gap 8 7 - 16 mmol/L    Glucose 101 (H) 65 - 100 mg/dL    BUN 29 (H) 8 - 23 MG/DL    Creatinine 1.40 0.8 - 1.5 MG/DL    GFR African American >60 >60 ml/min/1.73m2    GFR Non- 54 (L) >60 ml/min/1.73m2    Calcium 8.9 8.3 - 10.4 MG/DL    Total Bilirubin 2.0 (H) 0.2 - 1.1 MG/DL     (H) 12 - 65 U/L     (H) 15 - 37 U/L    Alk Phosphatase 520 (H) 50 - 136 U/L    Total Protein 6.3 6.3 - 8.2 g/dL    Albumin 2.6 (L) 3.2 - 4.6 g/dL    Globulin 3.7 (H) 2.3 - 3.5 g/dL    Albumin/Globulin Ratio 0.7 (L) 1.2 - 3.5     Magnesium    Collection Time: 08/06/22  2:46 AM   Result Value Ref Range    Magnesium 2.0 1.8 - 2.4 mg/dL   Bilirubin, total and direct    Collection Time: 08/06/22  2:46 AM   Result Value Ref Range    Total Bilirubin 1.9 (H) 0.2 - 1.1 MG/DL    Bilirubin, Direct 1.6 (H) <0.4 MG/DL    Bilirubin, Indirect 0.3 0.0 - 1.1 MG/DL       Imaging:  Xray Result (most recent):  XR ABDOMEN (KUB) (SINGLE AP VIEW) 08/04/2022    Narrative  KUB:    CLINICAL HISTORY:  Abdominal distention. COMPARISON:  CT of July 23, 2022. FINDINGS:  AP supine images demonstrate a moderate amount of stool scattered in  the colon with no dilated  small bowel loops. Marked hepatomegaly is again  noted. Multilevel kyphoplasty is again noted. Impression  STABLE MARKED HEPATOMEGALY AND A MODERATE AMOUNT OF STOOL IN THE  COLON, BUT NO EVIDENCE OF BOWEL OBSTRUCTION OR OTHER ACUTE ABDOMINAL  ABNORMALITY. 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)    Hypokalemia    Gynecomastia, male    Neuroendocrine carcinoma, high grade (HCC)    Anemia due to chemotherapy    Primary prostate cancer with metastasis from prostate to other site [facility])    Prostatic nodule    Family history of prostate cancer in father    Acute prostatitis    S/P TURP    Prostate cancer [facility])    Prostate cancer metastatic to bone ([address]    Thrombocytopenia ([address]    Hypomagnesemia    Hypercalcemia of malignancy    Pain due to malignant neoplasm metastatic to bone (HCC)    EDGARD (acute kidney injury) ([address]    Debility    Drug-induced constipation    Encounter for palliative care    Abnormal CT of the abdomen    Hydronephrosis    Hydroureter     Mr. Mayela Gonzalez is a 78 y.o. male admitted on 7/8/2022 with a primary diagnosis of There were no encounter diagnoses. .       Mr. Mayela Gonzalez has a PMH of metastatic prostate cancer dx 2017 s/p casodex and bilateral orchiectomy for surgical castration, 6 cycles of docetaxel, pathologic vertebral fx's with path positive for high grade neuroendocrine cancer, s/p carbo/etoposide and most recently carbo/taxotere which was held d/t TCP. Last was C2 on 6/8/22. 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   He is a known patient of Dr. Jose Bowman s/p multiple lines of treatment as above. At last office visit on 6/29, CEA had notably continued to rise to 8290. He has had discussion with [facility], however wished to hold off on making a decision until he completed radiation therapy. He presented to the ER with a fall at home, increasing pain in the hips and generalized weakness. Also with constipation. Also found to be anemic and transfused PRBC. We were asked for recommendations for our known patient. PLAN:  Metastatic Prostate Cancer/Neuroendocrine  - S/p multiple lines of therapy, most recently carbo/taxotere C2 on 6/8. Held on 6/29 d/t TCP. Undergoing XRT to rib and T-spine with Dr. Virgilio Metcalf.  - Has spoken to [facility], however wished to hold off on making a decision until he saw if XRT improved his pain  - Can resume XRT on Monday 7/11 Pt does not wish to pursue XRT. Meeting with PC today for pain mgmt and to discuss [address] - wishes to pursue facility placement and enroll in Hospice. 7/13 Pt now states he may be interested in going home since he will have support from friend moving here from out of state. Consult PT/OT. 7/14  PT recommends HHT  7/23 CT AP shows POD with [facility] liver lesions, increased prostate gland, and extensive bony mets  7/25 Pt states he is interested in pursuing XRT again, Rad Onc consulted. He states he may consider having chemo in the future as well. 7/26 Resuming XRT tomorrow (previously completed 1/1 fx to ribs and 1/5 fx to T-spine)  7/27 Resume XRT today - scheduled through 8/2 per rad onc notes. 8/1 XRT today (EOT 8/2). 8/2 Completes XRT to T-spine today (7/7 fx)     Cancer Related Pain  - Currently with Dilaudid/oxy prn  - Consult palliative care to assist with pain management (known from office) and also to assist with goals of care discussions   7/10 Increased Dilaudid to 1 mg every 3 hours for severe pain. Pall care consult pending. 7/11 PC assisting with mgmt. Resume Dex.   7/12 PC started Fentanyl 50mcg patch yesterday. Pt reports pain controlled today - even able to walk around room now. 7/13 Pain controlled. PC changed oxycodone to po dilaudid yesterday. 7/14 Pain controlled  7/15 C/o pain today  7/17 pain improved after menchaca  7/20 Pain controlled on current regimen  7/22 c/o worsening abd pain. Bladder scan with minimal urine. Check CT AP.  7/23 CT AP with POD  7/30 Feels pain increasing - will increase Fentanyl patch to 75 mcg every 48 hours and continue PO Dilaudid for breakthrough. 7/31 Better with adjustment   8/2 c/o b/l abd/flank pain. Check renal US, UA/Ucx  8/3 Pain improved s/p menchaca placement     Anemia/Thrombocytopenia  - Transfuse prn per Denae SOPs  7/24 Plt down to 41k. DC Lovenox. Check hemolysis labs, DIC labs, smear, HIT.  7/25 Plt 37k. No DIC noted. Smear, Hapto, and HIT pending. 7/26 Plt 32k. No hemolysis. HIT neg. Smear pending. 7/27 Plts stable at 34k. Hgb relatively stable - 7.6. Smear without schistocytes. Monitor. 7/28 Plt stable at 32k. Pt with hematuria, transfuse to keep plt >50k.  1 unit platelets ordered. 7/29 Plts up to 62k s/p transfusion for hematuria. No bleeding. 8/3 Transfuse 1 unit PRBCs and 1 unit platelets (d/t to hematuria s/p menchaca placement)     Transaminitis / Hyperbilirubinemia  - Alk phos/ALT/AST trended down today, continue to follow   7/11 AST/ALT continue to improve   7/13 LFTs improving  7/18 stable  7/23 LFTs increased, monitor - likely r/t liver mets  7/24 Tbili up to 1.2, LFTs con't to increase. 7/25 Bili down to 0.8. LFTs improved today. 7/27 LFTs stable - known liver mets. Tbili back to normal. Monitoring. 8/5 Tbili up to 2.0, check frac bili. LFTs con't to increase. 8/6 Tbili 2 - lab did not process frac bili yesterday, reordered this AM     EDGARD  - Gentle hydration  RESOLVED     Constipation  - Lactulose prn   7/11 Pt refusing Lactulose. PC started pericolace. Con't Miralax.   Consider KUB if no improvement. 7/12 No BM documented. Con't Miralax/Pericolace. Pt more active now, up around in room since pain controlled. 7/13 Had BM x 4 yesterday  7/20 c/o constipation. Increase pericolace to BID. Con't Miralax. Check KUB. If no obstruction, will order Movantik. 7/21 KUB with constipation. Movantik ordered. 7/22 No BM. Con't Movantik. Checking CT AP.  7/23 Pt having Bms. Con't Movantik. 7/31 Added Lactulose with good relief. 8/5 Pt c/o abd distention yesterday. KUB with mod constipation. Had 2 BM after lactulose given. RN note states pt had mild bloody BM, no further bleeding noted. Hypercalcemia / Hypocalcemia  7/12 CCa++ up to 11. 7. Zometa ordered. 7/15 CCa++ down to 9.2  7/19 CCa++ down to 8.4, replete  7/21 Cca++ 8.6  RESOLVED    Dysuria  7/15 c/o dysuria. UA c/w UTI. CTX ordered. Check Ucx.  7/17 UC NTD on Rocephin  7/18 Pain improved s/p menchaca placement. Ucx with MSF. Completed CTX x 3 days. RESOLVED    Urinary retention / B/l hydroureteronephrosis  7/16 menchaca ordered/ flomax ordered  7/17 UOP 4000 ml. Adding proscar  7/18 s/p menchaca placement. Con't Flomax/Proscar. Plan to DC menchaca tomorrow. 7/19 Remove menchaca for voiding trial.  If unable to void, will consult urology. 7/20 Menchaca removed yesterday and had to be replaced d/t urinary retention. Consult urology. 7/22 Awaiting urology consult  7/23 Urology felt r/t constipation, however CT shows b/l hydroureteronephrosis. Will defer mgmt to urology. 7/24 No response from urology from message sent yesterday. Will re-consult. 7/26 Urology recommends to remove menchaca this AM for voiding trial.  Also recommends reimaging later on to see if bilateral hydro has resolved (could be r/t overdistended bladder/urinary retention vs prostate cancer/obstruction).   If bilateral hydro has not improved over time, would consider bilateral stents vs nephrostomy tubes and this would be more favorable if/when kidney function declines or pt develops significant flank pain. 7/27 Menchaca removed yesterday, had PVR of 500cc but then able to void 200cc. Menchaca remains out. Cr 1.2.  7/29 Cr stable, 1.1. Voiding to urinal without issue. 8/2 c/o b/l abd/flank pain. Check renal US.  8/3 Renal US with mod b/l hydronephrosis. Urology replaced menchaca. If pt chooses to pursue Hospice, then will leave this in place. Otherwise, will consider other options such as TURP or suprapubic tube. 8/6 Cr 1.4. Continues with menchaca    Hematuria / UTI  7/28 Hematuria noted. Transfuse platelets. Check UA - UA c/w UTI. Ucx ordered. Start CTX.  7/30 hematuria resolved after plt transfusion. UC NGTD. CTX day 3 today. 8/2 c/o b/l abd/flank pain. Check UA/Ucx  8/3 UA neg for infx. Ucx-NGTD. Continue home meds  Denae SOPs  AC contraindicated d/t thrombocytopenia    Goals and plan of care reviewed with the patient. All questions answered to the best of our ability. Disposition:  7/14: Pt reports his friend is not moving here until 8/1. He reports she has a one-story home that he can move into on 8/1 or possibly even sooner. Pt was able to walk hallways yesterday. Unsure if he would qualify for facility placement while future living arrangements are made. CM states pt would have to go through extensive insurance process to even cover facility placement if he qualified. CM assisting with discharge planning. ? Could pt stay with son who lives locally until his friend's house is ready to move into? Pt also declines Hospice services at this time. He states he would prefer HHT. 7/15:  Pt is medically stable for discharge. Pt working on living arrangements. Hopeful for discharge home soon. 7/17 Spoke with patient's son yesterday. Patient does not a home to return to so now even if he does have a friend coming there is no home. Difficult placement. CM following. 7/19: Pt reportedly does not have a home to return to upon discharge.   Son states pt is unable to live with him. Unlikely that pt would qualify for facility placement. CM following.    7/20: Pt still stable for discharge. CM following - pt has no place to live.    7/21:  CM found available place at boarding house for patient. Pt states he was not told about this, but cannot afford $650/mo. He says he was paying $500/mo at previous place which is no longer available to him. CM following.    7/22:  Per CM, son calling to speak to the manager of the boarding home today. He also asked for list of ALFs that accept Medicaid. CM continues to follow. 7/25: Son went to see available place at boarding home - pt and son not pleased with arrangements as pt would be sharing bathroom with 3 other people. Son looking into ALFs. Pt voices concern that he would not be able to adequately care for himself if he had to live alone. CM following.    7/31: Per CM, pt is making no effort to locate living arrangements. CM assisting to help locate long-term. Sending pt's info to be reviewed at [facility] (which has availability). Pt is stable for discharge once placement arranged. No new updates. 8/1  Still stable for discharge - still has no place/home to discharge. CM following. 8/2:  Pt meeting with long-term liaison today. Hopeful for discharge soon, pt is medically stable for discharge. 8/3: long-term liaison did not show for appt yesterday. Rescheduled for 8/4 at 2p. Pt concerned about online ratings for facility. 8/4: Still stable for discharge. Pt meeting with long-term liaison today. 8/5:  CM continues to follow. JAMEL liaison canceled appt again yesterday. Pt/Son making no effort to help locate placement. Reconsulted PT/OT.             HELEN Shah [address] Hematology & Oncology  [address] [facility] [address]  Office : [phone]  Fax : [phone]   I personally saw, exammed and counselled the patient, and discussed with NP, agree with above history/assessment/plan. 78 y.o.male metastatic prostate cancer to liver and spine with T8 compression, progressive after cycle 2 carbo/Taxotere, admitted with pain and weakness/fall, came to the hospital to die, improved with supportive care, T8 irradiated, cefepime for UTI, very difficult situation for placement and  working on it. Angie Hernandez M.D.   [address], [address] [place] [address]  Office : [phone]  Fax : [phone]

## 2022-08-07 NOTE — PROGRESS NOTES
[facility] Hematology & Oncology        Inpatient Hematology / Oncology Progress Note    Reason for Admission:  Prostate cancer metastatic to bone ([facility] [address] [C61, C79.51]    24 Hour Events:  Afebrile, VSS  Needs placement  Refused PT again    Transfusions: 1 unit platelets  Replacements: None    ROS:  Constitutional: Positive for fatigue; negative for fever, chills. CV: Negative for chest pain, palpitations, edema. Respiratory: Negative for dyspnea, cough, wheezing. GI: Negative for nausea, diarrhea, abd pain. MSK:  +Back/hip pain (pain controlled)      10 point review of systems is otherwise negative with the exception of the elements mentioned above in the HPI. Allergies   Allergen Reactions    Turmeric Nausea And Vomiting     Past Medical History:   Diagnosis Date    Claustrophobia     Ear problems     Elevated PSA     Former light cigarette smoker (1-9 per day)     smoked for 35 years.   quit     History of multiple allergies     Nicotine vapor product user     Personal history of prostate cancer     Prostate cancer ([facility] [address]     prostate, neuroendocrine, mets to bone     Past Surgical History:   Procedure Laterality Date    BIOPSY PROSTATE      COLONOSCOPY  08/2020    HEENT      teeth removed     IR KYPHOPLASTY LUMBAR FIRST LEVEL  10/14/2020    IR KYPHOPLASTY LUMBAR FIRST LEVEL  10/14/2020    IR KYPHOPLASTY LUMBAR FIRST LEVEL 10/14/2020 [facility] RADIOLOGY SPECI[facility]    IR KYPHOPLASTY THORACIC FIRST LEVEL  10/29/2020    IR KYPHOPLASTY THORACIC FIRST LEVEL  10/29/2020    IR KYPHOPLASTY THORACIC FIRST LEVEL 10/29/2020 [facility] RADIOLOGY SPECI[facility]    TESTICLE REMOVAL Bilateral 02/2017     Family History   Problem Relation Age of Onset    Hypertension Father     Prostate Cancer Father     Cancer Father         prostate cancer     Social History     Socioeconomic History    Marital status: Single     Spouse name: Not on file    Number of children: Not on file    Years of education: Not on file    Highest education level: Not on file   Occupational History    Not on file   Tobacco Use    Smoking status: Former     Packs/day: 0.50     Types: Cigarettes     Quit date: 2016     Years since quittin.6    Smokeless tobacco: Never   Substance and Sexual Activity    Alcohol use: Yes    Drug use: No    Sexual activity: Not on file   Other Topics Concern    Not on file   Social History Narrative    Not on file     Social Determinants of Health     Financial Resource Strain: Not on file   Food Insecurity: Not on file   Transportation Needs: Not on file   Physical Activity: Not on file   Stress: Not on file   Social Connections: Not on file   Intimate Partner Violence: Not on file   Housing Stability: Not on file     Current Facility-Administered Medications   Medication Dose Route Frequency Provider Last Rate Last Admin    [START ON 2022] naloxegol (MOVANTIK) tablet 12.5 mg  12.5 mg Oral QAM AC Toy Jest, APRN - CNP        sennosides-docusate sodium (SENOKOT-S) 8.6-50 MG tablet 2 tablet  2 tablet Oral BID PRN Toy Jest, APRN - CNP        polyethylene glycol (GLYCOLAX) packet 17 g  17 g Oral Daily PRN Toy Jest, APRN - CNP        lactulose (CHRONULAC) 10 GM/15ML solution 20 g  20 g Oral BID PRN Toy Jest, APRN - CNP   20 g at 22 1420    0.9 % sodium chloride infusion   IntraVENous PRN Jeramie Martinez MD        fentaNYL (DURAGESIC) 75 MCG/HR 1 patch  1 patch TransDERmal Q48H Aliza Lair, APRN - NP   1 patch at 22 1410    diatrizoate meglumine-sodium (GASTROGRAFIN) 66-10 % solution 15 mL  15 mL Oral ONCE PRN Toy Jest, APRN - CNP   15 mL at 22 1444    HYDROmorphone HCl PF (DILAUDID) injection 1.5 mg  1.5 mg IntraVENous Q3H PRN Toy Jest, APRN - CNP   1.5 mg at 22 205    bisacodyl (DULCOLAX) suppository 10 mg  10 mg Rectal Daily PRN Shanteoral Rojasls, APRN - CNP   10 mg at 22 1758    magnesium oxide (MAG-OX) tablet 400 mg  400 mg Oral 4x Daily Wilton Pardo MD   400 mg at 22 5403 calcium carbonate (TUMS) chewable tablet 500 mg  500 mg Oral TID Ingrid Liyah, APRN - CNP   500 mg at 08/05/22 1409    tamsulosin (FLOMAX) capsule 0.4 mg  0.4 mg Oral Daily David Alexander, APRN - NP   0.4 mg at 08/07/22 7572    finasteride (PROSCAR) tablet 5 mg  5 mg Oral Daily David Alexander, APRN - NP   5 mg at 08/07/22 8500    zinc oxide 40 % paste   Topical 4x Daily PRN Brigid Nuñez MD        lidocaine-prilocaine (EMLA) cream   Topical Once Brigid Nuñez MD        phenazopyridine (PYRIDIUM) tablet 95 mg  95 mg Oral TID PRN Ingrid Pelletier, APRN - CNP   95 mg at 07/15/22 2048    cyclobenzaprine (FLEXERIL) tablet 10 mg  10 mg Oral TID PRN David Alexander APRN - NP   10 mg at 08/06/22 0122    [Held by provider] enoxaparin (LOVENOX) injection 40 mg  40 mg SubCUTAneous Daily Ingrid Liyah, APRN - CNP   40 mg at 07/23/22 0857    diphenhydrAMINE (BENADRYL) capsule 25 mg  25 mg Oral Q6H PRN Ingrid Pelletier, APRN - CNP   25 mg at 08/05/22 0356    acetaminophen (TYLENOL) tablet 650 mg  650 mg Oral Q6H PRN Ingrid Pelletier, APRN - CNP   650 mg at 08/05/22 8447    Or    acetaminophen (TYLENOL) suppository 650 mg  650 mg Rectal Q6H PRN Ingrid Liyah, APRN - CNP        HYDROmorphone (DILAUDID) tablet 2 mg  2 mg Oral Q4H PRN Roxane Pereira, APRN - CNP   2 mg at 08/03/22 0800    Or    HYDROmorphone (DILAUDID) tablet 4 mg  4 mg Oral Q4H PRN Roxane Pereira, APRN - CNP   4 mg at 08/07/22 0258    naloxone (NARCAN) injection 0.04 mg  0.04 mg IntraVENous PRN Katina Brand, APRN - CNP        dexamethasone (DECADRON) tablet 4 mg  4 mg Oral 2 times per day Ingrid Pelletier, APRN - CNP   4 mg at 08/07/22 0812    aluminum & magnesium hydroxide-simethicone (MAALOX) 200-200-20 MG/5ML suspension 20 mL  20 mL Oral Q6H PRN Cecilia Gray MD   20 mL at 07/13/22 0809    LORazepam (ATIVAN) tablet 1 mg  1 mg Oral TID PRN Mikayla Pradhan DO   1 mg at 08/06/22 7099    pantoprazole (PROTONIX) tablet 40 mg  40 mg Oral QAM AC Deedee Martinez DO   40 mg at 08/07/22 4459 sodium chloride flush 0.9 % injection 5-40 mL  5-40 mL IntraVENous 2 times per day Hershell Juan Manuel, DO   10 mL at 22 0835    sodium chloride flush 0.9 % injection 5-40 mL  5-40 mL IntraVENous PRN Hershell Juan Manuel, DO        0.9 % sodium chloride infusion   IntraVENous PRN Hershell Juan Manuel, DO        ondansetron (ZOFRAN-ODT) disintegrating tablet 4 mg  4 mg Oral Q8H PRN Hershell Juan Manuel, DO        Or    ondansetron [facility] injection 4 mg  4 mg IntraVENous Q6H PRN Hershell Juan Manuel, DO   4 mg at 07/10/22 1717    docusate sodium (COLACE) capsule 100 mg  100 mg Oral BID PRN Hershell Juan Manuel, DO   100 mg at 22 1406       OBJECTIVE:  Patient Vitals for the past 8 hrs:   BP Temp Temp src Pulse Resp SpO2   22 0719 116/74 98.2 °F (36.8 °C) Oral 84 18 92 %   22 0328 -- -- -- -- 18 --   22 0258 -- -- -- -- 16 --   22 0233 135/71 98.5 °F (36.9 °C) Oral 79 16 91 %     Temp (24hrs), Av.1 °F (36.7 °C), Min:97.5 °F (36.4 °C), Max:98.5 °F (36.9 °C)    701 -  1900  In: 360 [P.O.:360]  Out: -     Physical Exam:  Constitutional: Well developed male sitting comfortably on the hospital bed. HEENT: Normocephalic and atraumatic. Oropharynx is clear, mucous membranes are moist.  Neck supple. Skin Warm and dry. Bruising noted on L foot and no rash noted. No erythema. No pallor. Respiratory Lungs are clear to auscultation bilaterally without wheezes, rales or rhonchi, normal air exchange without accessory muscle use. CVS Normal rate, regular rhythm and normal S1 and S2. No murmurs, gallops, or rubs. Abdomen Soft, nontender and distended, normoactive bowel sounds. Neuro Grossly nonfocal with no obvious sensory or motor deficits. MSK Normal range of motion in general.  No edema and no tenderness. Psych Appropriate mood and affect.         Labs:    Recent Results (from the past 24 hour(s))   CBC with Auto Differential    Collection Time: 22  5:00 AM   Result Value Ref Range    WBC 5.5 4.3 - 11.1 K/uL    RBC 2.18 (L) 4.23 - 5.6 M/uL    Hemoglobin 7.1 (L) 13.6 - 17.2 g/dL    Hematocrit 23.1 (L) 41.1 - 50.3 %    .0 (H) 79.6 - 97.8 FL    MCH 32.6 26.1 - 32.9 PG    MCHC 30.7 (L) 31.4 - 35.0 g/dL    RDW 21.2 (H) 11.9 - 14.6 %    Platelets 42 (L) 540 - 450 K/uL    MPV 12.0 9.4 - 12.3 FL    nRBC 0.08 0.0 - 0.2 K/uL    Differential Type AUTOMATED      Seg Neutrophils 78 43 - 78 %    Lymphocytes 8 (L) 13 - 44 %    Monocytes 11 4.0 - 12.0 %    Eosinophils % 0 (L) 0.5 - 7.8 %    Basophils 0 0.0 - 2.0 %    Immature Granulocytes 2 0.0 - 5.0 %    Segs Absolute 4.3 1.7 - 8.2 K/UL    Absolute Lymph # 0.5 0.5 - 4.6 K/UL    Absolute Mono # 0.6 0.1 - 1.3 K/UL    Absolute Eos # 0.0 0.0 - 0.8 K/UL    Basophils Absolute 0.0 0.0 - 0.2 K/UL    Absolute Immature Granulocyte 0.1 0.0 - 0.5 K/UL   Comprehensive Metabolic Panel    Collection Time: 08/07/22  5:00 AM   Result Value Ref Range    Sodium 137 (L) 138 - 145 mmol/L    Potassium 4.1 3.5 - 5.1 mmol/L    Chloride 103 98 - 107 mmol/L    CO2 24 21 - 32 mmol/L    Anion Gap 10 7 - 16 mmol/L    Glucose 113 (H) 65 - 100 mg/dL    BUN 29 (H) 8 - 23 MG/DL    Creatinine 1.39 0.8 - 1.5 MG/DL    GFR African American >60 >60 ml/min/1.73m2    GFR Non- 54 (L) >60 ml/min/1.73m2    Calcium 8.7 8.3 - 10.4 MG/DL    Total Bilirubin 1.6 (H) 0.2 - 1.1 MG/DL     (H) 12 - 65 U/L     (H) 15 - 37 U/L    Alk Phosphatase 512 (H) 50 - 136 U/L    Total Protein 6.1 (L) 6.3 - 8.2 g/dL    Albumin 2.5 (L) 3.2 - 4.6 g/dL    Globulin 3.6 (H) 2.3 - 3.5 g/dL    Albumin/Globulin Ratio 0.7 (L) 1.2 - 3.5     Magnesium    Collection Time: 08/07/22  5:00 AM   Result Value Ref Range    Magnesium 2.1 1.8 - 2.4 mg/dL       Imaging:  Xray Result (most recent):  XR ABDOMEN (KUB) (SINGLE AP VIEW) 08/04/2022    Narrative  KUB:    CLINICAL HISTORY:  Abdominal distention. COMPARISON:  CT of July 23, 2022.     FINDINGS:  AP supine images demonstrate a moderate amount of stool scattered in  the colon with no dilated  small bowel loops. Marked hepatomegaly is again  noted. Multilevel kyphoplasty is again noted. Impression  STABLE MARKED HEPATOMEGALY AND A MODERATE AMOUNT OF STOOL IN THE  COLON, BUT NO EVIDENCE OF BOWEL OBSTRUCTION OR OTHER ACUTE ABDOMINAL  ABNORMALITY. 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)    Hypokalemia    Gynecomastia, male    Neuroendocrine carcinoma, high grade (HCC)    Anemia due to chemotherapy    Primary prostate cancer with metastasis from prostate to other site [facility])    Prostatic nodule    Family history of prostate cancer in father    Acute prostatitis    S/P TURP    Prostate cancer [facility])    Prostate cancer metastatic to bone ([facility] [address]    Thrombocytopenia ([facility] [address]    Hypomagnesemia    Hypercalcemia of malignancy    Pain due to malignant neoplasm metastatic to bone (HCC)    EDGARD (acute kidney injury) ([facility] [address]    Debility    Drug-induced constipation    Encounter for palliative care    Abnormal CT of the abdomen    Hydronephrosis    Hydroureter     Mr. Manuel Clements is a 78 y.o. male admitted on 7/8/2022 with a primary diagnosis of There were no encounter diagnoses. .       Mr. Manuel Clements has a PMH of metastatic prostate cancer dx 2017 s/p casodex and bilateral orchiectomy for surgical castration, 6 cycles of docetaxel, pathologic vertebral fx's with path positive for high grade neuroendocrine cancer, s/p carbo/etoposide and most recently carbo/taxotere which was held d/t TCP. Last was C2 on 6/8/22. 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 He is a known patient of Dr. Nirmala Scott s/p multiple lines of treatment as above. At last office visit on 6/29, CEA had notably continued to rise to 8290.   He has had discussion with [facility], [facility], however wished to hold off on making a decision until he completed menchaca  7/20 Pain controlled on current regimen  7/22 c/o worsening abd pain. Bladder scan with minimal urine. Check CT AP.  7/23 CT AP with POD  7/30 Feels pain increasing - will increase Fentanyl patch to 75 mcg every 48 hours and continue PO Dilaudid for breakthrough. 7/31 Better with adjustment   8/2 c/o b/l abd/flank pain. Check renal US, UA/Ucx  8/3 Pain improved s/p menchaca placement     Anemia/Thrombocytopenia  - Transfuse prn per [facility]ae SOPs  7/24 Plt down to 41k. DC Lovenox. Check hemolysis labs, DIC labs, smear, HIT.  7/25 Plt 37k. No DIC noted. Smear, Hapto, and HIT pending. 7/26 Plt 32k. No hemolysis. HIT neg. Smear pending. 7/27 Plts stable at 34k. Hgb relatively stable - 7.6. Smear without schistocytes. Monitor. 7/28 Plt stable at 32k. Pt with hematuria, transfuse to keep plt >50k.  1 unit platelets ordered. 7/29 Plts up to 62k s/p transfusion for hematuria. No bleeding. 8/3 Transfuse 1 unit PRBCs and 1 unit platelets (d/t to hematuria s/p menchaca placement)  8/7 Transfuse 1 unit platelets      Transaminitis / Hyperbilirubinemia  - Alk phos/ALT/AST trended down today, continue to follow   7/11 AST/ALT continue to improve   7/13 LFTs improving  7/18 stable  7/23 LFTs increased, monitor - likely r/t liver mets  7/24 Tbili up to 1.2, LFTs con't to increase. 7/25 Bili down to 0.8. LFTs improved today. 7/27 LFTs stable - known liver mets. Tbili back to normal. Monitoring. 8/5 Tbili up to 2.0, check frac bili. LFTs con't to increase. 8/6 Tbili 2 - lab did not process frac bili yesterday, reordered this AM  8/7 Tbili down to 1.6, mostly direct     EDGARD  - Gentle hydration  RESOLVED     Constipation  - Lactulose prn   7/11 Pt refusing Lactulose. PC started pericolace. Con't Miralax. Consider KUB if no improvement. 7/12 No BM documented. Con't Miralax/Pericolace. Pt more active now, up around in room since pain controlled. 7/13 Had BM x 4 yesterday  7/20 c/o constipation.   Increase pericolace to BID. Con't Miralax. Check KUB. If no obstruction, will order Movantik. 7/21 KUB with constipation. Movantik ordered. 7/22 No BM. Con't Movantik. Checking CT AP.  7/23 Pt having Bms. Con't Movantik. 7/31 Added Lactulose with good relief. 8/5 Pt c/o abd distention yesterday. KUB with mod constipation. Had 2 BM after lactulose given. RN note states pt had mild bloody BM, no further bleeding noted. Hypercalcemia / Hypocalcemia  7/12 CCa++ up to 11. 7. Zometa ordered. 7/15 CCa++ down to 9.2  7/19 CCa++ down to 8.4, replete  7/21 Cca++ 8.6  RESOLVED    Dysuria  7/15 c/o dysuria. UA c/w UTI. CTX ordered. Check Ucx.  7/17 UC NTD on Rocephin  7/18 Pain improved s/p menchaca placement. Ucx with [facility]F. Completed CTX x 3 days. RESOLVED    Urinary retention / B/l hydroureteronephrosis  7/16 menchaca ordered/ flomax ordered  7/17 UOP 4000 ml. Adding proscar  7/18 s/p menchaca placement. Con't Flomax/Proscar. Plan to DC menchaca tomorrow. 7/19 Remove menchaca for voiding trial.  If unable to void, will consult urology. 7/20 Menchaca removed yesterday and had to be replaced d/t urinary retention. Consult urology. 7/22 Awaiting urology consult  7/23 Urology felt r/t constipation, however CT shows b/l hydroureteronephrosis. Will defer mgmt to urology. 7/24 No response from urology from message sent yesterday. Will re-consult. 7/26 Urology recommends to remove menchaca this AM for voiding trial.  Also recommends reimaging later on to see if bilateral hydro has resolved (could be r/t overdistended bladder/urinary retention vs prostate cancer/obstruction). If bilateral hydro has not improved over time, would consider bilateral stents vs nephrostomy tubes and this would be more favorable if/when kidney function declines or pt develops significant flank pain. 7/27 Menchaca removed yesterday, had PVR of 500cc but then able to void 200cc. Menchaca remains out. Cr 1.2.  7/29 Cr stable, 1.1.  Voiding to urinal without issue.  8/2 c/o b/l abd/flank pain. Check renal US.  8/3 Renal US with mod b/l hydronephrosis. Urology replaced menchaca. If pt chooses to pursue Hospice, then will leave this in place. Otherwise, will consider other options such as TURP or suprapubic tube. 8/6 Cr 1.4. Continues with menchaca    Hematuria / UTI  7/28 Hematuria noted. Transfuse platelets. Check UA - UA c/w UTI. Ucx ordered. Start CTX.  7/30 hematuria resolved after plt transfusion. UC NGTD. CTX day 3 today. 8/2 c/o b/l abd/flank pain. Check UA/Ucx  8/3 UA neg for infx. Ucx-NGTD. Continue home meds  Denae SOPs  AC contraindicated d/t thrombocytopenia    Goals and plan of care reviewed with the patient. All questions answered to the best of our ability. Disposition:  7/14: Pt reports his friend is not moving here until 8/1. He reports she has a one-story home that he can move into on 8/1 or possibly even sooner. Pt was able to walk hallways yesterday. Unsure if he would qualify for facility placement while future living arrangements are made. CM states pt would have to go through extensive insurance process to even cover facility placement if he qualified. CM assisting with discharge planning. ? Could pt stay with son who lives locally until his friend's house is ready to move into? Pt also declines Hospice services at this time. He states he would prefer [facility]. 7/15:  Pt is medically stable for discharge. Pt working on living arrangements. Hopeful for discharge home soon. 7/17 Spoke with patient's son yesterday. Patient does not a home to return to so now even if he does have a friend coming there is no home. Difficult placement. CM following. 7/19: Pt reportedly does not have a home to return to upon discharge. Son states pt is unable to live with him. Unlikely that pt would qualify for facility placement. CM following.    7/20: Pt still stable for discharge.   CM following - pt has no place to live.    7/21:  CM found available place at boarding house for patient. Pt states he was not told about this, but cannot afford $650/mo. He says he was paying $500/mo at previous place which is no longer available to him. CM following.    7/22:  Per CM, son calling to speak to the manager of the boarding home today. He also asked for list of ALFs that accept Medicaid. CM continues to follow. 7/25: Son went to see available place at boarding home - pt and son not pleased with arrangements as pt would be sharing bathroom with 3 other people. Son looking into ALFs. Pt voices concern that he would not be able to adequately care for himself if he had to live alone. CM following.    7/31: Per CM, pt is making no effort to locate living arrangements. CM assisting to help locate [facility]. Sending pt's info to be reviewed at [facility] (which has availability). Pt is stable for discharge once placement arranged. No new updates. 8/1  Still stable for discharge - still has no place/home to discharge. CM following. 8/2:  Pt meeting with [facility] liaison today. Hopeful for discharge soon, pt is medically stable for discharge. 8/3: [facility] liaison did not show for appt yesterday. Rescheduled for 8/4 at . Pt concerned about online ratings for facility. 8/4: Still stable for discharge. Pt meeting with [facility] liaison today. 8/5:  CM continues to follow. [facility] liaison canceled appt again yesterday. Pt/Son making no effort to help locate placement. Reconsulted PT/OT. 8/6:  Pt has been stable for discharge for over 3 weeks. Still needs placement/living arrangements. Pt/son still not making any effort to assist with finding place for pt to go. Pt again refused PT, PT/OT signed off.             Key Busby, HELEN - [address] Hematology & Oncology  [address] [address]  Office : [phone]  Fax : [phone]   I personally saw, exammed and counselled the patient, and discussed with NP, agree with above history/assessment/plan. 78 y.o.male metastatic prostate cancer to liver and spine with T8 compression, progressive after cycle 2 carbo/Taxotere, admitted with pain and weakness/fall, came to the hospital to die, improved with supportive care, T8 irradiated, cefepime for UTI, continue pain med, very difficult situation for placement and  working on it, frankly discussed with patient that his son should provide assistance. Damion Dunaway M.D.   [address]  Office : [phone]  Fax : [phone]

## 2022-08-07 NOTE — PROGRESS NOTES
END OF SHIFT SUMMARY:    Significant labs this shift:  platelets dropped to 42  Blood products given this shift:  1 unit of platelets  Additional events this shift:   Pt stated he had a bowel movement but was not good, offered prn stool softeners, pt refused. Pt stated he ambulated in room, I encouraged pt to ambulate more and in schroeder.     I/Os:    08/07 0701 - 08/07 1900  In: 9435 [P.O.:1080]  Out: 230 [facility], RN

## 2022-08-08 NOTE — PLAN OF CARE
Problem: Skin/Tissue Integrity  Goal: Absence of new skin breakdown  Description: 1. Monitor for areas of redness and/or skin breakdown  2. Assess vascular access sites hourly  3. Every 4-6 hours minimum:  Change oxygen saturation probe site  4. Every 4-6 hours:  If on nasal continuous positive airway pressure, respiratory therapy assess nares and determine need for appliance change or resting period.   Outcome: Progressing     Problem: Pain  Goal: Verbalizes/displays adequate comfort level or baseline comfort level  Outcome: Progressing     Problem: Safety - Adult  Goal: Free from fall injury  Outcome: Progressing  Flowsheets (Taken 8/8/2022 1330)  Free From Fall Injury: Instruct family/caregiver on patient safety     Problem: ABCDS Injury Assessment  Goal: Absence of physical injury  Outcome: Progressing

## 2022-08-08 NOTE — PROGRESS NOTES
Patient has remained A&O to person, place and time, but occasionally seemed aff to situation. Pt continued to have bloody urine output from menchaca bag. He denies N/V/D but required 1 pain pill this AM. pt states that he had a large BM this afternoon. Vital signs have remained stable. No signs or symptoms of distress noted. Patient rounded on hourly by myself or patient assistant and encouraged to call with any needs. No signs of distress noted. Bed low, locked, call bell within reach.    Violetta Hinojosa, RN

## 2022-08-08 NOTE — PROGRESS NOTES
[facility] Hematology & Oncology        Inpatient Hematology / Oncology Progress Note    Reason for Admission:  Prostate cancer metastatic to bone ([facility] [address] [C61, C79.51]    24 Hour Events:  Afebrile, VSS  Needs placement  No complaints    ROS:  Constitutional: Positive for fatigue; negative for fever, chills. CV: Negative for chest pain, palpitations, edema. Respiratory: Negative for dyspnea, cough, wheezing. GI: Negative for nausea, diarrhea, abd pain. MSK:  +Back/hip pain (pain controlled)      10 point review of systems is otherwise negative with the exception of the elements mentioned above in the HPI. Allergies   Allergen Reactions    Turmeric Nausea And Vomiting     Past Medical History:   Diagnosis Date    Claustrophobia     Ear problems     Elevated PSA     Former light cigarette smoker (1-9 per day)     smoked for 35 years.   quit     History of multiple allergies     Nicotine vapor product user     Personal history of prostate cancer     Prostate cancer ([facility] [address]     prostate, neuroendocrine, mets to bone     Past Surgical History:   Procedure Laterality Date    BIOPSY PROSTATE      COLONOSCOPY  08/2020    HEENT      teeth removed     IR KYPHOPLASTY LUMBAR FIRST LEVEL  10/14/2020    IR KYPHOPLASTY LUMBAR FIRST LEVEL  10/14/2020    IR KYPHOPLASTY LUMBAR FIRST LEVEL 10/14/2020 [facility] RADIOLOGY SPECI[facility]    IR KYPHOPLASTY THORACIC FIRST LEVEL  10/29/2020    IR KYPHOPLASTY THORACIC FIRST LEVEL  10/29/2020    IR KYPHOPLASTY THORACIC FIRST LEVEL 10/29/2020 [facility] RADIOLOGY SPECI[facility]    TESTICLE REMOVAL Bilateral 02/2017     Family History   Problem Relation Age of Onset    Hypertension Father     Prostate Cancer Father     Cancer Father         prostate cancer     Social History     Socioeconomic History    Marital status: Single     Spouse name: Not on file    Number of children: Not on file    Years of education: Not on file    Highest education level: Not on file   Occupational History    Not on file Tobacco Use    Smoking status: Former     Packs/day: 0.50     Types: Cigarettes     Quit date: 2016     Years since quittin.6    Smokeless tobacco: Never   Substance and Sexual Activity    Alcohol use: Yes    Drug use: No    Sexual activity: Not on file   Other Topics Concern    Not on file   Social History Narrative    Not on file     Social Determinants of Health     Financial Resource Strain: Not on file   Food Insecurity: Not on file   Transportation Needs: Not on file   Physical Activity: Not on file   Stress: Not on file   Social Connections: Not on file   Intimate Partner Violence: Not on file   Housing Stability: Not on file     Current Facility-Administered Medications   Medication Dose Route Frequency Provider Last Rate Last Admin    naloxegol (MOVANTIK) tablet 12.5 mg  12.5 mg Oral QAM AC HELEN Thibodeaux CNP        sennosides-docusate sodium (SENOKOT-S) 8.6-50 MG tablet 2 tablet  2 tablet Oral BID PRN HELEN Thibodeaux CNP        polyethylene glycol (GLYCOLAX) packet 17 g  17 g Oral Daily PRN HELEN Thibodeaux CNP        lactulose (CHRONULAC) 10 GM/15ML solution 20 g  20 g Oral BID PRN HELEN Thibodeaux CNP   20 g at 22 1420    0.9 % sodium chloride infusion   IntraVENous PRN Macrina Ellison MD        fentaNYL (DURAGESIC) 75 MCG/HR 1 patch  1 patch TransDERmal Q48H JackieHELEN Baumann NP   1 patch at 22 1408    diatrizoate meglumine-sodium (GASTROGRAFIN) 66-10 % solution 15 mL  15 mL Oral ONCE PRN HELEN Thibodeaux CNP   15 mL at 22 1444    HYDROmorphone HCl PF (DILAUDID) injection 1.5 mg  1.5 mg IntraVENous Q3H PRN HELEN Thibodeaux CNP   1.5 mg at 22 205    bisacodyl (DULCOLAX) suppository 10 mg  10 mg Rectal Daily PRN HELEN Rubio CNP   10 mg at 22 1758    magnesium oxide (MAG-OX) tablet 400 mg  400 mg Oral 4x Daily Sony Baldwin MD   400 mg at 22 203    calcium carbonate (TUMS) chewable tablet 500 mg  500 mg Oral TID Jay Munoz HELEN - CNP   500 mg at 08/07/22 2037    tamsulosin (FLOMAX) capsule 0.4 mg  0.4 mg Oral Daily Kayy Kuhn APRN - NP   0.4 mg at 08/07/22 2838    finasteride (PROSCAR) tablet 5 mg  5 mg Oral Daily Kayy Kuhn APRN - NP   5 mg at 08/07/22 1479    zinc oxide 40 % paste   Topical 4x Daily PRN Zenon Prado MD        lidocaine-prilocaine (EMLA) cream   Topical Once Zenon Prado MD        phenazopyridine (PYRIDIUM) tablet 95 mg  95 mg Oral TID PRN HELEN Rodriguez - CNP   95 mg at 07/15/22 2048    cyclobenzaprine (FLEXERIL) tablet 10 mg  10 mg Oral TID PRN HELEN Kaur - NP   10 mg at 08/06/22 0122    [Held by provider] enoxaparin (LOVENOX) injection 40 mg  40 mg SubCUTAneous Daily HELEN Rodriguez - CNP   40 mg at 07/23/22 0857    diphenhydrAMINE (BENADRYL) capsule 25 mg  25 mg Oral Q6H PRN Du An APRN - CNP   25 mg at 08/07/22 1019    acetaminophen (TYLENOL) tablet 650 mg  650 mg Oral Q6H PRN Du An APRN - CNP   650 mg at 08/07/22 1018    Or    acetaminophen (TYLENOL) suppository 650 mg  650 mg Rectal Q6H PRN HELEN Rodriguez - CNP        HYDROmorphone (DILAUDID) tablet 2 mg  2 mg Oral Q4H PRN Deatrice Plush, APRN - CNP   2 mg at 08/03/22 0800    Or    HYDROmorphone (DILAUDID) tablet 4 mg  4 mg Oral Q4H PRN Deatrice Plush, APRN - CNP   4 mg at 08/08/22 0009    naloxone (NARCAN) injection 0.04 mg  0.04 mg IntraVENous PRN HELEN Marcus CNP        dexamethasone (DECADRON) tablet 4 mg  4 mg Oral 2 times per day HELEN Rodriguez - CNP   4 mg at 08/07/22 2037    aluminum & magnesium hydroxide-simethicone (MAALOX) 751-115-33 MG/5ML suspension 20 mL  20 mL Oral Q6H PRN Sid Terry MD   20 mL at 08/07/22 2037    LORazepam (ATIVAN) tablet 1 mg  1 mg Oral TID PRN Gerardo Hatfield DO   1 mg at 08/07/22 2142    pantoprazole (PROTONIX) tablet 40 mg  40 mg Oral QAM AC Jen Martinez, DO   40 mg at 08/07/22 0557    sodium chloride flush 0.9 % injection 5-40 mL  5-40 mL IntraVENous 2 times per day Viona Felipe, DO   10 mL at 22    sodium chloride flush 0.9 % injection 5-40 mL  5-40 mL IntraVENous PRN Viona Felipe, DO        0.9 % sodium chloride infusion   IntraVENous PRN Viona Felipe, DO        ondansetron (ZOFRAN-ODT) disintegrating tablet 4 mg  4 mg Oral Q8H PRN Viona Felipe, DO        Or    ondansetron [facility]) injection 4 mg  4 mg IntraVENous Q6H PRN Viona Felipe, DO   4 mg at 07/10/22 1717    docusate sodium (COLACE) capsule 100 mg  100 mg Oral BID PRN Viona Felipe, DO   100 mg at 22 1406       OBJECTIVE:  Patient Vitals for the past 8 hrs:   BP Temp Temp src Pulse Resp SpO2   22 0331 109/76 98.1 °F (36.7 °C) Oral 97 16 94 %     Temp (24hrs), Av.1 °F (36.7 °C), Min:97.8 °F (36.6 °C), Max:98.3 °F (36.8 °C)    No intake/output data recorded. Physical Exam:  Constitutional: Well developed male sitting comfortably on the hospital bed. HEENT: Normocephalic and atraumatic. Oropharynx is clear, mucous membranes are moist.  Neck supple. Skin Warm and dry. Bruising noted on L foot and no rash noted. No erythema. No pallor. Respiratory Lungs are clear to auscultation bilaterally without wheezes, rales or rhonchi, normal air exchange without accessory muscle use. CVS Normal rate, regular rhythm and normal S1 and S2. No murmurs, gallops, or rubs. Abdomen Soft, nontender and distended, normoactive bowel sounds. Neuro Grossly nonfocal with no obvious sensory or motor deficits. MSK Normal range of motion in general.  No edema and no tenderness. Psych Appropriate mood and affect.         Labs:    Recent Results (from the past 24 hour(s))   CBC with Auto Differential    Collection Time: 22  3:24 AM   Result Value Ref Range    WBC 6.6 4.3 - 11.1 K/uL    RBC 2.29 (L) 4.23 - 5.6 M/uL    Hemoglobin 7.4 (L) 13.6 - 17.2 g/dL    Hematocrit 24.1 (L) 41.1 - 50.3 %    .2 (H) 79.6 - 97.8 FL    MCH 32.3 26.1 - 32.9 PG    MCHC 30.7 STOOL IN THE  COLON, BUT NO EVIDENCE OF BOWEL OBSTRUCTION OR OTHER ACUTE ABDOMINAL  ABNORMALITY. 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)    Hypokalemia    Gynecomastia, male    Neuroendocrine carcinoma, high grade (HCC)    Anemia due to chemotherapy    Primary prostate cancer with metastasis from prostate to other site [facility])    Prostatic nodule    Family history of prostate cancer in father    Acute prostatitis    S/P TURP    Prostate cancer [facility])    Prostate cancer metastatic to bone ([facility] [address]    Thrombocytopenia ([facility] [address]    Hypomagnesemia    Hypercalcemia of malignancy    Pain due to malignant neoplasm metastatic to bone (HCC)    EDGARD (acute kidney injury) ([facility] [address]    Debility    Drug-induced constipation    Encounter for palliative care    Abnormal CT of the abdomen    Hydronephrosis    Hydroureter     Mr. Errol Mccarty is a 78 y.o. male admitted on 7/8/2022 with a primary diagnosis of There were no encounter diagnoses. .       Mr. Errol Mccarty has a PMH of metastatic prostate cancer dx 2017 s/p casodex and bilateral orchiectomy for surgical castration, 6 cycles of docetaxel, pathologic vertebral fx's with path positive for high grade neuroendocrine cancer, s/p carbo/etoposide and most recently carbo/taxotere which was held d/t TCP. Last was C2 on 6/8/22. 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 He is a known patient of Dr. Keith Cruz s/p multiple lines of treatment as above. At last office visit on 6/29, CEA had notably continued to rise to [id number]. He has had discussion with [facility], however wished to hold off on making a decision until he completed radiation therapy. He presented to the ER with a fall at home, increasing pain in the hips and generalized weakness. Also with constipation. Also found to be anemic and transfused PRBC.   We were asked for recommendations for our known patient. PLAN:  Metastatic Prostate Cancer/Neuroendocrine  - S/p multiple lines of therapy, most recently carbo/taxotere C2 on 6/8. Held on 6/29 d/t TCP. Undergoing XRT to rib and T-spine with Dr. Liam Castaneda.  - Has spoken to [facility], however wished to hold off on making a decision until he saw if XRT improved his pain  - Can resume XRT on Monday 7/11 Pt does not wish to pursue XRT. Meeting with PC today for pain mgmt and to discuss [address] - wishes to pursue facility placement and enroll in Hospice. 7/13 Pt now states he may be interested in going home since he will have support from friend moving here from out of state. Consult PT/OT. 7/14  PT recommends HHT  7/23 CT AP shows POD with [facility] liver lesions, increased prostate gland, and extensive bony mets  7/25 Pt states he is interested in pursuing XRT again, Rad Onc consulted. He states he may consider having chemo in the future as well. 7/26 Resuming XRT tomorrow (previously completed 1/1 fx to ribs and 1/5 fx to T-spine)  7/27 Resume XRT today - scheduled through 8/2 per rad onc notes. 8/1 XRT today (EOT 8/2). 8/2 Completes XRT to T-spine today (7/7 fx)     Cancer Related Pain  - Currently with Dilaudid/oxy prn  - Consult palliative care to assist with pain management (known from office) and also to assist with goals of care discussions   7/10 Increased Dilaudid to 1 mg every 3 hours for severe pain. Pall care consult pending. 7/11 PC assisting with mgmt. Resume Dex. 7/12 PC started Fentanyl 50mcg patch yesterday. Pt reports pain controlled today - even able to walk around room now. 7/13 Pain controlled. PC changed oxycodone to po dilaudid yesterday. 7/14 Pain controlled  7/15 C/o pain today  7/17 pain improved after menchaca  7/20 Pain controlled on current regimen  7/22 c/o worsening abd pain. Bladder scan with minimal urine.   Check CT AP.  7/23 CT AP with POD  7/30 Feels pain increasing - will increase Fentanyl patch to 75 mcg every 48 hours and continue PO Dilaudid for breakthrough. 7/31 Better with adjustment   8/2 c/o b/l abd/flank pain. Check renal US, UA/Ucx  8/3 Pain improved s/p menchaca placement     Anemia/Thrombocytopenia  - Transfuse prn per [facility] SOPs  7/24 Plt down to 41k. DC Lovenox. Check hemolysis labs, DIC labs, smear, HIT.  7/25 Plt 37k. No DIC noted. Smear, Hapto, and HIT pending. 7/26 Plt 32k. No hemolysis. HIT neg. Smear pending. 7/27 Plts stable at 34k. Hgb relatively stable - 7.6. Smear without schistocytes. Monitor. 7/28 Plt stable at 32k. Pt with hematuria, transfuse to keep plt >50k.  1 unit platelets ordered. 7/29 Plts up to 62k s/p transfusion for hematuria. No bleeding. 8/3 Transfuse 1 unit PRBCs and 1 unit platelets (d/t to hematuria s/p menchaca placement)  8/7 Transfuse 1 unit platelets      Transaminitis / Hyperbilirubinemia  - Alk phos/ALT/AST trended down today, continue to follow   7/11 AST/ALT continue to improve   7/13 LFTs improving  7/18 stable  7/23 LFTs increased, monitor - likely r/t liver mets  7/24 Tbili up to 1.2, LFTs con't to increase. 7/25 Bili down to 0.8. LFTs improved today. 7/27 LFTs stable - known liver mets. Tbili back to normal. Monitoring. 8/5 Tbili up to 2.0, check frac bili. LFTs con't to increase. 8/6 Tbili 2 - lab did not process frac bili yesterday, reordered this AM  8/7 Tbili down to 1.6, mostly direct     EDGARD  - Gentle hydration  RESOLVED     Constipation  - Lactulose prn   7/11 Pt refusing Lactulose. PC started pericolace. Con't Miralax. Consider KUB if no improvement. 7/12 No BM documented. Con't Miralax/Pericolace. Pt more active now, up around in room since pain controlled. 7/13 Had BM x 4 yesterday  7/20 c/o constipation. Increase pericolace to BID. Con't Miralax. Check KUB. If no obstruction, will order Movantik. 7/21 KUB with constipation. Movantik ordered. 7/22 No BM. Con't Movantik. Checking CT AP.  7/23 Pt having Bms. Con't Movantik.   7/31 Added TURP or suprapubic tube. 8/6 Cr 1.4. Continues with menchaca    Hematuria / UTI  7/28 Hematuria noted. Transfuse platelets. Check UA - UA c/w UTI. Ucx ordered. Start CTX.  7/30 hematuria resolved after plt transfusion. UC NGTD. CTX day 3 today. 8/2 c/o b/l abd/flank pain. Check UA/Ucx  8/3 UA neg for infx. Ucx-NGTD. Continue home meds  [facility] SOPs  AC contraindicated d/t thrombocytopenia    Goals and plan of care reviewed with the patient. All questions answered to the best of our ability. Disposition:  7/14: Pt reports his friend is not moving here until 8/1. He reports she has a one-story home that he can move into on 8/1 or possibly even sooner. Pt was able to walk hallways yesterday. Unsure if he would qualify for facility placement while future living arrangements are made. CM states pt would have to go through extensive insurance process to even cover facility placement if he qualified. CM assisting with discharge planning. ? Could pt stay with son who lives locally until his friend's house is ready to move into? Pt also declines Hospice services at this time. He states he would prefer HHT. 7/15:  Pt is medically stable for discharge. Pt working on living arrangements. Hopeful for discharge home soon. 7/17 Spoke with patient's son yesterday. Patient does not a home to return to so now even if he does have a friend coming there is no home. Difficult placement. CM following. 7/19: Pt reportedly does not have a home to return to upon discharge. Son states pt is unable to live with him. Unlikely that pt would qualify for facility placement. CM following.    7/20: Pt still stable for discharge. CM following - pt has no place to live.    7/21:  CM found available place at boarding [place] for patient. Pt states he was not told about this, but cannot afford $650/mo. He says he was paying $500/mo at previous place which is no longer available to him.   CM following.    7/22:  Per CM, son calling to speak to the manager of the boarding home today. He also asked for list of ALFs that accept Medicaid. CM continues to follow. 7/25: Son went to see available place at boarding home - pt and son not pleased with arrangements as pt would be sharing bathroom with 3 other people. Son looking into ALFs. Pt voices concern that he would not be able to adequately care for himself if he had to live alone. CM following.    7/31: Per CM, pt is making no effort to locate living arrangements. CM assisting to help locate [facility]. Sending pt's info to be reviewed at [facility] (which has availability). Pt is stable for discharge once placement arranged. No new updates. 8/1  Still stable for discharge - still has no place/home to discharge. CM following. 8/2:  Pt meeting with long term liaison today. Hopeful for discharge soon, pt is medically stable for discharge. 8/3: [facility] liaison did not show for appt yesterday. Rescheduled for 8/4 at . Pt concerned about online ratings for facility. 8/4: Still stable for discharge. Pt meeting with long term liaison today. 8/5:  CM continues to follow. [facility] liaison canceled appt again yesterday. Pt/Son making no effort to help locate placement. Reconsulted PT/OT. 8/6:  Pt has been stable for discharge for over 3 weeks. Still needs placement/living arrangements. Pt/son still not making any effort to assist with finding place for pt to go. Pt again refused PT, PT/OT signed off.    8/8 Patient's friend that is willing to assist him lives out of state. Discussed possibility of finding a facility closer to her. Discussed with CM. Still waiting on the nurse manager of [facility] to do face to face visit.                HELEN Spears - HARRY   [facility] Hematology & Oncology  [address] [facility] [address]  Office : [phone]  Fax : [phone]

## 2022-08-09 NOTE — PROGRESS NOTES
Patient has remained A&O x 4. Pt did c/o severe back pain that was relieved some byt dilaudid PO 4mg. Pt still had some pain on re assessment, but refused any additional medication. Old Fentanyl patch removed and new one applied today (left clavicle area). Bloody urine still draining from indwelling menchaca cath, but  urine is more brown than bright red. Pt given given PRN senna this morning as pt has not had a BM today. Patient rounded on hourly by myself or patient assistant and encouraged to call with any needs. VS have remained stable/ No signs of distress noted. Bed low, locked, call bell within reach.    Servando Ramirez RN

## 2022-08-09 NOTE — PLAN OF CARE
Problem: Skin/Tissue Integrity  Goal: Absence of new skin breakdown  Description: 1. Monitor for areas of redness and/or skin breakdown  2. Assess vascular access sites hourly  3. Every 4-6 hours minimum:  Change oxygen saturation probe site  4. Every 4-6 hours:  If on nasal continuous positive airway pressure, respiratory therapy assess nares and determine need for appliance change or resting period.   Outcome: Progressing     Problem: Pain  Goal: Verbalizes/displays adequate comfort level or baseline comfort level  Outcome: Progressing     Problem: Safety - Adult  Goal: Free from fall injury  Outcome: Progressing     Problem: ABCDS Injury Assessment  Goal: Absence of physical injury  Outcome: Progressing  Flowsheets (Taken 8/9/2022 2831)  Absence of Physical Injury: Implement safety measures based on patient assessment     Problem: Respiratory - Adult  Goal: Achieves optimal ventilation and oxygenation  Outcome: Progressing

## 2022-08-09 NOTE — PROGRESS NOTES
Events noted. Chart reviewed. Reports mild pelvic pain. AF.  VSS  Abd soft, NT, ND.  UOP light alek  Labs reviewed. AUR. Mild bilateral hydro. Reviewed options for CIC, Clark, SPT and repeat TURP. Given tumor progression after recent TURP on 3/2022 I do feel that his best option would be to leave an indwelling Clark changed monthly. He agrees. Call for questions/concerns.

## 2022-08-09 NOTE — PROGRESS NOTES
[employer] [facility]        Inpatient Hematology / Oncology Progress Note    Reason for Admission:  Prostate cancer metastatic to bone ([facility] [C61, C79.51]    24 Hour Events:  Afebrile, VSS  Chronic menchaca  Attempted to wean down dilaudid but had pain into the night    ROS:  Constitutional: Positive for fatigue; negative for fever, chills. CV: Negative for chest pain, palpitations, edema. Respiratory: Negative for dyspnea, cough, wheezing. GI: Negative for nausea, diarrhea, abd pain. MSK:  +Back/hip pain (pain controlled)      10 point review of systems is otherwise negative with the exception of the elements mentioned above in the HPI. Allergies   Allergen Reactions    Turmeric Nausea And Vomiting     Past Medical History:   Diagnosis Date    Claustrophobia     Ear problems     Elevated PSA     Former light cigarette smoker (1-9 per day)     smoked for 35 years.   quit     History of multiple allergies     Nicotine vapor product user     Personal history of prostate cancer     Prostate cancer ([facility]     prostate, neuroendocrine, mets to bone     Past Surgical History:   Procedure Laterality Date    BIOPSY PROSTATE      COLONOSCOPY  08/2020    HEENT      teeth removed     IR KYPHOPLASTY LUMBAR FIRST LEVEL  10/14/2020    IR KYPHOPLASTY LUMBAR FIRST LEVEL  10/14/2020    IR KYPHOPLASTY LUMBAR FIRST LEVEL 10/14/2020 [facility] RADIOLOGY SPECIALS    IR KYPHOPLASTY THORACIC FIRST LEVEL  10/29/2020    IR KYPHOPLASTY THORACIC FIRST LEVEL  10/29/2020    IR KYPHOPLASTY THORACIC FIRST LEVEL 10/29/2020 [facility] RADIOLOGY SPECIALS    TESTICLE REMOVAL Bilateral 02/2017     Family History   Problem Relation Age of Onset    Hypertension Father     Prostate Cancer Father     Cancer Father         prostate cancer     Social History     Socioeconomic History    Marital status: Single     Spouse name: Not on file    Number of children: Not on file    Years of education: Not on file    Highest education level: Not on Rectal Daily PRN Pradeep Jeff APRN - CNP   10 mg at 07/22/22 1758    magnesium oxide (MAG-OX) tablet 400 mg  400 mg Oral 4x Daily Jeff Moon MD   400 mg at 08/08/22 2051    calcium carbonate (TUMS) chewable tablet 500 mg  500 mg Oral TID Manoj Stanley, APRN - CNP   500 mg at 08/08/22 1336    tamsulosin (FLOMAX) capsule 0.4 mg  0.4 mg Oral Daily Angeline Chisholm APRN - NP   0.4 mg at 08/08/22 0858    finasteride (PROSCAR) tablet 5 mg  5 mg Oral Daily Bovey Phan, APRN - NP   5 mg at 08/08/22 0858    zinc oxide 40 % paste   Topical 4x Daily PRN Jeff Moon MD        lidocaine-prilocaine (EMLA) cream   Topical Once Jeff Moon MD        phenazopyridine (PYRIDIUM) tablet 95 mg  95 mg Oral TID PRN Manoj Stanley, APRN - CNP   95 mg at 07/15/22 2048    cyclobenzaprine (FLEXERIL) tablet 10 mg  10 mg Oral TID PRN Angeline Chisholm APRN - NP   10 mg at 08/08/22 2051    [Held by provider] enoxaparin (LOVENOX) injection 40 mg  40 mg SubCUTAneous Daily Manoj Stanley, APRN - CNP   40 mg at 07/23/22 0857    diphenhydrAMINE (BENADRYL) capsule 25 mg  25 mg Oral Q6H PRN Manoj Never, APRN - CNP   25 mg at 08/07/22 1019    acetaminophen (TYLENOL) tablet 650 mg  650 mg Oral Q6H PRN Manoj Never, APRN - CNP   650 mg at 08/07/22 1018    Or    acetaminophen (TYLENOL) suppository 650 mg  650 mg Rectal Q6H PRN Manoj Never, APRN - CNP        HYDROmorphone (DILAUDID) tablet 2 mg  2 mg Oral Q4H PRN Azalia Bland, APRN - CNP   2 mg at 08/08/22 5815    Or    HYDROmorphone (DILAUDID) tablet 4 mg  4 mg Oral Q4H PRN Azalia Bland, APRN - CNP   4 mg at 08/08/22 7708    naloxone [facility]) injection 0.04 mg  0.04 mg IntraVENous PRN Larina Nam, APRN - CNP        aluminum & magnesium hydroxide-simethicone (MAALOX) 200-200-20 MG/5ML suspension 20 mL  20 mL Oral Q6H PRN Mikhail Murillo MD   20 mL at 08/07/22 2037    LORazepam (ATIVAN) tablet 1 mg  1 mg Oral TID PRN Zeferino Wang DO   1 mg at 08/09/22 0458    pantoprazole (PROTONIX) tablet 40 mg 40 mg Oral QAM AC Tutu Dunfermline, DO   40 mg at 22 5955    sodium chloride flush 0.9 % injection 5-40 mL  5-40 mL IntraVENous 2 times per day Tutu Dunfermline, DO   10 mL at 22 0013    sodium chloride flush 0.9 % injection 5-40 mL  5-40 mL IntraVENous PRN Tutu Dunfermline, DO        0.9 % sodium chloride infusion   IntraVENous PRN Tutu Dunfermline, DO        ondansetron (ZOFRAN-ODT) disintegrating tablet 4 mg  4 mg Oral Q8H PRN Tutu Dunfermline, DO        Or    ondansetron [facility]) injection 4 mg  4 mg IntraVENous Q6H PRN Tutu Dunfermline, DO   4 mg at 07/10/22 1717    docusate sodium (COLACE) capsule 100 mg  100 mg Oral BID PRN Tutu Dunfermline, DO   100 mg at 22 1406       OBJECTIVE:  Patient Vitals for the past 8 hrs:   BP Temp Temp src Pulse Resp SpO2   22 0716 111/69 97.9 °F (36.6 °C) Oral 89 16 91 %   22 0200 108/66 97.8 °F (36.6 °C) Oral 89 16 93 %     Temp (24hrs), Av.9 °F (36.6 °C), Min:97.6 °F (36.4 °C), Max:98.2 °F (36.8 °C)    No intake/output data recorded. Physical Exam:  Constitutional: Well developed male sitting comfortably on the hospital bed. HEENT: Normocephalic and atraumatic. Oropharynx is clear, mucous membranes are moist.  Neck supple. Skin Warm and dry. Bruising noted on L foot and no rash noted. No erythema. No pallor. Respiratory Lungs are clear to auscultation bilaterally without wheezes, rales or rhonchi, normal air exchange without accessory muscle use. CVS Normal rate, regular rhythm and normal S1 and S2. No murmurs, gallops, or rubs. Abdomen Soft, nontender and distended, normoactive bowel sounds. Neuro Grossly nonfocal with no obvious sensory or motor deficits. MSK Normal range of motion in general.  No edema and no tenderness. Psych Appropriate mood and affect. Labs:    No results found for this or any previous visit (from the past 24 hour(s)).       Imaging:  Xray Result (most recent):  XR ABDOMEN (KUB) (SINGLE visit on 6/29, CEA had notably continued to rise to 8290. He has had discussion with [facility], [facility], however wished to hold off on making a decision until he completed radiation therapy. He presented to the ER with a fall at home, increasing pain in the hips and generalized weakness. Also with constipation. Also found to be anemic and transfused PRBC. We were asked for recommendations for our known patient. PLAN:  Metastatic Prostate Cancer/Neuroendocrine  - S/p multiple lines of therapy, most recently carbo/taxotere C2 on 6/8. Held on 6/29 d/t TCP. Undergoing XRT to rib and T-spine with Dr. Be Martin.  - Has spoken to [facility], however wished to hold off on making a decision until he saw if XRT improved his pain  - Can resume XRT on Monday 7/11 Pt does not wish to pursue XRT. Meeting with PC today for pain mgmt and to discuss [place] - wishes to pursue facility placement and enroll in Hospice. 7/13 Pt now states he may be interested in going home since he will have support from friend moving here from out of state. Consult PT/OT. 7/14  PT recommends HHT  7/23 CT AP shows POD with [facility] liver lesions, increased prostate gland, and extensive bony mets  7/25 Pt states he is interested in pursuing XRT again, Rad Onc consulted. He states he may consider having chemo in the future as well. 7/26 Resuming XRT tomorrow (previously completed 1/1 fx to ribs and 1/5 fx to T-spine)  7/27 Resume XRT today - scheduled through 8/2 per rad onc notes. 8/1 XRT today (EOT 8/2). 8/2 Completes XRT to T-spine today (7/7 fx)     Cancer Related Pain  - Currently with Dilaudid/oxy prn  - Consult palliative care to assist with pain management (known from office) and also to assist with goals of care discussions   7/10 Increased Dilaudid to 1 mg every 3 hours for severe pain. Pall care consult pending. 7/11 PC assisting with mgmt. Resume Dex. 7/12 PC started Fentanyl 50mcg patch yesterday.   Pt reports pain controlled today - even able to walk around room now. 7/13 Pain controlled. PC changed oxycodone to po dilaudid yesterday. 7/14 Pain controlled  7/15 C/o pain today  7/17 pain improved after menchaca  7/20 Pain controlled on current regimen  7/22 c/o worsening abd pain. Bladder scan with minimal urine. Check CT AP.  7/23 CT AP with POD  7/30 Feels pain increasing - will increase Fentanyl patch to 75 mcg every 48 hours and continue PO Dilaudid for breakthrough. 7/31 Better with adjustment   8/2 c/o b/l abd/flank pain. Check renal US, UA/Ucx  8/3 Pain improved s/p menchaca placement     Anemia/Thrombocytopenia  - Transfuse prn per [facility] SOPs  7/24 Plt down to 41k. DC Lovenox. Check hemolysis labs, DIC labs, smear, HIT.  7/25 Plt 37k. No DIC noted. Smear, Hapto, and HIT pending. 7/26 Plt 32k. No hemolysis. HIT neg. Smear pending. 7/27 Plts stable at 34k. Hgb relatively stable - 7.6. Smear without schistocytes. Monitor. 7/28 Plt stable at 32k. Pt with hematuria, transfuse to keep plt >50k.  1 unit platelets ordered. 7/29 Plts up to 62k s/p transfusion for hematuria. No bleeding. 8/3 Transfuse 1 unit PRBCs and 1 unit platelets (d/t to hematuria s/p menchaca placement)  8/7 Transfuse 1 unit platelets      Transaminitis / Hyperbilirubinemia  - Alk phos/ALT/AST trended down today, continue to follow   7/11 AST/ALT continue to improve   7/13 LFTs improving  7/18 stable  7/23 LFTs increased, monitor - likely r/t liver mets  7/24 Tbili up to 1.2, LFTs con't to increase. 7/25 Bili down to 0.8. LFTs improved today. 7/27 LFTs stable - known liver mets. Tbili back to normal. Monitoring. 8/5 Tbili up to 2.0, check frac bili. LFTs con't to increase. 8/6 Tbili 2 - lab did not process frac bili yesterday, reordered this AM  8/7 Tbili down to 1.6, mostly direct     EDGARD  - Gentle hydration  RESOLVED     Constipation  - Lactulose prn   7/11 Pt refusing Lactulose. PC started pericolace. Con't Miralax. Consider KUB if no improvement.   7/12 No BM documented. Con't Miralax/Pericolace. Pt more active now, up around in room since pain controlled. 7/13 Had BM x 4 yesterday  7/20 c/o constipation. Increase pericolace to BID. Con't Miralax. Check KUB. If no obstruction, will order Movantik. 7/21 KUB with constipation. Movantik ordered. 7/22 No BM. Con't Movantik. Checking CT AP.  7/23 Pt having Bms. Con't Movantik. 7/31 Added Lactulose with good relief. 8/5 Pt c/o abd distention yesterday. KUB with mod constipation. Had 2 BM after lactulose given. RN note states pt had mild bloody BM, no further bleeding noted. Hypercalcemia / Hypocalcemia  7/12 CCa++ up to 11. 7. Zometa ordered. 7/15 CCa++ down to 9.2  7/19 CCa++ down to 8.4, replete  7/21 Cca++ 8.6  RESOLVED    Dysuria  7/15 c/o dysuria. UA c/w UTI. CTX ordered. Check Ucx.  7/17 UC NTD on Rocephin  7/18 Pain improved s/p menchaca placement. Ucx with [facility]. Completed CTX x 3 days. RESOLVED    Urinary retention / B/l hydroureteronephrosis  7/16 emnchaca ordered/ flomax ordered  7/17 UOP 4000 ml. Adding proscar  7/18 s/p menchaca placement. Con't Flomax/Proscar. Plan to DC mnechaca tomorrow. 7/19 Remove menchaca for voiding trial.  If unable to void, will consult urology. 7/20 Menchaca removed yesterday and had to be replaced d/t urinary retention. Consult urology. 7/22 Awaiting urology consult  7/23 Urology felt r/t constipation, however CT shows b/l hydroureteronephrosis. Will defer mgmt to urology. 7/24 No response from urology from message sent yesterday. Will re-consult. 7/26 Urology recommends to remove menchaca this AM for voiding trial.  Also recommends reimaging later on to see if bilateral hydro has resolved (could be r/t overdistended bladder/urinary retention vs prostate cancer/obstruction).   If bilateral hydro has not improved over time, would consider bilateral stents vs nephrostomy tubes and this would be more favorable if/when kidney function declines or pt develops significant flank pain.  7/27 Menchaca removed yesterday, had PVR of 500cc but then able to void 200cc. Menchaca remains out. Cr 1.2.  7/29 Cr stable, 1.1. Voiding to urinal without issue. 8/2 c/o b/l abd/flank pain. Check renal US.  8/3 Renal US with mod b/l hydronephrosis. Urology replaced menchaca. If pt chooses to pursue Hospice, then will leave this in place. Otherwise, will consider other options such as TURP or suprapubic tube. 8/6 Cr 1.4. Continues with menchaca  8/9 per Urology reviewed options for CIC, Menchaca, SPT and repeat TURP. Given tumor progression after recent TURP on 3/2022 I do feel that his best option would be to leave an indwelling Menchaca changed monthly. Hematuria / UTI  7/28 Hematuria noted. Transfuse platelets. Check UA - UA c/w UTI. Ucx ordered. Start CTX.  7/30 hematuria resolved after plt transfusion. UC NGTD. CTX day 3 today. 8/2 c/o b/l abd/flank pain. Check UA/Ucx  8/3 UA neg for infx. Ucx-NGTD. Continue home meds  [facility] SOPs  AC contraindicated d/t thrombocytopenia    Goals and plan of care reviewed with the patient. All questions answered to the best of our ability. Disposition:  7/14: Pt reports his friend is not moving here until 8/1. He reports she has a one-story home that he can move into on 8/1 or possibly even sooner. Pt was able to walk hallways yesterday. Unsure if he would qualify for facility placement while future living arrangements are made. CM states pt would have to go through extensive insurance process to even cover facility placement if he qualified. CM assisting with discharge planning. ? Could pt stay with son who lives locally until his friend's house is ready to move into? Pt also declines Hospice services at this time. He states he would prefer HHT. 7/15:  Pt is medically stable for discharge. Pt working on living arrangements. Hopeful for discharge home soon. 7/17 Spoke with patient's son yesterday.  Patient does not a home to return to so now even if he does have a friend coming there is no home. Difficult placement. CM following. 7/19: Pt reportedly does not have a home to return to upon discharge. Son states pt is unable to live with him. Unlikely that pt would qualify for facility placement. CM following.    7/20: Pt still stable for discharge. CM following - pt has no place to live.    7/21:  CM found available place at boarding house for patient. Pt states he was not told about this, but cannot afford $650/mo. He says he was paying $500/mo at previous place which is no longer available to him. CM following.    7/22:  Per CM, son calling to speak to the manager of the boarding home today. He also asked for list of ALFs that accept Medicaid. CM continues to follow. 7/25: Son went to see available place at boarding home - pt and son not pleased with arrangements as pt would be sharing bathroom with 3 other people. Son looking into ALFs. Pt voices concern that he would not be able to adequately care for himself if he had to live alone. CM following.    7/31: Per CM, pt is making no effort to locate living arrangements. CM assisting to help locate assisted. Sending pt's info to be reviewed at [facility] (which has availability). Pt is stable for discharge once placement arranged. No new updates. 8/1  Still stable for discharge - still has no place/home to discharge. CM following. 8/2:  Pt meeting with assisted liaison today. Hopeful for discharge soon, pt is medically stable for discharge. 8/3: [facility] liaison did not show for appt yesterday. Rescheduled for 8/4 at 2p. Pt concerned about online ratings for facility. 8/4: Still stable for discharge. Pt meeting with assisted liaison today. 8/5:  CM continues to follow. assisted liaison canceled appt again yesterday. Pt/Son making no effort to help locate placement. Reconsulted PT/OT. 8/6:  Pt has been stable for discharge for over 3 weeks.   Still needs placement/living arrangements. Pt/son still not making any effort to assist with finding place for pt to go. Pt again refused PT, PT/OT signed off.    8/8 Patient's friend that is willing to assist him lives out of state. Discussed possibility of finding a facility closer to her. Discussed with CM. Still waiting on the nurse manager of [facility] to do face to face visit. 8/9 Current assisted living facility that we are looking into will not accept patient with menchaca cath. Per urology no other options than menchaca. CM updated.                HELEN Domínguez - HARRY   [facility]  [id number] [address]  Office : [phone]  Fax : [phone]

## 2022-08-10 NOTE — PROGRESS NOTES
[facility] Hematology & Oncology        Inpatient Hematology / Oncology Progress Note    Reason for Admission:  Prostate cancer metastatic to bone ([facility] [address] [C61, C79.51]    24 Hour Events:  Afebrile, VSS  Chronic menchaca  Ask PT to re-evaluate  Platelets 09S transfuse    ROS:  Constitutional: Positive for fatigue; negative for fever, chills. CV: Negative for chest pain, palpitations, edema. Respiratory: Negative for dyspnea, cough, wheezing. GI: Negative for nausea, diarrhea, abd pain. MSK:  +Back/hip pain (pain controlled)      10 point review of systems is otherwise negative with the exception of the elements mentioned above in the HPI. Allergies   Allergen Reactions    Turmeric Nausea And Vomiting     Past Medical History:   Diagnosis Date    Claustrophobia     Ear problems     Elevated PSA     Former light cigarette smoker (1-9 per day)     smoked for 35 years.   quit     History of multiple allergies     Nicotine vapor product user     Personal history of prostate cancer     Prostate cancer ([facility] [address]     prostate, neuroendocrine, mets to bone     Past Surgical History:   Procedure Laterality Date    BIOPSY PROSTATE      COLONOSCOPY  08/2020    HEENT      teeth removed     IR KYPHOPLASTY LUMBAR FIRST LEVEL  10/14/2020    IR KYPHOPLASTY LUMBAR FIRST LEVEL  10/14/2020    IR KYPHOPLASTY LUMBAR FIRST LEVEL 10/14/2020 [facility] RADIOLOGY SPECIALS    IR KYPHOPLASTY THORACIC FIRST LEVEL  10/29/2020    IR KYPHOPLASTY THORACIC FIRST LEVEL  10/29/2020    IR KYPHOPLASTY THORACIC FIRST LEVEL 10/29/2020 [facility] RADIOLOGY SPECI[facility]    TESTICLE REMOVAL Bilateral 02/2017     Family History   Problem Relation Age of Onset    Hypertension Father     Prostate Cancer Father     Cancer Father         prostate cancer     Social History     Socioeconomic History    Marital status: Single     Spouse name: Not on file    Number of children: Not on file    Years of education: Not on file    Highest education level: Not on file Occupational History    Not on file   Tobacco Use    Smoking status: Former     Packs/day: 0.50     Types: Cigarettes     Quit date: 2016     Years since quittin.6    Smokeless tobacco: Never   Substance and Sexual Activity    Alcohol use: Yes    Drug use: No    Sexual activity: Not on file   Other Topics Concern    Not on file   Social History Narrative    Not on file     Social Determinants of Health     Financial Resource Strain: Not on file   Food Insecurity: Not on file   Transportation Needs: Not on file   Physical Activity: Not on file   Stress: Not on file   Social Connections: Not on file   Intimate Partner Violence: Not on file   Housing Stability: Not on file     Current Facility-Administered Medications   Medication Dose Route Frequency Provider Last Rate Last Admin    0.9 % sodium chloride infusion   IntraVENous PRN Hawk Kelly MD        dexamethasone (DECADRON) tablet 4 mg  4 mg Oral Daily with breakfast Anibal Cotter APRN - NP   4 mg at 22 0850    And    dexamethasone (DECADRON) tablet 2 mg  2 mg Oral Nightly Anibal Cotter APRN - NP   2 mg at 22    naloxegol (MOVANTIK) tablet 12.5 mg  12.5 mg Oral QAM AC Kendall Conquest, APRN - CNP   12.5 mg at 22 0849    sennosides-docusate sodium (SENOKOT-S) 8.6-50 MG tablet 2 tablet  2 tablet Oral BID PRN Kendall Conquest, APRN - CNP   2 tablet at 22 0849    polyethylene glycol (GLYCOLAX) packet 17 g  17 g Oral Daily PRN Kendall Conquest, APRN - CNP        lactulose (CHRONULAC) 10 GM/15ML solution 20 g  20 g Oral BID PRN Kendall Conquest, APRN - CNP   20 g at 22 1420    0.9 % sodium chloride infusion   IntraVENous PRN Prakash Rubi MD        fentaNYL (DURAGESIC) 75 MCG/HR 1 patch  1 patch TransDERmal Q48H Anibal Cotter APRN - NP   1 patch at 22 1447    diatrizoate meglumine-sodium (GASTROGRAFIN) 66-10 % solution 15 mL  15 mL Oral ONCE PRN Kendall Conquest, APRN - CNP   15 mL at 22 1444    HYDROmorphone HCl PF (DILAUDID) injection 1.5 mg  1.5 mg IntraVENous Q3H PRN Geoffrey Velasquez APRN - CNP   1.5 mg at 08/02/22 2051    bisacodyl (DULCOLAX) suppository 10 mg  10 mg Rectal Daily PRN Chavez Martin APRN - CNP   10 mg at 07/22/22 1758    magnesium oxide (MAG-OX) tablet 400 mg  400 mg Oral 4x Daily Mary Mueller MD   400 mg at 08/09/22 1448    calcium carbonate (TUMS) chewable tablet 500 mg  500 mg Oral TID Geoffrey Velasquez APRN - CNP   500 mg at 08/09/22 0850    tamsulosin (FLOMAX) capsule 0.4 mg  0.4 mg Oral Daily Yennifer Miller APRN - NP   0.4 mg at 08/09/22 0850    finasteride (PROSCAR) tablet 5 mg  5 mg Oral Daily Yennifer Miller APRN - NP   5 mg at 08/09/22 0850    zinc oxide 40 % paste   Topical 4x Daily PRN Mary Mueller MD        lidocaine-prilocaine (EMLA) cream   Topical Once Mary Mueller MD        phenazopyridine (PYRIDIUM) tablet 95 mg  95 mg Oral TID PRN Geoffrey Velasquez APRN - CNP   95 mg at 07/15/22 2048    cyclobenzaprine (FLEXERIL) tablet 10 mg  10 mg Oral TID PRN Yennifer Miller APRN - NP   10 mg at 08/09/22 2004    [Held by provider] enoxaparin (LOVENOX) injection 40 mg  40 mg SubCUTAneous Daily Geoffrey Velasquez APRN - CNP   40 mg at 07/23/22 0857    diphenhydrAMINE (BENADRYL) capsule 25 mg  25 mg Oral Q6H PRN Geoffrey Velasquez, APRN - CNP   25 mg at 08/07/22 1019    acetaminophen (TYLENOL) tablet 650 mg  650 mg Oral Q6H PRN Geoffrey Velasquez, APRN - CNP   650 mg at 08/07/22 1018    Or    acetaminophen (TYLENOL) suppository 650 mg  650 mg Rectal Q6H PRN Geoffrey Velasquez, APRN - CNP        HYDROmorphone (DILAUDID) tablet 2 mg  2 mg Oral Q4H PRN Keyona Dai APRN - CNP   2 mg at 08/08/22 3495    Or    HYDROmorphone (DILAUDID) tablet 4 mg  4 mg Oral Q4H PRN HELEN Solis CNP   4 mg at 08/09/22 2228    naloxone (NARCAN) injection 0.04 mg  0.04 mg IntraVENous PRN HELEN Solis - CNP        aluminum & magnesium hydroxide-simethicone (MAALOX) 200-200-20 MG/5ML suspension 20 mL  20 mL Oral Q6H PRN Keyon Conklin MD   20 mL at 08/07/22 2034 Collection Time: 08/10/22  2:10 AM   Result Value Ref Range    WBC 6.5 4.3 - 11.1 K/uL    RBC 2.17 (L) 4.23 - 5.6 M/uL    Hemoglobin 7.1 (L) 13.6 - 17.2 g/dL    Hematocrit 23.1 (L) 41.1 - 50.3 %    .5 (H) 79.6 - 97.8 FL    MCH 32.7 26.1 - 32.9 PG    MCHC 30.7 (L) 31.4 - 35.0 g/dL    RDW 22.2 (H) 11.9 - 14.6 %    Platelets 34 (L) 452 - 450 K/uL    MPV 11.8 9.4 - 12.3 FL    nRBC 0.18 0.0 - 0.2 K/uL    Differential Type AUTOMATED      Seg Neutrophils 78 43 - 78 %    Lymphocytes 8 (L) 13 - 44 %    Monocytes 11 4.0 - 12.0 %    Eosinophils % 0 (L) 0.5 - 7.8 %    Basophils 0 0.0 - 2.0 %    Immature Granulocytes 2 0.0 - 5.0 %    Segs Absolute 5.1 1.7 - 8.2 K/UL    Absolute Lymph # 0.5 0.5 - 4.6 K/UL    Absolute Mono # 0.7 0.1 - 1.3 K/UL    Absolute Eos # 0.0 0.0 - 0.8 K/UL    Basophils Absolute 0.0 0.0 - 0.2 K/UL    Absolute Immature Granulocyte 0.1 0.0 - 0.5 K/UL   Comprehensive Metabolic Panel    Collection Time: 08/10/22  2:10 AM   Result Value Ref Range    Sodium 138 136 - 145 mmol/L    Potassium 3.9 3.5 - 5.1 mmol/L    Chloride 105 98 - 107 mmol/L    CO2 23 21 - 32 mmol/L    Anion Gap 10 7 - 16 mmol/L    Glucose 92 65 - 100 mg/dL    BUN 32 (H) 8 - 23 MG/DL    Creatinine 1.30 0.8 - 1.5 MG/DL    GFR African American >60 >60 ml/min/1.73m2    GFR Non- 59 (L) >60 ml/min/1.73m2    Calcium 8.7 8.3 - 10.4 MG/DL    Total Bilirubin 2.2 (H) 0.2 - 1.1 MG/DL     (H) 12 - 65 U/L     (H) 15 - 37 U/L    Alk Phosphatase 628 (H) 50 - 136 U/L    Total Protein 6.1 (L) 6.3 - 8.2 g/dL    Albumin 2.5 (L) 3.2 - 4.6 g/dL    Globulin 3.6 (H) 2.3 - 3.5 g/dL    Albumin/Globulin Ratio 0.7 (L) 1.2 - 3.5     Magnesium    Collection Time: 08/10/22  2:10 AM   Result Value Ref Range    Magnesium 2.1 1.8 - 2.4 mg/dL   TYPE AND SCREEN    Collection Time: 08/10/22  2:10 AM   Result Value Ref Range    Crossmatch expiration date 08/13/2022,2169     ABO/Rh A POSITIVE     Antibody Screen NEG          Imaging:  Xray Result (most recent):  XR ABDOMEN (KUB) (SINGLE AP VIEW) 08/04/2022    Narrative  KUB:    CLINICAL HISTORY:  Abdominal distention. COMPARISON:  CT of July 23, 2022. FINDINGS:  AP supine images demonstrate a moderate amount of stool scattered in  the colon with no dilated  small bowel loops. Marked hepatomegaly is again  noted. Multilevel kyphoplasty is again noted. Impression  STABLE MARKED HEPATOMEGALY AND A MODERATE AMOUNT OF STOOL IN THE  COLON, BUT NO EVIDENCE OF BOWEL OBSTRUCTION OR OTHER ACUTE ABDOMINAL  ABNORMALITY. 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)    Hypokalemia    Gynecomastia, male    Neuroendocrine carcinoma, high grade (HCC)    Anemia due to chemotherapy    Primary prostate cancer with metastasis from prostate to other site [facility])    Prostatic nodule    Family history of prostate cancer in father    Acute prostatitis    S/P TURP    Prostate cancer [facility])    Prostate cancer metastatic to bone ([facility] [address]    Thrombocytopenia ([facility] [address]    Hypomagnesemia    Hypercalcemia of malignancy    Pain due to malignant neoplasm metastatic to bone (HCC)    EDGARD (acute kidney injury) ([facility] [address]    Debility    Drug-induced constipation    Encounter for palliative care    Abnormal CT of the abdomen    Hydronephrosis    Hydroureter     Mr. Bradford Torres is a 78 y.o. male admitted on 7/8/2022 with a primary diagnosis of There were no encounter diagnoses. .       Mr. Bradford Torres has a PMH of metastatic prostate cancer dx 2017 s/p casodex and bilateral orchiectomy for surgical castration, 6 cycles of docetaxel, pathologic vertebral fx's with path positive for high grade neuroendocrine cancer, s/p carbo/etoposide and most recently carbo/taxotere which was held d/t TCP. Last was C2 on 6/8/22. 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   He is a known patient of Dr. Fairbanks  s/p multiple lines of treatment as above. At last office visit on 6/29, CEA had notably continued to rise to 8290. He has had discussion with [facility], [facility], however wished to hold off on making a decision until he completed radiation therapy. He presented to the ER with a fall at home, increasing pain in the hips and generalized weakness. Also with constipation. Also found to be anemic and transfused PRBC. We were asked for recommendations for our known patient. PLAN:  Metastatic Prostate Cancer/Neuroendocrine  - S/p multiple lines of therapy, most recently carbo/taxotere C2 on 6/8. Held on 6/29 d/t TCP. Undergoing XRT to rib and T-spine with Dr. Suha Shields.  - Has spoken to Dhaval Marinelli, however wished to hold off on making a decision until he saw if XRT improved his pain  - Can resume XRT on Monday 7/11 Pt does not wish to pursue XRT. Meeting with PC today for pain mgmt and to discuss [address] - wishes to pursue facility placement and enroll in Hospice. 7/13 Pt now states he may be interested in going home since he will have support from friend moving here from out of state. Consult PT/OT. 7/14  PT recommends HHT  7/23 CT AP shows POD with [facility] liver lesions, increased prostate gland, and extensive bony mets  7/25 Pt states he is interested in pursuing XRT again, Rad Onc consulted. He states he may consider having chemo in the future as well. 7/26 Resuming XRT tomorrow (previously completed 1/1 fx to ribs and 1/5 fx to T-spine)  7/27 Resume XRT today - scheduled through 8/2 per rad onc notes. 8/1 XRT today (EOT 8/2). 8/2 Completes XRT to T-spine today (7/7 fx)     Cancer Related Pain  - Currently with Dilaudid/oxy prn  - Consult palliative care to assist with pain management (known from office) and also to assist with goals of care discussions   7/10 Increased Dilaudid to 1 mg every 3 hours for severe pain. Pall care consult pending. 7/11 PC assisting with mgmt. Resume Dex. 7/12 PC started Fentanyl 50mcg patch yesterday.   Pt reports pain controlled today - even able to walk around room now. 7/13 Pain controlled. PC changed oxycodone to po dilaudid yesterday. 7/14 Pain controlled  7/15 C/o pain today  7/17 pain improved after menchaca  7/20 Pain controlled on current regimen  7/22 c/o worsening abd pain. Bladder scan with minimal urine. Check CT AP.  7/23 CT AP with POD  7/30 Feels pain increasing - will increase Fentanyl patch to 75 mcg every 48 hours and continue PO Dilaudid for breakthrough. 7/31 Better with adjustment   8/2 c/o b/l abd/flank pain. Check renal US, UA/Ucx  8/3 Pain improved s/p menchaca placement     Anemia/Thrombocytopenia  - Transfuse prn per [facility] SOPs  7/24 Plt down to 41k. DC Lovenox. Check hemolysis labs, DIC labs, smear, HIT.  7/25 Plt 37k. No DIC noted. Smear, Hapto, and HIT pending. 7/26 Plt 32k. No hemolysis. HIT neg. Smear pending. 7/27 Plts stable at 34k. Hgb relatively stable - 7.6. Smear without schistocytes. Monitor. 7/28 Plt stable at 32k. Pt with hematuria, transfuse to keep plt >50k.  1 unit platelets ordered. 7/29 Plts up to 62k s/p transfusion for hematuria. No bleeding. 8/3 Transfuse 1 unit PRBCs and 1 unit platelets (d/t to hematuria s/p menchaca placement)  8/7 Transfuse 1 unit platelets      Transaminitis / Hyperbilirubinemia  - Alk phos/ALT/AST trended down today, continue to follow   7/11 AST/ALT continue to improve   7/13 LFTs improving  7/18 stable  7/23 LFTs increased, monitor - likely r/t liver mets  7/24 Tbili up to 1.2, LFTs con't to increase. 7/25 Bili down to 0.8. LFTs improved today. 7/27 LFTs stable - known liver mets. Tbili back to normal. Monitoring. 8/5 Tbili up to 2.0, check frac bili. LFTs con't to increase. 8/6 Tbili 2 - lab did not process frac bili yesterday, reordered this AM  8/7 Tbili down to 1.6, mostly direct     EDGARD  - Gentle hydration  RESOLVED     Constipation  - Lactulose prn   7/11 Pt refusing Lactulose. PC started pericolace. Con't Miralax. Consider KUB if no improvement. 7/12 No BM documented. Con't Miralax/Pericolace. Pt more active now, up around in room since pain controlled. 7/13 Had BM x 4 yesterday  7/20 c/o constipation. Increase pericolace to BID. Con't Miralax. Check KUB. If no obstruction, will order Movantik. 7/21 KUB with constipation. Movantik ordered. 7/22 No BM. Con't Movantik. Checking CT AP.  7/23 Pt having Bms. Con't Movantik. 7/31 Added Lactulose with good relief. 8/5 Pt c/o abd distention yesterday. KUB with mod constipation. Had 2 BM after lactulose given. RN note states pt had mild bloody BM, no further bleeding noted. Hypercalcemia / Hypocalcemia  7/12 CCa++ up to 11. 7. Zometa ordered. 7/15 CCa++ down to 9.2  7/19 CCa++ down to 8.4, replete  7/21 Cca++ 8.6  RESOLVED    Dysuria  7/15 c/o dysuria. UA c/w UTI. CTX ordered. Check Ucx.  7/17 UC NTD on Rocephin  7/18 Pain improved s/p menchaca placement. Ucx with [facility]. Completed CTX x 3 days. RESOLVED    Urinary retention / B/l hydroureteronephrosis  7/16 menchaca ordered/ flomax ordered  7/17 UOP 4000 ml. Adding proscar  7/18 s/p menchaca placement. Con't Flomax/Proscar. Plan to DC menchaca tomorrow. 7/19 Remove menchaca for voiding trial.  If unable to void, will consult urology. 7/20 Menchaca removed yesterday and had to be replaced d/t urinary retention. Consult urology. 7/22 Awaiting urology consult  7/23 Urology felt r/t constipation, however CT shows b/l hydroureteronephrosis. Will defer mgmt to urology. 7/24 No response from urology from message sent yesterday. Will re-consult. 7/26 Urology recommends to remove menchaca this AM for voiding trial.  Also recommends reimaging later on to see if bilateral hydro has resolved (could be r/t overdistended bladder/urinary retention vs prostate cancer/obstruction).   If bilateral hydro has not improved over time, would consider bilateral stents vs nephrostomy tubes and this would be more favorable if/when kidney function declines or pt develops significant flank pain. 7/27 Menchaca removed yesterday, had PVR of 500cc but then able to void 200cc. Menchaca remains out. Cr 1.2.  7/29 Cr stable, 1.1. Voiding to urinal without issue. 8/2 c/o b/l abd/flank pain. Check renal US.  8/3 Renal US with mod b/l hydronephrosis. Urology replaced menchaca. If pt chooses to pursue Hospice, then will leave this in place. Otherwise, will consider other options such as TURP or suprapubic tube. 8/6 Cr 1.4. Continues with menchaca  8/9 per Urology reviewed options for CIC, Menchaca, SPT and repeat TURP. Given tumor progression after recent TURP on 3/2022 I do feel that his best option would be to leave an indwelling Menchaca changed monthly. Hematuria / UTI  7/28 Hematuria noted. Transfuse platelets. Check UA - UA c/w UTI. Ucx ordered. Start CTX.  7/30 hematuria resolved after plt transfusion. UC NGTD. CTX day 3 today. 8/2 c/o b/l abd/flank pain. Check UA/Ucx  8/3 UA neg for infx. Ucx-NGTD. Continue home meds  [facility] SOPs  AC contraindicated d/t thrombocytopenia    Goals and plan of care reviewed with the patient. All questions answered to the best of our ability. Disposition:  7/14: Pt reports his friend is not moving here until 8/1. He reports she has a one-story home that he can move into on 8/1 or possibly even sooner. Pt was able to walk hallways yesterday. Unsure if he would qualify for facility placement while future living arrangements are made. CM states pt would have to go through extensive insurance process to even cover facility placement if he qualified. CM assisting with discharge planning. ? Could pt stay with son who lives locally until his friend's house is ready to move into? Pt also declines Hospice services at this time. He states he would prefer HHT. 7/15:  Pt is medically stable for discharge. Pt working on living arrangements. Hopeful for discharge home soon. 7/17 Spoke with patient's son yesterday. Patient does not a home to return to so now even if he does have a friend coming there is no home. Difficult placement. CM following. 7/19: Pt reportedly does not have a home to return to upon discharge. Son states pt is unable to live with him. Unlikely that pt would qualify for facility placement. CM following.    7/20: Pt still stable for discharge. CM following - pt has no place to live.    7/21:  CM found available place at boarding house for patient. Pt states he was not told about this, but cannot afford $650/mo. He says he was paying $500/mo at previous place which is no longer available to him. CM following.    7/22:  Per CM, son calling to speak to the manager of the boarding home today. He also asked for list of ALFs that accept Medicaid. CM continues to follow. 7/25: Son went to see available place at boarding home - pt and son not pleased with arrangements as pt would be sharing bathroom with 3 other people. Son looking into ALFs. Pt voices concern that he would not be able to adequately care for himself if he had to live alone. CM following.    7/31: Per CM, pt is making no effort to locate living arrangements. CM assisting to help locate [facility]. Sending pt's info to be reviewed at [facility] (which has availability). Pt is stable for discharge once placement arranged. No new updates. 8/1  Still stable for discharge - still has no place/home to discharge. CM following. 8/2:  Pt meeting with [facility] liaison today. Hopeful for discharge soon, pt is medically stable for discharge. 8/3: [facility] liaison did not show for appt yesterday. Rescheduled for 8/4 at 2p. Pt concerned about online ratings for facility. 8/4: Still stable for discharge. Pt meeting with [facility] liaison today. 8/5:  CM continues to follow. [facility] liaison canceled appt again yesterday. Pt/Son making no effort to help locate placement. Reconsulted PT/OT.     8/6:  Pt has been stable for discharge for over 3 weeks. Still needs placement/living arrangements. Pt/son still not making any effort to assist with finding place for pt to go. Pt again refused PT, PT/OT signed off.    8/8 Patient's friend that is willing to assist him lives out of state. Discussed possibility of finding a facility closer to her. Discussed with CM. Still waiting on the nurse manager of [facility] to do face to face visit. 8/9 Current assisted living facility that we are looking into will not accept patient with menchaca cath. Per urology no other options than menchaca. CM updated.                HELEN Schmidt - NP   [facility] Hematology & Oncology  [address] [address]  Office : [phone]  Fax : [phone]

## 2022-08-10 NOTE — CARE COORDINATION
CM was notified by Yvrose Staley NP that pt will require an indwelling menchaca cath s/p d/c. Due to pt requiring a folely cath this will disqualify him for admittance to any residential care or [facility] facility as these facilities do not accept pts with menchaca caths. Pt has refused to work with PT/OT and has been d/c from their caseload. Pt does not require a hospital or SNF level of care. LOS = 33 days. Pt and his son need to orchestrate a d/c plan immediately as this CM has exhausted all resources. Pt has made no effort in his d/c planning and his son has made minimal effort.

## 2022-08-11 NOTE — PROGRESS NOTES
OCCUPATIONAL THERAPY Daily Note and Re-evaluation       OT Visit Days: 1  Acknowledge Orders  Time  OT Charge Capture  Rehab Caseload Tracker      Lorna Barclay is a 78 y.o. male   PRIMARY DIAGNOSIS: Prostate cancer metastatic to bone [facility])  Prostate cancer metastatic to bone ([facility] [address] [C61, C79.51]       Reason for Referral: Generalized Muscle Weakness (M62.81)  Other lack of cordination (R27.8)  Difficulty in walking, Not elsewhere classified (R26.2)  Inpatient: Payor: Jose Carlos Galvan / Plan: Adrianna Gregory / Product Type: *No Product type* /     ASSESSMENT:     REHAB RECOMMENDATIONS:   Recommendation to date pending progress:  Setting:  Short-term Rehab    Equipment:    To Be Determined     ASSESSMENT:  Mr. Marina Victor was seen today for re-evaluation due to being re-consulted for increased weakness. At previous evaluation, pt was evaluated and discharged due to being independent in his room. Pt with a PMH significant for metastatic pancreatic CA (metastatic to spine). At baseline, pt is independent. Today, pt was received supine in the bed. Completed bed mobility, LB dressing, sit>stand, ambulation via furniture walking, and grooming tasks standing at the sink with overall CGA. Pt with decreased activity tolerance and poor safety awareness. Pt would benefit from further rehab services at d/c prior to returning home. Lorna Barclay currently demonstrates overall deficits in strength, balance, activity tolerance, and ADL performance. Patient would benefit from skilled OT services at this time in order to address functional deficits and OT goals stated above.       [address] [facility] [address] [address] AM-PAC 6 Clicks Daily Activity Inpatient Short Form:    AM-PAC Daily Activity Inpatient   How much help for putting on and taking off regular lower body clothing?: A Little  How much help for Bathing?: A Little  How much help for Toileting?: A Little  How much help for putting on and taking off regular upper body clothing?: A Little  How much help for taking care of personal grooming?: A Little  How much help for eating meals?: None  AM-PAC Inpatient Daily Activity Raw Score: 19  AM-PAC Inpatient ADL T-Scale Score : 40.22  ADL Inpatient CMS 0-100% Score: 42.8  ADL Inpatient CMS G-Code Modifier : CK      SUBJECTIVE:     Mr. Ariel Mcgowan states, \"you're not kicking me out of here. \"     Social/Functional Lives With: Alone  Type of Home: House  Home Layout: Two level  Receives Help From: Family  ADL Assistance: Independent  Homemaking Assistance: Independent  Ambulation Assistance: Needs assistance  Transfer Assistance: Independent  Active : Yes  Mode of Transportation: Car  Occupation: Retired    OBJECTIVE:     LINES / Abdulaziza Corey / Veria Proud: NA    RESTRICTIONS/PRECAUTIONS:  Restrictions/Precautions: Fall Risk    PAIN: Erroll Abbot / O2:   Pre Treatment:   Pain Assessment: None - Denies Pain      Post Treatment: no change        Vitals          Oxygen            GROSS EVALUATION: INTACT IMPAIRED   (See Comments)   UE AROM [x] []   UE PROM [x] []   Strength [] Generalized weakness BUEs      Posture / Balance []  Fair+ sitting, fair standing    Sensation [x]     Coordination []       Tone [x]       Edema [x]    Activity Tolerance [] Fatigues quickly with activity, rest breaks required      Hand Dominance R [] L []      COGNITION/  PERCEPTION: INTACT IMPAIRED   (See Comments)   Orientation [x]     Vision [x]     Hearing [x]     Cognition  []  Decreased safety awareness, easily irritable    Perception []       MOBILITY: I Mod I S SBA CGA Min Mod Max Total  NT x2 Comments:   Bed Mobility    Rolling [] [] [] [] [] [] [] [] [] [x] []    Supine to Sit [] [] [] [] [x] [] [] [] [] [] []    Scooting [] [] [] [] [x] [] [] [] [] [] []    Sit to Supine [] [] [] [] [] [] [] [] [] [x] [] Left sitting in the chair    Transfers    Sit to Stand [] [] [] [] [x] [] [] [] [] [] []    Bed to Chair [] [] [] [] [x] [] [] [] [] [] [] No AD--furniture walked in room   Stand to Sit [] [] [] [] [x] [] [] [] [] [] []    Tub/Shower [] [] [] [] [] [] [] [] [] [x] []     Toilet [] [] [] [] [] [] [] [] [] [x] []      [] [] [] [] [] [] [] [] [] [] []    I=Independent, Mod I=Modified Independent, S=Supervision/Setup, SBA=Standby Assistance, CGA=Contact Guard Assistance, Min=Minimal Assistance, Mod=Moderate Assistance, Max=Maximal Assistance, Total=Total Assistance, NT=Not Tested    ACTIVITIES OF DAILY LIVING: I Mod I S SBA CGA Min Mod Max Total NT Comments   BASIC ADLs:              Upper Body Bathing  [] [] [] [] [] [] [] [] [] [x]    Lower Body Bathing [] [] [] [] [] [] [] [] [] [x]    Toileting [] [] [] [] [] [] [] [] [] [x]    Upper Body Dressing [] [] [] [] [] [] [] [] [] [x]    Lower Body Dressing [] [] [] [] [x] [] [] [] [] [] Socks    Feeding [] [] [] [] [] [] [] [] [] [x]    Grooming [] [] [] [] [x] [] [] [] [] [] Washed face and brushed teeth standing at the sink    Personal Device Care [] [] [] [] [] [] [] [] [] [x]    Functional Mobility [] [] [] [] [x] [] [] [] [] [] No AD   I=Independent, Mod I=Modified Independent, S=Supervision/Setup, SBA=Standby Assistance, CGA=Contact Guard Assistance, Min=Minimal Assistance, Mod=Moderate Assistance, Max=Maximal Assistance, Total=Total Assistance, NT=Not Tested    PLAN:     FREQUENCY/DURATION   OT Plan of Care: 3 times/week for duration of hospital stay or until stated goals are met, whichever comes first.    ACUTE OCCUPATIONAL THERAPY GOALS:   (Developed with and agreed upon by patient and/or caregiver.)  1. Patient will complete lower body bathing and dressing with MOD I and adaptive equipment as needed. 2.Patient will complete upper body bathing and dressing with MOD I and adaptive equipment as needed. 3. Patient will complete toileting with MOD I.   4. Patient will tolerate 25 minutes of OT treatment with 1-2 rest breaks to increase activity tolerance for ADLs.    5. Patient will complete functional transfers with MOD I and adaptive equipment as needed. 6. Patient will complete functional activity with MOD I and adaptive equipment as needed. Timeframe: 7 visits        PROBLEM LIST:   (Skilled intervention is medically necessary to address:)  Decreased ADL/Functional Activities  Decreased Activity Tolerance  Decreased Balance  Decreased Cognition  Decreased Coordination  Decreased Safety Awareness  Decreased Strength  Decreased Transfer Abilities   INTERVENTIONS PLANNED:  (Benefits and precautions of occupational therapy have been discussed with the patient.)  Self Care Training  Therapeutic Activity  Therapeutic Exercise/HEP  Neuromuscular Re-education  Manual Therapy  Education         TREATMENT:     EVALUATION: RE EVALUATION: (Untimed Charge)    TREATMENT:   Co-Treatment PT/OT necessary due to patient's decreased overall endurance/tolerance levels, as well as need for high level skilled assistance to complete functional transfers/mobility and functional tasks  Self Care (10 minutes): Patient participated in lower body dressing and grooming ADLs in unsupported sitting and standing with minimal verbal cueing to increase safety awareness. Patient also participated in functional mobility, functional transfer, and energy conservation training to increase independence, decrease assistance required, increase activity tolerance, and increase safety awareness. TREATMENT GRID:  N/A    AFTER TREATMENT PRECAUTIONS: Call light within reach, Chair, Needs within reach, and RN notified    INTERDISCIPLINARY COLLABORATION:  RN/ PCT, PT/ PTA, and OT/ NUÑEZ    EDUCATION:  Education Given To: Patient  Education Provided: Role of Therapy;Plan of Care; Fall Prevention Strategies  Education Outcome: Verbalized understanding;Demonstrated understanding    TOTAL TREATMENT DURATION AND TIME:  Time In: 2365  Time Out: 1034  Minutes: 12 Unique Kingsley OT

## 2022-08-11 NOTE — PROGRESS NOTES
Comprehensive Nutrition Assessment    Type and Reason for Visit: Reassess  Malnutrition Screening Tool: Malnutrition Screen  Have you recently lost weight without trying?: Unsure of amount of weight loss (2 points)  Have you been eating poorly because of a decreased appetite?: Yes (1 point)  Malnutrition Screening Tool Score: 3    Nutrition Recommendations/Plan:   Meals and Snacks:  Diet: Continue current order  Nutrition Supplement Therapy:  Medical food supplement therapy:  Initiate Ensure Clear once per day (this provides 240 kcal and 8 grams protein per bottle) to trial for tolerance       Malnutrition Assessment:  Malnutrition Status: No malnutrition    Nutrition Assessment:  Nutrition History: Patient states that he eats 2 meals per day and several snacks. He states that he typically does not eat breakfast just drinks coffee. He denies any changes to PO prior to admission. No deonte wasting. Do You Have Any Cultural, Holiness, or Ethnic Food Preferences?: Yes (Comment)   Nutrition Background:   Patient with PMH significant for metastatic prostate cancer s/p several lines of therapy and surgery and most recently XRT to T spine and ribs. He presented with pain s/p fall at home and constipation. Nutrition Interval:  KUB 8/4 negative for obstruction. Patient seen lying in bed. When RD commenting on recent decline in PO, he reports ongoing constipation. He states he does not want to eat anything because it is not coming out. When RD asks about fluid intake he indicates water pitcher and states he drinks at least 2 of them a day. Also noted several water bottles on table. He states that he has been ordering some food from outside and is actually getting outside food today. Discussed PRN bowel medication and patient states they never work. Discussed trial of Ensure Clear as patient has not tolerated Ensure Enlive in the past. He agrees to try it.   Noted medications: Movantik (daily)  PRN: Senokot (last taken 8/9), Lactulose, Colace, Dulcolax  Last BM recorded 8/9 - soft    Current Nutrition Therapies:  ADULT DIET; Regular    Current Intake:   Average Meal Intake:  (Mostly %, but decline to 1-25% over last 2 days) Average Supplements Intake: Refusing to take      Anthropometric Measures:  Height: 5' 10\" (177.8 cm)  Current Body Wt: 204 lb 9.4 oz (92.8 kg) (8/10), Weight source: Bed Scale  BMI: 29.4     Ideal Body Weight (Kg) (Calculated): 75 kg (166 lbs), 123.2 %  Usual Body Wt: 205 lb (93 kg), Percent weight change: -5.9       Edema:    BMI Category Overweight (BMI 25.0-29. 9)  Estimated Daily Nutrient Needs:  Energy (kcal/day): 7057-5638 (Kcal/kg (20-25) Weight used: 87.5 kg Current (7/8)  Protein (g/day):  (1-1.2 g/kg) Weight Used: (Current)    Fluid (ml/day):   (1 ml/kcal)    Nutrition Diagnosis:   Inadequate oral intake   Nutrition Interventions:   Food and/or Nutrient Delivery: Continue Current Diet, Start Oral Nutrition Supplement     Coordination of Nutrition Care: Continue to monitor while inpatient       Goals:   Previous Goal Met: Progress towards Goal(s) Declining  Active Goal: PO intake 75% or greater, by next RD assessment       Nutrition Monitoring and Evaluation:      Food/Nutrient Intake Outcomes: Food and Nutrient Intake, Supplement Intake  Physical Signs/Symptoms Outcomes: Constipation, Meal Time Behavior, Weight    Discharge Planning:     Too soon to determine    GAVIOTA BLUM RD

## 2022-08-11 NOTE — PROGRESS NOTES
PHYSICAL THERAPY Daily Note, Re-evaluation, and AM  (Link to Caseload Tracking: PT Visit Days : 1  Acknowledge Orders  Time In/Out  PT Charge Capture  Rehab Caseload Tracker    Donald Vega is a 78 y.o. male   PRIMARY DIAGNOSIS: Prostate cancer metastatic to bone [facility])  Prostate cancer metastatic to bone ([facility] [address] [C61, C79.51]       Reason for Referral: Generalized Muscle Weakness (M62.81)  Difficulty in walking, Not elsewhere classified (R26.2)  Inpatient: Payor: Emanuel Lopez / Plan: Manuel Vazquez / Product Type: *No Product type* /     ASSESSMENT:     REHAB RECOMMENDATIONS:   Recommendation to date pending progress:  Setting:  Short-term Rehab    Equipment:    To Be Determined     ASSESSMENT:  Mr. Herberth Kasper was last seen by PT in early July. At that time he was ambulating 250 ft with supervision. Since that time he was refusing PT and eventually discontinued from services. He was up about his room on his own. PT was re consulted due to Mr. Herberth Kasper suggesting he was a lot weaker now. Cotx with OT performed due to complexity of case. He was able to perform bed mobility with supervision. Once sitting edge of bed sit to stand with cg with extra time. Gait 20 ft to the bathroom reaching out for external support. Able to stand at the sink with supervision. But fatigues. Gait back to the chair with cg and again reaching for external support. Considering he was ambulating 250 ft a few weeks ago he has unfortunately declined in function at this point. He admits and even became tearful when he realized how his sedentary activity over the past several weeks has really caused this set back. At this point the original dc plan for home with home health is not appropriate. He now requires a post acute rehab stay. Goals below have been adjusted to match his new status. Discussed with oncology team and case management. Encouraged Mr. Herberth Kasper to remain out of the bed as much as possible. He admits to spending most of his time in bed. Mr. Marina Victor is functioning below baseline and is therefore appropriate for skilled PT to maximize his rehab potential.  At this point he would benefit from using a rolling walker. [address] [facility] [address] [address] AM-PAC 6 Clicks Basic Mobility Inpatient Short Form  AM-PAC Mobility Inpatient   How much difficulty turning over in bed?: None  How much difficulty sitting down on / standing up from a chair with arms?: None  How much difficulty moving from lying on back to sitting on side of bed?: None  How much help from another person moving to and from a bed to a chair?: None  How much help from another person needed to walk in hospital room?: None  How much help from another person for climbing 3-5 steps with a railing?: A Little  [facility] : 23  AM-PAC Inpatient T-Scale Score : 56.93  Mobility Inpatient CMS 0-100% Score: 11.2  Mobility Inpatient CMS G-Code Modifier : CI    SUBJECTIVE:   Mr. Marina Victor states, \"I now realize how not moving much put me in this situation.   \"     Social/Functional Lives With: Alone  Type of Home: House  Home Layout: Two level  Receives Help From: Family  ADL Assistance: Independent  Homemaking Assistance: Independent  Ambulation Assistance: Needs assistance  Transfer Assistance: Independent  Active : Yes  Mode of Transportation: Car  Occupation: Retired    OBJECTIVE:     PAIN: VITALS / O2: PRECAUTION / Gordon Aysei / DRAINS:   Pre Treatment: 0         Post Treatment: 0 Vitals        Oxygen        Clark Catheter  RESTRICTIONS/PRECAUTIONS:                    GROSS EVALUATION: Intact Impaired (Comments):   AROM [x]     PROM []    Strength []  Weak but functional   Balance [] Posture: Fair  Sitting - Static: Good  Sitting - Dynamic: Good  Standing - Static: Fair  Standing - Dynamic: Fair, -   Posture [] Forward Head  Rounded Shoulders   Sensation []  Right foot feels \"a little numb\"   Coordination []      Tone []     Edema [] His right leg is swollen compared to left. Activity Tolerance []  Fatigued after in room activity for about 8 minutes    []      COGNITION/  PERCEPTION: Intact Impaired (Comments):   Orientation [x]     Vision [x]     Hearing [x]     Cognition  [x]       MOBILITY: I Mod I S SBA CGA Min Mod Max Total  NT x2 Comments:   Bed Mobility    Rolling [x] [] [] [] [] [] [] [] [] [] []    Supine to Sit [x] [] [] [] [] [] [] [] [] [] []    Scooting [x] [] [] [] [] [] [] [] [] [] []    Sit to Supine [] [] [] [] [] [] [] [] [] [x] []    Transfers    Sit to Stand [] [] [] [] [x] [] [] [] [] [] []    Bed to Chair [] [] [] [] [x] [] [] [] [] [] []    Stand to Sit [] [] [] [] [x] [] [] [] [] [] []     [] [] [] [] [] [] [] [] [] [] []    I=Independent, Mod I=Modified Independent, S=Supervision, SBA=Standby Assistance, CGA=Contact Guard Assistance,   Min=Minimal Assistance, Mod=Moderate Assistance, Max=Maximal Assistance, Total=Total Assistance, NT=Not Tested    GAIT: I Mod I S SBA CGA Min Mod Max Total  NT x2 Comments:   Level of Assistance [] [] [] [] [x] [] [] [] [] [] []    Distance 20 ft x 2 feet    DME None    Gait Quality Decreased cody , Decreased step clearance, Decreased step length, Trunk sway increased, and Wide base of support    Weightbearing Status      Stairs      I=Independent, Mod I=Modified Independent, S=Supervision, SBA=Standby Assistance, CGA=Contact Guard Assistance,   Min=Minimal Assistance, Mod=Moderate Assistance, Max=Maximal Assistance, Total=Total Assistance, NT=Not Tested    PLAN:   ACUTE PHYSICAL THERAPY GOALS:   (Developed with and agreed upon by patient and/or caregiver.)   Mr. Marcela Jordan will perform dynamic standing activity for > 25 minutes independently in 7 days. Mr. Marcela Jordan will perform all transfers independently in 7 days. Mr. Marcela Jordan will perform gait with rolling walker >300 ft independently in 7 days. Mr. Marcela Jordan will perform therex in standing and sitting x 25 reps in 7 days. FREQUENCY AND DURATION: 3 times/week for duration of hospital stay or until stated goals are met, whichever comes first.    THERAPY PROGNOSIS: Good    PROBLEM LIST:   (Skilled intervention is medically necessary to address:)  Decreased Activity Tolerance  Decreased AROM/PROM  Decreased Balance  Decreased Gait Ability  Decreased Safety Awareness  Decreased Strength  Decreased Transfer Abilities INTERVENTIONS PLANNED:   (Benefits and precautions of physical therapy have been discussed with the patient.)  Therapeutic Activity  Therapeutic Exercise/HEP  Neuromuscular Re-education  Gait Training  Education       TREATMENT:   EVALUATION: MODERATE COMPLEXITY: (Untimed Charge)    TREATMENT:   Co-Treatment PT/OT necessary due to patient's decreased overall endurance/tolerance levels, as well as need for high level skilled assistance to complete functional transfers/mobility and functional tasks  Therapeutic Activity (15 Minutes): Therapeutic activity included Supine to Sit, Scooting, Transfer Training, Ambulation on level ground, Sitting balance , and Standing balance to improve functional Activity tolerance, Balance, Mobility, and Strength.     TREATMENT GRID:  N/A    AFTER TREATMENT PRECAUTIONS: Call light within reach, Chair, Needs within reach, and RN notified    INTERDISCIPLINARY COLLABORATION:  MD/ PA/ NP , RN/ PCT, PT/ PTA, OT/ NUÑEZ, and RN Case Manager/      EDUCATION: Education Given To: Patient  Education Provided: Plan of Care;Role of Therapy  Education Method: Verbal  Barriers to Learning: None  Education Outcome: Verbalized understanding    TIME IN/OUT:  Time In: 1014  Time Out: 1034  Minutes: [facility], PT

## 2022-08-11 NOTE — CARE COORDINATION
Pt discussed with PT and oncology NP.  PT/OT have re-evaluated pt and recommend STR at d/c.  NP to order PPD. CM has submitted referrals to:  [address] [facility]    CM is awaiting responses.

## 2022-08-11 NOTE — PROGRESS NOTES
[facility]        Inpatient Hematology / Oncology Progress Note    Reason for Admission:  Prostate cancer metastatic to bone ([facility] [address] [facility] [C61, C79.51]    24 Hour Events:  Afebrile, VSS  Chronic menchaca  Ask PT to re-evaluate  Platelets 91L sp transfusion  Constipated added miralax    ROS:  Constitutional: Positive for fatigue; negative for fever, chills. CV: Negative for chest pain, palpitations, edema. Respiratory: Negative for dyspnea, cough, wheezing. GI: Negative for nausea, diarrhea, abd pain. MSK:  +Back/hip pain (pain controlled)      10 point review of systems is otherwise negative with the exception of the elements mentioned above in the HPI. Allergies   Allergen Reactions    Turmeric Nausea And Vomiting     Past Medical History:   Diagnosis Date    Claustrophobia     Ear problems     Elevated PSA     Former light cigarette smoker (1-9 per day)     smoked for 35 years.   quit     History of multiple allergies     Nicotine vapor product user     Personal history of prostate cancer     Prostate cancer ([facility] [address] [facility]     prostate, neuroendocrine, mets to bone     Past Surgical History:   Procedure Laterality Date    BIOPSY PROSTATE      COLONOSCOPY  08/2020    HEENT      teeth removed     IR KYPHOPLASTY LUMBAR FIRST LEVEL  10/14/2020    IR KYPHOPLASTY LUMBAR FIRST LEVEL  10/14/2020    IR KYPHOPLASTY LUMBAR FIRST LEVEL 10/14/2020 [facility]    IR KYPHOPLASTY THORACIC FIRST LEVEL  10/29/2020    IR KYPHOPLASTY THORACIC FIRST LEVEL  10/29/2020    IR KYPHOPLASTY THORACIC FIRST LEVEL 10/29/2020 [facility]    TESTICLE REMOVAL Bilateral 02/2017     Family History   Problem Relation Age of Onset    Hypertension Father     Prostate Cancer Father     Cancer Father         prostate cancer     Social History     Socioeconomic History    Marital status: Single     Spouse name: Not on file    Number of children: Not on file    Years of education: Not on file    Highest education level: Not on file   Occupational History    Not on file   Tobacco Use    Smoking status: Former     Packs/day: 0.50     Types: Cigarettes     Quit date: 2016     Years since quittin.6    Smokeless tobacco: Never   Substance and Sexual Activity    Alcohol use: Yes    Drug use: No    Sexual activity: Not on file   Other Topics Concern    Not on file   Social History Narrative    Not on file     Social Determinants of Health     Financial Resource Strain: Not on file   Food Insecurity: Not on file   Transportation Needs: Not on file   Physical Activity: Not on file   Stress: Not on file   Social Connections: Not on file   Intimate Partner Violence: Not on file   Housing Stability: Not on file     Current Facility-Administered Medications   Medication Dose Route Frequency Provider Last Rate Last Admin    0.9 % sodium chloride infusion   IntraVENous PRN Nancy Su MD        0.9 % sodium chloride infusion   IntraVENous PRN Amanda Cover, APRN - NP        dexamethasone (DECADRON) tablet 4 mg  4 mg Oral Daily with breakfast Amanda Cover, APRN - NP   4 mg at 22 0630    And    dexamethasone (DECADRON) tablet 2 mg  2 mg Oral Nightly Amanda Cover, APRN - NP   2 mg at 08/10/22 2031    naloxegol ([facility]) tablet 12.5 mg  12.5 mg Oral QAM AC HELEN Damon - CNP   12.5 mg at 22 0630    sennosides-docusate sodium (SENOKOT-S) 8.6-50 MG tablet 2 tablet  2 tablet Oral BID PRN HELEN Damon - CNP   2 tablet at 22 0849    polyethylene glycol (GLYCOLAX) packet 17 g  17 g Oral Daily PRN Annelise Mercado APRN - CNP        lactulose (CHRONULAC) 10 GM/15ML solution 20 g  20 g Oral BID PRN HELEN Damon - CNP   20 g at 22 1420    0.9 % sodium chloride infusion   IntraVENous PRN Lashon Marcelino MD        fentaNYL (DURAGESIC) 75 MCG/HR 1 patch  1 patch TransDERmal Q48H Amanda Cover, APRN - NP   1 patch at 22 1447    diatrizoate meglumine-sodium (GASTROGRAFIN) 66-10 % solution 15 mL  15 mL cancer, s/p carbo/etoposide and most recently carbo/taxotere which was held d/t TCP. Last was C2 on 6/8/22. 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 He is a known patient of Dr. Sally Mercer s/p multiple lines of treatment as above. At last office visit on 6/29, CEA had notably continued to rise to [id number]. He has had discussion with [facility], however wished to hold off on making a decision until he completed radiation therapy. He presented to the ER with a fall at home, increasing pain in the hips and generalized weakness. Also with constipation. Also found to be anemic and transfused PRBC. We were asked for recommendations for our known patient. PLAN:  Metastatic Prostate Cancer/Neuroendocrine  - S/p multiple lines of therapy, most recently carbo/taxotere C2 on 6/8. Held on 6/29 d/t TCP. Undergoing XRT to rib and T-spine with Dr. Nathan Woods.  - Has spoken to [facility], however wished to hold off on making a decision until he saw if XRT improved his pain  - Can resume XRT on Monday 7/11 Pt does not wish to pursue XRT. Meeting with PC today for pain mgmt and to discuss [address] - wishes to pursue facility placement and enroll in Hospice. 7/13 Pt now states he may be interested in going home since he will have support from friend moving here from out of state. Consult PT/OT. 7/14  PT recommends HHT  7/23 CT AP shows POD with [facility] liver lesions, increased prostate gland, and extensive bony mets  7/25 Pt states he is interested in pursuing XRT again, Rad Onc consulted. He states he may consider having chemo in the future as well. 7/26 Resuming XRT tomorrow (previously completed 1/1 fx to ribs and 1/5 fx to T-spine)  7/27 Resume XRT today - scheduled through 8/2 per rad onc notes. 8/1 XRT today (EOT 8/2).   8/2 Completes XRT to T-spine today (7/7 fx)     Cancer Related Pain  - Currently with Dilaudid/oxy prn  - Consult palliative care to assist with pain management (known from office) and also to assist with goals of care discussions   7/10 Increased Dilaudid to 1 mg every 3 hours for severe pain. Pall care consult pending. 7/11 PC assisting with mgmt. Resume Dex. 7/12 PC started Fentanyl 50mcg patch yesterday. Pt reports pain controlled today - even able to walk around room now. 7/13 Pain controlled. PC changed oxycodone to po dilaudid yesterday. 7/14 Pain controlled  7/15 C/o pain today  7/17 pain improved after menchaca  7/20 Pain controlled on current regimen  7/22 c/o worsening abd pain. Bladder scan with minimal urine. Check CT AP.  7/23 CT AP with POD  7/30 Feels pain increasing - will increase Fentanyl patch to 75 mcg every 48 hours and continue PO Dilaudid for breakthrough. 7/31 Better with adjustment   8/2 c/o b/l abd/flank pain. Check renal US, UA/Ucx  8/3 Pain improved s/p menchaca placement     Anemia/Thrombocytopenia  - Transfuse prn per [facility] SOPs  7/24 Plt down to 41k. DC Lovenox. Check hemolysis labs, DIC labs, smear, HIT.  7/25 Plt 37k. No DIC noted. Smear, Hapto, and HIT pending. 7/26 Plt 32k. No hemolysis. HIT neg. Smear pending. 7/27 Plts stable at 34k. Hgb relatively stable - 7.6. Smear without schistocytes. Monitor. 7/28 Plt stable at 32k. Pt with hematuria, transfuse to keep plt >50k.  1 unit platelets ordered. 7/29 Plts up to 62k s/p transfusion for hematuria. No bleeding. 8/3 Transfuse 1 unit PRBCs and 1 unit platelets (d/t to hematuria s/p menchaca placement)  8/7 Transfuse 1 unit platelets      Transaminitis / Hyperbilirubinemia  - Alk phos/ALT/AST trended down today, continue to follow   7/11 AST/ALT continue to improve   7/13 LFTs improving  7/18 stable  7/23 LFTs increased, monitor - likely r/t liver mets  7/24 Tbili up to 1.2, LFTs con't to increase. 7/25 Bili down to 0.8. LFTs improved today. 7/27 LFTs stable - known liver mets. Tbili back to normal. Monitoring.   8/5 Tbili up to 2.0, check frac bili. LFTs con't to increase. 8/6 Tbili 2 - lab did not process frac bili yesterday, reordered this AM  8/7 Tbili down to 1.6, mostly direct     EDGARD  - Gentle hydration  RESOLVED     Constipation  - Lactulose prn   7/11 Pt refusing Lactulose. PC started pericolace. Con't Miralax. Consider KUB if no improvement. 7/12 No BM documented. Con't Miralax/Pericolace. Pt more active now, up around in room since pain controlled. 7/13 Had BM x 4 yesterday  7/20 c/o constipation. Increase pericolace to BID. Con't Miralax. Check KUB. If no obstruction, will order Movantik. 7/21 KUB with constipation. Movantik ordered. 7/22 No BM. Con't Movantik. Checking CT AP.  7/23 Pt having Bms. Con't Movantik. 7/31 Added Lactulose with good relief. 8/5 Pt c/o abd distention yesterday. KUB with mod constipation. Had 2 BM after lactulose given. RN note states pt had mild bloody BM, no further bleeding noted. Hypercalcemia / Hypocalcemia  7/12 CCa++ up to 11. 7. Zometa ordered. 7/15 CCa++ down to 9.2  7/19 CCa++ down to 8.4, replete  7/21 Cca++ 8.6  RESOLVED    Dysuria  7/15 c/o dysuria. UA c/w UTI. CTX ordered. Check Ucx.  7/17 UC NTD on Rocephin  7/18 Pain improved s/p menchaca placement. Ucx with [facility]. Completed CTX x 3 days. RESOLVED    Urinary retention / B/l hydroureteronephrosis  7/16 menchaca ordered/ flomax ordered  7/17 UOP 4000 ml. Adding proscar  7/18 s/p menchaca placement. Con't Flomax/Proscar. Plan to DC menchaca tomorrow. 7/19 Remove menchaca for voiding trial.  If unable to void, will consult urology. 7/20 Menchaca removed yesterday and had to be replaced d/t urinary retention. Consult urology. 7/22 Awaiting urology consult  7/23 Urology felt r/t constipation, however CT shows b/l hydroureteronephrosis. Will defer mgmt to urology. 7/24 No response from urology from message sent yesterday. Will re-consult.   7/26 Urology recommends to remove menchaca this AM for voiding trial.  Also recommends reimaging later on to see if bilateral hydro has resolved (could be r/t overdistended bladder/urinary retention vs prostate cancer/obstruction). If bilateral hydro has not improved over time, would consider bilateral stents vs nephrostomy tubes and this would be more favorable if/when kidney function declines or pt develops significant flank pain. 7/27 Menchaca removed yesterday, had PVR of 500cc but then able to void 200cc. Menchaca remains out. Cr 1.2.  7/29 Cr stable, 1.1. Voiding to urinal without issue. 8/2 c/o b/l abd/flank pain. Check renal US.  8/3 Renal US with mod b/l hydronephrosis. Urology replaced menchaca. If pt chooses to pursue Hospice, then will leave this in place. Otherwise, will consider other options such as TURP or suprapubic tube. 8/6 Cr 1.4. Continues with menchaca  8/9 per Urology reviewed options for CIC, Menchaca, SPT and repeat TURP. Given tumor progression after recent TURP on 3/2022 I do feel that his best option would be to leave an indwelling Menchaca changed monthly. Hematuria / UTI  7/28 Hematuria noted. Transfuse platelets. Check UA - UA c/w UTI. Ucx ordered. Start CTX.  7/30 hematuria resolved after plt transfusion. UC NGTD. CTX day 3 today. 8/2 c/o b/l abd/flank pain. Check UA/Ucx  8/3 UA neg for infx. Ucx-NGTD. Continue home meds  [facility] SOPs  AC contraindicated d/t thrombocytopenia    Goals and plan of care reviewed with the patient. All questions answered to the best of our ability. Disposition:  7/14: Pt reports his friend is not moving here until 8/1. He reports she has a one-story home that he can move into on 8/1 or possibly even sooner. Pt was able to walk hallways yesterday. Unsure if he would qualify for facility placement while future living arrangements are made. CM states pt would have to go through extensive insurance process to even cover facility placement if he qualified. CM assisting with discharge planning. ? Could pt stay with son who lives locally until his friend's house is ready to move into? Pt also declines Hospice services at this time. He states he would prefer HHT. 7/15:  Pt is medically stable for discharge. Pt working on living arrangements. Hopeful for discharge home soon. 7/17 Spoke with patient's son yesterday. Patient does not a home to return to so now even if he does have a friend coming there is no home. Difficult placement. CM following. 7/19: Pt reportedly does not have a home to return to upon discharge. Son states pt is unable to live with him. Unlikely that pt would qualify for facility placement. CM following.    7/20: Pt still stable for discharge. CM following - pt has no place to live.    7/21:  CM found available place at boarding house for patient. Pt states he was not told about this, but cannot afford $650/mo. He says he was paying $500/mo at previous place which is no longer available to him. CM following.    7/22:  Per CM, son calling to speak to the manager of the boarding home today. He also asked for list of ALFs that accept Medicaid. CM continues to follow. 7/25: Son went to see available place at boarding home - pt and son not pleased with arrangements as pt would be sharing bathroom with 3 other people. Son looking into ALFs. Pt voices concern that he would not be able to adequately care for himself if he had to live alone. CM following.    7/31: Per CM, pt is making no effort to locate living arrangements. CM assisting to help locate [facility]. Sending pt's info to be reviewed at [facility] (which has availability). Pt is stable for discharge once placement arranged. No new updates. 8/1  Still stable for discharge - still has no place/home to discharge. CM following. 8/2:  Pt meeting with [facility] liaison today. Hopeful for discharge soon, pt is medically stable for discharge. 8/3: [facility] liaison did not show for appt yesterday. Rescheduled for 8/4 at 2p.   Pt concerned about online ratings for facility. 8/4: Still stable for discharge. Pt meeting with halfway liaison today. 8/5:  CM continues to follow. [facility] liaison canceled appt again yesterday. Pt/Son making no effort to help locate placement. Reconsulted PT/OT. 8/6:  Pt has been stable for discharge for over 3 weeks. Still needs placement/living arrangements. Pt/son still not making any effort to assist with finding place for pt to go. Pt again refused PT, PT/OT signed off.    8/8 Patient's friend that is willing to assist him lives out of state. Discussed possibility of finding a facility closer to her. Discussed with CM. Still waiting on the nurse manager of [facility] to do face to face visit. 8/9 Current assisted living facility that we are looking into will not accept patient with menchaca cath. Per urology no other options than menchaca. CM updated.      8/11 PT to re-evaluate today              HELEN Sadler - HARRY   [facility]  [address] [address]  Office : [phone]  Fax : [phone]

## 2022-08-11 NOTE — PROGRESS NOTES
END OF SHIFT SUMMARY:      Additional events this shift:   -pt given po dilaudid 2x  -pt resting in bed  -no needs at this time  -vss    I/Os:    08/10 1901 - 08/11 0700  In: 420 [P.O.:420]  Out: 350 [Urine:350]

## 2022-08-12 NOTE — CARE COORDINATION
Chart screened by CM for d/c planning. [address] has declined pt's referral.  CM also submitted referrals to [facility], [address] [facility], and [address]. Awaiting responses from the rest of the referrals. CM received confirmation from Nery with [facility] that pt's referral has been accepted. Pre-cert is being started today. Nav Song NP aware. Pt will not be notified until closer to d/c and this will be done by his physician. RAUL and Nav Song NP discussed d/c planning and CM will send a hospice consult order to [facility] so that pt can be followed OP once he is d/c and when he does need hospice there will be a team who is already familiar with him.

## 2022-08-12 NOTE — PROGRESS NOTES
level: Not on file   Occupational History    Not on file   Tobacco Use    Smoking status: Former     Packs/day: 0.50     Types: Cigarettes     Quit date: 2016     Years since quittin.6    Smokeless tobacco: Never   Substance and Sexual Activity    Alcohol use: Yes    Drug use: No    Sexual activity: Not on file   Other Topics Concern    Not on file   Social History Narrative    Not on file     Social Determinants of Health     Financial Resource Strain: Not on file   Food Insecurity: Not on file   Transportation Needs: Not on file   Physical Activity: Not on file   Stress: Not on file   Social Connections: Not on file   Intimate Partner Violence: Not on file   Housing Stability: Not on file     Current Facility-Administered Medications   Medication Dose Route Frequency Provider Last Rate Last Admin    0.9 % sodium chloride infusion   IntraVENous PRN Aden Dawn MD        sennosides-docusate sodium (SENOKOT-S) 8.6-50 MG tablet 2 tablet  2 tablet Oral Daily Moises Godoy, APRN - NP        polyethylene glycol (GLYCOLAX) packet 17 g  17 g Oral Daily Moises Godoy, APRN - NP   17 g at 22 1033    tuberculin injection 5 Units  5 Units IntraDERmal Once Moises Godoy APRN - NP   5 Units at 22 1252    dexamethasone (DECADRON) tablet 4 mg  4 mg Oral Daily with breakfast Moises Godoy APRN - NP   4 mg at 22 0630    And    dexamethasone (DECADRON) tablet 2 mg  2 mg Oral Nightly Moises Godoy, APRN - NP   2 mg at 22 2053    naloxegol ([address] [place] [address]) tablet 12.5 mg  12.5 mg Oral QAM AC Jonny Quintana APRN - CNP   12.5 mg at 22 0630    lactulose (CHRONULAC) 10 GM/15ML solution 20 g  20 g Oral BID PRN Jonny Quintana APRN - CNP   20 g at 22 1420    fentaNYL (DURAGESIC) 75 MCG/HR 1 patch  1 patch TransDERmal Q48H Moises Godoy APRN - NP   1 patch at 22 1327    HYDROmorphone HCl PF (DILAUDID) injection 1.5 mg  1.5 mg IntraVENous Q3H PRN HELEN Love - CNP   1.5 mg at 22 205    bisacodyl (DULCOLAX) suppository 10 mg  10 mg Rectal Daily PRN Courtney Conklin APRN - CNP   10 mg at 07/22/22 1758    magnesium oxide (MAG-OX) tablet 400 mg  400 mg Oral 4x Daily Tracee Stacy MD   400 mg at 08/11/22 2053    calcium carbonate (TUMS) chewable tablet 500 mg  500 mg Oral TID Raven Cr APRN - CNP   500 mg at 08/11/22 2053    tamsulosin (FLOMAX) capsule 0.4 mg  0.4 mg Oral Daily HELEN Gaspar - NP   0.4 mg at 08/11/22 0844    finasteride (PROSCAR) tablet 5 mg  5 mg Oral Daily HELEN Gaspar - NP   5 mg at 08/11/22 0843    zinc oxide 40 % paste   Topical 4x Daily PRN Tracee Stacy MD        lidocaine-prilocaine (EMLA) cream   Topical Once Tracee Stacy MD        phenazopyridine (PYRIDIUM) tablet 95 mg  95 mg Oral TID PRN Raven Cr APRN - CNP   95 mg at 07/15/22 2048    cyclobenzaprine (FLEXERIL) tablet 10 mg  10 mg Oral TID PRN HELEN Gaspar - NP   10 mg at 08/11/22 2340    [Held by provider] enoxaparin (LOVENOX) injection 40 mg  40 mg SubCUTAneous Daily Raven Cr, APRN - CNP   40 mg at 07/23/22 0857    diphenhydrAMINE (BENADRYL) capsule 25 mg  25 mg Oral Q6H PRN Raven Cr, APRN - CNP   25 mg at 08/10/22 1018    acetaminophen (TYLENOL) tablet 650 mg  650 mg Oral Q6H PRN Ana Laurana Tayo, APRN - CNP   650 mg at 08/10/22 1018    Or    acetaminophen (TYLENOL) suppository 650 mg  650 mg Rectal Q6H PRN Raven Cr, APRN - CNP        HYDROmorphone (DILAUDID) tablet 2 mg  2 mg Oral Q4H PRN Jasmina Peacock APRN - CNP   2 mg at 08/08/22 8908    Or    HYDROmorphone (DILAUDID) tablet 4 mg  4 mg Oral Q4H PRN HELEN Gross CNP   4 mg at 08/11/22 2343    naloxone [facility]) injection 0.04 mg  0.04 mg IntraVENous PRN HELEN Gross CNP        aluminum & magnesium hydroxide-simethicone (MAALOX) 200-200-20 MG/5ML suspension 20 mL  20 mL Oral Q6H PRN Latha Solitario MD   20 mL at 08/07/22 2037    LORazepam (ATIVAN) tablet 1 mg  1 mg Oral TID PRN Tata Clemens DO   1 mg at 08/09/22 1884 pantoprazole (PROTONIX) tablet 40 mg  40 mg Oral QAM AC Carissa Glover Martinez, DO   40 mg at 22 0630    sodium chloride flush 0.9 % injection 5-40 mL  5-40 mL IntraVENous 2 times per day Avoyelles Pillar, DO   10 mL at 22    sodium chloride flush 0.9 % injection 5-40 mL  5-40 mL IntraVENous PRN [place] Pillar, DO        ondansetron (ZOFRAN-ODT) disintegrating tablet 4 mg  4 mg Oral Q8H PRN Tigre Pillar, DO        Or    ondansetron [facility]) injection 4 mg  4 mg IntraVENous Q6H PRN [place]oyelles Pillar, DO   4 mg at 07/10/22 1717    docusate sodium (COLACE) capsule 100 mg  100 mg Oral BID PRN Tigre Pillar, DO   100 mg at 22 1406       OBJECTIVE:  Patient Vitals for the past 8 hrs:   BP Temp Temp src Pulse Resp SpO2 Weight   22 0300 108/65 98.2 °F (36.8 °C) Oral 81 16 91 % --   22 2343 -- -- -- -- -- -- 193 lb 9.6 oz (87.8 kg)     Temp (24hrs), Av.1 °F (36.7 °C), Min:97.7 °F (36.5 °C), Max:98.3 °F (36.8 °C)    No intake/output data recorded. Physical Exam:  Constitutional: Well developed male sitting comfortably on the hospital bed. HEENT: Normocephalic and atraumatic. Oropharynx is clear, mucous membranes are moist.  Neck supple. Skin Warm and dry. Bruising noted on L foot and no rash noted. No erythema. No pallor. Respiratory Lungs are clear to auscultation bilaterally without wheezes, rales or rhonchi, normal air exchange without accessory muscle use. CVS Normal rate, regular rhythm and normal S1 and S2. No murmurs, gallops, or rubs. Abdomen Soft, nontender and distended, normoactive bowel sounds. Neuro Grossly nonfocal with no obvious sensory or motor deficits. MSK Normal range of motion in general. RLE swelling   Psych Appropriate mood and affect.         Labs:    Recent Results (from the past 24 hour(s))   CBC with Auto Differential    Collection Time: 22  3:50 AM   Result Value Ref Range    WBC 5.8 4.3 - 11.1 K/uL    RBC 2.05 (L) 4.23 - 5.6 M/uL    Hemoglobin 6.8 (LL) 13.6 - 17.2 g/dL    Hematocrit 21.9 (L) 41.1 - 50.3 %    .8 (H) 79.6 - 97.8 FL    MCH 33.2 (H) 26.1 - 32.9 PG    MCHC 31.1 (L) 31.4 - 35.0 g/dL    RDW 22.4 (H) 11.9 - 14.6 %    Platelets 54 (L) 359 - 450 K/uL    MPV 11.2 9.4 - 12.3 FL    nRBC 0.13 0.0 - 0.2 K/uL    Seg Neutrophils 79 (H) 43 - 78 %    Lymphocytes 8 (L) 13 - 44 %    Monocytes 10 4.0 - 12.0 %    Eosinophils % 1 0.5 - 7.8 %    Basophils 0 0.0 - 2.0 %    Immature Granulocytes 2 0.0 - 5.0 %    Segs Absolute 4.5 1.7 - 8.2 K/UL    Absolute Lymph # 0.5 0.5 - 4.6 K/UL    Absolute Mono # 0.6 0.1 - 1.3 K/UL    Absolute Eos # 0.1 0.0 - 0.8 K/UL    Basophils Absolute 0.0 0.0 - 0.2 K/UL    Absolute Immature Granulocyte 0.1 0.0 - 0.5 K/UL    RBC Comment OCCASIONAL  POLYCHROMASIA        WBC Comment Result Confirmed By Smear      Platelet Comment DECREASED      Differential Type AUTOMATED     Comprehensive Metabolic Panel    Collection Time: 08/12/22  3:50 AM   Result Value Ref Range    Sodium 133 (L) 138 - 145 mmol/L    Potassium 4.1 3.5 - 5.1 mmol/L    Chloride 102 98 - 107 mmol/L    CO2 24 21 - 32 mmol/L    Anion Gap 7 7 - 16 mmol/L    Glucose 94 65 - 100 mg/dL    BUN 32 (H) 8 - 23 MG/DL    Creatinine 1.30 0.8 - 1.5 MG/DL    GFR African American >60 >60 ml/min/1.73m2    GFR Non- 59 (L) >60 ml/min/1.73m2    Calcium 8.6 8.3 - 10.4 MG/DL    Total Bilirubin 2.7 (H) 0.2 - 1.1 MG/DL     (H) 12 - 65 U/L     (H) 15 - 37 U/L    Alk Phosphatase 591 (H) 50 - 136 U/L    Total Protein 6.1 (L) 6.3 - 8.2 g/dL    Albumin 2.5 (L) 3.2 - 4.6 g/dL    Globulin 3.6 (H) 2.3 - 3.5 g/dL    Albumin/Globulin Ratio 0.7 (L) 1.2 - 3.5     Magnesium    Collection Time: 08/12/22  3:50 AM   Result Value Ref Range    Magnesium 1.9 1.8 - 2.4 mg/dL         Imaging:  Xray Result (most recent):  XR ABDOMEN (KUB) (SINGLE AP VIEW) 08/04/2022    Narrative  KUB:    CLINICAL HISTORY:  Abdominal distention.     COMPARISON:  CT of July 23, 2022.    FINDINGS:  AP supine images demonstrate a moderate amount of stool scattered in  the colon with no dilated  small bowel loops. Marked hepatomegaly is again  noted. Multilevel kyphoplasty is again noted. Impression  STABLE MARKED HEPATOMEGALY AND A MODERATE AMOUNT OF STOOL IN THE  COLON, BUT NO EVIDENCE OF BOWEL OBSTRUCTION OR OTHER ACUTE ABDOMINAL  ABNORMALITY. 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 [address]    Thrombocytopenia ([facility] [address]    Hypomagnesemia    Hypercalcemia of malignancy    Pain due to malignant neoplasm metastatic to bone (HCC)    EDGARD (acute kidney injury) ([facility] [address]    Debility    Drug-induced constipation    Encounter for palliative care    Abnormal CT of the abdomen    Hydronephrosis    Hydroureter     Mr. Lawanda Delaney is a 78 y.o. male admitted on 7/8/2022 with a primary diagnosis of There were no encounter diagnoses. .       Mr. Lawanda Delaney has a PMH of metastatic prostate cancer dx 2017 s/p casodex and bilateral orchiectomy for surgical castration, 6 cycles of docetaxel, pathologic vertebral fx's with path positive for high grade neuroendocrine cancer, s/p carbo/etoposide and most recently carbo/taxotere which was held d/t TCP. Last was C2 on 6/8/22. 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 He is a known patient of Dr. Estefani Macdonald s/p multiple lines of treatment as above. At last office visit on 6/29, CEA had notably continued to rise to 8290.   He has had discussion with [facility], [facility], however wished to hold off on making a decision until he completed radiation therapy. He presented to the ER with a fall at home, increasing pain in the hips and generalized weakness. Also with constipation. Also found to be anemic and transfused PRBC. We were asked for recommendations for our known patient. PLAN:  Metastatic Prostate Cancer/Neuroendocrine  - S/p multiple lines of therapy, most recently carbo/taxotere C2 on 6/8. Held on 6/29 d/t TCP. Undergoing XRT to rib and T-spine with Dr. Noreen He.  - Has spoken to [facility], however wished to hold off on making a decision until he saw if XRT improved his pain  - Can resume XRT on Monday 7/11 Pt does not wish to pursue XRT. Meeting with PC today for pain mgmt and to discuss [address] - wishes to pursue facility placement and enroll in Hospice. 7/13 Pt now states he may be interested in going home since he will have support from friend moving here from out of state. Consult PT/OT. 7/14  PT recommends HHT  7/23 CT AP shows POD with [facility] liver lesions, increased prostate gland, and extensive bony mets  7/25 Pt states he is interested in pursuing XRT again, Rad Onc consulted. He states he may consider having chemo in the future as well. 7/26 Resuming XRT tomorrow (previously completed 1/1 fx to ribs and 1/5 fx to T-spine)  7/27 Resume XRT today - scheduled through 8/2 per rad onc notes. 8/1 XRT today (EOT 8/2). 8/2 Completes XRT to T-spine today (7/7 fx)     Cancer Related Pain  - Currently with Dilaudid/oxy prn  - Consult palliative care to assist with pain management (known from office) and also to assist with goals of care discussions   7/10 Increased Dilaudid to 1 mg every 3 hours for severe pain. Pall care consult pending. 7/11 PC assisting with mgmt. Resume Dex. 7/12 PC started Fentanyl 50mcg patch yesterday. Pt reports pain controlled today - even able to walk around room now. 7/13 Pain controlled. PC changed oxycodone to po dilaudid yesterday.   7/14 Pain controlled  7/15 C/o pain today  7/17 pain improved after menchaca  7/20 Pain controlled on current regimen  7/22 c/o worsening abd pain. Bladder scan with minimal urine. Check CT AP.  7/23 CT AP with POD  7/30 Feels pain increasing - will increase Fentanyl patch to 75 mcg every 48 hours and continue PO Dilaudid for breakthrough. 7/31 Better with adjustment   8/2 c/o b/l abd/flank pain. Check renal US, UA/Ucx  8/3 Pain improved s/p menchaca placement     Anemia/Thrombocytopenia  - Transfuse prn per [facility]ae SOPs  7/24 Plt down to 41k. DC Lovenox. Check hemolysis labs, DIC labs, smear, HIT.  7/25 Plt 37k. No DIC noted. Smear, Hapto, and HIT pending. 7/26 Plt 32k. No hemolysis. HIT neg. Smear pending. 7/27 Plts stable at 34k. Hgb relatively stable - 7.6. Smear without schistocytes. Monitor. 7/28 Plt stable at 32k. Pt with hematuria, transfuse to keep plt >50k.  1 unit platelets ordered. 7/29 Plts up to 62k s/p transfusion for hematuria. No bleeding. 8/3 Transfuse 1 unit PRBCs and 1 unit platelets (d/t to hematuria s/p menchaca placement)  8/7 Transfuse 1 unit platelets      Transaminitis / Hyperbilirubinemia  - Alk phos/ALT/AST trended down today, continue to follow   7/11 AST/ALT continue to improve   7/13 LFTs improving  7/18 stable  7/23 LFTs increased, monitor - likely r/t liver mets  7/24 Tbili up to 1.2, LFTs con't to increase. 7/25 Bili down to 0.8. LFTs improved today. 7/27 LFTs stable - known liver mets. Tbili back to normal. Monitoring. 8/5 Tbili up to 2.0, check frac bili. LFTs con't to increase. 8/6 Tbili 2 - lab did not process frac bili yesterday, reordered this AM  8/7 Tbili down to 1.6, mostly direct     EDGARD  - Gentle hydration  RESOLVED     Constipation  - Lactulose prn   7/11 Pt refusing Lactulose. PC started pericolace. Con't Miralax. Consider KUB if no improvement. 7/12 No BM documented. Con't Miralax/Pericolace. Pt more active now, up around in room since pain controlled.   7/13 Had BM x 4 yesterday  7/20 c/o constipation. Increase pericolace to BID. Con't Miralax. Check KUB. If no obstruction, will order Movantik. 7/21 KUB with constipation. Movantik ordered. 7/22 No BM. Con't Movantik. Checking CT AP.  7/23 Pt having Bms. Con't Movantik. 7/31 Added Lactulose with good relief. 8/5 Pt c/o abd distention yesterday. KUB with mod constipation. Had 2 BM after lactulose given. RN note states pt had mild bloody BM, no further bleeding noted. Hypercalcemia / Hypocalcemia  7/12 CCa++ up to 11. 7. Zometa ordered. 7/15 CCa++ down to 9.2  7/19 CCa++ down to 8.4, replete  7/21 Cca++ 8.6  RESOLVED    Dysuria  7/15 c/o dysuria. UA c/w UTI. CTX ordered. Check Ucx.  7/17 UC NTD on Rocephin  7/18 Pain improved s/p menchaca placement. Ucx with [facility]. Completed CTX x 3 days. RESOLVED    Urinary retention / B/l hydroureteronephrosis  7/16 menchaca ordered/ flomax ordered  7/17 UOP 4000 ml. Adding proscar  7/18 s/p menchaca placement. Con't Flomax/Proscar. Plan to DC menchaca tomorrow. 7/19 Remove menchaca for voiding trial.  If unable to void, will consult urology. 7/20 Menchaca removed yesterday and had to be replaced d/t urinary retention. Consult urology. 7/22 Awaiting urology consult  7/23 Urology felt r/t constipation, however CT shows b/l hydroureteronephrosis. Will defer mgmt to urology. 7/24 No response from urology from message sent yesterday. Will re-consult. 7/26 Urology recommends to remove menchaca this AM for voiding trial.  Also recommends reimaging later on to see if bilateral hydro has resolved (could be r/t overdistended bladder/urinary retention vs prostate cancer/obstruction). If bilateral hydro has not improved over time, would consider bilateral stents vs nephrostomy tubes and this would be more favorable if/when kidney function declines or pt develops significant flank pain. 7/27 Menchaca removed yesterday, had PVR of 500cc but then able to void 200cc. Menchaca remains out.  Cr 1.2.  7/29 Cr stable, 1.1. Voiding to urinal without issue. 8/2 c/o b/l abd/flank pain. Check renal US.  8/3 Renal US with mod b/l hydronephrosis. Urology replaced menchaca. If pt chooses to pursue Hospice, then will leave this in place. Otherwise, will consider other options such as TURP or suprapubic tube. 8/6 Cr 1.4. Continues with menchaca  8/9 per Urology reviewed options for [facility]IC, Menchaca, SPT and repeat TURP. Given tumor progression after recent TURP on 3/2022 I do feel that his best option would be to leave an indwelling Menchaca changed monthly. Hematuria / UTI  7/28 Hematuria noted. Transfuse platelets. Check UA - UA c/w UTI. Ucx ordered. Start CTX.  7/30 hematuria resolved after plt transfusion. UC NGTD. CTX day 3 today. 8/2 c/o b/l abd/flank pain. Check UA/Ucx  8/3 UA neg for infx. Ucx-NGTD. Continue home meds  Denae SOPs  AC contraindicated d/t thrombocytopenia    Goals and plan of care reviewed with the patient. All questions answered to the best of our ability. Disposition:  7/14: Pt reports his friend is not moving here until 8/1. He reports she has a one-story home that he can move into on 8/1 or possibly even sooner. Pt was able to walk hallways yesterday. Unsure if he would qualify for facility placement while future living arrangements are made. CM states pt would have to go through extensive insurance process to even cover facility placement if he qualified. CM assisting with discharge planning. ? Could pt stay with son who lives locally until his friend's house is ready to move into? Pt also declines Hospice services at this time. He states he would prefer HHT. 7/15:  Pt is medically stable for discharge. Pt working on living arrangements. Hopeful for discharge home soon. 7/17 Spoke with patient's son yesterday. Patient does not a home to return to so now even if he does have a friend coming there is no home. Difficult placement. CM following.      7/19: Pt reportedly does not have a home to return to upon discharge. Son states pt is unable to live with him. Unlikely that pt would qualify for facility placement. CM following.    7/20: Pt still stable for discharge. CM following - pt has no place to live.    7/21:  CM found available place at boarding house for patient. Pt states he was not told about this, but cannot afford $650/mo. He says he was paying $500/mo at previous place which is no longer available to him. CM following.    7/22:  Per CM, son calling to speak to the manager of the boarding home today. He also asked for list of ALFs that accept Medicaid. CM continues to follow. 7/25: Son went to see available place at boarding home - pt and son not pleased with arrangements as pt would be sharing bathroom with 3 other people. Son looking into ALFs. Pt voices concern that he would not be able to adequately care for himself if he had to live alone. CM following.    7/31: Per CM, pt is making no effort to locate living arrangements. CM assisting to help locate [facility]. Sending [facility]'s [facility] to be [facility] (which [facility] availability). Pt is stable for discharge once placement arranged. No new updates. 8/1  Still stable for discharge - still has no place/home to discharge. CM following. 8/2:  Pt meeting with [facility] liaison today. Hopeful for discharge soon, pt is medically stable for discharge. 8/3: [facility] liaison did not show for appt yesterday. Rescheduled for 8/4 at 2p. Pt concerned about online ratings for facility. 8/4: Still stable for discharge. Pt meeting with [facility] liaison today. 8/5:  CM continues to follow. [facility] liaison canceled appt again yesterday. Pt/Son making no effort to help locate placement. Reconsulted PT/OT. 8/6:  Pt has been stable for discharge for over 3 weeks. Still needs placement/living arrangements. Pt/son still not making any effort to assist with finding place for pt to go. Pt again refused PT, PT/OT signed off.    8/8 Patient's friend that is willing to assist him lives out of state. Discussed possibility of finding a facility closer to her. Discussed with CM. Still waiting on the nurse manager of [facility] to do face to face visit. 8/9 Current assisted living facility that we are looking into will not accept patient with menchaca cath. Per urology no other options than menchaca. CM updated. 8/12 PT recs SNF. Referrals sent.                  Jim Abbasi, HELEN - NP   [facility]  [id number] [place] [address]  Office : [phone]  Fax : [phone]

## 2022-08-12 NOTE — DISCHARGE SUMMARY
[facility] Hematology & Oncology: Inpatient Hematology / Oncology Discharge Summary Note    Patient ID:  Anabel Schneider  [id number]  30 y.o.  1955    Admit Date: 7/8/2022    Discharge Date: 8/15/2022    Admission Diagnoses: Prostate cancer metastatic to bone [facility]) [C61, C79.51]    Discharge Diagnoses:  Principal Diagnosis: Prostate cancer metastatic to bone [facility])  Principal Problem:    Prostate cancer metastatic to bone [facility])  Active Problems: Thrombocytopenia (HCC)    Hypomagnesemia    Hypercalcemia of malignancy    Pain due to malignant neoplasm metastatic to bone (HCC)    EDGARD (acute kidney injury) ([facility] [address]    Debility    Drug-induced constipation    Encounter for palliative care    Abnormal CT of the abdomen    Hydronephrosis    Hydroureter    Hypokalemia    Neuroendocrine carcinoma, high grade ([facility] [address]    Anemia due to chemotherapy    Primary prostate cancer with metastasis from prostate to other site [facility])  Resolved Problems:    Uncontrolled pain      Hospital Course:  Mr. Yoshi Manzo has a PMH of metastatic prostate cancer dx 2017 s/p casodex and bilateral orchiectomy for surgical castration, 6 cycles of docetaxel, pathologic vertebral fx's with path positive for high grade neuroendocrine cancer, s/p carbo/etoposide and most recently carbo/taxotere which was held d/t TCP. Last was C2 on 6/8/22. Also with T8 Epidural extension w/o cord compression completed RTX to t spine (planned for 5 fxs) and ribs (x1 fx). He is a known patient of  [facility] s/p multiple lines of treatment as above. At last office visit on 6/29, CEA had notably continued to rise to 8290. He presented to the ER with a fall at home, increasing pain in the hips and generalized weakness. Also with constipation. Also found to be anemic and transfused PRBC. His stay has been extremely lengthy primarily due to issues with placement.  Prior to coming in to the hospital he told his landlord that he was going to the hospital to die for severe pain. Pall care consult pending. 7/11 PC assisting with mgmt. Resume Dex. 7/12 PC started Fentanyl 50mcg patch yesterday. Pt reports pain controlled today - even able to walk around room now. 7/13 Pain controlled. PC changed oxycodone to po dilaudid yesterday. 7/14 Pain controlled  7/15 C/o pain today  7/17 pain improved after menchaca  7/20 Pain controlled on current regimen  7/22 c/o worsening abd pain. Bladder scan with minimal urine. Check CT AP.  7/23 CT AP with POD  7/30 Feels pain increasing - will increase Fentanyl patch to 75 mcg every 48 hours and continue PO Dilaudid for breakthrough. 7/31 Better with adjustment  8/2 c/o b/l abd/flank pain. Check renal US, UA/Ucx  8/3 Pain improved s/p menchaca placement     Anemia/Thrombocytopenia  - Transfuse prn per [facility] SOPs  7/24 Plt down to 41k. DC Lovenox. Check hemolysis labs, DIC labs, smear, HIT.  7/25 Plt 37k. No DIC noted. Smear, Hapto, and HIT pending. 7/26 Plt 32k. No hemolysis. HIT neg. Smear pending. 7/27 Plts stable at 34k. Hgb relatively stable - 7.6. Smear without schistocytes. Monitor. 7/28 Plt stable at 32k. Pt with hematuria, transfuse to keep plt >50k.  1 unit platelets ordered. 7/29 Plts up to 62k s/p transfusion for hematuria. No bleeding. 8/3 Transfuse 1 unit PRBCs and 1 unit platelets (d/t to hematuria s/p menchaca placement)  8/7 Transfuse 1 unit platelets     Transaminitis / Hyperbilirubinemia  - Alk phos/ALT/AST trended down today, continue to follow  7/11 AST/ALT continue to improve  7/13 LFTs improving  7/18 stable  7/23 LFTs increased, monitor - likely r/t liver mets  7/24 Tbili up to 1.2, LFTs con't to increase. 7/25 Bili down to 0.8. LFTs improved today. 7/27 LFTs stable - known liver mets. Tbili back to normal. Monitoring. 8/5 Tbili up to 2.0, check frac bili. LFTs con't to increase.   8/6 Tbili 2 - lab did not process frac bili yesterday, reordered this AM  8/7 Tbili down to 1.6, mostly direct EDGARD  - Gentle hydration  RESOLVED     Constipation  - Lactulose prn   7/11 Pt refusing Lactulose. PC started pericolace. Con't Miralax. Consider KUB if no improvement. 7/12 No BM documented. Con't Miralax/Pericolace. Pt more active now, up around in room since pain controlled. 7/13 Had BM x 4 yesterday  7/20 c/o constipation. Increase pericolace to BID. Con't Miralax. Check KUB. If no obstruction, will order Movantik. 7/21 KUB with constipation. Movantik ordered. 7/22 No BM. Con't Movantik. Checking CT AP.  7/23 Pt having Bms. Con't Movantik. 7/31 Added Lactulose with good relief. 8/5 Pt c/o abd distention yesterday. KUB with mod constipation. Had 2 BM after lactulose given. RN note states pt had mild bloody BM, no further bleeding noted. Hypercalcemia / Hypocalcemia  7/12 CCa++ up to 11. 7. Zometa ordered. 7/15 CCa++ down to 9.2  7/19 CCa++ down to 8.4, replete  7/21 Cca++ 8.6  RESOLVED     Dysuria  7/15 c/o dysuria. UA c/w UTI. CTX ordered. Check Ucx.  7/17 UC NTD on Rocephin  7/18 Pain improved s/p menchaca placement. Ucx with [facility]. Completed CTX x 3 days. RESOLVED     Urinary retention / B/l hydroureteronephrosis  7/16 menchaca ordered/ flomax ordered  7/17 UOP 4000 ml. Adding proscar  7/18 s/p menchaca placement. Con't Flomax/Proscar. Plan to DC menchaca tomorrow. 7/19 Remove menchaca for voiding trial.  If unable to void, will consult urology. 7/20 Menchaca removed yesterday and had to be replaced d/t urinary retention. Consult urology. 7/22 Awaiting urology consult  7/23 Urology felt r/t constipation, however CT shows b/l hydroureteronephrosis. Will defer mgmt to urology. 7/24 No response from urology from message sent yesterday. Will re-consult. 7/26 Urology recommends to remove menchaca this AM for voiding trial.  Also recommends reimaging later on to see if bilateral hydro has resolved (could be r/t overdistended bladder/urinary retention vs prostate cancer/obstruction).   If bilateral hydro has not improved over time, would consider bilateral stents vs nephrostomy tubes and this would be more favorable if/when kidney function declines or pt develops significant flank pain. 7/27 Menchaca removed yesterday, had PVR of 500cc but then able to void 200cc. Menchaca remains out. Cr 1.2.  7/29 Cr stable, 1.1. Voiding to urinal without issue. 8/2 c/o b/l abd/flank pain. Check renal US.  8/3 Renal US with mod b/l hydronephrosis. Urology replaced menchaca. If pt chooses to pursue Hospice, then will leave this in place. Otherwise, will consider other options such as TURP or suprapubic tube. 8/6 Cr 1.4. Continues with menchaca  8/9 per Urology reviewed options for CIC, Menchaca, SPT and repeat TURP. Given tumor progression after recent TURP on 3/2022 I do feel that his best option would be to leave an indwelling Menchaca changed monthly. Hematuria / UTI  7/28 Hematuria noted. Transfuse platelets. Check UA - UA c/w UTI. Ucx ordered. Start CTX.  7/30 hematuria resolved after plt transfusion. UC NGTD. CTX day 3 today. 8/2 c/o b/l abd/flank pain. Check UA/Ucx  8/3 UA neg for infx. Ucx-NGTD. Continue home meds  [facility] SOPs  AC contraindicated d/t thrombocytopenia     Goals and plan of care reviewed with the patient. All questions answered to the best of our ability. Disposition:  7/14: Pt reports his friend is not moving here until 8/1. He reports she has a one-story home that he can move into on 8/1 or possibly even sooner. Pt was able to walk hallways yesterday. Unsure if he would qualify for facility placement while future living arrangements are made. CM states pt would have to go through extensive insurance process to even cover facility placement if he qualified. CM assisting with discharge planning. ? Could pt stay with son who lives locally until his friend's house is ready to move into? Pt also declines Hospice services at this time. He states he would prefer HHT.      7/15: Pt is medically stable for discharge. Pt working on living arrangements. Hopeful for discharge home soon. 7/17 Spoke with patient's son yesterday. Patient does not a home to return to so now even if he does have a friend coming there is no home. Difficult placement. CM following. 7/19: Pt reportedly does not have a home to return to upon discharge. Son states pt is unable to live with him. Unlikely that pt would qualify for facility placement. CM following.     7/20: Pt still stable for discharge. CM following - pt has no place to live.     7/21:  CM found available place at boarding house for patient. Pt states he was not told about this, but cannot afford $650/mo. He says he was paying $500/mo at previous place which is no longer available to him. CM following.     7/22:  Per CM, son calling to speak to the manager of the boarding home today. He also asked for list of ALFs that accept Medicaid. CM continues to follow. 7/25: Son went to see available place at boarding home - pt and son not pleased with arrangements as pt would be sharing bathroom with 3 other people. Son looking into ALFs. Pt voices concern that he would not be able to adequately care for himself if he had to live alone. CM following.     7/31: Per CM, pt is making no effort to locate living arrangements. CM assisting to help locate [facility]. Sending pt's info to be reviewed at [facility] (which has availability). Pt is stable for discharge once placement arranged. No new updates. 8/1  Still stable for discharge - still has no place/home to discharge. CM following. 8/2:  Pt meeting with [facility] liaison today. Hopeful for discharge soon, pt is medically stable for discharge. 8/3: [facility] liaison did not show for appt yesterday. Rescheduled for 8/4 at 2p. Pt concerned about online ratings for facility. 8/4: Still stable for discharge. Pt meeting with [facility] liaison today.      8/5:  CM continues to follow. [facility] liaison canceled appt again yesterday. Pt/Son making no effort to help locate placement. Reconsulted PT/OT. 8/6:  Pt has been stable for discharge for over 3 weeks. Still needs placement/living arrangements. Pt/son still not making any effort to assist with finding place for pt to go. Pt again refused PT, PT/OT signed off.     8/8 Patient's friend that is willing to assist him lives out of state. Discussed possibility of finding a facility closer to her. Discussed with CM. Still waiting on the nurse manager of [facility] to do face to face visit. 8/9 Current assisted living facility that we are looking into will not accept patient with menchaca cath. Per urology no other options than menchaca. CM updated. 8/11 PT to re-evaluate today    8/12 PT recommends SNF    Consults:  IP CONSULT TO ONCOLOGY  IP CONSULT TO PALLIATIVE CARE  IP CONSULT TO CASE MANAGEMENT  IP CONSULT TO UROLOGY  IP CONSULT TO UROLOGY  IP CONSULT TO UROLOGY  IP CONSULT TO RADIATION ONCOLOGY  IP CONSULT TO UROLOGY  IP CONSULT TO UROLOGY  IP CONSULT TO PHYSICAL THERAPY  IP CONSULT TO HOSPICE    Pertinent Diagnostic Studies:   Labs:    Recent Labs     08/13/22  0926 08/13/22  1619 08/15/22  0312   WBC 5.6  --  7.5   HGB 7.9*  --  9.1*   PLT 31* 79* 51*      Recent Labs     08/15/22  0312   *   K 4.2      CO2 20*   BUN 30*   MG 2.2          Medication List        START taking these medications      * dexamethasone 2 MG tablet  Commonly known as: DECADRON  Take 1 tablet by mouth daily (with breakfast) for 10 days  Start taking on: August 16, 2022     fentaNYL 75 MCG/HR  Commonly known as: Pi[address]ent[address] 53 1 patch onto the skin every 48 hours for 30 days. finasteride 5 MG tablet  Commonly known as: PROSCAR  Take 1 tablet by mouth in the morning.   Start taking on: August 16, 2022     * HYDROmorphone 2 MG tablet  Commonly known as: DILAUDID  Take 1 tablet by mouth every 4 hours as needed for Pain for up to 30 days. * HYDROmorphone 4 MG tablet  Commonly known as: DILAUDID  Take 1 tablet by mouth every 4 hours as needed for Pain for up to 30 days. lactulose 10 GM/15ML solution  Commonly known as: CHRONULAC  Take 30 mLs by mouth 3 times daily as needed (constipation)     magnesium oxide 400 (240 Mg) MG tablet  Commonly known as: MAG-OX  Take 1 tablet by mouth in the morning and 1 tablet at noon and 1 tablet before bedtime. naloxegol 12.5 MG Tabs tablet  Commonly known as: MOVANTIK  Take 1 tablet by mouth every morning (before breakfast)  Start taking on: August 16, 2022     polyethylene glycol 17 g packet  Commonly known as: GLYCOLAX  Take 17 g by mouth in the morning. Start taking on: August 16, 2022     sennosides-docusate sodium 8.6-50 MG tablet  Commonly known as: SENOKOT-S  Take 2 tablets by mouth in the morning. Start taking on: August 16, 2022     tamsulosin 0.4 MG capsule  Commonly known as: FLOMAX  Take 1 capsule by mouth in the morning. Start taking on: August 16, 2022           * This list has 3 medication(s) that are the same as other medications prescribed for you. Read the directions carefully, and ask your doctor or other care provider to review them with you. CHANGE how you take these medications      * cyclobenzaprine 10 MG tablet  Commonly known as: FLEXERIL  What changed: Another medication with the same name was added. Make sure you understand how and when to take each. * cyclobenzaprine 10 MG tablet  Commonly known as: FLEXERIL  Take 1 tablet by mouth 3 times daily as needed for Muscle spasms  What changed: You were already taking a medication with the same name, and this prescription was added. Make sure you understand how and when to take each. * This list has 2 medication(s) that are the same as other medications prescribed for you. Read the directions carefully, and ask your doctor or other care provider to review them with you. CONTINUE taking these medications      lidocaine-prilocaine 2.5-2.5 % cream  Commonly known as: EMLA  Apply a dab over the port site 30-45 minutes prior to lab/infusion appts. Cover with saran wrap or a sandwich bag.     omeprazole 40 MG delayed release capsule  Commonly known as: PRILOSEC  Take 1 capsule by mouth daily            STOP taking these medications      naproxen 250 MG tablet  Commonly known as: NAPROSYN     oxyCODONE HCl 10 MG immediate release tablet  Commonly known as: OXY-IR            ASK your doctor about these medications      * dexamethasone 4 MG tablet  Commonly known as: DECADRON  Take 1 tablet by mouth 2 times daily (with meals)  Ask about: Should I take this medication? LORazepam 1 MG tablet  Commonly known as: ATIVAN  Take 1 tablet by mouth 3 times daily as needed for Anxiety for up to 30 days. Ask about: Should I take this medication? * This list has 1 medication(s) that are the same as other medications prescribed for you. Read the directions carefully, and ask your doctor or other care provider to review them with you.                    Where to Get Your Medications        [facility] [id number] [facility] [phone]  [address] [facility] [facility]      Phone: [phone]   dexamethasone 2 MG tablet  finasteride 5 MG tablet  lactulose 10 GM/15ML solution  magnesium oxide 400 (240 Mg) MG tablet  naloxegol 12.5 MG Tabs tablet  polyethylene glycol 17 g packet  sennosides-docusate sodium 8.6-50 MG tablet  tamsulosin 0.4 MG capsule       Information about where to get these medications is not yet available    Ask your nurse or doctor about these medications  cyclobenzaprine 10 MG tablet  fentaNYL 75 MCG/HR  HYDROmorphone 2 MG tablet  HYDROmorphone 4 MG tablet            OBJECTIVE:  Patient Vitals for the past 8 hrs:   BP Temp Temp src Pulse Resp SpO2   08/15/22 0720 117/72 97.9 °F (36.6 °C) Oral 92 18 93 %   08/15/22 0330 115/73 97.5 °F (36.4 °C) Oral (!) 102 18 92 %     Temp (24hrs), Av.2 °F (36.8 °C), Min:97.5 °F (36.4 °C), Max:98.5 °F (36.9 °C)    08/15 07 - 08/15 1900  In: -   Out: 650 [Urine:650]    Physical Exam:  Constitutional: Well developed, well nourished male in no acute distress, sitting comfortably on the bed   HEENT: Normocephalic and atraumatic. Oropharynx is clear, mucous membranes are moist.  Pupils are equal, round, and reactive to light. Extraocular muscles are intact. Sclerae anicteric. Neck supple without JVD. No thyromegaly present. Lymph node   No palpable submandibular, cervical, supraclavicular, axillary or inguinal lymph nodes. Skin Warm and dry. No bruising and no rash noted. No erythema. No pallor. Respiratory Lungs are clear to auscultation bilaterally without wheezes, rales or rhonchi, normal air exchange without accessory muscle use. CVS Normal rate, regular rhythm and normal S1 and S2. No murmurs, gallops, or rubs. Abdomen Soft, nontender and nondistended, normoactive bowel sounds. No palpable mass. No hepatosplenomegaly. Neuro Grossly nonfocal with no obvious sensory or motor deficits. MSK Normal range of motion in general.  No edema and no tenderness. Psych Appropriate mood and affect. ASSESSMENT:    Principal Problem:    Prostate cancer metastatic to bone [facility])  Active Problems:     Thrombocytopenia (HCC)    Hypomagnesemia    Hypercalcemia of malignancy    Pain due to malignant neoplasm metastatic to bone (HCC)    EDGARD (acute kidney injury) ([address]    Debility    Drug-induced constipation    Encounter for palliative care    Abnormal CT of the abdomen    Hydronephrosis    Hydroureter    Hypokalemia    Neuroendocrine carcinoma, high grade ([address]    Anemia due to chemotherapy    Primary prostate cancer with metastasis from prostate to other site [facility])  Resolved Problems:    Uncontrolled pain      DISPOSITION:  We will arrange follow up with Dr. Janette Hein ~1 week. I spent 42 minutes on the day of discharge in discharge planning and coordination of care for this patient. HELEN Hunt NP    [facility] Hematology & Oncology  [address] [place] [address]  Office : [phone]  Fax : [phone]        Attending Addendum:  I have personally performed a face to face diagnostic evaluation on this patient. I have reviewed and agree with the care plan as documented by Yvrose Staley N.P. My findings are as follows:  He has metastatic Prostate Cancer, appears weak, heart rate regular without murmurs, abdomen is non-tender, bowel sounds are positive, we will arrange for him to follow-up with Dr. Janette Hein next week.               Anna Sosa MD    [facility] Hematology/Oncology  [address] [place] [address]  Office : [phone]  Fax : [phone]

## 2022-08-12 NOTE — PROGRESS NOTES
END OF SHIFT SUMMARY:    Tests performed this shift:  rapid covid test negative, ppd reading negative  Blood products given this shift:  1 unit prbc  Additional events this shift:   -pt started on lactulose and had 1 bowel movement after  -encouraged pt to ambulate and drink more water to help bowel movement, pt refused stool softener   -pain meds given x2  -hospice consult    I/Os:    08/12 0701 - 08/12 1900  In: 360   Out: Andree Norton, RN

## 2022-08-13 NOTE — PROGRESS NOTES
[facility] Hematology & Oncology        Inpatient Hematology / Oncology Progress Note    Reason for Admission:  Prostate cancer metastatic to bone ([facility] [address] [C61, C79.51]    24 Hour Events:  Afebrile, VSS  No pain this AM  Hgb up to 7.9  Chronic hematuria-plt 31K  Awaiting placement    ROS:  Constitutional: Positive for fatigue; negative for fever, chills. CV: Negative for chest pain, palpitations, edema. Respiratory: Negative for dyspnea, cough, wheezing. GI: Negative for nausea, diarrhea, abd pain. MSK:  +Back/hip pain (pain controlled)      10 point review of systems is otherwise negative with the exception of the elements mentioned above in the HPI. Allergies   Allergen Reactions    Turmeric Nausea And Vomiting     Past Medical History:   Diagnosis Date    Claustrophobia     Ear problems     Elevated PSA     Former light cigarette smoker (1-9 per day)     smoked for 35 years.   quit     History of multiple allergies     Nicotine vapor product user     Personal history of prostate cancer     Prostate cancer ([facility] [address]     prostate, neuroendocrine, mets to bone     Past Surgical History:   Procedure Laterality Date    BIOPSY PROSTATE      COLONOSCOPY  08/2020    HEENT      teeth removed     IR KYPHOPLASTY LUMBAR FIRST LEVEL  10/14/2020    IR KYPHOPLASTY LUMBAR FIRST LEVEL  10/14/2020    IR KYPHOPLASTY LUMBAR FIRST LEVEL 10/14/2020 [facility] RADIOLOGY SPECIALS    IR KYPHOPLASTY THORACIC FIRST LEVEL  10/29/2020    IR KYPHOPLASTY THORACIC FIRST LEVEL  10/29/2020    IR KYPHOPLASTY THORACIC FIRST LEVEL 10/29/2020 [facility] RADIOLOGY SPECIALS    TESTICLE REMOVAL Bilateral 02/2017     Family History   Problem Relation Age of Onset    Hypertension Father     Prostate Cancer Father     Cancer Father         prostate cancer     Social History     Socioeconomic History    Marital status: Single     Spouse name: Not on file    Number of children: Not on file    Years of education: Not on file    Highest education level: Not on file   Occupational History    Not on file   Tobacco Use    Smoking status: Former     Packs/day: 0.50     Types: Cigarettes     Quit date: 2016     Years since quittin.6    Smokeless tobacco: Never   Substance and Sexual Activity    Alcohol use: Yes    Drug use: No    Sexual activity: Not on file   Other Topics Concern    Not on file   Social History Narrative    Not on file     Social Determinants of Health     Financial Resource Strain: Not on file   Food Insecurity: Not on file   Transportation Needs: Not on file   Physical Activity: Not on file   Stress: Not on file   Social Connections: Not on file   Intimate Partner Violence: Not on file   Housing Stability: Not on file     Current Facility-Administered Medications   Medication Dose Route Frequency Provider Last Rate Last Admin    0.9 % sodium chloride infusion   IntraVENous PRN Fan Swan MD        0.9 % sodium chloride infusion   IntraVENous PRN Jeanmarie Moreno MD        lactulose (CHRONULAC) 10 GM/15ML solution 20 g  20 g Oral TID Ric Blue, APRN - NP   20 g at 22 1602    sennosides-docusate sodium (SENOKOT-S) 8.6-50 MG tablet 2 tablet  2 tablet Oral Daily Ric Blue, APRN - NP   2 tablet at 22 0810    polyethylene glycol (GLYCOLAX) packet 17 g  17 g Oral Daily Ric Blue, APRN - NP   17 g at 22 1033    dexamethasone (DECADRON) tablet 4 mg  4 mg Oral Daily with breakfast Ric Blue, APRN - NP   4 mg at 22 2924    And    dexamethasone (DECADRON) tablet 2 mg  2 mg Oral Nightly Ric Blue, APRN - NP   2 mg at 22 2045    naloxegol (5 [facility]) tablet 12.5 mg  12.5 mg Oral QAM AC HELEN Calhoun - CNP   12.5 mg at 22 0813    fentaNYL (DURAGESIC) 75 MCG/HR 1 patch  1 patch TransDERmal Q48H Ric Blue, APRN - NP   1 patch at 22 1327    HYDROmorphone HCl PF (DILAUDID) injection 1.5 mg  1.5 mg IntraVENous Q3H PRN HELEN Calhoun - CNP   1.5 mg at 22 2051    bisacodyl (DULCOLAX) suppository 10 mg 10 mg Rectal Daily PRN Arie Barron, APRN - CNP   10 mg at 07/22/22 1758    magnesium oxide (MAG-OX) tablet 400 mg  400 mg Oral 4x Daily Priyanka Ferguson MD   400 mg at 08/13/22 0810    calcium carbonate (TUMS) chewable tablet 500 mg  500 mg Oral TID Iesha Squibb, APRN - CNP   500 mg at 08/13/22 0811    tamsulosin (FLOMAX) capsule 0.4 mg  0.4 mg Oral Daily Jim Abbasi, APRN - NP   0.4 mg at 08/13/22 0811    finasteride (PROSCAR) tablet 5 mg  5 mg Oral Daily Jim Ayleen, APRN - NP   5 mg at 08/13/22 9876    zinc oxide 40 % paste   Topical 4x Daily PRN Priyanka Ferguson MD        lidocaine-prilocaine (EMLA) cream   Topical Once Priyanka CraMD miguelina        phenazopyridine (PYRIDIUM) tablet 95 mg  95 mg Oral TID PRN Iesha Squibb, APRN - CNP   95 mg at 07/15/22 2048    cyclobenzaprine (FLEXERIL) tablet 10 mg  10 mg Oral TID PRN Jim Abbasi, APRN - NP   10 mg at 08/11/22 2340    [Held by provider] enoxaparin (LOVENOX) injection 40 mg  40 mg SubCUTAneous Daily Iesha Squibb, APRN - CNP   40 mg at 07/23/22 0857    diphenhydrAMINE (BENADRYL) capsule 25 mg  25 mg Oral Q6H PRN Iesha Squibb, APRN - CNP   25 mg at 08/12/22 1239    acetaminophen (TYLENOL) tablet 650 mg  650 mg Oral Q6H PRN Iesha Squibb, APRN - CNP   650 mg at 08/12/22 1239    Or    acetaminophen (TYLENOL) suppository 650 mg  650 mg Rectal Q6H PRN Iesha Squibb, APRN - CNP        HYDROmorphone (DILAUDID) tablet 2 mg  2 mg Oral Q4H PRN Alyssa Olives, APRN - CNP   2 mg at 08/12/22 0920    Or    HYDROmorphone (DILAUDID) tablet 4 mg  4 mg Oral Q4H PRN Alyssa Olives, APRN - CNP   4 mg at 08/12/22 2045    naloxone [facility]) injection 0.04 mg  0.04 mg IntraVENous PRN HELEN Oconnor - CNP        aluminum & magnesium hydroxide-simethicone (MAALOX) 200-200-20 MG/5ML suspension 20 mL  20 mL Oral Q6H PRN Nick Lei MD   20 mL at 08/07/22 2037    LORazepam (ATIVAN) tablet 1 mg  1 mg Oral TID PRN Jessi Vaca DO   1 mg at 08/09/22 0458    pantoprazole ([address]) Specimen: Nasopharyngeal   Result Value Ref Range    Source NASAL      SARS-CoV-2, Rapid Not detected NOTD     CBC with Auto Differential    Collection Time: 08/13/22  9:26 AM   Result Value Ref Range    WBC 5.6 4.3 - 11.1 K/uL    RBC 2.35 (L) 4.23 - 5.6 M/uL    Hemoglobin 7.9 (L) 13.6 - 17.2 g/dL    Hematocrit 24.4 (L) 41.1 - 50.3 %    .8 (H) 79.6 - 97.8 FL    MCH 33.6 (H) 26.1 - 32.9 PG    MCHC 32.4 31.4 - 35.0 g/dL    RDW 22.2 (H) 11.9 - 14.6 %    Platelets 31 (L) 656 - 450 K/uL    MPV 9.4 9.4 - 12.3 FL    nRBC 0.11 0.0 - 0.2 K/uL    Differential Type AUTOMATED      Seg Neutrophils 81 (H) 43 - 78 %    Lymphocytes 7 (L) 13 - 44 %    Monocytes 10 4.0 - 12.0 %    Eosinophils % 0 (L) 0.5 - 7.8 %    Basophils 0 0.0 - 2.0 %    Immature Granulocytes 2 0.0 - 5.0 %    Segs Absolute 4.5 1.7 - 8.2 K/UL    Absolute Lymph # 0.4 (L) 0.5 - 4.6 K/UL    Absolute Mono # 0.5 0.1 - 1.3 K/UL    Absolute Eos # 0.0 0.0 - 0.8 K/UL    Basophils Absolute 0.0 0.0 - 0.2 K/UL    Absolute Immature Granulocyte 0.1 0.0 - 0.5 K/UL         Imaging:  Xray Result (most recent):  XR ABDOMEN (KUB) (SINGLE AP VIEW) 08/04/2022    Narrative  KUB:    CLINICAL HISTORY:  Abdominal distention. COMPARISON:  CT of July 23, 2022. FINDINGS:  AP supine images demonstrate a moderate amount of stool scattered in  the colon with no dilated  small bowel loops. Marked hepatomegaly is again  noted. Multilevel kyphoplasty is again noted. Impression  STABLE MARKED HEPATOMEGALY AND A MODERATE AMOUNT OF STOOL IN THE  COLON, BUT NO EVIDENCE OF BOWEL OBSTRUCTION OR OTHER ACUTE ABDOMINAL  ABNORMALITY.         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)    Prostatic nodule    Family history of prostate cancer in father    Acute prostatitis    S/P TURP    Prostate cancer [facility])    Prostate cancer metastatic to bone (HCC)    Thrombocytopenia (HCC)    Hypomagnesemia    Hypercalcemia of malignancy    Pain due to malignant neoplasm metastatic to bone (HCC)    EDGARD (acute kidney injury) ([facility] [address]    Debility    Drug-induced constipation    Encounter for palliative care    Abnormal CT of the abdomen    Hydronephrosis    Hydroureter     Mr. Manuel Clements is a 78 y.o. male admitted on 7/8/2022 with a primary diagnosis of There were no encounter diagnoses. .       Mr. Manuel Clements has a PMH of metastatic prostate cancer dx 2017 s/p casodex and bilateral orchiectomy for surgical castration, 6 cycles of docetaxel, pathologic vertebral fx's with path positive for high grade neuroendocrine cancer, s/p carbo/etoposide and most recently carbo/taxotere which was held d/t TCP. Last was C2 on 6/8/22. 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 He is a known patient of Dr. Nirmala Scott s/p multiple lines of treatment as above. At last office visit on 6/29, CEA had notably continued to rise to [id number]. He has had discussion with [facility], however wished to hold off on making a decision until he completed radiation therapy. He presented to the ER with a fall at home, increasing pain in the hips and generalized weakness. Also with constipation. Also found to be anemic and transfused PRBC. We were asked for recommendations for our known patient. PLAN:  Metastatic Prostate Cancer/Neuroendocrine  - S/p multiple lines of therapy, most recently carbo/taxotere C2 on 6/8. Held on 6/29 d/t TCP. Undergoing XRT to rib and T-spine with Dr. Randa Browne.  - Has spoken to [facility] PLAN, however wished to hold off on making a decision until he saw if XRT improved his pain  - Can resume XRT on Monday 7/11 Pt does not wish to pursue XRT.   Meeting with PC today for pain mgmt and to discuss [address] - wishes to pursue facility placement and enroll in Hospice. 7/13 Pt now states he may be interested in going home since he will have support from friend moving here from out of state. Consult PT/OT. 7/14  PT recommends HHT  7/23 CT AP shows POD with [facility] liver lesions, increased prostate gland, and extensive bony mets  7/25 Pt states he is interested in pursuing XRT again, Rad Onc consulted. He states he may consider having chemo in the future as well. 7/26 Resuming XRT tomorrow (previously completed 1/1 fx to ribs and 1/5 fx to T-spine)  7/27 Resume XRT today - scheduled through 8/2 per rad onc notes. 8/1 XRT today (EOT 8/2). 8/2 Completes XRT to T-spine today (7/7 fx)     Cancer Related Pain  - Currently with Dilaudid/oxy prn  - Consult palliative care to assist with pain management (known from office) and also to assist with goals of care discussions   7/10 Increased Dilaudid to 1 mg every 3 hours for severe pain. Pall care consult pending. 7/11 PC assisting with mgmt. Resume Dex. 7/12 PC started Fentanyl 50mcg patch yesterday. Pt reports pain controlled today - even able to walk around room now. 7/13 Pain controlled. PC changed oxycodone to po dilaudid yesterday. 7/14 Pain controlled  7/15 C/o pain today  7/17 pain improved after menchaca  7/20 Pain controlled on current regimen  7/22 c/o worsening abd pain. Bladder scan with minimal urine. Check CT AP.  7/23 CT AP with POD  7/30 Feels pain increasing - will increase Fentanyl patch to 75 mcg every 48 hours and continue PO Dilaudid for breakthrough. 7/31 Better with adjustment   8/2 c/o b/l abd/flank pain. Check renal US, UA/Ucx  8/3 Pain improved s/p menchaca placement  8/13 Denies any pain this AM     Anemia/Thrombocytopenia  - Transfuse prn per Denae SOPs  7/24 Plt down to 41k. DC Lovenox. Check hemolysis labs, DIC labs, smear, HIT.  7/25 Plt 37k. No DIC noted. Smear, Hapto, and HIT pending. 7/26 Plt 32k. No hemolysis. HIT neg.   Smear pending. 7/27 Plts stable at 34k. Hgb relatively stable - 7.6. Smear without schistocytes. Monitor. 7/28 Plt stable at 32k. Pt with hematuria, transfuse to keep plt >50k.  1 unit platelets ordered. 7/29 Plts up to 62k s/p transfusion for hematuria. No bleeding. 8/3 Transfuse 1 unit PRBCs and 1 unit platelets (d/t to hematuria s/p menchaca placement)  8/7 Transfuse 1 unit platelets   7/77 Plt 31K, transfuse 1 unit     Transaminitis / Hyperbilirubinemia  - Alk phos/ALT/AST trended down today, continue to follow   7/11 AST/ALT continue to improve   7/13 LFTs improving  7/18 stable  7/23 LFTs increased, monitor - likely r/t liver mets  7/24 Tbili up to 1.2, LFTs con't to increase. 7/25 Bili down to 0.8. LFTs improved today. 7/27 LFTs stable - known liver mets. Tbili back to normal. Monitoring. 8/5 Tbili up to 2.0, check frac bili. LFTs con't to increase. 8/6 Tbili 2 - lab did not process frac bili yesterday, reordered this AM  8/7 Tbili down to 1.6, mostly direct     EDGARD  - Gentle hydration  RESOLVED     Constipation  - Lactulose prn   7/11 Pt refusing Lactulose. PC started pericolace. Con't Miralax. Consider KUB if no improvement. 7/12 No BM documented. Con't Miralax/Pericolace. Pt more active now, up around in room since pain controlled. 7/13 Had BM x 4 yesterday  7/20 c/o constipation. Increase pericolace to BID. Con't Miralax. Check KUB. If no obstruction, will order Movantik. 7/21 KUB with constipation. Movantik ordered. 7/22 No BM. Con't Movantik. Checking CT AP.  7/23 Pt having Bms. Con't Movantik. 7/31 Added Lactulose with good relief. 8/5 Pt c/o abd distention yesterday. KUB with mod constipation. Had 2 BM after lactulose given. RN note states pt had mild bloody BM, no further bleeding noted. 8/13 BM x 2 s/p lactulose     Hypercalcemia / Hypocalcemia  7/12 CCa++ up to 11. 7. Zometa ordered.   7/15 CCa++ down to 9.2  7/19 CCa++ down to 8.4, replete  7/21 Cca++ 8. 6  RESOLVED    Dysuria  7/15 c/o dysuria. UA c/w UTI. CTX ordered. Check Ucx.  7/17 UC NTD on Rocephin  7/18 Pain improved s/p menchaca placement. Ucx with [facility]. Completed CTX x 3 days. RESOLVED    Urinary retention / B/l hydroureteronephrosis  7/16 menchaca ordered/ flomax ordered  7/17 UOP 4000 ml. Adding proscar  7/18 s/p menchaca placement. Con't Flomax/Proscar. Plan to DC menchaca tomorrow. 7/19 Remove menchaca for voiding trial.  If unable to void, will consult urology. 7/20 Menchaca removed yesterday and had to be replaced d/t urinary retention. Consult urology. 7/22 Awaiting urology consult  7/23 Urology felt r/t constipation, however CT shows b/l hydroureteronephrosis. Will defer mgmt to urology. 7/24 No response from urology from message sent yesterday. Will re-consult. 7/26 Urology recommends to remove menchaca this AM for voiding trial.  Also recommends reimaging later on to see if bilateral hydro has resolved (could be r/t overdistended bladder/urinary retention vs prostate cancer/obstruction). If bilateral hydro has not improved over time, would consider bilateral stents vs nephrostomy tubes and this would be more favorable if/when kidney function declines or pt develops significant flank pain. 7/27 Menchaca removed yesterday, had PVR of 500cc but then able to void 200cc. Menchaca remains out. Cr 1.2.  7/29 Cr stable, 1.1. Voiding to urinal without issue. 8/2 c/o b/l abd/flank pain. Check renal US.  8/3 Renal US with mod b/l hydronephrosis. Urology replaced menchaca. If pt chooses to pursue Hospice, then will leave this in place. Otherwise, will consider other options such as TURP or suprapubic tube. 8/6 Cr 1.4. Continues with menchaca  8/9 per Urology reviewed options for CIC, Menchaca, SPT and repeat TURP. Given tumor progression after recent TURP on 3/2022 I do feel that his best option would be to leave an indwelling Menchaca changed monthly. Hematuria / UTI  7/28 Hematuria noted. Transfuse platelets. Check UA - UA c/w UTI. Ucx ordered. Start CTX.  7/30 hematuria resolved after plt transfusion. UC NGTD. CTX day 3 today. 8/2 c/o b/l abd/flank pain. Check UA/Ucx  8/3 UA neg for infx. Ucx-NGTD. Continue home meds  Denae SOPs  AC contraindicated d/t thrombocytopenia    Goals and plan of care reviewed with the patient. All questions answered to the best of our ability. Disposition:  7/14: Pt reports his friend is not moving here until 8/1. He reports she has a one-story home that he can move into on 8/1 or possibly even sooner. Pt was able to walk hallways yesterday. Unsure if he would qualify for facility placement while future living arrangements are made. CM states pt would have to go through extensive insurance process to even cover facility placement if he qualified. CM assisting with discharge planning. ? Could pt stay with son who lives locally until his friend's house is ready to move into? Pt also declines Hospice services at this time. He states he would prefer HHT. 7/15:  Pt is medically stable for discharge. Pt working on living arrangements. Hopeful for discharge home soon. 7/17 Spoke with patient's son yesterday. Patient does not a home to return to so now even if he does have a friend coming there is no home. Difficult placement. CM following. 7/19: Pt reportedly does not have a home to return to upon discharge. Son states pt is unable to live with him. Unlikely that pt would qualify for facility placement. CM following.    7/20: Pt still stable for discharge. CM following - pt has no place to live.    7/21:  CM found available place at [facility] for patient. Pt states he was not told about this, but cannot afford $650/mo. He says he was paying $500/mo at previous place which is no longer available to him. CM following.    7/22:  Per CM, son calling to speak to the manager of the [facility] home today. He also asked for list of [facility] that accept Medicaid.   RAUL continues to follow. 7/25: Son went to see available place at boarding home - pt and son not pleased with arrangements as pt would be sharing bathroom with 3 other people. Son looking into ALFs. Pt voices concern that he would not be able to adequately care for himself if he had to live alone. CM following.    7/31: Per CM, pt is making no effort to locate living arrangements. CM assisting to help locate custodial. Sending pt's info to be reviewed at [facility] (which has availability). Pt is stable for discharge once placement arranged. No new updates. 8/1  Still stable for discharge - still has no place/home to discharge. CM following. 8/2:  Pt meeting with custodial liaison today. Hopeful for discharge soon, pt is medically stable for discharge. 8/3: custodial liaison did not show for appt yesterday. Rescheduled for 8/4 at 2p. Pt concerned about online ratings for facility. 8/4: Still stable for discharge. Pt meeting with custodial liaison today. 8/5:  CM continues to follow. JAMEL liaison canceled appt again yesterday. Pt/Son making no effort to help locate placement. Reconsulted PT/OT. 8/6:  Pt has been stable for discharge for over 3 weeks. Still needs placement/living arrangements. Pt/son still not making any effort to assist with finding place for pt to go. Pt again refused PT, PT/OT signed off.    8/8 Patient's friend that is willing to assist him lives out of state. Discussed possibility of finding a facility closer to her. Discussed with CM. Still waiting on the nurse manager of [facility] to do face to face visit. 8/9 Current assisted living facility that we are looking into will not accept patient with menchaca cath. Per urology no other options than menchaca. CM updated. 8/12 PT recs SNF. Referrals sent.      8/13 Awaiting placement                [address] Hematology & Oncology  [id number] [facility] [address]  Office : [phone]  Fax : [phone]

## 2022-08-14 NOTE — PROGRESS NOTES
[facility] Hematology & Oncology        Inpatient Hematology / Oncology Progress Note    Reason for Admission:  Prostate cancer metastatic to bone ([facility] [address] [C61, C79.51]    24 Hour Events:  Afebrile, VSS  Chronic hematuria-plt 79K s/p 1 unit   Awaiting placement    ROS:  Constitutional: Positive for fatigue; negative for fever, chills. CV: Negative for chest pain, palpitations, edema. Respiratory: Negative for dyspnea, cough, wheezing. GI: Negative for nausea, diarrhea, abd pain. MSK:  +Back/hip pain (pain controlled)      10 point review of systems is otherwise negative with the exception of the elements mentioned above in the HPI. Allergies   Allergen Reactions    Turmeric Nausea And Vomiting     Past Medical History:   Diagnosis Date    Claustrophobia     Ear problems     Elevated PSA     Former light cigarette smoker (1-9 per day)     smoked for 35 years.   quit     History of multiple allergies     Nicotine vapor product user     Personal history of prostate cancer     Prostate cancer ([facility] [address]     prostate, neuroendocrine, mets to bone     Past Surgical History:   Procedure Laterality Date    BIOPSY PROSTATE      COLONOSCOPY  08/2020    HEENT      teeth removed     IR KYPHOPLASTY LUMBAR FIRST LEVEL  10/14/2020    IR KYPHOPLASTY LUMBAR FIRST LEVEL  10/14/2020    IR KYPHOPLASTY LUMBAR FIRST LEVEL 10/14/2020 [facility] RADIOLOGY SPECIALS    IR KYPHOPLASTY THORACIC FIRST LEVEL  10/29/2020    IR KYPHOPLASTY THORACIC FIRST LEVEL  10/29/2020    IR KYPHOPLASTY THORACIC FIRST LEVEL 10/29/2020 [facility] RADIOLOGY SPECIALS    TESTICLE REMOVAL Bilateral 02/2017     Family History   Problem Relation Age of Onset    Hypertension Father     Prostate Cancer Father     Cancer Father         prostate cancer     Social History     Socioeconomic History    Marital status: Single     Spouse name: Not on file    Number of children: Not on file    Years of education: Not on file    Highest education level: Not on file Occupational History    Not on file   Tobacco Use    Smoking status: Former     Packs/day: 0.50     Types: Cigarettes     Quit date: 2016     Years since quittin.7    Smokeless tobacco: Never   Substance and Sexual Activity    Alcohol use: Yes    Drug use: No    Sexual activity: Not on file   Other Topics Concern    Not on file   Social History Narrative    Not on file     Social Determinants of Health     Financial Resource Strain: Not on file   Food Insecurity: Not on file   Transportation Needs: Not on file   Physical Activity: Not on file   Stress: Not on file   Social Connections: Not on file   Intimate Partner Violence: Not on file   Housing Stability: Not on file     Current Facility-Administered Medications   Medication Dose Route Frequency Provider Last Rate Last Admin    0.9 % sodium chloride infusion   IntraVENous PRN Shayna De La Cruz MD        0.9 % sodium chloride infusion   IntraVENous PRN Sen Finley MD        lactulose (CHRONULAC) 10 GM/15ML solution 20 g  20 g Oral TID Nav oSng, APRN - NP   20 g at 22 1602    sennosides-docusate sodium (SENOKOT-S) 8.6-50 MG tablet 2 tablet  2 tablet Oral Daily Nav Song APRN - NP   2 tablet at 22 0757    polyethylene glycol (GLYCOLAX) packet 17 g  17 g Oral Daily Nav Song, APRN - NP   17 g at 22 0754    dexamethasone (DECADRON) tablet 4 mg  4 mg Oral Daily with breakfast Nav Song APRN - NP   4 mg at 22 0758    And    dexamethasone (DECADRON) tablet 2 mg  2 mg Oral Nightly Nav Song APRN - NP   2 mg at 22 1959    naloxegol (MOVANTIK) tablet 12.5 mg  12.5 mg Oral QAM AC Daniel Tejeda, APRN - CNP   12.5 mg at 22 0759    fentaNYL (DURAGESIC) 75 MCG/HR 1 patch  1 patch TransDERmal Q48H Nav Song, APRN - NP   1 patch at 22 1341    HYDROmorphone HCl PF (DILAUDID) injection 1.5 mg  1.5 mg IntraVENous Q3H PRN Daniel Tejeda, APRN - CNP   1.5 mg at 22 205    bisacodyl (DULCOLAX) suppository 10 mg  10 mg Rectal Daily PRN Snoy Andrade APRN - CNP   10 mg at 07/22/22 1758    magnesium oxide (MAG-OX) tablet 400 mg  400 mg Oral 4x Daily Matteo Dobbins MD   400 mg at 08/14/22 0758    calcium carbonate (TUMS) chewable tablet 500 mg  500 mg Oral TID Marcy Brandt APRN - CNP   500 mg at 08/14/22 0759    tamsulosin (FLOMAX) capsule 0.4 mg  0.4 mg Oral Daily Cherylgloria Navas, APRN - NP   0.4 mg at 08/14/22 0759    finasteride (PROSCAR) tablet 5 mg  5 mg Oral Daily Cherylgloria Eloise, APRN - NP   5 mg at 08/14/22 0757    zinc oxide 40 % paste   Topical 4x Daily PRN Matteo Dobbins MD        lidocaine-prilocaine (EMLA) cream   Topical Once Matteo Dobbins MD        phenazopyridine (PYRIDIUM) tablet 95 mg  95 mg Oral TID PRN Marcy Brandt APRN - CNP   95 mg at 07/15/22 2048    cyclobenzaprine (FLEXERIL) tablet 10 mg  10 mg Oral TID PRN Linnette Navas APRN - NP   10 mg at 08/13/22 1959    [Held by provider] enoxaparin (LOVENOX) injection 40 mg  40 mg SubCUTAneous Daily Marcy Brandt, APRN - CNP   40 mg at 07/23/22 0857    diphenhydrAMINE (BENADRYL) capsule 25 mg  25 mg Oral Q6H PRN Marcy Brandt, APRN - CNP   25 mg at 08/13/22 1251    acetaminophen (TYLENOL) tablet 650 mg  650 mg Oral Q6H PRN Marcy Brandt, APRN - CNP   650 mg at 08/13/22 1251    Or    acetaminophen (TYLENOL) suppository 650 mg  650 mg Rectal Q6H PRN Marcy Brandt, APRN - CNP        HYDROmorphone (DILAUDID) tablet 2 mg  2 mg Oral Q4H PRN Rodriguez Isgerman, APRN - CNP   2 mg at 08/12/22 0920    Or    HYDROmorphone (DILAUDID) tablet 4 mg  4 mg Oral Q4H PRN Michael Isgerman, APRN - CNP   4 mg at 08/14/22 0344    naloxone (NARCAN) injection 0.04 mg  0.04 mg IntraVENous PRN HELEN Zazueta - CNP        aluminum & magnesium hydroxide-simethicone (MAALOX) 200-200-20 MG/5ML suspension 20 mL  20 mL Oral Q6H PRN Tere Rivera MD   20 mL at 08/07/22 2037    LORazepam (ATIVAN) tablet 1 mg  1 mg Oral TID PRN Hiram Zambrano DO   1 mg at 08/09/22 0458    pantoprazole (PROTONIX) tablet 40 mg 40 mg Oral QAM AC Esme Fernandez Martinez, DO   40 mg at 22 0759    sodium chloride flush 0.9 % injection 5-40 mL  5-40 mL IntraVENous 2 times per day Taryn Miu, DO   10 mL at 22 0802    sodium chloride flush 0.9 % injection 5-40 mL  5-40 mL IntraVENous PRN Taryn Miu, DO        ondansetron (ZOFRAN-ODT) disintegrating tablet 4 mg  4 mg Oral Q8H PRN Taryn Miu, DO        Or    ondansetron [facility] injection 4 mg  4 mg IntraVENous Q6H PRN Taryn Miu, DO   4 mg at 07/10/22 1717    docusate sodium (COLACE) capsule 100 mg  100 mg Oral BID PRN Taryn Miu, DO   100 mg at 22 1406       OBJECTIVE:  Patient Vitals for the past 8 hrs:   BP Temp Temp src Pulse Resp SpO2   22 0737 110/70 98.4 °F (36.9 °C) Oral 88 19 92 %   22 0344 129/72 97.5 °F (36.4 °C) Oral 92 19 93 %     Temp (24hrs), Av °F (36.7 °C), Min:97.3 °F (36.3 °C), Max:98.6 °F (37 °C)    No intake/output data recorded. Physical Exam:  Constitutional: Well developed male sitting comfortably on the hospital bed. HEENT: Normocephalic and atraumatic. Oropharynx is clear, mucous membranes are moist.  Neck supple. Skin Warm and dry. Bruising noted on L foot and no rash noted. No erythema. No pallor. Respiratory Lungs are clear to auscultation bilaterally without wheezes, rales or rhonchi, normal air exchange without accessory muscle use. CVS Normal rate, regular rhythm and normal S1 and S2. No murmurs, gallops, or rubs. Abdomen Soft, nontender and distended, normoactive bowel sounds. Neuro Grossly nonfocal with no obvious sensory or motor deficits. MSK Normal range of motion in general. RLE swelling   Psych Appropriate mood and affect.         Labs:    Recent Results (from the past 24 hour(s))   CBC with Auto Differential    Collection Time: 22  9:26 AM   Result Value Ref Range    WBC 5.6 4.3 - 11.1 K/uL    RBC 2.35 (L) 4.23 - 5.6 M/uL    Hemoglobin 7.9 (L) 13.6 - 17.2 g/dL Hematocrit 24.4 (L) 41.1 - 50.3 %    .8 (H) 79.6 - 97.8 FL    MCH 33.6 (H) 26.1 - 32.9 PG    MCHC 32.4 31.4 - 35.0 g/dL    RDW 22.2 (H) 11.9 - 14.6 %    Platelets 31 (L) 705 - 450 K/uL    MPV 9.4 9.4 - 12.3 FL    nRBC 0.11 0.0 - 0.2 K/uL    Differential Type AUTOMATED      Seg Neutrophils 81 (H) 43 - 78 %    Lymphocytes 7 (L) 13 - 44 %    Monocytes 10 4.0 - 12.0 %    Eosinophils % 0 (L) 0.5 - 7.8 %    Basophils 0 0.0 - 2.0 %    Immature Granulocytes 2 0.0 - 5.0 %    Segs Absolute 4.5 1.7 - 8.2 K/UL    Absolute Lymph # 0.4 (L) 0.5 - 4.6 K/UL    Absolute Mono # 0.5 0.1 - 1.3 K/UL    Absolute Eos # 0.0 0.0 - 0.8 K/UL    Basophils Absolute 0.0 0.0 - 0.2 K/UL    Absolute Immature Granulocyte 0.1 0.0 - 0.5 K/UL   PREPARE PLATELETS, 1 Product    Collection Time: 08/13/22 10:15 AM   Result Value Ref Range    Unit Number [id number]     Product Code Blood Bank [facility],LR[facility]     Unit Divison 00     Dispense Status Blood Bank ISSUED    Platelet Count    Collection Time: 08/13/22  4:19 PM   Result Value Ref Range    Platelets 79 (L) 464 - 450 K/uL         Imaging:  Xray Result (most recent):  XR ABDOMEN (KUB) (SINGLE AP VIEW) 08/04/2022    Narrative  KUB:    CLINICAL HISTORY:  Abdominal distention. COMPARISON:  CT of July 23, 2022. FINDINGS:  AP supine images demonstrate a moderate amount of stool scattered in  the colon with no dilated  small bowel loops. Marked hepatomegaly is again  noted. Multilevel kyphoplasty is again noted. Impression  STABLE MARKED HEPATOMEGALY AND A MODERATE AMOUNT OF STOOL IN THE  COLON, BUT NO EVIDENCE OF BOWEL OBSTRUCTION OR OTHER ACUTE ABDOMINAL  ABNORMALITY.         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)    Hypokalemia    Gynecomastia, male    Neuroendocrine carcinoma, high grade ([address]    Anemia due to chemotherapy    Primary prostate cancer with metastasis from prostate to other site [facility])    Prostatic nodule Family history of prostate cancer in father    Acute prostatitis    S/P TURP    Prostate cancer [facility])    Prostate cancer metastatic to bone ([facility] [address]    Thrombocytopenia ([facility] [address]    Hypomagnesemia    Hypercalcemia of malignancy    Pain due to malignant neoplasm metastatic to bone (HCC)    EDGARD (acute kidney injury) ([facility] [address]    Debility    Drug-induced constipation    Encounter for palliative care    Abnormal CT of the abdomen    Hydronephrosis    Hydroureter     Mr. Sydney Mtz is a 78 y.o. male admitted on 7/8/2022 with a primary diagnosis of There were no encounter diagnoses. .       Mr. Sydney Mtz has a PMH of metastatic prostate cancer dx 2017 s/p casodex and bilateral orchiectomy for surgical castration, 6 cycles of docetaxel, pathologic vertebral fx's with path positive for high grade neuroendocrine cancer, s/p carbo/etoposide and most recently carbo/taxotere which was held d/t TCP. Last was C2 on 6/8/22. 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 He is a known patient of Dr. Brian Salamanca s/p multiple lines of treatment as above. At last office visit on 6/29, CEA had notably continued to rise to 8290. He has had discussion with [facility], however wished to hold off on making a decision until he completed radiation therapy. He presented to the ER with a fall at home, increasing pain in the hips and generalized weakness. Also with constipation. Also found to be anemic and transfused PRBC. We were asked for recommendations for our known patient. PLAN:  Metastatic Prostate Cancer/Neuroendocrine  - S/p multiple lines of therapy, most recently carbo/taxotere C2 on 6/8. Held on 6/29 d/t TCP. Undergoing XRT to rib and T-spine with Dr. Dustin Domínguez.  - Has spoken to [facility], however wished to hold off on making a decision until he saw if XRT improved his pain  - Can resume XRT on Monday 7/11 Pt does not wish to pursue XRT.   Meeting with PC today for pain mgmt and to discuss GOC - wishes to pursue facility placement and enroll in Hospice. 7/13 Pt now states he may be interested in going home since he will have support from friend moving here from out of state. Consult PT/OT. 7/14  PT recommends HHT  7/23 CT AP shows POD with [facility] liver lesions, increased prostate gland, and extensive bony mets  7/25 Pt states he is interested in pursuing XRT again, Rad Onc consulted. He states he may consider having chemo in the future as well. 7/26 Resuming XRT tomorrow (previously completed 1/1 fx to ribs and 1/5 fx to T-spine)  7/27 Resume XRT today - scheduled through 8/2 per rad onc notes. 8/1 XRT today (EOT 8/2). 8/2 Completes XRT to T-spine today (7/7 fx)     Cancer Related Pain  - Currently with Dilaudid/oxy prn  - Consult palliative care to assist with pain management (known from office) and also to assist with goals of care discussions   7/10 Increased Dilaudid to 1 mg every 3 hours for severe pain. Pall care consult pending. 7/11 PC assisting with mgmt. Resume Dex. 7/12 PC started Fentanyl 50mcg patch yesterday. Pt reports pain controlled today - even able to walk around room now. 7/13 Pain controlled. PC changed oxycodone to po dilaudid yesterday. 7/14 Pain controlled  7/15 C/o pain today  7/17 pain improved after menchaca  7/20 Pain controlled on current regimen  7/22 c/o worsening abd pain. Bladder scan with minimal urine. Check CT AP.  7/23 CT AP with POD  7/30 Feels pain increasing - will increase Fentanyl patch to 75 mcg every 48 hours and continue PO Dilaudid for breakthrough. 7/31 Better with adjustment   8/2 c/o b/l abd/flank pain. Check renal US, UA/Ucx  8/3 Pain improved s/p menchaca placement  8/13 Denies any pain this AM     Anemia/Thrombocytopenia  - Transfuse prn per [facility] SOPs  7/24 Plt down to 41k. DC Lovenox. Check hemolysis labs, DIC labs, smear, HIT.  7/25 Plt 37k. No DIC noted. Smear, Hapto, and HIT pending. 7/26 Plt 32k.   No hemolysis. HIT neg. Smear pending. 7/27 Plts stable at 34k. Hgb relatively stable - 7.6. Smear without schistocytes. Monitor. 7/28 Plt stable at 32k. Pt with hematuria, transfuse to keep plt >50k.  1 unit platelets ordered. 7/29 Plts up to 62k s/p transfusion for hematuria. No bleeding. 8/3 Transfuse 1 unit PRBCs and 1 unit platelets (d/t to hematuria s/p menchaca placement)  8/7 Transfuse 1 unit platelets   9/91 Plt 31K, transfuse 1 unit  8/14 Plt 79K     Transaminitis / Hyperbilirubinemia  - Alk phos/ALT/AST trended down today, continue to follow   7/11 AST/ALT continue to improve   7/13 LFTs improving  7/18 stable  7/23 LFTs increased, monitor - likely r/t liver mets  7/24 Tbili up to 1.2, LFTs con't to increase. 7/25 Bili down to 0.8. LFTs improved today. 7/27 LFTs stable - known liver mets. Tbili back to normal. Monitoring. 8/5 Tbili up to 2.0, check frac bili. LFTs con't to increase. 8/6 Tbili 2 - lab did not process frac bili yesterday, reordered this AM  8/7 Tbili down to 1.6, mostly direct     EDGARD  - Gentle hydration  RESOLVED     Constipation  - Lactulose prn   7/11 Pt refusing Lactulose. PC started pericolace. Con't Miralax. Consider KUB if no improvement. 7/12 No BM documented. Con't Miralax/Pericolace. Pt more active now, up around in room since pain controlled. 7/13 Had BM x 4 yesterday  7/20 c/o constipation. Increase pericolace to BID. Con't Miralax. Check KUB. If no obstruction, will order Movantik. 7/21 KUB with constipation. Movantik ordered. 7/22 No BM. Con't Movantik. Checking CT AP.  7/23 Pt having Bms. Con't Movantik. 7/31 Added Lactulose with good relief. 8/5 Pt c/o abd distention yesterday. KUB with mod constipation. Had 2 BM after lactulose given. RN note states pt had mild bloody BM, no further bleeding noted. 8/13 BM x 2 s/p lactulose     Hypercalcemia / Hypocalcemia  7/12 CCa++ up to 11. 7. Zometa ordered.   7/15 CCa++ down to 9.2  7/19 CCa++ down to 8.4, replete  7/21 Cca++ 8.6  RESOLVED    Dysuria  7/15 c/o dysuria. UA c/w UTI. CTX ordered. Check Ucx.  7/17 UC NTD on Rocephin  7/18 Pain improved s/p menchaca placement. Ucx with [facility]. Completed CTX x 3 days. RESOLVED    Urinary retention / B/l hydroureteronephrosis  7/16 menchaca ordered/ flomax ordered  7/17 UOP 4000 ml. Adding proscar  7/18 s/p menchaca placement. Con't Flomax/Proscar. Plan to DC menchaca tomorrow. 7/19 Remove menchaca for voiding trial.  If unable to void, will consult urology. 7/20 Menchaca removed yesterday and had to be replaced d/t urinary retention. Consult urology. 7/22 Awaiting urology consult  7/23 Urology felt r/t constipation, however CT shows b/l hydroureteronephrosis. Will defer mgmt to urology. 7/24 No response from urology from message sent yesterday. Will re-consult. 7/26 Urology recommends to remove menchaca this AM for voiding trial.  Also recommends reimaging later on to see if bilateral hydro has resolved (could be r/t overdistended bladder/urinary retention vs prostate cancer/obstruction). If bilateral hydro has not improved over time, would consider bilateral stents vs nephrostomy tubes and this would be more favorable if/when kidney function declines or pt develops significant flank pain. 7/27 Menchaca removed yesterday, had PVR of 500cc but then able to void 200cc. Menchaca remains out. Cr 1.2.  7/29 Cr stable, 1.1. Voiding to urinal without issue. 8/2 c/o b/l abd/flank pain. Check renal US.  8/3 Renal US with mod b/l hydronephrosis. Urology replaced menchaca. If pt chooses to pursue Hospice, then will leave this in place. Otherwise, will consider other options such as TURP or suprapubic tube. 8/6 Cr 1.4. Continues with menchaca  8/9 per Urology reviewed options for CIC, Menchaca, SPT and repeat TURP. Given tumor progression after recent TURP on 3/2022 I do feel that his best option would be to leave an indwelling Menchaca changed monthly. Hematuria / UTI  7/28 Hematuria noted. Transfuse platelets. Check UA - UA c/w UTI. Ucx ordered. Start CTX.  7/30 hematuria resolved after plt transfusion. UC NGTD. CTX day 3 today. 8/2 c/o b/l abd/flank pain. Check UA/Ucx  8/3 UA neg for infx. Ucx-NGTD. Continue home meds  [facility] SOPs  AC contraindicated d/t thrombocytopenia    Goals and plan of care reviewed with the patient. All questions answered to the best of our ability. Disposition:  7/14: Pt reports his friend is not moving here until 8/1. He reports she has a one-story home that he can move into on 8/1 or possibly even sooner. Pt was able to walk hallways yesterday. Unsure if he would qualify for facility placement while future living arrangements are made. CM states pt would have to go through extensive insurance process to even cover facility placement if he qualified. CM assisting with discharge planning. ? Could pt stay with son who lives locally until his friend's house is ready to move into? Pt also declines Hospice services at this time. He states he would prefer HHT. 7/15:  Pt is medically stable for discharge. Pt working on living arrangements. Hopeful for discharge home soon. 7/17 Spoke with patient's son yesterday. Patient does not a home to return to so now even if he does have a friend coming there is no home. Difficult placement. CM following. 7/19: Pt reportedly does not have a home to return to upon discharge. Son states pt is unable to live with him. Unlikely that pt would qualify for facility placement. CM following.    7/20: Pt still stable for discharge. CM following - pt has no place to live.    7/21:  CM found available place at [facility] for patient. Pt states he was not told about this, but cannot afford $650/mo. He says he was paying $500/mo at previous place which is no longer available to him. CM following.    7/22:  Per CM, [facility]   He also asked for list of ALFs that accept Medicaid. CM continues to follow. 7/25: Son went to see available place at boarding home - pt and son not pleased with arrangements as pt would be sharing bathroom with 3 other people. Son looking into ALFs. Pt voices concern that he would not be able to adequately care for himself if he had to live alone. CM following.    7/31: Per CM, pt is making no effort to locate living arrangements. CM assisting to help locate [facility]. Sending pt's info to be reviewed at [facility] (which has availability). Pt is stable for discharge once placement arranged. No new updates. 8/1  Still stable for discharge - still has no place/home to discharge. CM following. 8/2:  Pt meeting with senior living liaison today. Hopeful for discharge soon, pt is medically stable for discharge. 8/3: [facility] liaison did not show for appt yesterday. Rescheduled for 8/4 at 2p. Pt concerned about online ratings for facility. 8/4: Still stable for discharge. Pt meeting with senior living liaison today. 8/5:  CM continues to follow. [facility] liaison canceled appt again yesterday. Pt/Son making no effort to help locate placement. Reconsulted PT/OT. 8/6:  Pt has been stable for discharge for over 3 weeks. Still needs placement/living arrangements. Pt/son still not making any effort to assist with finding place for pt to go. Pt again refused PT, PT/OT signed off.    8/8 Patient's friend that is willing to assist him lives out of state. Discussed possibility of finding a facility closer to her. Discussed with CM. Still waiting on the nurse manager of [facility] to do face to face visit. 8/9 Current assisted living facility that we are looking into will not accept patient with menchaca cath. Per urology no other options than menchaca. CM updated. 8/12 PT recs SNF. Referrals sent.      8/13 Awaiting placement                [address] Hematology [facility] Oncology  [address]  Office : [phone]  Fax : [phone]

## 2022-08-15 NOTE — CARE COORDINATION
Pt to d/c today to [address] [facility] for STR. Room: 201. Report: [phone]. Transport scheduled via [employer] for 1500 per facility's request.  A referral has been made to [facility] to follow pt OP as he is not a candidate for any further cancer Tx and will eventually decline to the point of requiring end of life care. This pt has been stable for d/c for a minimum of three weeks. Pt did not participate in his own d/c planning even though he is fully capable to do so. Pt's son provided minimal assistance with d/c planning. No other supportive care needs identified. Pt agrees with d/c plan. Milestones met. LOS = 38 days. Update: [facility] called stating they were \"held up by another call\" and will arrive for this pt between [id number]. CM notified the facility liaison. 07/08/22 1626   Service Assessment   Patient Orientation Alert and Oriented;Person;Place;Self;Situation   Cognition Alert   History Provided By Medical Record; Child/Family; Patient   Primary [address]   Patient's Healthcare Decision Maker is: Legal Next of Kin   PCP Verified by CM Yes  (Suzan Liang. MD Andrey)   Last Visit to PCP Within last year   Prior Functional Level Independent in ADLs/IADLs   Current Functional Level Assistance with the following:;Cooking;Housework; Shopping;Mobility; Toileting   Can patient return to prior living arrangement No   Ability to make needs known: Good   Family able to assist with home care needs: Other (comment)  (Pt now homeless. Son works full time.)   Would you like for me to discuss the discharge plan with any other family members/significant others, and if so, who? No   Financial Resources Medicaid; Medicare   Social/Functional History   Lives With Alone   Type of [address] Needs assistance   Ambulation Assistance Needs assistance   Transfer Assistance Independent   Active  No   Occupation Retired   Discharge Planning   Type of [address]  ([address] [facility] Prior To Admission None   Potential Assistance Needed Durable Medical Equipment;Home Care  (Pt needs a rolling walker, 3:1, and  services)   Potential Assistance Purchasing Medications No   Type of [address] None   Patient expects to be discharged to: Skilled nursing facility  ([address] for STR)   One/Two Story Residence One story   History of falls? 1   Services At/After Discharge   Transition of Care Consult (CM Consult) Discharge Planning;SNF  ([facility] for [facility])   Partner SNF No   Reason Why Partner SNF Not Chosen Location   Services At/After Discharge Transport;Skilled Nursing Facility (SNF); In ambulance  ([email or web address] for [address])   [facility] Provided? No   Mode of Transport at Discharge [facility]  ([facility])   [facility] Time of Discharge 1500   Confirm Follow Up Transport Other (see comment)  [facility]   Condition of Participation: Discharge Planning   The Plan for Transition of Care is related to the following treatment goals: Pt requires STR to return to functional baseline in the community. The Patient and/or Patient Representative was provided with a Choice of Provider? Patient;Patient Representative   Name of the Patient Representative who was provided with the Choice of Provider and agrees with the Discharge Plan? Hawk Martinez and Pamela Bach (pt and son)   The Patient and/Or Patient Representative agree with the Discharge Plan? Yes   Freedom of Choice list was provided with basic dialogue that supports the patient's individualized plan of care/goals, treatment preferences, and shares the quality data associated with the providers?   Yes

## 2022-08-15 NOTE — PROGRESS NOTES
TRANSFER - OUT REPORT:    Verbal report given to Nurse on Violet Dobbins  being transferred to [facility] post acute for routine progression of patient care       Report consisted of patient's Situation, Background, Assessment and   Recommendations(SBAR). Information from the following report(s) Nurse Handoff Report was reviewed with the receiving nurse. Lines:   Single Lumen Implantable Port Right Chest (Active)   Port A Cath Status Accessed 08/15/22 0810   Criteria for Appropriate Use Long term IV/antibiotic administration 08/15/22 0810   Site Assessment Clean, dry & intact 08/15/22 0810   Line Care Connections checked and tightened;Cap changed 08/15/22 0810   Alcohol Cap Used Yes 08/15/22 0810   Date of Last Dressing Change 08/14/22 08/15/22 0810   Dressing Type Transparent; Antimicrobial 08/15/22 0810   Dressing Status Clean, dry & intact 08/15/22 0810   Dressing Intervention Other (Comment) 08/15/22 0810   Date Accessed  08/14/22 08/15/22 0810   Access Attempts  2 08/14/22 1618   Access Needle Gauge 20 G 08/14/22 1618   Access Needle Length 0.75 inches 08/14/22 1618   Accessed By: Bret Kimbrough RN 08/14/22 1618   Single Lumen Status Flushed;Blood return noted;Capped 08/15/22 0810   Date needle changed 08/14/22 08/15/22 0810   De-Access Date 08/14/22 08/14/22 1600   De-Access Time 1600 08/14/22 1600   De-Accessed By Bret Kimbrough RN 08/14/22 1600        Opportunity for questions and clarification was provided.       [facility]

## 2022-08-15 NOTE — CARE COORDINATION
CM received notification from [place] with [facility] that pt's insurance Cassidy Pat was received yesterday evening and the facility can accept him today. The facility is requesting transportation be scheduled after 1400.

## 2022-08-15 NOTE — PLAN OF CARE
Problem: Skin/Tissue Integrity  Goal: Absence of new skin breakdown  Description: 1. Monitor for areas of redness and/or skin breakdown  2. Assess vascular access sites hourly  3. Every 4-6 hours minimum:  Change oxygen saturation probe site  4. Every 4-6 hours:  If on nasal continuous positive airway pressure, respiratory therapy assess nares and determine need for appliance change or resting period.   8/15/2022 1448 by Salinas Monae RN  Outcome: Adequate for Discharge  8/15/2022 1137 by Salinas Monae RN  Outcome: Progressing     Problem: Pain  Goal: Verbalizes/displays adequate comfort level or baseline comfort level  8/15/2022 1448 by Salinas Monae RN  Outcome: Adequate for Discharge  8/15/2022 1137 by Salinas Monae RN  Outcome: Progressing     Problem: Safety - Adult  Goal: Free from fall injury  8/15/2022 1448 by Salinas Monae RN  Outcome: Adequate for Discharge  8/15/2022 1137 by Salinas Monae RN  Outcome: Progressing  Flowsheets (Taken 8/15/2022 0810)  Free From Fall Injury: Instruct family/caregiver on patient safety     Problem: ABCDS Injury Assessment  Goal: Absence of physical injury  8/15/2022 1448 by Salinas Monae RN  Outcome: Adequate for Discharge  8/15/2022 1137 by Salinas Monae RN  Outcome: Progressing  Flowsheets (Taken 8/15/2022 0810)  Absence of Physical Injury: Implement safety measures based on patient assessment     Problem: Respiratory - Adult  Goal: Achieves optimal ventilation and oxygenation  8/15/2022 1448 by Salinas Monae RN  Outcome: Adequate for Discharge  8/15/2022 1137 by Salinas Monae RN  Outcome: Progressing

## 2022-08-16 NOTE — CARE COORDINATION
[facility] Discharge Call    2022    Patient: Ainsley Victor Patient : 1955   MRN: [id number]  Reason for Admission: prostate cancer mets  Discharge Date: 8/15/22 RARS: Readmission Risk Score: 19.1         Discharge Facility: [facility]    Transition of care outreach postponed for 14 days due to patient's discharge to SNF.      Care Transitions Post Acute Facility Transition            Care Transitions Interventions         Future Appointments   Date Time Provider Wero Pichardo   2022  9:00 AM Charles Garcia MD HTF [facility] AMB   2022 12:15 PM [id number] BLOOD DRAW [id number] [facility] AMB   2022 11:00 AM Parnell Lanes Sterrett, DO [id number] [facility] AMB       Wicho Zaragoza RN

## 2022-08-19 NOTE — TELEPHONE ENCOUNTER
Son calling on the behalf of the PT. .. Son stated the PT is in a skilled facility. .      Son stated the PT is in a lot of pain and is requesting for a referral for hospice.

## 2022-08-19 NOTE — ED TRIAGE NOTES
Patient arrives via EMS from home c/o generalized pain. Patient took 2mg PO morphine at 1745, and had a 100 mcg fentanyl patch applied today. No relief. Patient currently has prostate cancer that has metastasized to the bones.     Vitals: /60,  ST on EKG, RR 22, O2 90% on RA

## 2022-08-19 NOTE — TELEPHONE ENCOUNTER
I reviewed the chart and the pt was offered Hospice in the Hospital and declined but now would like to accept. Referral placed and Msg to the schedulers to push thru.

## 2022-08-19 NOTE — ED PROVIDER NOTES
[employer] Emergency Department Provider Note                   PCP: Kenneth Tee MD               Age: 78 y.o. Sex: male       ICD-10-CM    1. Admission for hospice care  Z51.5       2. Prostate cancer metastatic to bone [facility])  C61     C79.51           DISPOSITION Decision To Admit 08/19/2022 07:08:49 PM        MDM  Number of Diagnoses or Management Options  Admission for hospice care  Prostate cancer metastatic to bone [facility])  Diagnosis management comments: Male presents to the emerged department for initiation of hospice care. Vital signs here are reviewed. Patient is tachycardic on arrival.  Patient is writhing around in pain. He was given medicine for pain control pain. Spoke with the hospitalist admitting the patient for initiation of hospice care. No orders of the defined types were placed in this encounter. Raul Albert is a 78 y.o. male who presents to the Emergency Department with chief complaint of  No chief complaint on file. 78-year-old male presents here to the emergency department for initiation of hospice care. Patient states that he has a history of metastatic prostate cancer since 2017 he is a patient of Dr. Alley Sotelo. His cancer has recently become untreatable. He was offered a hospice care 1 month prior but declined. He now states that his pain is not being controlled at the nursing home and he is here for hospice care. Any alleviating or aggravating factors. Symptoms have been worsening with time. Review of Systems   Constitutional:  Negative for chills, fatigue and fever. Uncontrollable pain   HENT:  Negative for congestion, dental problem and drooling. Eyes:  Negative for discharge. Respiratory:  Negative for cough, chest tightness and shortness of breath. Cardiovascular:  Negative for chest pain and palpitations. Gastrointestinal:  Negative for abdominal pain, diarrhea, nausea and vomiting.    Endocrine: Negative for polydipsia. Genitourinary:  Negative for difficulty urinating, dysuria and hematuria. Musculoskeletal:  Negative for back pain. Skin:  Negative for rash and wound. Neurological:  Negative for dizziness, seizures, speech difficulty, light-headedness and headaches. Psychiatric/Behavioral:  Negative for agitation. Past Medical History:   Diagnosis Date    Claustrophobia     Ear problems     Elevated PSA     Former light cigarette smoker (1-9 per day)     smoked for 35 years.   quit     History of multiple allergies     Nicotine vapor product user     Personal history of prostate cancer     Prostate cancer ([facility] [address]     prostate, neuroendocrine, mets to bone        Past Surgical History:   Procedure Laterality Date    BIOPSY PROSTATE      COLONOSCOPY  2020    HEENT      teeth removed     IR KYPHOPLASTY LUMBAR FIRST LEVEL  10/14/2020    IR KYPHOPLASTY LUMBAR FIRST LEVEL  10/14/2020    IR KYPHOPLASTY LUMBAR FIRST LEVEL 10/14/2020 [facility]    IR KYPHOPLASTY THORACIC FIRST LEVEL  10/29/2020    IR KYPHOPLASTY THORACIC FIRST LEVEL  10/29/2020    IR KYPHOPLASTY THORACIC FIRST LEVEL 10/29/2020 [facility]    TESTICLE REMOVAL Bilateral 2017        Family History   Problem Relation Age of Onset    Hypertension Father     Prostate Cancer Father     Cancer Father         prostate cancer        Social History     Socioeconomic History    Marital status: Single   Tobacco Use    Smoking status: Former     Packs/day: 0.50     Types: Cigarettes     Quit date: 2016     Years since quittin.7    Smokeless tobacco: Never   Substance and Sexual Activity    Alcohol use: Yes    Drug use: No         Turmeric     Previous Medications    CYCLOBENZAPRINE (FLEXERIL) 10 MG TABLET    Take 10 mg by mouth 3 times daily as needed    CYCLOBENZAPRINE (FLEXERIL) 10 MG TABLET    Take 1 tablet by mouth 3 times daily as needed for Muscle spasms    DEXAMETHASONE (DECADRON) 2 MG TABLET    Take 1 tablet by Rate and Rhythm: Regular rhythm. Tachycardia present. Heart sounds: No murmur heard. Comments: Fentanyl patch on anterior chest  R subclavian port  Pulmonary:      Effort: Pulmonary effort is normal. No respiratory distress. Breath sounds: Normal breath sounds. Abdominal:      General: There is no distension. Palpations: Abdomen is soft. Tenderness: There is no abdominal tenderness. There is no guarding. Musculoskeletal:         General: No swelling or tenderness. Cervical back: Normal range of motion and neck supple. No rigidity or tenderness. Comments: Tenderness along the spine   Skin:     General: Skin is warm and dry. Capillary Refill: Capillary refill takes less than 2 seconds. Coloration: Skin is jaundiced and pale. Neurological:      General: No focal deficit present. Mental Status: He is alert and oriented to person, place, and time. Mental status is at baseline. Psychiatric:         Behavior: Behavior normal.        Procedures      Labs Reviewed - No data to display     No orders to display                          Voice dictation software was used during the making of this note. This software is not perfect and grammatical and other typographical errors may be present. This note has not been completely proofread for errors.      Lakeisha Reddy DO  08/19/22 1912

## 2022-08-20 NOTE — PROGRESS NOTES
?Mr Abby Gallegos has been approved for GIP. There is no current plan for long term care. I spoke with the son Rizwana Pacheco. Explained the [place] GIP requirements. He would like his father to stay in patient at the hospital and see if his condition improves. We will continue to follow this patient.      Kendra Andino, RN  Admissions Nurse  [facility]  [phone]

## 2022-08-20 NOTE — PROGRESS NOTES
Patient evaluated and has been approved for [facility] pending conversation with son Amber Moreira. Voicemail left but no return call at this point. Per ISAAC Antonio RN    Please contact [facility] for any questions.   [phone]

## 2022-08-20 NOTE — ED NOTES
TRANSFER - OUT REPORT:    Verbal report given to Lopez Sanon RN on Bri Manzanares  being transferred to room 9356 1286 for routine progression of patient care       Report consisted of patient's Situation, Background, Assessment and   Recommendations(SBAR). Information from the following report(s) ED SBAR was reviewed with the receiving nurse. Lines:   Single Lumen Implantable Port Right Chest (Active)        Opportunity for questions and clarification was provided.       Patient transported with:  Tasha Tinsley RN  08/19/22 6706

## 2022-08-20 NOTE — PROGRESS NOTES
TRANSFER - IN REPORT:    Verbal report received from [facility] on Marina Albarran  being received from ED for routine progression of patient care      Report consisted of patient's Situation, Background, Assessment and   Recommendations(SBAR). Information from the following report(s) ED SBAR and MAR was reviewed with the receiving nurse. Opportunity for questions and clarification was provided. Assessment will be completed upon patient's arrival to unit and care assumed.

## 2022-08-20 NOTE — DISCHARGE SUMMARY
Hospitalist Discharge Summary   Admit Date:  2022  6:44 PM   DC Note date: 2022  Name:  Marco Polo   Age:  78 y.o. Sex:  male  :  1955   MRN:  [id number]   Room:  [facility]  PCP:  Jose Ohara MD    Presenting Complaint: No chief complaint on file. Initial Admission Diagnosis: Prostate cancer ([facility] Gaylia Shone  Admission for hospice care [Z51.5]  Prostate cancer metastatic to bone ([facility] [C61, C79.51]     Problem List for this Hospitalization (present on admission):    Principal Problem:    Prostate cancer [facility])  Active Problems:    Pain due to malignant neoplasm metastatic to bone [facility])    Admission for hospice care    Neuroendocrine carcinoma, high grade ([facility]  Resolved Problems:    * No resolved hospital problems. *      Hospital Course:  48-year-old male with a past medical history of metastatic prostate cancer status post Casodex and bilateral orchiectomy as well as chemotherapy, pathologic vertebral fracture with pathology positive for high-grade Neuroendocrine cancer status post chemotherapy, that presents in the setting of generalized pain, altered mental status and agitation. On admission, admitting provider had discussion with son at bedside to start comfort measures and pursue hospice. Patient was started on IV morphine as needed as well as IV Ativan. Hospice consulted. Patient accepted to [facility] hospice. Patient will be discharged . Disposition: Hospice  Diet: Diet NPO  Code Status: DNR    Follow Ups:      Time spent in patient discharge and coordination 45 minutes. Follow up labs/diagnostics (ultimately defer to outpatient provider):      Plan was discussed with patient's son. All questions answered. Patient was stable at time of discharge. Instructions given to call a physician or return if any concerns.     Current Discharge Medication List        CONTINUE these medications which have NOT CHANGED    Details   fentaNYL (DURAGESIC) 75 MCG/HR Place 1 patch onto the skin every 48 hours for 30 days. Qty: 15 patch, Refills: 0    Comments: Reduce doses taken as pain becomes manageable  Associated Diagnoses: Cancer associated pain      !! HYDROmorphone (DILAUDID) 2 MG tablet Take 1 tablet by mouth every 4 hours as needed for Pain for up to 30 days. Qty: 30 tablet, Refills: 0    Comments: Reduce doses taken as pain becomes manageable  Associated Diagnoses: Cancer associated pain      !! HYDROmorphone (DILAUDID) 4 MG tablet Take 1 tablet by mouth every 4 hours as needed for Pain for up to 30 days. Qty: 30 tablet, Refills: 0    Comments: Reduce doses taken as pain becomes manageable  Associated Diagnoses: Cancer associated pain       !! - Potential duplicate medications found. Please discuss with provider.         STOP taking these medications       cyclobenzaprine (FLEXERIL) 10 MG tablet Comments:   Reason for Stopping:         dexamethasone (DECADRON) 2 MG tablet Comments:   Reason for Stopping:         finasteride (PROSCAR) 5 MG tablet Comments:   Reason for Stopping:         lactulose (CHRONULAC) 10 GM/15ML solution Comments:   Reason for Stopping:         magnesium oxide (MAG-OX) 400 (240 Mg) MG tablet Comments:   Reason for Stopping:         naloxegol (MOVANTIK) 12.5 MG TABS tablet Comments:   Reason for Stopping:         polyethylene glycol (GLYCOLAX) 17 g packet Comments:   Reason for Stopping:         sennosides-docusate sodium (SENOKOT-S) 8.6-50 MG tablet Comments:   Reason for Stopping:         tamsulosin (FLOMAX) 0.4 MG capsule Comments:   Reason for Stopping:         cyclobenzaprine (FLEXERIL) 10 MG tablet Comments:   Reason for Stopping:         omeprazole (PRILOSEC) 40 MG delayed release capsule Comments:   Reason for Stopping:         lidocaine-prilocaine (EMLA) 2.5-2.5 % cream Comments:   Reason for Stopping:               Procedures done this admission:  * No surgery found *    Consults this admission:  IP CONSULT TO HOSPICE  IP CONSULT TO SPIRITUAL SERVICES    Echocardiogram results:  No results found for this or any previous visit. Diagnostic Imaging/Tests:   XR ABDOMEN (KUB) (SINGLE AP VIEW)    Result Date: 8/4/2022  STABLE MARKED HEPATOMEGALY AND A MODERATE AMOUNT OF STOOL IN THE COLON, BUT NO EVIDENCE OF BOWEL OBSTRUCTION OR OTHER ACUTE ABDOMINAL ABNORMALITY. CT ABDOMEN PELVIS W IV CONTRAST Additional Contrast? Radiologist Recommendation    Result Date: 7/22/2022  1) The liver has enlarged and is now largely replaced with too numerous to count small metastases. This would be in the prostate cancer. 2) The irregular malignant-appearing prostate gland has significantly increased in size as above. 3) Interval development bilateral hydroureteronephrosis. 4) Extensive bony metastases. US RETROPERITONEAL LIMITED    Result Date: 8/2/2022  1. Mild to moderate right, and moderate left hydronephrosis. The urinary bladder is also significantly urine distended. Therefore, the point of obstruction may occur at or distal to the urinary bladder outlet. Recent Labs     08/02/22  1139 07/28/22  1245   CULTURE NO GROWTH 2 DAYS <10,000 COLONIES/mL MIXED SKIN OCTAVIANO ISOLATED       Labs: Results:       BMP, Mg, Phos No results for input(s): NA, K, CL, CO2, ANIONGAP, BUN, CREATININE, LABGLOM, GFRAA, CALCIUM, GLUCOSE, MG, PHOS in the last 72 hours. CBC No results for input(s): WBC, RBC, HGB, HCT, MCV, MCH, MCHC, RDW, PLT, MPV, NRBC, SEGS, LYMPHOPCT, EOSRELPCT, MONOPCT, BASOPCT, IMMGRAN, SEGSABS, LYMPHSABS, EOSABS, MONOSABS, BASOSABS, ABSIMMGRAN in the last 72 hours. LFT No results for input(s): BILITOT, BILIDIR, ALKPHOS, AST, ALT, PROT, LABALBU, GLOB in the last 72 hours.    Cardiac  No results found for: NTPROBNP, TROPHS   Coags Lab Results   Component Value Date/Time    PROTIME 13.3 07/24/2022 12:45 PM    PROTIME 12.7 03/22/2022 01:45 PM    PROTIME 13.6 10/13/2020 11:45 AM    INR 1.0 07/24/2022 12:45 PM    INR 0.9 03/22/2022 01:45 PM    INR 1.0 10/13/2020 11:45 AM    APTT 37.0 07/24/2022 12:45 PM      A1c No results found for: LABA1C, EAG   Lipids Lab Results   Component Value Date/Time    CHOL 216 08/09/2021 11:12 AM    LDLCALC 123 08/09/2021 11:12 AM    LABVLDL 50 07/27/2020 10:54 AM    HDL 60 08/09/2021 11:12 AM    TRIG 186 08/09/2021 11:12 AM      Thyroid  No results found for: Kaitlyn Hnids     Most Recent UA Lab Results   Component Value Date/Time    COLORU BROWN 08/02/2022 11:39 AM    APPEARANCE CLOUDY 08/02/2022 11:39 AM    SPECGRAV 1.015 08/02/2022 11:39 AM    LABPH 6.0 08/02/2022 11:39 AM    PROTEINU 100 08/02/2022 11:39 AM    GLUCOSEU Negative 08/02/2022 11:39 AM    KETUA Negative 08/02/2022 11:39 AM    BILIRUBINUR Negative 08/02/2022 11:39 AM    BILIRUBINUR Negative 04/25/2022 12:36 PM    BLOODU LARGE 08/02/2022 11:39 AM    UROBILINOGEN 0.2 08/02/2022 11:39 AM    NITRU Negative 08/02/2022 11:39 AM    LEUKOCYTESUR TRACE 08/02/2022 11:39 AM    WBCUA 5-10 08/02/2022 11:39 AM    RBCUA >100 08/02/2022 11:39 AM    EPITHUA 0-5 08/02/2022 11:39 AM    BACTERIA Negative 08/02/2022 11:39 AM    LABCAST 0-2 08/02/2022 11:39 AM    MUCUS 1+ 07/15/2022 05:55 AM          All Labs from Last 24 Hrs:  No results found for this or any previous visit (from the past 24 hour(s)).     Allergies   Allergen Reactions    Turmeric Nausea And Vomiting     Immunization History   Administered Date(s) Administered    COVID-19, MODERNA BLUE border, Primary or Immunocompromised, (age 12y+), IM, 100 mcg/0.5mL 01/26/2021, 02/25/2021    PPD Test 08/11/2022    Pneumococcal Conjugate 13-valent ([id number]) 07/27/2020    Tdap (Boostrix, Adacel) 11/09/2017       Recent Vital Data:  Patient Vitals for the past 24 hrs:   Temp Pulse Resp BP SpO2   08/20/22 1516 -- 74 18 122/63 90 %   08/20/22 1440 -- -- 18 -- --   08/20/22 1127 98 °F (36.7 °C) (!) 106 20 119/82 92 %   08/20/22 0739 97 °F (36.1 °C) (!) 105 16 118/87 93 %   08/19/22 2231 99 °F (37.2 °C) (!) 124 21 131/73 94 %   08/19/22 2042 -- (!) 112 -- -- --   08/19/22 2035 -- -- -- 122/78 91 %   08/19/22 1933 -- (!) 110 -- -- --   08/19/22 1932 -- -- -- 133/67 91 %   08/19/22 1927 -- -- -- 127/73 97 %   08/19/22 1917 -- -- -- 114/67 --   08/19/22 1902 -- -- -- 112/81 90 %   08/19/22 1847 -- -- -- 106/87 91 %   08/19/22 1846 97.9 °F (36.6 °C) (!) 111 20 106/87 91 %       Oxygen Therapy  SpO2: 90 %  Pulse Oximeter Device Mode: Continuous  O2 Device: None (Room air)    Estimated body mass index is 26.58 kg/m² as calculated from the following:    Height as of this encounter: 5' 9\" (1.753 m). Weight as of this encounter: 180 lb (81.6 kg). No intake or output data in the 24 hours ending 08/20/22 1550      Physical Exam:    General:    Confused. Ill-appearing  Head:  Normocephalic, atraumatic  Eyes:  Sclerae appear normal.  Pupils equally round. HENT:  Nares appear normal, no drainage. Moist mucous membranes  Neck:  No restricted ROM. Trachea midline  CV:   RRR. No m/r/g. No JVD  Lungs:   CTAB. No wheezing, rhonchi, or rales. Respirations even, unlabored  Abdomen:   Soft, nontender, nondistended. Extremities: Warm and dry. No cyanosis or clubbing. No edema. Skin:     No rashes. Normal coloration  Neuro:  CN II-XII grossly intact. Psych:  Unable to evaluate due to confusion    Signed:  Jennifer Devries MD    Part of this note may have been written by using a voice dictation software. The note has been proof read but may still contain some grammatical/other typographical errors.

## 2022-08-20 NOTE — PROGRESS NOTES
Spiritual Consult Attempted. [address] checked in with RN. PT was asleep d/t current med. regime. Chart states PT is a Elicia George. [address] prayed quietly for PT, PT's Family, and Staff. Rev. Marlon Abdul M.Div.

## 2022-08-20 NOTE — PROGRESS NOTES
Fentanyl was removed from L upper chest wall and wasted, witnessed by Shae Reed RN. Post-mortem care completed. Nursing supervisor to take [place] the [facility]

## 2022-08-20 NOTE — DEATH NOTES
Hospitalist Discharge Summary for  Patient   Admit Date:  2022  6:44 PM   DC Note date: 2022  Name:  Ondina Colon   Age:  78 y.o. Sex:  male  :  1955   MRN:  [id number]   Room:  [facility]  PCP:  Mi Paris MD    Presenting Complaint: No chief complaint on file. Initial Admission Diagnosis: Prostate cancer ([facility] Salome Tan  Admission for hospice care [Z51.5]  Prostate cancer metastatic to bone ([facility].) [C61, C79.51]     Problem List for this Hospitalization (present on admission):    Principal Problem:    Prostate cancer [facility])  Active Problems:    Pain due to malignant neoplasm metastatic to bone [facility])    Admission for hospice care    Neuroendocrine carcinoma, high grade ([facility]  Resolved Problems:    * No resolved hospital problems. *      Hospital Course:  74-year-old male with a past medical history of metastatic prostate cancer status post Casodex and bilateral orchiectomy as well as chemotherapy, pathologic vertebral fracture with pathology positive for high-grade Neuroendocrine cancer status post chemotherapy, that presents in the setting of generalized pain, altered mental status and agitation. On admission, admitting provider had discussion with son at bedside to start comfort measures and pursue hospice. Patient was started on IV morphine as needed as well as IV Ativan. Hospice consulted. Patient accepted to [facility] hospice. Unfortunately prior to getting discharged to [facility] hospice, patient passed away. Time of Death:   16:30    Cause of Death:   Static prostate cancer    Time spent in patient discharge work and death certification 40 minutes. Procedures done this admission:  * No surgery found *    Consults this admission:  IP CONSULT TO HOSPICE  IP CONSULT TO SPIRITUAL SERVICES    Echocardiogram results:  No results found for this or any previous visit. Diagnostic Imaging/Tests:   No results found.       Labs: Results:       BMP, Mg, Phos No results for input(s): NA, K, CL, CO2, AGAP, BUN, CREA, CA, GLU, MG, PHOS in the last 72 hours. CBC No results for input(s): WBC, RBC, HGB, HCT, PLT, MONOS, IRA in the last 72 hours. Invalid input(s): GRANS, LYMPH, EOS, BASOS, IG, ANEU, ABL, ABM, ABB, AIG   LFT No results for input(s): ALT, TP, ALB, GLOB in the last 72 hours. Invalid input(s): SGOT, TBIL, AP, AGRAT, GPT   Cardiac Testing No results found for: BNP, CPK, RCK1, CKMB   Coagulation Tests Lab Results   Component Value Date/Time    INR 1.0 07/24/2022 12:45 PM    INR 0.9 03/22/2022 01:45 PM    INR 1.0 10/13/2020 11:45 AM    APTT 37.0 07/24/2022 12:45 PM      A1c No results found for: HBA1C   Lipid Panel Lab Results   Component Value Date/Time    CHOL 216 08/09/2021 11:12 AM    HDL 60 08/09/2021 11:12 AM    VLDL 33 08/09/2021 11:12 AM      Thyroid Panel Lab Results   Component Value Date/Time    TSH 0.955 10/26/2021 01:35 PM    TSH 1.130 10/05/2021 01:09 PM        Most Recent UA Lab Results   Component Value Date/Time    MUCUS 1+ 07/15/2022 05:55 AM    UCOM RESULTS VERIFIED MANUALLY 07/28/2022 12:45 PM          All Labs from Last 24 Hrs:  No results found for this or any previous visit (from the past 24 hour(s)).       Allergies   Allergen Reactions    Turmeric Nausea And Vomiting     Immunization History   Administered Date(s) Administered    COVID-19, MODERNA BLUE border, Primary or Immunocompromised, (age 12y+), IM, 100 mcg/0.5mL 01/26/2021, 02/25/2021    PPD Test 08/11/2022    Pneumococcal Conjugate 13-valent ([id number]) 07/27/2020    Tdap (Boostrix, Adacel) 11/09/2017       Recent Vital Data:  Patient Vitals for the past 24 hrs:   Temp Pulse Resp BP SpO2   08/20/22 1516 97.6 °F (36.4 °C) 74 18 122/63 90 %   08/20/22 1440 -- -- 18 -- --   08/20/22 1127 98 °F (36.7 °C) (!) 106 20 119/82 92 %   08/20/22 0739 97 °F (36.1 °C) (!) 105 16 118/87 93 %   08/19/22 2231 99 °F (37.2 °C) (!) 124 21 131/73 94 %   08/19/22 2042 -- (!) 112 -- -- --   08/19/22 2035 -- -- -- 122/78 91 % 08/19/22 1933 -- (!) 110 -- -- --   08/19/22 1932 -- -- -- 133/67 91 %   08/19/22 1927 -- -- -- 127/73 97 %   08/19/22 1917 -- -- -- 114/67 --   08/19/22 1902 -- -- -- 112/81 90 %   08/19/22 1847 -- -- -- 106/87 91 %   08/19/22 1846 97.9 °F (36.6 °C) (!) 111 20 106/87 91 %       Oxygen Therapy  SpO2: 90 %  Pulse Oximeter Device Mode: Continuous  O2 Device: None (Room air)    Estimated body mass index is 26.58 kg/m² as calculated from the following:    Height as of this encounter: 5' 9\" (1.753 m). Weight as of this encounter: 180 lb (81.6 kg). No intake or output data in the 24 hours ending 08/20/22 1640      Discharge Exam:  General: Lifeless  Eyes: Pupils fixed/nonreactive  Lungs: No breath sounds  Heart:  No heart sounds  Neurologic: unresponsive    Signed:  Jeanna Lange MD    Part of this note may have been written by using a voice dictation software. The note has been proof read but may still contain some grammatical/other typographical errors.

## 2022-08-20 NOTE — H&P
Admit date: 2022   Name:  Tino Mcgovern   Age:  78 y.o.   :  1955   MRN:  [id number]   PCP:  Reyes Kilpatrick MD   Provider:  Iman Pollard MD      ASSESSMENT AND PLAN  65-year-old male with a past medical history of metastatic prostate cancer status post Casodex and bilateral orchiectomy as well as chemotherapy, pathologic vertebral fracture with pathology positive for high-grade  Neuroendocrine cancer status post chemotherapy, that presents in the setting of generalized pain, altered mental status and agitation    1. Metastatic prostate cancer / High-grade neuroendocrine cancer / Severe pain and agitation  -Per discussion with only son at bedside will start comfort measures only and pursue hospice  -Start IV morphine as needed  -Start IV Ativan as needed  -Hospice consultation    Comfort measures only    Son at bedside aware of plan      CHIEF COMPLAINT:  Generalized pain, altered mental status and agitation      HISTORY OF PRESENT ILLNESS:  65-year-old male with a past medical history of metastatic prostate cancer status post Casodex and bilateral orchiectomy as well as chemotherapy, pathologic vertebral fracture with pathology positive for high-grade  Neuroendocrine cancer status post chemotherapy, that presents in the setting of generalized pain, altered mental status and agitation. Most of the history obtained by son at bedside since patient currently significantly confused on severe pain and mildly agitated. The son states that since he was discharged on 8/15/2022 the patient was at the rehab facility and has been complaining of generalized pain associated with worsening mentation and significant agitation. His pain has not been able to be controlled at the nursing facility so the son decided to bring him here to pursue hospice. Otherwise denies any other symptomatology. The patient will be admitted to the medical floors for further management.       Past Medical History:   Diagnosis Date Claustrophobia     Ear problems     Elevated PSA     Former light cigarette smoker (1-9 per day)     smoked for 35 years. quit     History of multiple allergies     Nicotine vapor product user     Personal history of prostate cancer     Prostate cancer ([facility]     prostate, neuroendocrine, mets to bone       Past Surgical History:   Procedure Laterality Date    BIOPSY PROSTATE      COLONOSCOPY  08/2020    HEENT      teeth removed     IR KYPHOPLASTY LUMBAR FIRST LEVEL  10/14/2020    IR KYPHOPLASTY LUMBAR FIRST LEVEL  10/14/2020    IR KYPHOPLASTY LUMBAR FIRST LEVEL 10/14/2020 [facility]    IR KYPHOPLASTY THORACIC FIRST LEVEL  10/29/2020    IR KYPHOPLASTY THORACIC FIRST LEVEL  10/29/2020    IR KYPHOPLASTY THORACIC FIRST LEVEL 10/29/2020 [facility]    TESTICLE REMOVAL Bilateral 02/2017          HOME MEDICATION:  Prior to Admission medications    Medication Sig Start Date End Date Taking? Authorizing Provider   cyclobenzaprine (FLEXERIL) 10 MG tablet Take 1 tablet by mouth 3 times daily as needed for Muscle spasms 8/15/22   Linnette Navas APRN - NP   dexamethasone (DECADRON) 2 MG tablet Take 1 tablet by mouth daily (with breakfast) for 10 days 8/16/22 8/26/22  Linnette Navas APRN - NP   fentaNYL (DURAGESIC) 75 MCG/HR Place 1 patch onto the skin every 48 hours for 30 days. 8/15/22 9/14/22  Linnette Navas APRN - NP   finasteride (PROSCAR) 5 MG tablet Take 1 tablet by mouth in the morning. 8/16/22   [facility] - NP   HYDROmorphone (DILAUDID) 2 MG tablet Take 1 tablet by mouth every 4 hours as needed for Pain for up to 30 days. 8/15/22 9/14/22  Linnette Rowlandra, APRN - NP   HYDROmorphone (DILAUDID) 4 MG tablet Take 1 tablet by mouth every 4 hours as needed for Pain for up to 30 days.  8/15/22 9/14/22  Linnette Navas APRN - NP   lactulose (CHRONULAC) 10 GM/15ML solution Take 30 mLs by mouth 3 times daily as needed (constipation) 8/15/22   [facility] - NP   magnesium oxide (MAG-OX) 400 (240 Mg) MG tablet Take 1 tablet by mouth in the morning and 1 tablet at noon and 1 tablet before bedtime. 8/15/22   HELEN Dowling NP   naloxegol (MOVANTIK) 12.5 MG TABS tablet Take 1 tablet by mouth every morning (before breakfast) 22   HELEN Dowling - HARRY   polyethylene glycol (GLYCOLAX) 17 g packet Take 17 g by mouth in the morning. 8/16/22 9/15/22  HELEN Dowling - HARRY   sennosides-docusate sodium (SENOKOT-S) 8.6-50 MG tablet Take 2 tablets by mouth in the morning. 22   HELEN Dowling - NP   tamsulosin (FLOMAX) 0.4 MG capsule Take 1 capsule by mouth in the morning. 22   HELEN Dowling - NP   cyclobenzaprine (FLEXERIL) 10 MG tablet Take 10 mg by mouth 3 times daily as needed 22   Historical Provider, MD   omeprazole (PRILOSEC) 40 MG delayed release capsule Take 1 capsule by mouth daily 22   Rosa Sanchez MD   lidocaine-prilocaine (EMLA) 2.5-2.5 % cream Apply a dab over the port site 30-45 minutes prior to lab/infusion appts. Cover with saran wrap or a sandwich bag.   Patient not taking: Reported on 2022   Hawk Kelly MD         REVIEW OF SYSTEMS:  Unable to obtain due to patient's condition      Social History     Tobacco Use    Smoking status: Former     Packs/day: 0.50     Types: Cigarettes     Quit date: 2016     Years since quittin.7    Smokeless tobacco: Never   Substance Use Topics    Alcohol use: Yes    Drug use: No         Family History   Problem Relation Age of Onset    Hypertension Father     Prostate Cancer Father     Cancer Father         prostate cancer         Allergies   Allergen Reactions    Turmeric Nausea And Vomiting         Vitals:    22 1932 22   BP: 133/67  122/78    Pulse:  (!) 110  (!) 112   Resp:       Temp:       TempSrc:       SpO2: 91%  91%    Weight:       Height:             PHYSICAL EXAM:  General: Confused, mildly agitated  HEENT: NC/AT, EOM are intact  Neck: supple, no JVD  Cardiovascular: RRR, S1, S2, no murmurs  Respiratory: Lungs are clear, no wheezes or rales  Abdomen: Soft, distended  Extremities: LE without pedal edema, no erythema  Neuro: Confused, mildly agitated  Skin: no rash or ulcers        I have personally reviewed patients laboratory data showing  Lab Results   Component Value Date    WBC 7.5 08/15/2022    HGB 9.1 (L) 08/15/2022    HCT 28.6 (L) 08/15/2022    .9 (H) 08/15/2022    PLT 51 (L) 08/15/2022     No results found for: CKTOTAL, CKMB, CKMBINDEX, TROPONINI  Lab Results   Component Value Date    ANIONGAP 11 08/15/2022    CALCIUM 9.4 08/15/2022     (L) 08/15/2022    K 4.2 08/15/2022    CO2 20 (L) 08/15/2022     08/15/2022    BUN 30 (H) 08/15/2022    CREATININE 1.50 08/15/2022

## 2022-08-20 NOTE — PLAN OF CARE
Problem: Pain  Goal: Verbalizes/displays adequate comfort level or baseline comfort level  Outcome: Progressing     Problem: Skin/Tissue Integrity  Goal: Absence of new skin breakdown  Description: 1. Monitor for areas of redness and/or skin breakdown  2. Assess vascular access sites hourly  3. Every 4-6 hours minimum:  Change oxygen saturation probe site  4. Every 4-6 hours:  If on nasal continuous positive airway pressure, respiratory therapy assess nares and determine need for appliance change or resting period. Outcome: Progressing     Problem: Confusion  Goal: Confusion, delirium, dementia, or psychosis is improved or at baseline  Description: INTERVENTIONS:  1. Assess for possible contributors to thought disturbance, including medications, impaired vision or hearing, underlying metabolic abnormalities, dehydration, psychiatric diagnoses, and notify attending LIP  2. Whipple high risk fall precautions, as indicated  3. Provide frequent short contacts to provide reality reorientation, refocusing and direction  4. Decrease environmental stimuli, including noise as appropriate  5. Monitor and intervene to maintain adequate nutrition, hydration, elimination, sleep and activity  6. If unable to ensure safety without constant attention obtain sitter and review sitter guidelines with assigned personnel  7.  Initiate Psychosocial CNS and Spiritual Care consult, as indicated  Outcome: Progressing     Problem: Safety - Adult  Goal: Free from fall injury  Outcome: Progressing

## 2022-09-08 LAB — PLATELET # BLD AUTO: 79 K/UL (ref 150–450)

## 2023-05-16 NOTE — PROGRESS NOTES
Arrived to the [facility]. Neulasta completed. Provided education on same. Patient instructed to report any side affects to ordering provider. Patient tolerated well. Any issues or concerns during appointment: none. Discharged ambulatory. Statement Selected

## (undated) DEVICE — TRAY PREP DRY W/ PREM GLV 2 APPL 6 SPNG 2 UNDPD 1 OVERWRAP

## (undated) DEVICE — BUTTON SWITCH PENCIL BLADE ELECTRODE, HOLSTER: Brand: EDGE

## (undated) DEVICE — REM POLYHESIVE ADULT PATIENT RETURN ELECTRODE: Brand: VALLEYLAB

## (undated) DEVICE — AMD ANTIMICROBIAL GAUZE SPONGES,12 PLY USP TYPE VII, 0.2% POLYHEXAMETHYLENE BIGUANIDE HCI (PHMB): Brand: CURITY

## (undated) DEVICE — SOL IRR GLYC 1.5 % 3000ML --

## (undated) DEVICE — SUTURE VCRL SZ 0 L36IN ABSRB UD L36MM CT-1 1/2 CIR J946H

## (undated) DEVICE — SYR 10ML LUER LOK 1/5ML GRAD --

## (undated) DEVICE — DRAIN WND PENRS RADPQ 0.25X12 --

## (undated) DEVICE — SHEET, T, LAPAROTOMY, STERILE: Brand: MEDLINE

## (undated) DEVICE — TURP TURB: Brand: MEDLINE INDUSTRIES, INC.

## (undated) DEVICE — SUTURE ETHBND EXCEL SZ 0 L30IN NONABSORBABLE GRN CT1 L36MM X424H

## (undated) DEVICE — ELECTRD CUT LP ANG 27FR BRN

## (undated) DEVICE — SOLUTION IV 1000ML 0.9% SOD CHL

## (undated) DEVICE — SURGICAL PROCEDURE PACK BASIC ST FRANCIS

## (undated) DEVICE — SOLUTION IRRIG 1000ML H2O STRL BLT

## (undated) DEVICE — SPONGE: SPECIALTY PEANUT XR 100/CS: Brand: MEDICAL ACTION INDUSTRIES

## (undated) DEVICE — BAG,DRAINAGE,4L,A/R TOWER,LL,SLIDE TAP: Brand: MEDLINE

## (undated) DEVICE — DEVICE STBL AD TRICOT ANCHR PD FOR 3 W F CATH STATLOK

## (undated) DEVICE — CATHETER URETH 24FR BLLN 30CC STD LTX 3 W TWO OPP DRNGE EYE

## (undated) DEVICE — CONTAINER SPEC HISTOLOGY 900ML POLYPR

## (undated) DEVICE — SPONGE LAP 18X18IN STRL -- 5/PK

## (undated) DEVICE — CUTTING LOOP, 27FR. ANGLED, STERILE: Brand: N.A.

## (undated) DEVICE — BLADE ASSEMB CLP HAIR FINE --

## (undated) DEVICE — SOLUTION,WATER,IRRIGATION,1000ML,STERILE: Brand: MEDLINE

## (undated) DEVICE — CONTAINER SPEC FRMLN 120ML --

## (undated) DEVICE — KENDALL SCD EXPRESS SLEEVES, KNEE LENGTH, MEDIUM: Brand: KENDALL SCD